# Patient Record
Sex: FEMALE | Race: WHITE | NOT HISPANIC OR LATINO | Employment: UNEMPLOYED | URBAN - METROPOLITAN AREA
[De-identification: names, ages, dates, MRNs, and addresses within clinical notes are randomized per-mention and may not be internally consistent; named-entity substitution may affect disease eponyms.]

---

## 2017-01-23 ENCOUNTER — ALLSCRIPTS OFFICE VISIT (OUTPATIENT)
Dept: OTHER | Facility: OTHER | Age: 57
End: 2017-01-23

## 2017-01-23 LAB — FLUAV AG SPEC QL IA: NEGATIVE

## 2017-02-08 ENCOUNTER — GENERIC CONVERSION - ENCOUNTER (OUTPATIENT)
Dept: OTHER | Facility: OTHER | Age: 57
End: 2017-02-08

## 2017-02-10 ENCOUNTER — ALLSCRIPTS OFFICE VISIT (OUTPATIENT)
Dept: OTHER | Facility: OTHER | Age: 57
End: 2017-02-10

## 2017-02-11 ENCOUNTER — GENERIC CONVERSION - ENCOUNTER (OUTPATIENT)
Dept: OTHER | Facility: OTHER | Age: 57
End: 2017-02-11

## 2017-03-22 ENCOUNTER — GENERIC CONVERSION - ENCOUNTER (OUTPATIENT)
Dept: OTHER | Facility: OTHER | Age: 57
End: 2017-03-22

## 2017-04-11 ENCOUNTER — GENERIC CONVERSION - ENCOUNTER (OUTPATIENT)
Dept: OTHER | Facility: OTHER | Age: 57
End: 2017-04-11

## 2017-04-22 ENCOUNTER — APPOINTMENT (OUTPATIENT)
Dept: LAB | Facility: MEDICAL CENTER | Age: 57
End: 2017-04-22
Payer: COMMERCIAL

## 2017-04-22 DIAGNOSIS — D89.2 HYPERGAMMAGLOBULINEMIA: ICD-10-CM

## 2017-04-22 DIAGNOSIS — R74.8 ABNORMAL LEVELS OF OTHER SERUM ENZYMES: ICD-10-CM

## 2017-04-22 DIAGNOSIS — E78.5 HYPERLIPIDEMIA: ICD-10-CM

## 2017-04-22 LAB
ALBUMIN SERPL BCP-MCNC: 3.3 G/DL (ref 3.5–5)
ALP SERPL-CCNC: 294 U/L (ref 46–116)
ALT SERPL W P-5'-P-CCNC: 46 U/L (ref 12–78)
ANION GAP SERPL CALCULATED.3IONS-SCNC: 5 MMOL/L (ref 4–13)
AST SERPL W P-5'-P-CCNC: 37 U/L (ref 5–45)
BASOPHILS # BLD AUTO: 0.02 THOUSANDS/ΜL (ref 0–0.1)
BASOPHILS NFR BLD AUTO: 0 % (ref 0–1)
BILIRUB SERPL-MCNC: 0.45 MG/DL (ref 0.2–1)
BUN SERPL-MCNC: 19 MG/DL (ref 5–25)
CALCIUM SERPL-MCNC: 8.9 MG/DL (ref 8.3–10.1)
CHLORIDE SERPL-SCNC: 100 MMOL/L (ref 100–108)
CHOLEST SERPL-MCNC: 233 MG/DL (ref 50–200)
CO2 SERPL-SCNC: 31 MMOL/L (ref 21–32)
CREAT SERPL-MCNC: 0.82 MG/DL (ref 0.6–1.3)
EOSINOPHIL # BLD AUTO: 0.16 THOUSAND/ΜL (ref 0–0.61)
EOSINOPHIL NFR BLD AUTO: 3 % (ref 0–6)
ERYTHROCYTE [DISTWIDTH] IN BLOOD BY AUTOMATED COUNT: 13.2 % (ref 11.6–15.1)
GFR SERPL CREATININE-BSD FRML MDRD: >60 ML/MIN/1.73SQ M
GLUCOSE P FAST SERPL-MCNC: 90 MG/DL (ref 65–99)
HCT VFR BLD AUTO: 40 % (ref 34.8–46.1)
HDLC SERPL-MCNC: 72 MG/DL (ref 40–60)
HGB BLD-MCNC: 12.7 G/DL (ref 11.5–15.4)
IGA SERPL-MCNC: 60 MG/DL (ref 70–400)
IGG SERPL-MCNC: 2580 MG/DL (ref 700–1600)
IGM SERPL-MCNC: 259 MG/DL (ref 40–230)
LDLC SERPL DIRECT ASSAY-MCNC: 121 MG/DL (ref 0–100)
LYMPHOCYTES # BLD AUTO: 2.06 THOUSANDS/ΜL (ref 0.6–4.47)
LYMPHOCYTES NFR BLD AUTO: 35 % (ref 14–44)
MCH RBC QN AUTO: 29.3 PG (ref 26.8–34.3)
MCHC RBC AUTO-ENTMCNC: 31.8 G/DL (ref 31.4–37.4)
MCV RBC AUTO: 92 FL (ref 82–98)
MONOCYTES # BLD AUTO: 0.42 THOUSAND/ΜL (ref 0.17–1.22)
MONOCYTES NFR BLD AUTO: 7 % (ref 4–12)
NEUTROPHILS # BLD AUTO: 3.24 THOUSANDS/ΜL (ref 1.85–7.62)
NEUTS SEG NFR BLD AUTO: 55 % (ref 43–75)
NRBC BLD AUTO-RTO: 0 /100 WBCS
PLATELET # BLD AUTO: 239 THOUSANDS/UL (ref 149–390)
PMV BLD AUTO: 11.2 FL (ref 8.9–12.7)
POTASSIUM SERPL-SCNC: 4.4 MMOL/L (ref 3.5–5.3)
PROT SERPL-MCNC: 8.7 G/DL (ref 6.4–8.2)
RBC # BLD AUTO: 4.34 MILLION/UL (ref 3.81–5.12)
SODIUM SERPL-SCNC: 136 MMOL/L (ref 136–145)
TRIGL SERPL-MCNC: 136 MG/DL
WBC # BLD AUTO: 5.93 THOUSAND/UL (ref 4.31–10.16)

## 2017-04-22 PROCEDURE — 82784 ASSAY IGA/IGD/IGG/IGM EACH: CPT

## 2017-04-22 PROCEDURE — 83721 ASSAY OF BLOOD LIPOPROTEIN: CPT

## 2017-04-22 PROCEDURE — 84165 PROTEIN E-PHORESIS SERUM: CPT

## 2017-04-22 PROCEDURE — 85025 COMPLETE CBC W/AUTO DIFF WBC: CPT

## 2017-04-22 PROCEDURE — 80061 LIPID PANEL: CPT

## 2017-04-22 PROCEDURE — 80053 COMPREHEN METABOLIC PANEL: CPT

## 2017-04-22 PROCEDURE — 36415 COLL VENOUS BLD VENIPUNCTURE: CPT

## 2017-04-24 ENCOUNTER — TRANSCRIBE ORDERS (OUTPATIENT)
Dept: ADMINISTRATIVE | Facility: HOSPITAL | Age: 57
End: 2017-04-24

## 2017-04-24 ENCOUNTER — ALLSCRIPTS OFFICE VISIT (OUTPATIENT)
Dept: OTHER | Facility: OTHER | Age: 57
End: 2017-04-24

## 2017-04-24 DIAGNOSIS — D47.2 MONOCLONAL PARAPROTEINEMIA: Primary | ICD-10-CM

## 2017-04-24 LAB
ALBUMIN SERPL ELPH-MCNC: 3.92 G/DL (ref 3.5–5)
ALBUMIN SERPL ELPH-MCNC: 46.1 % (ref 52–65)
ALPHA1 GLOB SERPL ELPH-MCNC: 0.3 G/DL (ref 0.1–0.4)
ALPHA1 GLOB SERPL ELPH-MCNC: 3.5 % (ref 2.5–5)
ALPHA2 GLOB SERPL ELPH-MCNC: 0.95 G/DL (ref 0.4–1.2)
ALPHA2 GLOB SERPL ELPH-MCNC: 11.2 % (ref 7–13)
BETA GLOB ABNORMAL SERPL ELPH-MCNC: 0.51 G/DL (ref 0.4–0.8)
BETA1 GLOB SERPL ELPH-MCNC: 6 % (ref 5–13)
BETA2 GLOB SERPL ELPH-MCNC: 3.9 % (ref 2–8)
BETA2+GAMMA GLOB SERPL ELPH-MCNC: 0.33 G/DL (ref 0.2–0.5)
GAMMA GLOB ABNORMAL SERPL ELPH-MCNC: 2.49 G/DL (ref 0.5–1.6)
GAMMA GLOB SERPL ELPH-MCNC: 29.3 % (ref 12–22)
IGG/ALB SER: 0.86 {RATIO} (ref 1.1–1.8)
M PROTEIN 1 MFR SERPL ELPH: 23 %
M PROTEIN 1 SERPL ELPH-MCNC: 1.96 G/DL
PROT SERPL-MCNC: 8.5 G/DL (ref 6.4–8.2)

## 2017-05-03 ENCOUNTER — ALLSCRIPTS OFFICE VISIT (OUTPATIENT)
Dept: OTHER | Facility: OTHER | Age: 57
End: 2017-05-03

## 2017-05-09 ENCOUNTER — GENERIC CONVERSION - ENCOUNTER (OUTPATIENT)
Dept: OTHER | Facility: OTHER | Age: 57
End: 2017-05-09

## 2017-05-09 ENCOUNTER — HOSPITAL ENCOUNTER (OUTPATIENT)
Dept: NON INVASIVE DIAGNOSTICS | Facility: CLINIC | Age: 57
Discharge: HOME | End: 2017-05-09
Payer: COMMERCIAL

## 2017-05-09 DIAGNOSIS — D47.2 MONOCLONAL PARAPROTEINEMIA: ICD-10-CM

## 2017-05-09 PROCEDURE — 78306 BONE IMAGING WHOLE BODY: CPT

## 2017-05-09 PROCEDURE — A9503 TC99M MEDRONATE: HCPCS

## 2017-05-10 ENCOUNTER — TRANSCRIBE ORDERS (OUTPATIENT)
Dept: LAB | Facility: CLINIC | Age: 57
End: 2017-05-10

## 2017-05-10 ENCOUNTER — APPOINTMENT (OUTPATIENT)
Dept: LAB | Facility: CLINIC | Age: 57
End: 2017-05-10
Payer: COMMERCIAL

## 2017-05-10 ENCOUNTER — ALLSCRIPTS OFFICE VISIT (OUTPATIENT)
Dept: OTHER | Facility: OTHER | Age: 57
End: 2017-05-10

## 2017-05-10 DIAGNOSIS — G89.4 CHRONIC PAIN SYNDROME: ICD-10-CM

## 2017-05-10 LAB
CRP SERPL QL: 4.8 MG/L
ERYTHROCYTE [DISTWIDTH] IN BLOOD BY AUTOMATED COUNT: 13 % (ref 11.6–15.1)
ERYTHROCYTE [SEDIMENTATION RATE] IN BLOOD: 56 MM/HOUR (ref 0–20)
HCT VFR BLD AUTO: 40.8 % (ref 34.8–46.1)
HGB BLD-MCNC: 12.9 G/DL (ref 11.5–15.4)
MCH RBC QN AUTO: 28.7 PG (ref 26.8–34.3)
MCHC RBC AUTO-ENTMCNC: 31.6 G/DL (ref 31.4–37.4)
MCV RBC AUTO: 91 FL (ref 82–98)
PLATELET # BLD AUTO: 244 THOUSANDS/UL (ref 149–390)
PMV BLD AUTO: 10.7 FL (ref 8.9–12.7)
RBC # BLD AUTO: 4.5 MILLION/UL (ref 3.81–5.12)
WBC # BLD AUTO: 5.43 THOUSAND/UL (ref 4.31–10.16)

## 2017-05-10 PROCEDURE — 86618 LYME DISEASE ANTIBODY: CPT

## 2017-05-10 PROCEDURE — 85027 COMPLETE CBC AUTOMATED: CPT

## 2017-05-10 PROCEDURE — 86430 RHEUMATOID FACTOR TEST QUAL: CPT

## 2017-05-10 PROCEDURE — 36415 COLL VENOUS BLD VENIPUNCTURE: CPT

## 2017-05-10 PROCEDURE — 86140 C-REACTIVE PROTEIN: CPT

## 2017-05-10 PROCEDURE — 81374 HLA I TYPING 1 ANTIGEN LR: CPT

## 2017-05-10 PROCEDURE — 86038 ANTINUCLEAR ANTIBODIES: CPT

## 2017-05-10 PROCEDURE — 85652 RBC SED RATE AUTOMATED: CPT

## 2017-05-11 ENCOUNTER — GENERIC CONVERSION - ENCOUNTER (OUTPATIENT)
Dept: OTHER | Facility: OTHER | Age: 57
End: 2017-05-11

## 2017-05-11 LAB
B BURGDOR IGG SER IA-ACNC: 0.22
B BURGDOR IGM SER IA-ACNC: 0.12
RHEUMATOID FACT SER QL LA: NEGATIVE

## 2017-05-12 ENCOUNTER — ALLSCRIPTS OFFICE VISIT (OUTPATIENT)
Dept: OTHER | Facility: OTHER | Age: 57
End: 2017-05-12

## 2017-05-12 ENCOUNTER — GENERIC CONVERSION - ENCOUNTER (OUTPATIENT)
Dept: OTHER | Facility: OTHER | Age: 57
End: 2017-05-12

## 2017-05-16 LAB
HLA-B27 QL NAA+PROBE: NEGATIVE
RYE IGE QN: NEGATIVE

## 2017-05-19 ENCOUNTER — GENERIC CONVERSION - ENCOUNTER (OUTPATIENT)
Dept: OTHER | Facility: OTHER | Age: 57
End: 2017-05-19

## 2017-05-19 ENCOUNTER — HOSPITAL ENCOUNTER (OUTPATIENT)
Dept: RADIOLOGY | Facility: HOSPITAL | Age: 57
Discharge: HOME | End: 2017-05-19
Attending: INTERNAL MEDICINE | Admitting: RADIOLOGY
Payer: COMMERCIAL

## 2017-05-19 VITALS
BODY MASS INDEX: 25.18 KG/M2 | WEIGHT: 170 LBS | DIASTOLIC BLOOD PRESSURE: 67 MMHG | TEMPERATURE: 98.3 F | OXYGEN SATURATION: 98 % | RESPIRATION RATE: 18 BRPM | HEART RATE: 65 BPM | SYSTOLIC BLOOD PRESSURE: 109 MMHG | HEIGHT: 69 IN

## 2017-05-19 DIAGNOSIS — D47.2 MONOCLONAL GAMMOPATHY OF UNKNOWN SIGNIFICANCE: ICD-10-CM

## 2017-05-19 DIAGNOSIS — G62.9 PERIPHERAL POLYNEUROPATHY: ICD-10-CM

## 2017-05-19 PROCEDURE — 88342 IMHCHEM/IMCYTCHM 1ST ANTB: CPT | Performed by: INTERNAL MEDICINE

## 2017-05-19 PROCEDURE — 88365 INSITU HYBRIDIZATION (FISH): CPT | Performed by: INTERNAL MEDICINE

## 2017-05-19 PROCEDURE — 38220 DX BONE MARROW ASPIRATIONS: CPT

## 2017-05-19 PROCEDURE — 88374 M/PHMTRC ALYS ISHQUANT/SEMIQ: CPT

## 2017-05-19 PROCEDURE — 88373 M/PHMTRC ALYS ISHQUANT/SEMIQ: CPT

## 2017-05-19 PROCEDURE — 88313 SPECIAL STAINS GROUP 2: CPT | Performed by: INTERNAL MEDICINE

## 2017-05-19 PROCEDURE — 88185 FLOWCYTOMETRY/TC ADD-ON: CPT

## 2017-05-19 PROCEDURE — 88367 INSITU HYBRIDIZATION AUTO: CPT

## 2017-05-19 PROCEDURE — 77012 CT SCAN FOR NEEDLE BIOPSY: CPT

## 2017-05-19 PROCEDURE — 88184 FLOWCYTOMETRY/ TC 1 MARKER: CPT | Performed by: INTERNAL MEDICINE

## 2017-05-19 PROCEDURE — 88311 DECALCIFY TISSUE: CPT | Performed by: INTERNAL MEDICINE

## 2017-05-19 PROCEDURE — 85097 BONE MARROW INTERPRETATION: CPT | Performed by: INTERNAL MEDICINE

## 2017-05-19 PROCEDURE — 88341 IMHCHEM/IMCYTCHM EA ADD ANTB: CPT | Performed by: INTERNAL MEDICINE

## 2017-05-19 PROCEDURE — 88305 TISSUE EXAM BY PATHOLOGIST: CPT | Performed by: INTERNAL MEDICINE

## 2017-05-19 PROCEDURE — 36415 COLL VENOUS BLD VENIPUNCTURE: CPT

## 2017-05-19 PROCEDURE — 38221 DX BONE MARROW BIOPSIES: CPT

## 2017-05-19 RX ORDER — DIPHENHYDRAMINE HYDROCHLORIDE 50 MG/ML
INJECTION INTRAMUSCULAR; INTRAVENOUS CODE/TRAUMA/SEDATION MEDICATION
Status: COMPLETED | OUTPATIENT
Start: 2017-05-19 | End: 2017-05-19

## 2017-05-19 RX ORDER — SODIUM CHLORIDE 9 MG/ML
75 INJECTION, SOLUTION INTRAVENOUS CONTINUOUS
Status: DISCONTINUED | OUTPATIENT
Start: 2017-05-19 | End: 2017-05-19 | Stop reason: HOSPADM

## 2017-05-19 RX ORDER — MIDAZOLAM HYDROCHLORIDE 1 MG/ML
INJECTION INTRAMUSCULAR; INTRAVENOUS CODE/TRAUMA/SEDATION MEDICATION
Status: COMPLETED | OUTPATIENT
Start: 2017-05-19 | End: 2017-05-19

## 2017-05-19 RX ORDER — FENTANYL CITRATE 50 UG/ML
INJECTION, SOLUTION INTRAMUSCULAR; INTRAVENOUS CODE/TRAUMA/SEDATION MEDICATION
Status: COMPLETED | OUTPATIENT
Start: 2017-05-19 | End: 2017-05-19

## 2017-05-19 RX ORDER — PAROXETINE 30 MG/1
30 TABLET, FILM COATED ORAL EVERY MORNING
COMMUNITY
End: 2018-03-23

## 2017-05-19 RX ADMIN — MIDAZOLAM HYDROCHLORIDE 2 MG: 1 INJECTION, SOLUTION INTRAMUSCULAR; INTRAVENOUS at 10:13

## 2017-05-19 RX ADMIN — FENTANYL CITRATE 50 MCG: 50 INJECTION, SOLUTION INTRAMUSCULAR; INTRAVENOUS at 10:13

## 2017-05-19 RX ADMIN — MIDAZOLAM HYDROCHLORIDE 2 MG: 1 INJECTION, SOLUTION INTRAMUSCULAR; INTRAVENOUS at 10:18

## 2017-05-19 RX ADMIN — DIPHENHYDRAMINE HYDROCHLORIDE 25 MG: 50 INJECTION, SOLUTION INTRAMUSCULAR; INTRAVENOUS at 10:28

## 2017-05-19 RX ADMIN — SODIUM CHLORIDE 75 ML/HR: 0.9 INJECTION, SOLUTION INTRAVENOUS at 08:28

## 2017-05-22 LAB — SCAN RESULT: NORMAL

## 2017-06-02 ENCOUNTER — GENERIC CONVERSION - ENCOUNTER (OUTPATIENT)
Dept: OTHER | Facility: OTHER | Age: 57
End: 2017-06-02

## 2017-06-05 ENCOUNTER — ALLSCRIPTS OFFICE VISIT (OUTPATIENT)
Dept: OTHER | Facility: OTHER | Age: 57
End: 2017-06-05

## 2017-06-05 DIAGNOSIS — K74.3 PRIMARY BILIARY CHOLANGITIS (HCC): ICD-10-CM

## 2017-06-09 ENCOUNTER — ALLSCRIPTS OFFICE VISIT (OUTPATIENT)
Dept: OTHER | Facility: OTHER | Age: 57
End: 2017-06-09

## 2017-06-16 ENCOUNTER — ALLSCRIPTS OFFICE VISIT (OUTPATIENT)
Dept: OTHER | Facility: OTHER | Age: 57
End: 2017-06-16

## 2017-06-23 ENCOUNTER — ALLSCRIPTS OFFICE VISIT (OUTPATIENT)
Dept: OTHER | Facility: OTHER | Age: 57
End: 2017-06-23

## 2017-06-23 ENCOUNTER — HOSPITAL ENCOUNTER (OUTPATIENT)
Dept: INFUSION CENTER | Facility: CLINIC | Age: 57
Discharge: HOME | End: 2017-06-23
Payer: COMMERCIAL

## 2017-06-23 VITALS
SYSTOLIC BLOOD PRESSURE: 112 MMHG | DIASTOLIC BLOOD PRESSURE: 72 MMHG | WEIGHT: 179 LBS | OXYGEN SATURATION: 97 % | TEMPERATURE: 98.7 F | HEART RATE: 80 BPM | BODY MASS INDEX: 25.62 KG/M2 | RESPIRATION RATE: 20 BRPM | HEIGHT: 70 IN

## 2017-06-23 PROCEDURE — 96365 THER/PROPH/DIAG IV INF INIT: CPT

## 2017-06-23 RX ORDER — ZOLEDRONIC ACID 0.04 MG/ML
4 INJECTION, SOLUTION INTRAVENOUS ONCE
Status: COMPLETED | OUTPATIENT
Start: 2017-06-23 | End: 2017-06-23

## 2017-06-23 RX ORDER — SODIUM CHLORIDE 9 MG/ML
20 INJECTION, SOLUTION INTRAVENOUS CONTINUOUS
Status: DISCONTINUED | OUTPATIENT
Start: 2017-06-23 | End: 2017-06-26 | Stop reason: HOSPADM

## 2017-06-23 RX ADMIN — ZOLEDRONIC ACID 4 MG: 0.04 INJECTION, SOLUTION INTRAVENOUS at 12:32

## 2017-06-23 RX ADMIN — SODIUM CHLORIDE 20 ML/HR: 0.9 INJECTION, SOLUTION INTRAVENOUS at 11:40

## 2017-06-23 NOTE — PROGRESS NOTES
Pt at clinic for first treatment of Zometa, talked to Kiana Castellano RN ok to use creat from April 22, 2017

## 2017-06-26 ENCOUNTER — GENERIC CONVERSION - ENCOUNTER (OUTPATIENT)
Dept: OTHER | Facility: OTHER | Age: 57
End: 2017-06-26

## 2017-07-19 ENCOUNTER — GENERIC CONVERSION - ENCOUNTER (OUTPATIENT)
Dept: OTHER | Facility: OTHER | Age: 57
End: 2017-07-19

## 2017-07-27 ENCOUNTER — HOSPITAL ENCOUNTER (OUTPATIENT)
Dept: RADIOLOGY | Age: 57
Discharge: HOME | End: 2017-07-27
Payer: COMMERCIAL

## 2017-07-27 DIAGNOSIS — C90.00 MULTIPLE MYELOMA NOT HAVING ACHIEVED REMISSION (HCC): ICD-10-CM

## 2017-07-27 LAB — GLUCOSE SERPL-MCNC: 107 MG/DL (ref 65–140)

## 2017-07-27 PROCEDURE — 78815 PET IMAGE W/CT SKULL-THIGH: CPT

## 2017-07-27 PROCEDURE — 82948 REAGENT STRIP/BLOOD GLUCOSE: CPT

## 2017-07-27 PROCEDURE — A9552 F18 FDG: HCPCS

## 2017-07-27 RX ADMIN — IOHEXOL 5 ML: 240 INJECTION, SOLUTION INTRATHECAL; INTRAVASCULAR; INTRAVENOUS; ORAL at 07:20

## 2017-07-31 ENCOUNTER — ALLSCRIPTS OFFICE VISIT (OUTPATIENT)
Dept: OTHER | Facility: OTHER | Age: 57
End: 2017-07-31

## 2017-08-25 ENCOUNTER — ALLSCRIPTS OFFICE VISIT (OUTPATIENT)
Dept: OTHER | Facility: OTHER | Age: 57
End: 2017-08-25

## 2017-08-27 ENCOUNTER — GENERIC CONVERSION - ENCOUNTER (OUTPATIENT)
Dept: OTHER | Facility: OTHER | Age: 57
End: 2017-08-27

## 2017-09-11 ENCOUNTER — ALLSCRIPTS OFFICE VISIT (OUTPATIENT)
Dept: OTHER | Facility: OTHER | Age: 57
End: 2017-09-11

## 2017-09-11 DIAGNOSIS — C90.00 MULTIPLE MYELOMA NOT HAVING ACHIEVED REMISSION (HCC): ICD-10-CM

## 2017-09-22 ENCOUNTER — GENERIC CONVERSION - ENCOUNTER (OUTPATIENT)
Dept: OTHER | Facility: OTHER | Age: 57
End: 2017-09-22

## 2017-10-03 ENCOUNTER — GENERIC CONVERSION - ENCOUNTER (OUTPATIENT)
Dept: OTHER | Facility: OTHER | Age: 57
End: 2017-10-03

## 2017-10-06 ENCOUNTER — APPOINTMENT (OUTPATIENT)
Dept: LAB | Facility: CLINIC | Age: 57
End: 2017-10-06
Payer: COMMERCIAL

## 2017-10-06 DIAGNOSIS — C90.00 MULTIPLE MYELOMA NOT HAVING ACHIEVED REMISSION (HCC): ICD-10-CM

## 2017-10-06 DIAGNOSIS — K74.3 PRIMARY BILIARY CHOLANGITIS (HCC): ICD-10-CM

## 2017-10-06 LAB
ALBUMIN SERPL BCP-MCNC: 3.2 G/DL (ref 3.5–5)
ALP SERPL-CCNC: 268 U/L (ref 46–116)
ALT SERPL W P-5'-P-CCNC: 52 U/L (ref 12–78)
ANION GAP SERPL CALCULATED.3IONS-SCNC: 6 MMOL/L (ref 4–13)
AST SERPL W P-5'-P-CCNC: 43 U/L (ref 5–45)
BASOPHILS # BLD AUTO: 0.03 THOUSANDS/ΜL (ref 0–0.1)
BASOPHILS NFR BLD AUTO: 1 % (ref 0–1)
BILIRUB SERPL-MCNC: 0.44 MG/DL (ref 0.2–1)
BUN SERPL-MCNC: 20 MG/DL (ref 5–25)
CALCIUM SERPL-MCNC: 8.7 MG/DL (ref 8.3–10.1)
CHLORIDE SERPL-SCNC: 102 MMOL/L (ref 100–108)
CO2 SERPL-SCNC: 29 MMOL/L (ref 21–32)
CREAT SERPL-MCNC: 0.97 MG/DL (ref 0.6–1.3)
EOSINOPHIL # BLD AUTO: 0.11 THOUSAND/ΜL (ref 0–0.61)
EOSINOPHIL NFR BLD AUTO: 2 % (ref 0–6)
ERYTHROCYTE [DISTWIDTH] IN BLOOD BY AUTOMATED COUNT: 13.6 % (ref 11.6–15.1)
GFR SERPL CREATININE-BSD FRML MDRD: 65 ML/MIN/1.73SQ M
GLUCOSE P FAST SERPL-MCNC: 108 MG/DL (ref 65–99)
HCT VFR BLD AUTO: 38.9 % (ref 34.8–46.1)
HGB BLD-MCNC: 12.5 G/DL (ref 11.5–15.4)
LYMPHOCYTES # BLD AUTO: 1.84 THOUSANDS/ΜL (ref 0.6–4.47)
LYMPHOCYTES NFR BLD AUTO: 31 % (ref 14–44)
MCH RBC QN AUTO: 29.1 PG (ref 26.8–34.3)
MCHC RBC AUTO-ENTMCNC: 32.1 G/DL (ref 31.4–37.4)
MCV RBC AUTO: 91 FL (ref 82–98)
MONOCYTES # BLD AUTO: 0.42 THOUSAND/ΜL (ref 0.17–1.22)
MONOCYTES NFR BLD AUTO: 7 % (ref 4–12)
NEUTROPHILS # BLD AUTO: 3.59 THOUSANDS/ΜL (ref 1.85–7.62)
NEUTS SEG NFR BLD AUTO: 59 % (ref 43–75)
NRBC BLD AUTO-RTO: 0 /100 WBCS
PLATELET # BLD AUTO: 208 THOUSANDS/UL (ref 149–390)
PMV BLD AUTO: 11.3 FL (ref 8.9–12.7)
POTASSIUM SERPL-SCNC: 3.9 MMOL/L (ref 3.5–5.3)
PROT SERPL-MCNC: 8.7 G/DL (ref 6.4–8.2)
RBC # BLD AUTO: 4.3 MILLION/UL (ref 3.81–5.12)
SODIUM SERPL-SCNC: 137 MMOL/L (ref 136–145)
WBC # BLD AUTO: 6.01 THOUSAND/UL (ref 4.31–10.16)

## 2017-10-06 PROCEDURE — 83883 ASSAY NEPHELOMETRY NOT SPEC: CPT

## 2017-10-06 PROCEDURE — 85025 COMPLETE CBC W/AUTO DIFF WBC: CPT

## 2017-10-06 PROCEDURE — 80053 COMPREHEN METABOLIC PANEL: CPT

## 2017-10-06 PROCEDURE — 36415 COLL VENOUS BLD VENIPUNCTURE: CPT

## 2017-10-07 LAB
KAPPA LC FREE SER-MCNC: 37 MG/L (ref 3.3–19.4)
KAPPA LC FREE/LAMBDA FREE SER: 2.55 {RATIO} (ref 0.26–1.65)
LAMBDA LC FREE SERPL-MCNC: 14.5 MG/L (ref 5.7–26.3)

## 2017-10-14 ENCOUNTER — HOSPITAL ENCOUNTER (OUTPATIENT)
Dept: INFUSION CENTER | Facility: CLINIC | Age: 57
Discharge: HOME | End: 2017-10-14
Payer: COMMERCIAL

## 2017-10-14 VITALS
OXYGEN SATURATION: 98 % | WEIGHT: 181.2 LBS | SYSTOLIC BLOOD PRESSURE: 118 MMHG | HEART RATE: 72 BPM | BODY MASS INDEX: 25.94 KG/M2 | HEIGHT: 70 IN | RESPIRATION RATE: 20 BRPM | DIASTOLIC BLOOD PRESSURE: 74 MMHG | TEMPERATURE: 98.1 F

## 2017-10-14 PROCEDURE — 96365 THER/PROPH/DIAG IV INF INIT: CPT

## 2017-10-14 RX ORDER — SODIUM CHLORIDE 9 MG/ML
20 INJECTION, SOLUTION INTRAVENOUS CONTINUOUS
Status: DISCONTINUED | OUTPATIENT
Start: 2017-10-14 | End: 2017-10-17 | Stop reason: HOSPADM

## 2017-10-14 RX ORDER — ZOLEDRONIC ACID 0.04 MG/ML
4 INJECTION, SOLUTION INTRAVENOUS ONCE
Status: DISCONTINUED | OUTPATIENT
Start: 2017-10-14 | End: 2017-10-17 | Stop reason: HOSPADM

## 2017-10-14 RX ADMIN — SODIUM CHLORIDE 20 ML/HR: 0.9 INJECTION, SOLUTION INTRAVENOUS at 12:15

## 2017-10-14 RX ADMIN — ZOLEDRONIC ACID 3.5 MG: 0.8 INJECTION, SOLUTION, CONCENTRATE INTRAVENOUS at 12:31

## 2017-10-14 NOTE — PROGRESS NOTES
Pt resting with no complaints  Vitals stable; labs drawn prior to treatment  Callbell within reach; will continue to monitor

## 2017-10-26 NOTE — PROGRESS NOTES
Assessment  1  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)    Plan  Smoldering multiple myeloma (SMM)    · (1) CBC/PLT/DIFF; Status:Active; Requested for:28Jyb7347;    Perform:[facility]; Due:43Ihr8923; Ordered; For:Smoldering multiple myeloma (SMM); Ordered By:Faroun, Wilmer Patches [facility]);   · (1) COMPREHENSIVE METABOLIC PANEL; Status:Active; Requested for:00Xkg4263;    Perform:[facility]; Due:55Dgt5559; Ordered; For:Smoldering multiple myeloma (SMM); Ordered By:Faroun, Wilmer Patches [facility]);   · (1) FREE LIGHT CHAINS, SERUM; Status:Active; Requested for:83Xxs4393;    Perform:[facility]; Due:35Ltu6929; Ordered; For:Smoldering multiple myeloma (SMM); Ordered By:Faroun, Wilmer Patches [facility]);   · (Q) IMMUNOFIXATION IGA,IGG,IGM QT  IMMUNOFIXATION SERUM IMMUNOGLOBULIN;  Status:Active; Requested for:19Uxf4129;    Perform:[facility]; Due:46Vnz7392; Ordered; For:Smoldering multiple myeloma (SMM); Ordered By:Faroun, Wilmer Patches [facility]); · Follow-up visit in 6 months Evaluation and Treatment  Follow-up  Status: Hold For -  Scheduling  Requested for: 10Dri5304   Ordered; For: Smoldering multiple myeloma (SMM); Ordered By: Ciera Carlos) Performed:  Due: 79LUL8608    Discussion/Summary  Discussion Summary:   #1  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, M protein 1 93 g/dL in April 2017, no evidence of an organ damage such as anemia, hypercalcemia or renal insufficiency, bone marrow biopsy showed 15% plasma cells, with normal cytogenetics as well as fish panel for multiple myelomaproceed with dose #2 out of 4 of Zometa 4 mg IV every 6 month, Ibuprofen every 6 hours when necessary for the next 48 hours after ZometaPrimary cirrhosis of the liver with arthralgia followed by youFollow-up in 6 months with CBC, CMP, SPEP, free light chain        Chief Complaint  Chief Complaint Free Text Note Form: MGUS      History of Present Illness  HPI: 40-year-old  female with past medical history of primary biliary cirrhosis, fracture of the right tibia, hypertension, OCD,Worsening of osteoporosis on DEXA scan Done in 2016, nephrolithiasis, chronic lower back pain and possible sacroiliitis was found to have elevated total protein 3-4 years ago, serum protein electrophoresis showed IgG kappa monoclonal protein of 1 7 g/dL however no evidence of anemia, hypercalcemia, or renal insufficiency, she had persistent elevation of alkaline phosphatase secondary to P BChad a bone scan done last year which showed activity in the ribs areamost recent serum protein electrophoresis in April 2017 showed IgG kappa monoclonal protein of 1 96 g/dL, total protein 8 5, albumin 3 9, IgG 2580, creatinine 0 8, calcium 8 9, alkaline phosphatase 294, AST 37, ALT 46, IgM 259July 2016 monoclonal protein of IgG kappa of 1 73protein has been normal however sedimentation rate was elevated in the range of 60been complaining of diffuse bone pain/aches especially in the shoulders, lower back, bilateral knees and hipsdoes not smoke or drinkworks [facility] and she takes care of her motherhas 2 living brothershistory significant for GI cancer in maternal grandmother   Interval History: She had pain lasting 2 days after Zometa      Review of Systems  Complete-Female:   Constitutional: no fever,-- not feeling poorly,-- no recent weight gain,-- no chills,-- not feeling tired-- and-- no recent weight loss  Eyes: No complaints of eye pain, no red eyes, no eyesight problems, no discharge, no dry eyes, no itching of eyes  ENT: no sore throat  Cardiovascular: No complaints of slow heart rate, no fast heart rate, no chest pain, no palpitations, no leg claudication, no lower extremity edema  Respiratory: no shortness of breath,-- no wheezing-- and-- no shortness of breath during exertion  Gastrointestinal: no abdominal pain,-- no nausea,-- no vomiting,-- no diarrhea-- and-- no blood in stools     Genitourinary: No complaints of dysuria, no incontinence, no pelvic pain, no dysmenorrhea, no vaginal discharge or bleeding  Musculoskeletal: limb pain-- and-- myalgias, but-- no joint swelling,-- no joint stiffness-- and-- no limb swelling  Integumentary: itching  Neurological: No complaints of headache, no confusion, no convulsions, no numbness, no dizziness or fainting, no tingling, no limb weakness, no difficulty walking  Psychiatric: Not suicidal, no sleep disturbance, no anxiety or depression, no change in personality, no emotional problems  Endocrine: No complaints of proptosis, no hot flashes, no muscle weakness, no deepening of the voice, no feelings of weakness  Hematologic/Lymphatic: no swollen glands,-- no tendency for easy bleeding-- and-- no swollen glands in the neck  Active Problems  1  Abnormal mammogram (793 80) (R92 8)   2  Aftercare following surgery of the musculoskeletal system (V58 78) (Z47 89)   3  Arthralgia of knee, right (719 46) (M25 561)   4  Arthritis (716 90) (M19 90)   5  Articular cartilage disorder of knee, right (717 9) (M23 91)   6  Back pain (724 5) (M54 9)   7  Chest pain (786 50) (R07 9)   8  Chronic liver disease (571 9) (K76 9)   9  Chronic pain syndrome (338 4) (G89 4)   10  Chronic right hip pain (719 45,338 29) (M25 551,G89 29)   11  Cough (786 2) (R05)   12  Dense breasts (793 82) (R92 2)   13  Depression with anxiety (300 4) (F41 8)   14  Difficulty concentrating (799 51) (R41 840)   15  Displacement of lumbar intervertebral disc without myelopathy (722 10) (M51 26)   16  Dry skin (701 1) (L85 3)   17  Ear noise/buzzing (388 30) (H93 19)   18  Elevated liver enzymes (790 5) (R74 8)   19  Epicondylitis elbow, medial, left (726 31) (M77 02)   20  Fainting (780 2) (R55)   21  Foot drop, right (736 79) (M21 371)   22  Ganglion cyst of flexor tendon sheath of finger, right (727 42) (M67 441)   23  Headache (784 0) (R51)   24  Hydronephrosis (591) (N13 30)   25   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) 26  Hyperlipidemia (272 4) (E78 5)   27  Hypertension (401 9) (I10)   28  Left elbow pain (719 42) (M25 522)   29  Long term use of drug (V58 69) (Z79 899)   30  Low back pain (724 2) (M54 5)   31  Lumbar degenerative disc disease (722 52) (M51 36)   32  Lumbar radiculopathy (724 4) (M54 16)   33  Medial epicondylitis of left elbow (726 31) (M77 02)   34  Memory loss (780 93) (R41 3)   35  MGUS (monoclonal gammopathy of unknown significance) (273 1) (D47 2)   36  Neck pain (723 1) (M54 2)   37  Need for prophylactic vaccination and inoculation against influenza (V04 81) (Z23)   38  Need for vaccination for DTP (V06 1) (Z23)   39  Nephrolithiasis (592 0) (N20 0)   40  Night sweats (780 8) (R61)   41  Occipital neuralgia (723 8) (M54 81)   42  Orthopedic Aftercare For Healing Traumatic Fx Lower Leg (V54 16)   43  Orthopedic aftercare for healing traumatic leg fracture (V54 14) (S82 90XD)   44  Osteoporosis (733 00) (M81 0)   45  Pain due to internal orthopedic prosthetic device, implant, or graft (996 78,338 18)    (T84 84XA)   46  Pain in joint (719 40) (M25 50)   47  Pain of lower extremity (729 5) (M79 606)   48  Peripheral neuropathy (356 9) (G62 9)   49  Pes anserine bursitis (726 61) (M70 50)   50  Postlaminectomy syndrome, lumbar (722 83) (M96 1)   51  Post-traumatic osteoarthritis of right knee (715 26) (M17 31)   52  Primary biliary cirrhosis (571 6) (K74 3)   53  Pruritus (698 9) (L29 9)   54  Right elbow pain (719 42) (M25 521)   55  Right shoulder pain (719 41) (M25 511)   56  Sacroiliitis (720 2) (M46 1)   57  Screening for colorectal cancer (V76 51) (Z12 11,Z12 12)   58  Shoulder bursitis, right (726 10) (M75 51)   59  Skin rash (782 1) (R21)   60  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)   61  Status post fall (V15 88) (Z91 81)   62  Trigger finger (727 03) (M65 30)   63  Trigger middle finger of right hand (727 03) (M65 331)   64  Trigger ring finger of right hand (727 03) (M65 341)   65  Trochanteric bursitis (726 5) (M70 60)   66  Urinary frequency (788 41) (R35 0)   67  Whiplash injuries (847 0) (S13 4XXA)    Past Medical History  1  History of Abnormal breast exam (611 79) (N64 59)   2  Acute pharyngitis (462) (J02 9)   3  History of Anxiety (300 00) (F41 9)   4  History of Depression with anxiety (300 4) (F41 8)   5  History of Hernia (553 9) (K46 9)   6  History of acute pharyngitis (V12 69) (Z87 09)   7  History of asthma (V12 69) (Z87 09)   8  History of bronchitis (V12 69) (Z87 09)   9  History of cardiac murmur (V12 59) (Z86 79)   10  History of earache (V12 49) (Z86 69)   11  History of endometriosis (V13 29) (Z87 42)   12  History of fracture of tibia (V15 51) (Z87 81)   13  History of hepatic disease (V12 79) (Z87 19)   14  History of low back pain (V13 59) (Z87 39)   15  History of obsessive compulsive disorder (V11 2) (Z86 59)   16  History of pneumonia (V12 61) (Z87 01)   17  History of seasonal allergies (V15 09) (Z88 9)   18  History of whiplash injury to neck (V15 59) (Z87 828)   19  Hypertension (401 9) (I10)   20  Need for vaccination for DTP (V06 1) (Z23)   21  Orthopedic aftercare for healing traumatic leg fracture (V54 14) (S82 90XD)   22  History of Primary biliary cirrhosis (571 6) (K74 3)   23  History of Wears eyeglasses (V49 89) (Z97 3)    Surgical History  1  History of Arthroplasty Of The Tibial Plateau   2  History of Back Surgery   3  History of Hernia Repair   4  History of Injection Of Trigger Point(S)   5  History of Jaw Surgery   6  History of Neuroplasty Decompression Median Nerve At Carpal Tunnel   7  Orthopedic Aftercare For Healing Traumatic Fx Lower Leg (V54 16)    Family History  Mother    1  Family history of Anxiety (Symptom)  Father    2  Family history of Anxiety (Symptom)   3  Family history of    4  Family history of hepatic cirrhosis (V18 59) (Z83 79)  Maternal Grandmother    5  Family history of   Paternal Grandmother    10   Family history of   Maternal Grandfather    7  Family history of   Paternal Grandfather    6  Family history of   Unknown    9  Family history of Back disorder   10  Family history of neuropathy (V17 2) (Z82 0)  Family History    11  Family history of Anxiety (Symptom)   12  Family history of Atherosclerosis (V17 49)   13  Family history of Depression   14  Family history of Hyperlipidemia   13  Family history of Osteoarthritis (V17 7)    Social History   · Alcohol Use (History)   · Completed college   · Functioning activity level   · Living situation   · Never A Smoker   · No alcohol use   · No drug use   · Occupation   · Single    Current Meds   1  Betamethasone Sod Phos & Acet 6 (3-3) MG/ML Injection Suspension; INJECT 6  MG   Subcutaneous; To Be Done: 14DBV4262; Status: HOLD FOR - Administration Ordered   2  Gralise 600 MG Oral Tablet; TAKE 3 TABLETS AT DINNER TIME; Therapy: 02OED7927 to (Claudell Sofia)  Requested for: 20CQN5019; Last   Rx:2017 Ordered   3  Paxil CR 25 MG Oral Tablet Extended Release 24 Hour; Take 1 tablet twice daily; Therapy: (Yash Latham) to Recorded   4  TraMADol HCl - 50 MG Oral Tablet; TAKE 1 TABLET Twice daily PRN  NOT TO EXCEED   100MG A DAY; Therapy: 2017 to Recorded   5  Triamterene-HCTZ TABS; Therapy: (Recorded:2017) to Recorded   6  Ursodiol 300 MG Oral Capsule; Take 1 capsule three times daily  Requested for:   48RTD3363; Last Rx:78Ixo5754 Ordered   7  Zovirax 200 MG Oral Capsule; 1 TABLET DAILY FOR 5 DAYS DURING PERIODS OF   HERPES SIMPLEX; Therapy: 2017 to Recorded    Allergies  1  Codeine Derivatives   2  Doxycycline Hyclate CAPS   3  Penicillins   4  Sulfa Drugs  5  FRUIT   6  Latex   7  Milk   8  TOMATO  Denied    9   Anesthesia Extension Tubing Miscellaneous    Vitals  Vital Signs    Recorded: 2017 08:59AM   Temperature 97 8 F   Heart Rate 76   Respiration 16   Systolic 930   Diastolic 80   Height 5 ft 9 in Weight 176 lb    BMI Calculated 25 99   BSA Calculated 1 96   O2 Saturation 96     Physical Exam    Constitutional   General appearance: No acute distress, well appearing and well nourished  Eyes   Conjunctiva and lids: No swelling, erythema or discharge  Pupils and irises: Equal, round and reactive to light  Ears, Nose, Mouth, and Throat   External inspection of ears and nose: Normal     Oropharynx: Normal with no erythema, edema, exudate or lesions  Pulmonary   Auscultation of lungs: Clear to auscultation  Cardiovascular   Auscultation of heart: Normal rate and rhythm, normal S1 and S2, without murmurs  Examination of extremities for edema and/or varicosities: Normal     Carotid pulses: Normal     Abdomen   Abdomen: Non-tender, no masses  Liver and spleen: No hepatomegaly or splenomegaly  Lymphatic   Palpation of lymph nodes in neck: No lymphadenopathy  Musculoskeletal   Gait and station: Normal     Digits and nails: Normal without clubbing or cyanosis  Inspection/palpation of joints, bones, and muscles: Normal     Skin   Skin and subcutaneous tissue: Normal without rashes or lesions  Neurologic   Cranial nerves: Cranial nerves 2-12 intact  Psychiatric   Orientation to person, place, and time: Normal     Mood and affect: Normal         ECOG 0       Results/Data  * NM PET CT SKULL BASE TO MID THIGH 74AUK9563 06:31AM Senia Or Dani Perez)     Test Name Result Flag Reference   NM PET CT SKULL BASE TO MID THIGH (Report)     PET/CT SCAN     INDICATION: Multiple myeloma,     MODIFIER: PI        COMPARISON: Bone scan 5/9/2017 and priors     CELL TYPE: Plasma cell neoplasm, bone marrow biopsy left iliac crest 5/19/2017     TECHNIQUE:  8 8 mCi F-18-FDG administered IV   Multiplanar attenuation corrected and non attenuation corrected PET images are available for interpretation, and contiguous, low dose, axial CT sections were obtained from the vertex through the femurs    following the administration of oral contrast material (7 5 cc Omnipaque-240 in 300 cc water)  Intravenous contrast material was not utilized  This examination, like all CT scans performed in the [facility], was performed utilizing    techniques to minimize radiation dose exposure, including the use of iterative reconstruction and automated exposure control  Fasting serum glucose: 107 mg/dl     FINDINGS:      VISUALIZED BRAIN:     No acute abnormalities are seen  HEAD/NECK:     There is a small cutaneous focus of activity in the left vertex, SUV 4 1  Etiology is uncertain, possibly just inflammatory  There is otherwise a physiologic distribution of FDG  No FDG avid cervical adenopathy is seen  CT images: Unremarkable  CHEST:     No FDG avid soft tissue lesions are seen  CT images: Small left lingular scarring/atelectasis  ABDOMEN:     No FDG avid soft tissue lesions are seen  CT images: Unremarkable  PELVIS:    No FDG avid soft tissue lesions are seen  CT images: Unremarkable  OSSEOUS STRUCTURES:   No FDG avid lesions are seen  CT images: Minimal spine degenerative change, including right L4-5 posterior facets  IMPRESSION:   1  There is a small cutaneous focus of activity in the scalp overlying the left vertex region, likely inflammatory, however this may be correlated clinically  2  Otherwise there are no hypermetabolic lesions that are concerning for malignancy/metastases  Workstation performed: [id number]     Signed by:   Sade Sanchez MD   7/27/17     (1) BONE MARROW BIOPSY AND EXAM 20LLG8635 10:31AM [address] Layne Fothergill)     Test Name Result Flag Reference   LAB AP SURGICAL ADDITIONAL INFORMATION (Report)     These tests were developed and their performance characteristics   determined by [facility] or [facility]  They may not be cleared or approved by the [place]  Food and   Drug Administration   The FDA has determined that such clearance or   approval is not necessary  These tests are used for clinical purposes  They should not be regarded as investigational or for research  This   laboratory has been approved by [facility] 88, designated as a high-complexity   laboratory and is qualified to perform these tests  - Interpretation performed at [facility] [address]   LAB AP CASE REPORT (Report)     Surgical Pathology Report             Case: [id number]                   Authorizing Provider: Nathalie Dietrich MD     Collected:      05/19/2017 1031        Ordering Location:   [facility]   Received:      05/19/2017 [address]                           Pathologist:      Angel Griffin MD                              Specimens:  A) - Iliac Crest, Left, Core                                      B) - Iliac Crest, Left, Clot                                      C) - Iliac Crest, Left, Slides x 21   LAB AP NOTE (Report)     Immunohistochemical stains and in situ mRNA hybridization (for kappa and   lambda), evaluated with appropriate positive and negative controls, are   employed in the enumeration of marrow plasma cells (), B-lymphocytes   (CD20) and T-lymphocytes (CD3)  Results of these findings are given in   the final diagnosis and identify a 6+:1 kappa-restricted excess plasma   cell component, morphologically mature, CD20(-), suggesting a plasma cell   dyscrasia/neoplasm that comprises    15% of marrow cells  Correlate with   clinical findings  Clinical Data:  - CBC (4/22/17): WBC 5,930; RBC 4,340,000; Hgb 12 7; Hct 40 0; MCV 92;   Plts 239,000  - Diff: Segs %; Lymphs %; Monos %; Eos %   LAB AP GROSS DESCRIPTION (Report)     A  The specimen is received fresh, labeled with the patient's name and   hospital number, and is designated left iliac crest bone marrow core     The specimen consists of one tan osseous tissue core having a diameter of   0 2 cm and a length of 1 0 cm  Touch preps are performed  Entirely   submitted  One cassette, after decalcification in Immunocal      B  The specimen is received in formalin, labeled with the patient's name   and hospital number, and is designated left iliac crest bone marrow clot   the specimen consists of a blood clot measuring 0 9 x 0 7 x 0 1 cm  Entirely submitted  One cassette  Note: The estimated total formalin fixation time based upon information   provided by the submitting clinician and the standard processing schedule   is 10 0 hours  C  The specimen is received without fixative, labeled with the patient's   name and hospital number, and is designated left iliac crest slides   ? ?21  The specimen consists of 21 bone marrow aspirate slides which are   submitted for further staining  No cassettes  MAS   LAB AP INTRAOPERATIVE CONSULTATION      On-Site Evaluation of Aspirate Smears: Spicules with trilineage marrow   elements  Core grossly adequate  Reviewed by ALMAZ Solorio FINAL DIAGNOSIS (Report)     A and B  Bone marrow, core biopsy and aspirate clot section:    - Plasma cell neoplasm, morphologically mature, kappa-restricted,   comprising   15% of marrow cells in a diffuse     and interstitial pattern - see Note  - Normocellular for age (45% cell) marrow with normoblastic and   progressive trilineage hematopoiesis, M:E = 4:1,     blasts < 2%  - Lymphocytes appear mature, scattered, not increased  - No reticulin fibrosis (Grade 0; European Concensus), no collagen   fibrosis, no granulomata, no vasculitis and no     necrosis  C  Bone marrow, left iliac crest, aspirate smear slides:    - Plasma cells appear mature, scattered and without morphologic atypia   to suggest neoplasia      - Normoblastic and progressive trilineage hematopoiesis, without   dysplastic morphologic features, blasts< 3%     - Evaluation of macrophage storage iron precluded due to lack of spicules; no ringed sideroblasts  - Flow cytometric analysis ([facility] report [id number]; Interpreted by Delia Mcnamara DO):     -- Interpretation: An IgG kappa restricted plasma cell neoplasm is   detected  -- Immunophenotypic analysis:      - Percentage of Abnormal Cells: 1% Plasma cells Cell Size: Medium   Viability 7AAD: 93%      - A monoclonal IgG kappa plasma cell (CD38 bright) population is   present       - B-cells (<1%) are polyclonal       - The following antigens were evaluated by flow cytometry and   found to be expressed by the appropriate cells:        CD19, CD20, cKappa, cLambda, cIgG, cIgM, cIgA, CD38, ,   , CD56, sKappa, sLambda, and CD45  - Fluorescence In- Situ Hybridization (FISH) - Myeloma Prognosis     [facility] Report [id number]; Interpreted by Radha Watson DO      -- Interpretation: 1  No evidence of IGH-MAF [translocation t(14;16)] gene rearrangement  2  No evidence of RB1 monosomy (13q14 deletion)  3 No evidence of CCND1-IGH [translocation t(11;14)] gene rearrangement,   and no evidence for trisomy 11 or gain of 11q  4 No evidence of p53 (17p13) deletion or amplification  5 No evidence of FGFR3-IGH [translocation t(4;14)] gene rearrangement  6 Negative for 1q21/CKS1B gain  Electronically signed by Reyna Saavedra MD on 5/31/2017 at 12:14 PM     (1) BHUMI SCREEN W/REFLEX TO TITER/PATTERN 34LXD9888 09:06AM Henrine Scarce   TW Order Number: [id number]     Test Name Result Flag Reference   BHUMI SCREEN   Negative  Negative     (1) CBC/ PLT (NO DIFF) 62QHP2620 09:06AM Henrine Scarce   TW Order Number: [id number]     Test Name Result Flag Reference   HEMATOCRIT 40 8 %  34 8-46 1   HEMOGLOBIN 12 9 g/dL  11 5-15 4   MCHC 31 6 g/dL  31 4-37 4   MCH 28 7 pg  26 8-34 3   MCV 91 fL  82-98   PLATELET COUNT 412 Thousands/uL  149-390   RBC COUNT 4 50 Million/uL  3 81-5 12   RDW 13 0 %  11 6-15 1   WBC COUNT 5 43 Thousand/uL  4 31-10 16   MPV 10 7 fL  8 9-12 7 (1) C-REACTIVE PROTEIN 33MLS6462 09:06AM [employer] Order Number: [id number]     Test Name Result Flag Reference   C-REACT PROTEIN 4 8 mg/L H <3 0     (1) HLA B27 10TYR0058 09:06AM [employer] Order Number: [id number]     Test Name Result Flag Reference   HLA B27 Negative     HLA-B*27 Negative  HLA allele interpretation for all loci based on IMGT/HLA  database version 3 25 0  This test was developed and its performance characteristics  determined by [facility]   It has not been cleared or approved  by the Food and Drug Administration  [facility] Lab CLIA ID Number [id number]  This test was performed using PCR (Polymerase Chain Reaction)/SSOP  (Sequence Specific Oligonucleotide Probes) technique  SBT (Sequence  Based Typing) and/or SSP (Sequence Specific Primers) may be used as  supplemental methods when necessary  Please contact HLA Customer  Service at [phone] if you have any questions  Director of [facility]   Dr José Luis Lewis, PhD    Performed at:  [address]  : Elver Lawton PhD, Phone:  [phone]     (1) RHEUMATOID FACTOR SCREEN 24JQM9990 09:06AM [employer] Order Number: [id number]     Test Name Result Flag Reference   RHEUMATOID FACTOR Negative  Negative     Health Management  History of Screening for cervical cancer   (every) THINPREP PAP; every 1 year; Next Due: 50PZI3077; Overdue  Orthopedic Aftercare For Healing Traumatic Fx Lower Leg   (every) THINPREP PAP; every 1 year; Next Due: 06BTF0796; Overdue  Screening for colorectal cancer   COLONOSCOPY; every 10 years; Next Due: 33URC4144; Overdue    Future Appointments    Date/Time Provider Specialty Site   09/22/2017 08:30 AM ALMAZ Farris  Orthopedic Surgery [facility]   11/24/2017 09:00 AM ALMAZ Farris   Orthopedic Surgery [facility]   12/04/2017 06:30 AM SAM Hunt ALMAZ Salmeron  Internal Medicine Mounika Talavera MD     Signatures   Electronically signed by :  ALMAZ Rodrigues ; Sep 11 2017  9:19AM EST                       (Author)

## 2017-11-24 ENCOUNTER — ALLSCRIPTS OFFICE VISIT (OUTPATIENT)
Dept: OTHER | Facility: OTHER | Age: 57
End: 2017-11-24

## 2017-11-24 ENCOUNTER — TRANSCRIBE ORDERS (OUTPATIENT)
Dept: ADMINISTRATIVE | Facility: HOSPITAL | Age: 57
End: 2017-11-24

## 2017-11-24 DIAGNOSIS — M77.02 MEDIAL EPICONDYLITIS OF LEFT ELBOW: ICD-10-CM

## 2017-11-24 DIAGNOSIS — M25.521 RIGHT ELBOW PAIN: Primary | ICD-10-CM

## 2017-11-24 DIAGNOSIS — Z87.39 PERSONAL HISTORY OF OTHER DISEASES OF THE MUSCULOSKELETAL SYSTEM AND CONNECTIVE TISSUE: ICD-10-CM

## 2017-11-24 DIAGNOSIS — M19.90 OSTEOARTHRITIS: ICD-10-CM

## 2017-11-24 DIAGNOSIS — R53.83 OTHER FATIGUE: ICD-10-CM

## 2017-11-24 DIAGNOSIS — E55.9 VITAMIN D DEFICIENCY: ICD-10-CM

## 2017-11-25 NOTE — PROGRESS NOTES
Assessment    1  Medial epicondylitis of left elbow (726 31) (M77 02)  2  History of lateral epicondylitis of right elbow (V13 59) (Z87 39)  Persistently symptomatic above colitis both elbows right lateral left medial   She has not had the response I would expect injections of corticosteroid  This is a patient that it may at some point time MRI scans of both elbows is warranted  In the interim Voltaren gel was recommended  I would welcome the opportunity see this patient back in the office in 4 weeks' time to review her response small tear in alf, and review MRI scans of both elbows   Plan  History of lateral epicondylitis of right elbow    · * MRI ELBOW LEFT WO CONTRAST; Status:Need Information - Financial Authorization; Requested for:24Nov2017;    · * MRI ELBOW RIGHT WO CONTRAST; Status:Need Information - FinancialAuthorization; Requested for:24Nov2017;    · Follow-up PRN Evaluation and Treatment  Follow-up  Status: Complete  Done: [id number]   History of lateral epicondylitis of right elbow, Medial epicondylitis of left elbow    · Start: Diclofenac Sodium 1 % Transdermal Gel (Voltaren); APPLY TO LOWEREXTREMITIES, 4 GM OF GEL TO AFFECTED AREA 4 TIMES DAILY  DO NOTAPPLY MORE THAN 16 GM DAILY TO ANY ONE AFFECTED JOINT1      1 Amended By: Maritza Hensley; Nov 24 2017 8:41 AM EST    Chief Complaint    1  Knee Pain    History of Present Illness  HPI: 71-year-old female presents for follow-up  3 months ago she received an injection in the lateral right elbow, medial left elbow  She describes no significant interval change her symptoms  She continues described significant pain in the lateralize for the elbow with extension than the dorsolateral aspect of the right forearm  She continues described medial elbow pain that extends over the volar medial aspect left forearm   With both elbow issues, there is no associated numbness or tingling to the hands      Review of Systems   Constitutional: No fever, no chills, feels well, no tiredness, no recent weight gain or loss  Eyes: No complaints of eyesight problems, no red eyes  ENT: no loss of hearing, no nosebleeds, no sore throat  Cardiovascular: No complaints of chest pain, no palpitations, no leg claudication or lower extremity edema  Respiratory: no compliants of shortness of breath, no wheezing, no cough  Gastrointestinal: no complaints of abdominal pain, no constipation, no nausea or diarrhea, no vomiting, no bloody stools  Genitourinary: no complaints of dysuria, no incontinence  Musculoskeletal: as noted in HPI  Integumentary: no complaints of skin rash or lesion, no itching or dry skin, no skin wounds  Neurological: no complaints of headache, no confusion, no numbness or tingling, no dizziness  Endocrine: No complaints of muscle weakness, no feelings of weakness, no frequent urination, no excessive thirst   Psychiatric: No suicidal thoughts, no anxiety, no feelings of depression  Active Problems    1  Abnormal mammogram (793 80) (R92 8)  2  Aftercare following surgery of the musculoskeletal system (V58 78) (Z47 89)  3  Arthralgia of knee, right (719 46) (M25 561)  4  Arthritis (716 90) (M19 90)  5  Articular cartilage disorder of knee, right (717 9) (M23 91)  6  Back pain (724 5) (M54 9)  7  Chest pain (786 50) (R07 9)  8  Chronic liver disease (571 9) (K76 9)  9  Chronic pain syndrome (338 4) (G89 4)  10  Chronic right hip pain (719 45,338 29) (M25 551,G89 29)  11  Cough (786 2) (R05)  12  Dense breasts (793 82) (R92 2)  13  Depression with anxiety (300 4) (F41 8)  14  Difficulty concentrating (799 51) (R41 840)  15  Displacement of lumbar intervertebral disc without myelopathy (722 10) (M51 26)  16  Dry skin (701 1) (L85 3)  17  Ear noise/buzzing (388 30) (H93 19)  18  Elevated liver enzymes (790 5) (R74 8)  19  Epicondylitis elbow, medial, left (726 31) (M77 02)  20  Fainting (780 2) (R55)  21  Foot drop, right (736 79) (T55 269)  22   Ganglion cyst of flexor tendon sheath of finger, right (727 42) (M67 441)  23  Headache (784 0) (R51)  24  Hydronephrosis (591) (N13 30)  25  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)  26  Hyperlipidemia (272 4) (E78 5)  27  Hypertension (401 9) (I10)  28  Left elbow pain (719 42) (M25 522)  29  Long term use of drug (V58 69) (Z79 899)  30  Low back pain (724 2) (M54 5)  31  Lumbar degenerative disc disease (722 52) (M51 36)  32  Lumbar radiculopathy (724 4) (M54 16)  33  Medial epicondylitis of left elbow (726 31) (M77 02)  34  Memory loss (780 93) (R41 3)  35  MGUS (monoclonal gammopathy of unknown significance) (273 1) (D47 2)  36  Neck pain (723 1) (M54 2)  37  Need for prophylactic vaccination and inoculation against influenza (V04 81) (Z23)  38  Need for vaccination for DTP (V06 1) (Z23)  39  Nephrolithiasis (592 0) (N20 0)  40  Night sweats (780 8) (R61)  41  Occipital neuralgia (723 8) (M54 81)  42  Orthopedic Aftercare For Healing Traumatic Fx Lower Leg (V54 16)  43  Orthopedic aftercare for healing traumatic leg fracture (V54 14) (S82 90XD)  44  Osteoporosis (733 00) (M81 0)  45  Pain due to internal orthopedic prosthetic device, implant, or graft (996 78,338 18)  (T84 84XA)  46  Pain in joint (719 40) (M25 50)  47  Pain of lower extremity (729 5) (M79 606)  48  Peripheral neuropathy (356 9) (G62 9)  49  Pes anserine bursitis (726 61) (M70 50)  50  Postlaminectomy syndrome, lumbar (722 83) (M96 1)  51  Post-traumatic osteoarthritis of right knee (715 26) (M17 31)  52  Primary biliary cirrhosis (571 6) (K74 3)  53  Pruritus (698 9) (L29 9)  54  Right elbow pain (719 42) (M25 521)  55  Right shoulder pain (719 41) (M25 511)  56  Sacroiliitis (720 2) (M46 1)  57  Screening for colorectal cancer (V76 51) (Z12 11,Z12 12)  58  Shoulder bursitis, right (726 10) (M75 51)  59  Skin rash (782 1) (R21)  60  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)  61  Status post fall (V15 88) (Z91 81)  62  Trigger finger (727 03) (M65 30)  63  Trigger middle finger of right hand (727 03) (M65 331)  64  Trigger ring finger of right hand (727 03) (M65 341)  65  Trochanteric bursitis (726 5) (M70 60)  66  Urinary frequency (788 41) (R35 0)  67  Whiplash injuries (847 0) (S13 4XXA)    Past Medical History   · History of Abnormal breast exam (611 79) (N64 59)   · Acute pharyngitis (462) (J02 9)   · History of Anxiety (300 00) (F41 9)   · History of Depression with anxiety (300 4) (F41 8)   · History of Hernia (553 9) (K46 9)   · History of acute pharyngitis (V12 69) (Z87 09)   · History of asthma (V12 69) (Z87 09)   · History of bronchitis (V12 69) (Z87 09)   · History of cardiac murmur (V12 59) (Z86 79)   · History of earache (V12 49) (Z86 69)   · History of endometriosis (V13 29) (Z87 42)   · History of fracture of tibia (V15 51) (Z87 81)   · History of hepatic disease (V12 79) (Z87 19)   · History of low back pain (V13 59) (Z87 39)   · History of obsessive compulsive disorder (V11 2) (Z86 59)   · History of pneumonia (V12 61) (Z87 01)   · History of seasonal allergies (V15 09) (Z88 9)   · History of whiplash injury to neck (V15 59) (G26 889)   · Hypertension (401 9) (I10)   · Need for vaccination for DTP (V06 1) (Z23)   · Orthopedic aftercare for healing traumatic leg fracture (V54 14) (S82 90XD)   · History of Primary biliary cirrhosis (571 6) (K74 3)   · History of Wears eyeglasses (V49 89) (Z97 3)    The active problems and past medical history were reviewed and updated today  Surgical History   · History of Arthroplasty Of The Tibial Plateau   · History of Back Surgery   · History of Hernia Repair   · History of Injection Of Trigger Point(S)   · History of Jaw Surgery   · History of Neuroplasty Decompression Median Nerve At Carpal Tunnel   · Orthopedic Aftercare For Healing Traumatic Fx Lower Leg (V54 16)    The surgical history was reviewed and updated today         Family History   · Family history of Anxiety (Symptom)   · Family history of Anxiety (Symptom)   · Family history of    · Family history of hepatic cirrhosis (V18 59) (Z83 79)   · Family history of    · Family history of    · Family history of    · Family history of    · Family history of Back disorder   · Family history of neuropathy (V17 2) (Z82 0)   · Family history of Anxiety (Symptom)   · Family history of Atherosclerosis (V17 49)   · Family history of Depression   · Family history of Hyperlipidemia   · Family history of Osteoarthritis (V17 7)    Social History     · Alcohol Use (History)   · Completed college   · Functioning activity level   · does not participate in activites outside of the home; participates in moderate activities    inside of the home   · Living situation   · lives with mother   · Never A Smoker   · No alcohol use   · No drug use   · Occupation   · /   · Single    Current Meds  1  Betamethasone Sod Phos & Acet 6 (3-3) MG/ML Injection Suspension; INJECT 6  MG Subcutaneous; To Be Done: [id number]; Status: HOLD FOR - Administration Ordered  2  Cefuroxime Axetil 250 MG Oral Tablet; Take 1 tablet twice daily; Therapy: 91TTN1529 to (Dorota Duenas)  Requested for: 2017; Last Rx:2017 Ordered  3  Gralise 600 MG Oral Tablet; TAKE 3 TABLETS AT DINNER TIME; Therapy: 11KZG6463 to (Myrtis Shone)  Requested for: 79LEC0480; Last Rx:2017 Ordered  4  Paxil CR 25 MG Oral Tablet Extended Release 24 Hour (PARoxetine HCl ER); Take 1 tablet twice daily; Therapy: (Emmett Reyes) to Recorded  5  TraMADol HCl - 50 MG Oral Tablet; TAKE 1 TABLET Twice daily PRN  NOT TO EXCEED 100MG A DAY; Therapy: 89Mqa1433 to Recorded  6  Triamterene-HCTZ TABS; Therapy: (Recorded:37Pje4490) to Recorded  7  Ursodiol 300 MG Oral Capsule (Actigall); Take 1 capsule three times daily  Requested for: 92PLN6018; Last Rx:2017 Ordered  8   Zovirax 200 MG Oral Capsule (Acyclovir); 1 TABLET DAILY FOR 5 DAYS DURING PERIODS OF HERPES SIMPLEX; Therapy: 93Jrs1596 to Recorded    Allergies  1  Codeine Derivatives  2  Doxycycline Hyclate CAPS  3  Penicillins  4  Sulfa Drugs  5  FRUIT  6  Latex  7  Milk  8  TOMATO  Denied   9  Anesthesia Extension Tubing Miscellaneous    Vitals   Recorded: [id number] 08:26AM   Heart Rate 73   Systolic 148   Diastolic 68   Height 5 ft 9 in   Weight 176 lb    BMI Calculated 25 99   BSA Calculated 1 96       Physical Exam  Breathing is not labored  Neck is nontender with good motion  Right elbow has full flexion-extension  There is full pronation supination  There is tenderness palpation of the lateral epicondylar mass  Is a negative Tinel sign present  Resistive wrist extension exacerbates pain  Fingers are warm, sensate, and mobile  Examination of the left shoulder find good motion  Left elbow is swollen medially  She is full flexion extension  She has full pronation supination  There is direct tenderness in patient on the medial elbow  Resisted wrist flexion exacerbates his pain  There is negative Tinel sign over the cubital tunnel  Future Appointments    Date/Time Provider Specialty Site   12/22/2017 08:30 AM ALMAZ Farris  Orthopedic Surgery [facility]   03/12/2018 08:00 AM ALMAZ Parekh  Hematology Oncology [facility] [place]   12/04/2017 06:30 AM ALMAZ Live   Internal Medicine Yaz Angulo MD       Signatures   Electronically signed by : ALMAZ Llye ; Nov 24 2017  8:42AM EST                       (Author)

## 2017-12-04 ENCOUNTER — ALLSCRIPTS OFFICE VISIT (OUTPATIENT)
Dept: OTHER | Facility: OTHER | Age: 57
End: 2017-12-04
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
## 2017-12-06 ENCOUNTER — APPOINTMENT (OUTPATIENT)
Dept: LAB | Facility: CLINIC | Age: 57
End: 2017-12-06
Payer: COMMERCIAL

## 2017-12-06 DIAGNOSIS — R53.83 OTHER FATIGUE: ICD-10-CM

## 2017-12-06 DIAGNOSIS — M19.90 OSTEOARTHRITIS: ICD-10-CM

## 2017-12-06 LAB
25(OH)D3 SERPL-MCNC: 12.8 NG/ML (ref 30–100)
ALBUMIN SERPL BCP-MCNC: 3.3 G/DL (ref 3.5–5)
ALP SERPL-CCNC: 260 U/L (ref 46–116)
ALT SERPL W P-5'-P-CCNC: 51 U/L (ref 12–78)
ANION GAP SERPL CALCULATED.3IONS-SCNC: 2 MMOL/L (ref 4–13)
AST SERPL W P-5'-P-CCNC: 42 U/L (ref 5–45)
BASOPHILS # BLD AUTO: 0.02 THOUSANDS/ΜL (ref 0–0.1)
BASOPHILS NFR BLD AUTO: 0 % (ref 0–1)
BILIRUB SERPL-MCNC: 0.35 MG/DL (ref 0.2–1)
BUN SERPL-MCNC: 21 MG/DL (ref 5–25)
CALCIUM SERPL-MCNC: 9.4 MG/DL (ref 8.3–10.1)
CHLORIDE SERPL-SCNC: 102 MMOL/L (ref 100–108)
CK SERPL-CCNC: 63 U/L (ref 26–192)
CO2 SERPL-SCNC: 32 MMOL/L (ref 21–32)
CREAT SERPL-MCNC: 0.87 MG/DL (ref 0.6–1.3)
CRP SERPL QL: <3 MG/L
EOSINOPHIL # BLD AUTO: 0.11 THOUSAND/ΜL (ref 0–0.61)
EOSINOPHIL NFR BLD AUTO: 2 % (ref 0–6)
ERYTHROCYTE [DISTWIDTH] IN BLOOD BY AUTOMATED COUNT: 12.9 % (ref 11.6–15.1)
ERYTHROCYTE [SEDIMENTATION RATE] IN BLOOD: 42 MM/HOUR (ref 0–20)
GFR SERPL CREATININE-BSD FRML MDRD: 74 ML/MIN/1.73SQ M
GLUCOSE P FAST SERPL-MCNC: 93 MG/DL (ref 65–99)
HCT VFR BLD AUTO: 38.6 % (ref 34.8–46.1)
HGB BLD-MCNC: 12.4 G/DL (ref 11.5–15.4)
LYMPHOCYTES # BLD AUTO: 1.76 THOUSANDS/ΜL (ref 0.6–4.47)
LYMPHOCYTES NFR BLD AUTO: 35 % (ref 14–44)
MCH RBC QN AUTO: 29.4 PG (ref 26.8–34.3)
MCHC RBC AUTO-ENTMCNC: 32.1 G/DL (ref 31.4–37.4)
MCV RBC AUTO: 92 FL (ref 82–98)
MONOCYTES # BLD AUTO: 0.44 THOUSAND/ΜL (ref 0.17–1.22)
MONOCYTES NFR BLD AUTO: 9 % (ref 4–12)
NEUTROPHILS # BLD AUTO: 2.71 THOUSANDS/ΜL (ref 1.85–7.62)
NEUTS SEG NFR BLD AUTO: 54 % (ref 43–75)
NRBC BLD AUTO-RTO: 0 /100 WBCS
PLATELET # BLD AUTO: 224 THOUSANDS/UL (ref 149–390)
PMV BLD AUTO: 11 FL (ref 8.9–12.7)
POTASSIUM SERPL-SCNC: 4.3 MMOL/L (ref 3.5–5.3)
PROT SERPL-MCNC: 8.8 G/DL (ref 6.4–8.2)
RBC # BLD AUTO: 4.22 MILLION/UL (ref 3.81–5.12)
SODIUM SERPL-SCNC: 136 MMOL/L (ref 136–145)
TSH SERPL DL<=0.05 MIU/L-ACNC: 1.1 UIU/ML (ref 0.36–3.74)
WBC # BLD AUTO: 5.05 THOUSAND/UL (ref 4.31–10.16)

## 2017-12-06 PROCEDURE — 82550 ASSAY OF CK (CPK): CPT

## 2017-12-06 PROCEDURE — 86140 C-REACTIVE PROTEIN: CPT

## 2017-12-06 PROCEDURE — 80053 COMPREHEN METABOLIC PANEL: CPT

## 2017-12-06 PROCEDURE — 85025 COMPLETE CBC W/AUTO DIFF WBC: CPT

## 2017-12-06 PROCEDURE — 86430 RHEUMATOID FACTOR TEST QUAL: CPT

## 2017-12-06 PROCEDURE — 86618 LYME DISEASE ANTIBODY: CPT

## 2017-12-06 PROCEDURE — 36415 COLL VENOUS BLD VENIPUNCTURE: CPT

## 2017-12-06 PROCEDURE — 84443 ASSAY THYROID STIM HORMONE: CPT

## 2017-12-06 PROCEDURE — 82306 VITAMIN D 25 HYDROXY: CPT

## 2017-12-06 PROCEDURE — 86038 ANTINUCLEAR ANTIBODIES: CPT

## 2017-12-06 PROCEDURE — 85652 RBC SED RATE AUTOMATED: CPT

## 2017-12-07 LAB
B BURGDOR IGG SER IA-ACNC: 0.12
B BURGDOR IGM SER IA-ACNC: 0.14
RHEUMATOID FACT SER QL LA: NEGATIVE

## 2017-12-08 LAB — RYE IGE QN: NEGATIVE

## 2017-12-13 ENCOUNTER — GENERIC CONVERSION - ENCOUNTER (OUTPATIENT)
Dept: OTHER | Facility: OTHER | Age: 57
End: 2017-12-13

## 2017-12-22 ENCOUNTER — GENERIC CONVERSION - ENCOUNTER (OUTPATIENT)
Dept: OTHER | Facility: OTHER | Age: 57
End: 2017-12-22

## 2017-12-24 ENCOUNTER — HOSPITAL ENCOUNTER (OUTPATIENT)
Dept: RADIOLOGY | Facility: HOSPITAL | Age: 57
Discharge: HOME | End: 2017-12-24
Attending: ORTHOPAEDIC SURGERY
Payer: COMMERCIAL

## 2017-12-24 DIAGNOSIS — Z87.39 PERSONAL HISTORY OF OTHER DISEASES OF THE MUSCULOSKELETAL SYSTEM AND CONNECTIVE TISSUE: ICD-10-CM

## 2017-12-24 PROCEDURE — 73221 MRI JOINT UPR EXTREM W/O DYE: CPT

## 2018-01-04 ENCOUNTER — GENERIC CONVERSION - ENCOUNTER (OUTPATIENT)
Dept: OTHER | Facility: OTHER | Age: 58
End: 2018-01-04

## 2018-01-09 NOTE — MISCELLANEOUS
Message   Recorded as Task   Date: 06/26/2017 09:32 AM, Created By: Aileen Toure   Task Name: Call Back   Assigned To: Kira Gage   Regarding Patient: Nash Banegas, Status: Active   Comment:    Aileen Toure - 26 Jun 2017 9:32 AM     TASK CREATED  PT states she had Zometa on Friday and since then she has been experiencing lower back pain and her left leg  She states she was very weak on Saturday  She would like a call back [phone]   Kira Gage - 26 Jun 2017 9:55 AM     TASK EDITED  patient had a dose of zometa on 6/23/17  on 6/24/17 , patient experienced "excruciating leg pain"  pain has subsided now  patient has f/u before her next planned dose of zometa and will d/w Dr Lilly Daigle at that time        Active Problems    1  Abnormal mammogram (793 80) (R92 8)   2  Aftercare following surgery of the musculoskeletal system (V58 78) (Z47 89)   3  Arthralgia of knee, right (719 46) (M25 561)   4  Arthritis (716 90) (M19 90)   5  Articular cartilage disorder of knee, right (717 9) (M23 91)   6  Back pain (724 5) (M54 9)   7  Chest pain (786 50) (R07 9)   8  Chronic liver disease (571 9) (K76 9)   9  Chronic pain syndrome (338 4) (G89 4)   10  Chronic right hip pain (719 45,338 29) (M25 551,G89 29)   11  Cough (786 2) (R05)   12  Dense breast (793 82) (R92 2)   13  Dense breasts (793 82) (R92 2)   14  Depression with anxiety (300 4) (F41 8)   15  Difficulty concentrating (799 51) (R41 840)   16  Displacement of lumbar intervertebral disc without myelopathy (722 10) (M51 26)   17  Dry skin (701 1) (L85 3)   18  Ear noise/buzzing (388 30) (H93 19)   19  Elevated liver enzymes (790 5) (R74 8)   20  Epicondylitis elbow, medial, left (726 31) (M77 02)   21  Fainting (780 2) (R55)   22  Foot drop, right (736 79) (M21 371)   23  Ganglion cyst of flexor tendon sheath of finger, right (727 42) (M67 441)   24  Headache (784 0) (R51)   25  Hydronephrosis (591) (N13 30)   26  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)   27  Hyperlipidemia (272 4) (E78 5)   28  Hypertension (401 9) (I10)   29  Left elbow pain (719 42) (M25 522)   30  Long term use of drug (V58 69) (Z79 899)   31  Low back pain (724 2) (M54 5)   32  Lumbar degenerative disc disease (722 52) (M51 36)   33  Lumbar radiculopathy (724 4) (M54 16)   34  Medial epicondylitis of left elbow (726 31) (M77 02)   35  Memory loss (780 93) (R41 3)   36  MGUS (monoclonal gammopathy of unknown significance) (273 1) (D47 2)   37  Neck pain (723 1) (M54 2)   38  Need for prophylactic vaccination and inoculation against influenza (V04 81) (Z23)   39  Need for vaccination for DTP (V06 1) (Z23)   40  Nephrolithiasis (592 0) (N20 0)   41  Night sweats (780 8) (R61)   42  Occipital neuralgia (723 8) (M54 81)   43  Orthopedic Aftercare For Healing Traumatic Fx Lower Leg (V54 16)   44  Orthopedic aftercare for healing traumatic leg fracture (V54 14) (S82 90XD)   45  Osteopenia (733 90) (M85 80)   46  Osteoporosis (733 00) (M81 0)   47  Pain due to internal orthopedic prosthetic device, implant, or graft (996 78,338 18)    (T84 84XA)   48  Pain in joint (719 40) (M25 50)   49  Pain of lower extremity (729 5) (M79 606)   50  Peripheral neuropathy (356 9) (G62 9)   51  Pes anserine bursitis (726 61) (M70 50)   52  Postlaminectomy syndrome, lumbar (722 83) (M96 1)   53  Post-traumatic osteoarthritis of right knee (715 26) (M17 31)   54  Primary biliary cirrhosis (571 6) (K74 3)   55  Pruritus (698 9) (L29 9)   56  Rib pain on right side (786 50) (R07 81)   57  Right middle ear infection (382 9) (H66 91)   58  Right shoulder pain (719 41) (M25 511)   59  Sacroiliitis (720 2) (M46 1)   60  Screening for colorectal cancer (V76 51) (Z12 11,Z12 12)   61  Shoulder bursitis, right (726 10) (M75 51)   62  Skin rash (782 1) (R21)   63  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)   64  Status post fall (V15 88) (Z91 81)   65  Trigger finger (727 03) (M65 30)   66   Trigger middle finger of right hand (727 03) (M65 331)   67  Trigger ring finger of right hand (727 03) (M65 341)   68  Trochanteric bursitis (726 5) (M70 60)   69  Urinary frequency (788 41) (R35 0)   70  Visit for screening mammogram (V76 12) (Z12 31)   71  Whiplash injuries (847 0) (S13 4XXA)    Current Meds   1  Betamethasone Sod Phos & Acet 6 (3-3) MG/ML Injection Suspension; INJECT 6  MG   Subcutaneous; To Be Done: 02GSF6173; Status: HOLD FOR - Administration Ordered   2  Fosamax 70 MG Oral Tablet (Alendronate Sodium); TAKE 1 TABLET ONCE WEEKLY; Therapy: 24Apr2017 to Recorded   3  Gralise 600 MG Oral Tablet; TAKE 3 TABLETS AT DINNER TIME; Therapy: 56CBI5289 to (Dahiana Barr)  Requested for: 80OBB3463; Last   Rx:01Jun2017 Ordered   4  Lexapro 10 MG Oral Tablet (Escitalopram Oxalate); TAKE 1 TABLET DAILY; Therapy: (Rina Gutierrez) to Recorded   5  Nucynta ER 50 MG Oral Tablet Extended Release 12 Hour; take 1 tablet every twelve   hours; Therapy: 17UWY5096 to (Evaluate:09Jun2017); Last Rx:72Old1428 Ordered   6  Paxil CR 12 5 MG Oral Tablet Extended Release 24 Hour (PARoxetine HCl ER); TAKE 1   TABLET DAILY; Therapy: 24Apr2017 to Recorded   7  TraMADol HCl - 50 MG Oral Tablet; TAKE 1 TABLET Twice daily PRN  NOT TO EXCEED   100MG A DAY; Therapy: 24Apr2017 to Recorded   8  Ursodiol 300 MG Oral Capsule (Actigall); Take 1 capsule three times daily  Requested   for: 63FGU9311; Last Rx:59Jdy8229 Ordered   9  Vitamin D (Ergocalciferol) 94516 UNIT Oral Capsule; TAKE 1 CAPULE ONCE   MONTHLY; Therapy: 24Apr2017 to Recorded   10  Vitamin D3 2000 UNIT Oral Capsule; take 1 capsule by mouth once daily; Therapy: 24Apr2017 to Recorded   11  Zovirax 200 MG Oral Capsule (Acyclovir); 1 TABLET DAILY FOR 5 DAYS DURING    PERIODS OF HERPES SIMPLEX; Therapy: 24Apr2017 to Recorded    Allergies    1  Codeine Derivatives   2  Doxycycline Hyclate CAPS   3  Penicillins   4  Sulfa Drugs    5  FRUIT   6  Latex   7  Milk   8  TOMATO  Denied    9  Anesthesia Extension Tubing Miscellaneous    Signatures   Electronically signed by : Nerissa Quezada, ; Jun 26 2017  9:56AM EST                       (Author)

## 2018-01-10 NOTE — MISCELLANEOUS
Message     Recorded as Task   Date: 05/08/2017 10:58 AM, Created By: Rory Pro   Task Name: Call Back   Assigned To: [facility] kacy clinical,Team   Regarding Patient: [facility], Status: Active   CommentVictorina Domínguez - 08 May 2017 10:58 AM     TASK CREATED  [facility] Call [facility]- Patient called today stating meds prescribed is being denied because it needs an authorization  (Gralise  mg tabs)  any questions please call patient [phone]    [facility]- [phone]   Rory Pro - 08 May 2017 11:00 AM     TASK EDITED  [address] [place] [address]- patient stated she is completely out of meds  RipTayler - 08 May 2017 3:39 PM     TASK IN PROGRESS   [facility] - 08 May 2017 3:39 PM     TASK EDITED  Medication request denied by   She needs an appt to be seen  Last seen May 2016  [facility] - 08 May 2017 3:42 PM     TASK REASSIGNED: Previously Assigned To Willian Van  Per patient, she is scheduled for appt on Wed at 7:45am, she states that she is completely out of her medication and is afraid of going through withdrawl  Please advise   Mikal Balderramaing - 08 May 2017 3:58 PM     TASK REPLIED TO: Previously Assigned To KAM hunter,Team       she can come  a sample pack   Juana Keyes - 08 May 2017 4:30 PM     TASK EDITED  S/W pt and advised that she can come p/u a sample pack tomorrow during office hours  Pt said she is completely out and needs pills to take tonight  Told pt our office policy requires 48 hrs notice for RF and she won't go through withdrawal if she missed 1 night  Pt said once before she went without and starting having withdrawal   Pt said she is still at work and works at the [employer]  I asked pt if her pharmacy just needs a new order or an authorization, she thought an order  Told pt I would need to [facility], if only needs an order I will s/w Dr Talita Weathers about calling in a 1 month RX today    Per   Amadou Trivedi if [facility] only needs order ok to call 1 month supply as written previously  S/W pharmacist at [facility], they need new order  Per Dr Amadou Trivedi Gralise 600mg 3 tabs at Dinnertime, #90 with no refills called to [facility] on [facility]  Pt informed RX called to [facility] and again reminded 48 hrs notice in future needed for med RF  Anitha Nolasco - 08 May 2017 5:06 PM     TASK REPLIED TO: Previously Assigned To Anitha Nolasco md aware        Active Problems    1  Abnormal mammogram (793 80) (R92 8)   2  Aftercare following surgery of the musculoskeletal system (V58 78) (Z47 89)   3  Arthralgia of knee, right (719 46) (M25 561)   4  Arthritis (716 90) (M19 90)   5  Articular cartilage disorder of knee, right (717 9) (M23 91)   6  Back pain (724 5) (M54 9)   7  Chest pain (786 50) (R07 9)   8  Chronic liver disease (571 9) (K76 9)   9  Chronic right hip pain (719 45,338 29) (M25 551,G89 29)   10  Cough (786 2) (R05)   11  Dense breast (793 82) (R92 2)   12  Dense breasts (793 82) (R92 2)   13  Depression with anxiety (300 4) (F41 8)   14  Difficulty concentrating (799 51) (R41 840)   15  Displacement of lumbar intervertebral disc without myelopathy (722 10) (M51 26)   16  Dry skin (701 1) (L85 3)   17  Ear noise/buzzing (388 30) (H93 19)   18  Elevated liver enzymes (790 5) (R74 8)   19  Epicondylitis elbow, medial, left (726 31) (M77 02)   20  Fainting (780 2) (R55)   21  Foot drop, right (736 79) (M21 371)   22  Ganglion cyst of flexor tendon sheath of finger, right (727 42) (M67 441)   23  Headache (784 0) (R51)   24  Hydronephrosis (591) (N13 30)   25  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)   26  Hyperlipidemia (272 4) (E78 5)   27  Hypertension (401 9) (I10)   28  Long term use of drug (V58 69) (Z79 899)   29  Low back pain (724 2) (M54 5)   30  Lumbar degenerative disc disease (722 52) (M51 36)   31  Lumbar radiculopathy (724 4) (M54 16)   32  Memory loss (780 93) (R41 3)   33   MGUS (monoclonal gammopathy of unknown significance) (273 1) (D47 2)   34  Neck pain (723 1) (M54 2)   35  Need for prophylactic vaccination and inoculation against influenza (V04 81) (Z23)   36  Need for vaccination for DTP (V06 1) (Z23)   37  Nephrolithiasis (592 0) (N20 0)   38  Night sweats (780 8) (R61)   39  Occipital neuralgia (723 8) (M54 81)   40  Orthopedic Aftercare For Healing Traumatic Fx Lower Leg (V54 16)   41  Orthopedic aftercare for healing traumatic leg fracture (V54 14) (S82 90XD)   42  Osteopenia (733 90) (M85 80)   43  Osteoporosis (733 00) (M81 0)   44  Pain due to internal orthopedic prosthetic device, implant, or graft (996 78,338 18)    (T84 84XA)   45  Pain in joint (719 40) (M25 50)   46  Pain of lower extremity (729 5) (M79 606)   47  Peripheral neuropathy (356 9) (G62 9)   48  Pes anserine bursitis (726 61) (M70 50)   49  Post-traumatic osteoarthritis of right knee (715 26) (M17 31)   50  Primary biliary cirrhosis (571 6) (K74 3)   51  Pruritus (698 9) (L29 9)   52  Rib pain on right side (786 50) (R07 81)   53  Right middle ear infection (382 9) (H66 91)   54  Right shoulder pain (719 41) (M25 511)   55  Sacroiliitis (720 2) (M46 1)   56  Screening for colorectal cancer (V76 51) (Z12 11,Z12 12)   57  Shoulder bursitis, right (726 10) (M75 51)   58  Skin rash (782 1) (R21)   59  Status post fall (V15 88) (Z91 81)   60  Trigger finger (727 03) (M65 30)   61  Trigger middle finger of right hand (727 03) (M65 331)   62  Trigger ring finger of right hand (727 03) (M65 341)   63  Trochanteric bursitis (726 5) (M70 60)   64  Urinary frequency (788 41) (R35 0)   65  Visit for screening mammogram (V76 12) (Z12 31)   66  Whiplash injuries (847 0) (S13 4XXA)    Current Meds   1  Betamethasone Sod Phos & Acet 6 (3-3) MG/ML Injection Suspension; INJECT 6  MG   Subcutaneous; To Be Done: 12OWJ8932; Status: HOLD FOR - Administration Ordered   2  Fosamax 70 MG Oral Tablet (Alendronate Sodium); TAKE 1 TABLET ONCE WEEKLY;    Therapy: 24Apr2017 to Recorded   3  Gralise 600 MG Oral Tablet; TAKE 3 TABLETS AT DINNER TIME; Therapy: 06LZM9971 to (Evaluate:07Jun2017)  Requested for: 53ZDU4248; Last   LL:25QCV9969 Ordered   4  Lexapro 10 MG Oral Tablet (Escitalopram Oxalate); TAKE 1 TABLET DAILY; Therapy: (Catalina Swift) to Recorded   5  Paxil CR 12 5 MG Oral Tablet Extended Release 24 Hour (PARoxetine HCl ER); TAKE 1   TABLET DAILY; Therapy: 24Apr2017 to Recorded   6  TraMADol HCl - 50 MG Oral Tablet; TAKE 1 TABLET Twice daily PRN  NOT TO EXCEED   100MG A DAY; Therapy: 24Apr2017 to Recorded   7  Ursodiol 300 MG Oral Capsule (Actigall); Take 1 capsule three times daily  Requested   for: 03UQE8118; Last Rx:36Uki2509 Ordered   8  Vitamin D (Ergocalciferol) 58879 UNIT Oral Capsule; TAKE 1 CAPULE ONCE   MONTHLY; Therapy: 24Apr2017 to Recorded   9  Vitamin D3 2000 UNIT Oral Capsule; take 1 capsule by mouth once daily; Therapy: 24Apr2017 to Recorded   10  Zovirax 200 MG Oral Capsule (Acyclovir); 1 TABLET DAILY FOR 5 DAYS DURING    PERIODS OF HERPES SIMPLEX; Therapy: 24Apr2017 to Recorded    Allergies    1  Codeine Derivatives   2  Doxycycline Hyclate CAPS   3  Penicillins   4  Sulfa Drugs    5  FRUIT   6  Latex   7  Milk   8  TOMATO  Denied    9   Anesthesia Extension Tubing Miscellaneous    Signatures   Electronically signed by : Samira Hebert, ; May  9 2017  8:24AM EST                       (Author)

## 2018-01-11 NOTE — MISCELLANEOUS
Message   Recorded as Task   Date: 05/19/2016 02:12 PM, Created By: Debi Boyce   Task Name: Medical Complaint Callback   Assigned To: KAM Simmons   Regarding Patient: Noni Brush, Status: Active   Comment:    Debi Boyce - 19 May 2016 2:12 PM     TASK CREATED  Caller: Self; Medical Complaint; [phone] (Mobile Phone)  Pt left vm today at 12:45 that yest Dr Janice Schafer put her on a different medication, it starts with a "C", said it's the generic of gabapentin  She was to start it at dinner, 1800mg, she didn't come down to earth until today at 12:30pm   She said this won't work, she needs to function and go to work  If this is just going to take a day or two for her to wear into it and then it settles down then ok she'll take it but if not she won't be able to stay on it  Pt was prescribed gralise 600mg, take (3) at dinnertime on 5/18/16 by Dr Janice Schafer  Please advise  Catalina Alicea - 19 May 2016 4:26 PM     TASK REPLIED TO: Previously Assigned To [facility] kacy clinical,Team  patient can try to take 2 tabs at dinner instead for a total of 1200mg, to see if thats more tolerable   Juana Keyes - 19 May 2016 5:02 PM     TASK EDITED  S/W pt and advised of the same  She will dec to 2 tabs tonight at dinner  Pt stated it did help take the edge off  Pt will contact the office if not tolerated        Active Problems    1  Abnormal mammogram (793 80) (R92 8)   2  Acute upper respiratory infection (465 9) (J06 9)   3  Aftercare following surgery of the musculoskeletal system (V58 78) (Z47 89)   4  Anemia (285 9) (D64 9)   5  Arthralgia of knee, right (719 46) (M25 561)   6  Arthritis (716 90) (M19 90)   7  Articular cartilage disorder of knee, right (717 9) (M23 91)   8  Chronic liver disease (571 9) (K76 9)   9  Cough (786 2) (R05)   10  Dense breast (793 82) (R92 2)   11  Depression with anxiety (300 4) (F41 8)   12  Difficulty concentrating (799 51) (R49 591)   13   Displacement of lumbar intervertebral disc without myelopathy (722 10) (M51 26)   14  Dry skin (701 1) (L85 3)   15  Ear noise/buzzing (388 30) (H93 19)   16  Elevated liver enzymes (790 5) (R74 8)   17  Fainting (780 2) (R55)   18  Ganglion cyst of flexor tendon sheath of finger, right (727 42) (M67 441)   19  Headache (784 0) (R51)   20  Hydronephrosis (591) (N13 30)   21  Hyperlipidemia (272 4) (E78 5)   22  Hypertension (401 9) (I10)   23  Iron deficiency anemia (280 9) (D50 9)   24  Leg pain (729 5) (M79 606)   25  Long term use of drug (V58 69) (Z79 899)   26  Loose stools (787 7) (R19 5)   27  Low back pain (724 2) (M54 5)   28  Lumbar degenerative disc disease (722 52) (M51 36)   29  Lumbar radiculopathy (724 4) (M54 16)   30  Memory loss (780 93) (R41 3)   31  Neck pain (723 1) (M54 2)   32  Need for vaccination for DTP (V06 1) (Z23)   33  Nephrolithiasis (592 0) (N20 0)   34  Night sweats (780 8) (R61)   35  Occipital neuralgia (723 8) (M54 81)   36  Orthopedic Aftercare For Healing Traumatic Fx Lower Leg (V54 16)   37  Orthopedic aftercare for healing traumatic leg fracture (V54 14) (S82 90XD)   38  Osteopenia (733 90) (M85 80)   39  Osteoporosis (733 00) (M81 0)   40  Pain due to internal orthopedic prosthetic device, implant, or graft (996 78,338 18)    (T84 84XA)   41  Painful joint (719 40) (M25 50)   42  Peripheral neuropathy (356 9) (G62 9)   43  Pes anserine bursitis (726 61) (M70 50)   44  Post-traumatic osteoarthritis of right knee (715 26) (M17 31)   45  Primary biliary cirrhosis (571 6) (K74 3)   46  Pruritus (698 9) (L29 9)   47  Right middle ear infection (382 9) (H66 91)   48  Sacroiliitis (720 2) (M46 1)   49  Screening for cervical cancer (V76 2) (Z12 4)   50  Screening for colorectal cancer (V76 51) (Z12 11,Z12 12)   51  Status post fall (V15 88) (Z91 89)   52  Trigger finger (727 03) (M65 30)   53  Trigger ring finger of right hand (727 03) (M65 341)   54  Trochanteric bursitis (726 5) (M70 60)   55  Urinary frequency (788 41) (R35 0)   56  Visit for screening mammogram (V76 12) (Z12 31)   57  Whiplash injuries (847 0) (S13 4XXA)    Current Meds   1  Betamethasone Sod Phos & Acet 6 (3-3) MG/ML Injection Suspension; INJECT 6  MG   Subcutaneous; To Be Done: 82YWD7048; Status: HOLD FOR - Administration Ordered   2  Cephalexin 500 MG Oral Capsule; take 1 tid for one week; Therapy: 27LPT9044 to (Last Rx:42Tej6101) Ordered   3  Cholestyramine 4 GM/DOSE Oral Powder; MIX 1 SCOOP IN LIQUID AND DRINK TWICE   DAILY PRN; Therapy: 23Isg4769-(Evaluate:99Hrs8496) Recorded   4  FLUoxetine HCl - 40 MG Oral Capsule Recorded   5  Gralise 600 MG Oral Tablet; TAKE 3 TABLETS AT DINNER TIME; Therapy: 71TKJ8556 to (Evaluate:46Ovb9533)  Requested for: 07IDT3229; Last   Rx:87Yjz9307 Ordered   6  Levofloxacin 500 MG Oral Tablet (Levaquin); 1 po daily for 1 week; Therapy: 72DLM5551 to (Last Rx:11Jan2016) Ordered   7  Medrol (Isaac) 4 MG TABS (MethylPREDNISolone (Isaac)); TAKE AS DIRECTED; Therapy: 04Tpt3783 to (Last Rx:61Yhr6353)  Requested for: 80Hai2368 Ordered   8  Triamterene-HCTZ 37 5-25 MG Oral Capsule (Dyazide); 1 po daily; Therapy: 39ABW4024 to (Evaluate:23Apr2016)  Requested for: 29Apr2015; Last   Rx:29Apr2015 Ordered   9  Ursodiol 300 MG Oral Capsule (Actigall); Take 1 capsule three times daily  Requested   for: 14SOL6068; Last Rx:16Tdi0686 Ordered    Allergies    1  Codeine Derivatives   2  Doxycycline Hyclate CAPS   3  Penicillins   4  Sulfa Drugs    5  FRUIT   6  Milk   7  TOMATO  Denied    8   Anesthesia Extension Tubing Miscellaneous    Signatures   Electronically signed by : Olimpia Dalton, ; May 19 2016  5:02PM EST                       (Author)

## 2018-01-11 NOTE — PROCEDURES
Results/Data    Procedure: Electromyogram and Nerve Conduction Study  Indication: RIght Lower Extremity   Referred by Dr Asim Paulino / Kayode Mills PA-C  The procedure's were discussed with the patient  Written consent was obtained prior to the procedure and is detailed in the patient's record  Prior to the start of the procedure a time out was taken and the identity of the patient was confirmed via name and date of birth with the patient  The correct site and the procedure to be performed were confirmed  The correct side was confirmed if applicable  The positioning of the patient was verified  The availability of the correct equipment was verified  Procedure Start Time: 8:45 am   Procedure Completion Time:   Technique: A sterile concentric needle electrode was used  The patient tolerated the procedure well  There were no complications  Results  : Motor and sensory nerve conduction studies were performed on the peroneal, tibial and sural nerves  The peroneal and tibial compound motor action potentials were within normal limits  The sural sensory action potential was within normal limits  The peroneal and tibial F waves were within normal limits  The right and left H soleus responses were within normal limits  Concentric needle examination was performed on various proximal and distal muscles including gluteus medius, vastus lateralis, tibialis anterior, medial gastrocnemius, EDB, L4-5 and L5-S1 paraspinal myotomes  There was no evidence of active denervation in any of the muscles tested  Mild decreased recruitment of giant motor units was noted in the gluteus medius and extensor digitorum brevis  The compound motor unit action potentials were of normal configuration with interference patterns being full or full for effort  Interpretation: There is electro physiologic evidence of a:    1   Mild chronic L5 radiculopathy as evidenced by the decreased recruitment and chronic denervation changes in the above-mentioned muscles  2  There is no evidence of a peroneal or tibial mono  neuropathy  Clinical correlation is recommended        Signatures   Electronically signed by : Anyi Medina MD; Mar 14 2016  9:42AM EST                       (Author)
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
## 2018-01-12 VITALS — RESPIRATION RATE: 14 BRPM | HEART RATE: 78 BPM | SYSTOLIC BLOOD PRESSURE: 120 MMHG | DIASTOLIC BLOOD PRESSURE: 78 MMHG

## 2018-01-12 VITALS
SYSTOLIC BLOOD PRESSURE: 116 MMHG | HEART RATE: 81 BPM | WEIGHT: 180 LBS | DIASTOLIC BLOOD PRESSURE: 69 MMHG | HEIGHT: 69 IN | BODY MASS INDEX: 26.66 KG/M2

## 2018-01-12 VITALS
HEART RATE: 87 BPM | TEMPERATURE: 98.6 F | RESPIRATION RATE: 16 BRPM | SYSTOLIC BLOOD PRESSURE: 140 MMHG | DIASTOLIC BLOOD PRESSURE: 70 MMHG | HEIGHT: 69 IN | BODY MASS INDEX: 26.66 KG/M2 | WEIGHT: 180 LBS | OXYGEN SATURATION: 96 %

## 2018-01-12 VITALS
DIASTOLIC BLOOD PRESSURE: 76 MMHG | WEIGHT: 170 LBS | HEIGHT: 69 IN | SYSTOLIC BLOOD PRESSURE: 112 MMHG | HEART RATE: 91 BPM | BODY MASS INDEX: 25.18 KG/M2

## 2018-01-12 VITALS
TEMPERATURE: 97.8 F | RESPIRATION RATE: 16 BRPM | HEART RATE: 76 BPM | WEIGHT: 176 LBS | HEIGHT: 69 IN | OXYGEN SATURATION: 96 % | DIASTOLIC BLOOD PRESSURE: 80 MMHG | BODY MASS INDEX: 26.07 KG/M2 | SYSTOLIC BLOOD PRESSURE: 130 MMHG
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
## 2018-01-13 VITALS
DIASTOLIC BLOOD PRESSURE: 68 MMHG | WEIGHT: 176 LBS | SYSTOLIC BLOOD PRESSURE: 111 MMHG | HEART RATE: 73 BPM | BODY MASS INDEX: 26.07 KG/M2 | HEIGHT: 69 IN

## 2018-01-13 VITALS
SYSTOLIC BLOOD PRESSURE: 118 MMHG | DIASTOLIC BLOOD PRESSURE: 78 MMHG | WEIGHT: 180 LBS | HEART RATE: 93 BPM | BODY MASS INDEX: 26.66 KG/M2 | RESPIRATION RATE: 16 BRPM | TEMPERATURE: 97.6 F | HEIGHT: 69 IN | OXYGEN SATURATION: 97 %

## 2018-01-13 VITALS — HEART RATE: 78 BPM | DIASTOLIC BLOOD PRESSURE: 80 MMHG | RESPIRATION RATE: 14 BRPM | SYSTOLIC BLOOD PRESSURE: 130 MMHG

## 2018-01-13 VITALS
HEIGHT: 69 IN | SYSTOLIC BLOOD PRESSURE: 113 MMHG | DIASTOLIC BLOOD PRESSURE: 73 MMHG | HEART RATE: 79 BPM | BODY MASS INDEX: 26.66 KG/M2 | WEIGHT: 180 LBS

## 2018-01-13 VITALS
DIASTOLIC BLOOD PRESSURE: 72 MMHG | BODY MASS INDEX: 24.73 KG/M2 | HEIGHT: 69 IN | WEIGHT: 167 LBS | SYSTOLIC BLOOD PRESSURE: 106 MMHG | HEART RATE: 81 BPM

## 2018-01-13 NOTE — MISCELLANEOUS
Message  patient c/o chronic generalized pain  has not changed since appt with Dr Myrna Reyes in June 2017  patient had seen pain specialist in May, does not want to go back  she has f/u with PCP next week, she will call us after appt if Dr Júnior Carter feels patient would benefit form seeing Dr Myrna Reyes sooner         Active Problems    1  Abnormal mammogram (793 80) (R92 8)   2  Aftercare following surgery of the musculoskeletal system (V58 78) (Z47 89)   3  Arthralgia of knee, right (719 46) (M25 561)   4  Arthritis (716 90) (M19 90)   5  Articular cartilage disorder of knee, right (717 9) (M23 91)   6  Back pain (724 5) (M54 9)   7  Chest pain (786 50) (R07 9)   8  Chronic liver disease (571 9) (K76 9)   9  Chronic pain syndrome (338 4) (G89 4)   10  Chronic right hip pain (719 45,338 29) (M25 551,G89 29)   11  Cough (786 2) (R05)   12  Dense breast (793 82) (R92 2)   13  Dense breasts (793 82) (R92 2)   14  Depression with anxiety (300 4) (F41 8)   15  Difficulty concentrating (799 51) (R41 840)   16  Displacement of lumbar intervertebral disc without myelopathy (722 10) (M51 26)   17  Dry skin (701 1) (L85 3)   18  Ear noise/buzzing (388 30) (H93 19)   19  Elevated liver enzymes (790 5) (R74 8)   20  Epicondylitis elbow, medial, left (726 31) (M77 02)   21  Fainting (780 2) (R55)   22  Foot drop, right (736 79) (M21 371)   23  Ganglion cyst of flexor tendon sheath of finger, right (727 42) (M67 441)   24  Headache (784 0) (R51)   25  Hydronephrosis (591) (N13 30)   26  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)   27  Hyperlipidemia (272 4) (E78 5)   28  Hypertension (401 9) (I10)   29  Left elbow pain (719 42) (M25 522)   30  Long term use of drug (V58 69) (Z79 899)   31  Low back pain (724 2) (M54 5)   32  Lumbar degenerative disc disease (722 52) (M51 36)   33  Lumbar radiculopathy (724 4) (M54 16)   34  Medial epicondylitis of left elbow (726 31) (M77 02)   35  Memory loss (780 93) (R41 3)   36   MGUS (monoclonal gammopathy of unknown significance) (273 1) (D47 2)   37  Neck pain (723 1) (M54 2)   38  Need for prophylactic vaccination and inoculation against influenza (V04 81) (Z23)   39  Need for vaccination for DTP (V06 1) (Z23)   40  Nephrolithiasis (592 0) (N20 0)   41  Night sweats (780 8) (R61)   42  Occipital neuralgia (723 8) (M54 81)   43  Orthopedic Aftercare For Healing Traumatic Fx Lower Leg (V54 16)   44  Orthopedic aftercare for healing traumatic leg fracture (V54 14) (S82 90XD)   45  Osteopenia (733 90) (M85 80)   46  Osteoporosis (733 00) (M81 0)   47  Pain due to internal orthopedic prosthetic device, implant, or graft (996 78,338 18)    (T84 84XA)   48  Pain in joint (719 40) (M25 50)   49  Pain of lower extremity (729 5) (M79 606)   50  Peripheral neuropathy (356 9) (G62 9)   51  Pes anserine bursitis (726 61) (M70 50)   52  Postlaminectomy syndrome, lumbar (722 83) (M96 1)   53  Post-traumatic osteoarthritis of right knee (715 26) (M17 31)   54  Primary biliary cirrhosis (571 6) (K74 3)   55  Pruritus (698 9) (L29 9)   56  Rib pain on right side (786 50) (R07 81)   57  Right middle ear infection (382 9) (H66 91)   58  Right shoulder pain (719 41) (M25 511)   59  Sacroiliitis (720 2) (M46 1)   60  Screening for colorectal cancer (V76 51) (Z12 11,Z12 12)   61  Shoulder bursitis, right (726 10) (M75 51)   62  Skin rash (782 1) (R21)   63  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)   64  Status post fall (V15 88) (Z91 81)   65  Trigger finger (727 03) (M65 30)   66  Trigger middle finger of right hand (727 03) (M65 331)   67  Trigger ring finger of right hand (727 03) (M65 341)   68  Trochanteric bursitis (726 5) (M70 60)   69  Urinary frequency (788 41) (R35 0)   70  Visit for screening mammogram (V76 12) (Z12 31)   71  Whiplash injuries (847 0) (S13 4XXA)    Current Meds   1  Betamethasone Sod Phos & Acet 6 (3-3) MG/ML Injection Suspension; INJECT 6  MG   Subcutaneous;  To Be Done: 92SJW4746; Status: HOLD FOR - Administration Ordered   2  Fosamax 70 MG Oral Tablet (Alendronate Sodium); TAKE 1 TABLET ONCE WEEKLY; Therapy: 24Apr2017 to Recorded   3  Gralise 600 MG Oral Tablet; TAKE 3 TABLETS AT DINNER TIME; Therapy: 63UJW5775 to (Doron Mccoy)  Requested for: 57HZT7992; Last   Rx:01Jun2017 Ordered   4  Lexapro 10 MG Oral Tablet (Escitalopram Oxalate); TAKE 1 TABLET DAILY; Therapy: (Svitlana Langston) to Recorded   5  Nucynta ER 50 MG Oral Tablet Extended Release 12 Hour; take 1 tablet every twelve   hours; Therapy: 70VNN1441 to (Evaluate:09Jun2017); Last Rx:60Jrp2586 Ordered   6  Paxil CR 12 5 MG Oral Tablet Extended Release 24 Hour (PARoxetine HCl ER); TAKE 1   TABLET DAILY; Therapy: 24Apr2017 to Recorded   7  TraMADol HCl - 50 MG Oral Tablet; TAKE 1 TABLET Twice daily PRN  NOT TO EXCEED   100MG A DAY; Therapy: 24Apr2017 to Recorded   8  Ursodiol 300 MG Oral Capsule (Actigall); Take 1 capsule three times daily  Requested   for: 00PFZ6179; Last Rx:14Thu8673 Ordered   9  Vitamin D (Ergocalciferol) 67010 UNIT Oral Capsule; TAKE 1 CAPULE ONCE   MONTHLY; Therapy: 24Apr2017 to Recorded   10  Vitamin D3 2000 UNIT Oral Capsule; take 1 capsule by mouth once daily; Therapy: 24Apr2017 to Recorded   11  Zovirax 200 MG Oral Capsule (Acyclovir); 1 TABLET DAILY FOR 5 DAYS DURING    PERIODS OF HERPES SIMPLEX; Therapy: 24Apr2017 to Recorded    Allergies    1  Codeine Derivatives   2  Doxycycline Hyclate CAPS   3  Penicillins   4  Sulfa Drugs    5  FRUIT   6  Latex   7  Milk   8  TOMATO  Denied    9   Anesthesia Extension Tubing Miscellaneous    Signatures   Electronically signed by : Odilia Martinez, ; Jul 19 2017  3:06PM EST                       (Author)

## 2018-01-13 NOTE — MISCELLANEOUS
Message   Recorded as Task   Date: 04/11/2016 01:45 PM, Created By: Zaheer Morillo   Task Name: Follow Up   Assigned To: Juan Griggs   Regarding Patient: Arias Estevez, Status: In Progress   Comment:    Senia Zurita - 11 Apr 2016 1:45 PM     TASK CREATED  Pt is S/P RIGHT L5 SNRB  on 4/5/16 by Dr Alejandra Damico  Pain diary scanned in  No f/u scheduled   Senia Zurita - 12 Apr 2016 11:03 AM     TASK EDITED  1st attempt to reach pt  LM for return call  Senia Zurita - 12 Apr 2016 11:26 AM     TASK EDITED  Pt LM on procedure line stating nerve block did not work at all  Elham Mcgraw - 09 May 2016 3:22 PM     TASK REPLIED TO: Previously Assigned To Elham Mcgraw  md aware   pt saw Dr Vlad Kurtz last week, please schedule POVS with me to discuss other treatment options   Senia Zurita - 10 May 2016 8:01 AM     TASK REASSIGNED: Previously Assigned To KAM Gonzalez Sa - 11 May 2016 2:44 PM     TASK IN PROGRESS   Kaur Ovalle - 11 May 2016 2:47 PM     TASK REPLIED TO: Previously Assigned To [facility] clerical,Team  LMOM for pt to CB to schedule  Ewa Harry - 12 May 2016 10:07 AM     TASK REPLIED TO: Previously Assigned To [facility] clerical,Team  lmom for pt to cb  Ewa Harry - 12 May 2016 10:13 AM     TASK REPLIED TO: Previously Assigned To Elham Mcgraw  Patient called back  I tried to schedule a follow up appt and she was not very happy about that  She stated there is no need to take off of work to come in discuss things again  She's already done that  She said she talked to Dr Vlad Kurtz as well and was under the impression she would be starting a round of 3 shots  She said Dr Vlad Kurtz told her he was going to discuss that with you  She asked me to relay the message and call her back     Would you still like her to come in for a povs?   Elham Mcgraw - 12 May 2016 11:50 AM     TASK REPLIED TO: Previously Assigned To [facility] k[facility] clinical,Team  i don't think a round of 3 shots would help at all since she received 0% relief with the nerve block   was going to [facility] as a treament option at [facility] - 12 May 2016 1:33 PM     TASK EDITED  S/w the verbally frustrated pt  and she stated that Dr Hi Henry indicated that she would need 6 shots into her right knee  One shot each week  The lower back injection didnot work and the pain is in her knee  I explained that usually there is no specification on how many "shots" you get  That is indicated by the physician administering  She stated she is frustrated by the whole process and she is not going to waste her time coming for an office visit to discuss  She does not want to discuss a stim, she states she does not need one  She just needs shots in her knee  I told her I would clarify with FQ and we would CB to clarify  Navid Clarke - 12 May 2016 2:08 PM     TASK REPLIED TO: Previously Assigned To Navid Trinidad will be doing the orthovisc shots in the knees not me so then i'm confused as to why she is coming back to me then   [facility] - 12 May 2016 2:59 PM     TASK EDITED  S/w the pt  again and she called Dr Zahraa Maddox office and s/w Linda Birmingham np and discussed what he was doing for her  They did verify that Dr Hi Henry will be giving her the knee shots and he wants her to f/u with you because her EMG showed that there might be some back involvement  She was verbally frustrated and condescending during our conversation  I told her it was ultimately  her choice to f/u c/ you  She stated " I guess I have too, Like I have a choice"  My WC claim stops at the end of the year and I have to wait months to  see anyone, she stated " it's not like they have pain everyday" Pt  scheduled for F/u povs c/ FQ  Emotional support was ongoing  Navid Clarke - 12 May 2016 3:28 PM     TASK REPLIED TO: Previously Assigned To Navid Clarke md aware        Active Problems    1  Abnormal mammogram (793 80) (R92 8)   2   Acute upper respiratory infection (465 9) (J06 9)   3  Aftercare following surgery of the musculoskeletal system (V58 78) (Z47 89)   4  Anemia (285 9) (D64 9)   5  Arthralgia of knee, right (719 46) (M25 561)   6  Arthritis (716 90) (M19 90)   7  Articular cartilage disorder of knee, right (717 9) (M23 91)   8  Chronic liver disease (571 9) (K76 9)   9  Cough (786 2) (R05)   10  Dense breast (793 82) (R92 2)   11  Depression with anxiety (300 4) (F41 8)   12  Difficulty concentrating (799 51) (R41 840)   13  Displacement of lumbar intervertebral disc without myelopathy (722 10) (M51 26)   14  Dry skin (701 1) (L85 3)   15  Ear noise/buzzing (388 30) (H93 19)   16  Elevated liver enzymes (790 5) (R74 8)   17  Fainting (780 2) (R55)   18  Ganglion cyst of flexor tendon sheath of finger, right (727 42) (M67 441)   19  Headache (784 0) (R51)   20  Hydronephrosis (591) (N13 30)   21  Hyperlipidemia (272 4) (E78 5)   22  Hypertension (401 9) (I10)   23  Iron deficiency anemia (280 9) (D50 9)   24  Leg pain (729 5) (M79 606)   25  Long term use of drug (V58 69) (Z79 899)   26  Loose stools (787 7) (R19 5)   27  Low back pain (724 2) (M54 5)   28  Lumbar degenerative disc disease (722 52) (M51 36)   29  Lumbar radiculopathy (724 4) (M54 16)   30  Memory loss (780 93) (R41 3)   31  Neck pain (723 1) (M54 2)   32  Need for vaccination for DTP (V06 1) (Z23)   33  Nephrolithiasis (592 0) (N20 0)   34  Night sweats (780 8) (R61)   35  Occipital neuralgia (723 8) (M54 81)   36  Orthopedic Aftercare For Healing Traumatic Fx Lower Leg (V54 16)   37  Orthopedic aftercare for healing traumatic leg fracture (V54 14) (S82 90XD)   38  Osteopenia (733 90) (M85 80)   39  Osteoporosis (733 00) (M81 0)   40  Pain due to internal orthopedic prosthetic device, implant, or graft (996 78,338 18)    (T84 84XA)   41  Painful joint (719 40) (M25 50)   42  Peripheral neuropathy (356 9) (G62 9)   43  Pes anserine bursitis (726 61) (M70 50)   44   Post-traumatic osteoarthritis of right knee (715 26) (M17 31)   45  Primary biliary cirrhosis (571 6) (K74 3)   46  Pruritus (698 9) (L29 9)   47  Right middle ear infection (382 9) (H66 91)   48  Sacroiliitis (720 2) (M46 1)   49  Screening for cervical cancer (V76 2) (Z12 4)   50  Screening for colorectal cancer (V76 51) (Z12 11,Z12 12)   51  Status post fall (V15 88) (Z91 89)   52  Trigger finger (727 03) (M65 30)   53  Trigger ring finger of right hand (727 03) (M65 341)   54  Trochanteric bursitis (726 5) (M70 60)   55  Urinary frequency (788 41) (R35 0)   56  Visit for screening mammogram (V76 12) (Z12 31)   57  Whiplash injuries (847 0) (S13 4XXA)    Current Meds   1  Betamethasone Sod Phos & Acet 6 (3-3) MG/ML Injection Suspension; INJECT 6  MG   Subcutaneous; To Be Done: 53XHQ1157; Status: HOLD FOR - Administration Ordered   2  Cephalexin 500 MG Oral Capsule; take 1 tid for one week; Therapy: 03YEJ4022 to (Last Rx:66Igk4818) Ordered   3  Cholestyramine 4 GM/DOSE Oral Powder; MIX 1 SCOOP IN LIQUID AND DRINK TWICE   DAILY PRN; Therapy: 06Apr2015-(Evaluate:77Guc5512) Recorded   4  FLUoxetine HCl - 40 MG Oral Capsule Recorded   5  Gabapentin 300 MG Oral Capsule; TAKE 2 CAPSULES 3 TIMES DAILY; Therapy: 53JVG4800 to (Juliette Armijo)  Requested for: 04Apr2016; Last   Rx:04Apr2016 Ordered   6  Levofloxacin 500 MG Oral Tablet (Levaquin); 1 po daily for 1 week; Therapy: 99OEE6936 to (Last Rx:11Jan2016) Ordered   7  Medrol (Isaac) 4 MG TABS (MethylPREDNISolone (Isaac)); TAKE AS DIRECTED; Therapy: 12Wna9521 to (Last Rx:25Wmx3506)  Requested for: 09Sep2015 Ordered   8  Triamterene-HCTZ 37 5-25 MG Oral Capsule (Dyazide); 1 po daily; Therapy: 47KIJ3971 to (Evaluate:23Apr2016)  Requested for: 29Apr2015; Last   Rx:29Apr2015 Ordered   9  Ursodiol 300 MG Oral Capsule (Actigall); Take 1 capsule three times daily  Requested   for: 82QAG4035; Last Rx:58Yxc2912 Ordered    Allergies    1  Codeine Derivatives   2   Doxycycline Hyclate CAPS   3  Penicillins   4  Sulfa Drugs    5  FRUIT   6  Milk   7  TOMATO  Denied    8   Anesthesia Extension Tubing Miscellaneous    Signatures   Electronically signed by : Lucero Cartwright, ; May 13 2016  7:42AM EST                       (Author)

## 2018-01-13 NOTE — MISCELLANEOUS
Message   Recorded as Task   Date: 05/24/2016 11:13 AM, Created By: [facility]   Task Name: Call Back   Assigned To: [facility] clinical,Team   Regarding Patient: Aiden Rivas, Status: Active   Comment:    Senia Zurita - 24 May 2016 11:13 AM     TASK CREATED  Pt LM on [facility] procedure line statin she has been taking 1800mg of Gralise and it makes her feel "out of it" and went back to 1200mg but it is nothelping during the day  Still has pain, burning and neuropathy  Pt can be reached at [address] [place] [address] - 24 May 2016 11:46 AM     TASK EDITED  Spoke w/pt states that she was instructed to decrease her gralise to 1200mg due to "feeling out of it" and "couldn't function"when she was taking gralise 1800mg  Pt takes her medication around 5pm, sleeps through the night and has approx 50% pain relief from the time she wakes up until noon,then her pain returns  Perfecto Swift - 24 May 2016 4:24 PM     TASK REPLIED TO: Previously Assigned To Perfecto Swift md aware   maybe she can take it just around 8pm still with a meal and then have pain relief until 3pm?   Juana Keyes - 25 May 2016 8:19 AM     TASK EDITED  Pt advised of the same  Pt will give that a try and then discuss with Dr Felipe Jordan at her inj on Tues 5/31/16  Perfecto Swift - 25 May 2016 8:36 AM     TASK REPLIED TO: Previously Assigned To Perfecto Swift md aware        Active Problems    1  Abnormal mammogram (793 80) (R92 8)   2  Acute upper respiratory infection (465 9) (J06 9)   3  Aftercare following surgery of the musculoskeletal system (V58 78) (Z47 89)   4  Anemia (285 9) (D64 9)   5  Arthralgia of knee, right (719 46) (M25 561)   6  Arthritis (716 90) (M19 90)   7  Articular cartilage disorder of knee, right (717 9) (M23 91)   8  Chronic liver disease (571 9) (K76 9)   9  Cough (786 2) (R05)   10  Dense breast (793 82) (R92 2)   11  Depression with anxiety (300 4) (F41 8)   12   Difficulty concentrating (799 51) (R41 840)   13  Displacement of lumbar intervertebral disc without myelopathy (722 10) (M51 26)   14  Dry skin (701 1) (L85 3)   15  Ear noise/buzzing (388 30) (H93 19)   16  Elevated liver enzymes (790 5) (R74 8)   17  Fainting (780 2) (R55)   18  Ganglion cyst of flexor tendon sheath of finger, right (727 42) (M67 441)   19  Headache (784 0) (R51)   20  Hydronephrosis (591) (N13 30)   21  Hyperlipidemia (272 4) (E78 5)   22  Hypertension (401 9) (I10)   23  Iron deficiency anemia (280 9) (D50 9)   24  Leg pain (729 5) (M79 606)   25  Long term use of drug (V58 69) (Z79 899)   26  Loose stools (787 7) (R19 5)   27  Low back pain (724 2) (M54 5)   28  Lumbar degenerative disc disease (722 52) (M51 36)   29  Lumbar radiculopathy (724 4) (M54 16)   30  Memory loss (780 93) (R41 3)   31  Neck pain (723 1) (M54 2)   32  Need for vaccination for DTP (V06 1) (Z23)   33  Nephrolithiasis (592 0) (N20 0)   34  Night sweats (780 8) (R61)   35  Occipital neuralgia (723 8) (M54 81)   36  Orthopedic Aftercare For Healing Traumatic Fx Lower Leg (V54 16)   37  Orthopedic aftercare for healing traumatic leg fracture (V54 14) (S82 90XD)   38  Osteopenia (733 90) (M85 80)   39  Osteoporosis (733 00) (M81 0)   40  Pain due to internal orthopedic prosthetic device, implant, or graft (996 78,338 18)    (T84 84XA)   41  Painful joint (719 40) (M25 50)   42  Peripheral neuropathy (356 9) (G62 9)   43  Pes anserine bursitis (726 61) (M70 50)   44  Post-traumatic osteoarthritis of right knee (715 26) (M17 31)   45  Primary biliary cirrhosis (571 6) (K74 3)   46  Pruritus (698 9) (L29 9)   47  Right middle ear infection (382 9) (H66 91)   48  Sacroiliitis (720 2) (M46 1)   49  Screening for cervical cancer (V76 2) (Z12 4)   50  Screening for colorectal cancer (V76 51) (Z12 11,Z12 12)   51  Status post fall (V15 88) (Z91 89)   52  Trigger finger (727 03) (M65 30)   53  Trigger ring finger of right hand (727 03) (M65 341)   54   Trochanteric bursitis (726 5) (M70 60)   55  Urinary frequency (788 41) (R35 0)   56  Visit for screening mammogram (V76 12) (Z12 31)   57  Whiplash injuries (847 0) (S13 4XXA)    Current Meds   1  Betamethasone Sod Phos & Acet 6 (3-3) MG/ML Injection Suspension; INJECT 6  MG   Subcutaneous; To Be Done: 92RWE3479; Status: HOLD FOR - Administration Ordered   2  Cephalexin 500 MG Oral Capsule; take 1 tid for one week; Therapy: 30IFK6798 to (Last Rx:59Rob6884) Ordered   3  Cholestyramine 4 GM/DOSE Oral Powder; MIX 1 SCOOP IN LIQUID AND DRINK TWICE   DAILY PRN; Therapy: 25Iyl5677-(Evaluate:93Fdz8067) Recorded   4  FLUoxetine HCl - 40 MG Oral Capsule Recorded   5  Gralise 600 MG Oral Tablet; TAKE 3 TABLETS AT DINNER TIME; Therapy: 88ITO1254 to (Evaluate:84Nvu6238)  Requested for: 05TPR1868; Last   Rx:28Baf4433 Ordered   6  Levofloxacin 500 MG Oral Tablet (Levaquin); 1 po daily for 1 week; Therapy: 93SRX4302 to (Last Rx:11Jan2016) Ordered   7  Medrol (Isaac) 4 MG TABS (MethylPREDNISolone (Isaac)); TAKE AS DIRECTED; Therapy: 22Xez3944 to (Last Rx:79Fpf5013)  Requested for: 38Iuv7641 Ordered   8  Triamterene-HCTZ 37 5-25 MG Oral Capsule (Dyazide); 1 po daily; Therapy: 41LKI1204 to (Evaluate:23Apr2016)  Requested for: 29Apr2015; Last   Rx:29Apr2015 Ordered   9  Ursodiol 300 MG Oral Capsule (Actigall); Take 1 capsule three times daily  Requested   for: 15YRW0312; Last Rx:65Hzc4233 Ordered    Allergies    1  Codeine Derivatives   2  Doxycycline Hyclate CAPS   3  Penicillins   4  Sulfa Drugs    5  FRUIT   6  Milk   7  TOMATO  Denied    8   Anesthesia Extension Tubing Miscellaneous    Signatures   Electronically signed by : Bruna Murphy, ; May 25 2016 10:44AM EST                       (Author)

## 2018-01-14 VITALS
HEIGHT: 69 IN | SYSTOLIC BLOOD PRESSURE: 150 MMHG | DIASTOLIC BLOOD PRESSURE: 69 MMHG | WEIGHT: 182 LBS | HEART RATE: 82 BPM | BODY MASS INDEX: 26.96 KG/M2

## 2018-01-14 VITALS
RESPIRATION RATE: 16 BRPM | SYSTOLIC BLOOD PRESSURE: 114 MMHG | BODY MASS INDEX: 26.96 KG/M2 | DIASTOLIC BLOOD PRESSURE: 72 MMHG | HEART RATE: 88 BPM | WEIGHT: 182 LBS | HEIGHT: 69 IN

## 2018-01-14 VITALS
DIASTOLIC BLOOD PRESSURE: 82 MMHG | WEIGHT: 170 LBS | BODY MASS INDEX: 25.18 KG/M2 | HEART RATE: 73 BPM | SYSTOLIC BLOOD PRESSURE: 122 MMHG | HEIGHT: 69 IN

## 2018-01-14 VITALS
HEIGHT: 69 IN | SYSTOLIC BLOOD PRESSURE: 110 MMHG | DIASTOLIC BLOOD PRESSURE: 70 MMHG | RESPIRATION RATE: 14 BRPM | WEIGHT: 177 LBS | BODY MASS INDEX: 26.22 KG/M2 | HEART RATE: 78 BPM
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
## 2018-01-15 NOTE — MISCELLANEOUS
Message     Recorded as Task   Date: 04/10/2017 03:55 PM, Created By: [email or web address]   Task Name: Medical Complaint Callback   Assigned To: S[facility] kacy clinical,Team   Regarding Patient: Arias Estevez, Status: Active   Comment:    [address] - 10 Apr 2017 3:55 PM     TASK CREATED  Caller: Self; Medical Complaint; [phone] (Home); [phone] (Mobile Phone)  TC from pt that she takes Gralise 1800mg per day for her back and leg  It doesn't seem to be working as well as it did in the past  She can't sleep through the night, she wakes up early she thinks b/c of the pain  She has back pain, pain just below the top of the Rt tibia, burning of the outside of the rt leg and neuropathy of the Rt foot  She is wondering if the dose can be increased, or does she need an appt? She is on her last bottle, not exactly sure how pills she has but and there are no further RF on it  Pt wanted to remind you that she has PBC of the liver and has to watch what meds she takes and the dosages in case you are going to increase the Gralise  Pt [facility], on file  Pt has not been seen since May 2016, pt has no pending ov  Last prescribed Gralise 600mg 3 tabs after dinner with 5 RF on 11/8/16  Told pt I would forward this info to FQ and c/b with how he would like to proceed  Pablo Yoder - 10 Apr 2017 4:26 PM     TASK REPLIED TO: Previously Assigned To KAM hunter,Team  she's already on max dose of Gralise   better make an appt with me for re-eval   i would consider stim for her as well   Juana Keyes - 11 Apr 2017 8:33 AM     TASK EDITED  Pt advised of the same  Pt given ov for 5/10/17 at 8am w/ FQ  Active Problems    1  Abdominal pain (789 00) (R10 9)   2  Abnormal mammogram (793 80) (R92 8)   3  Aftercare following surgery of the musculoskeletal system (V58 78) (Z47 89)   4  Anemia (285 9) (D64 9)   5  Arthralgia of knee, right (719 46) (M25 561)   6   Arthritis (716 90) (M19 90)   7  Articular cartilage disorder of knee, right (717 9) (M23 91)   8  Back pain (724 5) (M54 9)   9  Chest pain (786 50) (R07 9)   10  Chronic liver disease (571 9) (K76 9)   11  Chronic right hip pain (719 45,338 29) (M25 551,G89 29)   12  Cough (786 2) (R05)   13  Dense breast (793 82) (R92 2)   14  Dense breasts (793 82) (R92 2)   15  Depression with anxiety (300 4) (F41 8)   16  Difficulty concentrating (799 51) (R41 840)   17  Displacement of lumbar intervertebral disc without myelopathy (722 10) (M51 26)   18  Dry skin (701 1) (L85 3)   19  Ear noise/buzzing (388 30) (H93 19)   20  Elevated liver enzymes (790 5) (R74 8)   21  Epicondylitis elbow, medial, left (726 31) (M77 02)   22  Fainting (780 2) (R55)   23  Foot drop, right (736 79) (M21 371)   24  Ganglion cyst of flexor tendon sheath of finger, right (727 42) (M67 441)   25  Headache (784 0) (R51)   26  Hydronephrosis (591) (N13 30)   27  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)   28  Hyperlipidemia (272 4) (E78 5)   29  Hypertension (401 9) (I10)   30  Iron deficiency anemia (280 9) (D50 9)   31  Long term use of drug (V58 69) (Z79 899)   32  Low back pain (724 2) (M54 5)   33  Lumbar degenerative disc disease (722 52) (M51 36)   34  Lumbar radiculopathy (724 4) (M54 16)   35  Memory loss (780 93) (R41 3)   36  Neck pain (723 1) (M54 2)   37  Need for prophylactic vaccination and inoculation against influenza (V04 81) (Z23)   38  Need for vaccination for DTP (V06 1) (Z23)   39  Nephrolithiasis (592 0) (N20 0)   40  Night sweats (780 8) (R61)   41  Occipital neuralgia (723 8) (M54 81)   42  Orthopedic Aftercare For Healing Traumatic Fx Lower Leg (V54 16)   43  Orthopedic aftercare for healing traumatic leg fracture (V54 14) (S82 90XD)   44  Osteopenia (733 90) (M85 80)   45  Osteoporosis (733 00) (M81 0)   46  Pain due to internal orthopedic prosthetic device, implant, or graft (996 78,338 18)    (T84 84XA)   47   Pain in joint (719 40) (M25 50)   48  Pain of lower extremity (729 5) (M79 606)   49  Peripheral neuropathy (356 9) (G62 9)   50  Pes anserine bursitis (726 61) (M70 50)   51  Post-traumatic osteoarthritis of right knee (715 26) (M17 31)   52  Primary biliary cirrhosis (571 6) (K74 3)   53  Pruritus (698 9) (L29 9)   54  Rib pain on right side (786 50) (R07 81)   55  Right middle ear infection (382 9) (H66 91)   56  Right shoulder pain (719 41) (M25 511)   57  Sacroiliitis (720 2) (M46 1)   58  Screening for colorectal cancer (V76 51) (Z12 11,Z12 12)   59  Shoulder bursitis, right (726 10) (M75 51)   60  Skin rash (782 1) (R21)   61  Status post fall (V15 88) (Z91 81)   62  Trigger finger (727 03) (M65 30)   63  Trigger middle finger of right hand (727 03) (M65 331)   64  Trigger ring finger of right hand (727 03) (M65 341)   65  Trochanteric bursitis (726 5) (M70 60)   66  Urinary frequency (788 41) (R35 0)   67  Visit for screening mammogram (V76 12) (Z12 31)   68  Whiplash injuries (847 0) (S13 4XXA)    Current Meds   1  Betamethasone Sod Phos & Acet 6 (3-3) MG/ML Injection Suspension; INJECT 6  MG   Subcutaneous; To Be Done: 05TIN1630; Status: HOLD FOR - Administration Ordered   2  Cholestyramine 4 GM/DOSE Oral Powder; MIX 1 SCOOP IN LIQUID AND DRINK TWICE   DAILY PRN; Therapy: 06Apr2015-(Evaluate:53Sqz5821) Recorded   3  Cyclobenzaprine HCl - 10 MG Oral Tablet; TAKE 1/2 TO 1 TABLET 3 TIMES DAILY AS   NEEDED; Therapy: 64NSD0498 to (Evaluate:20Apr2017)  Requested for: 87TDO1009; Last   Rx:01Mar2017 Ordered   4  Gralise 600 MG Oral Tablet; TAKE 3 TABLETS AT DINNER TIME; Therapy: 73YRP1659 to (Gwendolyn Srivastava)  Requested for: 37PFR6389; Last   Rx:08Nov2016 Ordered   5  LevoFLOXacin 500 MG Oral Tablet (Levaquin); 1 po daily for 1 week; Therapy: 38ONL7368 to (Last Rx:11Jan2016) Ordered   6  Lexapro TABS (Escitalopram Oxalate); Therapy: (Recorded:72Ofb5001) to Recorded   7   Medrol (Isaac) 4 MG TABS (MethylPREDNISolone (Isaac)); TAKE AS DIRECTED; Therapy: 73Rkr2083 to (Last Rx:08Rfj5080)  Requested for: 59Rxi7816 Ordered   8  Triamterene-HCTZ 37 5-25 MG Oral Capsule (Dyazide); 1 po daily; Therapy: 62LPD3609 to (Evaluate:23Apr2016)  Requested for: 29Apr2015; Last   Rx:21Vdd1482 Ordered   9  Ursodiol 300 MG Oral Capsule (Actigall); Take 1 capsule three times daily  Requested   for: 39ELE0471; Last Rx:09Ziz5036 Ordered    Allergies    1  Codeine Derivatives   2  Doxycycline Hyclate CAPS   3  Penicillins   4  Sulfa Drugs    5  FRUIT   6  Latex   7  Milk   8  TOMATO  Denied    9   Anesthesia Extension Tubing Miscellaneous    Signatures   Electronically signed by : Codie Bernal, ; Apr 11 2017  8:35AM EST                       (Author)

## 2018-01-15 NOTE — MISCELLANEOUS
Message   Recorded as Task   Date: 10/03/2017 09:52 AM, Created By: Mary Andrade   Task Name: Miscellaneous   Assigned To: Lavelle Colorado   Regarding Patient: Bandar Hinkle, Status: In Progress   Comment:    MarymaribethEmani - 03 Oct 2017 9:52 AM     TASK CREATED  pt said that Sebas Lovell was supposed to up her gralise  please call pt back at [phone] - 03 Oct 2017 11:04 AM     TASK EDITED  LMOM on # listed below for pt to C/B, C/B # provided  Caryn Santos - 03 Oct 2017 11:51 AM     TASK EDITED  [address] [place] [address]- patient returned call   c/b [phone]   Juana Keyes - 03 Oct 2017 1:11 PM     TASK IN PROGRESS   Juana Keyes - 03 Oct 2017 1:18 PM     TASK EDITED  S/W pt who said Dr Teodora Aguilar or his PA was suppose to have notified Dr Derian Alvarez that Dr Teodora Aguilar wants pt to be on more Gralise  She is currently on 600mg capsule and takes (3) capsules at dinner  Pt said she just had a bottle filled last week  Told pt I would forward message to  to inquire if he had gotten a message from Dr Teodora Aguilar about this  Pls advise  No pending sovs with our office  Giuseppe Lyon - 10 Oct 2017 1:20 PM     TASK REPLIED TO: Previously Assigned To Giuseppe Lyon  she is already on the maximum dose   Juana Keyes - 03 Oct 2017 1:59 PM     TASK IN PROGRESS   Juana Keyes - 03 Oct 2017 2:01 PM     TASK EDITED  Left vm on number provided for pt to c/b and s/w nurse  C/B number and office hrs provided  Walt Price - 97 Oct 2017 4:06 PM     TASK EDITED  Patient called the answering service & left a message that she was returning the nurse call  Please call her @ [phone]  Thank you  [address] - 03 Oct 2017 4:09 PM     TASK IN PROGRESS   Juana Keyes - 03 Oct 2017 4:10 PM     TASK EDITED  Pt informed of the same, she will let Dr Mario Alberto López office know that  Active Problems    1  Abnormal mammogram (793 80) (R92 8)   2   Aftercare following surgery of the musculoskeletal system (V58 78) (Z47 89)   3  Arthralgia of knee, right (719 46) (M25 561)   4  Arthritis (716 90) (M19 90)   5  Articular cartilage disorder of knee, right (717 9) (M23 91)   6  Back pain (724 5) (M54 9)   7  Chest pain (786 50) (R07 9)   8  Chronic liver disease (571 9) (K76 9)   9  Chronic pain syndrome (338 4) (G89 4)   10  Chronic right hip pain (719 45,338 29) (M25 551,G89 29)   11  Cough (786 2) (R05)   12  Dense breasts (793 82) (R92 2)   13  Depression with anxiety (300 4) (F41 8)   14  Difficulty concentrating (799 51) (R41 840)   15  Displacement of lumbar intervertebral disc without myelopathy (722 10) (M51 26)   16  Dry skin (701 1) (L85 3)   17  Ear noise/buzzing (388 30) (H93 19)   18  Elevated liver enzymes (790 5) (R74 8)   19  Epicondylitis elbow, medial, left (726 31) (M77 02)   20  Fainting (780 2) (R55)   21  Foot drop, right (736 79) (M21 371)   22  Ganglion cyst of flexor tendon sheath of finger, right (727 42) (M67 441)   23  Headache (784 0) (R51)   24  Hydronephrosis (591) (N13 30)   25  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)   26  Hyperlipidemia (272 4) (E78 5)   27  Hypertension (401 9) (I10)   28  Left elbow pain (719 42) (M25 522)   29  Long term use of drug (V58 69) (Z79 899)   30  Low back pain (724 2) (M54 5)   31  Lumbar degenerative disc disease (722 52) (M51 36)   32  Lumbar radiculopathy (724 4) (M54 16)   33  Medial epicondylitis of left elbow (726 31) (M77 02)   34  Memory loss (780 93) (R41 3)   35  MGUS (monoclonal gammopathy of unknown significance) (273 1) (D47 2)   36  Neck pain (723 1) (M54 2)   37  Need for prophylactic vaccination and inoculation against influenza (V04 81) (Z23)   38  Need for vaccination for DTP (V06 1) (Z23)   39  Nephrolithiasis (592 0) (N20 0)   40  Night sweats (780 8) (R61)   41  Occipital neuralgia (723 8) (M54 81)   42  Orthopedic Aftercare For Healing Traumatic Fx Lower Leg (V54 16)   43   Orthopedic aftercare for healing traumatic leg fracture (V54 14) (S82 90XD)   44  Osteoporosis (733 00) (M81 0)   45  Pain due to internal orthopedic prosthetic device, implant, or graft (996 78,338 18)    (T84 84XA)   46  Pain in joint (719 40) (M25 50)   47  Pain of lower extremity (729 5) (M79 606)   48  Peripheral neuropathy (356 9) (G62 9)   49  Pes anserine bursitis (726 61) (M70 50)   50  Postlaminectomy syndrome, lumbar (722 83) (M96 1)   51  Post-traumatic osteoarthritis of right knee (715 26) (M17 31)   52  Primary biliary cirrhosis (571 6) (K74 3)   53  Pruritus (698 9) (L29 9)   54  Right elbow pain (719 42) (M25 521)   55  Right shoulder pain (719 41) (M25 511)   56  Sacroiliitis (720 2) (M46 1)   57  Screening for colorectal cancer (V76 51) (Z12 11,Z12 12)   58  Shoulder bursitis, right (726 10) (M75 51)   59  Skin rash (782 1) (R21)   60  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)   61  Status post fall (V15 88) (Z91 81)   62  Trigger finger (727 03) (M65 30)   63  Trigger middle finger of right hand (727 03) (M65 331)   64  Trigger ring finger of right hand (727 03) (M65 341)   65  Trochanteric bursitis (726 5) (M70 60)   66  Urinary frequency (788 41) (R35 0)   67  Whiplash injuries (847 0) (S13 4XXA)    Current Meds   1  Betamethasone Sod Phos & Acet 6 (3-3) MG/ML Injection Suspension; INJECT 6  MG   Subcutaneous; To Be Done: 13CWE7375; Status: HOLD FOR - Administration Ordered   2  Gralise 600 MG Oral Tablet; TAKE 3 TABLETS AT DINNER TIME; Therapy: 32BUX1058 to (Eric Perone)  Requested for: 16AVL3702; Last   Rx:01Jun2017 Ordered   3  Paxil CR 25 MG Oral Tablet Extended Release 24 Hour (PARoxetine HCl ER); Take 1   tablet twice daily; Therapy: (Umu Hence) to Recorded   4  TraMADol HCl - 50 MG Oral Tablet; TAKE 1 TABLET Twice daily PRN  NOT TO EXCEED   100MG A DAY; Therapy: 24Apr2017 to Recorded   5  Triamterene-HCTZ TABS; Therapy: (Recorded:11Sep2017) to Recorded   6  Ursodiol 300 MG Oral Capsule (Actigall);  Take 1 capsule three times daily  Requested   for: 96KHN9580; Last Rx:19Ebd0531 Ordered   7  Zovirax 200 MG Oral Capsule (Acyclovir); 1 TABLET DAILY FOR 5 DAYS DURING   PERIODS OF HERPES SIMPLEX; Therapy: 24Apr2017 to Recorded    Allergies    1  Codeine Derivatives   2  Doxycycline Hyclate CAPS   3  Penicillins   4  Sulfa Drugs    5  FRUIT   6  Latex   7  Milk   8  TOMATO  Denied    9   Anesthesia Extension Tubing Miscellaneous    Signatures   Electronically signed by : Hadley Gonzalez, ; Oct  3 2017  4:10PM EST                       (Author)

## 2018-01-15 NOTE — PROCEDURES
Procedures by Harpreet Leiva MD at 5/19/2017  10:42 AM      Author:  Harpreet Leiva MD Service:  Interventional Radiology  Author Type:  Physician     Filed:  5/19/2017 10:42 AM Date of Service:  5/19/2017 10:42 AM Status:  Signed     :  Harpreet Leiva MD (Physician)            Procedures      INTERVENTIONAL RADIOLOGY PROCEDURE NOTE    Date: 05/19/17    Procedures:     1  CT guided bone marrow and core biopsy  Preoperative diagnosis: monoclonal gammopathy    Postoperative diagnosis: Same    Surgeon: Harpreet Leiva MD     Assistant: None  No qualified resident was available  Blood loss: Minimal    Specimens: bone marrow aspirate and core     Findings: Bone marrow elements present  Complications: None    Anesthesia: IV moderate conscious sedation    Plan:  Bed rest 1 hour  DC home if patient remains stable                  Received for:Provider  EPIC   May 19 2017 10:43AM [facility] Standard Time

## 2018-01-15 NOTE — MISCELLANEOUS
Message  [employer] regarding a prior authorization for genetic testing portion of [employer] and they informed me it was approved from 5/5/2017 to 7/07/2017 Auth#: [id number]      Active Problems    1  Abnormal mammogram (793 80) (R92 8)   2  Aftercare following surgery of the musculoskeletal system (V58 78) (Z47 89)   3  Arthralgia of knee, right (719 46) (M25 561)   4  Arthritis (716 90) (M19 90)   5  Articular cartilage disorder of knee, right (717 9) (M23 91)   6  Back pain (724 5) (M54 9)   7  Chest pain (786 50) (R07 9)   8  Chronic liver disease (571 9) (K76 9)   9  Chronic pain syndrome (338 4) (G89 4)   10  Chronic right hip pain (719 45,338 29) (M25 551,G89 29)   11  Cough (786 2) (R05)   12  Dense breast (793 82) (R92 2)   13  Dense breasts (793 82) (R92 2)   14  Depression with anxiety (300 4) (F41 8)   15  Difficulty concentrating (799 51) (R41 840)   16  Displacement of lumbar intervertebral disc without myelopathy (722 10) (M51 26)   17  Dry skin (701 1) (L85 3)   18  Ear noise/buzzing (388 30) (H93 19)   19  Elevated liver enzymes (790 5) (R74 8)   20  Epicondylitis elbow, medial, left (726 31) (M77 02)   21  Fainting (780 2) (R55)   22  Foot drop, right (736 79) (M21 371)   23  Ganglion cyst of flexor tendon sheath of finger, right (727 42) (M67 441)   24  Headache (784 0) (R51)   25  Hydronephrosis (591) (N13 30)   26  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)   27  Hyperlipidemia (272 4) (E78 5)   28  Hypertension (401 9) (I10)   29  Long term use of drug (V58 69) (Z79 899)   30  Low back pain (724 2) (M54 5)   31  Lumbar degenerative disc disease (722 52) (M51 36)   32  Lumbar radiculopathy (724 4) (M54 16)   33  Memory loss (780 93) (R41 3)   34  MGUS (monoclonal gammopathy of unknown significance) (273 1) (D47 2)   35  Neck pain (723 1) (M54 2)   36  Need for prophylactic vaccination and inoculation against influenza (V04 81) (Z23)   37  Need for vaccination for DTP (V06 1) (Z23)   38  Nephrolithiasis (592 0) (N20 0)   39  Night sweats (780 8) (R61)   40  Occipital neuralgia (723 8) (M54 81)   41  Orthopedic Aftercare For Healing Traumatic Fx Lower Leg (V54 16)   42  Orthopedic aftercare for healing traumatic leg fracture (V54 14) (S82 90XD)   43  Osteopenia (733 90) (M85 80)   44  Osteoporosis (733 00) (M81 0)   45  Pain due to internal orthopedic prosthetic device, implant, or graft (996 78,338 18)    (T84 84XA)   46  Pain in joint (719 40) (M25 50)   47  Pain of lower extremity (729 5) (M79 606)   48  Peripheral neuropathy (356 9) (G62 9)   49  Pes anserine bursitis (726 61) (M70 50)   50  Postlaminectomy syndrome, lumbar (722 83) (M96 1)   51  Post-traumatic osteoarthritis of right knee (715 26) (M17 31)   52  Primary biliary cirrhosis (571 6) (K74 3)   53  Pruritus (698 9) (L29 9)   54  Rib pain on right side (786 50) (R07 81)   55  Right middle ear infection (382 9) (H66 91)   56  Right shoulder pain (719 41) (M25 511)   57  Sacroiliitis (720 2) (M46 1)   58  Screening for colorectal cancer (V76 51) (Z12 11,Z12 12)   59  Shoulder bursitis, right (726 10) (M75 51)   60  Skin rash (782 1) (R21)   61  Status post fall (V15 88) (Z91 81)   62  Trigger finger (727 03) (M65 30)   63  Trigger middle finger of right hand (727 03) (M65 331)   64  Trigger ring finger of right hand (727 03) (M65 341)   65  Trochanteric bursitis (726 5) (M70 60)   66  Urinary frequency (788 41) (R35 0)   67  Visit for screening mammogram (V76 12) (Z12 31)   68  Whiplash injuries (847 0) (S13 4XXA)    Current Meds   1  Betamethasone Sod Phos & Acet 6 (3-3) MG/ML Injection Suspension; INJECT 6  MG   Subcutaneous; To Be Done: 69DFD0677; Status: HOLD FOR - Administration Ordered   2  Fosamax 70 MG Oral Tablet (Alendronate Sodium); TAKE 1 TABLET ONCE WEEKLY; Therapy: 24Apr2017 to Recorded   3  Gralise 600 MG Oral Tablet; TAKE 3 TABLETS AT DINNER TIME;    Therapy: 07KWE2732 to (Evaluate:07Jun2017)  Requested for: 40LBG7750; Last   TENISHA:93PHI0689 Ordered   4  Lexapro 10 MG Oral Tablet (Escitalopram Oxalate); TAKE 1 TABLET DAILY; Therapy: (Joe Reyes) to Recorded   5  Nucynta ER 50 MG Oral Tablet Extended Release 12 Hour; take 1 tablet every twelve   hours; Therapy: 47OIQ5834 to (Evaluate:09Jun2017); Last Rx:75Aio1182 Ordered   6  Paxil CR 12 5 MG Oral Tablet Extended Release 24 Hour (PARoxetine HCl ER); TAKE 1   TABLET DAILY; Therapy: 24Apr2017 to Recorded   7  TraMADol HCl - 50 MG Oral Tablet; TAKE 1 TABLET Twice daily PRN  NOT TO EXCEED   100MG A DAY; Therapy: 24Apr2017 to Recorded   8  Ursodiol 300 MG Oral Capsule (Actigall); Take 1 capsule three times daily  Requested   for: 16XXZ4041; Last Rx:81Kbh3853 Ordered   9  Vitamin D (Ergocalciferol) 90592 UNIT Oral Capsule; TAKE 1 CAPULE ONCE   MONTHLY; Therapy: 24Apr2017 to Recorded   10  Vitamin D3 2000 UNIT Oral Capsule; take 1 capsule by mouth once daily; Therapy: 24Apr2017 to Recorded   11  Zovirax 200 MG Oral Capsule (Acyclovir); 1 TABLET DAILY FOR 5 DAYS DURING    PERIODS OF HERPES SIMPLEX; Therapy: 24Apr2017 to Recorded    Allergies    1  Codeine Derivatives   2  Doxycycline Hyclate CAPS   3  Penicillins   4  Sulfa Drugs    5  FRUIT   6  Latex   7  Milk   8  TOMATO  Denied    9   Anesthesia Extension Tubing Miscellaneous    Signatures   Electronically signed by : Sally Theodore, ; May 11 2017  4:20PM EST                       (Author)
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
## 2018-01-16 NOTE — MISCELLANEOUS
Message  This is a late note entry for 9/2/16 @ 9990 0927 as I was on call  The patient had called through the service because she only had 2 days left of her gralise  She reported taking 600 mg, 3 PO HS  I called in the script for this with 1 refill and reminded her of our 48 hour refill policy and that we do not accept refill requests from the pharmacy and only from patients  The patient verbalized and understanding and was thankful for the refill and information        Signatures   Electronically signed by : OLIVIA Luke; Sep  6 2016  6:52AM EST                       (Author)

## 2018-01-16 NOTE — MISCELLANEOUS
Message  Received call from patient requesting we fax over her notes to Dr Joselyn Lopes, fax# [phone]  I faxed over Consult Note & 2 Procedure Notes to Dr Rosi Torres attn this morning  Active Problems    1  Abnormal mammogram (793 80) (R92 8)   2  Acute upper respiratory infection (465 9) (J06 9)   3  Aftercare following surgery of the musculoskeletal system (V58 78) (Z47 89)   4  Anemia (285 9) (D64 9)   5  Arthralgia of knee, right (719 46) (M25 561)   6  Arthritis (716 90) (M19 90)   7  Articular cartilage disorder of knee, right (717 9) (M23 91)   8  Chronic liver disease (571 9) (K76 9)   9  Cough (786 2) (R05)   10  Dense breast (793 82) (R92 2)   11  Depression with anxiety (300 4) (F41 8)   12  Difficulty concentrating (799 51) (R41 840)   13  Displacement of lumbar intervertebral disc without myelopathy (722 10) (M51 26)   14  Dry skin (701 1) (L85 3)   15  Ear noise/buzzing (388 30) (H93 19)   16  Elevated liver enzymes (790 5) (R74 8)   17  Fainting (780 2) (R55)   18  Ganglion cyst of flexor tendon sheath of finger, right (727 42) (M67 441)   19  Headache (784 0) (R51)   20  Hydronephrosis (591) (N13 30)   21  Hyperlipidemia (272 4) (E78 5)   22  Hypertension (401 9) (I10)   23  Iron deficiency anemia (280 9) (D50 9)   24  Leg pain (729 5) (M79 606)   25  Long term use of drug (V58 69) (Z79 899)   26  Low back pain (724 2) (M54 5)   27  Lumbar degenerative disc disease (722 52) (M51 36)   28  Lumbar radiculopathy (724 4) (M54 16)   29  Memory loss (780 93) (R41 3)   30  Neck pain (723 1) (M54 2)   31  Need for vaccination for DTP (V06 1) (Z23)   32  Nephrolithiasis (592 0) (N20 0)   33  Night sweats (780 8) (R61)   34  Occipital neuralgia (723 8) (M54 81)   35  Orthopedic Aftercare For Healing Traumatic Fx Lower Leg (V54 16)   36  Orthopedic aftercare for healing traumatic leg fracture (V54 14) (S82 90XD)   37  Osteopenia (733 90) (M85 80)   38  Osteoporosis (733 00) (M81 0)   39   Pain due to internal orthopedic prosthetic device, implant, or graft (996 78,338 18)    (T84 84XA)   40  Painful joint (719 40) (M25 50)   41  Peripheral neuropathy (356 9) (G62 9)   42  Pes anserine bursitis (726 61) (M70 50)   43  Post-traumatic osteoarthritis of right knee (715 26) (M17 31)   44  Primary biliary cirrhosis (571 6) (K74 3)   45  Pruritus (698 9) (L29 9)   46  Right middle ear infection (382 9) (H66 91)   47  Sacroiliitis (720 2) (M46 1)   48  Screening for cervical cancer (V76 2) (Z12 4)   49  Screening for colorectal cancer (V76 51) (Z12 11,Z12 12)   50  Status post fall (V15 88) (Z91 89)   51  Trigger finger (727 03) (M65 30)   52  Trigger ring finger of right hand (727 03) (M65 341)   53  Trochanteric bursitis (726 5) (M70 60)   54  Urinary frequency (788 41) (R35 0)   55  Visit for screening mammogram (V76 12) (Z12 31)   56  Whiplash injuries (847 0) (S13 4XXA)    Current Meds   1  Betamethasone Sod Phos & Acet 6 (3-3) MG/ML Injection Suspension; INJECT 6  MG   Subcutaneous; To Be Done: 80YZS2607; Status: HOLD FOR - Administration Ordered   2  Cephalexin 500 MG Oral Capsule; take 1 tid for one week; Therapy: 82REI7940 to (Last Rx:17Tll9736) Ordered   3  Cholestyramine 4 GM/DOSE Oral Powder; MIX 1 SCOOP IN LIQUID AND DRINK TWICE   DAILY PRN; Therapy: 06Apr2015-(Evaluate:59Vwy3649) Recorded   4  FLUoxetine HCl - 40 MG Oral Capsule Recorded   5  Gabapentin 300 MG Oral Capsule; TAKE 2 CAPSULES 3 TIMES DAILY; Therapy: 88UBE6906 to (Dot Libman)  Requested for: 04Apr2016; Last   Rx:04Apr2016 Ordered   6  Levofloxacin 500 MG Oral Tablet (Levaquin); 1 po daily for 1 week; Therapy: 51IJB3195 to (Last Rx:11Jan2016) Ordered   7  Medrol (Isaac) 4 MG TABS (MethylPREDNISolone (Isaac)); TAKE AS DIRECTED; Therapy: 48Gco0594 to (Last Rx:09Sep2015)  Requested for: 09Sep2015 Ordered   8  Triamterene-HCTZ 37 5-25 MG Oral Capsule (Dyazide); 1 po daily;    Therapy: 40DOH0869 to (Evaluate:23Apr2016)  Requested for: 29Apr2015; Last   Rx:28Hga2576 Ordered   9  Ursodiol 300 MG Oral Capsule (Actigall); Take 1 capsule three times daily  Requested   for: 03MQS2705; Last Rx:55Lvm1756 Ordered    Allergies    1  Codeine Derivatives   2  Doxycycline Hyclate CAPS   3  Penicillins   4  Sulfa Drugs    5  FRUIT   6  Milk   7  TOMATO  Denied    8   Anesthesia Extension Tubing Miscellaneous    Signatures   Electronically signed by : Allie Leger, ; Apr 11 2016  9:24AM EST                       (Author)

## 2018-01-16 NOTE — RESULT NOTES
Message   Date: 25 Aug 2016 11:06 AM EST, Recorded By: Soha Robles For: Kenya Bauman, Self   Phone: [phone] (Home), [phone] (Work)   Reason: General Medical Question   S/w the pt  after receiving a vmlom from  [id number] today regarding taking Gralise before and after surgery  Referred her to s/w Dr Vlad Kurtz and katarinay with him about preopro and post op instructions          Signatures   Electronically signed by : Enrique Eastman, ; Aug 25 2016 11:07AM EST                       (Author)

## 2018-01-16 NOTE — CONSULTS
Therapy  Rehabilitation Services Referral:   Patient Status: routine  Diagnosis: See previous records  Rehabilitation Services: evaluate and treat patient as needed  Precautions/Comments: Weekly PT continues X 6 weeks , with HEP/ HMP  Frequency: 1 times per week, for 6 weeks  Please send progress report         Signatures   Electronically signed by : Ginette Santos [facility]; Mar 21 2016 11:18AM EST                       (Author)
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
## 2018-01-18 NOTE — MISCELLANEOUS
Message     Recorded as Task   Date: 03/22/2017 08:36 AM, Created By: [address]   Task Name: Follow Up   Assigned To: [facility] clinical,Team   Regarding Patient: Kole Garcia, Status: Active   Comment:    [address] - 22 Mar 2017 8:36 AM     TASK CREATED  Caller: Self; General Medical Question; [phone] (Mobile Phone)  TC from pt that she has been on 1800mg of Gralise Q evening and is being weaned off of it  Sat was the first day that she dropped from 1800mg a day to 1200mg  She is calling b/c since Sat she is finding that she is not sleeping well and has pain all the time  She is asking what she should do? Told pt Dr Soni Tapia to advise  Catherine Jackson - 22 Mar 2017 12:21 PM     TASK REPLIED TO: Previously Assigned To George Urena clinical,Team  better go back up to 3 pills Q evening and see if symptoms get better again   Juana Keyes - 22 Mar 2017 12:42 PM     TASK EDITED  Pt advised of the same  Pt instructed to be sure to call the office 48 Hrs before she runs out for further RF  Pt understood and agreed  Active Problems    1  Abdominal pain (789 00) (R10 9)   2  Abnormal mammogram (793 80) (R92 8)   3  Aftercare following surgery of the musculoskeletal system (V58 78) (Z47 89)   4  Anemia (285 9) (D64 9)   5  Arthralgia of knee, right (719 46) (M25 561)   6  Arthritis (716 90) (M19 90)   7  Articular cartilage disorder of knee, right (717 9) (M23 91)   8  Back pain (724 5) (M54 9)   9  Chest pain (786 50) (R07 9)   10  Chronic liver disease (571 9) (K76 9)   11  Chronic right hip pain (719 45,338 29) (M25 551,G89 29)   12  Cough (786 2) (R05)   13  Dense breast (793 82) (R92 2)   14  Dense breasts (793 82) (R92 2)   15  Depression with anxiety (300 4) (F41 8)   16  Difficulty concentrating (799 51) (R41 840)   17  Displacement of lumbar intervertebral disc without myelopathy (722 10) (M51 26)   18  Dry skin (701 1) (L85 3)   19  Ear noise/buzzing (388 30) (H93 19)   20  Elevated liver enzymes (790 5) (R74 8)   21  Epicondylitis elbow, medial, left (726 31) (M77 02)   22  Fainting (780 2) (R55)   23  Foot drop, right (736 79) (M21 371)   24  Ganglion cyst of flexor tendon sheath of finger, right (727 42) (M67 441)   25  Headache (784 0) (R51)   26  Hydronephrosis (591) (N13 30)   27  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)   28  Hyperlipidemia (272 4) (E78 5)   29  Hypertension (401 9) (I10)   30  Iron deficiency anemia (280 9) (D50 9)   31  Long term use of drug (V58 69) (Z79 899)   32  Low back pain (724 2) (M54 5)   33  Lumbar degenerative disc disease (722 52) (M51 36)   34  Lumbar radiculopathy (724 4) (M54 16)   35  Memory loss (780 93) (R41 3)   36  Neck pain (723 1) (M54 2)   37  Need for prophylactic vaccination and inoculation against influenza (V04 81) (Z23)   38  Need for vaccination for DTP (V06 1) (Z23)   39  Nephrolithiasis (592 0) (N20 0)   40  Night sweats (780 8) (R61)   41  Occipital neuralgia (723 8) (M54 81)   42  Orthopedic Aftercare For Healing Traumatic Fx Lower Leg (V54 16)   43  Orthopedic aftercare for healing traumatic leg fracture (V54 14) (S82 90XD)   44  Osteopenia (733 90) (M85 80)   45  Osteoporosis (733 00) (M81 0)   46  Pain due to internal orthopedic prosthetic device, implant, or graft (996 78,338 18)    (T84 84XA)   47  Pain in joint (719 40) (M25 50)   48  Pain of lower extremity (729 5) (M79 606)   49  Peripheral neuropathy (356 9) (G62 9)   50  Pes anserine bursitis (726 61) (M70 50)   51  Post-traumatic osteoarthritis of right knee (715 26) (M17 31)   52  Primary biliary cirrhosis (571 6) (K74 3)   53  Pruritus (698 9) (L29 9)   54  Rib pain on right side (786 50) (R07 81)   55  Right middle ear infection (382 9) (H66 91)   56  Right shoulder pain (719 41) (M25 511)   57  Sacroiliitis (720 2) (M46 1)   58  Screening for colorectal cancer (V76 51) (Z12 11,Z12 12)   59  Shoulder bursitis, right (726 10) (M75 51)   60   Skin rash (782 1) (R21) 61  Status post fall (V15 88) (Z91 81)   62  Trigger finger (727 03) (M65 30)   63  Trigger middle finger of right hand (727 03) (M65 331)   64  Trigger ring finger of right hand (727 03) (M65 341)   65  Trochanteric bursitis (726 5) (M70 60)   66  Urinary frequency (788 41) (R35 0)   67  Visit for screening mammogram (V76 12) (Z12 31)   68  Whiplash injuries (847 0) (S13 4XXA)    Current Meds   1  Betamethasone Sod Phos & Acet 6 (3-3) MG/ML Injection Suspension; INJECT 6  MG   Subcutaneous; To Be Done: 71SRG0101; Status: HOLD FOR - Administration Ordered   2  Cholestyramine 4 GM/DOSE Oral Powder; MIX 1 SCOOP IN LIQUID AND DRINK TWICE   DAILY PRN; Therapy: 06Apr2015-(Evaluate:60Nfo6083) Recorded   3  Cyclobenzaprine HCl - 10 MG Oral Tablet; TAKE 1/2 TO 1 TABLET 3 TIMES DAILY AS   NEEDED; Therapy: 28KKY0788 to (Evaluate:20Apr2017)  Requested for: 37NIL0220; Last   Rx:01Mar2017 Ordered   4  Gralise 600 MG Oral Tablet; TAKE 3 TABLETS AT DINNER TIME; Therapy: 37UTJ2987 to (Clemencia Coffey)  Requested for: 61KSR4243; Last   Rx:08Nov2016 Ordered   5  LevoFLOXacin 500 MG Oral Tablet (Levaquin); 1 po daily for 1 week; Therapy: 07OHB9081 to (Last Rx:11Jan2016) Ordered   6  Lexapro TABS (Escitalopram Oxalate); Therapy: (Recorded:66Utm8277) to Recorded   7  Medrol (Isaac) 4 MG TABS (MethylPREDNISolone (Isaac)); TAKE AS DIRECTED; Therapy: 87Xxu8823 to (Last Rx:42Qez2483)  Requested for: 90Wtm6002 Ordered   8  Triamterene-HCTZ 37 5-25 MG Oral Capsule (Dyazide); 1 po daily; Therapy: 33JLR4151 to (Evaluate:23Apr2016)  Requested for: 06Rtv8693; Last   Rx:29Apr2015 Ordered   9  Ursodiol 300 MG Oral Capsule (Actigall); Take 1 capsule three times daily  Requested   for: 09TVW9814; Last Rx:88Ezn8677 Ordered    Allergies    1  Codeine Derivatives   2  Doxycycline Hyclate CAPS   3  Penicillins   4  Sulfa Drugs    5  FRUIT   6  Latex   7  Milk   8  TOMATO  Denied    9   Anesthesia Extension Tubing Miscellaneous    Signatures Electronically signed by : Aris Mcdaniel, ; Mar 22 2017 12:43PM EST                       (Author)

## 2018-01-22 VITALS
BODY MASS INDEX: 26.57 KG/M2 | SYSTOLIC BLOOD PRESSURE: 118 MMHG | WEIGHT: 179.38 LBS | DIASTOLIC BLOOD PRESSURE: 70 MMHG | RESPIRATION RATE: 14 BRPM | HEIGHT: 69 IN | HEART RATE: 72 BPM

## 2018-01-22 VITALS
HEIGHT: 69 IN | DIASTOLIC BLOOD PRESSURE: 76 MMHG | BODY MASS INDEX: 26.07 KG/M2 | HEART RATE: 73 BPM | WEIGHT: 176 LBS | SYSTOLIC BLOOD PRESSURE: 112 MMHG

## 2018-01-24 VITALS
HEIGHT: 69 IN | DIASTOLIC BLOOD PRESSURE: 75 MMHG | HEART RATE: 85 BPM | SYSTOLIC BLOOD PRESSURE: 110 MMHG | BODY MASS INDEX: 27.46 KG/M2 | WEIGHT: 185.38 LBS

## 2018-01-24 VITALS — DIASTOLIC BLOOD PRESSURE: 70 MMHG | RESPIRATION RATE: 14 BRPM | HEART RATE: 78 BPM | SYSTOLIC BLOOD PRESSURE: 120 MMHG

## 2018-01-24 VITALS
DIASTOLIC BLOOD PRESSURE: 72 MMHG | HEIGHT: 69 IN | HEART RATE: 90 BPM | SYSTOLIC BLOOD PRESSURE: 109 MMHG | BODY MASS INDEX: 26.51 KG/M2 | WEIGHT: 179 LBS

## 2018-02-06 ENCOUNTER — TELEPHONE (OUTPATIENT)
Dept: INTERNAL MEDICINE CLINIC | Facility: CLINIC | Age: 58
End: 2018-02-06

## 2018-02-06 NOTE — TELEPHONE ENCOUNTER
Pt called stating she has a refill for Vit D 50,000 units , should she get it refilled  And is she using it monthly?        Please advise  She stated she think it is working   [phone]

## 2018-02-07 ENCOUNTER — TELEPHONE (OUTPATIENT)
Dept: HEMATOLOGY ONCOLOGY | Facility: CLINIC | Age: 58
End: 2018-02-07

## 2018-02-07 NOTE — TELEPHONE ENCOUNTER
Patient's phycologist wants to add Tomamex--needs Dr Gaurav Holloway currently taking generic paxol    Please call patient at [phone]  Check with Dr Lucita Addison or Carmella Cheng

## 2018-03-05 DIAGNOSIS — E55.9 VITAMIN D DEFICIENCY: Primary | ICD-10-CM

## 2018-03-05 RX ORDER — ERGOCALCIFEROL 1.25 MG/1
CAPSULE ORAL
Qty: 4 CAPSULE | Refills: 2 | Status: SHIPPED | OUTPATIENT
Start: 2018-03-05 | End: 2021-05-19 | Stop reason: SDUPTHER

## 2018-03-09 ENCOUNTER — APPOINTMENT (OUTPATIENT)
Dept: LAB | Facility: MEDICAL CENTER | Age: 58
End: 2018-03-09
Payer: COMMERCIAL

## 2018-03-09 ENCOUNTER — TRANSCRIBE ORDERS (OUTPATIENT)
Dept: HEMATOLOGY ONCOLOGY | Facility: CLINIC | Age: 58
End: 2018-03-09

## 2018-03-09 ENCOUNTER — OFFICE VISIT (OUTPATIENT)
Dept: OBGYN CLINIC | Facility: MEDICAL CENTER | Age: 58
End: 2018-03-09
Payer: COMMERCIAL

## 2018-03-09 ENCOUNTER — TELEPHONE (OUTPATIENT)
Dept: INTERNAL MEDICINE CLINIC | Facility: CLINIC | Age: 58
End: 2018-03-09

## 2018-03-09 VITALS
BODY MASS INDEX: 25.77 KG/M2 | SYSTOLIC BLOOD PRESSURE: 113 MMHG | WEIGHT: 180 LBS | DIASTOLIC BLOOD PRESSURE: 76 MMHG | HEART RATE: 79 BPM | HEIGHT: 70 IN

## 2018-03-09 DIAGNOSIS — M77.11 LATERAL EPICONDYLITIS OF RIGHT ELBOW: ICD-10-CM

## 2018-03-09 DIAGNOSIS — M77.02 MEDIAL EPICONDYLITIS, LEFT ELBOW: ICD-10-CM

## 2018-03-09 DIAGNOSIS — M25.522 BILATERAL ELBOW JOINT PAIN: Primary | ICD-10-CM

## 2018-03-09 DIAGNOSIS — M25.521 BILATERAL ELBOW JOINT PAIN: Primary | ICD-10-CM

## 2018-03-09 DIAGNOSIS — D47.2 SMOLDERING MULTIPLE MYELOMA: ICD-10-CM

## 2018-03-09 DIAGNOSIS — D47.2 SMOLDERING MULTIPLE MYELOMA: Primary | ICD-10-CM

## 2018-03-09 DIAGNOSIS — D47.2 MONOCLONAL GAMMOPATHY: ICD-10-CM

## 2018-03-09 LAB
ALBUMIN SERPL BCP-MCNC: 3.6 G/DL (ref 3.5–5)
ALP SERPL-CCNC: 229 U/L (ref 46–116)
ALT SERPL W P-5'-P-CCNC: 35 U/L (ref 12–78)
ANION GAP SERPL CALCULATED.3IONS-SCNC: 7 MMOL/L (ref 4–13)
AST SERPL W P-5'-P-CCNC: 35 U/L (ref 5–45)
BASOPHILS # BLD AUTO: 0.04 THOUSANDS/ΜL (ref 0–0.1)
BASOPHILS NFR BLD AUTO: 1 % (ref 0–1)
BILIRUB SERPL-MCNC: 0.34 MG/DL (ref 0.2–1)
BUN SERPL-MCNC: 21 MG/DL (ref 5–25)
CALCIUM SERPL-MCNC: 9.2 MG/DL (ref 8.3–10.1)
CHLORIDE SERPL-SCNC: 103 MMOL/L (ref 100–108)
CO2 SERPL-SCNC: 28 MMOL/L (ref 21–32)
CREAT SERPL-MCNC: 1 MG/DL (ref 0.6–1.3)
EOSINOPHIL # BLD AUTO: 0.16 THOUSAND/ΜL (ref 0–0.61)
EOSINOPHIL NFR BLD AUTO: 3 % (ref 0–6)
ERYTHROCYTE [DISTWIDTH] IN BLOOD BY AUTOMATED COUNT: 13.2 % (ref 11.6–15.1)
GFR SERPL CREATININE-BSD FRML MDRD: 62 ML/MIN/1.73SQ M
GLUCOSE P FAST SERPL-MCNC: 73 MG/DL (ref 65–99)
HCT VFR BLD AUTO: 40.2 % (ref 34.8–46.1)
HGB BLD-MCNC: 13.1 G/DL (ref 11.5–15.4)
LYMPHOCYTES # BLD AUTO: 2.37 THOUSANDS/ΜL (ref 0.6–4.47)
LYMPHOCYTES NFR BLD AUTO: 37 % (ref 14–44)
MCH RBC QN AUTO: 29.2 PG (ref 26.8–34.3)
MCHC RBC AUTO-ENTMCNC: 32.6 G/DL (ref 31.4–37.4)
MCV RBC AUTO: 90 FL (ref 82–98)
MONOCYTES # BLD AUTO: 0.46 THOUSAND/ΜL (ref 0.17–1.22)
MONOCYTES NFR BLD AUTO: 7 % (ref 4–12)
NEUTROPHILS # BLD AUTO: 3.27 THOUSANDS/ΜL (ref 1.85–7.62)
NEUTS SEG NFR BLD AUTO: 52 % (ref 43–75)
NRBC BLD AUTO-RTO: 0 /100 WBCS
PLATELET # BLD AUTO: 239 THOUSANDS/UL (ref 149–390)
PMV BLD AUTO: 11 FL (ref 8.9–12.7)
POTASSIUM SERPL-SCNC: 4.4 MMOL/L (ref 3.5–5.3)
PROT SERPL-MCNC: 9.3 G/DL (ref 6.4–8.2)
RBC # BLD AUTO: 4.48 MILLION/UL (ref 3.81–5.12)
SODIUM SERPL-SCNC: 138 MMOL/L (ref 136–145)
WBC # BLD AUTO: 6.34 THOUSAND/UL (ref 4.31–10.16)

## 2018-03-09 PROCEDURE — 86334 IMMUNOFIX E-PHORESIS SERUM: CPT

## 2018-03-09 PROCEDURE — 85025 COMPLETE CBC W/AUTO DIFF WBC: CPT

## 2018-03-09 PROCEDURE — 84165 PROTEIN E-PHORESIS SERUM: CPT | Performed by: PATHOLOGY

## 2018-03-09 PROCEDURE — 80053 COMPREHEN METABOLIC PANEL: CPT

## 2018-03-09 PROCEDURE — 83883 ASSAY NEPHELOMETRY NOT SPEC: CPT

## 2018-03-09 PROCEDURE — 84165 PROTEIN E-PHORESIS SERUM: CPT

## 2018-03-09 PROCEDURE — 36415 COLL VENOUS BLD VENIPUNCTURE: CPT

## 2018-03-09 PROCEDURE — 20605 DRAIN/INJ JOINT/BURSA W/O US: CPT | Performed by: ORTHOPAEDIC SURGERY

## 2018-03-09 PROCEDURE — 99213 OFFICE O/P EST LOW 20 MIN: CPT | Performed by: ORTHOPAEDIC SURGERY

## 2018-03-09 PROCEDURE — 86334 IMMUNOFIX E-PHORESIS SERUM: CPT | Performed by: PATHOLOGY

## 2018-03-09 RX ORDER — BUPIVACAINE HYDROCHLORIDE 2.5 MG/ML
1 INJECTION, SOLUTION INFILTRATION; PERINEURAL
Status: COMPLETED | OUTPATIENT
Start: 2018-03-09 | End: 2018-03-09

## 2018-03-09 RX ORDER — BETAMETHASONE SODIUM PHOSPHATE AND BETAMETHASONE ACETATE 3; 3 MG/ML; MG/ML
6 INJECTION, SUSPENSION INTRA-ARTICULAR; INTRALESIONAL; INTRAMUSCULAR; SOFT TISSUE
Status: COMPLETED | OUTPATIENT
Start: 2018-03-09 | End: 2018-03-09

## 2018-03-09 RX ORDER — TOPIRAMATE 25 MG/1
TABLET ORAL
Refills: 0 | COMMUNITY
Start: 2018-03-05 | End: 2018-04-13

## 2018-03-09 RX ORDER — LIDOCAINE HYDROCHLORIDE 10 MG/ML
1 INJECTION, SOLUTION INFILTRATION; PERINEURAL
Status: COMPLETED | OUTPATIENT
Start: 2018-03-09 | End: 2018-03-09

## 2018-03-09 RX ADMIN — BUPIVACAINE HYDROCHLORIDE 1 ML: 2.5 INJECTION, SOLUTION INFILTRATION; PERINEURAL at 08:48

## 2018-03-09 RX ADMIN — LIDOCAINE HYDROCHLORIDE 1 ML: 10 INJECTION, SOLUTION INFILTRATION; PERINEURAL at 08:49

## 2018-03-09 RX ADMIN — LIDOCAINE HYDROCHLORIDE 1 ML: 10 INJECTION, SOLUTION INFILTRATION; PERINEURAL at 08:48

## 2018-03-09 RX ADMIN — BUPIVACAINE HYDROCHLORIDE 1 ML: 2.5 INJECTION, SOLUTION INFILTRATION; PERINEURAL at 08:49

## 2018-03-09 RX ADMIN — BETAMETHASONE SODIUM PHOSPHATE AND BETAMETHASONE ACETATE 6 MG: 3; 3 INJECTION, SUSPENSION INTRA-ARTICULAR; INTRALESIONAL; INTRAMUSCULAR; SOFT TISSUE at 08:48

## 2018-03-09 RX ADMIN — BETAMETHASONE SODIUM PHOSPHATE AND BETAMETHASONE ACETATE 6 MG: 3; 3 INJECTION, SUSPENSION INTRA-ARTICULAR; INTRALESIONAL; INTRAMUSCULAR; SOFT TISSUE at 08:49

## 2018-03-09 NOTE — PROGRESS NOTES
61 y o female presents to the office for bilateral elbow pain  She has injections at her last visit which relieved her symptoms for a short period of time  Her pain on VAS is 9/10  She is still currently being treated for cancer  Review of Systems  Review of systems negative unless otherwise specified in HPI    Past Medical History  Past Medical History:   Diagnosis Date    Cancer ([facility] [address] )     IgG kappa smoldering multiple myeloma       Past Surgical History  Past Surgical History:   Procedure Laterality Date    EXPLORATION EXTREMITY Right 8/29/2016    Procedure: KNEE PERONEAL NERVE EXPLORATION AND RELEASE ;  Surgeon: Sebastian Rosales MD;  Location: [facility];  Service:    [facility] FOOT SURGERY      HAND SURGERY      HERNIA REPAIR         Current Medications  Current Outpatient Prescriptions on File Prior to Visit   Medication Sig Dispense Refill    calcium carbonate-vitamin D (OSCAL-D) 500 mg-200 units per tablet Take 1 tablet by mouth daily with breakfast       calcium-vitamin D (OSCAL 500 + D) 500 mg-200 units per tablet Take 1 tablet by mouth daily   Cholecalciferol (VITAMIN D3) 2000 UNITS TABS Take 1 tablet by mouth daily   ergocalciferol (VITAMIN D2) 50,000 units TAKE ONE CAPSULE BY MOUTH EVERY OTHER WEEK FOR 1 MONTH, THEN 1 CAPSULE EVERY MONTH 4 capsule 2    Gabapentin, Once-Daily, (GRALISE) 600 MG TABS Take 1,800 mg by mouth daily at bedtime      Multiple Vitamins-Minerals (CENTRUM SILVER PO) Take 1 tablet by mouth daily   PARoxetine (PAXIL) 30 mg tablet Take 30 mg by mouth every morning      triamterene-hydrochlorothiazide (DYAZIDE) 37 5-25 mg per capsule Take 1 capsule by mouth every morning   ursodiol (ACTIGALL) 300 mg capsule Take 300 mg by mouth 3 (three) times a day  No current facility-administered medications on file prior to visit          Recent Labs [facility])    0  Lab Value Date/Time   HCT 38 6 12/06/2017 0739   HCT 38 2 12/29/2015 0659   HGB 12 4 12/06/2017 0739   HGB 12 4 12/29/2015 0659   WBC 5 05 12/06/2017 0739   WBC 6 60 12/29/2015 0659   ESR 42 (H) 12/06/2017 0739   CRP <3 0 12/06/2017 0739   GLUCOSE 144 (H) 12/19/2016 1006   GLUCOSE 134 12/29/2015 0659         Physical exam  · General: Awake, Alert, Oriented  · Eyes: Pupils equal, round and reactive to light  · Heart: regular rate and rhythm  · Lungs: No audible wheezing  · Abdomen: soft  bilateral Elbow  · Good flexion, extension, pronation and supination bilaterally  · Tender lateral epicondyle, right  · Swelling medial, left  · Tender medial epicondyle, left      Imaging  None    Procedure  Medium joint arthrocentesis  Date/Time: 3/9/2018 8:48 AM  Consent given by: patient  Site marked: site marked  Timeout: Immediately prior to procedure a time out was called to verify the correct patient, procedure, equipment, support staff and site/side marked as required   Supporting Documentation  Indications: pain   Procedure Details  Location: elbow - Elbow joint: Right lateral epicondyle  Preparation: Patient was prepped and draped in the usual sterile fashion  Needle size: 22 G  Ultrasound guidance: no  Approach: Lateral   Medications administered: 1 mL bupivacaine 0 25 %; 1 mL lidocaine 1 %; 6 mg betamethasone acetate-betamethasone sodium phosphate 6 (3-3) mg/mL    Patient tolerance: patient tolerated the procedure well with no immediate complications  Dressing:  Sterile dressing applied  Medium joint arthrocentesis  Date/Time: 3/9/2018 8:49 AM  Consent given by: patient  Site marked: site marked  Timeout: Immediately prior to procedure a time out was called to verify the correct patient, procedure, equipment, support staff and site/side marked as required   Supporting Documentation  Indications: pain and joint swelling   Procedure Details  Location: elbow - Elbow joint: Left medial epicondyle    Needle size: 22 G  Ultrasound guidance: no  Approach: Medial   Medications administered: 1 mL bupivacaine 0 25 %; 1 mL lidocaine 1 %; 6 mg betamethasone acetate-betamethasone sodium phosphate 6 (3-3) mg/mL    Patient tolerance: patient tolerated the procedure well with no immediate complications  Dressing:  Sterile dressing applied          Assessment/Plan:   61 y  o female received bilateral cortisone injections in her elbows  Surgical intervention was discussed in detail  She will hold off and discuss this further with her oncologist  We will see her back in 6 weeks

## 2018-03-09 NOTE — PATIENT INSTRUCTIONS
You have received injections into both elbows today    Should be symptoms later this evening, liberal use of ice packs may be beneficial

## 2018-03-09 NOTE — TELEPHONE ENCOUNTER
PATIENT CALLED STATING SHE SPOKE WITH DR DE GUZMAN AND HE WOULD LIKE TO DO SURGERY ON BOTH OF HER ELBOWS, AT DIFFERENT TIMES  SHE STATED SHE HAS GOTTEN 4 INJECTIONS IN HER ELBOWS  ONE ELBOW IS A TENNIS ELBOW & THE OTHER IS GOLF ELBOW  She wants your opinion on all of this, Dr De Guzman is aware of how you feel

## 2018-03-09 NOTE — TELEPHONE ENCOUNTER
LET PATIENT KNOW THAT I VERY MUCH TRUST DR Darrin Isaac I AM  IN AGREEMENT WITH ANY OF HIS RECOMMENDATIONS

## 2018-03-10 LAB
KAPPA LC FREE SER-MCNC: 36.9 MG/L (ref 3.3–19.4)
KAPPA LC FREE/LAMBDA FREE SER: 2.43 {RATIO} (ref 0.26–1.65)
LAMBDA LC FREE SERPL-MCNC: 15.2 MG/L (ref 5.7–26.3)

## 2018-03-12 ENCOUNTER — TELEPHONE (OUTPATIENT)
Dept: OBGYN CLINIC | Facility: HOSPITAL | Age: 58
End: 2018-03-12

## 2018-03-12 ENCOUNTER — OFFICE VISIT (OUTPATIENT)
Dept: HEMATOLOGY ONCOLOGY | Facility: CLINIC | Age: 58
End: 2018-03-12
Payer: COMMERCIAL

## 2018-03-12 VITALS
BODY MASS INDEX: 27.07 KG/M2 | OXYGEN SATURATION: 93 % | TEMPERATURE: 98.3 F | DIASTOLIC BLOOD PRESSURE: 80 MMHG | SYSTOLIC BLOOD PRESSURE: 120 MMHG | WEIGHT: 186 LBS | HEART RATE: 79 BPM

## 2018-03-12 DIAGNOSIS — K74.5 BILIARY CIRRHOSIS (HCC): ICD-10-CM

## 2018-03-12 DIAGNOSIS — D47.2 SMOLDERING MULTIPLE MYELOMA: Primary | ICD-10-CM

## 2018-03-12 PROCEDURE — 99214 OFFICE O/P EST MOD 30 MIN: CPT | Performed by: INTERNAL MEDICINE

## 2018-03-12 NOTE — TELEPHONE ENCOUNTER
Patient is calling wondering if the bilat elbow pain she is having is related to her work  Patient is stating that she does repetitive motions and is wondering if that is why she is having the pain  Patient does not have an open work comp claim regarding the elbows she does have one regarding her leg       Brogle pt

## 2018-03-12 NOTE — TELEPHONE ENCOUNTER
Patient wonders if her elbow problem is from repetitive use, if it is she says that it should be deemed Wc  Please advise if we should bring her in sooner to discuss

## 2018-03-12 NOTE — TELEPHONE ENCOUNTER
From 1 0 can recall, this patient has never once mentioned her activities a work have any direct impact on her elbows  As such, I do not think her bilateral elbow pathology can be attributed to a work related injury  Please call the patient and advise of above    Thank you

## 2018-03-12 NOTE — PROGRESS NOTES
Hematology Outpatient Follow - Up Note  Kristina Harding 61 y o  female MRN: @ Encounter: [id number]        Date:  3/12/2018        Assessment/ Plan:   1  IgG kappa smoldering multiple myeloma bone marrow biopsy in May 2017 showed 15% plasma cells, normal FISH panel for multiple myeloma as well as cytogenetics, most likely in the background of  Primary biliary cirrhosis of the liver, which cause the patient significant joint and bone pain, osteoporosis  Free light chain had been stable, no evidence of hypercalcemia, anemia or lytic lesions by the last CT/PET scan  2  There was study suggesting IV calcium gluconate 15 milligram/kilogram daily for 2 weeks could give relief of the pain for the next  3-4 month, I told the patient to discuss this with GI physician, I encouraged the patient to take oral calcium hoping to reduce the pain and osteoporosis  3   Follow-up in 6 months with CBC, CMP, SPEP, free light chain           HPI: 18-year-old  female with past medical history of primary biliary cirrhosis, fracture of the right tibia, hypertension, OCD,worsening of osteoporosis on DEXA scan Done in 2016, nephrolithiasis, chronic lower back pain and possible sacroiliitis was found to have elevated total protein 3-4 years ago, serum protein electrophoresis showed IgG kappa monoclonal protein of 1 7 g/dL however no evidence of anemia, hypercalcemia, or renal insufficiency, she had persistent elevation of alkaline phosphatase secondary to PBC  had a bone scan done last year which showed activity in the ribs area  serum protein electrophoresis in April 2017 showed IgG kappa monoclonal protein of 1 96 g/dL, total protein 8 5, albumin 3 9, IgG 2580, creatinine 0 8, calcium 8 9, alkaline phosphatase 294, AST 37, ALT 46, IgM 25 in July 2016 monoclonal protein of IgG kappa of 1 73  C-reactive protein has been normal however sedimentation rate was elevated in the range of 60  she has been complaining of diffuse bone pain/aches especially in the shoulders, lower back, bilateral knees and hips  does not smoke or drink, she works in [employer] and she takes care of her mother  has 2 living brothers  history significant for GI cancer in maternal grandmother  A bone marrow biopsy was done in May 2017 showed 15% plasma cells  In diffuse and interstitial pattern, normal FISH panel for multiple myeloma, normal female cytogenetics     Interval History:  Generalized bone pain       Previous Treatment:   Watchful observation       Test Results:    Imaging: No results found  Labs:   Lab Results   Component Value Date    WBC 6 34 03/09/2018    HGB 13 1 03/09/2018    HCT 40 2 03/09/2018    MCV 90 03/09/2018     03/09/2018     Lab Results   Component Value Date     03/09/2018    K 4 4 03/09/2018     03/09/2018    CO2 28 03/09/2018    ANIONGAP 7 03/09/2018    BUN 21 03/09/2018    CREATININE 1 00 03/09/2018    GLUCOSE 144 (H) 12/19/2016    GLUF 73 03/09/2018    CALCIUM 9 2 03/09/2018    AST 35 03/09/2018    ALT 35 03/09/2018    ALKPHOS 229 (H) 03/09/2018    PROT 9 3 (H) 03/09/2018    BILITOT 0 34 03/09/2018    EGFR 62 03/09/2018       Lab Results   Component Value Date    IRON 80 03/15/2014    FERRITIN 74 1 03/15/2014       No results found for: [id number]      ROS:   Review of Systems   Constitutional: Positive for fatigue  HENT: Negative  Eyes: Negative  Respiratory: Negative  Cardiovascular: Negative  Gastrointestinal: Negative  Endocrine: Negative  Genitourinary: Negative  Musculoskeletal: Positive for arthralgias, back pain and myalgias  Allergic/Immunologic: Negative  Neurological: Negative  Hematological: Negative  Psychiatric/Behavioral: Negative  All other systems reviewed and are negative  Current Medications: Reviewed  Allergies: Reviewed  PMH/FH/SH:  Reviewed      Physical Exam:    Body surface area is 2 01 meters squared      Wt Readings from Last 3 Encounters: 03/12/18 84 4 kg (186 lb)   03/09/18 81 6 kg (180 lb)   01/04/18 84 1 kg (185 lb 6 1 oz)        Temp Readings from Last 3 Encounters:   03/12/18 98 3 °F (36 8 °C) (Tympanic)   10/14/17 98 1 °F (36 7 °C) (Oral)   09/11/17 97 8 °F (36 6 °C)        BP Readings from Last 3 Encounters:   03/12/18 120/80   03/09/18 113/76   01/04/18 110/75         Pulse Readings from Last 3 Encounters:   03/12/18 79   03/09/18 79   01/04/18 85        Physical Exam   Constitutional: She is oriented to person, place, and time  She appears well-developed and well-nourished  No distress  HENT:   Head: Normocephalic and atraumatic  Mouth/Throat: Oropharynx is clear and moist  No oropharyngeal exudate  Eyes: Conjunctivae and EOM are normal  Pupils are equal, round, and reactive to light  Neck: Normal range of motion  Neck supple  No tracheal deviation present  No thyromegaly present  Cardiovascular: Normal rate and regular rhythm  Exam reveals no gallop and no friction rub  No murmur heard  Pulmonary/Chest: Effort normal and breath sounds normal  No respiratory distress  She has no wheezes  She has no rales  She exhibits no tenderness  Abdominal: Soft  Bowel sounds are normal  She exhibits no distension and no mass  There is no tenderness  There is no rebound and no guarding  Musculoskeletal: Normal range of motion  Lymphadenopathy:     She has no cervical adenopathy  Neurological: She is alert and oriented to person, place, and time  Skin: Skin is warm and dry  No rash noted  She is not diaphoretic  No erythema  No pallor  Psychiatric: She has a normal mood and affect  Her behavior is normal  Judgment and thought content normal    Vitals reviewed  Goals and Barriers:  Current Goal: Minimize effects of disease  Barriers: None  Patient's Capacity to Self Care:  Patient is able to self care      Code Status: @[facility]@

## 2018-03-12 NOTE — LETTER
March 12, 2018     Luis Avilez MD  [address]  [address] [facility], [address] [place] [address]    Patient: Rosina Sheikh   YOB: 1960   Date of Visit: 3/12/2018       Dear Dr Bianca Roque Recipients: Thank you for referring Delma Shukla to me for evaluation  Below are my notes for this consultation  If you have questions, please do not hesitate to call me  I look forward to following your patient along with you  Sincerely,        William Zendejas MD        CC: No Recipients  William Zendejas MD  3/12/2018  9:00 AM  Sign at close encounter  Hematology Outpatient Follow - Up Note  Rosina Sheikh 61 y o  female MRN: @ Encounter: [id number]        Date:  3/12/2018        Assessment/ Plan:   1  IgG kappa smoldering multiple myeloma bone marrow biopsy in May 2017 showed 15% plasma cells, normal FISH panel for multiple myeloma as well as cytogenetics, most likely in the background of  Primary biliary cirrhosis of the liver, which cause the patient significant joint and bone pain, osteoporosis  Free light chain had been stable, no evidence of hypercalcemia, anemia or lytic lesions by the last CT/PET scan  2  There was study suggesting IV calcium gluconate 15 milligram/kilogram daily for 2 weeks could give relief of the pain for the next  3-4 month, I told the patient to discuss this with GI physician, I encouraged the patient to take oral calcium hoping to reduce the pain and osteoporosis  3   Follow-up in 6 months with CBC, CMP, SPEP, free light chain           HPI: 79-year-old  female with past medical history of primary biliary cirrhosis, fracture of the right tibia, hypertension, OCD,worsening of osteoporosis on DEXA scan Done in 2016, nephrolithiasis, chronic lower back pain and possible sacroiliitis was found to have elevated total protein 3-4 years ago, serum protein electrophoresis showed IgG kappa monoclonal protein of 1 7 g/dL however no evidence of anemia, hypercalcemia, or renal insufficiency, she had persistent elevation of alkaline phosphatase secondary to PBC  had a bone scan done last year which showed activity in the ribs area  serum protein electrophoresis in April 2017 showed IgG kappa monoclonal protein of 1 96 g/dL, total protein 8 5, albumin 3 9, IgG 2580, creatinine 0 8, calcium 8 9, alkaline phosphatase 294, AST 37, ALT 46, IgM 25 in July 2016 monoclonal protein of IgG kappa of 1 73  C-reactive protein has been normal however sedimentation rate was elevated in the range of 60  she has been complaining of diffuse bone pain/aches especially in the shoulders, lower back, bilateral knees and hips  does not smoke or drink, she works in [employer] and she takes care of her mother  has 2 living brothers  history significant for GI cancer in maternal grandmother  A bone marrow biopsy was done in May 2017 showed 15% plasma cells  In diffuse and interstitial pattern, normal FISH panel for multiple myeloma, normal female cytogenetics     Interval History:  Generalized bone pain       Previous Treatment:   Watchful observation       Test Results:    Imaging: No results found  Labs:   Lab Results   Component Value Date    WBC 6 34 03/09/2018    HGB 13 1 03/09/2018    HCT 40 2 03/09/2018    MCV 90 03/09/2018     03/09/2018     Lab Results   Component Value Date     03/09/2018    K 4 4 03/09/2018     03/09/2018    CO2 28 03/09/2018    ANIONGAP 7 03/09/2018    BUN 21 03/09/2018    CREATININE 1 00 03/09/2018    GLUCOSE 144 (H) 12/19/2016    GLUF 73 03/09/2018    CALCIUM 9 2 03/09/2018    AST 35 03/09/2018    ALT 35 03/09/2018    ALKPHOS 229 (H) 03/09/2018    PROT 9 3 (H) 03/09/2018    BILITOT 0 34 03/09/2018    EGFR 62 03/09/2018       Lab Results   Component Value Date    IRON 80 03/15/2014    FERRITIN 74 1 03/15/2014       No results found for: [id number]      ROS:   Review of Systems   Constitutional: Positive for fatigue  HENT: Negative  Eyes: Negative  Respiratory: Negative  Cardiovascular: Negative  Gastrointestinal: Negative  Endocrine: Negative  Genitourinary: Negative  Musculoskeletal: Positive for arthralgias, back pain and myalgias  Allergic/Immunologic: Negative  Neurological: Negative  Hematological: Negative  Psychiatric/Behavioral: Negative  All other systems reviewed and are negative  Current Medications: Reviewed  Allergies: Reviewed  PMH/FH/SH:  Reviewed      Physical Exam:    Body surface area is 2 01 meters squared  Wt Readings from Last 3 Encounters:   03/12/18 84 4 kg (186 lb)   03/09/18 81 6 kg (180 lb)   01/04/18 84 1 kg (185 lb 6 1 oz)        Temp Readings from Last 3 Encounters:   03/12/18 98 3 °F (36 8 °C) (Tympanic)   10/14/17 98 1 °F (36 7 °C) (Oral)   09/11/17 97 8 °F (36 6 °C)        BP Readings from Last 3 Encounters:   03/12/18 120/80   03/09/18 113/76   01/04/18 110/75         Pulse Readings from Last 3 Encounters:   03/12/18 79   03/09/18 79   01/04/18 85        Physical Exam   Constitutional: She is oriented to person, place, and time  She appears well-developed and well-nourished  No distress  HENT:   Head: Normocephalic and atraumatic  Mouth/Throat: Oropharynx is clear and moist  No oropharyngeal exudate  Eyes: Conjunctivae and EOM are normal  Pupils are equal, round, and reactive to light  Neck: Normal range of motion  Neck supple  No tracheal deviation present  No thyromegaly present  Cardiovascular: Normal rate and regular rhythm  Exam reveals no gallop and no friction rub  No murmur heard  Pulmonary/Chest: Effort normal and breath sounds normal  No respiratory distress  She has no wheezes  She has no rales  She exhibits no tenderness  Abdominal: Soft  Bowel sounds are normal  She exhibits no distension and no mass  There is no tenderness  There is no rebound and no guarding  Musculoskeletal: Normal range of motion  Lymphadenopathy:     She has no cervical adenopathy  Neurological: She is alert and oriented to person, place, and time  Skin: Skin is warm and dry  No rash noted  She is not diaphoretic  No erythema  No pallor  Psychiatric: She has a normal mood and affect  Her behavior is normal  Judgment and thought content normal    Vitals reviewed  Goals and Barriers:  Current Goal: Minimize effects of disease  Barriers: None  Patient's Capacity to Self Care:  Patient is able to self care      Code Status: @[facility]@

## 2018-03-13 LAB
ALBUMIN SERPL ELPH-MCNC: 4.3 G/DL (ref 3.5–5)
ALBUMIN SERPL ELPH-MCNC: 47.2 % (ref 52–65)
ALPHA1 GLOB SERPL ELPH-MCNC: 0.33 G/DL (ref 0.1–0.4)
ALPHA1 GLOB SERPL ELPH-MCNC: 3.6 % (ref 2.5–5)
ALPHA2 GLOB SERPL ELPH-MCNC: 0.96 G/DL (ref 0.4–1.2)
ALPHA2 GLOB SERPL ELPH-MCNC: 10.5 % (ref 7–13)
BETA GLOB ABNORMAL SERPL ELPH-MCNC: 0.5 G/DL (ref 0.4–0.8)
BETA1 GLOB SERPL ELPH-MCNC: 5.5 % (ref 5–13)
BETA2 GLOB SERPL ELPH-MCNC: 4 % (ref 2–8)
BETA2+GAMMA GLOB SERPL ELPH-MCNC: 0.36 G/DL (ref 0.2–0.5)
GAMMA GLOB ABNORMAL SERPL ELPH-MCNC: 2.66 G/DL (ref 0.5–1.6)
GAMMA GLOB SERPL ELPH-MCNC: 29.2 % (ref 12–22)
IGG/ALB SER: 0.89 {RATIO} (ref 1.1–1.8)
INTERPRETATION UR IFE-IMP: NORMAL
M PROTEIN 1 MFR SERPL ELPH: 24 %
M PROTEIN 1 SERPL ELPH-MCNC: 2.18 G/DL
PROT SERPL-MCNC: 9.1 G/DL (ref 6.4–8.2)

## 2018-03-13 NOTE — TELEPHONE ENCOUNTER
No need to bring this patient in the office to make a determination as far as if her elbow pathology is from workmen's Comp  She can be seen at her previously scheduled visit  Please call and inform of above    Thank you

## 2018-03-23 ENCOUNTER — OFFICE VISIT (OUTPATIENT)
Dept: OBGYN CLINIC | Facility: MEDICAL CENTER | Age: 58
End: 2018-03-23
Payer: OTHER MISCELLANEOUS

## 2018-03-23 VITALS
SYSTOLIC BLOOD PRESSURE: 115 MMHG | WEIGHT: 180 LBS | BODY MASS INDEX: 25.77 KG/M2 | HEIGHT: 70 IN | DIASTOLIC BLOOD PRESSURE: 71 MMHG | HEART RATE: 73 BPM

## 2018-03-23 DIAGNOSIS — G89.29 CHRONIC PAIN OF RIGHT KNEE: Primary | ICD-10-CM

## 2018-03-23 DIAGNOSIS — M17.31 POST-TRAUMATIC OSTEOARTHRITIS OF RIGHT KNEE: ICD-10-CM

## 2018-03-23 DIAGNOSIS — M25.561 CHRONIC PAIN OF RIGHT KNEE: Primary | ICD-10-CM

## 2018-03-23 PROCEDURE — 99213 OFFICE O/P EST LOW 20 MIN: CPT | Performed by: ORTHOPAEDIC SURGERY

## 2018-03-23 RX ORDER — FLUOXETINE HYDROCHLORIDE 40 MG/1
40 CAPSULE ORAL
COMMUNITY
End: 2018-04-13

## 2018-03-23 RX ORDER — CHOLESTYRAMINE 4 G/9G
POWDER, FOR SUSPENSION ORAL
COMMUNITY
Start: 2015-04-06 | End: 2018-08-31

## 2018-03-23 NOTE — PROGRESS NOTES
61 y o female presents for follow-up  It has been 9 months she into received a 3 shot series of viscosupplementation to a knee that has posttraumatic arthritis  She describes continued worsening of pain in the right knee  She has morning stiffness that lasts less than 20 minutes  She has pain at the level of the right knee joint, pain is increased with weight-bearing, and increases with increased activities  Review of Systems  Review of systems negative unless otherwise specified in HPI    Past Medical History  Past Medical History:   Diagnosis Date    Cancer ([address] )     IgG kappa smoldering multiple myeloma       Past Surgical History  Past Surgical History:   Procedure Laterality Date    EXPLORATION EXTREMITY Right 8/29/2016    Procedure: KNEE PERONEAL NERVE EXPLORATION AND RELEASE ;  Surgeon: Corazon Hickman MD;  Location: [facility];  Service:    [facility] FOOT SURGERY      HAND SURGERY      HERNIA REPAIR         Current Medications  Current Outpatient Prescriptions on File Prior to Visit   Medication Sig Dispense Refill    calcium carbonate-vitamin D (OSCAL-D) 500 mg-200 units per tablet Take 1 tablet by mouth daily with breakfast       Cholecalciferol (VITAMIN D3) 2000 UNITS TABS Take 1 tablet by mouth daily   ergocalciferol (VITAMIN D2) 50,000 units TAKE ONE CAPSULE BY MOUTH EVERY OTHER WEEK FOR 1 MONTH, THEN 1 CAPSULE EVERY MONTH 4 capsule 2    Gabapentin, Once-Daily, (GRALISE) 600 MG TABS Take 1,800 mg by mouth daily at bedtime      Multiple Vitamins-Minerals (CENTRUM SILVER PO) Take 1 tablet by mouth daily   topiramate (TOPAMAX) 25 mg tablet TAKE 1 TAB NIGHTLY  0    triamterene-hydrochlorothiazide (DYAZIDE) 37 5-25 mg per capsule Take 1 capsule by mouth every morning   ursodiol (ACTIGALL) 300 mg capsule Take 300 mg by mouth 3 (three) times a day   [DISCONTINUED] calcium-vitamin D (OSCAL 500 + D) 500 mg-200 units per tablet Take 1 tablet by mouth daily        [DISCONTINUED] PARoxetine (PAXIL) 30 mg tablet Take 30 mg by mouth every morning       No current facility-administered medications on file prior to visit  Recent Labs [facility])    0  Lab Value Date/Time   HCT 40 2 03/09/2018 0929   HCT 38 2 12/29/2015 0659   HGB 13 1 03/09/2018 0929   HGB 12 4 12/29/2015 0659   WBC 6 34 03/09/2018 0929   WBC 6 60 12/29/2015 0659   ESR 42 (H) 12/06/2017 0739   CRP <3 0 12/06/2017 0739   GLUCOSE 144 (H) 12/19/2016 1006   GLUCOSE 134 12/29/2015 0659         Physical exam  · General: Awake, Alert, Oriented  · Eyes: Pupils equal, round and reactive to light  · Heart: regular rate and rhythm  · Lungs: No audible wheezing  · Abdomen: soft  Right hip moves well  Right thighs devoid of atrophy right knee has a healed anterolateral incision  She has no effusion  There is crepitation flexion extension  There is bony enlargement and tenderness laterally at the knee  Calf compartments are soft and supple  Toes are warm, sensate, and mobile    Imaging  Review of previous x-rays from 2016 show posttraumatic arthritic changes in the lateral and patellofemoral compartments of the right knee    Procedure  None indicated or performed today    Assessment/Plan:   61 y  o female who has posttraumatic arthritis in the right knee  A repeat series of viscosupplementation is indicated  We will range the preserve process and see the patient back in the office in 2 weeks time to initiate a 3 shot series of Orthovisc to the right knee   I did mention that the arthroplasty is indicated to alleviate pain and promote improved function, the patient is to consider her options

## 2018-03-28 ENCOUNTER — TELEPHONE (OUTPATIENT)
Dept: OBGYN CLINIC | Facility: HOSPITAL | Age: 58
End: 2018-03-28

## 2018-03-28 NOTE — TELEPHONE ENCOUNTER
Caller: patient  Call back number: [phone]  Patient's doctor: Dr Sherrill Yeager    Patient is asking if the injection was approved yet  Patient is unsure which one   Please advise

## 2018-04-02 ENCOUNTER — TELEPHONE (OUTPATIENT)
Dept: HEMATOLOGY ONCOLOGY | Facility: CLINIC | Age: 58
End: 2018-04-02

## 2018-04-02 NOTE — TELEPHONE ENCOUNTER
[facility] is seeing her Ortho Doctor on Friday  She stated that the ortho doctor wants her to have a total knee replacement  She was told that she needs to discuss this with Dr Go Cordova and needs to know if it is ok for her to have to surgery because of her type of cancer  She would like a call back by Dr Go Cordova to inform her if she should have the surgery or what he suggests

## 2018-04-04 ENCOUNTER — TELEPHONE (OUTPATIENT)
Dept: INTERNAL MEDICINE CLINIC | Facility: CLINIC | Age: 58
End: 2018-04-04

## 2018-04-04 NOTE — TELEPHONE ENCOUNTER
Per patient Dr De Guzman made the decision her only option is to  Get a knee replacement  Friday going for injection & after final decision will be made

## 2018-04-06 ENCOUNTER — OFFICE VISIT (OUTPATIENT)
Dept: OBGYN CLINIC | Facility: MEDICAL CENTER | Age: 58
End: 2018-04-06
Payer: OTHER MISCELLANEOUS

## 2018-04-06 VITALS
BODY MASS INDEX: 25.77 KG/M2 | HEART RATE: 90 BPM | SYSTOLIC BLOOD PRESSURE: 112 MMHG | HEIGHT: 70 IN | DIASTOLIC BLOOD PRESSURE: 75 MMHG | WEIGHT: 180 LBS

## 2018-04-06 DIAGNOSIS — M25.561 CHRONIC PAIN OF RIGHT KNEE: Primary | ICD-10-CM

## 2018-04-06 DIAGNOSIS — M17.31 POST-TRAUMATIC OSTEOARTHRITIS OF RIGHT KNEE: ICD-10-CM

## 2018-04-06 DIAGNOSIS — G89.29 CHRONIC PAIN OF RIGHT KNEE: Primary | ICD-10-CM

## 2018-04-06 PROCEDURE — 20610 DRAIN/INJ JOINT/BURSA W/O US: CPT | Performed by: ORTHOPAEDIC SURGERY

## 2018-04-06 PROCEDURE — 99213 OFFICE O/P EST LOW 20 MIN: CPT | Performed by: ORTHOPAEDIC SURGERY

## 2018-04-06 NOTE — PROGRESS NOTES
61 y o female who presents for follow-up  She has return of pain in the right knee  The pain is located level joint, the pain is made worse bearing weight, the pain increases with increased activities  She continues to desire not aggressive treatments is under alleviate pain as well as promote improved function      Review of Systems  Review of systems negative unless otherwise specified in HPI    Past Medical History  Past Medical History:   Diagnosis Date    Abnormal breast exam     Anxiety     Asthma     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[facility] [address] [facility]     primary per allscripts    Cancer ([facility] [address] )     IgG kappa smoldering multiple myeloma    Cardiac murmur     Chronic liver disease     resolved 12/04/2017    Depression     Endometriosis     Fracture of tibia     right tibial plateau fracture per allscripts    Hepatic disease     Hypertension     last assessed 12/13/2017    OCD (obsessive compulsive disorder)     Pneumonia     Seasonal allergies     Wears eyeglasses     Whiplash injury to neck        Past Surgical History  Past Surgical History:   Procedure Laterality Date    BACK SURGERY      hemilaminectomy and discectomy, L5-S1, right (Dr Ascencion Desai, [facility]) onset 02/14/2005 per allscripts    EXPLORATION EXTREMITY Right 8/29/2016    Procedure: KNEE PERONEAL NERVE EXPLORATION AND RELEASE ;  Surgeon: Luke Erazo MD;  [facility];  Service:    [facility] FOOT SURGERY      HAND SURGERY      HERNIA REPAIR      MANDIBLE SURGERY      NEUROPLASTY / TRANSPOSITION MEDIAN NERVE AT CARPAL TUNNEL      ORIF TIBIAL PLATEAU Right     arthroplasty per allscripts    OTHER SURGICAL HISTORY      injection of trigger point(s) per allscripts       Current Medications  Current Outpatient Prescriptions on File Prior to Visit   Medication Sig Dispense Refill    calcium carbonate-vitamin D (OSCAL-D) 500 mg-200 units per tablet Take 1 tablet by mouth daily with breakfast       Cholecalciferol (VITAMIN D3) 2000 UNITS TABS Take 1 tablet by mouth daily   cholestyramine (QUESTRAN) 4 g packet       ergocalciferol (VITAMIN D2) 50,000 units TAKE ONE CAPSULE BY MOUTH EVERY OTHER WEEK FOR 1 MONTH, THEN 1 CAPSULE EVERY MONTH 4 capsule 2    FLUoxetine (PROzac) 40 MG capsule Take 40 mg by mouth      Gabapentin, Once-Daily, (GRALISE) 600 MG TABS Take 1,800 mg by mouth daily at bedtime      Multiple Vitamins-Minerals (CENTRUM SILVER PO) Take 1 tablet by mouth daily   topiramate (TOPAMAX) 25 mg tablet TAKE 1 TAB NIGHTLY  0    triamterene-hydrochlorothiazide (DYAZIDE) 37 5-25 mg per capsule Take 1 capsule by mouth every morning   ursodiol (ACTIGALL) 300 mg capsule Take 300 mg by mouth 3 (three) times a day  No current facility-administered medications on file prior to visit  Recent Labs [facility])    0  Lab Value Date/Time   HCT 40 2 03/09/2018 0929   HCT 38 2 12/29/2015 0659   HGB 13 1 03/09/2018 0929   HGB 12 4 12/29/2015 0659   WBC 6 34 03/09/2018 0929   WBC 6 60 12/29/2015 0659   ESR 42 (H) 12/06/2017 0739   CRP <3 0 12/06/2017 0739   GLUCOSE 144 (H) 12/19/2016 1006   GLUCOSE 134 12/29/2015 0659         Physical exam  · General: Awake, Alert, Oriented  · Eyes: Pupils equal, round and reactive to light  · Heart: regular rate and rhythm  · Lungs: No audible wheezing  · Abdomen: soft  Exam finds gait pattern with a single-point cane  Right hip moves well  Right thighs very little atrophy  Right knee is in slight valgus  There is no effusion  There is a healed inferolateral right knee incision  There is bony enlargement and tenderness medially laterally  There is crepitation flexion extension  There is no palpable warmth the synovium  Imaging  None performed today    Procedure  An injection of viscosupplementation is provided to the right knee    It is documented below      Large joint arthrocentesis  Date/Time: 4/6/2018 8:47 AM  Consent given by: patient  Supporting Documentation  Indications: pain   Procedure Details  Location: knee -   Needle size: 20 G  Approach: anterolateral  Medications administered: 30 mg sodium hyaluronate 30 mg/2 mL    Patient tolerance: patient tolerated the procedure well with no immediate complications  Dressing:  Sterile dressing applied          Assessment/Plan:   61 y  o female who has posttraumatic arthritis of the right knee  Viscosupplementation is indicated  It is advised, except, administers outlined above    Would welcome the opportunity see this patient back in the office in 1 week for ongoing viscosupplementation of the right

## 2018-04-13 ENCOUNTER — TELEPHONE (OUTPATIENT)
Dept: PREADMISSION TESTING | Facility: HOSPITAL | Age: 58
End: 2018-04-13

## 2018-04-13 ENCOUNTER — OFFICE VISIT (OUTPATIENT)
Dept: OBGYN CLINIC | Facility: MEDICAL CENTER | Age: 58
End: 2018-04-13
Payer: OTHER MISCELLANEOUS

## 2018-04-13 ENCOUNTER — TELEPHONE (OUTPATIENT)
Dept: INTERNAL MEDICINE CLINIC | Facility: CLINIC | Age: 58
End: 2018-04-13

## 2018-04-13 VITALS
HEIGHT: 70 IN | SYSTOLIC BLOOD PRESSURE: 131 MMHG | DIASTOLIC BLOOD PRESSURE: 85 MMHG | BODY MASS INDEX: 25.75 KG/M2 | HEART RATE: 89 BPM | WEIGHT: 179.9 LBS

## 2018-04-13 DIAGNOSIS — G89.29 CHRONIC PAIN OF RIGHT KNEE: Primary | ICD-10-CM

## 2018-04-13 DIAGNOSIS — M17.31 POST-TRAUMATIC OSTEOARTHRITIS OF RIGHT KNEE: ICD-10-CM

## 2018-04-13 DIAGNOSIS — M25.561 CHRONIC PAIN OF RIGHT KNEE: Primary | ICD-10-CM

## 2018-04-13 PROCEDURE — 20610 DRAIN/INJ JOINT/BURSA W/O US: CPT

## 2018-04-13 RX ORDER — BUPIVACAINE HYDROCHLORIDE 2.5 MG/ML
4 INJECTION, SOLUTION INFILTRATION; PERINEURAL
Status: COMPLETED | OUTPATIENT
Start: 2018-04-13 | End: 2018-04-13

## 2018-04-13 RX ORDER — TOPIRAMATE 50 MG/1
50 TABLET, FILM COATED ORAL
COMMUNITY
Start: 2018-04-12 | End: 2018-08-31

## 2018-04-13 RX ORDER — GABAPENTIN 300 MG/1
300 CAPSULE ORAL ONCE
Status: CANCELLED | OUTPATIENT
Start: 2018-04-13 | End: 2018-04-13

## 2018-04-13 RX ORDER — PAROXETINE HYDROCHLORIDE 25 MG/1
50 TABLET, FILM COATED, EXTENDED RELEASE ORAL
COMMUNITY
Start: 2018-04-12 | End: 2018-08-31

## 2018-04-13 RX ORDER — ACETAMINOPHEN 325 MG/1
975 TABLET ORAL ONCE
Status: CANCELLED | OUTPATIENT
Start: 2018-04-13 | End: 2018-04-13

## 2018-04-13 RX ADMIN — BUPIVACAINE HYDROCHLORIDE 4 ML: 2.5 INJECTION, SOLUTION INFILTRATION; PERINEURAL at 15:15

## 2018-04-13 NOTE — PROGRESS NOTES
61 y o female presents for orthovisc #2 to her right knee, known post-traumatic Oa  She presents using a SPC to ambulate  She recall shaving a fair amount of pain after her injection last week  Review of Systems  Review of systems negative unless otherwise specified in HPI    Past Medical History  Past Medical History:   Diagnosis Date    Abnormal breast exam     Anxiety     Asthma     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[facility] [address] )     primary per allscripts    Cancer ([facility] [address] )     IgG kappa smoldering multiple myeloma    Cardiac murmur     Chronic liver disease     resolved 12/04/2017    Depression     Endometriosis     Fracture of tibia     right tibial plateau fracture per allscripts    Hepatic disease     Hypertension     last assessed 12/13/2017    OCD (obsessive compulsive disorder)     Pneumonia     Seasonal allergies     Wears eyeglasses     Whiplash injury to neck        Past Surgical History  Past Surgical History:   Procedure Laterality Date    BACK SURGERY      hemilaminectomy and discectomy, L5-S1, right (Dr Katelin Paulino, NSA at [place]) onset 02/14/2005 per allscripts    EXPLORATION EXTREMITY Right 8/29/2016    Procedure: KNEE PERONEAL NERVE EXPLORATION AND RELEASE ;  Surgeon: Cole Avalos MD;  [facility];  Service:    [facility] FOOT SURGERY      HAND SURGERY      HERNIA REPAIR      MANDIBLE SURGERY      NEUROPLASTY / TRANSPOSITION MEDIAN NERVE AT CARPAL TUNNEL      ORIF TIBIAL PLATEAU Right     arthroplasty per allscripts    OTHER SURGICAL HISTORY      injection of trigger point(s) per allscripts       Current Medications  Current Outpatient Prescriptions on File Prior to Visit   Medication Sig Dispense Refill    Cholecalciferol (VITAMIN D3) 2000 UNITS TABS Take 1 tablet by mouth daily        cholestyramine (QUESTRAN) 4 g packet       ergocalciferol (VITAMIN D2) 50,000 units TAKE ONE CAPSULE BY MOUTH EVERY OTHER WEEK FOR 1 MONTH, THEN 1 CAPSULE EVERY MONTH 4 capsule 2    Gabapentin, Once-Daily, (GRALISE) 600 MG TABS Take 1,800 mg by mouth daily at bedtime      triamterene-hydrochlorothiazide (DYAZIDE) 37 5-25 mg per capsule Take 1 capsule by mouth every morning   ursodiol (ACTIGALL) 300 mg capsule Take 300 mg by mouth 3 (three) times a day   [DISCONTINUED] calcium carbonate-vitamin D (OSCAL-D) 500 mg-200 units per tablet Take 1 tablet by mouth daily with breakfast       [DISCONTINUED] FLUoxetine (PROzac) 40 MG capsule Take 40 mg by mouth      [DISCONTINUED] Multiple Vitamins-Minerals (CENTRUM SILVER PO) Take 1 tablet by mouth daily   [DISCONTINUED] topiramate (TOPAMAX) 25 mg tablet TAKE 1 TAB NIGHTLY  0     No current facility-administered medications on file prior to visit  Recent Labs [facility]    0  Lab Value Date/Time   HCT 40 2 03/09/2018 0929   HCT 38 2 12/29/2015 0659   HGB 13 1 03/09/2018 0929   HGB 12 4 12/29/2015 0659   WBC 6 34 03/09/2018 0929   WBC 6 60 12/29/2015 0659   ESR 42 (H) 12/06/2017 0739   CRP <3 0 12/06/2017 0739   GLUCOSE 144 (H) 12/19/2016 1006   GLUCOSE 134 12/29/2015 0659         Physical exam  · General: Awake, Alert, Oriented  · Eyes: Pupils equal, round and reactive to light  · Heart: regular rate and rhythm  · Lungs: No audible wheezing  · Abdomen: soft  right Knee exam  Exam finds gait pattern with a single-point cane  Right knee is in slight valgus  There is no effusion  There is a healed inferolateral right knee incision  There is bony enlargement and tenderness medially laterally  There is crepitation flexion extension  There is no palpable warmth the synovium  Procedure  An injection of viscosupplementation is provided to the right knee    It is documented below  Large joint arthrocentesis  Date/Time: 4/13/2018 3:15 PM  Consent given by: patient  Site marked: site marked  Supporting Documentation  Indications: pain   Procedure Details  Location: knee - R knee  Preparation: Patient was prepped and draped in the usual sterile fashion  Ultrasound guidance: no  Approach: lateral  Medications administered: 4 mL bupivacaine 0 25 %; 30 mg sodium hyaluronate 30 mg/2 mL    Patient tolerance: patient tolerated the procedure well with no immediate complications  Dressing:  Sterile dressing applied          Imaging  none    ASSESSMENT 62 yo female who has posttraumatic arthritis of the right knee  orthovisc #2 given to her left knee today        PLAN  Left knee orthovisc #2 performed today  Follow-up in 1 week for #3  Will tentatively slate her for a right total knee for sometime in June at patient's request and availablity

## 2018-04-13 NOTE — TELEPHONE ENCOUNTER
Patient is having surgery with Dr De Guzman on June 4th , she needs an appt for surgical clearance  This appt needs to be in the timeframe of 30days before sugery

## 2018-04-14 ENCOUNTER — HOSPITAL ENCOUNTER (OUTPATIENT)
Dept: INFUSION CENTER | Facility: HOSPITAL | Age: 58
Discharge: HOME | End: 2018-04-14
Payer: COMMERCIAL

## 2018-04-14 VITALS
HEIGHT: 69 IN | SYSTOLIC BLOOD PRESSURE: 106 MMHG | WEIGHT: 178.57 LBS | TEMPERATURE: 97.6 F | RESPIRATION RATE: 20 BRPM | HEART RATE: 76 BPM | BODY MASS INDEX: 26.45 KG/M2 | DIASTOLIC BLOOD PRESSURE: 64 MMHG

## 2018-04-14 LAB
CREAT SERPL-MCNC: 0.95 MG/DL (ref 0.6–1.3)
GFR SERPL CREATININE-BSD FRML MDRD: 66 ML/MIN/1.73SQ M

## 2018-04-14 PROCEDURE — 82565 ASSAY OF CREATININE: CPT | Performed by: INTERNAL MEDICINE

## 2018-04-14 PROCEDURE — 96365 THER/PROPH/DIAG IV INF INIT: CPT

## 2018-04-14 RX ORDER — SODIUM CHLORIDE 9 MG/ML
20 INJECTION, SOLUTION INTRAVENOUS ONCE
Status: COMPLETED | OUTPATIENT
Start: 2018-04-14 | End: 2018-04-14

## 2018-04-14 RX ADMIN — ZOLEDRONIC ACID 3.5 MG: 0.8 INJECTION, SOLUTION, CONCENTRATE INTRAVENOUS at 09:24

## 2018-04-14 RX ADMIN — SODIUM CHLORIDE 20 ML/HR: 0.9 INJECTION, SOLUTION INTRAVENOUS at 09:00

## 2018-04-14 NOTE — PLAN OF CARE
Problem: Potential for Falls  Goal: Patient will remain free of falls  INTERVENTIONS:  - Assess patient frequently for physical needs  -  Identify cognitive and physical deficits and behaviors that affect risk of falls    -  [place] fall precautions as indicated by assessment   - Educate patient/family on patient safety including physical limitations  - Instruct patient to call for assistance with activity based on assessment  - Modify environment to reduce risk of injury  - Consider OT/PT consult to assist with strengthening/mobility   Outcome: Progressing

## 2018-04-16 ENCOUNTER — TELEPHONE (OUTPATIENT)
Dept: INTERNAL MEDICINE CLINIC | Facility: CLINIC | Age: 58
End: 2018-04-16

## 2018-04-16 NOTE — TELEPHONE ENCOUNTER
Ortho called pt is having surgery 06/04     She needs clearance , please advise when I can put her on your schedule        Call Jaylyn Louis [phone]

## 2018-04-20 ENCOUNTER — OFFICE VISIT (OUTPATIENT)
Dept: OBGYN CLINIC | Facility: MEDICAL CENTER | Age: 58
End: 2018-04-20
Payer: OTHER MISCELLANEOUS

## 2018-04-20 ENCOUNTER — OFFICE VISIT (OUTPATIENT)
Dept: OBGYN CLINIC | Facility: MEDICAL CENTER | Age: 58
End: 2018-04-20
Payer: COMMERCIAL

## 2018-04-20 VITALS
DIASTOLIC BLOOD PRESSURE: 81 MMHG | HEART RATE: 80 BPM | WEIGHT: 178.57 LBS | BODY MASS INDEX: 26.45 KG/M2 | HEIGHT: 69 IN | SYSTOLIC BLOOD PRESSURE: 122 MMHG

## 2018-04-20 VITALS
HEIGHT: 69 IN | HEART RATE: 80 BPM | DIASTOLIC BLOOD PRESSURE: 81 MMHG | BODY MASS INDEX: 26.45 KG/M2 | WEIGHT: 178.57 LBS | SYSTOLIC BLOOD PRESSURE: 122 MMHG

## 2018-04-20 DIAGNOSIS — G89.29 CHRONIC PAIN OF RIGHT KNEE: Primary | ICD-10-CM

## 2018-04-20 DIAGNOSIS — M25.561 CHRONIC PAIN OF RIGHT KNEE: Primary | ICD-10-CM

## 2018-04-20 DIAGNOSIS — M77.02 MEDIAL EPICONDYLITIS OF LEFT ELBOW: ICD-10-CM

## 2018-04-20 DIAGNOSIS — M17.31 POST-TRAUMATIC OSTEOARTHRITIS OF RIGHT KNEE: ICD-10-CM

## 2018-04-20 DIAGNOSIS — M77.11 LATERAL EPICONDYLITIS OF RIGHT ELBOW: Primary | ICD-10-CM

## 2018-04-20 PROCEDURE — 20610 DRAIN/INJ JOINT/BURSA W/O US: CPT

## 2018-04-20 PROCEDURE — 99213 OFFICE O/P EST LOW 20 MIN: CPT | Performed by: ORTHOPAEDIC SURGERY

## 2018-04-20 PROCEDURE — 20605 DRAIN/INJ JOINT/BURSA W/O US: CPT

## 2018-04-20 RX ORDER — BETAMETHASONE SODIUM PHOSPHATE AND BETAMETHASONE ACETATE 3; 3 MG/ML; MG/ML
6 INJECTION, SUSPENSION INTRA-ARTICULAR; INTRALESIONAL; INTRAMUSCULAR; SOFT TISSUE
Status: COMPLETED | OUTPATIENT
Start: 2018-04-20 | End: 2018-04-20

## 2018-04-20 RX ORDER — HYALURONATE SODIUM 10 MG/ML
20 SYRINGE (ML) INTRAARTICULAR
Status: COMPLETED | OUTPATIENT
Start: 2018-04-20 | End: 2018-04-20

## 2018-04-20 RX ORDER — BUPIVACAINE HYDROCHLORIDE 2.5 MG/ML
1 INJECTION, SOLUTION INFILTRATION; PERINEURAL
Status: COMPLETED | OUTPATIENT
Start: 2018-04-20 | End: 2018-04-20

## 2018-04-20 RX ORDER — LIDOCAINE HYDROCHLORIDE 10 MG/ML
1 INJECTION, SOLUTION INFILTRATION; PERINEURAL
Status: COMPLETED | OUTPATIENT
Start: 2018-04-20 | End: 2018-04-20

## 2018-04-20 RX ADMIN — BETAMETHASONE SODIUM PHOSPHATE AND BETAMETHASONE ACETATE 6 MG: 3; 3 INJECTION, SUSPENSION INTRA-ARTICULAR; INTRALESIONAL; INTRAMUSCULAR; SOFT TISSUE at 08:34

## 2018-04-20 RX ADMIN — Medication 20 MG: at 08:50

## 2018-04-20 RX ADMIN — LIDOCAINE HYDROCHLORIDE 1 ML: 10 INJECTION, SOLUTION INFILTRATION; PERINEURAL at 08:36

## 2018-04-20 RX ADMIN — BUPIVACAINE HYDROCHLORIDE 1 ML: 2.5 INJECTION, SOLUTION INFILTRATION; PERINEURAL at 08:34

## 2018-04-20 RX ADMIN — BETAMETHASONE SODIUM PHOSPHATE AND BETAMETHASONE ACETATE 6 MG: 3; 3 INJECTION, SUSPENSION INTRA-ARTICULAR; INTRALESIONAL; INTRAMUSCULAR; SOFT TISSUE at 08:36

## 2018-04-20 RX ADMIN — LIDOCAINE HYDROCHLORIDE 1 ML: 10 INJECTION, SOLUTION INFILTRATION; PERINEURAL at 08:34

## 2018-04-20 RX ADMIN — BUPIVACAINE HYDROCHLORIDE 1 ML: 2.5 INJECTION, SOLUTION INFILTRATION; PERINEURAL at 08:36

## 2018-04-20 NOTE — PROGRESS NOTES
61 y o female presents for orthovisc #3 right knee  She is slated for a right TKA for 6/4    Review of Systems  Review of systems negative unless otherwise specified in HPI    Past Medical History  Past Medical History:   Diagnosis Date    Abnormal breast exam     Anxiety     Asthma     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[facility] [address] )     primary per allscripts    Cancer ([facility] [address] )     IgG kappa smoldering multiple myeloma    Cardiac murmur     Chronic liver disease     resolved 12/04/2017    Depression     Endometriosis     Fracture of tibia     right tibial plateau fracture per allscripts    Hepatic disease     Hypertension     last assessed 12/13/2017    OCD (obsessive compulsive disorder)     Pneumonia     Seasonal allergies     Wears eyeglasses     Whiplash injury to neck        Past Surgical History  Past Surgical History:   Procedure Laterality Date    BACK SURGERY      hemilaminectomy and discectomy, L5-S1, right ([facility] at [facility]) onset 02/14/2005 per allscripts    EXPLORATION EXTREMITY Right 8/29/2016    Procedure: KNEE PERONEAL NERVE EXPLORATION AND RELEASE ;  Surgeon: Barry Rand MD;  [facility];  Service:    [facility] FOOT SURGERY      HAND SURGERY      HERNIA REPAIR      MANDIBLE SURGERY      NEUROPLASTY / TRANSPOSITION MEDIAN NERVE AT CARPAL TUNNEL      ORIF TIBIAL PLATEAU Right     arthroplasty per allscripts    OTHER SURGICAL HISTORY      injection of trigger point(s) per allscripts       Current Medications  Current Outpatient Prescriptions on File Prior to Visit   Medication Sig Dispense Refill    Cholecalciferol (VITAMIN D3) 2000 UNITS TABS Take 1 tablet by mouth daily        cholestyramine (QUESTRAN) 4 g packet       ergocalciferol (VITAMIN D2) 50,000 units TAKE ONE CAPSULE BY MOUTH EVERY OTHER WEEK FOR 1 MONTH, THEN 1 CAPSULE EVERY MONTH 4 capsule 2    Gabapentin, Once-Daily, (GRALISE) 600 MG TABS Take 1,800 mg by mouth daily at bedtime      PARoxetine (PAXIL-CR) 25 mg 24 hr tablet  topiramate (TOPAMAX) 50 MG tablet       triamterene-hydrochlorothiazide (DYAZIDE) 37 5-25 mg per capsule Take 1 capsule by mouth every morning   ursodiol (ACTIGALL) 300 mg capsule Take 300 mg by mouth 3 (three) times a day  No current facility-administered medications on file prior to visit          Recent Labs [facility])    0  Lab Value Date/Time   HCT 40 2 03/09/2018 0929   HCT 38 2 12/29/2015 0659   HGB 13 1 03/09/2018 0929   HGB 12 4 12/29/2015 0659   WBC 6 34 03/09/2018 0929   WBC 6 60 12/29/2015 0659   ESR 42 (H) 12/06/2017 0739   CRP <3 0 12/06/2017 0739   GLUCOSE 144 (H) 12/19/2016 1006   GLUCOSE 134 12/29/2015 0659         Physical exam  · General: Awake, Alert, Oriented  · Eyes: Pupils equal, round and reactive to light  · Heart: regular rate and rhythm  · Lungs: No audible wheezing  · Abdomen: soft  right Knee exam  ·     Procedure  Large joint arthrocentesis  Date/Time: 4/20/2018 8:50 AM  Consent given by: patient  Site marked: site marked  Supporting Documentation  Indications: pain   Procedure Details  Location: knee - R knee  Preparation: Patient was prepped and draped in the usual sterile fashion  Needle size: 22 G  Ultrasound guidance: no  Approach: anterolateral  Medications administered: 20 mg Sodium Hyaluronate 20 MG/2ML    Patient tolerance: patient tolerated the procedure well with no immediate complications  Dressing:  Sterile dressing applied          Imaging  none    ASSESSMENT  orthovisc #3 right knee      PLAN  ICE  WBAT  Activities as tolerated  Follow-up 6 weeks

## 2018-04-20 NOTE — PROGRESS NOTES
61 y o female presents for re-evaluation of her B/L elbows, chronic lateral epicondylitis  She continues to complain of lateral elbow pain  Her last set of cortisone injections were 3/9/18  Review of Systems  Review of systems negative unless otherwise specified in HPI    Past Medical History  Past Medical History:   Diagnosis Date    Abnormal breast exam     Anxiety     Asthma     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[facility] [address] )     primary per allscripts    Cancer ([facility] [address] )     IgG kappa smoldering multiple myeloma    Cardiac murmur     Chronic liver disease     resolved 12/04/2017    Depression     Endometriosis     Fracture of tibia     right tibial plateau fracture per allscripts    Hepatic disease     Hypertension     last assessed 12/13/2017    OCD (obsessive compulsive disorder)     Pneumonia     Seasonal allergies     Wears eyeglasses     Whiplash injury to neck        Past Surgical History  Past Surgical History:   Procedure Laterality Date    BACK SURGERY      hemilaminectomy and discectomy, L5-S1, right (Dr Marrion Merlin, [facility]) onset 02/14/2005 per allscripts    EXPLORATION EXTREMITY Right 8/29/2016    Procedure: KNEE PERONEAL NERVE EXPLORATION AND RELEASE ;  Surgeon: Nolvia White MD;  [facility];  Service:    [employer] FOOT SURGERY      HAND SURGERY      HERNIA REPAIR      MANDIBLE SURGERY      NEUROPLASTY / TRANSPOSITION MEDIAN NERVE AT CARPAL TUNNEL      ORIF TIBIAL PLATEAU Right     arthroplasty per allscripts    OTHER SURGICAL HISTORY      injection of trigger point(s) per allscripts       Current Medications  Current Outpatient Prescriptions on File Prior to Visit   Medication Sig Dispense Refill    Cholecalciferol (VITAMIN D3) 2000 UNITS TABS Take 1 tablet by mouth daily        cholestyramine (QUESTRAN) 4 g packet       ergocalciferol (VITAMIN D2) 50,000 units TAKE ONE CAPSULE BY MOUTH EVERY OTHER WEEK FOR 1 MONTH, THEN 1 CAPSULE EVERY MONTH 4 capsule 2    Gabapentin, Once-Daily, (GRALISE) 600 MG TABS Take 1,800 mg by mouth daily at bedtime      PARoxetine (PAXIL-CR) 25 mg 24 hr tablet       topiramate (TOPAMAX) 50 MG tablet       triamterene-hydrochlorothiazide (DYAZIDE) 37 5-25 mg per capsule Take 1 capsule by mouth every morning   ursodiol (ACTIGALL) 300 mg capsule Take 300 mg by mouth 3 (three) times a day  No current facility-administered medications on file prior to visit  Recent Labs [facility])    0  Lab Value Date/Time   HCT 40 2 03/09/2018 0929   HCT 38 2 12/29/2015 0659   HGB 13 1 03/09/2018 0929   HGB 12 4 12/29/2015 0659   WBC 6 34 03/09/2018 0929   WBC 6 60 12/29/2015 0659   ESR 42 (H) 12/06/2017 0739   CRP <3 0 12/06/2017 0739   GLUCOSE 144 (H) 12/19/2016 1006   GLUCOSE 134 12/29/2015 0659         Physical exam  · General: Awake, Alert, Oriented  · Eyes: Pupils equal, round and reactive to light  · Heart: regular rate and rhythm  · Lungs: No audible wheezing  · Abdomen: soft  bilateral Elbow skin intact, no erythema or swelling  · Good flexion, extension, pronation and supination bilaterally, tenderness over right lateral epicondyle and left medial epicondyle    · NVID    Procedure  Medium joint arthrocentesis  Date/Time: 4/20/2018 8:34 AM  Consent given by: patient  Site marked: site marked  Supporting Documentation  Indications: pain   Procedure Details  Location: elbow - R elbow (lateral epicondyle)  Preparation: Patient was prepped and draped in the usual sterile fashion  Needle size: 22 G  Ultrasound guidance: no  Approach: lateral   Medications administered: 1 mL bupivacaine 0 25 %; 1 mL lidocaine 1 %; 6 mg betamethasone acetate-betamethasone sodium phosphate 6 (3-3) mg/mL    Patient tolerance: patient tolerated the procedure well with no immediate complications  Dressing:  Sterile dressing applied  Medium joint arthrocentesis  Date/Time: 4/20/2018 8:36 AM  Consent given by: patient  Site marked: site marked  Supporting Documentation  Indications: pain   Procedure Details  Location: elbow - L elbow (medial epicondyle)  Preparation: Patient was prepped and draped in the usual sterile fashion  Needle size: 22 G  Ultrasound guidance: no  Approach: lateral   Medications administered: 1 mL bupivacaine 0 25 %; 1 mL lidocaine 1 %; 6 mg betamethasone acetate-betamethasone sodium phosphate 6 (3-3) mg/mL    Patient tolerance: patient tolerated the procedure well with no immediate complications  Dressing:  Sterile dressing applied          Imaging      ASSESSMENT  60yo female with chronic B/Lelbow pain, right lateral epicondylitis and left medial epicondylitis  Repeat epicondyle cortisone injections today       PLAN  Activities as tolerated  ICE

## 2018-04-21 ENCOUNTER — APPOINTMENT (OUTPATIENT)
Dept: LAB | Age: 58
End: 2018-04-21
Payer: COMMERCIAL

## 2018-04-21 DIAGNOSIS — E55.9 VITAMIN D DEFICIENCY: ICD-10-CM

## 2018-04-21 LAB — 25(OH)D3 SERPL-MCNC: 28.5 NG/ML (ref 30–100)

## 2018-04-21 PROCEDURE — 82306 VITAMIN D 25 HYDROXY: CPT

## 2018-04-21 PROCEDURE — 36415 COLL VENOUS BLD VENIPUNCTURE: CPT

## 2018-04-22 PROBLEM — D47.2 SMOLDERING MULTIPLE MYELOMA (SMM): Status: ACTIVE | Noted: 2017-06-05

## 2018-04-22 PROBLEM — E55.9 VITAMIN D DEFICIENCY: Status: ACTIVE | Noted: 2017-12-13

## 2018-04-22 PROBLEM — M77.02 MEDIAL EPICONDYLITIS, LEFT ELBOW: Chronic | Status: ACTIVE | Noted: 2018-03-09

## 2018-04-22 PROBLEM — K74.60 CIRRHOSIS (HCC): Status: ACTIVE | Noted: 2017-12-04

## 2018-04-22 PROBLEM — M17.31 POST-TRAUMATIC OSTEOARTHRITIS OF RIGHT KNEE: Chronic | Status: ACTIVE | Noted: 2018-03-23

## 2018-04-22 PROBLEM — K74.60 CIRRHOSIS (HCC): Chronic | Status: ACTIVE | Noted: 2017-12-04

## 2018-04-22 PROBLEM — G89.4 CHRONIC PAIN DISORDER: Status: ACTIVE | Noted: 2017-05-10

## 2018-04-22 PROBLEM — M77.11 LATERAL EPICONDYLITIS OF RIGHT ELBOW: Chronic | Status: ACTIVE | Noted: 2018-03-09

## 2018-04-23 ENCOUNTER — OFFICE VISIT (OUTPATIENT)
Dept: INTERNAL MEDICINE CLINIC | Facility: CLINIC | Age: 58
End: 2018-04-23
Payer: COMMERCIAL

## 2018-04-23 VITALS
BODY MASS INDEX: 27.54 KG/M2 | SYSTOLIC BLOOD PRESSURE: 128 MMHG | HEART RATE: 72 BPM | DIASTOLIC BLOOD PRESSURE: 78 MMHG | RESPIRATION RATE: 14 BRPM | WEIGHT: 186.6 LBS

## 2018-04-23 DIAGNOSIS — E78.2 MIXED HYPERLIPIDEMIA: Chronic | ICD-10-CM

## 2018-04-23 DIAGNOSIS — F41.8 DEPRESSION WITH ANXIETY: Chronic | ICD-10-CM

## 2018-04-23 DIAGNOSIS — M81.0 OSTEOPOROSIS WITHOUT CURRENT PATHOLOGICAL FRACTURE, UNSPECIFIED OSTEOPOROSIS TYPE: Chronic | ICD-10-CM

## 2018-04-23 DIAGNOSIS — K74.3 PRIMARY BILIARY CHOLANGITIS (HCC): Primary | Chronic | ICD-10-CM

## 2018-04-23 DIAGNOSIS — G62.9 PERIPHERAL POLYNEUROPATHY: Chronic | ICD-10-CM

## 2018-04-23 DIAGNOSIS — D47.2 SMOLDERING MULTIPLE MYELOMA (SMM): ICD-10-CM

## 2018-04-23 PROCEDURE — 99215 OFFICE O/P EST HI 40 MIN: CPT | Performed by: INTERNAL MEDICINE
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
## 2018-05-02 ENCOUNTER — TELEPHONE (OUTPATIENT)
Dept: OBGYN CLINIC | Facility: HOSPITAL | Age: 58
End: 2018-05-02

## 2018-05-10 ENCOUNTER — TELEPHONE (OUTPATIENT)
Dept: PREADMISSION TESTING | Facility: HOSPITAL | Age: 58
End: 2018-05-10

## 2018-05-15 ENCOUNTER — TELEPHONE (OUTPATIENT)
Dept: OBGYN CLINIC | Facility: HOSPITAL | Age: 58
End: 2018-05-15

## 2018-05-22 ENCOUNTER — APPOINTMENT (OUTPATIENT)
Dept: LAB | Facility: HOSPITAL | Age: 58
End: 2018-05-22
Attending: ORTHOPAEDIC SURGERY
Payer: COMMERCIAL

## 2018-05-22 ENCOUNTER — HOSPITAL ENCOUNTER (OUTPATIENT)
Dept: RADIOLOGY | Facility: HOSPITAL | Age: 58
Discharge: HOME | End: 2018-05-22
Payer: COMMERCIAL

## 2018-05-22 DIAGNOSIS — G89.29 CHRONIC PAIN OF RIGHT KNEE: ICD-10-CM

## 2018-05-22 DIAGNOSIS — M17.31 POST-TRAUMATIC OSTEOARTHRITIS OF RIGHT KNEE: ICD-10-CM

## 2018-05-22 DIAGNOSIS — M25.561 CHRONIC PAIN OF RIGHT KNEE: ICD-10-CM

## 2018-05-22 LAB
ABO GROUP BLD: NORMAL
ALBUMIN SERPL BCP-MCNC: 3.4 G/DL (ref 3.5–5)
ALP SERPL-CCNC: 237 U/L (ref 46–116)
ALT SERPL W P-5'-P-CCNC: 47 U/L (ref 12–78)
ANION GAP SERPL CALCULATED.3IONS-SCNC: 5 MMOL/L (ref 4–13)
APTT PPP: 27 SECONDS (ref 24–36)
AST SERPL W P-5'-P-CCNC: 38 U/L (ref 5–45)
ATRIAL RATE: 65 BPM
ATRIAL RATE: 67 BPM
BASOPHILS # BLD AUTO: 0.02 THOUSANDS/ΜL (ref 0–0.1)
BASOPHILS NFR BLD AUTO: 0 % (ref 0–1)
BILIRUB SERPL-MCNC: 0.33 MG/DL (ref 0.2–1)
BLD GP AB SCN SERPL QL: NEGATIVE
BUN SERPL-MCNC: 22 MG/DL (ref 5–25)
CALCIUM SERPL-MCNC: 9 MG/DL (ref 8.3–10.1)
CHLORIDE SERPL-SCNC: 103 MMOL/L (ref 100–108)
CO2 SERPL-SCNC: 29 MMOL/L (ref 21–32)
CREAT SERPL-MCNC: 0.91 MG/DL (ref 0.6–1.3)
EOSINOPHIL # BLD AUTO: 0.12 THOUSAND/ΜL (ref 0–0.61)
EOSINOPHIL NFR BLD AUTO: 2 % (ref 0–6)
ERYTHROCYTE [DISTWIDTH] IN BLOOD BY AUTOMATED COUNT: 13.3 % (ref 11.6–15.1)
EST. AVERAGE GLUCOSE BLD GHB EST-MCNC: 140 MG/DL
GFR SERPL CREATININE-BSD FRML MDRD: 70 ML/MIN/1.73SQ M
GLUCOSE SERPL-MCNC: 112 MG/DL (ref 65–140)
HBA1C MFR BLD: 6.5 % (ref 4.2–6.3)
HCT VFR BLD AUTO: 39.4 % (ref 34.8–46.1)
HGB BLD-MCNC: 12.5 G/DL (ref 11.5–15.4)
INR PPP: 0.95 (ref 0.86–1.17)
LYMPHOCYTES # BLD AUTO: 1.93 THOUSANDS/ΜL (ref 0.6–4.47)
LYMPHOCYTES NFR BLD AUTO: 34 % (ref 14–44)
MCH RBC QN AUTO: 29.3 PG (ref 26.8–34.3)
MCHC RBC AUTO-ENTMCNC: 31.7 G/DL (ref 31.4–37.4)
MCV RBC AUTO: 93 FL (ref 82–98)
MONOCYTES # BLD AUTO: 0.32 THOUSAND/ΜL (ref 0.17–1.22)
MONOCYTES NFR BLD AUTO: 6 % (ref 4–12)
NEUTROPHILS # BLD AUTO: 3.24 THOUSANDS/ΜL (ref 1.85–7.62)
NEUTS SEG NFR BLD AUTO: 57 % (ref 43–75)
NRBC BLD AUTO-RTO: 0 /100 WBCS
P AXIS: 43 DEGREES
P AXIS: 45 DEGREES
PLATELET # BLD AUTO: 199 THOUSANDS/UL (ref 149–390)
PMV BLD AUTO: 10.7 FL (ref 8.9–12.7)
POTASSIUM SERPL-SCNC: 4 MMOL/L (ref 3.5–5.3)
PR INTERVAL: 160 MS
PR INTERVAL: 160 MS
PROT SERPL-MCNC: 8.9 G/DL (ref 6.4–8.2)
PROTHROMBIN TIME: 12.8 SECONDS (ref 11.8–14.2)
QRS AXIS: 37 DEGREES
QRS AXIS: 39 DEGREES
QRSD INTERVAL: 84 MS
QRSD INTERVAL: 84 MS
QT INTERVAL: 410 MS
QT INTERVAL: 434 MS
QTC INTERVAL: 433 MS
QTC INTERVAL: 451 MS
RBC # BLD AUTO: 4.26 MILLION/UL (ref 3.81–5.12)
RH BLD: POSITIVE
SODIUM SERPL-SCNC: 137 MMOL/L (ref 136–145)
SPECIMEN EXPIRATION DATE: NORMAL
T WAVE AXIS: 66 DEGREES
T WAVE AXIS: 69 DEGREES
VENTRICULAR RATE: 65 BPM
VENTRICULAR RATE: 67 BPM
WBC # BLD AUTO: 5.65 THOUSAND/UL (ref 4.31–10.16)

## 2018-05-22 PROCEDURE — 86901 BLOOD TYPING SEROLOGIC RH(D): CPT | Performed by: ORTHOPAEDIC SURGERY

## 2018-05-22 PROCEDURE — 85025 COMPLETE CBC W/AUTO DIFF WBC: CPT | Performed by: ORTHOPAEDIC SURGERY

## 2018-05-22 PROCEDURE — 80053 COMPREHEN METABOLIC PANEL: CPT

## 2018-05-22 PROCEDURE — 93010 ELECTROCARDIOGRAM REPORT: CPT | Performed by: INTERNAL MEDICINE

## 2018-05-22 PROCEDURE — 93005 ELECTROCARDIOGRAM TRACING: CPT

## 2018-05-22 PROCEDURE — 85610 PROTHROMBIN TIME: CPT

## 2018-05-22 PROCEDURE — 36415 COLL VENOUS BLD VENIPUNCTURE: CPT

## 2018-05-22 PROCEDURE — 86900 BLOOD TYPING SEROLOGIC ABO: CPT | Performed by: ORTHOPAEDIC SURGERY

## 2018-05-22 PROCEDURE — 71046 X-RAY EXAM CHEST 2 VIEWS: CPT

## 2018-05-22 PROCEDURE — 85730 THROMBOPLASTIN TIME PARTIAL: CPT

## 2018-05-22 PROCEDURE — 83036 HEMOGLOBIN GLYCOSYLATED A1C: CPT

## 2018-05-22 PROCEDURE — 86850 RBC ANTIBODY SCREEN: CPT | Performed by: ORTHOPAEDIC SURGERY

## 2018-05-22 RX ORDER — ASCORBIC ACID 500 MG
500 TABLET ORAL DAILY
COMMUNITY
End: 2018-05-30 | Stop reason: SDUPTHER

## 2018-05-22 RX ORDER — FERROUS SULFATE 325(65) MG
325 TABLET ORAL
COMMUNITY
End: 2018-05-30 | Stop reason: SDUPTHER

## 2018-05-22 RX ORDER — FOLIC ACID 1 MG/1
TABLET ORAL DAILY
COMMUNITY
End: 2018-05-30 | Stop reason: SDUPTHER

## 2018-05-23 ENCOUNTER — TELEPHONE (OUTPATIENT)
Dept: OBGYN CLINIC | Facility: HOSPITAL | Age: 58
End: 2018-05-23

## 2018-05-24 ENCOUNTER — ANESTHESIA EVENT (OUTPATIENT)
Dept: PERIOP | Facility: HOSPITAL | Age: 58
DRG: 209 | End: 2018-05-24
Payer: OTHER MISCELLANEOUS

## 2018-05-24 ENCOUNTER — TELEPHONE (OUTPATIENT)
Dept: HEMATOLOGY ONCOLOGY | Facility: CLINIC | Age: 58
End: 2018-05-24

## 2018-05-24 ENCOUNTER — TELEPHONE (OUTPATIENT)
Dept: INTERNAL MEDICINE CLINIC | Facility: CLINIC | Age: 58
End: 2018-05-24

## 2018-05-24 ENCOUNTER — TELEPHONE (OUTPATIENT)
Dept: OBGYN CLINIC | Facility: HOSPITAL | Age: 58
End: 2018-05-24

## 2018-05-24 ENCOUNTER — TELEPHONE (OUTPATIENT)
Dept: PREADMISSION TESTING | Facility: HOSPITAL | Age: 58
End: 2018-05-24

## 2018-05-24 NOTE — PRE-PROCEDURE INSTRUCTIONS
Pre-Surgery Instructions:   Medication Instructions    ascorbic acid (VITAMIN C) 500 mg tablet Patient was instructed by Physician and understands   Cholecalciferol (VITAMIN D3) 2000 UNITS TABS Instructed patient per Anesthesia Guidelines   cholestyramine (QUESTRAN) 4 g packet Instructed patient per Anesthesia Guidelines   ergocalciferol (VITAMIN D2) 50,000 units Instructed patient per Anesthesia Guidelines   ferrous sulfate 325 (65 Fe) mg tablet Patient was instructed by Physician and understands   folic acid (FOLVITE) 1 mg tablet Patient was instructed by Physician and understands   Gabapentin, Once-Daily, (GRALISE) 600 MG TABS Instructed patient per Anesthesia Guidelines   PARoxetine (PAXIL-CR) 25 mg 24 hr tablet Instructed patient per Anesthesia Guidelines   topiramate (TOPAMAX) 50 MG tablet Instructed patient per Anesthesia Guidelines   triamterene-hydrochlorothiazide (DYAZIDE) 37 5-25 mg per capsule Instructed patient per Anesthesia Guidelines   ursodiol (ACTIGALL) 300 mg capsule Instructed patient per Anesthesia Guidelines  Pt had PAT appt  Medication list reviewed & instructed  As of 6 4 18 pt to stop vit D2/3  Instructed on tylenol  Pt does not have scripts for pre-op vitamins or post-op lovenox, will f/u with ortho  Once received, pt will take folic acid, vit C, iron until 6 10 18  Am DOS pt to take actigall  Showering instructions & cloths given by office, reviewed at time of appt  Soap and incentive spirometer given  All instructed  All instructions verbally understood by patient  Callback number given  Diuretic Med Class     Continue this medication up to the evening before surgery/procedure, but do not take the morning of the day of surgery  Antidepressant Med Class     Continue to take this medication on your normal schedule    If this is an oral medication and you take it in the morning, then you may take this medicine with a sip of water   Antiepileptic Med Class     Continue to take this medication on your normal schedule  If this is an oral medication and you take it in the morning, then you may take this medicine with a sip of water  NonStatin Med Class     Continue to take this medication on your normal schedule  If this is an oral medication and you take it in the morning, then you may take this medicine with a sip of water

## 2018-05-24 NOTE — TELEPHONE ENCOUNTER
Go Pozo ([phone]) from the orthopedic office called her and left her a message that Dr Kermit Bhagat needs a clearance from Dr Iram Ritter before her June 11th knee surgery  Does Dr Iram Ritter need to see her to give this?

## 2018-05-25 ENCOUNTER — TELEPHONE (OUTPATIENT)
Dept: INTERNAL MEDICINE CLINIC | Facility: CLINIC | Age: 58
End: 2018-05-25

## 2018-05-25 NOTE — TELEPHONE ENCOUNTER
Pt states ortho is requesting pre-op clearance for her sx on June  Pre-op appt scheduled on 5/16 was cancelled  Normal pre-op within 30 days of sx  Please advise   Thanks   Pt tele: [phone]

## 2018-05-29 NOTE — TELEPHONE ENCOUNTER
Call placed to do pre-op elective admission assessment  Spoke with pt  Pt reporting she lives in a cape home, her mother lives with her  Pt reporting she is the primary care giver for her mother, whom is 81yo  Per pt, her mother is AAOx4 and is ambulating independently of devices- per pt her mom needs supervision and structure (No cooking when no one is home, no bathing when no one is home etc  )  Pt reporting that her 2 brothers are her only other family and per pt they would be unable to care for pt in post-op period  Pt's mother could possibly assist pt in the post-op period with "small things", however pt is concerned about adding extra stress to her mother  Pt has limited to no post-op caregiver support  Pt has 3 steps to enter into the home, pt planning for a first floor setup if DCd to home  Pt has a step in tub with a shower seat  Pt has a cane, walker and bedside commode  Pt reporting that she is currently ambulating independently of assistive devices and is independent with her ADLs  Pt uses the [facility] in [place]  She self manages her medications and has an RX for post-op lovenox- pt plans to fill PTA  Pt does c/o RLE tingling "because I have nerve damaged in that leg"  Pt reporting she was "hoping to go to rehab when discharged, for a little bit anyway"  Pt's preferred SNF choice is [facility]  I explained to patient that her disposition (rehab Vs  Home PT Vs  outpt PT) is determined while she is inpt and is based off a number of things including post-op functionality, caregiver support, home layout etc   I explained that in order to go to a rehab/SNF, pt would have to be both accepted by a facility and approved by her insurance to go  I explained to patient that for the above reasons, we would have to develop a "plan B" if not accepted to go to SNF    Pt reporting that with her previous surgeries, she was DCd to home with [facility] "for weeks", then she transitioned to outpt PT at [facility] in [place] ([facility])  I explained the many benefits of being DCd to home and directly reporting to outpt PT- pt aware  I spoke with Lynda Feldman, [facility]  on 5/29-per Lynda Feldman, "if it is reasonable and necessary for pt to be DCd to rehab and documented as such, we will approve pt to go to rehab"  Per pt, "[facility] has to take care of post-op transport"  Pt has no post-op transport plan  I spoke with pt's Srinivasa Tipton, on 5/29  Per Lynda Feldman, "[facility] has to pay for transport, or reimburse her family for mileage if they take her"  I explained the pt reported to me she would not have anyone to transport her in the post-op period  Per Lynda Feldman,  She assigned pt a DCMP RN- Mary Arceo  Per Brady Crawford with be working on patient's post-op transport  Per Lynda Feldman, they sometimes use [facility] for transport in the [facility]- I also suggested using [facility]  Per Lynda Feldman, pt told her that pt's neighbor would be "mowing the lawn and getting groceries for her"  I left a VM for LURDES Arceo to discuss post-op DC plan and transport with her- awaiting call back  Abiodun Mac- [phone] ext   [facility] RN- [phone]

## 2018-05-30 ENCOUNTER — TELEPHONE (OUTPATIENT)
Dept: OBGYN CLINIC | Facility: HOSPITAL | Age: 58
End: 2018-05-30

## 2018-05-30 DIAGNOSIS — M25.569 CHRONIC KNEE PAIN, UNSPECIFIED LATERALITY: Primary | ICD-10-CM

## 2018-05-30 DIAGNOSIS — G89.29 CHRONIC KNEE PAIN, UNSPECIFIED LATERALITY: Primary | ICD-10-CM

## 2018-05-30 RX ORDER — ASCORBIC ACID 500 MG
500 TABLET ORAL 2 TIMES DAILY
Qty: 40 TABLET | Refills: 0 | Status: SHIPPED | OUTPATIENT
Start: 2018-05-30 | End: 2018-06-22

## 2018-05-30 RX ORDER — FOLIC ACID 1 MG/1
1 TABLET ORAL DAILY
Qty: 20 TABLET | Refills: 0 | Status: SHIPPED | OUTPATIENT
Start: 2018-05-30 | End: 2018-06-22

## 2018-05-30 RX ORDER — FERROUS SULFATE 325(65) MG
325 TABLET ORAL 2 TIMES DAILY WITH MEALS
Qty: 40 TABLET | Refills: 0 | Status: SHIPPED | OUTPATIENT
Start: 2018-05-30 | End: 2018-06-22

## 2018-05-30 NOTE — TELEPHONE ENCOUNTER
Patient aware    Larkin Severance at [facility] office ( [phone]) aware   she will be faxing a clearance letter   Attention: Catarina Elmore, please look for this form to have Dr Jolly Resendiz sign

## 2018-05-30 NOTE — TELEPHONE ENCOUNTER
Pt returned my call to discuss post-op planning  Pt reporting that her post-op DC plan is dependant upon her post-op pain  Pt reporting that she is primary caregiver for her 81yo mother  Pt reporting that if she is "in so much pain, I cannot go home and be a a bear to my mother- she is 80  I cannot add that stress to her  If my pain is controlled and I am doing OK, I will go home when [facility]"  I explained that pain is not usually a factor that is considered by SNFs for acceptance  I explained that SNFs look at patient's functionality  I explained that in order to be DCd to a SNF, pt has to be both accepted by that SNF and approved by her insurance  Pt reporting that her preferred SNF choice is [facility]  I offered alternatives like [facility], pt reporting to me that she would not like to go to either of those sites  Pt's mother is AAOx4 and ambulates independently of assistive devices  Per pt, her mother just really needs "supervision" with taking meds, bathing, cooking etc  Per pt, her brother Jesi Concepcion is "back up caregiver" for her mother if pt not DCd to home  Per pt, if she is DCd to home, Jesi Concepcion can be "back up" and a phone call away if pt would need him  Pt also reports her neighbor will be assisting her in the post-op period with getting groceries and mowing the lawn  Per pt's [address], [facility] will approve all required post-op transport(PT, appts)  Per Izzy, they would likely use [facility]- [phone]  I explained we prefer if possible for our pt's to be DCd to home with outpt PT  I explained the many benefits of outpt PT  Pt reports she would do outpt PT at [place]    I suggested that she make herself a post-op PT appt for Friday 6/15 with the understanding that it could be cancelled if pt receives post-op home PT or is DCd to SNF- pt declined assistance making initial post-op PT appt and reports she will make the appt  I requested pt call me back with her date/time of first post-op PT appt  Pt denying having questions/concerns at this time

## 2018-05-31 ENCOUNTER — TELEPHONE (OUTPATIENT)
Dept: INTERNAL MEDICINE CLINIC | Facility: CLINIC | Age: 58
End: 2018-05-31

## 2018-05-31 NOTE — TELEPHONE ENCOUNTER
-Poision ivy arms and legs and ankles behind the knees  -started this past saturday   -states little humps, slight redness    -Pt went to pharm and she was instructed to contact  pcp since she is having upcoming sx   Please advise   Thanks   Declines: fever,chills  Pt tele: [phone]  [facility]- [facility]

## 2018-05-31 NOTE — TELEPHONE ENCOUNTER
Apply over-the-counter calamine lotion b i d   Also prescribe Diprosone cream 0 05% apply to affected area b i d -dispense 60 gram tube with 1 refill

## 2018-06-01 ENCOUNTER — TELEPHONE (OUTPATIENT)
Dept: OBGYN CLINIC | Facility: HOSPITAL | Age: 58
End: 2018-06-01

## 2018-06-01 NOTE — TELEPHONE ENCOUNTER
Patient sees Dr Arnulfo Jensen  She has surgery scheduled for 06/11 and she is needing to get the Vitamin C, Folic acid, and Ferrous sulfate  The work [employer] needs something in writing stating she needs these meds for surgery in order to cover them   Please fax to #[phone]

## 2018-06-04 ENCOUNTER — TELEPHONE (OUTPATIENT)
Dept: INTERNAL MEDICINE CLINIC | Facility: CLINIC | Age: 58
End: 2018-06-04

## 2018-06-04 ENCOUNTER — OFFICE VISIT (OUTPATIENT)
Dept: INTERNAL MEDICINE CLINIC | Facility: CLINIC | Age: 58
End: 2018-06-04
Payer: OTHER MISCELLANEOUS

## 2018-06-04 VITALS
SYSTOLIC BLOOD PRESSURE: 128 MMHG | DIASTOLIC BLOOD PRESSURE: 78 MMHG | WEIGHT: 185.2 LBS | BODY MASS INDEX: 27.43 KG/M2 | HEIGHT: 69 IN | HEART RATE: 72 BPM | RESPIRATION RATE: 14 BRPM

## 2018-06-04 DIAGNOSIS — D47.2 SMOLDERING MULTIPLE MYELOMA (SMM): ICD-10-CM

## 2018-06-04 DIAGNOSIS — Z01.818 PREOPERATIVE EXAMINATION: Primary | ICD-10-CM

## 2018-06-04 DIAGNOSIS — R21 SKIN RASH: ICD-10-CM

## 2018-06-04 PROCEDURE — 99213 OFFICE O/P EST LOW 20 MIN: CPT | Performed by: INTERNAL MEDICINE

## 2018-06-04 NOTE — PROGRESS NOTES
Assessment/Plan:  1  Health maintenance-medically clear for surgery-patient instructed on meds  She knows to take usual meds day of surgery with sips of water  2  Skin rash-felt to represent plan poison-she will continue with topical steroids  Also placed on prednisone 10 milligrams b i d  for 4 days  She will use calamine lotion b i d  and is using antihistamine before bed  No problem-specific Assessment & Plan notes found for this encounter  Diagnoses and all orders for this visit:    Preoperative examination    Skin rash    Smoldering multiple myeloma (SMM) ([facility])          Subjective:      Patient ID: Casey Colorado is a 61 y o  female  Seen today for preoperative exam   As noted she will be having additional knee surgery  We had previously reviewed her meds  We went over this again today  She denies chest pain or pressure with activity  She denies calf pain with walking  Denies weakness of either arm and leg compared to the other  Has developed a recent rash  Had been treated with topical steroids for presumptive plan poison  She wants to know about additional treatment as the rash is bothering her significantly  This patient denies any systemic symptoms  Specifically there has been no evidence of fever, night sweats, significant weight loss or significant decrease in appetite  The following portions of the patient's history were reviewed and updated as appropriate: current medications, past family history, past medical history, past social history, past surgical history and problem list     Review of Systems   Constitutional: Positive for fatigue  Respiratory: Negative  Cardiovascular: Negative  Gastrointestinal: Negative  Endocrine: Negative  Genitourinary: Negative  Musculoskeletal: Positive for arthralgias  Skin: Positive for rash  Neurological: Negative  Hematological: Negative  Psychiatric/Behavioral: Negative            Objective:      /78 Pulse 72   Resp 14    5' 9" (1 753 m)   Wt 84 kg (185 lb 3 2 oz)   LMP  (LMP Unknown)   BMI 27 35 kg/m²          Physical Exam   Constitutional: She is oriented to person, place, and time  She appears well-developed and well-nourished  No distress  HENT:   Head: Normocephalic and atraumatic  Right Ear: External ear normal    Left Ear: External ear normal    Nose: Nose normal    Mouth/Throat: Oropharynx is clear and moist  No oropharyngeal exudate  Eyes: Conjunctivae and EOM are normal  Pupils are equal, round, and reactive to light  Right eye exhibits no discharge  Left eye exhibits no discharge  No scleral icterus  Neck: Normal range of motion  Neck supple  No JVD present  No tracheal deviation present  No thyromegaly present  Cardiovascular: Normal rate, regular rhythm, normal heart sounds and intact distal pulses  Exam reveals no gallop and no friction rub  No murmur heard  Pulmonary/Chest: Effort normal and breath sounds normal  No stridor  No respiratory distress  She has no wheezes  She has no rales  She exhibits no tenderness  Abdominal: Soft  Bowel sounds are normal  She exhibits no distension and no mass  There is no tenderness  There is no rebound and no guarding  Musculoskeletal: Normal range of motion  She exhibits no edema or deformity  Lymphadenopathy:     She has no cervical adenopathy  Neurological: She is alert and oriented to person, place, and time  She has normal reflexes  No cranial nerve deficit  She exhibits normal muscle tone  Coordination normal    Skin: Skin is warm and dry  No rash noted  She is not diaphoretic  No erythema  Scattered papules of the arms and legs   Psychiatric: She has a normal mood and affect  Her behavior is normal  Judgment and thought content normal    Vitals reviewed

## 2018-06-05 ENCOUNTER — TELEPHONE (OUTPATIENT)
Dept: OBGYN CLINIC | Facility: HOSPITAL | Age: 58
End: 2018-06-05

## 2018-06-05 NOTE — TELEPHONE ENCOUNTER
Hoang Alcala    [facility]  [phone]    Ami Julian from [facility] called to verify signature on Lovenox RX but nothing on file  If this is to be filled please contact Ami Julian   Thanks

## 2018-06-05 NOTE — TELEPHONE ENCOUNTER
Patient scheduled for total joint next week  She needs this medication filled in the advance in case there are any authorizations that need to occur    Patient is not to start taking until she gets home from the hospital

## 2018-06-08 ENCOUNTER — TELEPHONE (OUTPATIENT)
Dept: OBGYN CLINIC | Facility: HOSPITAL | Age: 58
End: 2018-06-08

## 2018-06-08 NOTE — TELEPHONE ENCOUNTER
Pt called me to notify me that she scheduled herself a post-op PT appt for 6/15 @5108 with Nuha Perez

## 2018-06-11 ENCOUNTER — HOSPITAL ENCOUNTER (INPATIENT)
Facility: HOSPITAL | Age: 58
LOS: 2 days | Discharge: HOME | DRG: 209 | End: 2018-06-13
Attending: ORTHOPAEDIC SURGERY | Admitting: ORTHOPAEDIC SURGERY
Payer: OTHER MISCELLANEOUS

## 2018-06-11 ENCOUNTER — ANESTHESIA (OUTPATIENT)
Dept: PERIOP | Facility: HOSPITAL | Age: 58
DRG: 209 | End: 2018-06-11
Payer: OTHER MISCELLANEOUS

## 2018-06-11 DIAGNOSIS — M25.561 CHRONIC PAIN OF RIGHT KNEE: ICD-10-CM

## 2018-06-11 DIAGNOSIS — G89.29 CHRONIC PAIN OF RIGHT KNEE: ICD-10-CM

## 2018-06-11 DIAGNOSIS — K74.60 HEPATIC CIRRHOSIS, UNSPECIFIED HEPATIC CIRRHOSIS TYPE, UNSPECIFIED WHETHER ASCITES PRESENT (HCC): Primary | Chronic | ICD-10-CM

## 2018-06-11 DIAGNOSIS — E78.2 MIXED HYPERLIPIDEMIA: Chronic | ICD-10-CM

## 2018-06-11 DIAGNOSIS — M17.31 POST-TRAUMATIC OSTEOARTHRITIS OF RIGHT KNEE: Chronic | ICD-10-CM

## 2018-06-11 LAB
ABO GROUP BLD: NORMAL
BLD GP AB SCN SERPL QL: NEGATIVE
RH BLD: POSITIVE
SPECIMEN EXPIRATION DATE: NORMAL

## 2018-06-11 PROCEDURE — G8979 MOBILITY GOAL STATUS: HCPCS

## 2018-06-11 PROCEDURE — C1713 ANCHOR/SCREW BN/BN,TIS/BN: HCPCS | Performed by: ORTHOPAEDIC SURGERY

## 2018-06-11 PROCEDURE — C1776 JOINT DEVICE (IMPLANTABLE): HCPCS | Performed by: ORTHOPAEDIC SURGERY

## 2018-06-11 PROCEDURE — 86850 RBC ANTIBODY SCREEN: CPT | Performed by: ORTHOPAEDIC SURGERY

## 2018-06-11 PROCEDURE — 97163 PT EVAL HIGH COMPLEX 45 MIN: CPT

## 2018-06-11 PROCEDURE — 86900 BLOOD TYPING SEROLOGIC ABO: CPT | Performed by: ORTHOPAEDIC SURGERY

## 2018-06-11 PROCEDURE — 86901 BLOOD TYPING SEROLOGIC RH(D): CPT | Performed by: ORTHOPAEDIC SURGERY

## 2018-06-11 PROCEDURE — 27447 TOTAL KNEE ARTHROPLASTY: CPT | Performed by: ORTHOPAEDIC SURGERY

## 2018-06-11 PROCEDURE — 0SRC0J9 REPLACEMENT OF RIGHT KNEE JOINT WITH SYNTHETIC SUBSTITUTE, CEMENTED, OPEN APPROACH: ICD-10-PCS | Performed by: ORTHOPAEDIC SURGERY

## 2018-06-11 PROCEDURE — G8978 MOBILITY CURRENT STATUS: HCPCS

## 2018-06-11 DEVICE — ATTUNE KNEE SYSTEM FEMORAL POSTERIOR STABILIZED NARROW SIZE 5N RIGHT CEMENTED
Type: IMPLANTABLE DEVICE | Site: PATELLA | Status: FUNCTIONAL
Brand: ATTUNE

## 2018-06-11 DEVICE — ATTUNE KNEE SYSTEM TIBIAL BASE FIXED BEARING SIZE 5 CEMENTED
Type: IMPLANTABLE DEVICE | Site: PATELLA | Status: FUNCTIONAL
Brand: ATTUNE

## 2018-06-11 DEVICE — ATTUNE KNEE SYSTEM TIBIAL INSERT FIXED BEARING POSTERIOR STABILIZED 5 6MM AOX
Type: IMPLANTABLE DEVICE | Site: PATELLA | Status: FUNCTIONAL
Brand: ATTUNE

## 2018-06-11 DEVICE — ATTUNE PATELLA MEDIALIZED DOME 35MM CEMENTED AOX
Type: IMPLANTABLE DEVICE | Site: PATELLA | Status: FUNCTIONAL
Brand: ATTUNE

## 2018-06-11 DEVICE — SMARTSET HV HIGH VISCOSITY BONE CEMENT 40G
Type: IMPLANTABLE DEVICE | Status: FUNCTIONAL
Brand: SMARTSET

## 2018-06-11 RX ORDER — PROPOFOL 10 MG/ML
INJECTION, EMULSION INTRAVENOUS CONTINUOUS PRN
Status: DISCONTINUED | OUTPATIENT
Start: 2018-06-11 | End: 2018-06-11 | Stop reason: SURG

## 2018-06-11 RX ORDER — SODIUM CHLORIDE, SODIUM LACTATE, POTASSIUM CHLORIDE, CALCIUM CHLORIDE 600; 310; 30; 20 MG/100ML; MG/100ML; MG/100ML; MG/100ML
125 INJECTION, SOLUTION INTRAVENOUS CONTINUOUS
Status: DISCONTINUED | OUTPATIENT
Start: 2018-06-11 | End: 2018-06-11

## 2018-06-11 RX ORDER — ROPIVACAINE HYDROCHLORIDE 5 MG/ML
INJECTION, SOLUTION EPIDURAL; INFILTRATION; PERINEURAL AS NEEDED
Status: DISCONTINUED | OUTPATIENT
Start: 2018-06-11 | End: 2018-06-11 | Stop reason: SURG

## 2018-06-11 RX ORDER — PAROXETINE HYDROCHLORIDE 25 MG/1
50 TABLET, FILM COATED, EXTENDED RELEASE ORAL
Status: DISCONTINUED | OUTPATIENT
Start: 2018-06-11 | End: 2018-06-13 | Stop reason: HOSPADM

## 2018-06-11 RX ORDER — TOPIRAMATE 25 MG/1
50 TABLET ORAL
Status: DISCONTINUED | OUTPATIENT
Start: 2018-06-11 | End: 2018-06-13 | Stop reason: HOSPADM

## 2018-06-11 RX ORDER — ONDANSETRON 2 MG/ML
4 INJECTION INTRAMUSCULAR; INTRAVENOUS EVERY 4 HOURS PRN
Status: DISCONTINUED | OUTPATIENT
Start: 2018-06-11 | End: 2018-06-13 | Stop reason: HOSPADM

## 2018-06-11 RX ORDER — TRAMADOL HYDROCHLORIDE 50 MG/1
50 TABLET ORAL EVERY 6 HOURS PRN
Qty: 30 TABLET | Refills: 0 | Status: SHIPPED | OUTPATIENT
Start: 2018-06-11 | End: 2018-06-22

## 2018-06-11 RX ORDER — LIDOCAINE HYDROCHLORIDE 10 MG/ML
INJECTION, SOLUTION INFILTRATION; PERINEURAL AS NEEDED
Status: DISCONTINUED | OUTPATIENT
Start: 2018-06-11 | End: 2018-06-11 | Stop reason: SURG

## 2018-06-11 RX ORDER — MIDAZOLAM HYDROCHLORIDE 1 MG/ML
INJECTION INTRAMUSCULAR; INTRAVENOUS AS NEEDED
Status: DISCONTINUED | OUTPATIENT
Start: 2018-06-11 | End: 2018-06-11 | Stop reason: SURG

## 2018-06-11 RX ORDER — OXYCODONE HYDROCHLORIDE AND ACETAMINOPHEN 5; 325 MG/1; MG/1
TABLET ORAL
Qty: 20 TABLET | Refills: 0 | Status: SHIPPED | OUTPATIENT
Start: 2018-06-11 | End: 2018-10-12

## 2018-06-11 RX ORDER — EPHEDRINE SULFATE 50 MG/ML
INJECTION, SOLUTION INTRAVENOUS AS NEEDED
Status: DISCONTINUED | OUTPATIENT
Start: 2018-06-11 | End: 2018-06-11 | Stop reason: SURG

## 2018-06-11 RX ORDER — MEPERIDINE HYDROCHLORIDE 25 MG/ML
12.5 INJECTION INTRAMUSCULAR; INTRAVENOUS; SUBCUTANEOUS AS NEEDED
Status: DISCONTINUED | OUTPATIENT
Start: 2018-06-11 | End: 2018-06-11 | Stop reason: HOSPADM

## 2018-06-11 RX ORDER — MAGNESIUM HYDROXIDE 1200 MG/15ML
LIQUID ORAL AS NEEDED
Status: DISCONTINUED | OUTPATIENT
Start: 2018-06-11 | End: 2018-06-11 | Stop reason: HOSPADM

## 2018-06-11 RX ORDER — OXYCODONE HYDROCHLORIDE AND ACETAMINOPHEN 5; 325 MG/1; MG/1
1 TABLET ORAL EVERY 4 HOURS PRN
Status: DISCONTINUED | OUTPATIENT
Start: 2018-06-11 | End: 2018-06-13 | Stop reason: HOSPADM

## 2018-06-11 RX ORDER — LABETALOL HYDROCHLORIDE 5 MG/ML
5 INJECTION, SOLUTION INTRAVENOUS
Status: DISCONTINUED | OUTPATIENT
Start: 2018-06-11 | End: 2018-06-11 | Stop reason: HOSPADM

## 2018-06-11 RX ORDER — PROPOFOL 10 MG/ML
INJECTION, EMULSION INTRAVENOUS AS NEEDED
Status: DISCONTINUED | OUTPATIENT
Start: 2018-06-11 | End: 2018-06-11 | Stop reason: SURG

## 2018-06-11 RX ORDER — ALBUTEROL SULFATE 2.5 MG/3ML
2.5 SOLUTION RESPIRATORY (INHALATION) ONCE AS NEEDED
Status: DISCONTINUED | OUTPATIENT
Start: 2018-06-11 | End: 2018-06-11 | Stop reason: HOSPADM

## 2018-06-11 RX ORDER — BUPIVACAINE HYDROCHLORIDE 7.5 MG/ML
INJECTION, SOLUTION INTRASPINAL AS NEEDED
Status: DISCONTINUED | OUTPATIENT
Start: 2018-06-11 | End: 2018-06-11 | Stop reason: SURG

## 2018-06-11 RX ORDER — DOCUSATE SODIUM 100 MG/1
100 CAPSULE, LIQUID FILLED ORAL 2 TIMES DAILY
Status: DISCONTINUED | OUTPATIENT
Start: 2018-06-11 | End: 2018-06-13 | Stop reason: HOSPADM

## 2018-06-11 RX ORDER — MELATONIN
2000 DAILY
Status: DISCONTINUED | OUTPATIENT
Start: 2018-06-11 | End: 2018-06-13 | Stop reason: HOSPADM

## 2018-06-11 RX ORDER — ONDANSETRON 2 MG/ML
4 INJECTION INTRAMUSCULAR; INTRAVENOUS ONCE AS NEEDED
Status: DISCONTINUED | OUTPATIENT
Start: 2018-06-11 | End: 2018-06-11 | Stop reason: HOSPADM

## 2018-06-11 RX ORDER — SENNOSIDES 8.6 MG
1 TABLET ORAL DAILY
Status: DISCONTINUED | OUTPATIENT
Start: 2018-06-11 | End: 2018-06-13 | Stop reason: HOSPADM

## 2018-06-11 RX ORDER — SODIUM CHLORIDE, SODIUM LACTATE, POTASSIUM CHLORIDE, CALCIUM CHLORIDE 600; 310; 30; 20 MG/100ML; MG/100ML; MG/100ML; MG/100ML
1.5 INJECTION, SOLUTION INTRAVENOUS CONTINUOUS
Status: DISCONTINUED | OUTPATIENT
Start: 2018-06-11 | End: 2018-06-13 | Stop reason: HOSPADM

## 2018-06-11 RX ORDER — TRIAMTERENE AND HYDROCHLOROTHIAZIDE 37.5; 25 MG/1; MG/1
1 TABLET ORAL DAILY
Status: DISCONTINUED | OUTPATIENT
Start: 2018-06-12 | End: 2018-06-13 | Stop reason: HOSPADM

## 2018-06-11 RX ORDER — ACETAMINOPHEN 325 MG/1
975 TABLET ORAL ONCE
Status: COMPLETED | OUTPATIENT
Start: 2018-06-11 | End: 2018-06-11

## 2018-06-11 RX ORDER — URSODIOL 300 MG/1
300 CAPSULE ORAL
Status: DISCONTINUED | OUTPATIENT
Start: 2018-06-11 | End: 2018-06-13 | Stop reason: HOSPADM

## 2018-06-11 RX ORDER — FENTANYL CITRATE/PF 50 MCG/ML
25 SYRINGE (ML) INJECTION
Status: DISCONTINUED | OUTPATIENT
Start: 2018-06-11 | End: 2018-06-11 | Stop reason: HOSPADM

## 2018-06-11 RX ORDER — GABAPENTIN 300 MG/1
300 CAPSULE ORAL 3 TIMES DAILY
Status: DISCONTINUED | OUTPATIENT
Start: 2018-06-11 | End: 2018-06-13 | Stop reason: HOSPADM

## 2018-06-11 RX ORDER — ASCORBIC ACID 500 MG
500 TABLET ORAL 2 TIMES DAILY
Status: DISCONTINUED | OUTPATIENT
Start: 2018-06-11 | End: 2018-06-13 | Stop reason: HOSPADM

## 2018-06-11 RX ORDER — SODIUM CHLORIDE, SODIUM LACTATE, POTASSIUM CHLORIDE, CALCIUM CHLORIDE 600; 310; 30; 20 MG/100ML; MG/100ML; MG/100ML; MG/100ML
20 INJECTION, SOLUTION INTRAVENOUS CONTINUOUS
Status: DISCONTINUED | OUTPATIENT
Start: 2018-06-11 | End: 2018-06-11

## 2018-06-11 RX ORDER — OXYCODONE HYDROCHLORIDE AND ACETAMINOPHEN 5; 325 MG/1; MG/1
2 TABLET ORAL EVERY 6 HOURS PRN
Status: DISCONTINUED | OUTPATIENT
Start: 2018-06-11 | End: 2018-06-13 | Stop reason: HOSPADM

## 2018-06-11 RX ORDER — GABAPENTIN 300 MG/1
300 CAPSULE ORAL ONCE
Status: COMPLETED | OUTPATIENT
Start: 2018-06-11 | End: 2018-06-11

## 2018-06-11 RX ORDER — FERROUS SULFATE 325(65) MG
325 TABLET ORAL 2 TIMES DAILY WITH MEALS
Status: DISCONTINUED | OUTPATIENT
Start: 2018-06-11 | End: 2018-06-13 | Stop reason: HOSPADM

## 2018-06-11 RX ORDER — FOLIC ACID 1 MG/1
1 TABLET ORAL DAILY
Status: DISCONTINUED | OUTPATIENT
Start: 2018-06-11 | End: 2018-06-13 | Stop reason: HOSPADM

## 2018-06-11 RX ORDER — ONDANSETRON 2 MG/ML
INJECTION INTRAMUSCULAR; INTRAVENOUS AS NEEDED
Status: DISCONTINUED | OUTPATIENT
Start: 2018-06-11 | End: 2018-06-11 | Stop reason: SURG

## 2018-06-11 RX ORDER — TRAMADOL HYDROCHLORIDE 50 MG/1
50 TABLET ORAL EVERY 6 HOURS SCHEDULED
Status: DISCONTINUED | OUTPATIENT
Start: 2018-06-11 | End: 2018-06-13 | Stop reason: HOSPADM

## 2018-06-11 RX ORDER — DIPHENHYDRAMINE HCL 25 MG
25 TABLET ORAL EVERY 6 HOURS PRN
Status: DISCONTINUED | OUTPATIENT
Start: 2018-06-11 | End: 2018-06-13 | Stop reason: HOSPADM

## 2018-06-11 RX ADMIN — DEXAMETHASONE SODIUM PHOSPHATE 10 MG: 10 INJECTION INTRAMUSCULAR; INTRAVENOUS at 11:06

## 2018-06-11 RX ADMIN — HYDROMORPHONE HYDROCHLORIDE 1 MG: 1 INJECTION, SOLUTION INTRAMUSCULAR; INTRAVENOUS; SUBCUTANEOUS at 22:35

## 2018-06-11 RX ADMIN — DIPHENHYDRAMINE HCL 25 MG: 25 TABLET ORAL at 16:35

## 2018-06-11 RX ADMIN — TOPIRAMATE 50 MG: 25 TABLET, FILM COATED ORAL at 22:33

## 2018-06-11 RX ADMIN — Medication 325 MG: at 15:58

## 2018-06-11 RX ADMIN — Medication 2000 MG: at 11:02

## 2018-06-11 RX ADMIN — OXYCODONE HYDROCHLORIDE AND ACETAMINOPHEN 500 MG: 500 TABLET ORAL at 18:28

## 2018-06-11 RX ADMIN — MIDAZOLAM 2 MG: 1 INJECTION INTRAMUSCULAR; INTRAVENOUS at 10:46

## 2018-06-11 RX ADMIN — GABAPENTIN 300 MG: 300 CAPSULE ORAL at 08:41

## 2018-06-11 RX ADMIN — SODIUM CHLORIDE, SODIUM LACTATE, POTASSIUM CHLORIDE, AND CALCIUM CHLORIDE 20 ML/HR: .6; .31; .03; .02 INJECTION, SOLUTION INTRAVENOUS at 08:55

## 2018-06-11 RX ADMIN — OXYCODONE HYDROCHLORIDE AND ACETAMINOPHEN 2 TABLET: 5; 325 TABLET ORAL at 19:43

## 2018-06-11 RX ADMIN — PROPOFOL 100 MCG/KG/MIN: 10 INJECTION, EMULSION INTRAVENOUS at 11:02

## 2018-06-11 RX ADMIN — OXYCODONE HYDROCHLORIDE AND ACETAMINOPHEN 2 TABLET: 5; 325 TABLET ORAL at 13:39

## 2018-06-11 RX ADMIN — DOCUSATE SODIUM 100 MG: 100 CAPSULE, LIQUID FILLED ORAL at 18:28

## 2018-06-11 RX ADMIN — GABAPENTIN 300 MG: 300 CAPSULE ORAL at 22:33

## 2018-06-11 RX ADMIN — ACETAMINOPHEN 975 MG: 325 TABLET ORAL at 08:41

## 2018-06-11 RX ADMIN — ONDANSETRON 4 MG: 2 INJECTION INTRAMUSCULAR; INTRAVENOUS at 11:41

## 2018-06-11 RX ADMIN — URSODIOL 300 MG: 300 CAPSULE ORAL at 18:29

## 2018-06-11 RX ADMIN — SODIUM CHLORIDE, SODIUM LACTATE, POTASSIUM CHLORIDE, AND CALCIUM CHLORIDE 1.5 ML/KG/HR: .6; .31; .03; .02 INJECTION, SOLUTION INTRAVENOUS at 19:34

## 2018-06-11 RX ADMIN — LIDOCAINE HYDROCHLORIDE 3 ML: 10 INJECTION, SOLUTION INFILTRATION; PERINEURAL at 10:55

## 2018-06-11 RX ADMIN — BUPIVACAINE HYDROCHLORIDE IN DEXTROSE 1.2 ML: 7.5 INJECTION, SOLUTION SUBARACHNOID at 10:58

## 2018-06-11 RX ADMIN — TRAMADOL HYDROCHLORIDE 50 MG: 50 TABLET, FILM COATED ORAL at 18:28

## 2018-06-11 RX ADMIN — SODIUM CHLORIDE, SODIUM LACTATE, POTASSIUM CHLORIDE, AND CALCIUM CHLORIDE: .6; .31; .03; .02 INJECTION, SOLUTION INTRAVENOUS at 11:51

## 2018-06-11 RX ADMIN — SODIUM CHLORIDE, SODIUM LACTATE, POTASSIUM CHLORIDE, AND CALCIUM CHLORIDE 1.5 ML/KG/HR: .6; .31; .03; .02 INJECTION, SOLUTION INTRAVENOUS at 13:42

## 2018-06-11 RX ADMIN — EPHEDRINE SULFATE 5 MG: 50 INJECTION, SOLUTION INTRAMUSCULAR; INTRAVENOUS; SUBCUTANEOUS at 11:37

## 2018-06-11 RX ADMIN — ROPIVACAINE HYDROCHLORIDE 20 ML: 5 INJECTION, SOLUTION EPIDURAL; INFILTRATION; PERINEURAL at 09:40

## 2018-06-11 RX ADMIN — EPHEDRINE SULFATE 5 MG: 50 INJECTION, SOLUTION INTRAMUSCULAR; INTRAVENOUS; SUBCUTANEOUS at 11:55

## 2018-06-11 RX ADMIN — PAROXETINE HYDROCHLORIDE 50 MG: 25 TABLET, FILM COATED, EXTENDED RELEASE ORAL at 22:33

## 2018-06-11 RX ADMIN — EPHEDRINE SULFATE 5 MG: 50 INJECTION, SOLUTION INTRAMUSCULAR; INTRAVENOUS; SUBCUTANEOUS at 11:35

## 2018-06-11 RX ADMIN — GABAPENTIN 300 MG: 300 CAPSULE ORAL at 15:58

## 2018-06-11 RX ADMIN — PROPOFOL 50 MG: 10 INJECTION, EMULSION INTRAVENOUS at 11:02

## 2018-06-11 RX ADMIN — CEFAZOLIN SODIUM 1000 MG: 1 SOLUTION INTRAVENOUS at 18:30

## 2018-06-11 RX ADMIN — HYDROMORPHONE HYDROCHLORIDE 1 MG: 1 INJECTION, SOLUTION INTRAMUSCULAR; INTRAVENOUS; SUBCUTANEOUS at 15:59

## 2018-06-11 NOTE — ANESTHESIA PROCEDURE NOTES
Peripheral Block    Patient location during procedure: holding area  Start time: 6/11/2018 9:30 AM  Reason for block: at surgeon's request and post-op pain management  Staffing  Anesthesiologist: Liberty Goodpasture  Performed: anesthesiologist   Preanesthetic Checklist  Completed: patient identified, site marked, surgical consent, pre-op evaluation, timeout performed, IV checked, risks and benefits discussed and monitors and equipment checked  Peripheral Block  Patient position: supine  Prep: ChloraPrep  Patient monitoring: continuous pulse ox, frequent blood pressure checks, cardiac monitor and heart rate  Block type: adductor canal block  Laterality: right  Injection technique: single-shot  Procedures: ultrasound guided  Ultrasound permanent image saved  Needle  Needle type: Stimuplex   Needle gauge: 22 G  Needle length: 10 cm  Needle localization: ultrasound guidance  Needle insertion depth: 5 cm  Assessment  Injection assessment: incremental injection, local visualized surrounding nerve on ultrasound, negative aspiration for heme and no paresthesia on injection  Paresthesia pain: none  Heart rate change: no  Slow fractionated injection: yes  Post-procedure:  site cleaned  patient tolerated the procedure well with no immediate complications

## 2018-06-11 NOTE — ANESTHESIA POSTPROCEDURE EVALUATION
Post-Op Assessment Note      CV Status:  Stable    Mental Status:  Alert and awake    Hydration Status:  Stable    PONV Controlled:  None    Airway Patency:  Patent    Post Op Vitals Reviewed: Yes          Staff: CRNA           BP   84/52   Temp 98 4    Pulse 71 (06/11/18 1221)   Resp   11   SpO2 96%   Postop VS in PACU noted above, SV non-obstructed on 2LNC, denies pain

## 2018-06-11 NOTE — OP NOTE
OPERATIVE REPORT  PATIENT NAME: Meena Salcedo    :  1960  MRN: [id number]  Pt Location: [facility] OR ROOM 04    SURGERY DATE: 2018    Surgeon(s) and Role:     * Elza Wilkes MD - Primary     * Ken Byrd - Assisting     * Shagufta Henson PA-C - Assisting    Preop Diagnosis:  Post-traumatic osteoarthritis of right knee [M17 31]  Chronic pain of right knee [M25 561, G89 29]    Post-Op Diagnosis Codes:     * Post-traumatic osteoarthritis of right knee [M17 31]     * Chronic pain of right knee [M25 561, G89 29]    Procedure(s) (LRB):  ARTHROPLASTY KNEE TOTAL (Right)    Specimen(s):  * No specimens in log *    Estimated Blood Loss:   Minimal    Drains:  Closed/Suction Drain Right Knee Accordion 10 Fr  (Active)   Number of days: 0       Urethral Catheter Non-latex 16 Fr  (Active)   Number of days: 0       Anesthesia Type:   Spinal w/ Femoral Nerve Block    Operative Indications:  Post-traumatic osteoarthritis of right knee [M17 31]  Chronic pain of right knee [M25 561, G89 29]      Operative Findings:  depuy attune   Femur-5N   Poly-6   Tibia-5   [id number]    Complications:   None    Procedure and Technique: Following induction of adequate level of spinal anesthesia, Tierney catheter sterilely introduced this patient's bladder  The right thigh was fitted with a thigh-high tourniquet  The right lower extremity was then prepped and draped sterilely  Right lower extremity was exsanguinated gravity, the tourniquet inflated to 3 0 mm of mercury  Antibiotics were administered prior to this  A midline knee incision was created the knee in flexion  Full-thickness flaps raised get the extensor mechanism  A medial arthrotomy was created open the joint  Bony and soft tissue releases were performed where necessary  The distal femoral cut was made 6° valgus  The cemented long intramedullary alignment bravo  The proximal tibia cut was made next  As this was a varus knee, 2 mm reference medially    Care was taken in order to protect the integrity medial collateral, lateral collateral, and posterior structures during these maneuvers  The distal femoral sizing guide was used, the appropriate form 1 cutting blocks impacted  The anterior, posterior, chamfer cuts were then made  The box cut was made for the posterior stabilized unit  With the trial components in, the knee was taken through range of motion, found to be capable full extension, good flexion, no mid flexion valgus instability  The patella was then resurfaced will utilize the manufacture's equipment, was found to be a size 35 mm button  The trial components removed, and the knee was prepared for insertion of cemented components  The cemented tibia, cemented femur, trial poly, cemented patella placed  Excess cement was removed, the knee was brought into extension  The cement was allowed to cure  The trial poly was taken out, the knee was packed off  The tourniquet was deflated, hemostasis was secured  The insert polyethylene was then snapped into position  The knee was taken through a final range of motion, and found to be capable full extension, good flexion, no mid flexion valgus instability, and excellent patellar tracking  Satisfied with the extent of surgery, the wounds then flushed with saline and closed  A Betadine soak was initiated  A drain was placed deep brought out via separate lateral stab incision  The arthrotomy was closed number Vicryl suture  The subcu tissue closed 2 Vicryl suture  The skin was closed staples  Sterile dressings applied  The drains constituted    She was then transferred to recovery in stable condition with plans to include physical therapy weight-bearing to tolerance, she will require DVT prophylaxis with Lovenox   I was present for the entire procedure    Patient Disposition:  PACU     SIGNATURE: Ganga Vinson MD  DATE: June 11, 2018  TIME: 12:15 PM
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
## 2018-06-11 NOTE — PLAN OF CARE
Problem: PHYSICAL THERAPY ADULT  Goal: Performs mobility at highest level of function for planned discharge setting  See evaluation for individualized goals  Treatment/Interventions: Functional transfer training, LE strengthening/ROM, Elevations, Therapeutic exercise, Endurance training, Cognitive reorientation, Patient/family training, Equipment eval/education, Bed mobility, Gait training, Spoke to nursing  Equipment Recommended: Other (Comment) (pt owns RW and crutches )       See flowsheet documentation for full assessment, interventions and recommendations  Prognosis: Good  Problem List: Decreased strength, Decreased range of motion, Decreased endurance, Impaired balance, Decreased mobility, Impaired sensation, Impaired tone, Decreased skin integrity, Orthopedic restrictions, Pain  Assessment: Pt is a 61year old female presenting s/p ARTHROPLASTY KNEE TOTAL performed 6/11/18  PT consulted to assess functional mobility and assist with safe d/c planning  PT eval performed POD #0 with WBAT RLE and OOB activity orders  Pt with a problem list which includes post traumatic OA of R knee, peripheral neuropathy, B elbow joint pain, cirrhosis, lumbar radiculopathy and HTN  Please see above for a more comprehensive list  PTA, pt living at home with her 80year old mother in a [address] [facility] home with 2+1 FELISHA and a first floor set up  Pt reports owning RW and crutches  Denies any falls  On eval, pt presenting with the following deficits; impaired balance, decreased strength and ROM, pain, poor tolerance to activity and decreased overall functional mobility  Pt in supine upon arrival ad agreeable to participate in PT session  Pt required Aida for bed mobility, transfers and gait with a RW  PT to continue to follow pt during stay to progress functional mobility (I) and safety  Anticipate safe return home with HHPT vs OPPT pending progress           Recommendation: Home with family support (pending progress; HHPT vs OPPT) See flowsheet documentation for full assessment

## 2018-06-11 NOTE — CASE MANAGEMENT
Initial Clinical Review    Age/Sex: 61 y o  female admitted on 6/11 for elective surgery - OR    Surgery Date: 6/11    Procedure: S/P ARTHROPLASTY KNEE TOTAL (Right Knee)    Anesthesia: Regional, Spinal    Admission Orders: Date/Time/Statement: Inpatient 6/11/18 @ 1226 Med Surg     Orders Placed This Encounter   Procedures    Inpatient Admission     Standing Status:   Standing     Number of Occurrences:   1     Order Specific Question:   Admitting Physician     Answer:   Coretta Cook [[id number]]     Order Specific Question:   Level of Care     Answer:   Med Surg [16]     Order Specific Question:   Estimated length of stay     Answer:   Inpatient Only Surgery       Vital Signs: BP 96/52 (BP Location: Left arm)   Pulse 77   Temp 97 5 °F (36 4 °C) (Oral)   Resp 18   Ht 5' 9" (1 753 m)   Wt 81 6 kg (180 lb)   LMP  (LMP Unknown)   SpO2 98%   BMI 26 58 kg/m²     Diet:        Diet Orders            Start     Ordered    06/11/18 1237  Diet Regular; Regular House  Diet effective now     Question Answer Comment   Diet Type Regular    Regular Regular House    RD to adjust diet per protocol? Yes        06/11/18 1236          Mobility: WBAT RLE   PT/OT eval and treat    DVT Prophylaxis: Foot Pump    Scheduled Meds:  Current Facility-Administered Medications:  ascorbic acid 500 mg Oral BID   cefazolin 1,000 mg Intravenous Q8H   cholecalciferol 2,000 Units Oral Daily   docusate sodium 100 mg Oral BID   [START ON 6/12/2018] enoxaparin 40 mg Subcutaneous Daily   ferrous sulfate 325 mg Oral BID With Meals   folic acid 1 mg Oral Daily   gabapentin 300 mg Oral TID   PARoxetine 50 mg Oral HS   senna 1 tablet Oral Daily   topiramate 50 mg Oral HS   traMADol 50 mg Oral Q6H [facility]   [START ON 6/12/2018] triamterene-hydrochlorothiazide 1 tablet Oral Daily   ursodiol 300 mg Oral TID (diuretic)     Continuous Infusions:  lactated ringers 1 5 mL/kg/hr Last Rate: 1 5 mL/kg/hr (06/11/18 1342)     PRN Meds:  HYDROmorphone    ondansetron   oxyCODONE-acetaminophen po x1

## 2018-06-11 NOTE — ANESTHESIA PREPROCEDURE EVALUATION
Review of Systems/Medical History  Patient summary reviewed  Chart reviewed  History of anesthetic complications     Cardiovascular  Negative cardio ROS Exercise tolerance (METS): >4,  Hyperlipidemia, Hypertension controlled,    Pulmonary  Negative pulmonary ROS        GI/Hepatic  Negative GI/hepatic ROS   Liver disease (primary biliary cirrhosis) ,        Negative  ROS Kidney stones,        Endo/Other  Negative endo/other ROS      GYN  Negative gynecology ROS          Hematology      Comment: IgG kappa smoldering multiple myeloma Musculoskeletal  Negative musculoskeletal ROS   Arthritis     Neurology  Negative neurology ROS   Headaches,    Psychology   Anxiety, Depression ,   Chronic pain,   Comment: OCD         Physical Exam    Airway  Comment: Cervical disease, limited neck extension and flexion  Mallampati score: II  TM Distance: >3 FB  Neck ROM: limited     Dental   Comment: caps, No notable dental hx     Cardiovascular  Comment: Negative ROS, Cardiovascular exam normal    Pulmonary  Pulmonary exam normal     Other Findings        Anesthesia Plan  ASA Score- 2     Anesthesia Type- regional and spinal with ASA Monitors  Additional Monitors:   Airway Plan:     Comment: Patient seen and examined  History reviewed  Adductor canal or femoral nerve block to be done pre-op for post-op pain  Spinal to be done in OR with sedation during the case  Plan discussed with the patient  Risks explained, consent obtained        Plan Factors-    Induction- intravenous  Postoperative Plan-     Informed Consent- Anesthetic plan and risks discussed with patient  I personally reviewed this patient with the CRNA  Discussed and agreed on the Anesthesia Plan with the CRNA  Merrill Krishna

## 2018-06-11 NOTE — CONSULTS
Consultation - Nela Broussard 61 y o  female MRN: [id number]    Unit/Bed#: CW3 343-01 Encounter: [id number]        History of Present Illness     HPI: Nela Broussard is a 61y o  year old female, with PMH of HTN, mood disorder, primary biliary cirrhosis, IgG kappa smoldering multiple myeloma, who presents for an elective Rt TKA by Dr Ngoc Conner  She had failed conservative treatment as an outpt  She tolerated the surgery well with minimal blood loss  Pt did have hypotension post-op and was asymptomatic  ROS:  Constitutional: Negative  HENT: Negative  Respiratory: Negative  Cardiovascular: Negative  Gastrointestinal: Negative  Musculoskeletal: Rt knee pain    Neurological: Negative  Psychiatric/Behavioral: Negative          Historical Information   Past Medical History:   Diagnosis Date    Abnormal breast exam     Anxiety     Asthma     childhood    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[facility] [address] )     primary per allscripts    Cancer ([facility] [address] )     IgG kappa smoldering multiple myeloma    Cardiac murmur     Chronic liver disease     resolved 12/04/2017    Depression     Endometriosis     Fracture of tibia     right tibial plateau fracture per allscripts    Hepatic disease     Hypertension     last assessed 12/13/2017    Neuropathy     R foot     OCD (obsessive compulsive disorder)     Pneumonia     Seasonal allergies     Wears eyeglasses     Whiplash injury to neck      Past Surgical History:   Procedure Laterality Date    BACK SURGERY      hemilaminectomy and discectomy, L5-S1, right (Dr Katelin Paulino, [facility] [place]) onset 02/14/2005 per allscripts    EXPLORATION EXTREMITY Right 8/29/2016    Procedure: KNEE PERONEAL NERVE EXPLORATION AND RELEASE ;  Surgeon: Cole Avalos MD;  [facility];  Service:    Mushtaq Buckley FOOT SURGERY      HAND SURGERY      HERNIA REPAIR      MANDIBLE SURGERY      NEUROPLASTY / TRANSPOSITION MEDIAN NERVE AT CARPAL TUNNEL      ORIF TIBIAL PLATEAU Right     arthroplasty per allscripts    OTHER SURGICAL HISTORY      injection of trigger point(s) per allscripts     Social History   History   Alcohol Use No     History   Drug Use No     History   Smoking Status    Never Smoker   Smokeless Tobacco    Never Used     Family History   Problem Relation Age of Onset    Heart failure Mother     Anxiety disorder Mother      symptom per allscripts    Anxiety disorder Father      symptom per allscripts    Cirrhosis Father      hepatic per allscripts    Anxiety disorder Other      symptom per allscripts    Coronary artery disease Other     Depression Other     Hyperlipidemia Other     Osteoarthritis Other     Other Other      back disorder per allscripts    Neuropathy Other        Meds/Allergies   current meds:  Current Facility-Administered Medications   Medication Dose Route Frequency    ascorbic acid (VITAMIN C) tablet 500 mg  500 mg Oral BID    ceFAZolin (ANCEF) IVPB (premix) 1,000 mg  1,000 mg Intravenous Q8H    cholecalciferol (VITAMIN D3) tablet 2,000 Units  2,000 Units Oral Daily    docusate sodium (COLACE) capsule 100 mg  100 mg Oral BID    [START ON 6/12/2018] enoxaparin (LOVENOX) subcutaneous injection 40 mg  40 mg Subcutaneous Daily    ferrous sulfate tablet 325 mg  325 mg Oral BID With Meals    folic acid (FOLVITE) tablet 1 mg  1 mg Oral Daily    gabapentin (NEURONTIN) capsule 300 mg  300 mg Oral TID    HYDROmorphone (DILAUDID) injection 1 mg  1 mg Intravenous Q4H PRN    lactated ringers infusion  1 5 mL/kg/hr Intravenous Continuous    ondansetron (ZOFRAN) injection 4 mg  4 mg Intravenous Q4H PRN    oxyCODONE-acetaminophen (PERCOCET) 5-325 mg per tablet 1 tablet  1 tablet Oral Q4H PRN    oxyCODONE-acetaminophen (PERCOCET) 5-325 mg per tablet 2 tablet  2 tablet Oral Q6H PRN    PARoxetine (PAXIL-CR) 24 hr tablet 50 mg  50 mg Oral HS    senna (SENOKOT) tablet 8 6 mg  1 tablet Oral Daily    topiramate (TOPAMAX) tablet 50 mg  50 mg Oral HS    traMADol (ULTRAM) tablet 50 mg 50 mg Oral Q6H [facility]    [START ON 6/12/2018] triamterene-hydrochlorothiazide (MAXZIDE-25) 37 5-25 mg per tablet 1 tablet  1 tablet Oral Daily    ursodiol (ACTIGALL) capsule 300 mg  300 mg Oral TID (diuretic)       PTA meds:   Prescriptions Prior to Admission   Medication    ascorbic acid (VITAMIN C) 500 mg tablet    Cholecalciferol (VITAMIN D3) 2000 UNITS TABS    ergocalciferol (VITAMIN D2) 50,000 units    ferrous sulfate 325 (65 Fe) mg tablet    folic acid (FOLVITE) 1 mg tablet    Gabapentin, Once-Daily, (GRALISE) 600 MG TABS    PARoxetine (PAXIL-CR) 25 mg 24 hr tablet    topiramate (TOPAMAX) 50 MG tablet    triamterene-hydrochlorothiazide (DYAZIDE) 37 5-25 mg per capsule    ursodiol (ACTIGALL) 300 mg capsule    cholestyramine (QUESTRAN) 4 g packet     Allergies   Allergen Reactions    Codeine GI Intolerance     Other reaction(s): Other (See Comments)  Violently ill    Doxycycline GI Intolerance    Milk-Related Compounds GI Intolerance    Morphine And Related     Orange Fruit [Citrus] Other (See Comments)     Tested  positive    Sulfa Antibiotics GI Intolerance    Tomato Other (See Comments)     Tested positive;  Eats in sauces, not pure    Latex Rash     itching    Penicillins Other (See Comments) and Rash     Childhood reaction       Objective   Vitals: Blood pressure 98/54, pulse 67, temperature (!) 97 3 °F (36 3 °C), temperature source Oral, resp  rate 18, height 5' 9" (1 753 m), weight 81 6 kg (180 lb), SpO2 98 %  Physical Exam   Constitutional: Pt is oriented to person, place, and time  HENT:  Normocephalic  EOM are normal  PERRLA  Neck supple  Cardiovascular: Normal rate and regular rhythm  Pulmonary: Breath sounds normal  No respiratory distress  Pt has no wheezes nor rales  Abdominal: Soft  Bowel sounds are normal  Pt exhibits no distension  There is no tenderness  There is no rebound and no guarding  Neurological: Pt is alert and oriented to person, place, and time  Psychiatric: Pt has a normal mood and affect  Lab Results:                   Glucose (mg/dL)   Date Value   05/22/2018 112   12/19/2016 144 (H)   10/19/2016 118   07/15/2016 139   12/29/2015 134   08/31/2015 99   03/28/2015 89   12/06/2014 99       Labs reviewed    Imaging: reviewed  EKG, Pathology, and Other Studies: I have personally reviewed pertinent reports  VTE Prophylaxis: Enoxaparin (Lovenox)    Code Status: Level 1 - Full Code   Advance Directive and Living Will:      Power of :    POLST:      Assessment/Plan     # Rt knee OA s/p Rt TKA: Continue post op pain control measures as prescribed  Follow bowel regimen to help decrease narcotic induced constipation  Follow post operative hemoglobin with serial CBC and treat accordingly  Monitor WBC and fever curve post op while encouraging use of incentive spirometer  DVT prophylaxis in place and reviewed  # HTN: Monitor BP every shift  Will hold Maxide in the post-op period to avoid electrolyte abnormalities and renal injury  Add Hydralazine 25mg every 8 hours as needed for SBP > 160  # Mood disorder: Continue Psychiatric medications  Counseling / Coordination of Care  Total floor / unit time spent today 30 minutes  Greater than 50% of total time was spent with the patient and / or family counseling and / or coordination of care        Heriberto Simon PA-C

## 2018-06-12 LAB
ANION GAP SERPL CALCULATED.3IONS-SCNC: 7 MMOL/L (ref 4–13)
BASOPHILS # BLD AUTO: 0 THOUSANDS/ΜL (ref 0–0.1)
BASOPHILS NFR BLD AUTO: 0 % (ref 0–1)
BUN SERPL-MCNC: 17 MG/DL (ref 5–25)
CALCIUM SERPL-MCNC: 7.8 MG/DL (ref 8.3–10.1)
CHLORIDE SERPL-SCNC: 102 MMOL/L (ref 100–108)
CO2 SERPL-SCNC: 29 MMOL/L (ref 21–32)
CREAT SERPL-MCNC: 0.92 MG/DL (ref 0.6–1.3)
EOSINOPHIL # BLD AUTO: 0 THOUSAND/ΜL (ref 0–0.61)
EOSINOPHIL NFR BLD AUTO: 0 % (ref 0–6)
ERYTHROCYTE [DISTWIDTH] IN BLOOD BY AUTOMATED COUNT: 13.1 % (ref 11.6–15.1)
GFR SERPL CREATININE-BSD FRML MDRD: 69 ML/MIN/1.73SQ M
GLUCOSE SERPL-MCNC: 184 MG/DL (ref 65–140)
HCT VFR BLD AUTO: 27.3 % (ref 34.8–46.1)
HCT VFR BLD AUTO: 29.6 % (ref 34.8–46.1)
HGB BLD-MCNC: 8.3 G/DL (ref 11.5–15.4)
HGB BLD-MCNC: 9.2 G/DL (ref 11.5–15.4)
IMM GRANULOCYTES # BLD AUTO: 0.03 THOUSAND/UL (ref 0–0.2)
IMM GRANULOCYTES NFR BLD AUTO: 0 % (ref 0–2)
LYMPHOCYTES # BLD AUTO: 0.88 THOUSANDS/ΜL (ref 0.6–4.47)
LYMPHOCYTES NFR BLD AUTO: 9 % (ref 14–44)
MCH RBC QN AUTO: 29.3 PG (ref 26.8–34.3)
MCHC RBC AUTO-ENTMCNC: 31.1 G/DL (ref 31.4–37.4)
MCV RBC AUTO: 94 FL (ref 82–98)
MONOCYTES # BLD AUTO: 0.76 THOUSAND/ΜL (ref 0.17–1.22)
MONOCYTES NFR BLD AUTO: 8 % (ref 4–12)
NEUTROPHILS # BLD AUTO: 7.88 THOUSANDS/ΜL (ref 1.85–7.62)
NEUTS SEG NFR BLD AUTO: 83 % (ref 43–75)
NRBC BLD AUTO-RTO: 0 /100 WBCS
PLATELET # BLD AUTO: 151 THOUSANDS/UL (ref 149–390)
PMV BLD AUTO: 10.5 FL (ref 8.9–12.7)
POTASSIUM SERPL-SCNC: 3.5 MMOL/L (ref 3.5–5.3)
RBC # BLD AUTO: 3.14 MILLION/UL (ref 3.81–5.12)
SODIUM SERPL-SCNC: 138 MMOL/L (ref 136–145)
WBC # BLD AUTO: 9.55 THOUSAND/UL (ref 4.31–10.16)

## 2018-06-12 PROCEDURE — G8988 SELF CARE GOAL STATUS: HCPCS

## 2018-06-12 PROCEDURE — 85018 HEMOGLOBIN: CPT | Performed by: PHYSICIAN ASSISTANT

## 2018-06-12 PROCEDURE — G8987 SELF CARE CURRENT STATUS: HCPCS

## 2018-06-12 PROCEDURE — 97530 THERAPEUTIC ACTIVITIES: CPT

## 2018-06-12 PROCEDURE — 85025 COMPLETE CBC W/AUTO DIFF WBC: CPT | Performed by: ORTHOPAEDIC SURGERY

## 2018-06-12 PROCEDURE — 97166 OT EVAL MOD COMPLEX 45 MIN: CPT

## 2018-06-12 PROCEDURE — 85014 HEMATOCRIT: CPT | Performed by: PHYSICIAN ASSISTANT

## 2018-06-12 PROCEDURE — 99024 POSTOP FOLLOW-UP VISIT: CPT | Performed by: ORTHOPAEDIC SURGERY

## 2018-06-12 PROCEDURE — 80048 BASIC METABOLIC PNL TOTAL CA: CPT | Performed by: ORTHOPAEDIC SURGERY

## 2018-06-12 PROCEDURE — 97110 THERAPEUTIC EXERCISES: CPT

## 2018-06-12 PROCEDURE — 97116 GAIT TRAINING THERAPY: CPT

## 2018-06-12 RX ADMIN — URSODIOL 300 MG: 300 CAPSULE ORAL at 17:26

## 2018-06-12 RX ADMIN — TRAMADOL HYDROCHLORIDE 50 MG: 50 TABLET, FILM COATED ORAL at 11:07

## 2018-06-12 RX ADMIN — PAROXETINE HYDROCHLORIDE 50 MG: 25 TABLET, FILM COATED, EXTENDED RELEASE ORAL at 22:24

## 2018-06-12 RX ADMIN — TRAMADOL HYDROCHLORIDE 50 MG: 50 TABLET, FILM COATED ORAL at 23:05

## 2018-06-12 RX ADMIN — TRAMADOL HYDROCHLORIDE 50 MG: 50 TABLET, FILM COATED ORAL at 00:30

## 2018-06-12 RX ADMIN — DOCUSATE SODIUM 100 MG: 100 CAPSULE, LIQUID FILLED ORAL at 08:01

## 2018-06-12 RX ADMIN — FOLIC ACID 1 MG: 1 TABLET ORAL at 08:01

## 2018-06-12 RX ADMIN — OXYCODONE HYDROCHLORIDE AND ACETAMINOPHEN 2 TABLET: 5; 325 TABLET ORAL at 07:57

## 2018-06-12 RX ADMIN — OXYCODONE HYDROCHLORIDE AND ACETAMINOPHEN 2 TABLET: 5; 325 TABLET ORAL at 01:50

## 2018-06-12 RX ADMIN — HYDROMORPHONE HYDROCHLORIDE 1 MG: 1 INJECTION, SOLUTION INTRAMUSCULAR; INTRAVENOUS; SUBCUTANEOUS at 22:25

## 2018-06-12 RX ADMIN — URSODIOL 300 MG: 300 CAPSULE ORAL at 07:33

## 2018-06-12 RX ADMIN — GABAPENTIN 300 MG: 300 CAPSULE ORAL at 20:19

## 2018-06-12 RX ADMIN — CHOLECALCIFEROL TAB 25 MCG (1000 UNIT) 2000 UNITS: 25 TAB at 08:00

## 2018-06-12 RX ADMIN — SODIUM CHLORIDE, SODIUM LACTATE, POTASSIUM CHLORIDE, AND CALCIUM CHLORIDE 1.5 ML/KG/HR: .6; .31; .03; .02 INJECTION, SOLUTION INTRAVENOUS at 04:06

## 2018-06-12 RX ADMIN — SODIUM CHLORIDE, SODIUM LACTATE, POTASSIUM CHLORIDE, AND CALCIUM CHLORIDE 1000 ML: .6; .31; .03; .02 INJECTION, SOLUTION INTRAVENOUS at 20:21

## 2018-06-12 RX ADMIN — GABAPENTIN 300 MG: 300 CAPSULE ORAL at 15:42

## 2018-06-12 RX ADMIN — URSODIOL 300 MG: 300 CAPSULE ORAL at 11:08

## 2018-06-12 RX ADMIN — DOCUSATE SODIUM 100 MG: 100 CAPSULE, LIQUID FILLED ORAL at 17:26

## 2018-06-12 RX ADMIN — OXYCODONE HYDROCHLORIDE AND ACETAMINOPHEN 500 MG: 500 TABLET ORAL at 17:26

## 2018-06-12 RX ADMIN — OXYCODONE HYDROCHLORIDE AND ACETAMINOPHEN 500 MG: 500 TABLET ORAL at 08:01

## 2018-06-12 RX ADMIN — SODIUM CHLORIDE, SODIUM LACTATE, POTASSIUM CHLORIDE, AND CALCIUM CHLORIDE 1000 ML: .6; .31; .03; .02 INJECTION, SOLUTION INTRAVENOUS at 12:22

## 2018-06-12 RX ADMIN — ENOXAPARIN SODIUM 40 MG: 100 INJECTION SUBCUTANEOUS at 08:00

## 2018-06-12 RX ADMIN — CEFAZOLIN SODIUM 1000 MG: 1 SOLUTION INTRAVENOUS at 04:05

## 2018-06-12 RX ADMIN — HYDROMORPHONE HYDROCHLORIDE 1 MG: 1 INJECTION, SOLUTION INTRAMUSCULAR; INTRAVENOUS; SUBCUTANEOUS at 15:42

## 2018-06-12 RX ADMIN — TRAMADOL HYDROCHLORIDE 50 MG: 50 TABLET, FILM COATED ORAL at 05:55

## 2018-06-12 RX ADMIN — OXYCODONE HYDROCHLORIDE AND ACETAMINOPHEN 2 TABLET: 5; 325 TABLET ORAL at 20:19

## 2018-06-12 RX ADMIN — TRAMADOL HYDROCHLORIDE 50 MG: 50 TABLET, FILM COATED ORAL at 17:26

## 2018-06-12 RX ADMIN — Medication 325 MG: at 15:42

## 2018-06-12 RX ADMIN — Medication 325 MG: at 08:01

## 2018-06-12 RX ADMIN — OXYCODONE HYDROCHLORIDE AND ACETAMINOPHEN 2 TABLET: 5; 325 TABLET ORAL at 14:11

## 2018-06-12 RX ADMIN — SENNOSIDES 8.6 MG: 8.6 TABLET, FILM COATED ORAL at 08:00

## 2018-06-12 RX ADMIN — TOPIRAMATE 50 MG: 25 TABLET, FILM COATED ORAL at 22:24

## 2018-06-12 RX ADMIN — GABAPENTIN 300 MG: 300 CAPSULE ORAL at 08:00

## 2018-06-12 NOTE — PROGRESS NOTES
Pt care rounds completed with Dr Milo Jara anticipate discharge home tomorrow if medically stable  Aware of current bp and currently receiving bolus as per protocol, will d/c hemovac this afternoon

## 2018-06-12 NOTE — PLAN OF CARE
Problem: PHYSICAL THERAPY ADULT  Goal: Performs mobility at highest level of function for planned discharge setting  See evaluation for individualized goals  Treatment/Interventions: Functional transfer training, LE strengthening/ROM, Elevations, Therapeutic exercise, Endurance training, Cognitive reorientation, Patient/family training, Equipment eval/education, Bed mobility, Gait training, Spoke to nursing  Equipment Recommended: Other (Comment) (pt owns RW and crutches )       See flowsheet documentation for full assessment, interventions and recommendations  Outcome: Progressing  Prognosis: Good  Problem List: Decreased strength, Decreased range of motion, Decreased endurance, Impaired balance, Decreased mobility, Decreased skin integrity, Orthopedic restrictions, Pain  Assessment: Pt engaged in PT treatment session focussing on transfers, gait and LE therex  LE therex performed with AAROM 2* pain and stiffness to increase strength and ROM  Pt able to progress to S with all activites  Pt expressing goal again of using crutches at home as it is more practical will conitnue to utilize with PT  Pt notified to utilize RW with staff during stay  PT to continue to follow pt during stay to progress functional mobility (I) and safety  Anticipate safe return home with HHPT vs OPPT pending progress  Recommendation: Home with family support (velasquez ROMEROPT 2* limited support )          See flowsheet documentation for full assessment

## 2018-06-12 NOTE — PLAN OF CARE
Problem: OCCUPATIONAL THERAPY ADULT  Goal: Performs self-care activities at highest level of function for planned discharge setting  See evaluation for individualized goals  Treatment Interventions: ADL retraining, Functional transfer training, Endurance training, Patient/family training, Equipment evaluation/education, Compensatory technique education, Continued evaluation, Energy conservation          See flowsheet documentation for full assessment, interventions and recommendations  Limitation: Decreased ADL status, Decreased Safe judgement during ADL, Decreased endurance, Decreased self-care trans, Decreased high-level ADLs  Prognosis: Good  Assessment: Pt  is a 61 y o  female who was admitted to \[facility]\"" on 6/11/2018  Pt  Now s/p R ARTHROPLASTY KNEE TOTAL, WBAT  Pt  w/a significant PMH/co-morbidities of post traumatic OA of R knee, peripheral neuropathy, B/L elbow joint pain, cirrhosis, lumbar radiculopathy and HTN  Please see above for a more comprehensive list  Pt  currently lives in a [address] [place] [address] with FF s/u with 80 y o  mother  PTA, pt  (I) in all ADLs/IADLs + driving and +work  Pt did not use AD for functional mobility  Currently, pt  requires close (S) for functional mobility and transfers, min A-s/u for UB ADLs and min A-(S) for LB ADLs  A is needed d/t the following impairments: decreased functional activity tolerance, decreased balance, increased pain, decreased LE flexibility/ROM 2/2 pain, slight impulsivity,decreased safety/insight into deficits/judgment  Additionally, pt  requires min v c  during functional activity  Therefore, pt  will benefit from skilled OT services to maximize functional gains, monitor status and prevent decline  Will continue to follow pt  3-5x/week to meet the goals described below in 7-10 days  Recommend home with family support once medically stable  OT Discharge Recommendation: Home with family support  OT - OK to Discharge:  Yes

## 2018-06-12 NOTE — PLAN OF CARE
Maintains or returns to baseline urinary function Progressing      Absence of urinary retention Progressing      Maintains hematologic stability Progressing      Maintain or return mobility to safest level of function Progressing      Maintain proper alignment of affected body part Progressing      Patient will remain free of falls Progressing      Skin integrity is maintained or improved Progressing      Skin integrity remains intact Progressing      Incision(s), wounds(s) or drain site(s) healing without S/S of infection Progressing      Oral mucous membranes remain intact Progressing

## 2018-06-12 NOTE — PROGRESS NOTES
Internal Medicine Progress Note  Patient: Rocky Davis  Age/sex: 61 y o  female  Medical Record #: [id number]      ASSESSMENT/PLAN:  Rocky aDvis is seen and examined and mangement for following issues:    # Rt knee OA s/p Rt TKA: Pain is adequately controlled per patient will continue same therapy without change  Remains afebrile; no nausea     # HTN:Blood pressure adequately controlled on current treatment per review of BP logs  Continue same treatment plan  Maintain current outpatient medical therapy which we reviewed with Rocky Davis   Respect holding parameters in post op period  Adjust accordingly based on blood pressure trends    # Mood disorder: Continue Psychiatric medications  # Acute blood loss anemia: Mild at 9 2  No need for transfusion  Follow post operative hemoglobin with serial CBC and treat accordingly  Subjective: Patient seen and examined    Pt without any overnight events or reported nursing issues      ROS:   GI: denies abdominal pain, change bowel habits or reflux symptoms  Neuro: No new neurologic changes  Respiratory: No Cough, SOB  Cardiovascular: No CP, palpitations     Scheduled Meds:    Current Facility-Administered Medications:  ascorbic acid 500 mg Oral BID Dacia Massing    cholecalciferol 2,000 Units Oral Daily Jim Camara    diphenhydrAMINE 25 mg Oral Q6H PRN Brina Kelley PA-C    docusate sodium 100 mg Oral BID Jim Camara    enoxaparin 40 mg Subcutaneous Daily Dacia Massing    ferrous sulfate 325 mg Oral BID With Meals Dacia Massing    folic acid 1 mg Oral Daily Jim Camara    gabapentin 300 mg Oral TID Dacia Massing    HYDROmorphone 1 mg Intravenous Q4H PRN Jim Camara    lactated ringers 1 5 mL/kg/hr Intravenous Continuous Dacia Massing Last Rate: 1 5 mL/kg/hr (06/12/18 0406)   ondansetron 4 mg Intravenous Q4H PRN Dacia Massing    oxyCODONE-acetaminophen 1 tablet Oral Q4H PRN Dacia Massing    oxyCODONE-acetaminophen 2 tablet Oral Q6H PRN Dacia Massing PARoxetine 50 mg Oral HS Jim Camara    senna 1 tablet Oral Daily Shanta Yadav    topiramate 50 mg Oral HS Shanta Yadav    traMADol 50 mg Oral Q6H [address] Jim Camara    triamterene-hydrochlorothiazide 1 tablet Oral Daily Shanta Yadav    ursodiol 300 mg Oral TID (diuretic) Shanta Yadav        Labs:       Results from last 7 days  Lab Units 06/12/18  0440   WBC Thousand/uL 9 55   HEMOGLOBIN g/dL 9 2*   HEMATOCRIT % 29 6*   PLATELETS Thousands/uL 151       Results from last 7 days  Lab Units 06/12/18  0440   SODIUM mmol/L 138   POTASSIUM mmol/L 3 5   CHLORIDE mmol/L 102   CO2 mmol/L 29   BUN mg/dL 17   CREATININE mg/dL 0 92   GLUCOSE RANDOM mg/dL 184*   CALCIUM mg/dL 7 8*                Glucose (mg/dL)   Date Value   06/12/2018 184 (H)   05/22/2018 112   12/19/2016 144 (H)   10/19/2016 118   12/29/2015 134   08/31/2015 99   03/28/2015 89   12/06/2014 99       Labs reviewed    Physical Examination:  Vitals:   Vitals:    06/11/18 1620 06/11/18 1900 06/11/18 2300 06/12/18 0300   BP: 94/51 106/59 117/62 96/50   BP Location: Right arm Right arm Left arm Right arm   Pulse: 74 76 73 68   Resp: 18 18 18 18   Temp: 97 6 °F (36 4 °C) 97 5 °F (36 4 °C) 97 6 °F (36 4 °C) 97 5 °F (36 4 °C)   TempSrc: Oral Oral Oral Oral   SpO2: 95% 95% 95% 95%   Weight:       Height:           GEN: NAD  HEENT: NC/AT, EOMI  RESP: CTAB, no R/R/W  CV: +S1 S2, regular rate, no rubs  ABD: soft, NT, ND, normal BS   : catheter removed  EXT: no edema; DP intact  Skin:no rash  Neuro: AAOx3      [ X ] Total time spent: 30 Mins and greater than 50% of this time was spent counseling/coordinating care        Collette Littles, PA-C  Internal Medicine

## 2018-06-12 NOTE — SOCIAL WORK
CM met with patient and explained cm role  Pt alert and oriented  Pt reports she lives in a 2 story home with her 80year old mother/caregiver Naresh  [phone]  Pt reports DME: cane , walker, commode, denies VNA, and inpt rehab  Pt reports being independent PTA, reports minimal support at home, she drives and has transport home with workers comp company who pick her up on Wednesday when pt is ready for d/c home, pt did not have telephone number  Pts pharmacy is [facility] in [place]  No POA  Pt denies hx/admission for drug/etoh and psych/mental health  Pt reports she filled Lovenox  Pt reports she may need rehab due to poor caregiver support, gave choices: [address], [address], and [facility]  PT recommending: Home PT  CM will continue to follow for d/c needs  CM reviewed d/c planning process including the following: identifying help at home, patient preference for d/c planning needs, [facility] ung[facility], availability of treatment team to discuss questions or concerns patient and/or family may have regarding understanding medications and recognizing signs and symptoms once discharged  CM also encouraged patient to follow up with all recommended appointments after discharge  Patient advised of importance for patient and family to participate in managing patients medical well being

## 2018-06-12 NOTE — PHYSICAL THERAPY NOTE
PT TREATMENT      06/12/18 0900   Pain Assessment   Pain Assessment 0-10   Pain Score Worst Possible Pain   Pain Type Acute pain;Surgical pain   Pain Location Knee   Pain Orientation Right   Effect of Pain on Daily Activities impaired quality of mobility    Patient's Stated Pain Goal No pain   Hospital Pain Intervention(s) Cold applied; Ambulation/increased activity; Elevated   Response to Interventions tolerated well    Restrictions/Precautions   Weight Bearing Precautions Per Order Yes   RLE Weight Bearing Per Order WBAT   Other Precautions WBS; Fall Risk;Pain   General   Chart Reviewed Yes   Additional Pertinent History POD #1 TKA    Response to Previous Treatment Patient with no complaints from previous session  Family/Caregiver Present No   Cognition   Overall Cognitive Status WFL   Arousal/Participation Cooperative   Attention Within functional limits   Orientation Level Oriented X4   Memory Within functional limits   Following Commands Follows all commands and directions without difficulty   Comments Pt is pleasant and cooperative  Able to converse beyond basic needs    Subjective   Subjective Pt eager and agreeable to particiapte in PT session    Bed Mobility   Supine to Sit 5  Supervision   Additional items HOB elevated; Increased time required;Verbal cues   Additional Comments Reproted low BP this AM- asymptomatic    Transfers   Sit to Stand 5  Supervision   Additional items Increased time required;Verbal cues   Stand to Sit 5  Supervision   Additional items Increased time required;Verbal cues   Toilet transfer 5  Supervision   Additional items Increased time required;Verbal cues;Standard toilet  (with use of grab bars )   Ambulation/Elevation   Gait pattern Improper Weight shift; Antalgic;Decreased foot clearance;Decreased R stance; Short stride; Step to   Gait Assistance 4  Minimal assist   Additional items Assist x 1   Assistive Device Rolling walker;Crutches   Distance 20' with RW + 20' with crutches Balance   Static Sitting Fair +   Dynamic Sitting Fair   Static Standing Fair   Dynamic Standing Fair -   Ambulatory Fair -   Endurance Deficit   Endurance Deficit No   Activity Tolerance   Activity Tolerance Patient tolerated treatment well   Medical Staff [facility], [facility] Silvio Hanley RN    Exercises   Quad Sets Sitting;20 reps;AROM  (longsitting )   Glute Sets Sitting;20 reps;AROM; Bilateral  (longsitting )   Hip Flexion Sitting;20 reps;AAROM; Right   Hip Abduction Sitting;20 reps;AROM; Bilateral   Hip Adduction Sitting;20 reps;AROM; Bilateral   Knee AROM Long Arc Quad Sitting;20 reps;AAROM; Right   Ankle Pumps Sitting;20 reps;AROM; Bilateral;Supine  (longsitting )   Balance training  Education provided on positioning of LE for optimal ROM and comfort    Assessment   Prognosis Good   Problem List Decreased strength;Decreased range of motion;Decreased endurance; Impaired balance;Decreased mobility; Decreased skin integrity;Orthopedic restrictions;Pain   Assessment Pt engaged in PT treatment session focussing on transfers, gait and LE therex  LE therex performed with AAROM 2* pain and stiffness to increase strength and ROM  Pt able to progress to S with all activites  Pt expressing goal again of using crutches at home as it is more practical will conitnue to utilize with PT  Pt notified to utilize RW with staff during stay  PT to continue to follow pt during stay to progress functional mobility (I) and safety  Anticipate safe return home with HHPT vs OPPT pending progress  Goals   Patient Goals patient would like to regain her QOL   STG Expiration Date 06/16/18   Treatment Day 1   Plan   Treatment/Interventions Functional transfer training;LE strengthening/ROM; Elevations; Therapeutic exercise; Endurance training;Patient/family training;Equipment eval/education; Bed mobility;Gait training;Spoke to nursing;Spoke to case management;Spoke to advanced practitioner;OT   Progress Progressing toward goals PT Frequency Twice a day   Recommendation   Recommendation Home with family support  (velasquez ROMEROPT 2* limited support )   Equipment Recommended Other (Comment)  (pt owns RW and crutches )   Additional Comments OOB in chair with all needs within reach    Orlena Comfort, PT

## 2018-06-12 NOTE — PROGRESS NOTES
Orthopedics   Di  61 y o  female MRN: [id number]  Unit/Bed#: CW3 343-01      Subjective:  61 y  o female post operative day 1 right total knee arthroplasty  Pt doing well  Pain controlled      Labs:    0  Lab Value Date/Time   HCT 29 6 (L) 06/12/2018 0440   HCT 39 4 05/22/2018 1547   HCT 40 2 03/09/2018 0929   HCT 38 2 12/29/2015 0659   HCT 40 1 03/28/2015 1012   HCT 39 3 12/06/2014 0840   HGB 9 2 (L) 06/12/2018 0440   HGB 12 5 05/22/2018 1547   HGB 13 1 03/09/2018 0929   HGB 12 4 12/29/2015 0659   HGB 13 0 03/28/2015 1012   HGB 12 9 12/06/2014 0840   INR 0 95 05/22/2018 1439   WBC 9 55 06/12/2018 0440   WBC 5 65 05/22/2018 1547   WBC 6 34 03/09/2018 0929   WBC 6 60 12/29/2015 0659   WBC 5 12 03/28/2015 1012   WBC 5 23 12/06/2014 0840   ESR 42 (H) 12/06/2017 0739   CRP <3 0 12/06/2017 0739       Meds:    Current Facility-Administered Medications:     ascorbic acid (VITAMIN C) tablet 500 mg, 500 mg, Oral, BID, Zaria Bingham, 500 mg at 06/11/18 1828    cholecalciferol (VITAMIN D3) tablet 2,000 Units, 2,000 Units, Oral, Daily, Zaria Bingham    diphenhydrAMINE (BENADRYL) tablet 25 mg, 25 mg, Oral, Q6H PRN, Brina Kelley PA-C, 25 mg at 06/11/18 1635    docusate sodium (COLACE) capsule 100 mg, 100 mg, Oral, BID, Zaria Bingham, 100 mg at 06/11/18 1828    enoxaparin (LOVENOX) subcutaneous injection 40 mg, 40 mg, Subcutaneous, Daily, Zaria Bingham    ferrous sulfate tablet 325 mg, 325 mg, Oral, BID With Meals, Zaria Bingham, 325 mg at 44/57/37 8716    folic acid (FOLVITE) tablet 1 mg, 1 mg, Oral, Daily, Zaria Bingham    gabapentin (NEURONTIN) capsule 300 mg, 300 mg, Oral, TID, Zaria Bingham, 300 mg at 06/11/18 2233    HYDROmorphone (DILAUDID) injection 1 mg, 1 mg, Intravenous, Q4H PRN, Zaria Bingham, 1 mg at 06/11/18 2235    lactated ringers infusion, 1 5 mL/kg/hr, Intravenous, Continuous, Zaria Bingham, Last Rate: 122 4 mL/hr at 06/12/18 0406, 1 5 mL/kg/hr at 06/12/18 0406    ondansetron (ZOFRAN) injection 4 mg, 4 mg, Intravenous, Q4H PRN, Zaria Bingham    oxyCODONE-acetaminophen (PERCOCET) 5-325 mg per tablet 1 tablet, 1 tablet, Oral, Q4H PRN, Zaria Bingham    oxyCODONE-acetaminophen (PERCOCET) 5-325 mg per tablet 2 tablet, 2 tablet, Oral, Q6H PRN, Zaria Bingham, 2 tablet at 06/12/18 0150    PARoxetine (PAXIL-CR) 24 hr tablet 50 mg, 50 mg, Oral, HS, Zaria Bingham, 50 mg at 06/11/18 2233    senna (SENOKOT) tablet 8 6 mg, 1 tablet, Oral, Daily, Zaria Bingham    topiramate (TOPAMAX) tablet 50 mg, 50 mg, Oral, HS, Zaria Bingham, 50 mg at 06/11/18 2233    traMADol (ULTRAM) tablet 50 mg, 50 mg, Oral, Q6H [address], Zaria Bingham, 50 mg at 06/12/18 0555    triamterene-hydrochlorothiazide (MAXZIDE-25) 37 5-25 mg per tablet 1 tablet, 1 tablet, Oral, Daily, Zaria Bingham    ursodiol (ACTIGALL) capsule 300 mg, 300 mg, Oral, TID (diuretic), Zaria Bingham, 300 mg at 06/11/18 1829    Blood Culture:   No results found for: BLOODCX    Wound Culture:   No results found for: WOUNDCULT    Ins and Outs:  I/O last 24 hours: In: 2048 1 [P O :180; I V :1868 1]  Out: 2025 [Urine:1300; Drains:725]          Physical:  Vitals:    06/12/18 0300   BP: 96/50   Pulse: 68   Resp: 18   Temp: 97 5 °F (36 4 °C)   SpO2: 95%     right lower extremity:  · Dressings C/D/I  · Toes warm and well perfused  · HVx1    _*_*_*_*_*_*_*_*_*_*_*_*_*_*_*_*_*_*_*_*_*_*_*_*_*_*_*_*_*_*_*_*_*_*_*_*_*_*_*_*_*    Assessment: 61 y  o female post operative day 1 right total knee arthroplasty   Doing well    Plan:  · Weight Bearing as tolerated  · Up and out of bed  · DVT prophylaxis  · Analgesics  · PT/OT  · Patient noted to have acute blood loss anemia due to a drop in Hbg of > 2 0g from preop levels, will monitor vital signs and resuscitate with IV fluids as needed    Carlyn Rom

## 2018-06-12 NOTE — PLAN OF CARE
Problem: PHYSICAL THERAPY ADULT  Goal: Performs mobility at highest level of function for planned discharge setting  See evaluation for individualized goals  Treatment/Interventions: Functional transfer training, LE strengthening/ROM, Elevations, Therapeutic exercise, Endurance training, Cognitive reorientation, Patient/family training, Equipment eval/education, Bed mobility, Gait training, Spoke to nursing  Equipment Recommended: Other (Comment) (pt owns RW and crutches )       See flowsheet documentation for full assessment, interventions and recommendations  Outcome: Progressing  Prognosis: Good  Problem List: Decreased strength, Decreased range of motion, Decreased endurance, Impaired balance, Decreased mobility, Decreased skin integrity, Orthopedic restrictions, Pain  Assessment: The pt  is significantly limited due to severe pain, but she was agreeable to therapeutic exercise  She was instructed in her home exercise program which she was able to complete actively except for heel slides  Encouraged the pt  to continue to mobilize with nursing as well as with therapy tomorrow morning  Barriers to Discharge: Inaccessible home environment     Recommendation: Home with family support, Home PT     PT - OK to Discharge: No    See flowsheet documentation for full assessment

## 2018-06-12 NOTE — PHYSICAL THERAPY NOTE
Physical Therapy Progress Note     06/12/18 1421   Pain Assessment   Pain Assessment 0-10   Pain Score Worst Possible Pain   Pain Type Acute pain;Surgical pain   Pain Location Knee   Pain Orientation Right   Pain Descriptors [facility] Pain Intervention(s) Cold applied;Repositioned; Emotional support   Response to Interventions Tolerated  Restrictions/Precautions   RLE Weight Bearing Per Order WBAT   Other Precautions WBS; Fall Risk;Pain   Subjective   Subjective The pt  reports that she has severe pain, and that she has been walking to the bathroom  The pt's nurse noted that she has been walking to the bathroom with assistance  The pt  deferred ambulation at this time due to pain, but she was agreeable to therapeutic exercise  Activity Tolerance   Activity Tolerance Patient limited by pain   Nurse Made [address], RN  Exercises   TKR Supine;10 reps;AAROM;AROM; Bilateral  (RLE for heelslides only )   Assessment   Prognosis Good   Problem List Decreased strength;Decreased range of motion;Decreased endurance; Impaired balance;Decreased mobility; Decreased skin integrity;Orthopedic restrictions;Pain   Assessment The pt  is significantly limited due to severe pain, but she was agreeable to therapeutic exercise  She was instructed in her home exercise program which she was able to complete actively except for heel slides  Encouraged the pt  to continue to mobilize with nursing as well as with therapy tomorrow morning  Barriers to Discharge Inaccessible home environment   Goals   Patient Goals To have less pain  STG Expiration Date 06/16/18   Treatment Day 2   Plan   Treatment/Interventions Functional transfer training;LE strengthening/ROM; Elevations; Therapeutic exercise; Endurance training;Patient/family training;Bed mobility;Gait training   Progress Progressing toward goals   PT Frequency Twice a day   Recommendation   Recommendation Home with family support;Home PT   PT - OK to Discharge No     Bhakti VALERIO Nevils, PTA

## 2018-06-12 NOTE — OCCUPATIONAL THERAPY NOTE
[address]  Evaluation     Patient Name: Julio César Covarrubias  OPGTI'C Date: 6/12/2018  Problem List  Patient Active Problem List   Diagnosis    Peripheral neuropathy    Bilateral elbow joint pain    Lateral epicondylitis of right elbow    Medial epicondylitis, left elbow    Chronic pain of right knee    Post-traumatic osteoarthritis of right knee    Medial epicondylitis of left elbow    Abnormal mammogram    Arthritis    Chronic pain disorder    Chronic right hip pain    Cirrhosis ([address] )    Dense breasts    Depression with anxiety    Ear noise/buzzing    Headache    Hydronephrosis    Hypergammaglobulinemia    Hyperlipidemia    Lumbar degenerative disc disease    Lumbar radiculopathy    Nephrolithiasis    Occipital neuralgia    Osteoporosis    Primary biliary cholangitis (HCC)    Pruritus    Sacroiliitis (HCC)    Smoldering multiple myeloma (SMM) (HCC)    Vitamin D deficiency    Preoperative examination     Past Medical History  Past Medical History:   Diagnosis Date    Abnormal breast exam     Anxiety     Asthma     childhood    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[address] )     primary per allscripts    Cancer ([address] )     IgG kappa smoldering multiple myeloma    Cardiac murmur     Chronic liver disease     resolved 12/04/2017    Depression     Endometriosis     Fracture of tibia     right tibial plateau fracture per allscripts    Hepatic disease     Hypertension     last assessed 12/13/2017    Neuropathy     R foot     OCD (obsessive compulsive disorder)     Pneumonia     Seasonal allergies     Wears eyeglasses     Whiplash injury to neck      Past Surgical History  Past Surgical History:   Procedure Laterality Date    BACK SURGERY      hemilaminectomy and discectomy, L5-S1, right (Dr DORMAN [facility] [place]) onset 02/14/2005 per allscripts    EXPLORATION EXTREMITY Right 8/29/2016    Procedure: KNEE PERONEAL NERVE EXPLORATION AND RELEASE ;  Surgeon: Nolvia White MD;  Location:  MAIN OR;  Service:     FOOT SURGERY      HAND SURGERY      HERNIA REPAIR      MANDIBLE SURGERY      NEUROPLASTY / TRANSPOSITION MEDIAN NERVE AT CARPAL TUNNEL      ORIF TIBIAL PLATEAU Right     arthroplasty per allscripts    OTHER SURGICAL HISTORY      injection of trigger point(s) per allscripts    MA TOTAL KNEE ARTHROPLASTY Right 6/11/2018    Procedure: ARTHROPLASTY KNEE TOTAL;  Surgeon: Nolvia White MD;  [facility];  Service: Orthopedics         06/12/18 0844   Note Type   Note type Eval/Treat   Restrictions/Precautions   Weight Bearing Precautions Per Order Yes   RLE Weight Bearing Per Order WBAT   Other Precautions WBS; Impulsive; Fall Risk;Pain;Multiple lines;Telemetry   Pain Assessment   Pain Assessment 0-10   Pain Score Worst Possible Pain   Pain Type Acute pain   Pain Location Knee   Pain Orientation Right   Effect of Pain on Daily Activities Impaired mobiltiy/ADLs   Hospital Pain Intervention(s) Repositioned; Ambulation/increased activity; Distraction; Emotional support   Diversional Activities (conversation)   Response to Interventions tolerated well   Home Living   Type of [address] [place] [address] Two level;Stairs to enter with rails; Able to live on main level with bedroom/bathroom  (2+1 FELISHA)   Bathroom Shower/Tub Tub/shower unit   [employer] Raised  ("comfort height")   Bathroom Equipment Shower chair   Bathroom Accessibility Accessible   Home Equipment Walker;Cane;Crutches   Additional Comments Pt would prefer to utilize crutches at d/c as her home is small    Prior Function   Level of [place] Independent with ADLs and functional mobility   Lives With Family  (79 y/o mother)   Receives Help From Family  (brother will be checking in, neighbor can A PRN)   ADL Assistance Independent   IADLs Independent   Falls in the last 6 months 0   Vocational (works as )   Lifestyle   Autonomy I in ADL/IADL + driving and work   Reciprocal Relationships lives with mother who can complete her ADLs at a (S) to mod I level    Service to Others works as a    Intrinsic Gratification biking   Psychosocial   Psychosocial (WDL) [address] "I hope that after this surgery I can be more active again"   ADL   Eating Assistance 5  Supervision/Setup   Eating Deficit Setup   Grooming Assistance 5  Supervision/Setup   Grooming Deficit Setup  (seated)   [address]; Fasteners   LB Dressing Assistance 4  Minimal Assistance   LB Dressing Deficit Setup;Don/doff R sock; Don/doff L sock; Don/doff R shoe;Don/doff L shoe  (donned undergarment, completed long sitting in bed)   Toileting Assistance  5  Supervision/Setup   Toileting Deficit Supervison/safety;Verbal cueing   Bed Mobility   Supine to Sit 5  Supervision   Additional items Verbal cues;HOB elevated   Transfers   Sit to Stand 5  Supervision   Additional items Verbal cues   Stand to Sit 5  Supervision   Additional items Verbal cues   Functional Mobility   Functional Mobility 5  Supervision   Additional Comments t/o room and bathroom   Additional items Rolling walker   Balance   Static Sitting Fair +   Dynamic Sitting Fair   Static Standing Fair   Dynamic Standing Fair -   Ambulatory Fair -   Activity Tolerance   Activity Tolerance Patient tolerated treatment well;Patient limited by pain   Medical Staff Made Aware PT Belkis present   Nurse Made [facility] cleared for session   RUE Assessment   RUE Assessment WFL   LUE Assessment   LUE Assessment WFL   Hand Function   Gross Motor Coordination Functional   Fine Motor Coordination Functional   Sensation   Light Touch No apparent deficits   Cognition   Overall Cognitive Status WFL   Arousal/Participation Alert; Cooperative   Attention Within functional limits   Orientation Level Oriented X4   Memory Within functional limits   Following Commands Follows all commands and directions without difficulty   Comments pt  slightly impulsive with decreased safety however takes v c  well and open to edu  provided   Assessment   Limitation Decreased ADL status; Decreased Safe judgement during ADL;Decreased endurance;Decreased self-care trans;Decreased high-level ADLs   Prognosis Good   Assessment Pt  is a 61 y o  female who was [facility] to [facility] on 6/11/2018  Pt  Now s/p R ARTHROPLASTY KNEE TOTAL, WBAT  Pt  w/a significant PMH/co-morbidities of post traumatic OA of R knee, peripheral neuropathy, B/L elbow joint pain, cirrhosis, lumbar radiculopathy and HTN  Please see above for a more comprehensive list  Pt  currently lives in a [address] [place] [address]) with FF s/u with 80 y o  mother  PTA, pt  (I) in all ADLs/IADLs + driving and +work  Pt did not use AD for functional mobility  Currently, pt  requires close (S) for functional mobility and transfers, min A-s/u for UB ADLs and min A-(S) for LB ADLs  A is needed d/t the following impairments: decreased functional activity tolerance, decreased balance, increased pain, decreased LE flexibility/ROM 2/2 pain, slight impulsivity,decreased safety/insight into deficits/judgment  Additionally, pt  requires min v c  during functional activity  Therefore, pt  will benefit from skilled OT services to maximize functional gains, monitor status and prevent decline  Will continue to follow pt  3-5x/week to meet the goals described below in 7-10 days  Recommend home with family support once medically stable  Goals   Patient Goals to try using crutches   LTG Time Frame 7-10   Plan   Treatment Interventions ADL retraining;Functional transfer training; Endurance training;Patient/family training;Equipment evaluation/education; Compensatory technique education;Continued evaluation; Energy conservation   Goal Expiration Date 06/22/18   OT Frequency 3-5x/wk   Recommendation   OT Discharge Recommendation Home with family/friend support   OT - OK to Discharge Yes; edu   Pt  On BSC to raise toilet and provide rails however declined    Barthel Index   Feeding 10   Bathing 0   Grooming Score 5   Dressing Score 5   Bladder Score 10   Bowels Score 10   Toilet Use Score 10   Transfers (Bed/Chair) Score 10   Mobility (Level Surface) Score 0   Stairs Score 0   Barthel Index Score 60   Modified Storey Scale   Modified Storey Scale 3     GOALS     Patient will perform all functional mobility and transfers at a mod I level with use of the least restrictive device      Pt  will perform bed mobility at a mod I level with G balance and activity tolerance      Pt  will complete LB/UB dressing at a mod I level with AE as needed       Pt  will complete all grooming activities at a mod I level      Pt   will complete all toileting tasks at a mod I level      Pt will complete LB/UB bathing at a mod I level with the use of AE as needed      Chalo Solomon, ISAAC, OTR/L

## 2018-06-12 NOTE — H&P
Orthopedics   Di  61 y o  female MRN: [id number]  Unit/Bed#: CW3 343-01    61 y  o female presents for follow up clinic visit and orthovisc #3 right knee   She is slated for a right TKA in June      Review of Systems  Review of systems negative unless otherwise specified in HPI     Past Medical History  Medical History        Past Medical History:   Diagnosis Date    Abnormal breast exam      Anxiety      Asthma      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HCC)       primary per allscripts    Cancer ([address] )       IgG kappa smoldering multiple myeloma    Cardiac murmur      Chronic liver disease       resolved 12/04/2017    Depression      Endometriosis      Fracture of tibia       right tibial plateau fracture per allscripts    Hepatic disease      Hypertension       last assessed 12/13/2017    OCD (obsessive compulsive disorder)      Pneumonia      Seasonal allergies      Wears eyeglasses      Whiplash injury to neck              Past Surgical History  Surgical History         Past Surgical History:   Procedure Laterality Date    BACK SURGERY         hemilaminectomy and discectomy, L5-S1, right (Dr Hemalatha Goncalves, [facility]) onset 02/14/2005 per allscripts    EXPLORATION EXTREMITY Right 8/29/2016     Procedure: KNEE PERONEAL NERVE EXPLORATION AND RELEASE ;  Surgeon: Priscilla Babinski, MD;  Location[facility];  Service:    [facility] FOOT SURGERY        HAND SURGERY        HERNIA REPAIR        MANDIBLE SURGERY        NEUROPLASTY / TRANSPOSITION MEDIAN NERVE AT CARPAL TUNNEL        ORIF TIBIAL PLATEAU Right       arthroplasty per allscripts    OTHER SURGICAL HISTORY         injection of trigger point(s) per allscripts            Current Medications         Current Outpatient Prescriptions on File Prior to Visit   Medication Sig Dispense Refill    Cholecalciferol (VITAMIN D3) 2000 UNITS TABS Take 1 tablet by mouth daily         cholestyramine (QUESTRAN) 4 g packet          ergocalciferol (VITAMIN D2) 50,000 units TAKE ONE CAPSULE BY MOUTH EVERY OTHER WEEK FOR 1 MONTH, THEN 1 CAPSULE EVERY MONTH 4 capsule 2    Gabapentin, Once-Daily, (GRALISE) 600 MG TABS Take 1,800 mg by mouth daily at bedtime        PARoxetine (PAXIL-CR) 25 mg 24 hr tablet          topiramate (TOPAMAX) 50 MG tablet          triamterene-hydrochlorothiazide (DYAZIDE) 37 5-25 mg per capsule Take 1 capsule by mouth every morning         ursodiol (ACTIGALL) 300 mg capsule Take 300 mg by mouth 3 (three) times a day           No current facility-administered medications on file prior to visit           Recent Labs [facility])     0  Lab Value Date/Time   HCT 40 2 03/09/2018 0929   HCT 38 2 12/29/2015 0659   HGB 13 1 03/09/2018 0929   HGB 12 4 12/29/2015 0659   WBC 6 34 03/09/2018 0929   WBC 6 60 12/29/2015 0659   ESR 42 (H) 12/06/2017 0739   CRP <3 0 12/06/2017 0739   GLUCOSE 144 (H) 12/19/2016 1006   GLUCOSE 134 12/29/2015 0659            Physical exam  · General: Awake, Alert, Oriented  · Eyes: Pupils equal, round and reactive to light  · Heart: regular rate and rhythm  · Lungs: No audible wheezing  · Abdomen: soft  right Knee exam  ·       Procedure  Large joint arthrocentesis  Date/Time: 4/20/2018 8:50 AM  Consent given by: patient  Site marked: site marked  Supporting Documentation  Indications: pain   Procedure Details  Location: knee - R knee  Preparation: Patient was prepped and draped in the usual sterile fashion  Needle size: 22 G  Ultrasound guidance: no  Approach: anterolateral  Medications administered: 20 mg Sodium Hyaluronate 20 MG/2ML     Patient tolerance: patient tolerated the procedure well with no immediate complications  Dressing:  Sterile dressing applied            Imaging  none     ASSESSMENT  orthovisc #3 right knee and TKA        PLAN  ICE  WBAT  Activities as tolerated  Will present for surgery to [facility] in June   Follow up as post-op

## 2018-06-13 VITALS
BODY MASS INDEX: 26.66 KG/M2 | TEMPERATURE: 98 F | HEART RATE: 73 BPM | HEIGHT: 69 IN | DIASTOLIC BLOOD PRESSURE: 62 MMHG | OXYGEN SATURATION: 97 % | SYSTOLIC BLOOD PRESSURE: 101 MMHG | WEIGHT: 180 LBS | RESPIRATION RATE: 18 BRPM

## 2018-06-13 LAB
ANION GAP SERPL CALCULATED.3IONS-SCNC: 4 MMOL/L (ref 4–13)
BASOPHILS # BLD AUTO: 0.04 THOUSANDS/ΜL (ref 0–0.1)
BASOPHILS NFR BLD AUTO: 1 % (ref 0–1)
BUN SERPL-MCNC: 12 MG/DL (ref 5–25)
CALCIUM SERPL-MCNC: 7.5 MG/DL (ref 8.3–10.1)
CHLORIDE SERPL-SCNC: 107 MMOL/L (ref 100–108)
CO2 SERPL-SCNC: 30 MMOL/L (ref 21–32)
CREAT SERPL-MCNC: 0.81 MG/DL (ref 0.6–1.3)
EOSINOPHIL # BLD AUTO: 0.19 THOUSAND/ΜL (ref 0–0.61)
EOSINOPHIL NFR BLD AUTO: 3 % (ref 0–6)
ERYTHROCYTE [DISTWIDTH] IN BLOOD BY AUTOMATED COUNT: 13.5 % (ref 11.6–15.1)
GFR SERPL CREATININE-BSD FRML MDRD: 80 ML/MIN/1.73SQ M
GLUCOSE SERPL-MCNC: 112 MG/DL (ref 65–140)
HCT VFR BLD AUTO: 26.2 % (ref 34.8–46.1)
HGB BLD-MCNC: 8 G/DL (ref 11.5–15.4)
IMM GRANULOCYTES # BLD AUTO: 0.05 THOUSAND/UL (ref 0–0.2)
IMM GRANULOCYTES NFR BLD AUTO: 1 % (ref 0–2)
LYMPHOCYTES # BLD AUTO: 2.15 THOUSANDS/ΜL (ref 0.6–4.47)
LYMPHOCYTES NFR BLD AUTO: 32 % (ref 14–44)
MCH RBC QN AUTO: 29.2 PG (ref 26.8–34.3)
MCHC RBC AUTO-ENTMCNC: 30.5 G/DL (ref 31.4–37.4)
MCV RBC AUTO: 96 FL (ref 82–98)
MONOCYTES # BLD AUTO: 0.47 THOUSAND/ΜL (ref 0.17–1.22)
MONOCYTES NFR BLD AUTO: 7 % (ref 4–12)
NEUTROPHILS # BLD AUTO: 3.79 THOUSANDS/ΜL (ref 1.85–7.62)
NEUTS SEG NFR BLD AUTO: 56 % (ref 43–75)
NRBC BLD AUTO-RTO: 0 /100 WBCS
PLATELET # BLD AUTO: 144 THOUSANDS/UL (ref 149–390)
PMV BLD AUTO: 10.8 FL (ref 8.9–12.7)
POTASSIUM SERPL-SCNC: 3.5 MMOL/L (ref 3.5–5.3)
RBC # BLD AUTO: 2.74 MILLION/UL (ref 3.81–5.12)
SODIUM SERPL-SCNC: 141 MMOL/L (ref 136–145)
WBC # BLD AUTO: 6.69 THOUSAND/UL (ref 4.31–10.16)

## 2018-06-13 PROCEDURE — 97535 SELF CARE MNGMENT TRAINING: CPT | Performed by: STUDENT IN AN ORGANIZED HEALTH CARE EDUCATION/TRAINING PROGRAM

## 2018-06-13 PROCEDURE — 97116 GAIT TRAINING THERAPY: CPT

## 2018-06-13 PROCEDURE — 97530 THERAPEUTIC ACTIVITIES: CPT

## 2018-06-13 PROCEDURE — 80048 BASIC METABOLIC PNL TOTAL CA: CPT | Performed by: ORTHOPAEDIC SURGERY

## 2018-06-13 PROCEDURE — 85025 COMPLETE CBC W/AUTO DIFF WBC: CPT | Performed by: ORTHOPAEDIC SURGERY

## 2018-06-13 RX ORDER — DOCUSATE SODIUM 100 MG/1
100 CAPSULE, LIQUID FILLED ORAL 2 TIMES DAILY
Qty: 10 CAPSULE | Refills: 0 | Status: SHIPPED | OUTPATIENT
Start: 2018-06-13 | End: 2018-08-31

## 2018-06-13 RX ADMIN — FOLIC ACID 1 MG: 1 TABLET ORAL at 08:06

## 2018-06-13 RX ADMIN — Medication 325 MG: at 08:05

## 2018-06-13 RX ADMIN — URSODIOL 300 MG: 300 CAPSULE ORAL at 05:17

## 2018-06-13 RX ADMIN — DOCUSATE SODIUM 100 MG: 100 CAPSULE, LIQUID FILLED ORAL at 08:06

## 2018-06-13 RX ADMIN — GABAPENTIN 300 MG: 300 CAPSULE ORAL at 08:06

## 2018-06-13 RX ADMIN — OXYCODONE HYDROCHLORIDE AND ACETAMINOPHEN 2 TABLET: 5; 325 TABLET ORAL at 02:16

## 2018-06-13 RX ADMIN — OXYCODONE HYDROCHLORIDE AND ACETAMINOPHEN 500 MG: 500 TABLET ORAL at 08:06

## 2018-06-13 RX ADMIN — ENOXAPARIN SODIUM 40 MG: 100 INJECTION SUBCUTANEOUS at 08:05

## 2018-06-13 RX ADMIN — URSODIOL 300 MG: 300 CAPSULE ORAL at 11:10

## 2018-06-13 RX ADMIN — SENNOSIDES 8.6 MG: 8.6 TABLET, FILM COATED ORAL at 08:06

## 2018-06-13 RX ADMIN — OXYCODONE HYDROCHLORIDE AND ACETAMINOPHEN 2 TABLET: 5; 325 TABLET ORAL at 08:05

## 2018-06-13 RX ADMIN — CHOLECALCIFEROL TAB 25 MCG (1000 UNIT) 2000 UNITS: 25 TAB at 08:06

## 2018-06-13 RX ADMIN — TRAMADOL HYDROCHLORIDE 50 MG: 50 TABLET, FILM COATED ORAL at 05:17

## 2018-06-13 RX ADMIN — TRAMADOL HYDROCHLORIDE 50 MG: 50 TABLET, FILM COATED ORAL at 11:10

## 2018-06-13 RX ADMIN — OXYCODONE HYDROCHLORIDE AND ACETAMINOPHEN 2 TABLET: 5; 325 TABLET ORAL at 14:19

## 2018-06-13 NOTE — DISCHARGE INSTRUCTIONS
Discharge Instructions - Jennifer May 61 y o  female MRN: [id number]  Unit/Bed#: PACU 04    Weight Bearing Status:                                           Weight bearing as tolerated right lower extremity    DVT prophylaxis:  Complete course of Lovenox as directed    Pain:  Continue analgesics as directed    Dressing Instructions:   May change dressing as needed for drainage with ABD pad and ace wrap  Do not shower until first follow-up clinic appointment    PT/OT:  Continue PT/OT on outpatient basis as directed    Appt Instructions: If you do not have your appointment, please call the clinic at [phone] to f/u with Dr Teodora Aguilar in orthopaedic clinic, otherwise follow-up as scheduled prior to surgery    Contact the office sooner if you experience any increased numbness/tingling in the extremities

## 2018-06-13 NOTE — DISCHARGE SUMMARY
ORTHOPEDICS DISCHARGE SUMMARY  Leona Root 61 y o  female MRN: [id number]  Unit/Bed#: DZ5 343-01    Attending Physician: Delia Pedro    Admitting diagnosis: Post-traumatic osteoarthritis of right knee [M17 31]  Chronic pain of right knee [M25 561, G89 29]    Discharge diagnosis: Post-traumatic osteoarthritis of right knee [M17 31]  Chronic pain of right knee [M25 561, G89 29]    Date of admission: 6/11/2018    Date of discharge: 06/13/18         Procedure: Right TKA    HPI:  This is a 61y o  year old female that presented to the office with signs and symptoms of right knee osteoarthritis  They tried and failed conservative treatment measures and wished to proceed with surgical intervention  The risks, benefits, and complications of the procedure were discussed with the patient and informed consent was obtained  Hospital Course: The patient was admitted to the hospital on 6/11/2018 and underwent an uncomplicated right total knee arthroplasty  They were transferred to the floor after a brief stay in the post-anesthesia care unit  Their pain was well managed with IV and oral pain medications  They began therapy on post operative day #1  Lovenox was also started for DVT prophylaxis post operative day #1  Hemovac drain was removed on POD1  On discharge date pt was cleared by PT and the medicine team and determined to be safe for discharge  Daily discussion was had with the patient, nursing staff, orthopaedic team, and family members if present  All questions were answered to the patients satisifaction          0  Lab Value Date/Time   HGB 8 0 (L) 06/13/2018 0509   HGB 8 3 (L) 06/12/2018 2045   HGB 9 2 (L) 06/12/2018 0440   HGB 12 5 05/22/2018 1547   HGB 13 1 03/09/2018 0929   HGB 12 4 12/06/2017 0739   HGB 12 5 10/06/2017 0735   HGB 12 9 05/10/2017 0906   HGB 12 7 04/22/2017 0934   HGB 13 3 12/19/2016 1006   HGB 12 8 06/04/2016 0928   HGB 13 2 01/30/2016 1025   HGB 12 4 12/29/2015 0659   HGB 13 0 03/28/2015 1012 HGB 12 9 12/06/2014 0840   HGB 12 8 07/26/2014 1016   HGB 12 4 06/04/2014 1400   HGB 12 1 03/15/2014 1117   HGB 12 4 11/11/2013 1106     Greater than 2 gram drop which qualifies for diagnosis of acute blood loss anemia  Vital signs remained stable and pt was resuscitated with IVF as needed     Discharge Instructions: The patient was discharged weight bearing as tolerated to the right lower extremity  Lovenox will be continued for 28 days  Continue PT/OT  Take pain medications as instructed  Discharge Medications: For the complete list of discharge medications, please refer to the patient's medication reconciliation

## 2018-06-13 NOTE — PROGRESS NOTES
Orthopedics   Stanley Garcia 61 y o  female MRN: [id number]  Unit/Bed#: CW3 343-01      Subjective:  61 y  o female post operative day 2 right total knee arthroplasty  Pt doing well  Pain controlled  Did physical therapy yesterday      Labs:    0  Lab Value Date/Time   HCT 26 2 (L) 06/13/2018 0509   HCT 27 3 (L) 06/12/2018 2045   HCT 29 6 (L) 06/12/2018 0440   HCT 38 2 12/29/2015 0659   HCT 40 1 03/28/2015 1012   HCT 39 3 12/06/2014 0840   HGB 8 0 (L) 06/13/2018 0509   HGB 8 3 (L) 06/12/2018 2045   HGB 9 2 (L) 06/12/2018 0440   HGB 12 4 12/29/2015 0659   HGB 13 0 03/28/2015 1012   HGB 12 9 12/06/2014 0840   INR 0 95 05/22/2018 1439   WBC 6 69 06/13/2018 0509   WBC 9 55 06/12/2018 0440   WBC 5 65 05/22/2018 1547   WBC 6 60 12/29/2015 0659   WBC 5 12 03/28/2015 1012   WBC 5 23 12/06/2014 0840   ESR 42 (H) 12/06/2017 0739   CRP <3 0 12/06/2017 0739       Meds:    Current Facility-Administered Medications:     ascorbic acid (VITAMIN C) tablet 500 mg, 500 mg, Oral, BID, Santosh Humberto, 500 mg at 06/12/18 1726    cholecalciferol (VITAMIN D3) tablet 2,000 Units, 2,000 Units, Oral, Daily, Santosh Humberto, 2,000 Units at 06/12/18 0800    diphenhydrAMINE (BENADRYL) tablet 25 mg, 25 mg, Oral, Q6H PRN, Brina Kelley PA-C, 25 mg at 06/11/18 1635    docusate sodium (COLACE) capsule 100 mg, 100 mg, Oral, BID, Santosh Humberto, 100 mg at 06/12/18 1726    enoxaparin (LOVENOX) subcutaneous injection 40 mg, 40 mg, Subcutaneous, Daily, Santosh Humberto, 40 mg at 06/12/18 0800    ferrous sulfate tablet 325 mg, 325 mg, Oral, BID With Meals, Santosh Humberto, 325 mg at 11/28/05 4678    folic acid (FOLVITE) tablet 1 mg, 1 mg, Oral, Daily, Santosh Humberto, 1 mg at 06/12/18 0801    gabapentin (NEURONTIN) capsule 300 mg, 300 mg, Oral, TID, Santosh Humberto, 300 mg at 06/12/18 2019    HYDROmorphone (DILAUDID) injection 1 mg, 1 mg, Intravenous, Q4H PRN, Santosh Humberto, 1 mg at 06/12/18 2225    lactated ringers infusion, 1 5 mL/kg/hr, Intravenous, Continuous, Deana Magyar, Last Rate: 122 4 mL/hr at 06/12/18 0406, 1 5 mL/kg/hr at 06/12/18 0406    ondansetron (ZOFRAN) injection 4 mg, 4 mg, Intravenous, Q4H PRN, Deana Magyar    oxyCODONE-acetaminophen (PERCOCET) 5-325 mg per tablet 1 tablet, 1 tablet, Oral, Q4H PRN, Deana Magyar    oxyCODONE-acetaminophen (PERCOCET) 5-325 mg per tablet 2 tablet, 2 tablet, Oral, Q6H PRN, Deana Magyar, 2 tablet at 06/13/18 0216    PARoxetine (PAXIL-CR) 24 hr tablet 50 mg, 50 mg, Oral, HS, Deana Magyar, 50 mg at 06/12/18 2224    senna (SENOKOT) tablet 8 6 mg, 1 tablet, Oral, Daily, Deana Magyar, 8 6 mg at 06/12/18 0800    topiramate (TOPAMAX) tablet 50 mg, 50 mg, Oral, HS, Deana Magyar, 50 mg at 06/12/18 2224    traMADol (ULTRAM) tablet 50 mg, 50 mg, Oral, Q6H [facility], Deana Magyar, 50 mg at 06/13/18 0517    triamterene-hydrochlorothiazide (MAXZIDE-25) 37 5-25 mg per tablet 1 tablet, 1 tablet, Oral, Daily, Deana Magyar    ursodiol (ACTIGALL) capsule 300 mg, 300 mg, Oral, TID (diuretic), Deana Magyar, 300 mg at 06/13/18 2254    Blood Culture:   No results found for: BLOODCX    Wound Culture:   No results found for: WOUNDCULT    Ins and Outs:  I/O last 24 hours: In: 3117 5 [I V :2117 5; IV Piggyback:1000]  Out: 1425 [Urine:1050; Drains:375]          Physical:  Vitals:    06/13/18 0340   BP: 104/63   Pulse: 76   Resp: 20   Temp: 98 6 °F (37 °C)   SpO2: 96%     right lower extremity:  · Dressings C/D/I  · Toes warm and well perfused  · SILT s/sp/dp/s  · Motor intact plantarflexion and dorsiflexion    _*_*_*_*_*_*_*_*_*_*_*_*_*_*_*_*_*_*_*_*_*_*_*_*_*_*_*_*_*_*_*_*_*_*_*_*_*_*_*_*_*    Assessment: 61 y  o female post operative day 2 right total knee arthroplasty   Doing well    Plan:  · Weight Bearing as tolerated  · Up and out of bed  · DVT prophylaxis  · Analgesics  · PT/OT as able  · Patient noted to have acute blood loss anemia due to a drop in Hbg of > 2 0g from preop levels, will monitor vital signs and resuscitate with IV fluids as needed    Cyrus Mar

## 2018-06-13 NOTE — PROGRESS NOTES
Internal Medicine Progress Note  Patient: Ravindra Teran  Age/sex: 61 y o  female  Medical Record #: [id number]      ASSESSMENT/PLAN:  Ravindra Teran is seen and examined and mangement for following issues:    # Rt knee OA s/p Rt TKA: Pain is adequately controlled per patient will continue same therapy without change  Remains afebrile; no nausea     # HTN:  Blood pressure adequately controlled on current treatment per review of BP logs  Continue same treatment plan  Maintain current outpatient medical therapy which we reviewed with Ravindra Teran   Respect holding parameters in post op period  Adjust accordingly based on blood pressure trends    # Mood disorder: Continue Psychiatric medications  # Acute blood loss anemia: Hgb remains stable; no need for transfusion    Medically clear for discharge    Subjective: Patient seen and examined    Pt without any overnight events or reported nursing issues, has not had BM but has hyperactive BS and is passing gas      ROS:   GI: denies abdominal pain, change bowel habits or reflux symptoms; +gas  Neuro: No new neurologic changes  Respiratory: No Cough, SOB  Cardiovascular: No CP, palpitations   Musculoskeletal: +pain R knee    Scheduled Meds:    Current Facility-Administered Medications:  ascorbic acid 500 mg Oral BID Shanta Yadav    cholecalciferol 2,000 Units Oral Daily Jim Camara    diphenhydrAMINE 25 mg Oral Q6H PRN Brina Kelley PA-C    docusate sodium 100 mg Oral BID Jim Camara    enoxaparin 40 mg Subcutaneous Daily Shanta Yadav    ferrous sulfate 325 mg Oral BID With Meals Shanta Yadav    folic acid 1 mg Oral Daily Jim Camara    gabapentin 300 mg Oral TID Shanta Yadav    HYDROmorphone 1 mg Intravenous Q4H PRN Jim Camara    lactated ringers 1 5 mL/kg/hr Intravenous Continuous Shanta Yadav Last Rate: 1 5 mL/kg/hr (06/12/18 0406)   ondansetron 4 mg Intravenous Q4H PRN Shanta Yadav    oxyCODONE-acetaminophen 1 tablet Oral Q4H PRN Shanta Yadav oxyCODONE-acetaminophen 2 tablet Oral Q6H PRN Melodye Repress    PARoxetine 50 mg Oral HS Jim Camara    senna 1 tablet Oral Daily Jim Camara    topiramate 50 mg Oral HS Jim Camara    traMADol 50 mg Oral Q6H [facility] Jim Camara    triamterene-hydrochlorothiazide 1 tablet Oral Daily Melodye Repress    ursodiol 300 mg Oral TID (diuretic) Melodye Repress        Labs:       Results from last 7 days  Lab Units 06/13/18  0509 06/12/18  2045 06/12/18  0440   WBC Thousand/uL 6 69  --  9 55   HEMOGLOBIN g/dL 8 0* 8 3* 9 2*   HEMATOCRIT % 26 2* 27 3* 29 6*   PLATELETS Thousands/uL 144*  --  151       Results from last 7 days  Lab Units 06/13/18  0509 06/12/18  0440   SODIUM mmol/L 141 138   POTASSIUM mmol/L 3 5 3 5   CHLORIDE mmol/L 107 102   CO2 mmol/L 30 29   BUN mg/dL 12 17   CREATININE mg/dL 0 81 0 92   GLUCOSE RANDOM mg/dL 112 184*   CALCIUM mg/dL 7 5* 7 8*                  Glucose (mg/dL)   Date Value   06/13/2018 112   06/12/2018 184 (H)   05/22/2018 112   12/19/2016 144 (H)   12/29/2015 134   08/31/2015 99   03/28/2015 89   12/06/2014 99       Labs reviewed    Physical Examination:  Vitals:   Vitals:    06/12/18 2215 06/12/18 2300 06/13/18 0340 06/13/18 0833   BP: 112/62 102/60 104/63 101/62   BP Location: Left arm Left arm Right arm Right arm   Pulse: 76 81 76 73   Resp: 18 18 20 18   Temp:  98 2 °F (36 8 °C) 98 6 °F (37 °C) 98 °F (36 7 °C)   TempSrc:  Oral Oral Oral   SpO2:  91% 96% 97%   Weight:       Height:           GEN: NAD  HEENT: NC/AT, EOMI  RESP: CTAB, no R/R/W  CV: +S1 S2, regular rate, no rubs  ABD: soft, NT, ND, hyperactive BS   : catheter removed  EXT: no edema; DP intact  Skin:no rash; dressing in place to R knee  Neuro: AAOx3      [ X ] Total time spent: 30 Mins and greater than 50% of this time was spent counseling/coordinating care        Gerri Dominguez, [facility]  Internal Medicine

## 2018-06-13 NOTE — PLAN OF CARE
Problem: PHYSICAL THERAPY ADULT  Goal: Performs mobility at highest level of function for planned discharge setting  See evaluation for individualized goals  Treatment/Interventions: Functional transfer training, LE strengthening/ROM, Elevations, Therapeutic exercise, Endurance training, Cognitive reorientation, Patient/family training, Equipment eval/education, Bed mobility, Gait training, Spoke to nursing  Equipment Recommended: Other (Comment) (pt owns RW and crutches )       See flowsheet documentation for full assessment, interventions and recommendations  Outcome: Adequate for Discharge  Prognosis: Good  Problem List: Decreased strength, Decreased range of motion, Decreased endurance, Impaired balance, Decreased mobility, Decreased skin integrity, Orthopedic restrictions, Pain  Assessment: The pt  is ambulating community distances without any loss of balance  She initially had absent heel strike, but after 30 feet of gait this improved  She had improving balance throughout the treatment, and she is independent with the rolling walker  She also completed the stairs without difficulty  She has demonstrated the necessary mobility in order to safely return home at discharge  Barriers to Discharge: None     Recommendation: Home with family support, Outpatient PT     PT - OK to Discharge: Yes    See flowsheet documentation for full assessment

## 2018-06-13 NOTE — PLAN OF CARE
DISCHARGE PLANNING - CARE MANAGEMENT     Discharge to post-acute care or home with appropriate resources Progressing        GENITOURINARY - ADULT     Maintains or returns to baseline urinary function Progressing     Absence of urinary retention Progressing        HEMATOLOGIC - ADULT     Maintains hematologic stability Progressing        MUSCULOSKELETAL - ADULT     Maintain or return mobility to safest level of function Progressing     Maintain proper alignment of affected body part Progressing        Potential for Falls     Patient will remain free of falls Progressing        Prexisting or High Potential for Compromised Skin Integrity     Skin integrity is maintained or improved Progressing        SKIN/TISSUE INTEGRITY - ADULT     Skin integrity remains intact Progressing     Incision(s), wounds(s) or drain site(s) healing without S/S of infection Progressing     Oral mucous membranes remain intact Progressing

## 2018-06-13 NOTE — PLAN OF CARE
Problem: OCCUPATIONAL THERAPY ADULT  Goal: Performs self-care activities at highest level of function for planned discharge setting  See evaluation for individualized goals  Treatment Interventions: ADL retraining, Functional transfer training, Endurance training, Patient/family training, Equipment evaluation/education, Compensatory technique education, Continued evaluation, Energy conservation          See flowsheet documentation for full assessment, interventions and recommendations  Outcome: Progressing  Limitation: Decreased ADL status, Decreased Safe judgement during ADL, Decreased endurance, Decreased self-care trans, Decreased high-level ADLs  Prognosis: Good  Assessment: Pt participates in OT session with focus on dressing, activity tolerance, grooming, and transfers to increase I for d/c  Pt setup grooming to wash and comb hair using shampoo cap  Pt mod I UB dressing to don/doff shirt  Pt min A LB dressing to don socks, underwear, and shorts while supine in bed with HOB elevated  Pt min A supine to sit EOB with A needed to lift RLE  Pt supervision functional mobility from EOB to bedside chair with RW support  Pt will continue to benefit from activity tolerance, transfers, and adls  OT Discharge Recommendation: Home with family support  OT - OK to Discharge:  Yes

## 2018-06-13 NOTE — OCCUPATIONAL THERAPY NOTE
06/13/18 1300   Restrictions/Precautions   Weight Bearing Precautions Per Order Yes   RLE Weight Bearing Per Order WBAT   Other Precautions WBS; Fall Risk;Pain   Pain Assessment   Pain Assessment 0-10   Pain Score 8   ADL   Where Assessed Supine, bed   Grooming Assistance 5  Supervision/Setup   Grooming Deficit Setup   Grooming Comments shampoo cap and comb hair   UB Dressing Assistance 6  Modified independent   UB Dressing Comments don shirt   LB Dressing Assistance 4  Minimal Assistance   LB Dressing Deficit Setup;Don/doff R sock   LB Dressing Comments don socks, underwear, and shorts   Bed Mobility   Supine to Sit 4  Minimal assistance   Additional items Assist x 1   Transfers   Sit to Stand 5  Supervision   Stand to Sit 5  Supervision   Stand pivot 5  Supervision   Cognition   Overall Cognitive Status WFL   Arousal/Participation Cooperative   Attention Within functional limits   Orientation Level Oriented X4   Memory Within functional limits   Following Commands Follows all commands and directions without difficulty   Activity Tolerance   Activity Tolerance Patient tolerated treatment well   Assessment   Assessment Pt participates in OT session with focus on dressing, activity tolerance, grooming, and transfers to increase I for d/c  Pt setup grooming to wash and comb hair using shampoo cap  Pt mod I UB dressing to don/doff shirt  Pt min A LB dressing to don socks, underwear, and shorts while supine in bed with HOB elevated  Pt min A supine to sit EOB with A needed to lift RLE  Pt supervision functional mobility from EOB to bedside chair with RW support  Pt will continue to benefit from activity tolerance, transfers, and adls  Plan   Treatment Interventions ADL retraining;Functional transfer training; Activityengagement   Goal Expiration Date 06/22/18   Treatment Day 1   OT Frequency 3-5x/wk   Recommendation   OT Discharge Recommendation Home with family support

## 2018-06-14 ENCOUNTER — TRANSITIONAL CARE MANAGEMENT (OUTPATIENT)
Dept: INTERNAL MEDICINE CLINIC | Facility: CLINIC | Age: 58
End: 2018-06-14

## 2018-06-15 ENCOUNTER — TELEPHONE (OUTPATIENT)
Dept: OBGYN CLINIC | Facility: HOSPITAL | Age: 58
End: 2018-06-15

## 2018-06-15 DIAGNOSIS — Z47.1 AFTERCARE FOLLOWING RIGHT KNEE JOINT REPLACEMENT SURGERY: Primary | ICD-10-CM

## 2018-06-15 DIAGNOSIS — Z96.651 AFTERCARE FOLLOWING RIGHT KNEE JOINT REPLACEMENT SURGERY: Primary | ICD-10-CM

## 2018-06-15 NOTE — TELEPHONE ENCOUNTER
Patient sees Dr Ryan holt is calling for a new PT script  The last script written was from 04/13/18  Can an updated script be faxed to them?       Please fax to #[phone]

## 2018-06-15 NOTE — TELEPHONE ENCOUNTER
Pt returned my call to do post-op follow up  Pt reporting "so far it's alright"  Pt reporting "I wake up with most of my pain, lying flat is also hard because it hurts my back"  Pt reporting she is taking one tablet of tramadol every 6 hours and 1-2 tablets of percocet every 6 hours  Pt confirms she is ambulating using the walker, she denies falls  Pt reporting a slight serosanguineous drainage in her dressing, per pt no active draining or bleeding at this time  Pt confirms she is doing her daily lovenox without issue, she denies bleeding from any source  Pt confirms she is taking docusate BID  LBM 6/11  Pt confirms she is passing gas  She denies nausea/vomiting/abd pain  I suggested ambulating frequently, increasing oral fluids (6/8 glasses of water/day) and adding in miralax (as directed on back of bottle or consult [facility]) and/or prune juice  I educated pt to be aware of S&S like nausea/vomiting/absence of gas/abd pain  Pt agreeable to plan and verbalizes understanding of S&S  Pt denying CP/SOB/dizziness/fevers/calf pain  Pt reporting she has outpt PT today  AVS, AVS med list and F/Us reviewed with pt  Pt denying having any questions/concerns at this time  Pt reports "I just want to say that I had really really good care while I was in the hospital, they were really good to me  I am so pleased"

## 2018-06-15 NOTE — PROGRESS NOTES
Patient called the office    Patient desires having her physical therapy ordered at [facility]    This was done electronically on her behalf

## 2018-06-18 ENCOUNTER — TELEPHONE (OUTPATIENT)
Dept: OBGYN CLINIC | Facility: HOSPITAL | Age: 58
End: 2018-06-18

## 2018-06-18 NOTE — TELEPHONE ENCOUNTER
Pt is going to wait and pick this up on Friday when you are in [address] [place] [address]

## 2018-06-18 NOTE — TELEPHONE ENCOUNTER
Patient is calling   [phone]  Patient sees Dr Jessica Reyna    Patient is asking for a refill of percocet 5-325mg  Patient has started physical therapy & states that she needs this for pain  Please call into the [facility] pharmacy on file  Please let the patient know when this is done

## 2018-06-18 NOTE — TELEPHONE ENCOUNTER
Dr  and I are in the operating room in [facility]  This medication cannot be called in,   And our E prescribe privileges are not working    This can be refilled and picked up in [facility] by us,    Or better 1 of your practitioners at [facility] [place]    Please give thought to this,   And discuss this with the patient

## 2018-06-22 ENCOUNTER — APPOINTMENT (OUTPATIENT)
Dept: RADIOLOGY | Facility: MEDICAL CENTER | Age: 58
End: 2018-06-22
Payer: OTHER MISCELLANEOUS

## 2018-06-22 ENCOUNTER — OFFICE VISIT (OUTPATIENT)
Dept: OBGYN CLINIC | Facility: MEDICAL CENTER | Age: 58
End: 2018-06-22

## 2018-06-22 VITALS
HEART RATE: 90 BPM | HEIGHT: 70 IN | BODY MASS INDEX: 25.77 KG/M2 | SYSTOLIC BLOOD PRESSURE: 110 MMHG | DIASTOLIC BLOOD PRESSURE: 72 MMHG | WEIGHT: 180 LBS

## 2018-06-22 DIAGNOSIS — Z96.651 STATUS POST TOTAL RIGHT KNEE REPLACEMENT: ICD-10-CM

## 2018-06-22 DIAGNOSIS — Z96.651 STATUS POST TOTAL RIGHT KNEE REPLACEMENT: Primary | ICD-10-CM

## 2018-06-22 PROCEDURE — 99024 POSTOP FOLLOW-UP VISIT: CPT | Performed by: ORTHOPAEDIC SURGERY

## 2018-06-22 PROCEDURE — 73560 X-RAY EXAM OF KNEE 1 OR 2: CPT

## 2018-06-22 RX ORDER — OXYCODONE HYDROCHLORIDE 5 MG/1
5 TABLET ORAL EVERY 4 HOURS PRN
Qty: 30 TABLET | Refills: 0 | Status: SHIPPED | OUTPATIENT
Start: 2018-06-22 | End: 2018-07-20 | Stop reason: SDUPTHER

## 2018-07-17 ENCOUNTER — TELEPHONE (OUTPATIENT)
Dept: OBGYN CLINIC | Facility: HOSPITAL | Age: 58
End: 2018-07-17

## 2018-07-17 NOTE — TELEPHONE ENCOUNTER
Patient sees Dr Aleksander Espitia  She is calling to see if it is okay if she drives today  She has been doing her PT and she is feeling that she is able to drive now

## 2018-07-17 NOTE — TELEPHONE ENCOUNTER
If this is a regularly scheduled dental visit, should be rescheduled for 6 weeks in the future  If this is for acute dental care, she should taken antibiotic  Please call in a prescription for this patient  It should read:  Cephalexin 500 mg  Four tablets p  o  1 hour before dental visit  Number 40, no refills  Please call patient and inform of above    Thank you

## 2018-07-17 NOTE — TELEPHONE ENCOUNTER
Provided her right knee bending well, and she is not taking narcotic medication, return to driving is probably okay  Please call patient and inform of above    Thank you

## 2018-07-17 NOTE — TELEPHONE ENCOUNTER
Patient sees Dr Jacky Evans  She had her knee replaced on 06/11  She has a dental appointment on Thursday 07/19  She is asking if antibiotics can be sent to her pharmacy on file? She is allergic to Penicillins

## 2018-07-17 NOTE — TELEPHONE ENCOUNTER
Patient made aware of above information and will reschedule for 6 weeks for routine cleaning  Kizzy called to pharmacy on file

## 2018-07-20 ENCOUNTER — OFFICE VISIT (OUTPATIENT)
Dept: OBGYN CLINIC | Facility: MEDICAL CENTER | Age: 58
End: 2018-07-20

## 2018-07-20 VITALS
BODY MASS INDEX: 31.4 KG/M2 | HEIGHT: 69 IN | SYSTOLIC BLOOD PRESSURE: 109 MMHG | HEART RATE: 75 BPM | DIASTOLIC BLOOD PRESSURE: 77 MMHG | WEIGHT: 212 LBS

## 2018-07-20 DIAGNOSIS — Z96.651 STATUS POST TOTAL RIGHT KNEE REPLACEMENT: Primary | ICD-10-CM

## 2018-07-20 PROCEDURE — 99024 POSTOP FOLLOW-UP VISIT: CPT | Performed by: STUDENT IN AN ORGANIZED HEALTH CARE EDUCATION/TRAINING PROGRAM

## 2018-07-20 RX ORDER — OXYCODONE HYDROCHLORIDE 5 MG/1
5 TABLET ORAL EVERY 4 HOURS PRN
Qty: 30 TABLET | Refills: 0 | Status: SHIPPED | OUTPATIENT
Start: 2018-07-20 | End: 2018-08-31

## 2018-07-20 NOTE — LETTER
To whom it may concern,         Given the patient's recent surgery, she is not medically cleared to  an automobile long distances  If there are questions regarding this communication, please feel free to contact the [address] [place] [address] office                             Reyna Bennett MD

## 2018-07-20 NOTE — PROGRESS NOTES
61 y o female presents for 6 weeks postoperative visit status post right TKA (op date: 6/11/2018)  Patient continues to progress well though she still complains of medially based tenderness at the pes anserine bursa  The pain is a nuisance, but does not significantly limit her progress with PT  She came into the office today without the use of an assistive walking device  She has no other complaints at this time       Review of Systems  Review of systems negative unless otherwise specified in HPI    Past Medical History  Past Medical History:   Diagnosis Date    Abnormal breast exam     Anxiety     Asthma     childhood    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[address] )     primary per allscripts    Cancer ([facility] [address] )     IgG kappa smoldering multiple myeloma    Cardiac murmur     Chronic liver disease     resolved 12/04/2017    Depression     Endometriosis     Fracture of tibia     right tibial plateau fracture per allscripts    Hepatic disease     Hypertension     last assessed 12/13/2017    Neuropathy     R foot     OCD (obsessive compulsive disorder)     Pneumonia     Seasonal allergies     Wears eyeglasses     Whiplash injury to neck        Past Surgical History  Past Surgical History:   Procedure Laterality Date    BACK SURGERY      hemilaminectomy and discectomy, L5-S1, right ([facility]) onset 02/14/2005 per allscripts    EXPLORATION EXTREMITY Right 8/29/2016    Procedure: KNEE PERONEAL NERVE EXPLORATION AND RELEASE ;  Surgeon: Mari Wong MD;  Location: [facility];  Service:    [facility] FOOT SURGERY      HAND SURGERY      HERNIA REPAIR      MANDIBLE SURGERY      NEUROPLASTY / TRANSPOSITION MEDIAN NERVE AT CARPAL TUNNEL      ORIF TIBIAL PLATEAU Right     arthroplasty per allscripts    OTHER SURGICAL HISTORY      injection of trigger point(s) per allscripts    SD TOTAL KNEE ARTHROPLASTY Right 6/11/2018    Procedure: ARTHROPLASTY KNEE TOTAL;  Surgeon: Mari Wong MD;  Location: [facility];  Service: Orthopedics       Current Medications  Current Outpatient Prescriptions on File Prior to Visit   Medication Sig Dispense Refill    Cholecalciferol (VITAMIN D3) 2000 UNITS TABS Take 2,000 Units by mouth daily        cholestyramine (QUESTRAN) 4 g packet       docusate sodium (COLACE) 100 mg capsule Take 1 capsule (100 mg total) by mouth 2 (two) times a day 10 capsule 0    enoxaparin (LOVENOX) 40 mg/0 4 mL Inject 0 4 mL (40 mg total) under the skin daily for 28 days 11 2 mL 0    ergocalciferol (VITAMIN D2) 50,000 units TAKE ONE CAPSULE BY MOUTH EVERY OTHER WEEK FOR 1 MONTH, THEN 1 CAPSULE EVERY MONTH (Patient taking differently: TAKE ONE CAPSULE BY MOUTH EVERY MONTH) 4 capsule 2    Gabapentin, Once-Daily, (GRALISE) 600 MG TABS Take 1,800 mg by mouth daily at bedtime      oxyCODONE (ROXICODONE) 5 mg immediate release tablet Take 1 tablet (5 mg total) by mouth every 4 (four) hours as needed for moderate pain for up to 60 doses Max Daily Amount: 30 mg 30 tablet 0    oxyCODONE-acetaminophen (PERCOCET) 5-325 mg per tablet 1-2 tabs po q6 hr prn pain  Do not exceed 4g of acetaminophen in any 24 hr period 20 tablet 0    PARoxetine (PAXIL-CR) 25 mg 24 hr tablet Take 50 mg by mouth daily at bedtime        topiramate (TOPAMAX) 50 MG tablet Take 50 mg by mouth daily at bedtime        triamterene-hydrochlorothiazide (DYAZIDE) 37 5-25 mg per capsule Take 1 capsule by mouth every morning   ursodiol (ACTIGALL) 300 mg capsule Take 300 mg by mouth 3 (three) times a day  No current facility-administered medications on file prior to visit          Recent Labs [facility])    0  Lab Value Date/Time   HCT 26 2 (L) 06/13/2018 0509   HCT 38 2 12/29/2015 0659   HGB 8 0 (L) 06/13/2018 0509   HGB 12 4 12/29/2015 0659   WBC 6 69 06/13/2018 0509   WBC 6 60 12/29/2015 0659   INR 0 95 05/22/2018 1439   ESR 42 (H) 12/06/2017 0739   CRP <3 0 12/06/2017 0739   GLUCOSE 112 06/13/2018 0509   GLUCOSE 134 12/29/2015 0659   HGBA1C 6 5 (H) 05/22/2018 1439         Physical exam  · General: Awake, Alert, Oriented  · Eyes: Pupils equal, round and reactive to light  · Heart: regular rate and rhythm  · Lungs: No audible wheezing  · Abdomen: soft  right Knee exam  · Skin intact with a well healed surgical incision  · No effusion   · Mild swelling  · No JLT though there is point tenderness over Washita's tubercle   · Stable to varus/valgus stress  · ROM 5-100 degrees   · Motor and sensation intact   · 2+ DP pulse     Imaging  Radiographs of right knee show a well placed prosthesis in excellent alignment     62 yo F 6 weeks s/p right TKA for post-traumatic arthritis   · WBAT RLE   · Continue PT  · PO anti-inflammatories  · Please follow-up in 6 weeks for continued post-operative evaluation

## 2018-07-24 ENCOUNTER — TELEPHONE (OUTPATIENT)
Dept: OBGYN CLINIC | Facility: HOSPITAL | Age: 58
End: 2018-07-24

## 2018-07-24 NOTE — TELEPHONE ENCOUNTER
Call from patient   Call back # [phone]  Dr Kasia Walker 06/11/18 RT TKA    Patient would like to know if she can put Biofreeze on her knee to help with pain at night? Patient states she has trouble sleeping due to the pain

## 2018-07-30 ENCOUNTER — TELEPHONE (OUTPATIENT)
Dept: OBGYN CLINIC | Facility: HOSPITAL | Age: 58
End: 2018-07-30

## 2018-07-30 NOTE — TELEPHONE ENCOUNTER
I apologize, what I meant to request is the patients work status  I only see a letter in the chart that she cannot drive

## 2018-08-02 ENCOUNTER — TELEPHONE (OUTPATIENT)
Dept: OBGYN CLINIC | Facility: HOSPITAL | Age: 58
End: 2018-08-02

## 2018-08-02 NOTE — TELEPHONE ENCOUNTER
Seema- dr Elkin Echevarria pt  Ph- [phone]  Patient is asking if she can come in to get the injection for her bursitis before her p/o follow up on 8/31? She reports she is in a lot of pain

## 2018-08-02 NOTE — TELEPHONE ENCOUNTER
An injection for bursitis is not advised until a least 3 months postsurgical   Please call patient and inform of above    Thank you

## 2018-08-13 ENCOUNTER — TELEPHONE (OUTPATIENT)
Dept: INTERNAL MEDICINE CLINIC | Facility: CLINIC | Age: 58
End: 2018-08-13

## 2018-08-13 ENCOUNTER — OFFICE VISIT (OUTPATIENT)
Dept: INTERNAL MEDICINE CLINIC | Facility: CLINIC | Age: 58
End: 2018-08-13
Payer: COMMERCIAL

## 2018-08-13 VITALS — HEART RATE: 78 BPM | RESPIRATION RATE: 14 BRPM

## 2018-08-13 DIAGNOSIS — M79.671 RIGHT FOOT PAIN: Primary | ICD-10-CM

## 2018-08-13 PROCEDURE — 99213 OFFICE O/P EST LOW 20 MIN: CPT | Performed by: INTERNAL MEDICINE

## 2018-08-13 PROCEDURE — 10120 INC&RMVL FB SUBQ TISS SMPL: CPT | Performed by: INTERNAL MEDICINE

## 2018-08-13 NOTE — TELEPHONE ENCOUNTER
PT STATED THAT SHE GOT A SPLINTER IN HER FOOT THIS PAST Friday  SHE STATED THAT HER NEIGHBOR TRIED TO GET IT ALL OUT BUT SHE DOESN'T THINK THAT IT IS BECAUSE IT'S PAINFUL WHEN SHE WALKS  SHE STATED THAT SHE CAN'T LOOK TO SEE IF IT'S ALL OUT BECAUSE IT'S THE FOOT OF THE LEG SHE HAD SURGERY ON SO SHE CAN'T BEND   PLEASE ADVISE     PT'S #[phone]

## 2018-08-13 NOTE — PROGRESS NOTES
Procedures  Under sterile conditions patient's right plantar surface of her foot was cleaned with Betadine and then alcohol  With careful manipulation in use a 22 gauge needle foreign body removed without complications    Patient tolerated the procedure well

## 2018-08-13 NOTE — PROGRESS NOTES
Assessment/Plan:  1  Foreign body of the right foot-removed under sterile conditions-she will apply Neosporin and Band-Aid daily  She will call if she notes surrounding erythema fever streaking up the leg etc with concerns about potential secondary infection  No problem-specific Assessment & Plan notes found for this encounter  Diagnoses and all orders for this visit:    Right foot pain          Subjective:      Patient ID: Sal Vargas is a 61 y o  female  Seen today with chief complaint of right foot pain  She felt is 0 she had a splinter in her right foot  She has hardwood floor  She felt a large splinter in the foot  It is difficult for her to removed the splinter with her chronic right leg problems  She tried a manipulated herself  She had her neighbor look added  She presents for exam today  She denies pain starting in the foot and radiating up the leg  She has not had any systemic symptoms fever etc        The following portions of the patient's history were reviewed and updated as appropriate: current medications, past family history, past medical history, past social history, past surgical history and problem list     Review of Systems   Constitutional: Negative  Respiratory: Negative  Cardiovascular: Negative  Gastrointestinal: Negative  Endocrine: Negative  Genitourinary: Negative  Musculoskeletal: Positive for gait problem  Chronic right leg pain in new right foot pain   Hematological: Negative  Psychiatric/Behavioral: Negative  Objective:      Pulse 78   Resp 14   LMP  (LMP Unknown)          Physical Exam   Constitutional: She is oriented to person, place, and time  She appears well-developed and well-nourished  No distress  HENT:   Head: Normocephalic and atraumatic  Right Ear: External ear normal    Left Ear: External ear normal    Nose: Nose normal    Mouth/Throat: Oropharynx is clear and moist  No oropharyngeal exudate     Eyes: Conjunctivae and EOM are normal  Pupils are equal, round, and reactive to light  Right eye exhibits no discharge  Left eye exhibits no discharge  No scleral icterus  Neck: Normal range of motion  Neck supple  No JVD present  No tracheal deviation present  No thyromegaly present  Cardiovascular: Normal rate, regular rhythm, normal heart sounds and intact distal pulses  Exam reveals no gallop and no friction rub  No murmur heard  Pulmonary/Chest: Effort normal and breath sounds normal  No respiratory distress  She has no wheezes  She has no rales  She exhibits no tenderness  Abdominal: Soft  Bowel sounds are normal  She exhibits no distension and no mass  There is no tenderness  There is no rebound and no guarding  Musculoskeletal: Normal range of motion  She exhibits no edema or deformity  Pain on range of motion of right leg  Lymphadenopathy:     She has no cervical adenopathy  Neurological: She is alert and oriented to person, place, and time  She has normal reflexes  No cranial nerve deficit  She exhibits normal muscle tone  Coordination normal    Skin: Skin is warm and dry  No rash noted  No erythema  Wound of right plantar side of her foot associated with recent splinter  Under sterile conditions small pieces of splinter removed   Psychiatric: She has a normal mood and affect   Her behavior is normal  Judgment and thought content normal

## 2018-08-30 ENCOUNTER — OFFICE VISIT (OUTPATIENT)
Dept: INTERNAL MEDICINE CLINIC | Facility: CLINIC | Age: 58
End: 2018-08-30
Payer: COMMERCIAL

## 2018-08-30 VITALS
WEIGHT: 167.4 LBS | HEART RATE: 68 BPM | DIASTOLIC BLOOD PRESSURE: 78 MMHG | BODY MASS INDEX: 24.79 KG/M2 | SYSTOLIC BLOOD PRESSURE: 128 MMHG | RESPIRATION RATE: 14 BRPM | HEIGHT: 69 IN

## 2018-08-30 DIAGNOSIS — D47.2 SMOLDERING MULTIPLE MYELOMA (SMM): ICD-10-CM

## 2018-08-30 DIAGNOSIS — M81.0 OSTEOPOROSIS WITHOUT CURRENT PATHOLOGICAL FRACTURE, UNSPECIFIED OSTEOPOROSIS TYPE: Chronic | ICD-10-CM

## 2018-08-30 DIAGNOSIS — E78.5 HYPERLIPIDEMIA, UNSPECIFIED HYPERLIPIDEMIA TYPE: ICD-10-CM

## 2018-08-30 DIAGNOSIS — K74.60 HEPATIC CIRRHOSIS, UNSPECIFIED HEPATIC CIRRHOSIS TYPE, UNSPECIFIED WHETHER ASCITES PRESENT (HCC): Chronic | ICD-10-CM

## 2018-08-30 DIAGNOSIS — R92.8 ABNORMAL MAMMOGRAM: ICD-10-CM

## 2018-08-30 DIAGNOSIS — R53.83 OTHER FATIGUE: Primary | ICD-10-CM

## 2018-08-30 DIAGNOSIS — E55.9 VITAMIN D DEFICIENCY: ICD-10-CM

## 2018-08-30 PROBLEM — I10 ESSENTIAL HYPERTENSION: Chronic | Status: ACTIVE | Noted: 2018-08-30

## 2018-08-30 PROBLEM — I10 ESSENTIAL HYPERTENSION: Status: ACTIVE | Noted: 2018-08-30

## 2018-08-30 PROCEDURE — 99215 OFFICE O/P EST HI 40 MIN: CPT | Performed by: INTERNAL MEDICINE
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
## 2018-08-31 ENCOUNTER — APPOINTMENT (OUTPATIENT)
Dept: RADIOLOGY | Facility: MEDICAL CENTER | Age: 58
End: 2018-08-31
Payer: OTHER MISCELLANEOUS

## 2018-08-31 ENCOUNTER — OFFICE VISIT (OUTPATIENT)
Dept: OBGYN CLINIC | Facility: MEDICAL CENTER | Age: 58
End: 2018-08-31
Payer: OTHER MISCELLANEOUS

## 2018-08-31 VITALS
HEART RATE: 76 BPM | HEIGHT: 69 IN | WEIGHT: 164.8 LBS | DIASTOLIC BLOOD PRESSURE: 77 MMHG | SYSTOLIC BLOOD PRESSURE: 120 MMHG | BODY MASS INDEX: 24.41 KG/M2

## 2018-08-31 DIAGNOSIS — Z96.651 STATUS POST TOTAL RIGHT KNEE REPLACEMENT: ICD-10-CM

## 2018-08-31 DIAGNOSIS — Z96.651 STATUS POST TOTAL RIGHT KNEE REPLACEMENT: Primary | ICD-10-CM

## 2018-08-31 PROCEDURE — 99024 POSTOP FOLLOW-UP VISIT: CPT | Performed by: ORTHOPAEDIC SURGERY

## 2018-08-31 PROCEDURE — 73560 X-RAY EXAM OF KNEE 1 OR 2: CPT

## 2018-08-31 PROCEDURE — 20610 DRAIN/INJ JOINT/BURSA W/O US: CPT | Performed by: ORTHOPAEDIC SURGERY

## 2018-08-31 RX ORDER — LIDOCAINE HYDROCHLORIDE 10 MG/ML
2 INJECTION, SOLUTION INFILTRATION; PERINEURAL
Status: COMPLETED | OUTPATIENT
Start: 2018-08-31 | End: 2018-08-31

## 2018-08-31 RX ORDER — TRAMADOL HYDROCHLORIDE 50 MG/1
50 TABLET ORAL EVERY 6 HOURS PRN
COMMUNITY
End: 2018-10-12

## 2018-08-31 RX ORDER — BUPIVACAINE HYDROCHLORIDE 2.5 MG/ML
2 INJECTION, SOLUTION INFILTRATION; PERINEURAL
Status: COMPLETED | OUTPATIENT
Start: 2018-08-31 | End: 2018-08-31

## 2018-08-31 RX ORDER — BETAMETHASONE SODIUM PHOSPHATE AND BETAMETHASONE ACETATE 3; 3 MG/ML; MG/ML
12 INJECTION, SUSPENSION INTRA-ARTICULAR; INTRALESIONAL; INTRAMUSCULAR; SOFT TISSUE
Status: COMPLETED | OUTPATIENT
Start: 2018-08-31 | End: 2018-08-31

## 2018-08-31 RX ADMIN — BUPIVACAINE HYDROCHLORIDE 2 ML: 2.5 INJECTION, SOLUTION INFILTRATION; PERINEURAL at 09:17

## 2018-08-31 RX ADMIN — LIDOCAINE HYDROCHLORIDE 2 ML: 10 INJECTION, SOLUTION INFILTRATION; PERINEURAL at 09:17

## 2018-08-31 RX ADMIN — BUPIVACAINE HYDROCHLORIDE 2 ML: 2.5 INJECTION, SOLUTION INFILTRATION; PERINEURAL at 09:18

## 2018-08-31 RX ADMIN — BETAMETHASONE SODIUM PHOSPHATE AND BETAMETHASONE ACETATE 12 MG: 3; 3 INJECTION, SUSPENSION INTRA-ARTICULAR; INTRALESIONAL; INTRAMUSCULAR; SOFT TISSUE at 09:18

## 2018-08-31 RX ADMIN — LIDOCAINE HYDROCHLORIDE 2 ML: 10 INJECTION, SOLUTION INFILTRATION; PERINEURAL at 09:18

## 2018-08-31 RX ADMIN — BETAMETHASONE SODIUM PHOSPHATE AND BETAMETHASONE ACETATE 12 MG: 3; 3 INJECTION, SUSPENSION INTRA-ARTICULAR; INTRALESIONAL; INTRAMUSCULAR; SOFT TISSUE at 09:17
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
## 2018-08-31 NOTE — LETTER
August 31, 2018     Patient: Meena Salcedo   YOB: 1960   Date of Visit: 8/31/2018       To Whom it May Concern:    Jesse Adams is under my professional care  She was seen in my office on 8/31/2018  She will remain out of work though her next appointment in 6 wks on 10/12/18 at which time we will re-evaluate her return to work status  If you have any questions or concerns, please don't hesitate to call           Sincerely,          Elza Wilkes MD        CC: No Recipients

## 2018-09-04 ENCOUNTER — TELEPHONE (OUTPATIENT)
Dept: INTERNAL MEDICINE CLINIC | Facility: CLINIC | Age: 58
End: 2018-09-04

## 2018-09-04 NOTE — TELEPHONE ENCOUNTER
PT CALLED STATING SHE IS HAVING A HARD TIME GETTING INTO DR MCCLAIN [facility] OFFICE   SHE STATED YOU TOLD HER TO CALL THE OFFICE IF SHE WAS HAVING TROUBLE  SHE ALSO MENTIONED THERE IS A LADY THAT WORKS AT HIS OFFICE BY THE NAME OF JIMENEZ , SHE DOES NOT WANT HER IN HER CHART AT ALL , FOR PERSONAL REASONS      SHE IS WILLING TO GO TO THE OFFICE BUT WANTS YOU TO SPEAK WITH  [facility] AND LET HIM KNOW AHEAD OF  TIME OR THE OFFICE [address]  [address]   [phone]

## 2018-09-04 NOTE — TELEPHONE ENCOUNTER
Please make a note to speak with Poncho Valentine on Thursday about this-give me  a copy of this requested and remind me on Thursday to speak with him

## 2018-09-07 ENCOUNTER — TELEPHONE (OUTPATIENT)
Dept: HEMATOLOGY ONCOLOGY | Facility: CLINIC | Age: 58
End: 2018-09-07

## 2018-09-07 ENCOUNTER — TELEPHONE (OUTPATIENT)
Dept: PSYCHIATRY | Facility: CLINIC | Age: 58
End: 2018-09-07

## 2018-09-07 NOTE — TELEPHONE ENCOUNTER
Please ask Sejal Saldana to help you-we either need to push to move up the appointment or seek out another psychiatrist in the system-asked Sejal Saldana for his assistance

## 2018-09-07 NOTE — TELEPHONE ENCOUNTER
Called pt to let her know Cyrus's response that he could get her in to see Dr Enedina Argueta but that it might not be till next summer  Had to leave a message

## 2018-09-07 NOTE — TELEPHONE ENCOUNTER
Called patient and LVM informing her that her labs need to be completed prior to have appt in Monday with Dr Isaiah Dang   Informed her if her labs are not completed her appt may need to be R/S

## 2018-09-07 NOTE — TELEPHONE ENCOUNTER
----- Message from Flakito Higgins sent at 9/7/2018 10:50 AM EDT -----  He is aware it is going to be a ways out  [place] Peri Quesada  ----- Message -----  From: Karishma Parekh MA  Sent: 9/7/2018  10:43 AM  To: Elif Clark is Dr Kalia Sheldon aware Dr Darren Huang is scheduling in August 2019?  ----- Message -----  From: Roxana Gutierrez MA  Sent: 9/7/2018   8:48 AM  To: Karishma Parekh MA        ----- Message -----  From: Rachel Orlando MA  Sent: 9/6/2018   2:21 PM  To: Mary Parker MA        ----- Message -----  From: Flakito Higgins  Sent: 9/6/2018   1:21 PM  To: Yolanda Way    PCP- Dr Kalia Sheldon- requesting patient see Dr Darren Huang specifically   Dacia Quesada

## 2018-09-07 NOTE — TELEPHONE ENCOUNTER
Pt called asking for an update and also to reiterate that she does not want Courtney Fierro to be able to access her records  She is her ex-boyfriend's ex-wife so she feels there is a definite conflict of interest and she wants everyone to be aware  Please advise

## 2018-09-07 NOTE — TELEPHONE ENCOUNTER
Pt called back to say that waiting that long is unacceptable and that she is already irritated with that office because they called her home phone and left a voicemail so now anyone in the house could hear and she feels like that was a breach of [employer]  Pt asked if you have an alternative suggestion or if you yourself could get her in sooner  Please advise

## 2018-09-08 ENCOUNTER — APPOINTMENT (OUTPATIENT)
Dept: LAB | Age: 58
End: 2018-09-08
Payer: COMMERCIAL

## 2018-09-08 DIAGNOSIS — E78.5 HYPERLIPIDEMIA, UNSPECIFIED HYPERLIPIDEMIA TYPE: ICD-10-CM

## 2018-09-08 DIAGNOSIS — E55.9 VITAMIN D DEFICIENCY: ICD-10-CM

## 2018-09-08 DIAGNOSIS — R53.83 OTHER FATIGUE: ICD-10-CM

## 2018-09-08 LAB
25(OH)D3 SERPL-MCNC: 22.8 NG/ML (ref 30–100)
ALBUMIN SERPL BCP-MCNC: 3.1 G/DL (ref 3.5–5)
ALP SERPL-CCNC: 295 U/L (ref 46–116)
ALT SERPL W P-5'-P-CCNC: 71 U/L (ref 12–78)
ANION GAP SERPL CALCULATED.3IONS-SCNC: 6 MMOL/L (ref 4–13)
AST SERPL W P-5'-P-CCNC: 42 U/L (ref 5–45)
BILIRUB SERPL-MCNC: 0.34 MG/DL (ref 0.2–1)
BUN SERPL-MCNC: 14 MG/DL (ref 5–25)
CALCIUM SERPL-MCNC: 8.7 MG/DL (ref 8.3–10.1)
CHLORIDE SERPL-SCNC: 101 MMOL/L (ref 100–108)
CHOLEST SERPL-MCNC: 188 MG/DL (ref 50–200)
CO2 SERPL-SCNC: 30 MMOL/L (ref 21–32)
CREAT SERPL-MCNC: 0.86 MG/DL (ref 0.6–1.3)
GFR SERPL CREATININE-BSD FRML MDRD: 75 ML/MIN/1.73SQ M
GLUCOSE P FAST SERPL-MCNC: 90 MG/DL (ref 65–99)
HDLC SERPL-MCNC: 62 MG/DL (ref 40–60)
LDLC SERPL DIRECT ASSAY-MCNC: 96 MG/DL (ref 0–100)
POTASSIUM SERPL-SCNC: 3.3 MMOL/L (ref 3.5–5.3)
PROT SERPL-MCNC: 8.4 G/DL (ref 6.4–8.2)
SODIUM SERPL-SCNC: 137 MMOL/L (ref 136–145)
TRIGL SERPL-MCNC: 165 MG/DL

## 2018-09-08 PROCEDURE — 80053 COMPREHEN METABOLIC PANEL: CPT

## 2018-09-08 PROCEDURE — 36415 COLL VENOUS BLD VENIPUNCTURE: CPT

## 2018-09-08 PROCEDURE — 82306 VITAMIN D 25 HYDROXY: CPT

## 2018-09-08 PROCEDURE — 80061 LIPID PANEL: CPT

## 2018-09-08 PROCEDURE — 83721 ASSAY OF BLOOD LIPOPROTEIN: CPT

## 2018-09-10 ENCOUNTER — TELEPHONE (OUTPATIENT)
Dept: INTERNAL MEDICINE CLINIC | Facility: CLINIC | Age: 58
End: 2018-09-10

## 2018-09-10 ENCOUNTER — APPOINTMENT (OUTPATIENT)
Dept: LAB | Facility: CLINIC | Age: 58
End: 2018-09-10
Payer: COMMERCIAL

## 2018-09-10 ENCOUNTER — TELEPHONE (OUTPATIENT)
Dept: BEHAVIORAL/MENTAL HEALTH CLINIC | Facility: CLINIC | Age: 58
End: 2018-09-10

## 2018-09-10 ENCOUNTER — OFFICE VISIT (OUTPATIENT)
Dept: HEMATOLOGY ONCOLOGY | Facility: CLINIC | Age: 58
End: 2018-09-10
Payer: COMMERCIAL

## 2018-09-10 ENCOUNTER — TELEPHONE (OUTPATIENT)
Dept: HEMATOLOGY ONCOLOGY | Facility: CLINIC | Age: 58
End: 2018-09-10

## 2018-09-10 ENCOUNTER — TRANSCRIBE ORDERS (OUTPATIENT)
Dept: LAB | Facility: CLINIC | Age: 58
End: 2018-09-10

## 2018-09-10 VITALS
TEMPERATURE: 98 F | WEIGHT: 168 LBS | SYSTOLIC BLOOD PRESSURE: 140 MMHG | HEART RATE: 76 BPM | BODY MASS INDEX: 24.05 KG/M2 | OXYGEN SATURATION: 97 % | DIASTOLIC BLOOD PRESSURE: 82 MMHG | HEIGHT: 70 IN | RESPIRATION RATE: 18 BRPM

## 2018-09-10 DIAGNOSIS — C90.00 MULTIPLE MYELOMA, REMISSION STATUS UNSPECIFIED (HCC): Primary | ICD-10-CM

## 2018-09-10 DIAGNOSIS — D47.2 SMOLDERING MULTIPLE MYELOMA (SMM): Primary | ICD-10-CM

## 2018-09-10 DIAGNOSIS — M81.0 OSTEOPOROSIS WITHOUT CURRENT PATHOLOGICAL FRACTURE, UNSPECIFIED OSTEOPOROSIS TYPE: Chronic | ICD-10-CM

## 2018-09-10 DIAGNOSIS — D47.2 SMOLDERING MULTIPLE MYELOMA (SMM): ICD-10-CM

## 2018-09-10 LAB
BASOPHILS # BLD AUTO: 0.04 THOUSANDS/ΜL (ref 0–0.1)
BASOPHILS NFR BLD AUTO: 1 % (ref 0–1)
EOSINOPHIL # BLD AUTO: 0.12 THOUSAND/ΜL (ref 0–0.61)
EOSINOPHIL NFR BLD AUTO: 2 % (ref 0–6)
ERYTHROCYTE [DISTWIDTH] IN BLOOD BY AUTOMATED COUNT: 13.4 % (ref 11.6–15.1)
HCT VFR BLD AUTO: 40.5 % (ref 34.8–46.1)
HGB BLD-MCNC: 12.8 G/DL (ref 11.5–15.4)
IGA SERPL-MCNC: 57 MG/DL (ref 70–400)
IGG SERPL-MCNC: 2650 MG/DL (ref 700–1600)
IGM SERPL-MCNC: 215 MG/DL (ref 40–230)
IMM GRANULOCYTES # BLD AUTO: 0.05 THOUSAND/UL (ref 0–0.2)
IMM GRANULOCYTES NFR BLD AUTO: 1 % (ref 0–2)
LYMPHOCYTES # BLD AUTO: 2.39 THOUSANDS/ΜL (ref 0.6–4.47)
LYMPHOCYTES NFR BLD AUTO: 30 % (ref 14–44)
MCH RBC QN AUTO: 28.2 PG (ref 26.8–34.3)
MCHC RBC AUTO-ENTMCNC: 31.6 G/DL (ref 31.4–37.4)
MCV RBC AUTO: 89 FL (ref 82–98)
MONOCYTES # BLD AUTO: 0.48 THOUSAND/ΜL (ref 0.17–1.22)
MONOCYTES NFR BLD AUTO: 6 % (ref 4–12)
NEUTROPHILS # BLD AUTO: 4.83 THOUSANDS/ΜL (ref 1.85–7.62)
NEUTS SEG NFR BLD AUTO: 60 % (ref 43–75)
NRBC BLD AUTO-RTO: 0 /100 WBCS
PLATELET # BLD AUTO: 225 THOUSANDS/UL (ref 149–390)
PMV BLD AUTO: 10 FL (ref 8.9–12.7)
RBC # BLD AUTO: 4.54 MILLION/UL (ref 3.81–5.12)
WBC # BLD AUTO: 7.91 THOUSAND/UL (ref 4.31–10.16)

## 2018-09-10 PROCEDURE — 99214 OFFICE O/P EST MOD 30 MIN: CPT | Performed by: INTERNAL MEDICINE

## 2018-09-10 PROCEDURE — 84165 PROTEIN E-PHORESIS SERUM: CPT | Performed by: PATHOLOGY

## 2018-09-10 PROCEDURE — 84165 PROTEIN E-PHORESIS SERUM: CPT

## 2018-09-10 PROCEDURE — 83883 ASSAY NEPHELOMETRY NOT SPEC: CPT

## 2018-09-10 PROCEDURE — 82784 ASSAY IGA/IGD/IGG/IGM EACH: CPT

## 2018-09-10 PROCEDURE — 85025 COMPLETE CBC W/AUTO DIFF WBC: CPT

## 2018-09-10 PROCEDURE — 36415 COLL VENOUS BLD VENIPUNCTURE: CPT

## 2018-09-10 NOTE — TELEPHONE ENCOUNTER
----- Message from Reece Zuniga MD sent at 9/9/2018  6:51 AM EDT -----  Call patient and let her know recent labs show vitamin-D level still low  She needs to increase her daily dose to 2000 units daily and also begin vitamin-D 33830 units 1 p o  monthly dispense 3 with 3 refills    Her potassium is borderline low needs to be repeated in a month-schedule chemistry profile no fast in 1 month with diagnosis of multiple myeloma

## 2018-09-10 NOTE — TELEPHONE ENCOUNTER
SPOKE WITH PT, PER THIS CONVERSATION, I INFORMED HER THAT WE ARE UNABLE TO PUSH UP HER APPT WITH DR Aguila Swanson  I GAVE HER DR MEGHAN'S NAME AND SHE REFUSED TO SEE HIM  I TOLD HER SHE COULD CALL BACK TO DR Aguila Swanson' OFFICE TO SEE IF THERE IS ANOTHER DR IN HIS PRACTICE THAT SHE COULD SEE UNTIL DR BATES HAS OPENINGS OR SHE COULD ASK TO BE PUT ON A CANCELTION LIST OR SEE SOMEONE ELSE   PT STATED "OK" AND THAT WAS THE END OF OUR CONVERSATION

## 2018-09-10 NOTE — LETTER
September 10, 2018     Harlee Moritz, MD  [address]  [address] [facility], [address] [place] [address]    Patient: Garett Service   YOB: 1960   Date of Visit: 9/10/2018       Dear Dr Jhonathan Baez: Thank you for referring Micheline Osorio to me for evaluation  Below are my notes for this consultation  If you have questions, please do not hesitate to call me  I look forward to following your patient along with you  Sincerely,        Wander Quintana MD        CC: No Recipients  Wander Quintana MD  9/10/2018  9:01 AM  Sign at close encounter  Hematology Outpatient Follow - Up Note  Roomer Travel Service 61 y o  female MRN: @ Encounter: [id number]        Date:  9/10/2018        Assessment/ Plan:    1  IgG kappa smoldering multiple myeloma, she had a bone marrow biopsy in May 2017 showed 15% plasma cells when she presented with elevated total protein, normal cytogenetics and fish panel for multiple myeloma, she is on watchful observation  2  Primary biliary cirrhosis of the liver with persistent elevation of alkaline phosphatase   3  Osteoporosis, she is on Zometa every 6 months for total of 2 years proceed with dose 4/4   4  Very frustrated with anxiety/ OCD/depression that she could not get an appointment with a psychiatrist for 1 year, she does not have suicidal ideation,  I will try to talk to our  even talk to the psychiatrist to expedite the appointment  5  SPEP, free light chain are pending at the time of the dictation, also CBC  5   Follow-up in 6 months with CBC, CMP, SPEP, free light chain          HPI: 79-year-old  female with past medical history of primary biliary cirrhosis, fracture of the right tibia, hypertension, OCD,worsening of osteoporosis on DEXA scan Done in 2016, nephrolithiasis, chronic lower back pain and possible sacroiliitis was found to have elevated total protein 3-4 years ago, serum protein electrophoresis showed IgG kappa monoclonal protein of 1 7 g/dL however no evidence of anemia, hypercalcemia, or renal insufficiency, she had persistent elevation of alkaline phosphatase secondary to PBC  had a bone scan done last year which showed activity in the ribs area  serum protein electrophoresis in April 2017 showed IgG kappa monoclonal protein of 1 96 g/dL, total protein 8 5, albumin 3 9, IgG 2580, creatinine 0 8, calcium 8 9, alkaline phosphatase 294, AST 37, ALT 46, IgM 25 in July 2016 monoclonal protein of IgG kappa of 1 73  C-reactive protein has been normal however sedimentation rate was elevated in the range of 60  Bone marrow biopsy in May 2017 showed 15% plasma cells in diffuse and interstitial pattern, normal fish panel for multiple myeloma and cytogenetics     Interval History:  Status post right knee replacement in June 2018, I was noticed to have anemia after the surgery with hemoglobin of 8 down from 12  She had exacerbation of anxiety/depression, she is so frustrated that she could not get an appointment with a psychiatrist until 1 year? ECOG score       Test Results:    Imaging: Xr Knee 1 Or 2 Vw Right    Result Date: 8/31/2018  Narrative: RIGHT KNEE INDICATION:   Z96 651: Presence of right artificial knee joint  COMPARISON:  6/22/2018 VIEWS:  XR KNEE 1 OR 2 VW RIGHT FINDINGS: There is no acute fracture or dislocation  There appears to be a joint effusion  The knee prosthesis is stable in alignment  No lucency around the hardware components  Sclerotic area noted in the proximal lateral aspect of the tibia, stable from prior  Soft tissues are unremarkable  Impression: Small joint effusion  Knee prosthesis appears stable and satisfactory   Workstation performed: [id number]       Labs:   Lab Results   Component Value Date    WBC 6 69 06/13/2018    HGB 8 0 (L) 06/13/2018    HCT 26 2 (L) 06/13/2018    MCV 96 06/13/2018     (L) 06/13/2018     Lab Results   Component Value Date     09/08/2018    K 3 3 (L) 09/08/2018     09/08/2018 CO2 30 09/08/2018    ANIONGAP 7 12/29/2015    BUN 14 09/08/2018    CREATININE 0 86 09/08/2018    GLUCOSE 134 12/29/2015    GLUF 90 09/08/2018    CALCIUM 8 7 09/08/2018    AST 42 09/08/2018    ALT 71 09/08/2018    ALKPHOS 295 (H) 09/08/2018    PROT 8 4 (H) 12/29/2015    BILITOT 0 46 12/29/2015    EGFR 75 09/08/2018           No results found for: [id number]      ROS:   Review of Systems   Constitutional: Positive for diaphoresis and fatigue  Negative for activity change, appetite change, fever and unexpected weight change  HENT: Negative for facial swelling, hearing loss, rhinorrhea, sinus pain, sinus pressure, sneezing, sore throat and tinnitus  Eyes: Negative for photophobia, pain, discharge, redness, itching and visual disturbance  Respiratory: Positive for shortness of breath  Negative for apnea and chest tightness  Cardiovascular: Negative for chest pain, palpitations and leg swelling  Gastrointestinal: Negative for abdominal distention, abdominal pain, blood in stool, constipation, diarrhea, nausea, rectal pain and vomiting  Endocrine: Negative for cold intolerance, heat intolerance, polydipsia and polyphagia  Genitourinary: Negative for difficulty urinating, dyspareunia, frequency, hematuria, pelvic pain and urgency  Musculoskeletal: Positive for joint swelling ( right knee joint swelling)  Negative for arthralgias, back pain, gait problem and myalgias  Skin: Negative for color change, pallor and rash  Allergic/Immunologic: Negative for environmental allergies and food allergies  Neurological: Negative for dizziness, tremors, seizures, syncope, speech difficulty, numbness and headaches  Hematological: Negative for adenopathy  Does not bruise/bleed easily  Psychiatric/Behavioral: Positive for sleep disturbance  Negative for agitation, confusion, dysphoric mood, hallucinations and suicidal ideas  The patient is nervous/anxious            Current Medications: Reviewed  Allergies: Reviewed  PMH/FH/SH:  Reviewed      Physical Exam:    Body surface area is 1 93 meters squared  Wt Readings from Last 3 Encounters:   09/10/18 76 2 kg (168 lb)   08/31/18 74 8 kg (164 lb 12 8 oz)   08/30/18 75 9 kg (167 lb 6 4 oz)        Temp Readings from Last 3 Encounters:   09/10/18 98 °F (36 7 °C) (Tympanic)   06/13/18 98 °F (36 7 °C) (Oral)   04/14/18 97 6 °F (36 4 °C) (Tympanic)        BP Readings from Last 3 Encounters:   09/10/18 140/82   08/31/18 120/77   08/30/18 128/78         Pulse Readings from Last 3 Encounters:   09/10/18 76   08/31/18 76   08/30/18 68        Physical Exam   Constitutional: She is oriented to person, place, and time  She appears well-developed and well-nourished  No distress  HENT:   Head: Normocephalic and atraumatic  Mouth/Throat: Oropharynx is clear and moist  No oropharyngeal exudate  Eyes: Conjunctivae and EOM are normal  Pupils are equal, round, and reactive to light  Neck: Normal range of motion  Neck supple  No JVD present  No tracheal deviation present  No thyromegaly present  Cardiovascular: Normal rate and regular rhythm  Exam reveals no gallop and no friction rub  No murmur heard  Pulmonary/Chest: Effort normal and breath sounds normal  No respiratory distress  She has no wheezes  She has no rales  She exhibits no tenderness  Abdominal: Soft  Bowel sounds are normal  She exhibits no distension and no mass  There is no tenderness  There is no rebound and no guarding  Musculoskeletal: Normal range of motion  She exhibits edema ( +1 edema of the right knee), tenderness and deformity ( scoliosis)  Lymphadenopathy:     She has no cervical adenopathy  Neurological: She is alert and oriented to person, place, and time  Skin: Skin is warm and dry  No rash noted  She is not diaphoretic  No erythema  No pallor  Psychiatric: She has a normal mood and affect  Her behavior is normal  Judgment and thought content normal    Vitals reviewed            Goals and Barriers:  Current Goal: Minimize effects of disease  Barriers: None  Patient's Capacity to Self Care:  Patient is able to self care      Code Status: [unfilled]

## 2018-09-10 NOTE — PROGRESS NOTES
Hematology Outpatient Follow - Up Note  Claudene Jacobs 61 y o  female MRN: @ Encounter: [id number]        Date:  9/10/2018        Assessment/ Plan:    1  IgG kappa smoldering multiple myeloma, she had a bone marrow biopsy in May 2017 showed 15% plasma cells when she presented with elevated total protein, normal cytogenetics and fish panel for multiple myeloma, she is on watchful observation  2  Primary biliary cirrhosis of the liver with persistent elevation of alkaline phosphatase   3  Osteoporosis, she is on Zometa every 6 months for total of 2 years proceed with dose 4/4   4  Very frustrated with anxiety/ OCD/depression that she could not get an appointment with a psychiatrist for 1 year, she does not have suicidal ideation,  I will try to talk to our  even talk to the psychiatrist to expedite the appointment  5  SPEP, free light chain are pending at the time of the dictation, also CBC  5   Follow-up in 6 months with CBC, CMP, SPEP, free light chain          HPI: 50-year-old  female with past medical history of primary biliary cirrhosis, fracture of the right tibia, hypertension, OCD,worsening of osteoporosis on DEXA scan Done in 2016, nephrolithiasis, chronic lower back pain and possible sacroiliitis was found to have elevated total protein 3-4 years ago, serum protein electrophoresis showed IgG kappa monoclonal protein of 1 7 g/dL however no evidence of anemia, hypercalcemia, or renal insufficiency, she had persistent elevation of alkaline phosphatase secondary to PBC  had a bone scan done last year which showed activity in the ribs area  serum protein electrophoresis in April 2017 showed IgG kappa monoclonal protein of 1 96 g/dL, total protein 8 5, albumin 3 9, IgG 2580, creatinine 0 8, calcium 8 9, alkaline phosphatase 294, AST 37, ALT 46, IgM 25 in July 2016 monoclonal protein of IgG kappa of 1 73  C-reactive protein has been normal however sedimentation rate was elevated in the range of 60  Bone marrow biopsy in May 2017 showed 15% plasma cells in diffuse and interstitial pattern, normal fish panel for multiple myeloma and cytogenetics     Interval History:  Status post right knee replacement in June 2018, I was noticed to have anemia after the surgery with hemoglobin of 8 down from 12  She had exacerbation of anxiety/depression, she is so frustrated that she could not get an appointment with a psychiatrist until 1 year? ECOG score       Test Results:    Imaging: Xr Knee 1 Or 2 Vw Right    Result Date: 8/31/2018  Narrative: RIGHT KNEE INDICATION:   Z96 651: Presence of right artificial knee joint  COMPARISON:  6/22/2018 VIEWS:  XR KNEE 1 OR 2 VW RIGHT FINDINGS: There is no acute fracture or dislocation  There appears to be a joint effusion  The knee prosthesis is stable in alignment  No lucency around the hardware components  Sclerotic area noted in the proximal lateral aspect of the tibia, stable from prior  Soft tissues are unremarkable  Impression: Small joint effusion  Knee prosthesis appears stable and satisfactory  Workstation performed: [id number]       Labs:   Lab Results   Component Value Date    WBC 6 69 06/13/2018    HGB 8 0 (L) 06/13/2018    HCT 26 2 (L) 06/13/2018    MCV 96 06/13/2018     (L) 06/13/2018     Lab Results   Component Value Date     09/08/2018    K 3 3 (L) 09/08/2018     09/08/2018    CO2 30 09/08/2018    ANIONGAP 7 12/29/2015    BUN 14 09/08/2018    CREATININE 0 86 09/08/2018    GLUCOSE 134 12/29/2015    GLUF 90 09/08/2018    CALCIUM 8 7 09/08/2018    AST 42 09/08/2018    ALT 71 09/08/2018    ALKPHOS 295 (H) 09/08/2018    PROT 8 4 (H) 12/29/2015    BILITOT 0 46 12/29/2015    EGFR 75 09/08/2018           No results found for: [id number]      ROS:   Review of Systems   Constitutional: Positive for diaphoresis and fatigue  Negative for activity change, appetite change, fever and unexpected weight change     HENT: Negative for facial swelling, hearing loss, rhinorrhea, sinus pain, sinus pressure, sneezing, sore throat and tinnitus  Eyes: Negative for photophobia, pain, discharge, redness, itching and visual disturbance  Respiratory: Positive for shortness of breath  Negative for apnea and chest tightness  Cardiovascular: Negative for chest pain, palpitations and leg swelling  Gastrointestinal: Negative for abdominal distention, abdominal pain, blood in stool, constipation, diarrhea, nausea, rectal pain and vomiting  Endocrine: Negative for cold intolerance, heat intolerance, polydipsia and polyphagia  Genitourinary: Negative for difficulty urinating, dyspareunia, frequency, hematuria, pelvic pain and urgency  Musculoskeletal: Positive for joint swelling ( right knee joint swelling)  Negative for arthralgias, back pain, gait problem and myalgias  Skin: Negative for color change, pallor and rash  Allergic/Immunologic: Negative for environmental allergies and food allergies  Neurological: Negative for dizziness, tremors, seizures, syncope, speech difficulty, numbness and headaches  Hematological: Negative for adenopathy  Does not bruise/bleed easily  Psychiatric/Behavioral: Positive for sleep disturbance  Negative for agitation, confusion, dysphoric mood, hallucinations and suicidal ideas  The patient is nervous/anxious  Current Medications: Reviewed  Allergies: Reviewed  PMH/FH/SH:  Reviewed      Physical Exam:    Body surface area is 1 93 meters squared      Wt Readings from Last 3 Encounters:   09/10/18 76 2 kg (168 lb)   08/31/18 74 8 kg (164 lb 12 8 oz)   08/30/18 75 9 kg (167 lb 6 4 oz)        Temp Readings from Last 3 Encounters:   09/10/18 98 °F (36 7 °C) (Tympanic)   06/13/18 98 °F (36 7 °C) (Oral)   04/14/18 97 6 °F (36 4 °C) (Tympanic)        BP Readings from Last 3 Encounters:   09/10/18 140/82   08/31/18 120/77   08/30/18 128/78         Pulse Readings from Last 3 Encounters:   09/10/18 76   08/31/18 76 08/30/18 68        Physical Exam   Constitutional: She is oriented to person, place, and time  She appears well-developed and well-nourished  No distress  HENT:   Head: Normocephalic and atraumatic  Mouth/Throat: Oropharynx is clear and moist  No oropharyngeal exudate  Eyes: Conjunctivae and EOM are normal  Pupils are equal, round, and reactive to light  Neck: Normal range of motion  Neck supple  No JVD present  No tracheal deviation present  No thyromegaly present  Cardiovascular: Normal rate and regular rhythm  Exam reveals no gallop and no friction rub  No murmur heard  Pulmonary/Chest: Effort normal and breath sounds normal  No respiratory distress  She has no wheezes  She has no rales  She exhibits no tenderness  Abdominal: Soft  Bowel sounds are normal  She exhibits no distension and no mass  There is no tenderness  There is no rebound and no guarding  Musculoskeletal: Normal range of motion  She exhibits edema ( +1 edema of the right knee), tenderness and deformity ( scoliosis)  Lymphadenopathy:     She has no cervical adenopathy  Neurological: She is alert and oriented to person, place, and time  Skin: Skin is warm and dry  No rash noted  She is not diaphoretic  No erythema  No pallor  Psychiatric: She has a normal mood and affect  Her behavior is normal  Judgment and thought content normal    Vitals reviewed  Goals and Barriers:  Current Goal: Minimize effects of disease  Barriers: None  Patient's Capacity to Self Care:  Patient is able to self care      Code Status: [unfilled]

## 2018-09-10 NOTE — TELEPHONE ENCOUNTER
SPOKE WITH PT, GAVE RESULTS  THEN CALLED IN THE VITAMIN D (TO [facility] PER HER REQUEST) I LEFT A MESSAGE FOR THE PHARMACIST, AND MAILED HOME A LAB SLIP THAT PT WILL HAVE DONE IN ONE MONTH, NON FASTING

## 2018-09-10 NOTE — TELEPHONE ENCOUNTER
Behavorial Health Outpatient Intake Questions    Referred by: DR Cavazos with provider before scheduling    Are there any developmental disabilities? No    Does the patient have hearing impairment? No    Does the patient have ICM or CTT? No    Taking injectable psychiatric medications? NoIf yes, patient can not be seen here  Has the patient ever seen or currently see a psychiatrist? Yes If yes who/when? 2 weeks ago DR ALLEN X 2 YRS OCD,ANXIETY,DEPRESSION    Has the patient ever seen or currently see a therapist? Yes If yes who/when? CURRENTLY SEEN BY  Ladonna Pate X 20 YRS    How many visits did the pt have for previous psychiatric treatment?  History    Has the patient served in the [place] [address]? No    If yes, have you had combat services? No    Was the patient activated into federal active duty as a member of the national guard or reserve? No    Minor Child    Who has custody of the child? N/A    Is there a custody agreement? N/A    If there is a custody agreement remind parent that they must bring a copy to the first appt or they will not be seen  Behavorial Health Outpatient Intake History     Presenting Problem (in patient's words) ANXIETY,OCD,DEPRESSION    Substance Abuse:No concerns of substance abuse are reported  Has the patient been seen here previously, either inpatient or outpatient? No outpatient    If seen as outpatient, what provider(s) did the patient see? N/A    A member of the patient's family has been in therapy here with NO    ACCEPTED as a patient Yes Appointment Date: 8/2/2019 @ 10:00AM  DR BATES    Referred Elsewhere?  No    Primary Care Physician: Dayana Torres MD    PCP telephone number: [phone]    SUB: [facility]  INS: [facility]  ID: [id number]    GRP: [id number]

## 2018-09-10 NOTE — TELEPHONE ENCOUNTER
Spoke with Adriano Moreno regarding concerns he expressed in office note from today  Dacia Quesada will reach out to patient today

## 2018-09-11 ENCOUNTER — TELEPHONE (OUTPATIENT)
Dept: INTERNAL MEDICINE CLINIC | Facility: CLINIC | Age: 58
End: 2018-09-11

## 2018-09-11 LAB
KAPPA LC FREE SER-MCNC: 32.6 MG/L (ref 3.3–19.4)
KAPPA LC FREE/LAMBDA FREE SER: 2.43 {RATIO} (ref 0.26–1.65)
LAMBDA LC FREE SERPL-MCNC: 13.4 MG/L (ref 5.7–26.3)

## 2018-09-11 NOTE — TELEPHONE ENCOUNTER
Pt called stating she was referred to Dr Jack Tate psych    She wants to know if you have heard anything about this doctor     Good or bad    Please advise

## 2018-09-12 ENCOUNTER — DOCUMENTATION (OUTPATIENT)
Dept: RADIATION ONCOLOGY | Facility: HOSPITAL | Age: 58
End: 2018-09-12

## 2018-09-12 LAB
ALBUMIN SERPL ELPH-MCNC: 4.08 G/DL (ref 3.5–5)
ALBUMIN SERPL ELPH-MCNC: 45.8 % (ref 52–65)
ALPHA1 GLOB SERPL ELPH-MCNC: 0.35 G/DL (ref 0.1–0.4)
ALPHA1 GLOB SERPL ELPH-MCNC: 3.9 % (ref 2.5–5)
ALPHA2 GLOB SERPL ELPH-MCNC: 1.01 G/DL (ref 0.4–1.2)
ALPHA2 GLOB SERPL ELPH-MCNC: 11.4 % (ref 7–13)
BETA GLOB ABNORMAL SERPL ELPH-MCNC: 0.49 G/DL (ref 0.4–0.8)
BETA1 GLOB SERPL ELPH-MCNC: 5.5 % (ref 5–13)
BETA2 GLOB SERPL ELPH-MCNC: 3.9 % (ref 2–8)
BETA2+GAMMA GLOB SERPL ELPH-MCNC: 0.35 G/DL (ref 0.2–0.5)
GAMMA GLOB ABNORMAL SERPL ELPH-MCNC: 2.63 G/DL (ref 0.5–1.6)
GAMMA GLOB SERPL ELPH-MCNC: 29.5 % (ref 12–22)
IGG/ALB SER: 0.85 {RATIO} (ref 1.1–1.8)
M PROTEIN 1 MFR SERPL ELPH: 24 %
M PROTEIN 1 SERPL ELPH-MCNC: 2.14 G/DL
PROT SERPL-MCNC: 8.9 G/DL (ref 6.4–8.2)

## 2018-09-12 NOTE — PROGRESS NOTES
Summary Note  Patient was very upset that she would not be able to see her PCP"S choice of Behavioral Health Providers, Dr Hiral Egan, until next August  I attempted to assuage her concerns and I indicated that I would do my best to obtain an earlier appt for her with this provider  Discussed the matter with Dr Ron Pabon and Saeed Camarena RN on 9/11/18  Dr Ron Pabon very kindly agreed to reach out to Dr Nighat Leong to request an earlier appt  Received a pc from the patient today (9/12/18) informing me that she heard from Dr Nighat Leong' office-she now has an appointment on 10/22/18  To see Dr Nighat Leong  She requested that I contact Dr Ron Pabon re same and offer him her appreciation and gratitude for his assistance  Notified Dr Ron Pabon of same

## 2018-09-17 ENCOUNTER — TELEPHONE (OUTPATIENT)
Dept: HEMATOLOGY ONCOLOGY | Facility: CLINIC | Age: 58
End: 2018-09-17

## 2018-09-17 ENCOUNTER — TELEPHONE (OUTPATIENT)
Dept: INTERNAL MEDICINE CLINIC | Facility: CLINIC | Age: 58
End: 2018-09-17

## 2018-09-17 RX ORDER — SODIUM CHLORIDE 9 MG/ML
20 INJECTION, SOLUTION INTRAVENOUS CONTINUOUS
Status: DISCONTINUED | OUTPATIENT
Start: 2018-09-18 | End: 2018-09-21 | Stop reason: HOSPADM

## 2018-09-17 NOTE — TELEPHONE ENCOUNTER
Pt called requesting a refill on her zovirax cream         Also she stated she has not been feeling well   She goes from warm to cold  Possible flu like symptoms  She has been sleeping more than usual  Denies any fevers     She recently stopped a med and think its from that       Please advise  Cb [phone]

## 2018-09-17 NOTE — TELEPHONE ENCOUNTER
Patient called in to state she has not been felling well for the past 2 weeks or so with her symptoms not improving and Dr Lilly Daigle told her at her last visit, to advise him of any "flu like" symptoms she might be experiencing  Patient stated she has been sleeping a lot more than usual (slept most of this past Saturday and Sunday) Has had chills/then gets very hot  Denies any fevers but stated she does not own a thermometer  She is due for Zometa tomorrow but would like to speak with someone about her symptoms        She can be reached at [phone]

## 2018-09-18 ENCOUNTER — OFFICE VISIT (OUTPATIENT)
Dept: INTERNAL MEDICINE CLINIC | Facility: CLINIC | Age: 58
End: 2018-09-18
Payer: COMMERCIAL

## 2018-09-18 ENCOUNTER — TELEPHONE (OUTPATIENT)
Dept: HEMATOLOGY ONCOLOGY | Facility: CLINIC | Age: 58
End: 2018-09-18

## 2018-09-18 ENCOUNTER — HOSPITAL ENCOUNTER (OUTPATIENT)
Dept: INFUSION CENTER | Facility: CLINIC | Age: 58
Discharge: HOME | End: 2018-09-18

## 2018-09-18 VITALS
WEIGHT: 163.6 LBS | RESPIRATION RATE: 14 BRPM | SYSTOLIC BLOOD PRESSURE: 110 MMHG | DIASTOLIC BLOOD PRESSURE: 70 MMHG | HEART RATE: 78 BPM | BODY MASS INDEX: 23.81 KG/M2 | TEMPERATURE: 98.1 F

## 2018-09-18 DIAGNOSIS — B00.9 HERPES SIMPLEX: ICD-10-CM

## 2018-09-18 DIAGNOSIS — R53.83 OTHER FATIGUE: Primary | ICD-10-CM

## 2018-09-18 PROCEDURE — 99214 OFFICE O/P EST MOD 30 MIN: CPT | Performed by: INTERNAL MEDICINE

## 2018-09-18 NOTE — PROGRESS NOTES
Assessment/Plan:  1  Herpes simplex-suspect this is the cause of her systemic symptoms  We reviewed this is more of an issue now that she has multiple myeloma  She will be treated with Acyclovir 400 milligrams t i d  for 5 days  If she has recurrent symptoms she may require suppressive Acyclovir with her myeloma-  2  Systemic symptoms-likely related to 1   3   Vitamin-D deficiency-recent level low at 23-patient is now on 2000 units a day +50 1000 units monthly  4  Elevated alk-phos-felt related to her chronic liver disease-most recent value 295  She is scheduled for repeat chemistry profile over the next month  5  Recent noted mild hypokalemia-potassium 3 3-repeat labs over the next month-she did have some diarrhea  6  Hyperlipidemia-recent lipids show cholesterol 188 triglycerides 165 HDL 62 LDL 96  7  Depression with OCD  Was on Paxil CR with maximal dose recommended with her chronic liver disease and Topamax  Psychiatry spur sugar Cymbalta but she did tolerated and is now off all meds  As noted she will be seen Dr Ward Migue    All other problems as per note of August 2018 and September 2012      MEDICAL REGIMEN:Acyclovir- 200 mg- 2 p o  t i d  for 5 days now and during periods of herpes simplex, Actigall 300 milligrams t i d  of though she is often noncompliant, aspirin 81 milligrams a day, Oscal D 500 milligrams b i d , vitamin D3/2000 units a day, vitamin-D/89520 units monthly, multivitamin, IV bisphosphonates every several months be Oncology      This patient will be seen at previously scheduled appointment with previously scheduled labs  Addendum-received a phone call for the patient on October 24th  She had been placed on gabapentin 600 milligrams half tablet t i d  by orthopedic physician she felt this was helping  She was also placed on oxcarbazepine 150 milligrams-start was 75 milligrams b i d    Every week decreased by 75 milligrams until reaching 300 milligrams b i d -this was started by her new psychiatrist-Dr Jasmine Sung  She wanted to know if she should take both  We told her to hold the gabapentin since she was started oxcarbazepine which is also used sometimes for neuropathic pain-she will keep us posted  She has ongoing symptoms of wants to restart gabapentin we will need to make sure this is okay with Psychiatry    Addendum-patient called the office wanting to go back on gabapentin-will make sure this is okay with Psychiatry    Addendum-Dr Jasmine Sung feels it is okay to restart gabapentin at prior dose              No problem-specific Assessment & Plan notes found for this encounter  Diagnoses and all orders for this visit:    Other fatigue    Herpes simplex          Subjective:      Patient ID: Rich Fu is a 61 y o  female  This patient is seen today as an emergency appointment  She felt poorly for the last several days  She notes some nausea, chills, questionable fever-she felt as though she had flu-like syndromes  She wonders if she is in drug  She wonders if this is residual former previously noted abrupt withdrawal of Cymbalta  She had been seen in the office on August 30th at that point talked about abrupt withdrawal of Cymbalta  She has been seeking out a new psychiatric care and will be seen Dr Jasmine Sung  This patient has a history of herpes simplex  She noted new lesions of the vaginal area  These are recurred over the past couple days  We reviewed that many of her symptoms could be related to this  We reviewed that symptomatology with this is likely to be more of an issue with her known smoldering multiple myeloma  She said she has not had an episode of herpes simplex for several years  She wanted to know about oral verses topical therapy  Up-to-date was reviewed and there is no dose adjustment needed with a 6 clear for patients with chronic liver disease  Dose adjustment is needed for patients with chronic renal disease   At this point I recommended that she go on a sickly clear 40 milligrams t i d  for 5 days  She has chronic leg pain as before  She recently had a follow-up visit with Oncology in reference to her smoldering multiple myeloma  We reviewed that if her simplex his recurrence she may require suppressive antiviral therapy  The following portions of the patient's history were reviewed and updated as appropriate: current medications, past family history, past medical history, past social history, past surgical history and problem list     Review of Systems   Constitutional: Positive for chills, diaphoresis and fatigue  Respiratory: Negative  Cardiovascular: Negative  Gastrointestinal: Positive for nausea  Endocrine: Negative  Genitourinary: Negative  Musculoskeletal: Negative  Right leg pain unchanged   Neurological: Negative  Hematological: Negative  Psychiatric/Behavioral: Negative  Objective:      /70   Pulse 78   Temp 98 1 °F (36 7 °C) (Oral)   Resp 14   Wt 74 2 kg (163 lb 9 6 oz)   LMP  (LMP Unknown)   BMI 23 81 kg/m²          Physical Exam   Constitutional: She is oriented to person, place, and time  She appears well-developed and well-nourished  No distress  HENT:   Head: Normocephalic and atraumatic  Right Ear: External ear normal    Left Ear: External ear normal    Nose: Nose normal    Mouth/Throat: Oropharynx is clear and moist  No oropharyngeal exudate  Eyes: Conjunctivae and EOM are normal  Pupils are equal, round, and reactive to light  Right eye exhibits no discharge  Left eye exhibits no discharge  No scleral icterus  Neck: Normal range of motion  Neck supple  No JVD present  No tracheal deviation present  No thyromegaly present  Cardiovascular: Normal rate, regular rhythm and intact distal pulses  Exam reveals no gallop and no friction rub  No murmur heard  Pulmonary/Chest: Effort normal and breath sounds normal  No respiratory distress  She has no wheezes   She has no rales  She exhibits no tenderness  Abdominal: Soft  Bowel sounds are normal  She exhibits no distension and no mass  There is no tenderness  There is no rebound and no guarding  Genitourinary:   Genitourinary Comments: Abnormal areas of the vulva   Musculoskeletal: Normal range of motion  She exhibits no edema or deformity  Scar from prior knee surgeries   Lymphadenopathy:     She has no cervical adenopathy  Neurological: She is alert and oriented to person, place, and time  She has normal reflexes  No cranial nerve deficit  She exhibits normal muscle tone  Coordination normal    Skin: Skin is warm and dry  No rash noted  No erythema  Psychiatric: She has a normal mood and affect   Her behavior is normal  Judgment and thought content normal

## 2018-10-01 ENCOUNTER — TELEPHONE (OUTPATIENT)
Dept: INTERNAL MEDICINE CLINIC | Facility: CLINIC | Age: 58
End: 2018-10-01

## 2018-10-01 ENCOUNTER — OFFICE VISIT (OUTPATIENT)
Dept: INTERNAL MEDICINE CLINIC | Facility: CLINIC | Age: 58
End: 2018-10-01
Payer: COMMERCIAL

## 2018-10-01 VITALS
SYSTOLIC BLOOD PRESSURE: 128 MMHG | OXYGEN SATURATION: 98 % | TEMPERATURE: 97.7 F | HEART RATE: 84 BPM | DIASTOLIC BLOOD PRESSURE: 74 MMHG

## 2018-10-01 DIAGNOSIS — R10.9 ABDOMINAL PAIN, UNSPECIFIED ABDOMINAL LOCATION: Primary | ICD-10-CM

## 2018-10-01 DIAGNOSIS — K21.9 GASTROESOPHAGEAL REFLUX DISEASE WITHOUT ESOPHAGITIS: ICD-10-CM

## 2018-10-01 PROBLEM — R10.30 LOWER ABDOMINAL PAIN: Status: ACTIVE | Noted: 2018-10-01

## 2018-10-01 PROCEDURE — 99214 OFFICE O/P EST MOD 30 MIN: CPT | Performed by: INTERNAL MEDICINE

## 2018-10-01 RX ORDER — PANTOPRAZOLE SODIUM 40 MG/1
40 TABLET, DELAYED RELEASE ORAL DAILY
Qty: 90 TABLET | Refills: 1 | Status: SHIPPED | OUTPATIENT
Start: 2018-10-01 | End: 2018-10-02

## 2018-10-01 NOTE — TELEPHONE ENCOUNTER
PT STATED THAT SHE'S STILL NOT FEELING WELL, STATED SHE HAS A "SOUR STOMACH, NO APPETITE, HASN'T HAD A BM, AND "NEEDS TO SHAKE HER HEAD BECAUSE SHE FEELS DRUGGED"   PLEASE ADVISE       PT'S #[phone]

## 2018-10-01 NOTE — PROGRESS NOTES
Assessment/Plan:    #Acid Reflux  - reports acid sensation in her abdomen  - we will start on protonix at this time to see if she experiences relief  - reports that appetite has diminished as a result    #Abdominal Pain  - b/l lower quadrant abdominal pain  - non tender to palpation  - reports that she feels nausea and bloating but is constipated  - we will obtain US abdomen imaging    Addendum 10/2/18: patient tried to get protonix filled however it was too expensive, we will try 150mg raniditine BID instead to see if she gets relief  Refer to August 2018 note for completeness    No problem-specific Assessment & Plan notes found for this encounter  Diagnoses and all orders for this visit:    Abdominal pain, unspecified abdominal location  -     US abdomen complete; Future    Gastroesophageal reflux disease without esophagitis  -     pantoprazole (PROTONIX) 40 mg tablet; Take 1 tablet (40 mg total) by mouth daily            Current Outpatient Prescriptions:     Cholecalciferol (VITAMIN D3) 2000 UNITS TABS, Take 2,000 Units by mouth daily  , Disp: , Rfl:     ergocalciferol (VITAMIN D2) 50,000 units, TAKE ONE CAPSULE BY MOUTH EVERY OTHER WEEK FOR 1 MONTH, THEN 1 CAPSULE EVERY MONTH (Patient taking differently: TAKE ONE CAPSULE BY MOUTH EVERY MONTH), Disp: 4 capsule, Rfl: 2    oxyCODONE-acetaminophen (PERCOCET) 5-325 mg per tablet, 1-2 tabs po q6 hr prn pain   Do not exceed 4g of acetaminophen in any 24 hr period, Disp: 20 tablet, Rfl: 0    pantoprazole (PROTONIX) 40 mg tablet, Take 1 tablet (40 mg total) by mouth daily, Disp: 90 tablet, Rfl: 1    traMADol (ULTRAM) 50 mg tablet, Take 50 mg by mouth every 6 (six) hours as needed for moderate pain, Disp: , Rfl:     triamterene-hydrochlorothiazide (DYAZIDE) 37 5-25 mg per capsule, Take 1 capsule by mouth every morning , Disp: , Rfl:     ursodiol (ACTIGALL) 300 mg capsule, Take 300 mg by mouth 3 (three) times a day , Disp: , Rfl:     Subjective:      Patient ID: Corinne Jackson is a 61 y o  female  HPI     Patient presents as an emergency visit  States that since last Thursday she has started to feel weak without appetite  She reports that she feels a sour stomach along with lower abdominal pain  Has had very little bowel movement and reports that she has had a runny nose that is chronic which she does nasal irrigration at home for relief  She denies cough, sore throat  She denies any recent travel, no vomiting, and no fevers  She reports that she has chronic muscle aches and joint pain because she is on treatment for the myeloma  She is currently on treatment with zymetia however missed the last dose because of her current symptoms  She has not taken any medications at home for the relief  On examination patient did not have any fevers, abdomen was nontender to palpation, bowel sounds auscultated, no hepatosplenomegaly noted  She reports that she had of random bug bite on the medial aspect of her right patella in May  She underwent Lyme disease titers testing that was negative  The following portions of the patient's history were reviewed and updated as appropriate: allergies, current medications, past family history, past medical history, past social history, past surgical history and problem list     Review of Systems   Constitutional: Positive for appetite change and fatigue  Negative for activity change, chills and fever  HENT: Negative for congestion, ear pain, facial swelling, hearing loss, sore throat, tinnitus and trouble swallowing  Eyes: Negative for photophobia and visual disturbance  Respiratory: Negative for cough, shortness of breath and wheezing  Cardiovascular: Negative for chest pain and leg swelling  Gastrointestinal: Positive for abdominal distention, abdominal pain (lower abdomen), constipation and nausea  Negative for blood in stool and vomiting  Genitourinary: Negative for difficulty urinating, dysuria and pelvic pain  Musculoskeletal: Negative for arthralgias and joint swelling  Skin: Negative for rash and wound  Neurological: Negative for dizziness, tremors, light-headedness and headaches  Objective:      /74   Pulse 84   Temp 97 7 °F (36 5 °C) (Oral)   LMP  (LMP Unknown)   SpO2 98%          Physical Exam   Constitutional: She is oriented to person, place, and time  She appears well-developed and well-nourished  HENT:   Head: Normocephalic and atraumatic  Right Ear: External ear normal    Left Ear: External ear normal    Nose: Nose normal    Mouth/Throat: Oropharynx is clear and moist    Eyes: Pupils are equal, round, and reactive to light  Conjunctivae and EOM are normal    Neck: Normal range of motion  Neck supple  No JVD present  No thyromegaly present  Cardiovascular: Normal rate, regular rhythm, normal heart sounds and intact distal pulses  No murmur heard  Pulmonary/Chest: Effort normal and breath sounds normal  No stridor  No respiratory distress  She has no wheezes  Abdominal: Soft  Bowel sounds are normal  She exhibits no distension  There is no tenderness  There is no rebound and no guarding  Musculoskeletal: Normal range of motion  She exhibits tenderness (right knee surgical incision for knee replacement)  She exhibits no edema  Lymphadenopathy:     She has no cervical adenopathy  Neurological: She is alert and oriented to person, place, and time  She has normal reflexes  Skin: Skin is warm  No rash noted  No erythema  Vitals reviewed

## 2018-10-02 ENCOUNTER — TELEPHONE (OUTPATIENT)
Dept: INTERNAL MEDICINE CLINIC | Facility: CLINIC | Age: 58
End: 2018-10-02

## 2018-10-02 RX ORDER — RANITIDINE 150 MG/1
150 CAPSULE ORAL 2 TIMES DAILY
Qty: 60 CAPSULE | Refills: 1 | Status: SHIPPED | OUTPATIENT
Start: 2018-10-02 | End: 2018-10-12

## 2018-10-02 NOTE — TELEPHONE ENCOUNTER
Pt called stating Dr Froilan Harding prescribed her protonix , when she went to the pharm it was $190  It is too expensive and the generic was called into the pharm   Clare Agosto     Please advise

## 2018-10-10 ENCOUNTER — APPOINTMENT (OUTPATIENT)
Dept: LAB | Facility: MEDICAL CENTER | Age: 58
End: 2018-10-10
Payer: COMMERCIAL

## 2018-10-10 DIAGNOSIS — D47.2 SMOLDERING MULTIPLE MYELOMA (SMM): ICD-10-CM

## 2018-10-10 LAB
ALBUMIN SERPL BCP-MCNC: 3.2 G/DL (ref 3.5–5)
ALP SERPL-CCNC: 366 U/L (ref 46–116)
ALT SERPL W P-5'-P-CCNC: 64 U/L (ref 12–78)
ANION GAP SERPL CALCULATED.3IONS-SCNC: 4 MMOL/L (ref 4–13)
AST SERPL W P-5'-P-CCNC: 52 U/L (ref 5–45)
BILIRUB SERPL-MCNC: 0.39 MG/DL (ref 0.2–1)
BUN SERPL-MCNC: 17 MG/DL (ref 5–25)
CALCIUM SERPL-MCNC: 9.2 MG/DL (ref 8.3–10.1)
CHLORIDE SERPL-SCNC: 105 MMOL/L (ref 100–108)
CO2 SERPL-SCNC: 29 MMOL/L (ref 21–32)
CREAT SERPL-MCNC: 0.93 MG/DL (ref 0.6–1.3)
GFR SERPL CREATININE-BSD FRML MDRD: 68 ML/MIN/1.73SQ M
GLUCOSE P FAST SERPL-MCNC: 85 MG/DL (ref 65–99)
POTASSIUM SERPL-SCNC: 4.5 MMOL/L (ref 3.5–5.3)
PROT SERPL-MCNC: 8.8 G/DL (ref 6.4–8.2)
SODIUM SERPL-SCNC: 138 MMOL/L (ref 136–145)

## 2018-10-10 PROCEDURE — 80053 COMPREHEN METABOLIC PANEL: CPT

## 2018-10-10 PROCEDURE — 36415 COLL VENOUS BLD VENIPUNCTURE: CPT

## 2018-10-12 ENCOUNTER — OFFICE VISIT (OUTPATIENT)
Dept: OBGYN CLINIC | Facility: MEDICAL CENTER | Age: 58
End: 2018-10-12
Payer: OTHER MISCELLANEOUS

## 2018-10-12 VITALS
DIASTOLIC BLOOD PRESSURE: 81 MMHG | BODY MASS INDEX: 23.34 KG/M2 | WEIGHT: 163 LBS | SYSTOLIC BLOOD PRESSURE: 128 MMHG | HEART RATE: 77 BPM | HEIGHT: 70 IN

## 2018-10-12 DIAGNOSIS — Z96.651 STATUS POST TOTAL RIGHT KNEE REPLACEMENT: Primary | ICD-10-CM

## 2018-10-12 DIAGNOSIS — G62.9 PERIPHERAL POLYNEUROPATHY: Chronic | ICD-10-CM

## 2018-10-12 PROCEDURE — 99213 OFFICE O/P EST LOW 20 MIN: CPT | Performed by: ORTHOPAEDIC SURGERY

## 2018-10-12 RX ORDER — GABAPENTIN 600 MG/1
300 TABLET ORAL 3 TIMES DAILY
Qty: 60 TABLET | Refills: 2 | Status: SHIPPED | OUTPATIENT
Start: 2018-10-12 | End: 2018-12-18 | Stop reason: SDUPTHER

## 2018-10-12 NOTE — LETTER
October 12, 2018     Patient: Alexis Dickson   YOB: 1960   Date of Visit: 10/12/2018       To Whom it May Concern:    Mau Posada is under my professional care  She was seen in my office on 10/12/2018  She  Is to remain out of work for the next month  Her next follow-up appointment is in 4 additional weeks    If you have any questions or concerns, please don't hesitate to call           Sincerely,          Eva Levin MD        CC: Alexis Samueljunior

## 2018-10-12 NOTE — PROGRESS NOTES
Subjective;     74-year-old adult female well known to the practice  she underwent total knee replacement right knee for posttraumatic arthritis of the right knee from a previous trauma this occurred on 11 June 2018  Her visit today is just over 3 months postop,  In individual that has done rehab and has an established neuropathy of the right lower extremity  in follow-up today she continues to have significant pain throughout the right lower extremity the lateral right knee the lateral greater than medial anterior calf and shin down to the foot  She describes neuritic type symptoms involving the shin and the foot  she finds that range of motion has slightly decreased a former flexion of 105 is now 100,    And she has an extension lag of straightening the knee of 5 -8 degrees  She is concerned regarding her course and progress thus far,  And feels that she has been able to accomplish little   Also coinciding at this time is the cessation of a nerve pain relieving medication    Past Medical History:   Diagnosis Date    Abnormal breast exam     Anxiety     Asthma     childhood    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[facility] [address] )     primary per allscripts    Cancer ([facility] [address] [facility]     IgG kappa smoldering multiple myeloma    Cardiac murmur     Chronic liver disease     resolved 12/04/2017    Depression     Endometriosis     Fracture of tibia     right tibial plateau fracture per allscripts    Hepatic disease     Hypertension     last assessed 12/13/2017    Neuropathy     R foot     OCD (obsessive compulsive disorder)     Pneumonia     Seasonal allergies     Wears eyeglasses     Whiplash injury to neck        Past Surgical History:   Procedure Laterality Date    BACK SURGERY      hemilaminectomy and discectomy, L5-S1, right ([facility]) onset 02/14/2005 per allscripts    EXPLORATION EXTREMITY Right 8/29/2016    Procedure: KNEE PERONEAL NERVE EXPLORATION AND RELEASE ;  Surgeon: Lolita Kwong MD;  Location: [facility];  Service:     FOOT SURGERY      HAND SURGERY      HERNIA REPAIR      MANDIBLE SURGERY      NEUROPLASTY / TRANSPOSITION MEDIAN NERVE AT CARPAL TUNNEL      ORIF TIBIAL PLATEAU Right     arthroplasty per allscripts    OTHER SURGICAL HISTORY      injection of trigger point(s) per allscripts    [place] TOTAL KNEE ARTHROPLASTY Right 6/11/2018    Procedure: ARTHROPLASTY KNEE TOTAL;  Surgeon: Etta Rush MD;  Location: [facility];  Service: Orthopedics       Family History   Problem Relation Age of Onset    Heart failure Mother     Anxiety disorder Mother         symptom per allscripts    Anxiety disorder Father         symptom per allscripts    Cirrhosis Father         hepatic per allscripts    Anxiety disorder Other         symptom per allscripts    Coronary artery disease Other     Depression Other     Hyperlipidemia Other     Osteoarthritis Other     Other Other         back disorder per allscripts    Neuropathy Other        Social History   Substance Use Topics    Smoking status: Never Smoker    Smokeless tobacco: Never Used    Alcohol use No      exam;     adult female who is seated in the exam room  Her attire allows for full inspection and examination of the right lower extremity   she exhibits a healed midline incision for the knee for knee replacement and a lazy J incision laterally from her previous trauma to the knee and proximal tibia  She has a history of nerve release as well secondary to pain after trauma and the knee surgery  extension finds a subtle extension lag of 5-8 degrees   flexion is done to just past 90 on our cold exam today( an exam not done after therapy exercises)  She exhibits no mid flexion arc instability to the attending surgeon   she exhibits no intra-articular pain of the right knee to range of motion by the attending surgeon   there are no changes in the skin color texture or temperature of the right lower extremity       impression;     history of traumatic injury to the right leg the right knee and proximal tibia  history of right total knee replacement for posttraumatic arthritis of the right knee  history of surgical nerve release to the right lower extremity secondary to pain from trauma and from surgeries and nerve entrapment  current neuritic type symptoms of the right lower extremity from the level of the knee to the foot       plan;     the attending surgeon recommended gabapentin, to be reintroduced to the patient at 300 mg t i d      she is to remain off work     she is to continue therapeutic exercise for range of motion function and pain alleviation     we will see her in follow-up  In 4 weeks

## 2018-10-17 ENCOUNTER — TELEPHONE (OUTPATIENT)
Dept: HEMATOLOGY ONCOLOGY | Facility: CLINIC | Age: 58
End: 2018-10-17

## 2018-10-17 NOTE — TELEPHONE ENCOUNTER
Pt called  Has smoldering multiple myeloma  Has been doing some reading on it which said it is a "precursor" to myeloma  Informed that is true  That currently is under observation  It is a condition that may or may not progress to myeloma  She wants to get cancer insurance  Informed not sure how her DX will affect getting a policy

## 2018-10-22 ENCOUNTER — TELEPHONE (OUTPATIENT)
Dept: OBGYN CLINIC | Facility: CLINIC | Age: 58
End: 2018-10-22

## 2018-10-22 ENCOUNTER — OFFICE VISIT (OUTPATIENT)
Dept: PSYCHIATRY | Facility: CLINIC | Age: 58
End: 2018-10-22
Payer: COMMERCIAL

## 2018-10-22 DIAGNOSIS — Z79.899 HIGH RISK MEDICATION USE: ICD-10-CM

## 2018-10-22 DIAGNOSIS — F41.1 GAD (GENERALIZED ANXIETY DISORDER): Primary | ICD-10-CM

## 2018-10-22 DIAGNOSIS — F33.2 MDD (MAJOR DEPRESSIVE DISORDER), RECURRENT SEVERE, WITHOUT PSYCHOSIS (HCC): ICD-10-CM

## 2018-10-22 DIAGNOSIS — F42.9 OBSESSIVE-COMPULSIVE DISORDER, UNSPECIFIED TYPE: ICD-10-CM

## 2018-10-22 PROCEDURE — 90792 PSYCH DIAG EVAL W/MED SRVCS: CPT | Performed by: PSYCHIATRY & NEUROLOGY

## 2018-10-22 RX ORDER — OXCARBAZEPINE 150 MG/1
TABLET, FILM COATED ORAL
Qty: 120 TABLET | Refills: 1 | Status: SHIPPED | OUTPATIENT
Start: 2018-10-22 | End: 2018-11-30
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
## 2018-10-22 NOTE — TELEPHONE ENCOUNTER
Dr Renetta Hahn was prescribed Gabapentin 0 5 tablets 300 mg total by mouth 3xday  She misread the directions and took one full tablet on Friday and got a terrible headache  She took a full tablet Saturday and when realized she was supposed to take 1/2 tablet, skipped the next dose and continued taking as prescribed  She went to physical therapy on Monday morning and everything went really well but now the 1/2 tablet 3 x day is not helping much  She feels she needs the medication to be adjusted and she would like to speak to you about it    Best contact #[phone]  Thank you

## 2018-10-23 NOTE — TELEPHONE ENCOUNTER
She should resume full tablets of the 300 mg preparation, and if she runs out of this medication I can prescribe it accordingly  Please call patient and inform of above    Thank you

## 2018-10-24 ENCOUNTER — CLINICAL SUPPORT (OUTPATIENT)
Dept: INTERNAL MEDICINE CLINIC | Facility: CLINIC | Age: 58
End: 2018-10-24
Payer: COMMERCIAL

## 2018-10-24 ENCOUNTER — TELEPHONE (OUTPATIENT)
Dept: INTERNAL MEDICINE CLINIC | Facility: CLINIC | Age: 58
End: 2018-10-24

## 2018-10-24 DIAGNOSIS — Z23 NEEDS FLU SHOT: Primary | ICD-10-CM

## 2018-10-24 PROCEDURE — 90471 IMMUNIZATION ADMIN: CPT

## 2018-10-24 PROCEDURE — 90682 RIV4 VACC RECOMBINANT DNA IM: CPT

## 2018-10-24 NOTE — TELEPHONE ENCOUNTER
Called pt back telling her, per you to stop the gabapentin      She stated its making her feel better     So she wants to know now what to do

## 2018-10-24 NOTE — TELEPHONE ENCOUNTER
While pt was getting her flu shot    She stated she was put on 2 new medications       Gabapentin 600 mg- 0 5 by mouth TID from Dr Dm Wright     Oxcarbazepine 150 mg - Take 75mg BID   Every week increase by 75mg total per day until reaching 300mg BID from Dr Valeri Pollock    Pt wants to know if this is okay     Please advise  CB - pt cell

## 2018-10-24 NOTE — TELEPHONE ENCOUNTER
The oxcarbazepine should help with the pain in her leg as well as her psychiatric disease-if this is not the case may need to consider adding get but she would 1st need clearance from the psychiatrist-tell her  for now to start with the oxcarbazepine and observe both issues

## 2018-10-25 ENCOUNTER — DOCUMENTATION (OUTPATIENT)
Dept: PSYCHIATRY | Facility: CLINIC | Age: 58
End: 2018-10-25

## 2018-10-25 NOTE — PROGRESS NOTES
Treatment Plan done but not signed at time of office visit due to:  Leaving before signing Tx Plan at office visit 10/22/2018 , will review and sign at next OV

## 2018-10-31 ENCOUNTER — TELEPHONE (OUTPATIENT)
Dept: INTERNAL MEDICINE CLINIC | Facility: CLINIC | Age: 58
End: 2018-10-31

## 2018-10-31 NOTE — TELEPHONE ENCOUNTER
Pt wants to go back on gabapentin  She said she doesn't understand why you took her off it because now she's getting no relief in the pain in her leg  Please advise

## 2018-10-31 NOTE — TELEPHONE ENCOUNTER
The tell her I will need to get in touch with her psychiatrist to make sure he is okay with her going back on this and no interaction with the medication he prescribed-we will be in touch

## 2018-11-05 ENCOUNTER — TELEPHONE (OUTPATIENT)
Dept: INTERNAL MEDICINE CLINIC | Facility: CLINIC | Age: 58
End: 2018-11-05

## 2018-11-05 NOTE — TELEPHONE ENCOUNTER
----- Message from Jens Russo MD sent at 11/4/2018  9:12 AM EST -----  Please let patient know I was in touch with Dr Yeung and it is okay to restart gabapentin

## 2018-11-07 ENCOUNTER — TELEPHONE (OUTPATIENT)
Dept: OBGYN CLINIC | Facility: HOSPITAL | Age: 58
End: 2018-11-07

## 2018-11-07 DIAGNOSIS — M54.16 LUMBAR RADICULOPATHY: Primary | Chronic | ICD-10-CM

## 2018-11-07 DIAGNOSIS — Z96.651 STATUS POST TOTAL RIGHT KNEE REPLACEMENT: ICD-10-CM

## 2018-11-07 NOTE — TELEPHONE ENCOUNTER
[facility]  [phone]    Dr Nellie Phillips    Patient has changed mind about EMG on rt leg and would like an RX  Please notify patient when ready  Thank you

## 2018-11-16 ENCOUNTER — OFFICE VISIT (OUTPATIENT)
Dept: OBGYN CLINIC | Facility: MEDICAL CENTER | Age: 58
End: 2018-11-16
Payer: OTHER MISCELLANEOUS

## 2018-11-16 VITALS
RESPIRATION RATE: 18 BRPM | DIASTOLIC BLOOD PRESSURE: 74 MMHG | HEART RATE: 98 BPM | SYSTOLIC BLOOD PRESSURE: 109 MMHG | WEIGHT: 158.2 LBS | HEIGHT: 70 IN | BODY MASS INDEX: 22.65 KG/M2

## 2018-11-16 DIAGNOSIS — G89.29 CHRONIC RIGHT HIP PAIN: Primary | ICD-10-CM

## 2018-11-16 DIAGNOSIS — G89.29 CHRONIC PAIN OF RIGHT KNEE: ICD-10-CM

## 2018-11-16 DIAGNOSIS — M25.561 CHRONIC PAIN OF RIGHT KNEE: ICD-10-CM

## 2018-11-16 DIAGNOSIS — M25.551 CHRONIC RIGHT HIP PAIN: Primary | ICD-10-CM

## 2018-11-16 DIAGNOSIS — T78.40XA HYPERSENSITIVITY, INITIAL ENCOUNTER: ICD-10-CM

## 2018-11-16 PROCEDURE — 99213 OFFICE O/P EST LOW 20 MIN: CPT | Performed by: ORTHOPAEDIC SURGERY

## 2018-11-16 NOTE — LETTER
November 16, 2018     Patient: Mc Rodgers   YOB: 1960   Date of Visit: 11/16/2018       To Whom it May Concern:    Сергей Raza is under my professional care  She was seen in my office on 11/16/2018  She is not cleared for work at current time  She will be re-evaluated in 6 weeks for improvement in symptoms  If you have any questions or concerns, please don't hesitate to call           Sincerely,          Zian Mcallister MD        CC: Mc Rodgers
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
## 2018-11-19 ENCOUNTER — TELEPHONE (OUTPATIENT)
Dept: INTERNAL MEDICINE CLINIC | Facility: CLINIC | Age: 58
End: 2018-11-19

## 2018-11-19 NOTE — TELEPHONE ENCOUNTER
Pt called back wanting to know should she keep going to see Dr Kb Fulton  Knowing her back history of them , having office issues  Or is there some one else you like     She mentioned 2 doctors from [facility] [place] : Dr Aron Sanchez and Jose Cruz Tejada   Any thought     She's also getting a EMG with Dr Deana Conner ? But heard Dr Kemi Jorge knows more ?     Please advise with your opinion and thoughts  Cb Cell #

## 2018-11-22 DIAGNOSIS — K74.5 BILIARY CIRRHOSIS (HCC): Primary | ICD-10-CM

## 2018-11-22 RX ORDER — URSODIOL 300 MG/1
CAPSULE ORAL
Qty: 270 CAPSULE | Refills: 2 | Status: SHIPPED | OUTPATIENT
Start: 2018-11-22 | End: 2019-05-16 | Stop reason: SDUPTHER

## 2018-11-23 ENCOUNTER — TELEPHONE (OUTPATIENT)
Dept: INTERNAL MEDICINE CLINIC | Facility: CLINIC | Age: 58
End: 2018-11-23

## 2018-11-23 NOTE — TELEPHONE ENCOUNTER
PT STATED THAT WHILE AT [facility] LAST NIGHT "SHE THINKS SHE HAD ANOTHER EPISODE"  STATED THAT SHE HAD SHARP PAINS IN HER STOMACH, BECAME SWEATY, AND THEN PASSED OUT ON HER WAY TO THE BATHROOM  SHE STATED THAT WHEN THE SHARP PAINS STARTED SHE "LAID DOWN ON ONE OF THE BEDS AND HAD HER MOTHER GO GET A WHEELCHAIR" SO SHE COULD GET TO THE BATHROOM, SHE PASSED OUT IN THE WHEELCHAIR BUT ISN'T SURE FOR HOW LONG  SHE ALSO STATED THAT TODAY SHE'S WEAK AND OUT OF IT   PLEASE ADVISE     PT'S CB# [phone]

## 2018-11-24 ENCOUNTER — APPOINTMENT (OUTPATIENT)
Dept: LAB | Age: 58
End: 2018-11-24
Payer: COMMERCIAL

## 2018-11-24 DIAGNOSIS — Z79.899 HIGH RISK MEDICATION USE: ICD-10-CM

## 2018-11-24 LAB
ALBUMIN SERPL BCP-MCNC: 3.6 G/DL (ref 3.5–5)
ALP SERPL-CCNC: 352 U/L (ref 46–116)
ALT SERPL W P-5'-P-CCNC: 52 U/L (ref 12–78)
ANION GAP SERPL CALCULATED.3IONS-SCNC: 3 MMOL/L (ref 4–13)
AST SERPL W P-5'-P-CCNC: 44 U/L (ref 5–45)
BASOPHILS # BLD AUTO: 0.07 THOUSANDS/ΜL (ref 0–0.1)
BASOPHILS NFR BLD AUTO: 1 % (ref 0–1)
BILIRUB SERPL-MCNC: 0.33 MG/DL (ref 0.2–1)
BUN SERPL-MCNC: 26 MG/DL (ref 5–25)
CALCIUM SERPL-MCNC: 10 MG/DL (ref 8.3–10.1)
CHLORIDE SERPL-SCNC: 98 MMOL/L (ref 100–108)
CO2 SERPL-SCNC: 31 MMOL/L (ref 21–32)
CREAT SERPL-MCNC: 1.05 MG/DL (ref 0.6–1.3)
EOSINOPHIL # BLD AUTO: 0.13 THOUSAND/ΜL (ref 0–0.61)
EOSINOPHIL NFR BLD AUTO: 2 % (ref 0–6)
ERYTHROCYTE [DISTWIDTH] IN BLOOD BY AUTOMATED COUNT: 14 % (ref 11.6–15.1)
GFR SERPL CREATININE-BSD FRML MDRD: 59 ML/MIN/1.73SQ M
GLUCOSE SERPL-MCNC: 76 MG/DL (ref 65–140)
HCT VFR BLD AUTO: 39.3 % (ref 34.8–46.1)
HGB BLD-MCNC: 12.2 G/DL (ref 11.5–15.4)
IMM GRANULOCYTES # BLD AUTO: 0.02 THOUSAND/UL (ref 0–0.2)
IMM GRANULOCYTES NFR BLD AUTO: 0 % (ref 0–2)
LYMPHOCYTES # BLD AUTO: 2.35 THOUSANDS/ΜL (ref 0.6–4.47)
LYMPHOCYTES NFR BLD AUTO: 32 % (ref 14–44)
MCH RBC QN AUTO: 28.5 PG (ref 26.8–34.3)
MCHC RBC AUTO-ENTMCNC: 31 G/DL (ref 31.4–37.4)
MCV RBC AUTO: 92 FL (ref 82–98)
MONOCYTES # BLD AUTO: 0.37 THOUSAND/ΜL (ref 0.17–1.22)
MONOCYTES NFR BLD AUTO: 5 % (ref 4–12)
NEUTROPHILS # BLD AUTO: 4.34 THOUSANDS/ΜL (ref 1.85–7.62)
NEUTS SEG NFR BLD AUTO: 60 % (ref 43–75)
NRBC BLD AUTO-RTO: 0 /100 WBCS
PLATELET # BLD AUTO: 238 THOUSANDS/UL (ref 149–390)
PMV BLD AUTO: 11.1 FL (ref 8.9–12.7)
POTASSIUM SERPL-SCNC: 3.8 MMOL/L (ref 3.5–5.3)
PROT SERPL-MCNC: 10.1 G/DL (ref 6.4–8.2)
RBC # BLD AUTO: 4.28 MILLION/UL (ref 3.81–5.12)
SODIUM SERPL-SCNC: 132 MMOL/L (ref 136–145)
WBC # BLD AUTO: 7.28 THOUSAND/UL (ref 4.31–10.16)

## 2018-11-24 PROCEDURE — 36415 COLL VENOUS BLD VENIPUNCTURE: CPT

## 2018-11-24 PROCEDURE — 80053 COMPREHEN METABOLIC PANEL: CPT

## 2018-11-24 PROCEDURE — 85025 COMPLETE CBC W/AUTO DIFF WBC: CPT

## 2018-11-26 ENCOUNTER — OFFICE VISIT (OUTPATIENT)
Dept: INTERNAL MEDICINE CLINIC | Facility: CLINIC | Age: 58
End: 2018-11-26
Payer: COMMERCIAL

## 2018-11-26 VITALS — SYSTOLIC BLOOD PRESSURE: 110 MMHG | DIASTOLIC BLOOD PRESSURE: 80 MMHG | RESPIRATION RATE: 14 BRPM | HEART RATE: 72 BPM

## 2018-11-26 DIAGNOSIS — F42.9 OBSESSIVE-COMPULSIVE DISORDER, UNSPECIFIED TYPE: ICD-10-CM

## 2018-11-26 DIAGNOSIS — I10 ESSENTIAL HYPERTENSION: Chronic | ICD-10-CM

## 2018-11-26 DIAGNOSIS — R55 SYNCOPE, UNSPECIFIED SYNCOPE TYPE: Primary | ICD-10-CM

## 2018-11-26 PROCEDURE — 99215 OFFICE O/P EST HI 40 MIN: CPT | Performed by: INTERNAL MEDICINE

## 2018-11-26 PROCEDURE — 93000 ELECTROCARDIOGRAM COMPLETE: CPT | Performed by: INTERNAL MEDICINE

## 2018-11-26 PROCEDURE — 3079F DIAST BP 80-89 MM HG: CPT | Performed by: INTERNAL MEDICINE

## 2018-11-26 PROCEDURE — 3074F SYST BP LT 130 MM HG: CPT | Performed by: INTERNAL MEDICINE

## 2018-11-26 NOTE — PROGRESS NOTES
Assessment/Plan:  1  Syncope-vasovagal-has had this in the past and similar  Exam stable  EKG stable  I do not feel additional investigation is warranted at this point  She was counseled on preventive measures including avoiding hot environments, adequate fluid intake, lying down if she has symptoms etc   2  Episode of abdominal pain-resolved-questionable gastroenteritis-no longer an issue  3  Evaluation by the covering physician for dyspepsia-she was given an H2 blocker-this has resolved  4  Elevated alk-phos-felt related to her chronic liver disease-most recent value 05/03/2052-she has been counseled on multiple occasions about taking correct dosing of her meds with her cirrhosis  5  Recent episode of herpes simplex-she understands this is more of an issue since she has multiple myeloma  6  Vitamin-D deficiency-continue current regimen  7  Hyperlipidemia-improved with conservative therapy  8  Depression with OCD-was on Paxil CR with maximal dose recommended with her chronic liver disease and Topamax  She did do well on Cymbalta  Now seeing Dr Laly Segura and is on oxcarbazepine  All other problems as per noted August 2018 in September 2012      MEDICAL REGIMEN:      A sickly clear to 100 milligrams-2 p o  t i d  during periods of herpes simplex, Actigall 300 milligrams t i d  although she is often noncompliant, aspirin 81 milligrams a day, Oscal D 500 milligrams b i d , vitamin D3/2000 units a day, vitamin-D 36128 units monthly, multivitamin, IV bisphosphonates every several months be Oncology, gabapentin 300 milligrams b i d  to t i d , oxcarbazepine 300 milligrams b i d  This patient will be seen at previously scheduled appointment with previously scheduled labs      Addendum-patient called on December the 10 stating then Dr Laly Segura put her on Vibryd- Dr Neymar Robles told her to continue gabapentin 300 milligrams t i d  and started her on Nucynta which is in opioid-she wants to know my opinion about that and she was told that she would have discussed this extensively with pain management in the they both feel this is appropriate then she should proceed a        No problem-specific Assessment & Plan notes found for this encounter  Diagnoses and all orders for this visit:    Syncope, unspecified syncope type  -     POCT ECG    Essential hypertension    Obsessive-compulsive disorder, unspecified type          Subjective:      Patient ID: Alexis Dickson is a 61 y o  female  This patient is seen today as an emergency appointment  She had a recent episode of syncope  She said the night before the episode she had less sleep or was on altered sleep schedule  The following evening she was at a store and felt as though she is going to have diarrhea  She felt weak in his though she was going to pass out  At the store she laid down on a mattress  She tried to get up and felt weak again and again lay down on the mattress  Her mother was with her and she asked her mother to obtain a wheelchair from the store  She get into the wheelchair and while being escorted to the bathroom she had a syncopal episode  She said she went to the restroom  She had minimal diarrhea  She came out of the bathroom was again in a wheelchair and had nausea without vomiting  She then felt better and was able to get into her truck and drive home  She thinks he abdominal pain lasted about 15 minutes  She did not vomit  She did not have any chest pain or pressure  She did not have any palpitations  She did not have any weakness of 1 arm and leg compared to the other  She did not have any numbness of 1 arm and leg compared to the other  She did not have any blurred or double vision or difficulty with her speech  She did not have any full-blown diarrhea  She is on a new psychiatric regimen per her psychiatrist-she is currently on oxcarbazepine 300 milligrams b i d  as well as gabapentin 300 milligrams b i d -t i d  She is scheduled for follow-up appointment with Psychiatry  This patient denies any systemic symptoms  Specifically there has been no evidence of fever, night sweats, significant weight loss or significant decrease in appetite  As noted she did not have any chest pain or pressure  EKG done today shows no evidence of ischemia  She is remaining under treatment for her smoldering myeloma and has no med a every 3-4 months  She has chronic right leg pain as before and has now been told she may have reflex sympathetic dystrophy per her orthopedic surgeon    She remains under large amount of stress caring for her mother with her multiple medical problems  She did not have any tonic-clonic activity  She did not have any incontinence at the time of the event  She states that she has had similar episodes in the past associated with pain and/or feeling poorly  She clearly states she felt the episode coming on        She has not had any episodes since that time    She had laboratory testing done in November 24 showing CBC normal   Chemistry profile shows a BUN of 26 creatinine 1 05 potassium 3 8 AST had been elevated a month prior and is now normal   ALT is normal   Alk-phos is drops in 366 down 352    This was a 40 minutes visit  More than 50% of the time was spent counseling the patient formulating a treatment plan  Multiple questions were answered        The following portions of the patient's history were reviewed and updated as appropriate: allergies, current medications, past family history, past medical history, past social history, past surgical history and problem list     Review of Systems   Constitutional: Negative  Respiratory: Negative  Cardiovascular: Negative  Gastrointestinal: Negative  Endocrine: Negative  Genitourinary: Negative  Musculoskeletal: Negative  Right leg pain   Neurological: Positive for syncope  Hematological: Negative      Psychiatric/Behavioral: Negative  Objective:      /80   Pulse 72   Resp 14   LMP  (LMP Unknown)          Physical Exam   Constitutional: She is oriented to person, place, and time  She appears well-developed and well-nourished  No distress  HENT:   Head: Normocephalic and atraumatic  Right Ear: External ear normal    Left Ear: External ear normal    Nose: Nose normal    Mouth/Throat: Oropharynx is clear and moist  No oropharyngeal exudate  Eyes: Pupils are equal, round, and reactive to light  Conjunctivae and EOM are normal  Right eye exhibits no discharge  Left eye exhibits no discharge  No scleral icterus  Neck: Normal range of motion  Neck supple  No JVD present  No tracheal deviation present  No thyromegaly present  Cardiovascular: Normal rate, regular rhythm, normal heart sounds and intact distal pulses  Exam reveals no gallop and no friction rub  No murmur heard  Pulmonary/Chest: Effort normal and breath sounds normal  No respiratory distress  She has no wheezes  She has no rales  She exhibits no tenderness  Abdominal: Soft  Bowel sounds are normal  She exhibits no distension and no mass  There is no tenderness  There is no rebound and no guarding  Musculoskeletal: Normal range of motion  She exhibits no edema or deformity  Changes related to prior surgery   Lymphadenopathy:     She has no cervical adenopathy  Neurological: She is alert and oriented to person, place, and time  She has normal reflexes  No cranial nerve deficit  She exhibits normal muscle tone  Coordination normal    Sensitivity to touch of the right leg   Skin: Skin is warm and dry  No rash noted  No erythema  Psychiatric: She has a normal mood and affect   Her behavior is normal  Judgment and thought content normal

## 2018-11-29 ENCOUNTER — TELEPHONE (OUTPATIENT)
Dept: PSYCHIATRY | Facility: CLINIC | Age: 58
End: 2018-11-29

## 2018-11-29 NOTE — TELEPHONE ENCOUNTER
She has had vasovagal episodes in the past  Seems better with medication prescribe  However, trileptal has not helped her mental health  Currently taking 300mg BID  Agitation lately  No thirst, new fatigue, nausea, headaches  P: continue trileptal, discuss further tomorrow

## 2018-11-30 ENCOUNTER — OFFICE VISIT (OUTPATIENT)
Dept: PSYCHIATRY | Facility: CLINIC | Age: 58
End: 2018-11-30
Payer: COMMERCIAL

## 2018-11-30 DIAGNOSIS — F42.9 OBSESSIVE-COMPULSIVE DISORDER, UNSPECIFIED TYPE: ICD-10-CM

## 2018-11-30 DIAGNOSIS — E55.9 VITAMIN D DEFICIENCY: ICD-10-CM

## 2018-11-30 DIAGNOSIS — F33.2 MDD (MAJOR DEPRESSIVE DISORDER), RECURRENT SEVERE, WITHOUT PSYCHOSIS (HCC): Primary | ICD-10-CM

## 2018-11-30 DIAGNOSIS — F43.10 PTSD (POST-TRAUMATIC STRESS DISORDER): ICD-10-CM

## 2018-11-30 DIAGNOSIS — F41.1 GAD (GENERALIZED ANXIETY DISORDER): ICD-10-CM

## 2018-11-30 PROCEDURE — 99214 OFFICE O/P EST MOD 30 MIN: CPT | Performed by: PSYCHIATRY & NEUROLOGY

## 2018-11-30 RX ORDER — ACYCLOVIR 200 MG/1
CAPSULE ORAL 3 TIMES DAILY PRN
Refills: 4 | COMMUNITY
Start: 2018-09-18 | End: 2020-03-12 | Stop reason: SDUPTHER

## 2018-11-30 RX ORDER — VILAZODONE HYDROCHLORIDE 20 MG/1
TABLET ORAL
Qty: 30 TABLET | Refills: 1 | Status: SHIPPED | OUTPATIENT
Start: 2018-11-30 | End: 2019-01-29 | Stop reason: SDUPTHER

## 2018-11-30 RX ORDER — LORAZEPAM 0.5 MG/1
.5-1 TABLET ORAL 2 TIMES DAILY PRN
Qty: 45 TABLET | Refills: 1 | Status: SHIPPED | OUTPATIENT
Start: 2018-11-30 | End: 2020-03-03
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
## 2018-12-03 ENCOUNTER — TELEPHONE (OUTPATIENT)
Dept: PSYCHIATRY | Facility: CLINIC | Age: 58
End: 2018-12-03

## 2018-12-03 NOTE — TELEPHONE ENCOUNTER
Received a faxed request to submit a prior auth for Viibryd  Request generated via [facility]  Will submit

## 2018-12-04 DIAGNOSIS — R60.9 EDEMA, UNSPECIFIED TYPE: Primary | ICD-10-CM

## 2018-12-04 NOTE — TELEPHONE ENCOUNTER
Received approval for Viibryd started kit  (12/03/2018 - 12/03/2019) Starter kit on order and should be there later today  When I notified the pharmacist, she said Viibryd 20 mg daily needs a P  A too  I attempted to complete a P  A via CoverMyMeds, but the request was "resolved " I spoke with a representative at  [facility]/[employer] regarding the request  Ubaldo Blevins is covered with a paid claim beyond the started pack  No further P  A needed  I reviewed the same information with the pharmacist and that the office can be contacted with further difficulty filling/processing  I also spoke with Arianna Waggoner and reviewed same  She, too, agreed to call with any problems filling

## 2018-12-05 RX ORDER — TRIAMTERENE AND HYDROCHLOROTHIAZIDE 37.5; 25 MG/1; MG/1
TABLET ORAL
Qty: 90 TABLET | Refills: 2 | Status: SHIPPED | OUTPATIENT
Start: 2018-12-05 | End: 2020-03-16 | Stop reason: SDUPTHER

## 2018-12-10 ENCOUNTER — CONSULT (OUTPATIENT)
Dept: PAIN MEDICINE | Facility: CLINIC | Age: 58
End: 2018-12-10
Payer: OTHER MISCELLANEOUS

## 2018-12-10 ENCOUNTER — TELEPHONE (OUTPATIENT)
Dept: INTERNAL MEDICINE CLINIC | Facility: CLINIC | Age: 58
End: 2018-12-10

## 2018-12-10 VITALS
TEMPERATURE: 98.4 F | SYSTOLIC BLOOD PRESSURE: 120 MMHG | DIASTOLIC BLOOD PRESSURE: 74 MMHG | WEIGHT: 158 LBS | BODY MASS INDEX: 23 KG/M2 | HEART RATE: 68 BPM

## 2018-12-10 DIAGNOSIS — G89.4 CHRONIC PAIN SYNDROME: Primary | ICD-10-CM

## 2018-12-10 DIAGNOSIS — G90.521 COMPLEX REGIONAL PAIN SYNDROME TYPE 1 OF RIGHT LOWER EXTREMITY: ICD-10-CM

## 2018-12-10 PROCEDURE — 99214 OFFICE O/P EST MOD 30 MIN: CPT | Performed by: ANESTHESIOLOGY
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
## 2018-12-10 NOTE — TELEPHONE ENCOUNTER
Pam Rey is an opiod-she should have discussed this extensively with the pain management physician and if they both feel it is appropriate that she should proceed

## 2018-12-10 NOTE — TELEPHONE ENCOUNTER
Pt called to make you aware of med changes  Dr Urbina Fairly put pt on Viibryd and Dr Rebecca Warren said to continue gabapentin 300 mg 3 times a day and he also put her on Nucynta and she's a little concerned about that one  Please advise

## 2018-12-11 ENCOUNTER — TELEPHONE (OUTPATIENT)
Dept: PSYCHIATRY | Facility: CLINIC | Age: 58
End: 2018-12-11

## 2018-12-11 ENCOUNTER — TELEPHONE (OUTPATIENT)
Dept: PAIN MEDICINE | Facility: CLINIC | Age: 58
End: 2018-12-11

## 2018-12-11 DIAGNOSIS — G62.9 PERIPHERAL POLYNEUROPATHY: Chronic | ICD-10-CM

## 2018-12-11 NOTE — TELEPHONE ENCOUNTER
Patient is calling because she was in the office yesterday and given a script for Jessica Fairchild  She has some questions and concerns about being on this mediation  She can be reached at #[phone]

## 2018-12-11 NOTE — TELEPHONE ENCOUNTER
AYAHI:  Pt informed of the same as stated in FQ's previous task  Pt asked what the dosage was again and I told her it was written as nucynta ER 50 mg 1 tab Q 12hrs  I explained it is a extended release medication that is taken Q 12 hours around the clock  Pt thought it was only a once a day medication  Pt said she is waiting to hear if W/C approves it and then she will prob try it, she said FQ told her to call in 2 wks with update on how it was working

## 2018-12-11 NOTE — TELEPHONE ENCOUNTER
This medication has an extremely low abuse potential and is not a typical opioid  The dose is extremely low and will not cause serotonin syndrome at the dose prescribed  I am aware of her PBC diagnosis and this medication will not worsen that condition  It is used long term if it works  This condition she has is chronic  There is no medication that will cure what she has  All we can do is try to alleviate some of the pain to bring her a better quality of life  In addition, she does not want to do SCS which is the gold standard treatment for CRPS so options are extremly limited

## 2018-12-11 NOTE — TELEPHONE ENCOUNTER
Patient called patient & I verified that she should speak to pain management to have a clear understating about this medication & if they both feel this is right she should start

## 2018-12-17 NOTE — TELEPHONE ENCOUNTER
Patient is calling back about the Nucynta  She is asking if the Gabapentin can be increased rather than taking the Nucynta?

## 2018-12-17 NOTE — TELEPHONE ENCOUNTER
Sure   She can try increasing the gapapentin to 600mg QID and see if that helps      Unfortunately, no injection helps with CRPS

## 2018-12-17 NOTE — TELEPHONE ENCOUNTER
SW patient, states that she called her pharmacy today in regards to her Vene [address] ER and pharmacists states she needs a prior authorization  Patient states she was suppose to call our office today and give an update on how the Vene [address] ER is working for her and she is still waiting to get it filled  Apologized to patient and advised will forward this to  triage for them to start working on the authorization  Patient states that she discussed taking the Nucynta ER  with her PCP Tatianna Noonan MD) and he really doesn't want her to start the Nucynta ER due to her PBC  Patient is asking if her gabapentin dosage can be increased instead of going on the Nucynta ER? Patient is also asking if there are any procedures/injections that she can get to relieve her pain besides a SCS? Advised patient will c/b with FQ recommendations

## 2018-12-18 RX ORDER — GABAPENTIN 600 MG/1
600 TABLET ORAL 4 TIMES DAILY
Qty: 120 TABLET | Refills: 2 | Status: SHIPPED | OUTPATIENT
Start: 2018-12-18 | End: 2019-03-18 | Stop reason: SDUPTHER

## 2018-12-18 NOTE — TELEPHONE ENCOUNTER
I called pt and asked her if her medications are under W/C she said yes  She was upset that this wasn't already being worked on  I did find the adjusters information and lm for her to take care of the authorization for the pt

## 2018-12-18 NOTE — TELEPHONE ENCOUNTER
I left detailed vm on pt's cell that the new script for gabapentin 600 mg QID with 2 refills was sent to her [facility]

## 2018-12-18 NOTE — TELEPHONE ENCOUNTER
Pt informed that per FQ she can inc the gabapentin 600 mg to QID and see if that helps  Pt said she will need a Rx for the 600 mg QID [facility] on [address] b/c she won't have enough from the last Rx  Pt asked about the PA for the Nucynta ER; can someone pls call pt with the status of the PA  Pt plans to try the Nucynta ER once approved and will only stay on it if she see a significant improvement while taking it

## 2018-12-19 NOTE — TELEPHONE ENCOUNTER
Pt said last night was the first night she took gabapentin 600 mg at bedtime and she doesn't know if she was having hallucinations or just weird nightmares  Pt asking for rec or if she could try the gralise 1800 mg she took in the past about a year ago? Pt no longer has any gralise  Told pt will forward update to FQ and c/b with his rec  Pt then asked about the status of the nucynta; I told pt that our staff that does the PA had confirmed with her that her medications are under w/c and she had told the pt W/C would have to approve it  I advised pt to f/u with W/C on the status of the nucynta

## 2018-12-19 NOTE — TELEPHONE ENCOUNTER
Patient  [phone]    Pt states she had a hard time falling asleep and when she did fall asleep she had nightmares  Please call back to advise  Also would like to try the Nucynta

## 2018-12-20 NOTE — TELEPHONE ENCOUNTER
DANNY patient and made aware of FQ recommendations  Patient states she will give us a call back on Monday with an update

## 2018-12-20 NOTE — TELEPHONE ENCOUNTER
It will take some time to get used to the higher dose of the gabapentin  Just stick with it for now and usually the side effects go away

## 2018-12-21 ENCOUNTER — OFFICE VISIT (OUTPATIENT)
Dept: OBGYN CLINIC | Facility: MEDICAL CENTER | Age: 58
End: 2018-12-21
Payer: OTHER MISCELLANEOUS

## 2018-12-21 VITALS — BODY MASS INDEX: 22.9 KG/M2 | RESPIRATION RATE: 18 BRPM | HEIGHT: 70 IN | WEIGHT: 160 LBS

## 2018-12-21 DIAGNOSIS — M25.561 CHRONIC PAIN OF RIGHT KNEE: Primary | ICD-10-CM

## 2018-12-21 DIAGNOSIS — G89.29 CHRONIC PAIN OF RIGHT KNEE: Primary | ICD-10-CM

## 2018-12-21 DIAGNOSIS — G90.521 COMPLEX REGIONAL PAIN SYNDROME I OF RIGHT LOWER LIMB: ICD-10-CM

## 2018-12-21 DIAGNOSIS — Z96.651 STATUS POST TOTAL RIGHT KNEE REPLACEMENT: ICD-10-CM

## 2018-12-21 PROCEDURE — 99213 OFFICE O/P EST LOW 20 MIN: CPT | Performed by: ORTHOPAEDIC SURGERY

## 2018-12-21 NOTE — TELEPHONE ENCOUNTER
Gissel Marsh   [phone] ext [phone]/ fax: [phone]  Dr Spring Hendersonoter    Please fax over office notes so she can see why the prescription was ordered  Gissel Marsh is requesting Shellie Pallas to fax the notes that they were speaking about over to her asap thank you
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
## 2018-12-21 NOTE — LETTER
December 21, 2018     Patient: Venus Hahn   YOB: 1960   Date of Visit: 12/21/2018       To Whom it May Concern:      Celeste Palomares is under my professional care  She was seen in my office on 12/21/2018  She can return to work 1/4/19  She is out of work until that date  She will work 3 half days a week until re evaluated next appointment  She is able to work with limitations of 5lbs max lifting  No squatting, kneeling and no deep knee bending  No prolonged standing and allow rest as tolerated  No continuous  walking(as tolerated), no climbing  Please designate parking spot in covered parking deck closest to the doors  Please allow patient to wear shoes that accommodate  Allot time off as needed to accommodate physical therapy sessions and doctor appts related to injury  Patient requires use of elevator as she is not able to perform stairs or steps  If you have any questions or concerns, please don't hesitate to call           Sincerely,          Candance Knife, MD        CC: No Recipients

## 2018-12-21 NOTE — LETTER
December 21, 2018     Patient: Eulalia Blas   YOB: 1960   Date of Visit: 12/21/2018       To Whom it May Concern:    Nolan Bryson is under my professional care  She was seen in my office on 12/21/2018  She can return to work 1/4/19  She is out of work until that date  She is able to work with limitations of 5lbs max lifting  No squatting, kneeling and no deep knee bending  No prolonged standing and allow rest as tolerated  No continuous  walking(as tolerated), no climbing  Please designate parking spot in covered parking deck closest to the doors  Please allow patient to wear shoes that accommodate  Allot time off as needed to accommodate physical therapy sessions and doctor appts related to injury  Patient requires use of elevator as she is not able to perform stairs or steps  If you have any questions or concerns, please don't hesitate to call           Sincerely,          Jimmy Blankenship MD        CC: No Recipients

## 2018-12-24 NOTE — TELEPHONE ENCOUNTER
Pt's consult note from 12/10/18 was faxed to [facility] at fax # [phone]

## 2019-01-07 ENCOUNTER — APPOINTMENT (OUTPATIENT)
Dept: LAB | Facility: CLINIC | Age: 59
End: 2019-01-07
Payer: COMMERCIAL

## 2019-01-07 ENCOUNTER — TRANSCRIBE ORDERS (OUTPATIENT)
Dept: LAB | Facility: CLINIC | Age: 59
End: 2019-01-07

## 2019-01-07 DIAGNOSIS — E55.9 VITAMIN D DEFICIENCY: ICD-10-CM

## 2019-01-07 DIAGNOSIS — R53.83 FATIGUE, UNSPECIFIED TYPE: ICD-10-CM

## 2019-01-07 DIAGNOSIS — E78.5 HYPERLIPIDEMIA, UNSPECIFIED HYPERLIPIDEMIA TYPE: ICD-10-CM

## 2019-01-07 DIAGNOSIS — E78.5 HYPERLIPIDEMIA, UNSPECIFIED HYPERLIPIDEMIA TYPE: Primary | ICD-10-CM

## 2019-01-07 LAB
25(OH)D3 SERPL-MCNC: 54.4 NG/ML (ref 30–100)
ALBUMIN SERPL BCP-MCNC: 3.3 G/DL (ref 3.5–5)
ALP SERPL-CCNC: 254 U/L (ref 46–116)
ALT SERPL W P-5'-P-CCNC: 48 U/L (ref 12–78)
ANION GAP SERPL CALCULATED.3IONS-SCNC: 6 MMOL/L (ref 4–13)
AST SERPL W P-5'-P-CCNC: 38 U/L (ref 5–45)
BILIRUB SERPL-MCNC: 0.4 MG/DL (ref 0.2–1)
BUN SERPL-MCNC: 21 MG/DL (ref 5–25)
CALCIUM SERPL-MCNC: 8.9 MG/DL (ref 8.3–10.1)
CHLORIDE SERPL-SCNC: 104 MMOL/L (ref 100–108)
CHOLEST SERPL-MCNC: 203 MG/DL (ref 50–200)
CO2 SERPL-SCNC: 28 MMOL/L (ref 21–32)
CREAT SERPL-MCNC: 1.15 MG/DL (ref 0.6–1.3)
GFR SERPL CREATININE-BSD FRML MDRD: 52 ML/MIN/1.73SQ M
GLUCOSE P FAST SERPL-MCNC: 96 MG/DL (ref 65–99)
HDLC SERPL-MCNC: 64 MG/DL (ref 40–60)
LDLC SERPL DIRECT ASSAY-MCNC: 118 MG/DL (ref 0–100)
POTASSIUM SERPL-SCNC: 3.8 MMOL/L (ref 3.5–5.3)
PROT SERPL-MCNC: 8.9 G/DL (ref 6.4–8.2)
SODIUM SERPL-SCNC: 138 MMOL/L (ref 136–145)
TRIGL SERPL-MCNC: 101 MG/DL

## 2019-01-07 PROCEDURE — 83721 ASSAY OF BLOOD LIPOPROTEIN: CPT

## 2019-01-07 PROCEDURE — 36415 COLL VENOUS BLD VENIPUNCTURE: CPT

## 2019-01-07 PROCEDURE — 82306 VITAMIN D 25 HYDROXY: CPT

## 2019-01-07 PROCEDURE — 80053 COMPREHEN METABOLIC PANEL: CPT

## 2019-01-07 PROCEDURE — 80061 LIPID PANEL: CPT

## 2019-01-10 ENCOUNTER — OFFICE VISIT (OUTPATIENT)
Dept: INTERNAL MEDICINE CLINIC | Facility: CLINIC | Age: 59
End: 2019-01-10
Payer: COMMERCIAL

## 2019-01-10 VITALS
HEIGHT: 69 IN | WEIGHT: 158.8 LBS | HEART RATE: 72 BPM | SYSTOLIC BLOOD PRESSURE: 128 MMHG | DIASTOLIC BLOOD PRESSURE: 78 MMHG | RESPIRATION RATE: 14 BRPM | BODY MASS INDEX: 23.52 KG/M2

## 2019-01-10 DIAGNOSIS — I10 ESSENTIAL HYPERTENSION: Chronic | ICD-10-CM

## 2019-01-10 DIAGNOSIS — R74.8 ELEVATED LIVER ENZYMES: Primary | ICD-10-CM

## 2019-01-10 DIAGNOSIS — F42.9 OBSESSIVE-COMPULSIVE DISORDER, UNSPECIFIED TYPE: ICD-10-CM

## 2019-01-10 DIAGNOSIS — Z12.31 ENCOUNTER FOR SCREENING MAMMOGRAM FOR BREAST CANCER: ICD-10-CM

## 2019-01-10 DIAGNOSIS — K74.3 PRIMARY BILIARY CHOLANGITIS (HCC): Chronic | ICD-10-CM

## 2019-01-10 DIAGNOSIS — D47.2 SMOLDERING MULTIPLE MYELOMA (SMM): ICD-10-CM

## 2019-01-10 DIAGNOSIS — F33.2 MDD (MAJOR DEPRESSIVE DISORDER), RECURRENT SEVERE, WITHOUT PSYCHOSIS (HCC): ICD-10-CM

## 2019-01-10 PROCEDURE — 99214 OFFICE O/P EST MOD 30 MIN: CPT | Performed by: INTERNAL MEDICINE

## 2019-01-10 PROCEDURE — 3078F DIAST BP <80 MM HG: CPT | Performed by: INTERNAL MEDICINE

## 2019-01-10 PROCEDURE — 3074F SYST BP LT 130 MM HG: CPT | Performed by: INTERNAL MEDICINE
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
## 2019-01-11 ENCOUNTER — TELEPHONE (OUTPATIENT)
Dept: INTERNAL MEDICINE CLINIC | Facility: CLINIC | Age: 59
End: 2019-01-11

## 2019-01-11 ENCOUNTER — TELEPHONE (OUTPATIENT)
Dept: HEMATOLOGY ONCOLOGY | Facility: CLINIC | Age: 59
End: 2019-01-11

## 2019-01-11 ENCOUNTER — APPOINTMENT (OUTPATIENT)
Dept: LAB | Facility: MEDICAL CENTER | Age: 59
End: 2019-01-11
Payer: COMMERCIAL

## 2019-01-11 DIAGNOSIS — K74.5 BILIARY CIRRHOSIS (HCC): ICD-10-CM

## 2019-01-11 DIAGNOSIS — D47.2 SMOLDERING MULTIPLE MYELOMA: ICD-10-CM

## 2019-01-11 DIAGNOSIS — R74.8 ELEVATED LIVER ENZYMES: ICD-10-CM

## 2019-01-11 LAB
ALBUMIN SERPL BCP-MCNC: 3.5 G/DL (ref 3.5–5)
ALP SERPL-CCNC: 269 U/L (ref 46–116)
ALT SERPL W P-5'-P-CCNC: 49 U/L (ref 12–78)
ANION GAP SERPL CALCULATED.3IONS-SCNC: 7 MMOL/L (ref 4–13)
AST SERPL W P-5'-P-CCNC: 40 U/L (ref 5–45)
BASOPHILS # BLD AUTO: 0.05 THOUSANDS/ΜL (ref 0–0.1)
BASOPHILS NFR BLD AUTO: 1 % (ref 0–1)
BILIRUB SERPL-MCNC: 0.33 MG/DL (ref 0.2–1)
BUN SERPL-MCNC: 19 MG/DL (ref 5–25)
CALCIUM SERPL-MCNC: 9 MG/DL (ref 8.3–10.1)
CHLORIDE SERPL-SCNC: 101 MMOL/L (ref 100–108)
CO2 SERPL-SCNC: 27 MMOL/L (ref 21–32)
CREAT SERPL-MCNC: 0.96 MG/DL (ref 0.6–1.3)
EOSINOPHIL # BLD AUTO: 0.11 THOUSAND/ΜL (ref 0–0.61)
EOSINOPHIL NFR BLD AUTO: 2 % (ref 0–6)
ERYTHROCYTE [DISTWIDTH] IN BLOOD BY AUTOMATED COUNT: 13.2 % (ref 11.6–15.1)
GFR SERPL CREATININE-BSD FRML MDRD: 65 ML/MIN/1.73SQ M
GLUCOSE SERPL-MCNC: 123 MG/DL (ref 65–140)
HCT VFR BLD AUTO: 38.3 % (ref 34.8–46.1)
HGB BLD-MCNC: 12.1 G/DL (ref 11.5–15.4)
IGA SERPL-MCNC: 53 MG/DL (ref 70–400)
IGG SERPL-MCNC: 2770 MG/DL (ref 700–1600)
IGM SERPL-MCNC: 206 MG/DL (ref 40–230)
IMM GRANULOCYTES # BLD AUTO: 0.02 THOUSAND/UL (ref 0–0.2)
IMM GRANULOCYTES NFR BLD AUTO: 0 % (ref 0–2)
LYMPHOCYTES # BLD AUTO: 2.44 THOUSANDS/ΜL (ref 0.6–4.47)
LYMPHOCYTES NFR BLD AUTO: 33 % (ref 14–44)
MCH RBC QN AUTO: 29.2 PG (ref 26.8–34.3)
MCHC RBC AUTO-ENTMCNC: 31.6 G/DL (ref 31.4–37.4)
MCV RBC AUTO: 93 FL (ref 82–98)
MONOCYTES # BLD AUTO: 0.39 THOUSAND/ΜL (ref 0.17–1.22)
MONOCYTES NFR BLD AUTO: 5 % (ref 4–12)
NEUTROPHILS # BLD AUTO: 4.41 THOUSANDS/ΜL (ref 1.85–7.62)
NEUTS SEG NFR BLD AUTO: 59 % (ref 43–75)
NRBC BLD AUTO-RTO: 0 /100 WBCS
PLATELET # BLD AUTO: 217 THOUSANDS/UL (ref 149–390)
PMV BLD AUTO: 11.2 FL (ref 8.9–12.7)
POTASSIUM SERPL-SCNC: 3.7 MMOL/L (ref 3.5–5.3)
PROT SERPL-MCNC: 9 G/DL (ref 6.4–8.2)
RBC # BLD AUTO: 4.14 MILLION/UL (ref 3.81–5.12)
SODIUM SERPL-SCNC: 135 MMOL/L (ref 136–145)
WBC # BLD AUTO: 7.42 THOUSAND/UL (ref 4.31–10.16)

## 2019-01-11 PROCEDURE — 80053 COMPREHEN METABOLIC PANEL: CPT

## 2019-01-11 PROCEDURE — 82784 ASSAY IGA/IGD/IGG/IGM EACH: CPT

## 2019-01-11 PROCEDURE — 83883 ASSAY NEPHELOMETRY NOT SPEC: CPT

## 2019-01-11 PROCEDURE — 36415 COLL VENOUS BLD VENIPUNCTURE: CPT

## 2019-01-11 PROCEDURE — 84165 PROTEIN E-PHORESIS SERUM: CPT

## 2019-01-11 PROCEDURE — 84165 PROTEIN E-PHORESIS SERUM: CPT | Performed by: PATHOLOGY

## 2019-01-11 PROCEDURE — 85025 COMPLETE CBC W/AUTO DIFF WBC: CPT

## 2019-01-11 NOTE — TELEPHONE ENCOUNTER
Her appt can be moved up, and she does not need zometa  Per the last note the last dose was discontinued

## 2019-01-11 NOTE — TELEPHONE ENCOUNTER
Pt called stating she wants to know what test you wanted her to have done?     She stated its for complex regional pain syndrome      Please advise

## 2019-01-11 NOTE — TELEPHONE ENCOUNTER
At her last visit I told her she needs a mammogram which is supposedly already scheduled  She needs lab work prior to next visit    I did not order any other testing

## 2019-01-11 NOTE — TELEPHONE ENCOUNTER
Pt is calling regarding loss of employment and insurance going to  the end of January  Pt is asking that her f/u be moved up to this month and arrange for Zometa as well  Please address/ advise

## 2019-01-14 LAB
ALBUMIN SERPL ELPH-MCNC: 4.09 G/DL (ref 3.5–5)
ALBUMIN SERPL ELPH-MCNC: 46.5 % (ref 52–65)
ALPHA1 GLOB SERPL ELPH-MCNC: 0.31 G/DL (ref 0.1–0.4)
ALPHA1 GLOB SERPL ELPH-MCNC: 3.5 % (ref 2.5–5)
ALPHA2 GLOB SERPL ELPH-MCNC: 0.92 G/DL (ref 0.4–1.2)
ALPHA2 GLOB SERPL ELPH-MCNC: 10.4 % (ref 7–13)
BETA GLOB ABNORMAL SERPL ELPH-MCNC: 0.48 G/DL (ref 0.4–0.8)
BETA1 GLOB SERPL ELPH-MCNC: 5.4 % (ref 5–13)
BETA2 GLOB SERPL ELPH-MCNC: 3.6 % (ref 2–8)
BETA2+GAMMA GLOB SERPL ELPH-MCNC: 0.32 G/DL (ref 0.2–0.5)
GAMMA GLOB ABNORMAL SERPL ELPH-MCNC: 2.69 G/DL (ref 0.5–1.6)
GAMMA GLOB SERPL ELPH-MCNC: 30.6 % (ref 12–22)
IGG/ALB SER: 0.87 {RATIO} (ref 1.1–1.8)
M PROTEIN 1 MFR SERPL ELPH: 24.3 %
M PROTEIN 1 SERPL ELPH-MCNC: 2.14 G/DL
PROT SERPL-MCNC: 8.8 G/DL (ref 6.4–8.2)

## 2019-01-15 LAB
KAPPA LC FREE SER-MCNC: 30.6 MG/L (ref 3.3–19.4)
KAPPA LC FREE/LAMBDA FREE SER: 3.03 {RATIO} (ref 0.26–1.65)
LAMBDA LC FREE SERPL-MCNC: 10.1 MG/L (ref 5.7–26.3)

## 2019-01-18 ENCOUNTER — TELEPHONE (OUTPATIENT)
Dept: PSYCHIATRY | Facility: CLINIC | Age: 59
End: 2019-01-18

## 2019-01-18 ENCOUNTER — OFFICE VISIT (OUTPATIENT)
Dept: HEMATOLOGY ONCOLOGY | Facility: CLINIC | Age: 59
End: 2019-01-18
Payer: COMMERCIAL

## 2019-01-18 VITALS
HEIGHT: 69 IN | OXYGEN SATURATION: 98 % | HEART RATE: 76 BPM | RESPIRATION RATE: 18 BRPM | BODY MASS INDEX: 23.4 KG/M2 | WEIGHT: 158 LBS | SYSTOLIC BLOOD PRESSURE: 116 MMHG | TEMPERATURE: 96.9 F | DIASTOLIC BLOOD PRESSURE: 78 MMHG

## 2019-01-18 DIAGNOSIS — D47.2 SMOLDERING MULTIPLE MYELOMA (SMM): Chronic | ICD-10-CM

## 2019-01-18 DIAGNOSIS — M81.0 OSTEOPOROSIS WITHOUT CURRENT PATHOLOGICAL FRACTURE, UNSPECIFIED OSTEOPOROSIS TYPE: Chronic | ICD-10-CM

## 2019-01-18 DIAGNOSIS — K74.3 PRIMARY BILIARY CHOLANGITIS (HCC): Primary | Chronic | ICD-10-CM

## 2019-01-18 PROCEDURE — 99214 OFFICE O/P EST MOD 30 MIN: CPT | Performed by: INTERNAL MEDICINE

## 2019-01-18 NOTE — LETTER
January 18, 2019     Sherren Dames, MD  [address]  [address] [facility], [address] [place] [address]    Patient: Venus Hahn   YOB: 1960   Date of Visit: 1/18/2019       Dear Dr Gordo Wilburn: Thank you for referring Celeste Palomares to me for evaluation  Below are my notes for this consultation  If you have questions, please do not hesitate to call me  I look forward to following your patient along with you  Sincerely,        Mary Quintana MD        CC: No Recipients  Mary Quintana MD  1/18/2019  4:52 PM  Sign at close encounter  Hematology Outpatient Follow - Up Note  Venus Hahn 60 y o  female MRN: @ Encounter: [id number]        Date:  1/18/2019        Assessment/ Plan: IgG kappa indolent multiple myeloma, bone marrow biopsy in May 2017 showed 15% plasma cells, IgG kappa subtype, normal cytogenetics and fish panel for multiple myeloma, no evidence of end-organ damage, continue watchful observation    Elevated alkaline phosphatase and primary biliary cirrhosis of the liver    Osteoporosis, Zometa proceed with dose 4  Out of 4, the patient said that she is tolerating Zometa if she takes NSAIDs for flu-like symptoms after Zometa injection, she insisted on getting Zometa dose 4      Follow-up in 6 months with CBC, CMP, SPEP, free light chain, quantitative immunoglobulins or earlier if symptoms arise such as bone pain, lower back pain, fatigue, low urine output, fever, chills               HPI:  65-year-old  female with past medical history of primary biliary cirrhosis, fracture of the right tibia, hypertension, OCD,worsening of osteoporosis on DEXA scan Done in 2016, nephrolithiasis, chronic lower back pain and possible sacroiliitis was found to have elevated total protein 3-4 years ago, serum protein electrophoresis showed IgG kappa monoclonal protein of 1 7 g/dL however no evidence of anemia, hypercalcemia, or renal insufficiency, she had persistent elevation of alkaline phosphatase secondary to PBC  had a bone scan done last year which showed activity in the ribs area  serum protein electrophoresis in April 2017 showed IgG kappa monoclonal protein of 1 96 g/dL, total protein 8 5, albumin 3 9, IgG 2580, creatinine 0 8, calcium 8 9, alkaline phosphatase 294, AST 37, ALT 46, IgM 25 in July 2016 monoclonal protein of IgG kappa of 1 73  C-reactive protein has been normal however sedimentation rate was elevated in the range of 60  Bone marrow biopsy in May 2017 showed 15% plasma cells in diffuse and interstitial pattern, normal fish panel for multiple myeloma and cytogenetics  Status post right knee replacement in June 2018  Exacerbation of anxiety/depression followed by psychiatric medicine      Interval History:        Previous Treatment:         Test Results:    Imaging: No results found  Labs:   Lab Results   Component Value Date    WBC 7 42 01/11/2019    HGB 12 1 01/11/2019    HCT 38 3 01/11/2019    MCV 93 01/11/2019     01/11/2019     Lab Results   Component Value Date     12/29/2015    K 3 7 01/11/2019     01/11/2019    CO2 27 01/11/2019    ANIONGAP 7 12/29/2015    BUN 19 01/11/2019    CREATININE 0 96 01/11/2019    GLUCOSE 134 12/29/2015    GLUF 96 01/07/2019    CALCIUM 9 0 01/11/2019    AST 40 01/11/2019    ALT 49 01/11/2019    ALKPHOS 269 (H) 01/11/2019    PROT 8 4 (H) 12/29/2015    BILITOT 0 46 12/29/2015    EGFR 65 01/11/2019       [place] light chain 30 6, lambda light chain 10 1, IgG 2770, IgA 53, IgM 206, M protein 2 14 g IgG kappa    No results found for: [id number]      ROS:   Review of Systems   Constitutional: Negative for activity change, appetite change, diaphoresis, fatigue, fever and unexpected weight change  HENT: Negative for facial swelling, hearing loss, rhinorrhea, sinus pain, sinus pressure, sneezing, sore throat and tinnitus  Eyes: Negative for photophobia, pain, discharge, redness, itching and visual disturbance     Respiratory: Negative for apnea and chest tightness  Cardiovascular: Negative for chest pain, palpitations and leg swelling  Gastrointestinal: Negative for abdominal distention, abdominal pain, blood in stool, constipation, diarrhea, nausea, rectal pain and vomiting  Endocrine: Negative for cold intolerance, heat intolerance, polydipsia and polyphagia  Genitourinary: Negative for difficulty urinating, dyspareunia, frequency, hematuria, pelvic pain and urgency  Musculoskeletal: Positive for arthralgias (Of the right knee), back pain, gait problem and joint swelling  Negative for myalgias  Skin: Negative for color change, pallor and rash  Allergic/Immunologic: Negative for environmental allergies and food allergies  Neurological: Negative for dizziness, tremors, seizures, syncope, speech difficulty, numbness and headaches  Hematological: Negative for adenopathy  Does not bruise/bleed easily  Psychiatric/Behavioral: Negative for agitation, confusion, dysphoric mood, hallucinations and suicidal ideas  Current Medications: Reviewed  Allergies: Reviewed  PMH/FH/SH:  Reviewed      Physical Exam:    Body surface area is 1 87 meters squared  Wt Readings from Last 3 Encounters:   01/18/19 71 7 kg (158 lb)   01/10/19 72 kg (158 lb 12 8 oz)   12/21/18 72 6 kg (160 lb)        Temp Readings from Last 3 Encounters:   01/18/19 (!) 96 9 °F (36 1 °C) (Tympanic)   12/10/18 98 4 °F (36 9 °C) (Oral)   10/01/18 97 7 °F (36 5 °C) (Oral)        BP Readings from Last 3 Encounters:   01/18/19 116/78   01/10/19 128/78   12/10/18 120/74         Pulse Readings from Last 3 Encounters:   01/18/19 76   01/10/19 72   12/10/18 68        Physical Exam   Constitutional: She is oriented to person, place, and time  She appears well-developed and well-nourished  No distress  HENT:   Head: Normocephalic and atraumatic  Mouth/Throat: Oropharynx is clear and moist  No oropharyngeal exudate     Eyes: Pupils are equal, round, and reactive to light  Conjunctivae and EOM are normal    Neck: Normal range of motion  Neck supple  No tracheal deviation present  No thyromegaly present  Cardiovascular: Normal rate and regular rhythm  Exam reveals no gallop and no friction rub  No murmur heard  Pulmonary/Chest: Effort normal and breath sounds normal  No respiratory distress  She has no wheezes  She has no rales  She exhibits no tenderness  Abdominal: Soft  Bowel sounds are normal  She exhibits no distension and no mass  There is no tenderness  There is no rebound and no guarding  Musculoskeletal: Normal range of motion  She exhibits edema (Plus one edema of the right lower extremity) and tenderness (Right knee joint)  Lymphadenopathy:     She has no cervical adenopathy  Neurological: She is alert and oriented to person, place, and time  Skin: Skin is warm and dry  No rash noted  She is not diaphoretic  No erythema  No pallor  Psychiatric: She has a normal mood and affect  Her behavior is normal  Judgment and thought content normal    Vitals reviewed  Goals and Barriers:  Current Goal: Minimize effects of disease  Barriers: None  Patient's Capacity to Self Care:  Patient is able to self care      Code Status: @[facility] CenterDARVINUniversity [facility] Hospitals@

## 2019-01-18 NOTE — PROGRESS NOTES
Hematology Outpatient Follow - Up Note  Mc Rodgers 60 y o  female MRN: @ Encounter: [id number]        Date:  1/18/2019        Assessment/ Plan: IgG kappa indolent multiple myeloma, bone marrow biopsy in May 2017 showed 15% plasma cells, IgG kappa subtype, normal cytogenetics and fish panel for multiple myeloma, no evidence of end-organ damage, continue watchful observation    Elevated alkaline phosphatase and primary biliary cirrhosis of the liver    Osteoporosis, Zometa proceed with dose 4  Out of 4, the patient said that she is tolerating Zometa if she takes NSAIDs for flu-like symptoms after Zometa injection, she insisted on getting Zometa dose 4      Follow-up in 6 months with CBC, CMP, SPEP, free light chain, quantitative immunoglobulins or earlier if symptoms arise such as bone pain, lower back pain, fatigue, low urine output, fever, chills               HPI:  42-year-old  female with past medical history of primary biliary cirrhosis, fracture of the right tibia, hypertension, OCD,worsening of osteoporosis on DEXA scan Done in 2016, nephrolithiasis, chronic lower back pain and possible sacroiliitis was found to have elevated total protein 3-4 years ago, serum protein electrophoresis showed IgG kappa monoclonal protein of 1 7 g/dL however no evidence of anemia, hypercalcemia, or renal insufficiency, she had persistent elevation of alkaline phosphatase secondary to PBC  had a bone scan done last year which showed activity in the ribs area  serum protein electrophoresis in April 2017 showed IgG kappa monoclonal protein of 1 96 g/dL, total protein 8 5, albumin 3 9, IgG 2580, creatinine 0 8, calcium 8 9, alkaline phosphatase 294, AST 37, ALT 46, IgM 25 in July 2016 monoclonal protein of IgG kappa of 1 73  C-reactive protein has been normal however sedimentation rate was elevated in the range of 60  Bone marrow biopsy in May 2017 showed 15% plasma cells in diffuse and interstitial pattern, normal fish panel for multiple myeloma and cytogenetics  Status post right knee replacement in June 2018  Exacerbation of anxiety/depression followed by psychiatric medicine      Interval History:        Previous Treatment:         Test Results:    Imaging: No results found  Labs:   Lab Results   Component Value Date    WBC 7 42 01/11/2019    HGB 12 1 01/11/2019    HCT 38 3 01/11/2019    MCV 93 01/11/2019     01/11/2019     Lab Results   Component Value Date     12/29/2015    K 3 7 01/11/2019     01/11/2019    CO2 27 01/11/2019    ANIONGAP 7 12/29/2015    BUN 19 01/11/2019    CREATININE 0 96 01/11/2019    GLUCOSE 134 12/29/2015    GLUF 96 01/07/2019    CALCIUM 9 0 01/11/2019    AST 40 01/11/2019    ALT 49 01/11/2019    ALKPHOS 269 (H) 01/11/2019    PROT 8 4 (H) 12/29/2015    BILITOT 0 46 12/29/2015    EGFR 65 01/11/2019       [place] light chain 30 6, lambda light chain 10 1, IgG 2770, IgA 53, IgM 206, M protein 2 14 g IgG kappa    No results found for: [id number]      ROS:   Review of Systems   Constitutional: Negative for activity change, appetite change, diaphoresis, fatigue, fever and unexpected weight change  HENT: Negative for facial swelling, hearing loss, rhinorrhea, sinus pain, sinus pressure, sneezing, sore throat and tinnitus  Eyes: Negative for photophobia, pain, discharge, redness, itching and visual disturbance  Respiratory: Negative for apnea and chest tightness  Cardiovascular: Negative for chest pain, palpitations and leg swelling  Gastrointestinal: Negative for abdominal distention, abdominal pain, blood in stool, constipation, diarrhea, nausea, rectal pain and vomiting  Endocrine: Negative for cold intolerance, heat intolerance, polydipsia and polyphagia  Genitourinary: Negative for difficulty urinating, dyspareunia, frequency, hematuria, pelvic pain and urgency     Musculoskeletal: Positive for arthralgias (Of the right knee), back pain, gait problem and joint swelling  Negative for myalgias  Skin: Negative for color change, pallor and rash  Allergic/Immunologic: Negative for environmental allergies and food allergies  Neurological: Negative for dizziness, tremors, seizures, syncope, speech difficulty, numbness and headaches  Hematological: Negative for adenopathy  Does not bruise/bleed easily  Psychiatric/Behavioral: Negative for agitation, confusion, dysphoric mood, hallucinations and suicidal ideas  Current Medications: Reviewed  Allergies: Reviewed  PMH/FH/SH:  Reviewed      Physical Exam:    Body surface area is 1 87 meters squared  Wt Readings from Last 3 Encounters:   01/18/19 71 7 kg (158 lb)   01/10/19 72 kg (158 lb 12 8 oz)   12/21/18 72 6 kg (160 lb)        Temp Readings from Last 3 Encounters:   01/18/19 (!) 96 9 °F (36 1 °C) (Tympanic)   12/10/18 98 4 °F (36 9 °C) (Oral)   10/01/18 97 7 °F (36 5 °C) (Oral)        BP Readings from Last 3 Encounters:   01/18/19 116/78   01/10/19 128/78   12/10/18 120/74         Pulse Readings from Last 3 Encounters:   01/18/19 76   01/10/19 72   12/10/18 68        Physical Exam   Constitutional: She is oriented to person, place, and time  She appears well-developed and well-nourished  No distress  HENT:   Head: Normocephalic and atraumatic  Mouth/Throat: Oropharynx is clear and moist  No oropharyngeal exudate  Eyes: Pupils are equal, round, and reactive to light  Conjunctivae and EOM are normal    Neck: Normal range of motion  Neck supple  No tracheal deviation present  No thyromegaly present  Cardiovascular: Normal rate and regular rhythm  Exam reveals no gallop and no friction rub  No murmur heard  Pulmonary/Chest: Effort normal and breath sounds normal  No respiratory distress  She has no wheezes  She has no rales  She exhibits no tenderness  Abdominal: Soft  Bowel sounds are normal  She exhibits no distension and no mass  There is no tenderness  There is no rebound and no guarding  Musculoskeletal: Normal range of motion  She exhibits edema (Plus one edema of the right lower extremity) and tenderness (Right knee joint)  Lymphadenopathy:     She has no cervical adenopathy  Neurological: She is alert and oriented to person, place, and time  Skin: Skin is warm and dry  No rash noted  She is not diaphoretic  No erythema  No pallor  Psychiatric: She has a normal mood and affect  Her behavior is normal  Judgment and thought content normal    Vitals reviewed  Goals and Barriers:  Current Goal: Minimize effects of disease  Barriers: None  Patient's Capacity to Self Care:  Patient is able to self care      Code Status: [unfilled]

## 2019-01-21 ENCOUNTER — DOCUMENTATION (OUTPATIENT)
Dept: PSYCHIATRY | Facility: CLINIC | Age: 59
End: 2019-01-21

## 2019-01-21 NOTE — PROGRESS NOTES
# 1Treatment Plan not completed within required time limits due to : Provider will be out of the office 1/25/19  and will reschedule at a later time

## 2019-01-24 ENCOUNTER — TELEPHONE (OUTPATIENT)
Dept: INTERNAL MEDICINE CLINIC | Facility: CLINIC | Age: 59
End: 2019-01-24

## 2019-01-24 RX ORDER — SODIUM CHLORIDE 9 MG/ML
20 INJECTION, SOLUTION INTRAVENOUS CONTINUOUS
Status: DISCONTINUED | OUTPATIENT
Start: 2019-01-25 | End: 2019-01-28 | Stop reason: HOSPADM

## 2019-01-24 NOTE — TELEPHONE ENCOUNTER
Arpita the [employer] called saying that the pt had told her that you only wanted a mammo but the tech said that she also needs a breast ultrasound and asked that you look and confirm if you want her to have both an ultrasound and a mammogram  Please advise    CB#: [phone]

## 2019-01-25 ENCOUNTER — HOSPITAL ENCOUNTER (OUTPATIENT)
Dept: INFUSION CENTER | Facility: CLINIC | Age: 59
Discharge: HOME | End: 2019-01-25
Payer: COMMERCIAL

## 2019-01-25 VITALS
WEIGHT: 161 LBS | TEMPERATURE: 97.6 F | BODY MASS INDEX: 23.05 KG/M2 | OXYGEN SATURATION: 98 % | SYSTOLIC BLOOD PRESSURE: 104 MMHG | HEIGHT: 70 IN | HEART RATE: 67 BPM | RESPIRATION RATE: 16 BRPM | DIASTOLIC BLOOD PRESSURE: 66 MMHG

## 2019-01-25 PROCEDURE — 96365 THER/PROPH/DIAG IV INF INIT: CPT

## 2019-01-25 RX ADMIN — ZOLEDRONIC ACID 3.5 MG: 4 INJECTION, SOLUTION, CONCENTRATE INTRAVENOUS at 15:19

## 2019-01-25 RX ADMIN — SODIUM CHLORIDE 20 ML/HR: 0.9 INJECTION, SOLUTION INTRAVENOUS at 15:19

## 2019-01-25 NOTE — PROGRESS NOTES
Pt  Tolerated treatment w/out adverse reaction  Pt  Has no further appts  Scheduled &states this is her last appt   Pt  Declined AVS

## 2019-01-29 ENCOUNTER — OFFICE VISIT (OUTPATIENT)
Dept: PSYCHIATRY | Facility: CLINIC | Age: 59
End: 2019-01-29
Payer: COMMERCIAL

## 2019-01-29 DIAGNOSIS — F43.10 PTSD (POST-TRAUMATIC STRESS DISORDER): ICD-10-CM

## 2019-01-29 DIAGNOSIS — F41.1 GAD (GENERALIZED ANXIETY DISORDER): ICD-10-CM

## 2019-01-29 DIAGNOSIS — F33.2 MDD (MAJOR DEPRESSIVE DISORDER), RECURRENT SEVERE, WITHOUT PSYCHOSIS (HCC): ICD-10-CM

## 2019-01-29 DIAGNOSIS — F42.9 OBSESSIVE-COMPULSIVE DISORDER, UNSPECIFIED TYPE: Primary | ICD-10-CM

## 2019-01-29 PROCEDURE — 99214 OFFICE O/P EST MOD 30 MIN: CPT | Performed by: PSYCHIATRY & NEUROLOGY

## 2019-01-29 PROCEDURE — 90833 PSYTX W PT W E/M 30 MIN: CPT | Performed by: PSYCHIATRY & NEUROLOGY

## 2019-01-29 RX ORDER — VILAZODONE HYDROCHLORIDE 40 MG/1
40 TABLET ORAL
Qty: 30 TABLET | Refills: 1 | Status: SHIPPED | OUTPATIENT
Start: 2019-01-29 | End: 2019-02-26 | Stop reason: SDUPTHER
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
## 2019-01-30 ENCOUNTER — HOSPITAL ENCOUNTER (OUTPATIENT)
Dept: ULTRASOUND IMAGING | Facility: CLINIC | Age: 59
Discharge: HOME | End: 2019-01-30
Payer: COMMERCIAL

## 2019-01-30 ENCOUNTER — HOSPITAL ENCOUNTER (OUTPATIENT)
Dept: MAMMOGRAPHY | Facility: CLINIC | Age: 59
Discharge: HOME | End: 2019-01-30
Payer: COMMERCIAL

## 2019-01-30 VITALS — WEIGHT: 161 LBS | BODY MASS INDEX: 23.85 KG/M2 | HEIGHT: 69 IN

## 2019-01-30 DIAGNOSIS — Z12.31 ENCOUNTER FOR SCREENING MAMMOGRAM FOR BREAST CANCER: ICD-10-CM

## 2019-01-30 DIAGNOSIS — R92.8 ABNORMAL MAMMOGRAM: ICD-10-CM

## 2019-01-30 PROCEDURE — 76642 ULTRASOUND BREAST LIMITED: CPT

## 2019-01-30 PROCEDURE — 77067 SCR MAMMO BI INCL CAD: CPT

## 2019-01-30 PROCEDURE — 77063 BREAST TOMOSYNTHESIS BI: CPT

## 2019-01-31 ENCOUNTER — TELEPHONE (OUTPATIENT)
Dept: PSYCHIATRY | Facility: CLINIC | Age: 59
End: 2019-01-31

## 2019-01-31 NOTE — TELEPHONE ENCOUNTER
I had told her of that in our first conversation but [facility]  Thanks Jorge Hernandez

## 2019-01-31 NOTE — TELEPHONE ENCOUNTER
Patient called to report that Vibryd 40 mg needs prior authorization She wanted to mention that today is the last day of her [employer] [facility]

## 2019-01-31 NOTE — TELEPHONE ENCOUNTER
Claudia - please let Yuki Skinner know the prior auth won't get finished today and if it's the last day of coverage it won't cover medication in the future  If she's getting new insurance we can submit to them

## 2019-01-31 NOTE — TELEPHONE ENCOUNTER
Called patient with your advice   She asked if this is ture even though Dr Allyson Mancera gave her a card that she would pay $10 for last 2 Vibryd prescriptions-Vibryd program?

## 2019-01-31 NOTE — TELEPHONE ENCOUNTER
I followed up with Chris Cody to review insurance/prior auth process  Chris Shows said she was getting her medication for $10 with some program  When I asked for more specific information, she could only say it was a program  She also said she didn't know an increase had been sent to the pharmacy and why hadn't the doctor put it through already  I explained providers don't know specifics of insurance plans and that the pharmacy or patient would need to let the office know that a prior Ceci Ferraro is needed  She said she will not be able to increase the medication  After viewing additional information by Radha Gomez it seems she would be using a coupon from the [employer], which is only valid with [employer]

## 2019-02-04 NOTE — TELEPHONE ENCOUNTER
I believe we discussed on Friday  She should go through pharmaceutical  to see if they can help her  If insurance not an option now, will she get soon? If she does not want to pursue this medication, let me know  We may have to go back to a medication she has taken in the past that at least helped some (like prozac, lexapro, or similar medications)       Thanks,  [id number]

## 2019-02-05 NOTE — TELEPHONE ENCOUNTER
Spoke with Laquita Parekh and she stated that she is not sure if the $10 00 coupon she received from Dr Ron Pickering for [address] just works with her former insurance or whether she can continue getting Viibryd with the coupon only b/c she no longer has any insurance  She has requested paperwork from her former company to apply for COBRA but has not recvd as yet  Laquita Parekh will contact her pharmacy and clarify with them the terms of this coupon  She stated that she wants to remain on Viibryd and this program would be most helpful  She wanted to let Dr Ron Pickering know that at this time she does not have insurance but she has a job interview renetta Parekh was asked to stay in contact and communicate if she needs any further assistance with her medications  For Dr Ron Pickering review

## 2019-02-14 DIAGNOSIS — F33.2 MDD (MAJOR DEPRESSIVE DISORDER), RECURRENT SEVERE, WITHOUT PSYCHOSIS (HCC): ICD-10-CM

## 2019-02-14 DIAGNOSIS — F41.1 GAD (GENERALIZED ANXIETY DISORDER): ICD-10-CM

## 2019-02-15 NOTE — TELEPHONE ENCOUNTER
Called Kandace Dennis and left  requesting return call to discuss Viibryd refill request sent to Dr Daphne Muniz by pharmacy

## 2019-02-18 ENCOUNTER — TELEPHONE (OUTPATIENT)
Dept: PSYCHIATRY | Facility: CLINIC | Age: 59
End: 2019-02-18

## 2019-02-18 NOTE — TELEPHONE ENCOUNTER
Letty Manuel again and left VM on her mobile phone as there was no answer  Nursing phone number left to return call  For Dr Brenda townsend

## 2019-02-20 ENCOUNTER — TELEPHONE (OUTPATIENT)
Dept: PSYCHIATRY | Facility: CLINIC | Age: 59
End: 2019-02-20

## 2019-02-20 NOTE — TELEPHONE ENCOUNTER
Katelin Nichols returned call and apologized that she did not call back sooner  She stated that she can no longer get her Viibryd as she is not eligible to participate in special program with [employer] as she does not have insurance and program requires insurance to participate  Katelin Nichols stated she has not been able to reach [employer] customer service either on-line or by phone  Discussed calling phone number listed under "[employer]" customer service on-line  She will call back and inform of her progress as she wants to remain on Viibryd  For Dr Nely Michaels review

## 2019-02-21 ENCOUNTER — DOCUMENTATION (OUTPATIENT)
Dept: PSYCHIATRY | Facility: CLINIC | Age: 59
End: 2019-02-21

## 2019-02-21 NOTE — PROGRESS NOTES
Patient came to office today and brought in forms from [employer] assistance program for her medication,  Viibryd  Patient is requesting that Dr Mary An complete, sign and provide script so she can apply for program  She is requesting once packet of forms is completed that we fax to the designated number on forms for her directly to [employer]  She also requested that we mail or have a copy of the fax confirmation  for her records  Patient also is concerned about running out of the medication because she only has a 2 week supply left  She is questioning if perhaps she should take every other day instead of daily  It was also discussed that she could download a good Rx coupon and inquire about out of pocket cost  Patient asked nursing to relay this information to Dr Mary An  For Dr Mary An review

## 2019-02-21 NOTE — PROGRESS NOTES
I would prefer she not be on every other day or 1/2 tab daily, but if it will take a while, we may need to discuss      Did they give us a timeframe for approval?

## 2019-02-22 ENCOUNTER — TELEPHONE (OUTPATIENT)
Dept: PSYCHIATRY | Facility: CLINIC | Age: 59
End: 2019-02-22

## 2019-02-22 NOTE — TELEPHONE ENCOUNTER
Ask her to split the viibryd in 1/2 rather than taking every other day  Hopefully we will hear from insurance sooner, but if we hear nothing in next week or two we can talk about other medication options

## 2019-02-22 NOTE — TELEPHONE ENCOUNTER
Janet First and relayed Dr Ozzie Calhoun instructions on taking lower dose of medication if this is necessary  She stated she will follow these instructions if necessary  For Dr Ozzie Calhoun review

## 2019-02-22 NOTE — TELEPHONE ENCOUNTER
Lindsey Kebede was contacted to follow up on her requests to have Dr Bette Andersen complete  [employer] paperwork for [employer] to obtain Viibryd at no cost      Paper work completed and copy of fax verification and entire application was put in mail today, per [employer] request     As per [employer] requirements, a three month supply of Viibryd needs to be ordered by Dr Bette Andersen (noted on paperwork) Lindsey Kebede asked if she is approved for Viibryd 40 mg tablet for 90 days, would Dr Bette Andersen approve her splitting the tablet into 20 mgs to take a lower dose if 40 mg is too high a dose for her  Discussed that time frame to hear of approval is normally one month  Lindsey Kebede was supposed to discuss with her pharmacy how much out of pocket she would have to pay, with Good RX coupon, for two weeks of Viibryd  She has not done this to date  She is asking if she could take 20 mg Viibryd tablets, she has on hand, every other day, (she only has two weeks left), until she hears of approval/denial, from Mariann Abernathy  On Good RX site, [facility] lists Viibryd 40 mg 30 days costing $297 52 and Lindsey Kebede stated that she cannot afford to pay even 1/2 of this charge for 15 days of Viibryd so she may need to switch medications  For Dr Bette Andersen review and follow up  Thank you

## 2019-02-26 ENCOUNTER — TELEPHONE (OUTPATIENT)
Dept: PSYCHIATRY | Facility: CLINIC | Age: 59
End: 2019-02-26

## 2019-02-26 ENCOUNTER — TELEPHONE (OUTPATIENT)
Dept: INTERNAL MEDICINE CLINIC | Facility: CLINIC | Age: 59
End: 2019-02-26

## 2019-02-26 DIAGNOSIS — F33.2 MDD (MAJOR DEPRESSIVE DISORDER), RECURRENT SEVERE, WITHOUT PSYCHOSIS (HCC): ICD-10-CM

## 2019-02-26 DIAGNOSIS — F41.1 GAD (GENERALIZED ANXIETY DISORDER): ICD-10-CM

## 2019-02-26 RX ORDER — VILAZODONE HYDROCHLORIDE 40 MG/1
40 TABLET ORAL
Qty: 90 TABLET | Refills: 1 | Status: SHIPPED | OUTPATIENT
Start: 2019-02-26 | End: 2019-02-28

## 2019-02-26 RX ORDER — VILAZODONE HYDROCHLORIDE 20 MG/1
TABLET ORAL
Qty: 30 TABLET | Refills: 1 | OUTPATIENT
Start: 2019-02-26

## 2019-02-26 NOTE — TELEPHONE ENCOUNTER
Pt just called to let you know that she saw Dr David Alejandra at [facility] and that he's going to order an MRI of her back  Wanted to update you

## 2019-02-26 NOTE — TELEPHONE ENCOUNTER
Viibryd 40 mg tablets need to have 90 days ordered per [facility] requirements for Patient Assistance Program RX  For Dr Jerry townsend

## 2019-02-26 NOTE — TELEPHONE ENCOUNTER
Viibryd 40 mg tablets need to have 90 days ordered per [employer] requirements for Patient Assistance Program Jose Daniel townsend

## 2019-02-28 ENCOUNTER — DOCUMENTATION (OUTPATIENT)
Dept: PSYCHIATRY | Facility: CLINIC | Age: 59
End: 2019-02-28

## 2019-02-28 DIAGNOSIS — F42.9 OBSESSIVE-COMPULSIVE DISORDER, UNSPECIFIED TYPE: ICD-10-CM

## 2019-02-28 DIAGNOSIS — F33.2 MDD (MAJOR DEPRESSIVE DISORDER), RECURRENT SEVERE, WITHOUT PSYCHOSIS (HCC): Primary | ICD-10-CM

## 2019-02-28 DIAGNOSIS — F43.10 PTSD (POST-TRAUMATIC STRESS DISORDER): ICD-10-CM

## 2019-02-28 DIAGNOSIS — F41.1 GAD (GENERALIZED ANXIETY DISORDER): ICD-10-CM

## 2019-02-28 RX ORDER — FLUOXETINE 20 MG/1
TABLET, FILM COATED ORAL
Qty: 60 TABLET | Refills: 1 | Status: SHIPPED | OUTPATIENT
Start: 2019-02-28 | End: 2019-05-17

## 2019-02-28 NOTE — PROGRESS NOTES
Patient called back and stated for 10 pills of both 20 mg and 40 mg Viibryd would cost her over $100 00 dollars and this is not doable due to her not working  Patient asking for direction since she only has one week supply of this medication  She mentioned the side effects again during the conversation and is requesting a return call with plan moving forward  For Dr Bette Andersen review

## 2019-02-28 NOTE — PROGRESS NOTES
Nursing left message for patient regarding status on patient assistance and what she has left for medication  Requested a return call

## 2019-02-28 NOTE — PROGRESS NOTES
Taking 20mg still  Has stomach burning and loose stool ongoing  Always since starting medications  Does not feel it was there before viibryd  Waiting to hear back on interview  Reduce Viibryd to 10mg daily and start prozac 20mg daily for 1week,  Then stop Viibryd and increase prozac 40mg  PARQ completed including serotonin syndrome (especially with taking viibryd), SIADH, worsening depression, suicidality, activation, GI upset (discussed preference to stop one and let AE resolve, but she is worried about psychological functioning/symptoms), sedation, potential drug interactions, and others

## 2019-02-28 NOTE — PROGRESS NOTES
Patient called back reporting that she has about one week of her medication, Viibryd  She is going to pharmacy today and will check on what a 2 week supply of 20 mg vs 40 of medication mg is  She inquired about the lower dosing and staying on 20 mg possibly  She stated that she will call back once she speaks to the pharmacist  The one month of 297 00 is "too much for her to pay out of pocket"  She is complaining of side effects consisting of "burning stomach and loose stools"  She is inquiring if this could be from the [address]  Patient informed that nursing would send message to Dr Jaclyn Fountain

## 2019-03-18 DIAGNOSIS — G62.9 PERIPHERAL POLYNEUROPATHY: Chronic | ICD-10-CM

## 2019-03-18 RX ORDER — GABAPENTIN 600 MG/1
TABLET ORAL
Qty: 120 TABLET | Refills: 5 | Status: SHIPPED | OUTPATIENT
Start: 2019-03-18 | End: 2021-02-09

## 2019-03-22 ENCOUNTER — OFFICE VISIT (OUTPATIENT)
Dept: OBGYN CLINIC | Facility: MEDICAL CENTER | Age: 59
End: 2019-03-22
Payer: OTHER MISCELLANEOUS

## 2019-03-22 VITALS
HEART RATE: 78 BPM | WEIGHT: 161 LBS | DIASTOLIC BLOOD PRESSURE: 83 MMHG | SYSTOLIC BLOOD PRESSURE: 125 MMHG | BODY MASS INDEX: 23.85 KG/M2 | HEIGHT: 69 IN

## 2019-03-22 DIAGNOSIS — Z96.651 STATUS POST TOTAL RIGHT KNEE REPLACEMENT: Primary | ICD-10-CM

## 2019-03-22 DIAGNOSIS — M25.561 CHRONIC PAIN OF RIGHT KNEE: ICD-10-CM

## 2019-03-22 DIAGNOSIS — G89.29 CHRONIC PAIN OF RIGHT KNEE: ICD-10-CM

## 2019-03-22 DIAGNOSIS — G90.521 COMPLEX REGIONAL PAIN SYNDROME I OF RIGHT LOWER LIMB: ICD-10-CM

## 2019-03-22 PROCEDURE — 99213 OFFICE O/P EST LOW 20 MIN: CPT | Performed by: ORTHOPAEDIC SURGERY

## 2019-03-22 RX ORDER — TRAMADOL HYDROCHLORIDE 50 MG/1
TABLET ORAL
COMMUNITY
End: 2020-05-04 | Stop reason: SDUPTHER

## 2019-03-22 RX ORDER — LIDOCAINE 50 MG/G
PATCH TOPICAL
COMMUNITY

## 2019-03-22 NOTE — PROGRESS NOTES
60 y o female presents for 9 months postoperative visit status post right TKA (op date: 6/11/2019)  Patient continues to see a pain management specialist [facility] for lumbar spine injections to address her complex regional pain syndrome for the right lower extremity  In addition to this, the patient uses a diclofenac patch over the area of sensitivity  Regarding the knee, the patient has very few complaints       Review of Systems  All systems reviewed and are otherwise negative    Past Medical History  Past Medical History:   Diagnosis Date    Abnormal breast exam     Anxiety     Asthma     childhood    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[address] )     primary per allscripts    Cancer ([facility] )     IgG kappa smoldering multiple myeloma    Cardiac murmur     Chronic liver disease     resolved 12/04/2017    Depression     Endometriosis     Fracture of tibia     right tibial plateau fracture per allscripts    Hepatic disease     Hypertension     last assessed 12/13/2017    Neuropathy     R foot     OCD (obsessive compulsive disorder)     Pneumonia     RSD (reflex sympathetic dystrophy) 12/11/2018    Seasonal allergies     Wears eyeglasses     Whiplash injury to neck        Past Surgical History  Past Surgical History:   Procedure Laterality Date    BACK SURGERY      hemilaminectomy and discectomy, L5-S1, right (Dr Kia Adams, [facility]) onset 02/14/2005 per allscripts    EXPLORATION EXTREMITY Right 8/29/2016    Procedure: KNEE PERONEAL NERVE EXPLORATION AND RELEASE ;  Surgeon: Etta Rush MD;  [facility];  Service:    Sweetie Silva FOOT SURGERY      HAND SURGERY      HERNIA REPAIR      MANDIBLE SURGERY      NEUROPLASTY / TRANSPOSITION MEDIAN NERVE AT CARPAL TUNNEL      ORIF TIBIAL PLATEAU Right     arthroplasty per allscripts    OTHER SURGICAL HISTORY      injection of trigger point(s) per allscripts    CT TOTAL KNEE ARTHROPLASTY Right 6/11/2018    Procedure: ARTHROPLASTY KNEE TOTAL;  Surgeon: Etta Rush MD; Location: [facility];  Service: Orthopedics       Current Medications  Current Outpatient Medications on File Prior to Visit   Medication Sig Dispense Refill    acyclovir (ZOVIRAX) 200 mg capsule TAKE 2 CAPSULES BY MOUTH 3 TIMES A DAY FOR 5 DAYS  4    Cholecalciferol (VITAMIN D3) 2000 UNITS TABS Take 2,000 Units by mouth daily        ergocalciferol (VITAMIN D2) 50,000 units TAKE ONE CAPSULE BY MOUTH EVERY OTHER WEEK FOR 1 MONTH, THEN 1 CAPSULE EVERY MONTH (Patient taking differently: TAKE ONE CAPSULE BY MOUTH EVERY MONTH) 4 capsule 2    FLUoxetine (PROzac) 20 MG tablet Take 20mg daily for 1 week, then increase to 40mg daily  May go slower if tolerability concerns  60 tablet 1    gabapentin (NEURONTIN) 600 MG tablet TAKE 1 TABLET (600 MG TOTAL) BY MOUTH 4 (FOUR) TIMES A DAY FOR 90  tablet 5    lidocaine (LIDODERM) 5 % lidocaine 5 % topical patch   APPLY 1 PATCH TO AFFECTED AREA FOR 12 HOURS A DAY & REMOVE FOR 12 HOURS BEFORE APPLYING NEXT PATCH  (12 HOURS ON, 12 HOURS OFF)      LORazepam (ATIVAN) 0 5 mg tablet Take 1-2 tablets (0 5-1 mg total) by mouth 2 (two) times a day as needed for anxiety 45 tablet 1    traMADol (ULTRAM) 50 mg tablet Ultram      triamterene-hydrochlorothiazide (MAXZIDE-25) 37 5-25 mg per tablet TAKE 1 TABLET BY MOUTH EVERY DAY 90 tablet 2    ursodiol (ACTIGALL) 300 mg capsule TAKE 1 CAPSULE THREE TIMES DAILY  270 capsule 2     No current facility-administered medications on file prior to visit          Recent Labs [facility])  0   Lab Value Date/Time    HCT 38 3 01/11/2019 1450    HCT 38 2 12/29/2015 0659    HGB 12 1 01/11/2019 1450    HGB 12 4 12/29/2015 0659    WBC 7 42 01/11/2019 1450    WBC 6 60 12/29/2015 0659    INR 0 95 05/22/2018 1439    ESR 42 (H) 12/06/2017 0739    CRP <3 0 12/06/2017 0739    GLUCOSE 134 12/29/2015 0659    HGBA1C 6 5 (H) 05/22/2018 1439         Physical exam  · General: Awake, Alert, Oriented  · Eyes: Pupils equal, round and reactive to light  · Heart: regular rate and rhythm  · Lungs: No audible wheezing  · Abdomen: soft  right Knee exam  · Well-healed anterior and anterolateral incisions  Proximal aspect of the anterior incision with some hyperpigmentation  · Skin over the lower leg appear show any  · Knees able to go from 0-120 degrees flexion  · Stable to varus valgus stress  · Hyperesthesia over the anterior aspect of the proximal tibia  · Patient has robust plantar dorsiflexion of the ankle  · Limb otherwise warm well-perfused    A 70-year-old female 9 months status post right total knee arthroplasty for posttraumatic arthritis  · Weightbearing as tolerated to the right lower extremity  · Patient may return to work in a limited capacity  · Patient should continue her pain management modalities as directed by her pain management specialist  · She is instructed to follow up in 3 months for her 1 year postoperative visit    She should obtain x-rays at this time  · Patient understands and agrees with plan above

## 2019-03-22 NOTE — LETTER
March 22, 2019     Patient: Gracie Verdin   YOB: 1960   Date of Visit: 3/22/2019       To Whom it May Concern:    Kobe Gibson is under my professional care  She was seen in my office on 3/22/2019  She may return to work in a limited capacity 3 half days per week  She has a weight lifting restriction of 5 lb  She is restricted from performing any squatting, kneeling or deep knee bending  She is also restricted from continuous walking (as tolerated) or climbing  Patient should receive a designated parking spot, and Cover parking, close doors  Fetal out that she wear shoes that accommodate her limited activity  Patient should also be allowed time off for physical therapy as well as doctor's appointments  Lastly, patient should primarily use the elevator she is unable to traverse steps  If you have any questions or concerns, please don't hesitate to call           Sincerely,          Dany Gann MD        CC: Gracie Verdin

## 2019-03-25 ENCOUNTER — TELEPHONE (OUTPATIENT)
Dept: OBGYN CLINIC | Facility: HOSPITAL | Age: 59
End: 2019-03-25

## 2019-03-25 NOTE — TELEPHONE ENCOUNTER
Caller: patient  Callback# [phone]  Fax# [facility] [phone]  Dr Hitesh Pickering        Patient called stated note dated 03/22/19 need to be revised sentence 4  Patient should be able to work with limitations  5  She state 5lbs max and no climbing  6 patient need to have a destinate parking spot undercover and closet to the door  7 please allow patient shoe that accommodate  related to work injury "Fetal out that she " should be removed  Lastly patient should require use of elevator  Please advise thanks

## 2019-03-25 NOTE — TELEPHONE ENCOUNTER
cbnDr Verónica Leander    Patient forgot to ask if she is to continue with PT   She has it scheduled for this afternoon and if it needs to be cancelled she will she just needs to know    Thank you  Cb# [phone]

## 2019-03-25 NOTE — LETTER
March 26, 2019     Anant Spencer    Patient: Anant Spencer   YOB: 1960   Date of Visit: 3/25/2019       Dear Dr Johnice Gowers:    Socorro Gunter is under my professional care  She was seen in my office on 3/22/2019  She may return to work with limitations 3 half days per week  She has a weight lifting restriction of 5 lb maximum  She is restricted from performing any squatting, kneeling, climbing or deep knee bending  She is also restricted from continuous walking (as tolerated) or climbing  Patient should receive a designated covered parking spot closest to the door  Please allow her to wear shoes that accommodate her limited work activity  Patient should also be allowed time off for physical therapy as well as doctor's appointments  Lastly, patient is required to use the elevator as she is unable to traverse steps  Sincerely,        Radha Chino MD        CC: No Recipients  Juan C Blevins  3/26/2019 11:23 AM  Signed  *    Shawn Helton PA-C  3/26/2019  7:15 AM  Signed  This will be relayed to Dr Carlo Marrufo  3/25/2019 12:30 PM  Signed  Caller: patient  Callback# [phone]  Fax# [facility] [phone]  Dr Ivelisse Blankenship        Patient called stated note dated 03/22/19 need to be revised sentence 4  Patient should be able to work with limitations  5  She state 5lbs max and no climbing  6 patient need to have a destinate parking spot undercover and closet to the door  7 please allow patient shoe that accommodate  related to work injury "Fetal out that she " should be removed  Lastly patient should require use of elevator  Please advise thanks

## 2019-03-27 ENCOUNTER — TELEPHONE (OUTPATIENT)
Dept: PSYCHIATRY | Facility: CLINIC | Age: 59
End: 2019-03-27

## 2019-03-27 NOTE — TELEPHONE ENCOUNTER
GAIL left on [employer]'s cell phone that Paulino Gaffney has been delivered and requested return call for Sharif Bird to arrange p/u with nursing  For Dr Carter Johnson information

## 2019-03-27 NOTE — TELEPHONE ENCOUNTER
Viibryd 40 mg three containers of 30 tabs received today in Dr Brenda Steven name for [facility]  For Dr Brenda Steven review:   Nursing can call to advise [facility] that Portia Montero has been received and she can come in to office to p/u  Can all three containers be picked up at one time or month to month?

## 2019-03-28 ENCOUNTER — TELEPHONE (OUTPATIENT)
Dept: PSYCHIATRY | Facility: CLINIC | Age: 59
End: 2019-03-28

## 2019-03-28 NOTE — TELEPHONE ENCOUNTER
[facility] Customer Service Phone # [phone] (Customer Service Contract Program)   Reference Purchase Order when reordering  #[id number] [facility]    Original order: Viibryd 40 mg tabs    (3 mos were sent) and arrived 3/27/19 in Dr Elidia Rogers' name at: [facility] and patient picked up today  Instructions are to begin by taking Viibryd 1/4 tab (10 mg) in morning, beginning tomm  3/29/19  for one week then titrate up to 1/2 tab (20 mg) for one week to 3/4 tab (30 mg) for one week and finally to one whole tab of 40 mg depending on Courtney's progress titrating up successfully  Thor Organ will contact our office if experiencing problems  No Prozac dose tonight

## 2019-03-28 NOTE — TELEPHONE ENCOUNTER
Aly Wang stopped by to  Viibryd today  She had the following questions:    1  She took Prozac last evening  Should she begin to take Viibryd tonight? 2  RX is for 40 mg Viibryd  Do you want her to take 1/2 tab (20 mg) to start? She stated that she had loose stool as SE when she last took [employer] (20 mg)  Never took 40 mg dose  3  Nursing agreed to call her with instructions per Dr Yon Powers  4  Aly Kathleen was instructed to contact Nursing at least two weeks before she is out of medication to give nursing time to process  For Dr Yon Powers' review

## 2019-04-22 ENCOUNTER — TELEPHONE (OUTPATIENT)
Dept: PSYCHIATRY | Facility: CLINIC | Age: 59
End: 2019-04-22

## 2019-04-30 ENCOUNTER — DOCUMENTATION (OUTPATIENT)
Dept: PSYCHIATRY | Facility: CLINIC | Age: 59
End: 2019-04-30

## 2019-05-07 ENCOUNTER — TELEPHONE (OUTPATIENT)
Dept: PSYCHIATRY | Facility: CLINIC | Age: 59
End: 2019-05-07

## 2019-05-16 DIAGNOSIS — K74.5 BILIARY CIRRHOSIS (HCC): ICD-10-CM

## 2019-05-16 RX ORDER — URSODIOL 300 MG/1
300 CAPSULE ORAL 3 TIMES DAILY
Qty: 270 CAPSULE | Refills: 2 | Status: SHIPPED | OUTPATIENT
Start: 2019-05-16 | End: 2020-07-11

## 2019-05-17 ENCOUNTER — OFFICE VISIT (OUTPATIENT)
Dept: PSYCHIATRY | Facility: CLINIC | Age: 59
End: 2019-05-17
Payer: COMMERCIAL

## 2019-05-17 DIAGNOSIS — F42.9 OBSESSIVE-COMPULSIVE DISORDER, UNSPECIFIED TYPE: ICD-10-CM

## 2019-05-17 DIAGNOSIS — F43.10 PTSD (POST-TRAUMATIC STRESS DISORDER): ICD-10-CM

## 2019-05-17 DIAGNOSIS — F33.2 MDD (MAJOR DEPRESSIVE DISORDER), RECURRENT SEVERE, WITHOUT PSYCHOSIS (HCC): Primary | ICD-10-CM

## 2019-05-17 DIAGNOSIS — F41.1 GAD (GENERALIZED ANXIETY DISORDER): ICD-10-CM

## 2019-05-17 PROCEDURE — 90833 PSYTX W PT W E/M 30 MIN: CPT | Performed by: PSYCHIATRY & NEUROLOGY

## 2019-05-17 PROCEDURE — 99214 OFFICE O/P EST MOD 30 MIN: CPT | Performed by: PSYCHIATRY & NEUROLOGY

## 2019-05-23 ENCOUNTER — DOCUMENTATION (OUTPATIENT)
Dept: PSYCHIATRY | Facility: CLINIC | Age: 59
End: 2019-05-23

## 2019-05-23 ENCOUNTER — APPOINTMENT (OUTPATIENT)
Dept: LAB | Facility: CLINIC | Age: 59
End: 2019-05-23
Payer: COMMERCIAL

## 2019-05-23 ENCOUNTER — TELEPHONE (OUTPATIENT)
Dept: PSYCHIATRY | Facility: CLINIC | Age: 59
End: 2019-05-23

## 2019-05-23 DIAGNOSIS — R74.8 ELEVATED LIVER ENZYMES: ICD-10-CM

## 2019-05-23 LAB
ALBUMIN SERPL BCP-MCNC: 3.3 G/DL (ref 3.5–5)
ALP SERPL-CCNC: 799 U/L (ref 46–116)
ALT SERPL W P-5'-P-CCNC: 95 U/L (ref 12–78)
ANION GAP SERPL CALCULATED.3IONS-SCNC: 5 MMOL/L (ref 4–13)
AST SERPL W P-5'-P-CCNC: 79 U/L (ref 5–45)
BASOPHILS # BLD AUTO: 0.04 THOUSANDS/ΜL (ref 0–0.1)
BASOPHILS NFR BLD AUTO: 1 % (ref 0–1)
BILIRUB SERPL-MCNC: 0.65 MG/DL (ref 0.2–1)
BUN SERPL-MCNC: 16 MG/DL (ref 5–25)
CALCIUM SERPL-MCNC: 8.8 MG/DL (ref 8.3–10.1)
CHLORIDE SERPL-SCNC: 102 MMOL/L (ref 100–108)
CO2 SERPL-SCNC: 28 MMOL/L (ref 21–32)
CREAT SERPL-MCNC: 0.85 MG/DL (ref 0.6–1.3)
EOSINOPHIL # BLD AUTO: 0.19 THOUSAND/ΜL (ref 0–0.61)
EOSINOPHIL NFR BLD AUTO: 3 % (ref 0–6)
ERYTHROCYTE [DISTWIDTH] IN BLOOD BY AUTOMATED COUNT: 13.8 % (ref 11.6–15.1)
GFR SERPL CREATININE-BSD FRML MDRD: 75 ML/MIN/1.73SQ M
GLUCOSE SERPL-MCNC: 88 MG/DL (ref 65–140)
HCT VFR BLD AUTO: 39 % (ref 34.8–46.1)
HGB BLD-MCNC: 12.3 G/DL (ref 11.5–15.4)
IMM GRANULOCYTES # BLD AUTO: 0.01 THOUSAND/UL (ref 0–0.2)
IMM GRANULOCYTES NFR BLD AUTO: 0 % (ref 0–2)
LYMPHOCYTES # BLD AUTO: 2.13 THOUSANDS/ΜL (ref 0.6–4.47)
LYMPHOCYTES NFR BLD AUTO: 37 % (ref 14–44)
MCH RBC QN AUTO: 29.4 PG (ref 26.8–34.3)
MCHC RBC AUTO-ENTMCNC: 31.5 G/DL (ref 31.4–37.4)
MCV RBC AUTO: 93 FL (ref 82–98)
MONOCYTES # BLD AUTO: 0.43 THOUSAND/ΜL (ref 0.17–1.22)
MONOCYTES NFR BLD AUTO: 8 % (ref 4–12)
NEUTROPHILS # BLD AUTO: 2.97 THOUSANDS/ΜL (ref 1.85–7.62)
NEUTS SEG NFR BLD AUTO: 51 % (ref 43–75)
NRBC BLD AUTO-RTO: 0 /100 WBCS
PLATELET # BLD AUTO: 214 THOUSANDS/UL (ref 149–390)
PMV BLD AUTO: 11.6 FL (ref 8.9–12.7)
POTASSIUM SERPL-SCNC: 4 MMOL/L (ref 3.5–5.3)
PROT SERPL-MCNC: 9.3 G/DL (ref 6.4–8.2)
RBC # BLD AUTO: 4.19 MILLION/UL (ref 3.81–5.12)
SODIUM SERPL-SCNC: 135 MMOL/L (ref 136–145)
WBC # BLD AUTO: 5.77 THOUSAND/UL (ref 4.31–10.16)

## 2019-05-23 PROCEDURE — 85025 COMPLETE CBC W/AUTO DIFF WBC: CPT

## 2019-05-23 PROCEDURE — 36415 COLL VENOUS BLD VENIPUNCTURE: CPT

## 2019-05-23 PROCEDURE — 80053 COMPREHEN METABOLIC PANEL: CPT

## 2019-05-24 ENCOUNTER — TELEPHONE (OUTPATIENT)
Dept: PSYCHIATRY | Facility: CLINIC | Age: 59
End: 2019-05-24

## 2019-05-28 ENCOUNTER — TELEPHONE (OUTPATIENT)
Dept: INTERNAL MEDICINE CLINIC | Facility: CLINIC | Age: 59
End: 2019-05-28

## 2019-05-28 ENCOUNTER — OFFICE VISIT (OUTPATIENT)
Dept: INTERNAL MEDICINE CLINIC | Facility: CLINIC | Age: 59
End: 2019-05-28
Payer: COMMERCIAL

## 2019-05-28 ENCOUNTER — TELEPHONE (OUTPATIENT)
Dept: PSYCHIATRY | Facility: CLINIC | Age: 59
End: 2019-05-28

## 2019-05-28 VITALS
DIASTOLIC BLOOD PRESSURE: 78 MMHG | SYSTOLIC BLOOD PRESSURE: 120 MMHG | HEART RATE: 72 BPM | BODY MASS INDEX: 22.38 KG/M2 | WEIGHT: 151.1 LBS | RESPIRATION RATE: 14 BRPM | HEIGHT: 69 IN

## 2019-05-28 DIAGNOSIS — M81.0 OSTEOPOROSIS WITHOUT CURRENT PATHOLOGICAL FRACTURE, UNSPECIFIED OSTEOPOROSIS TYPE: Chronic | ICD-10-CM

## 2019-05-28 DIAGNOSIS — K74.3 PRIMARY BILIARY CHOLANGITIS (HCC): Primary | Chronic | ICD-10-CM

## 2019-05-28 DIAGNOSIS — F33.2 MDD (MAJOR DEPRESSIVE DISORDER), RECURRENT SEVERE, WITHOUT PSYCHOSIS (HCC): ICD-10-CM

## 2019-05-28 DIAGNOSIS — D47.2 SMOLDERING MULTIPLE MYELOMA (SMM): Chronic | ICD-10-CM

## 2019-05-28 DIAGNOSIS — I10 ESSENTIAL HYPERTENSION: Chronic | ICD-10-CM

## 2019-05-28 PROBLEM — Z01.818 PREOPERATIVE EXAMINATION: Status: RESOLVED | Noted: 2018-06-04 | Resolved: 2019-05-28

## 2019-05-28 PROCEDURE — 3078F DIAST BP <80 MM HG: CPT | Performed by: INTERNAL MEDICINE

## 2019-05-28 PROCEDURE — 99214 OFFICE O/P EST MOD 30 MIN: CPT | Performed by: INTERNAL MEDICINE

## 2019-05-28 PROCEDURE — 3074F SYST BP LT 130 MM HG: CPT | Performed by: INTERNAL MEDICINE

## 2019-05-29 ENCOUNTER — TELEPHONE (OUTPATIENT)
Dept: PSYCHIATRY | Facility: CLINIC | Age: 59
End: 2019-05-29

## 2019-05-31 ENCOUNTER — TELEPHONE (OUTPATIENT)
Dept: PSYCHIATRY | Facility: CLINIC | Age: 59
End: 2019-05-31

## 2019-06-05 ENCOUNTER — TELEPHONE (OUTPATIENT)
Dept: PSYCHIATRY | Facility: CLINIC | Age: 59
End: 2019-06-05

## 2019-06-08 DIAGNOSIS — M81.0 AGE-RELATED OSTEOPOROSIS WITHOUT CURRENT PATHOLOGICAL FRACTURE: ICD-10-CM

## 2019-06-10 ENCOUNTER — TELEPHONE (OUTPATIENT)
Dept: INTERNAL MEDICINE CLINIC | Facility: CLINIC | Age: 59
End: 2019-06-10

## 2019-06-17 ENCOUNTER — TELEPHONE (OUTPATIENT)
Dept: PSYCHIATRY | Facility: CLINIC | Age: 59
End: 2019-06-17

## 2019-07-05 ENCOUNTER — APPOINTMENT (OUTPATIENT)
Dept: RADIOLOGY | Facility: MEDICAL CENTER | Age: 59
End: 2019-07-05
Payer: COMMERCIAL

## 2019-07-05 ENCOUNTER — OFFICE VISIT (OUTPATIENT)
Dept: OBGYN CLINIC | Facility: MEDICAL CENTER | Age: 59
End: 2019-07-05
Payer: OTHER MISCELLANEOUS

## 2019-07-05 VITALS
DIASTOLIC BLOOD PRESSURE: 79 MMHG | SYSTOLIC BLOOD PRESSURE: 127 MMHG | HEIGHT: 69 IN | WEIGHT: 151 LBS | BODY MASS INDEX: 22.36 KG/M2 | HEART RATE: 68 BPM

## 2019-07-05 DIAGNOSIS — G90.521 COMPLEX REGIONAL PAIN SYNDROME I OF RIGHT LOWER LIMB: ICD-10-CM

## 2019-07-05 DIAGNOSIS — M62.81 QUADRICEPS WEAKNESS: ICD-10-CM

## 2019-07-05 DIAGNOSIS — Z96.651 STATUS POST TOTAL RIGHT KNEE REPLACEMENT: Primary | ICD-10-CM

## 2019-07-05 DIAGNOSIS — M79.604 LEG PAIN, ANTERIOR, RIGHT: ICD-10-CM

## 2019-07-05 DIAGNOSIS — Z96.651 STATUS POST TOTAL RIGHT KNEE REPLACEMENT: ICD-10-CM

## 2019-07-05 PROCEDURE — 99213 OFFICE O/P EST LOW 20 MIN: CPT | Performed by: ORTHOPAEDIC SURGERY

## 2019-07-05 PROCEDURE — 73560 X-RAY EXAM OF KNEE 1 OR 2: CPT

## 2019-07-05 NOTE — PROGRESS NOTES
Assessment  Diagnoses and all orders for this visit:    Status post total right knee replacement  -     XR knee 1 or 2 vw right; Future  -     Ambulatory referral to Physical Therapy; Future      Discussion and Plan:    1  Continue with therapy for the right knee  Quad strengthening  2  Lifetime antibiotic prophylaxis prior to dental appointments  3  Work note provided  It will remain unchanged from last visit  Restriction will remain in effect until her next appointment  4  Follow up in 3 months for reevaluation  5  Continue with pain management for treatment of right leg pain     Subjective:   Patient ID: Jennifer Giron is a 60 y o  female      Ghulam Goldstein returns to the office in follow in follow up of the right knee  She denies interval change in her pain complaints since last office visit  She has pain that limits activities of daily living as well as activities of enjoyment  Pain is located globally over the knee  At times the pain is sharp  She has burning sensation along medial and lateral aspect of the right knee  She denies new injury or trauma since last visit  She has been going to pain management at [facility] for injections for her CRPS  She has gained short term relief with 3/4 injections  The longest relief was approximately 1 month  /79   Pulse 68   Ht 5' 9" (1 753 m)   Wt 68 5 kg (151 lb)   LMP  (LMP Unknown)   BMI 22 30 kg/m²       The following portions of the patient's history were reviewed and updated as appropriate: allergies, current medications, past family history, past medical history, past social history, past surgical history and problem list     Review of Systems   Constitutional: Negative for chills and fever  HENT: Negative for hearing loss  Eyes: Negative for visual disturbance  Respiratory: Negative for shortness of breath  Cardiovascular: Negative for chest pain  Gastrointestinal: Negative for abdominal pain     Musculoskeletal:        As reviewed in the HPI   Skin: Negative for rash  Neurological:        As reviewed in the HPI   Psychiatric/Behavioral: Negative for agitation  Objective:  Right Knee Exam     Tenderness   Right knee tenderness location: hypersensitivity globally over knee  Range of Motion   Extension: 0   Flexion: 120     Other   Erythema: absent  Scars: present (healed scars)  Right knee sensation: hyperesthesia  Swelling: mild  Effusion: no effusion present    Comments:  No midflexion instability with varus and valgus stress  Mild quad atrophy compared to contralateral side            Physical Exam   Constitutional: She is oriented to person, place, and time  She appears well-developed and well-nourished  HENT:   Head: Normocephalic  Eyes: EOM are normal    Neck: Normal range of motion  Pulmonary/Chest: Breath sounds normal  She has no wheezes  Musculoskeletal:        Right knee: She exhibits no effusion  Neurological: She is alert and oriented to person, place, and time  Skin: Skin is warm and dry  Psychiatric: She has a normal mood and affect  Her behavior is normal  Judgment and thought content normal          I have personally reviewed pertinent films in PACS and my interpretation is as follows      2 views right knee: stable prosthesis, no loosening

## 2019-07-05 NOTE — LETTER
July 5, 2019     Patient: Brii Pratt   YOB: 1960   Date of Visit: 7/5/2019       To Whom it May Concern:    Lenny Wilson is under my professional care  She was seen in my office on 7/5/2019  She may return to work with limitations 3 half days per week  Izabella Washburn has a weight lifting restriction of 5 lb maximum   She is restricted from performing any squatting, kneeling, climbing or deep knee bending   She is also restricted from continuous walking (as tolerated) or climbing   Patient should receive a designated covered parking spot closest to the door   Please allow her to wear shoes that accommodate her limited work activity   Patient should also be allowed time off for physical therapy as well as doctor's appointments  Severa Plenty, patient is required to use the elevator as she is unable to traverse steps  If you have any questions or concerns, please don't hesitate to call           Sincerely,          Kirk Salmon MD        CC: No Recipients

## 2019-07-12 ENCOUNTER — TRANSCRIBE ORDERS (OUTPATIENT)
Dept: LAB | Facility: CLINIC | Age: 59
End: 2019-07-12

## 2019-07-12 ENCOUNTER — APPOINTMENT (OUTPATIENT)
Dept: LAB | Facility: CLINIC | Age: 59
End: 2019-07-12
Payer: COMMERCIAL

## 2019-07-12 DIAGNOSIS — K74.3 PRIMARY BILIARY CHOLANGITIS (HCC): Chronic | ICD-10-CM

## 2019-07-12 DIAGNOSIS — D47.2 SMOLDERING MULTIPLE MYELOMA (SMM): Primary | ICD-10-CM

## 2019-07-12 DIAGNOSIS — E85.9 MYELOMA ASSOCIATED AMYLOIDOSIS (HCC): ICD-10-CM

## 2019-07-12 DIAGNOSIS — C90.00 MYELOMA ASSOCIATED AMYLOIDOSIS (HCC): ICD-10-CM

## 2019-07-12 DIAGNOSIS — M81.0 OSTEOPOROSIS WITHOUT CURRENT PATHOLOGICAL FRACTURE, UNSPECIFIED OSTEOPOROSIS TYPE: Chronic | ICD-10-CM

## 2019-07-12 DIAGNOSIS — D47.2 SMOLDERING MULTIPLE MYELOMA (SMM): Chronic | ICD-10-CM

## 2019-07-12 LAB
ALBUMIN SERPL BCP-MCNC: 3.4 G/DL (ref 3.5–5)
ALP SERPL-CCNC: 419 U/L (ref 46–116)
ALT SERPL W P-5'-P-CCNC: 88 U/L (ref 12–78)
ANION GAP SERPL CALCULATED.3IONS-SCNC: 6 MMOL/L (ref 4–13)
AST SERPL W P-5'-P-CCNC: 67 U/L (ref 5–45)
BASOPHILS # BLD AUTO: 0.03 THOUSANDS/ΜL (ref 0–0.1)
BASOPHILS NFR BLD AUTO: 1 % (ref 0–1)
BILIRUB SERPL-MCNC: 0.5 MG/DL (ref 0.2–1)
BUN SERPL-MCNC: 15 MG/DL (ref 5–25)
CALCIUM SERPL-MCNC: 8.9 MG/DL (ref 8.3–10.1)
CHLORIDE SERPL-SCNC: 103 MMOL/L (ref 100–108)
CO2 SERPL-SCNC: 30 MMOL/L (ref 21–32)
CREAT SERPL-MCNC: 0.91 MG/DL (ref 0.6–1.3)
EOSINOPHIL # BLD AUTO: 0.1 THOUSAND/ΜL (ref 0–0.61)
EOSINOPHIL NFR BLD AUTO: 2 % (ref 0–6)
ERYTHROCYTE [DISTWIDTH] IN BLOOD BY AUTOMATED COUNT: 14.2 % (ref 11.6–15.1)
GFR SERPL CREATININE-BSD FRML MDRD: 69 ML/MIN/1.73SQ M
GLUCOSE P FAST SERPL-MCNC: 90 MG/DL (ref 65–99)
HCT VFR BLD AUTO: 41.9 % (ref 34.8–46.1)
HGB BLD-MCNC: 13.1 G/DL (ref 11.5–15.4)
IGA SERPL-MCNC: 57 MG/DL (ref 70–400)
IGG SERPL-MCNC: 2690 MG/DL (ref 700–1600)
IGM SERPL-MCNC: 308 MG/DL (ref 40–230)
IMM GRANULOCYTES # BLD AUTO: 0.02 THOUSAND/UL (ref 0–0.2)
IMM GRANULOCYTES NFR BLD AUTO: 0 % (ref 0–2)
LYMPHOCYTES # BLD AUTO: 1.53 THOUSANDS/ΜL (ref 0.6–4.47)
LYMPHOCYTES NFR BLD AUTO: 33 % (ref 14–44)
MCH RBC QN AUTO: 29.7 PG (ref 26.8–34.3)
MCHC RBC AUTO-ENTMCNC: 31.3 G/DL (ref 31.4–37.4)
MCV RBC AUTO: 95 FL (ref 82–98)
MONOCYTES # BLD AUTO: 0.35 THOUSAND/ΜL (ref 0.17–1.22)
MONOCYTES NFR BLD AUTO: 7 % (ref 4–12)
NEUTROPHILS # BLD AUTO: 2.68 THOUSANDS/ΜL (ref 1.85–7.62)
NEUTS SEG NFR BLD AUTO: 57 % (ref 43–75)
NRBC BLD AUTO-RTO: 0 /100 WBCS
PLATELET # BLD AUTO: 192 THOUSANDS/UL (ref 149–390)
PMV BLD AUTO: 10.8 FL (ref 8.9–12.7)
POTASSIUM SERPL-SCNC: 4.1 MMOL/L (ref 3.5–5.3)
PROT SERPL-MCNC: 8.9 G/DL (ref 6.4–8.2)
RBC # BLD AUTO: 4.41 MILLION/UL (ref 3.81–5.12)
SODIUM SERPL-SCNC: 139 MMOL/L (ref 136–145)
WBC # BLD AUTO: 4.71 THOUSAND/UL (ref 4.31–10.16)

## 2019-07-12 PROCEDURE — 83883 ASSAY NEPHELOMETRY NOT SPEC: CPT

## 2019-07-12 PROCEDURE — 84165 PROTEIN E-PHORESIS SERUM: CPT

## 2019-07-12 PROCEDURE — 85025 COMPLETE CBC W/AUTO DIFF WBC: CPT

## 2019-07-12 PROCEDURE — 80053 COMPREHEN METABOLIC PANEL: CPT

## 2019-07-12 PROCEDURE — 82784 ASSAY IGA/IGD/IGG/IGM EACH: CPT

## 2019-07-12 PROCEDURE — 86334 IMMUNOFIX E-PHORESIS SERUM: CPT

## 2019-07-12 PROCEDURE — 36415 COLL VENOUS BLD VENIPUNCTURE: CPT

## 2019-07-12 PROCEDURE — 84165 PROTEIN E-PHORESIS SERUM: CPT | Performed by: PATHOLOGY

## 2019-07-12 NOTE — PROGRESS NOTES
Hematology/Oncology Outpatient Follow- up Note  Garett Service 60 y o  female MRN: @ Encounter: [id number]        Date:  7/15/2019      Assessment / Plan:    1  IgG kappa indolent multiple myeloma, bone marrow biopsy in May 2017 showed 15% plasma cells, IgG kappa subtype, normal cytogenetics and fish panel for multiple myeloma, no evidence of end-organ damage, continue watchful observation  7/12/2019:  Hemoglobin 13 1, white blood cell count 4 7 with normal differential, platelet count 031  Creatinine stable 0 9, calcium normal at 8 9  Serum kappa free light chain 37 6, lambda 14 1, ratio 2 67(stable since at least 10/2017)  IgG stable at 2690 (2580 4/2017)        2   Elevated alkaline phosphatase, transaminases and primary biliary cirrhosis of the liver  She saw Dr Janneth Vasques previously  Managed now per Dr Jhonathan Baez  3   Superficial dog bit/nip left lateral shin  No sign of infection    If she were to have any site of infection, she is to contact PCP or present urgent care        Follow-up in 6 months with CBC, CMP, SPEP, free light chain, quantitative immunoglobulins or earlier if symptoms arise such as bone pain, lower back pain, fatigue, low urine output, fever, chills               HPI:  34-year-old  female with past medical history of primary biliary cirrhosis, fracture of the right tibia, hypertension, OCD,worsening of osteoporosis on DEXA scan Done in 2016, nephrolithiasis, chronic lower back pain and possible sacroiliitis was found to have elevated total protein 3-4 years ago, serum protein electrophoresis showed IgG kappa monoclonal protein of 1 7 g/dL however no evidence of anemia, hypercalcemia, or renal insufficiency, she had persistent elevation of alkaline phosphatase secondary to PBC  had a bone scan done last year which showed activity in the ribs area  serum protein electrophoresis in April 2017 showed IgG kappa monoclonal protein of 1 96 g/dL, total protein 8 5, albumin 3 9, IgG 2580, creatinine 0 8, calcium 8 9, alkaline phosphatase 294, AST 37, ALT 46, IgM 25 in July 2016 monoclonal protein of IgG kappa of 1 73  C-reactive protein has been normal however sedimentation rate was elevated in the range of 60  Bone marrow biopsy in May 2017 showed 15% plasma cells in diffuse and interstitial pattern, normal fish panel for multiple myeloma and cytogenetics  Status post right knee replacement in June 2018  Exacerbation of anxiety/depression followed by psychiatric medicine      Osteoporosis, Zometa dose 4 out of 4 1/2019  She took NSAIDs for flu-like symptoms after Zometa injection  Interval History:  7/12/2019:  Hemoglobin 13 1, white blood cell count 4 7 with normal differential, platelet count 509  Creatinine stable 0 9, calcium normal at 8 9  Serum kappa free light chain 37 6, lambda 14 1, ratio 2 67(stable since at least 10/2017)  IgG stable at 2690 (2580 4/2017)  IgM 308  Was bit by a friend's dog yesterday, cleaned the wound out with peroxide  She has not had any fevers or chills  Test Results:        Labs:   Lab Results   Component Value Date    HGB 12 3 05/23/2019    HCT 39 0 05/23/2019    MCV 93 05/23/2019     05/23/2019    WBC 5 77 05/23/2019    NRBC 0 05/23/2019     Lab Results   Component Value Date     12/29/2015    K 4 0 05/23/2019     05/23/2019    CO2 28 05/23/2019    ANIONGAP 7 12/29/2015    BUN 16 05/23/2019    CREATININE 0 85 05/23/2019    GLUCOSE 134 12/29/2015    GLUF 96 01/07/2019    CALCIUM 8 8 05/23/2019    AST 79 (H) 05/23/2019    ALT 95 (H) 05/23/2019    ALKPHOS 799 (H) 05/23/2019    PROT 8 4 (H) 12/29/2015    BILITOT 0 46 12/29/2015    EGFR 75 05/23/2019       Imaging: Xr Knee 1 Or 2 Vw Right    Result Date: 7/9/2019  Narrative: RIGHT KNEE INDICATION:   Z96 651: Presence of right artificial knee joint  COMPARISON:  Right knee plain films from 8/31/2018   VIEWS:  XR KNEE 1 OR 2 VW RIGHT FINDINGS: There is no acute fracture or dislocation  There is a small joint effusion  Unremarkable appearance of total knee arthroplasty  No evidence of hardware complication  No lytic or blastic lesions are seen  Soft tissues are unremarkable  Impression: Unremarkable appearance of total knee arthroplasty  Workstation performed: [id number]           ROS:  As mentioned in HPI & Interval History otherwise 14 point ROS negative  Allergies: Allergies   Allergen Reactions    Codeine GI Intolerance and Nausea Only     Other reaction(s): Other (See Comments)  Violently ill  Violently ill    Latex Rash     itching    Doxycycline GI Intolerance and Nausea Only    Cymbalta [Duloxetine Hcl]     Milk-Related Compounds GI Intolerance    Morphine And Related     Orange Fruit [Citrus] Other (See Comments)     Tested  positive    Sulfa Antibiotics GI Intolerance    Tomato Other (See Comments)     Tested positive;  Eats in sauces, not pure    Penicillins Other (See Comments) and Rash     Childhood reaction     Current Medications: Reviewed  PMH/FH/SH:  Reviewed      Physical Exam:    There is no height or weight on file to calculate BSA  Ht Readings from Last 3 Encounters:   07/05/19 5' 9" (1 753 m)   05/28/19 5' 9" (1 753 m)   03/22/19 5' 9" (1 753 m)        Wt Readings from Last 3 Encounters:   07/05/19 68 5 kg (151 lb)   05/28/19 68 5 kg (151 lb 1 6 oz)   03/22/19 73 kg (161 lb)        Temp Readings from Last 3 Encounters:   01/25/19 97 6 °F (36 4 °C) (Oral)   01/18/19 (!) 96 9 °F (36 1 °C) (Tympanic)   12/10/18 98 4 °F (36 9 °C) (Oral)        BP Readings from Last 3 Encounters:   07/05/19 127/79   05/28/19 120/78   03/22/19 125/83           Physical Exam   Constitutional: She is oriented to person, place, and time  She appears well-developed and well-nourished  No distress  HENT:   Head: Normocephalic and atraumatic  Mouth/Throat: No oropharyngeal exudate  Eyes: Pupils are equal, round, and reactive to light   Conjunctivae are normal  Neck: Normal range of motion  Neck supple  No tracheal deviation present  Cardiovascular: Normal rate and regular rhythm  Exam reveals no gallop and no friction rub  No murmur heard  Pulmonary/Chest: Effort normal and breath sounds normal  No respiratory distress  She has no wheezes  She has no rales  She exhibits no tenderness  Abdominal: Soft  She exhibits no distension  There is no tenderness  Musculoskeletal: Normal range of motion  Lymphadenopathy:     She has no cervical adenopathy  Neurological: She is alert and oriented to person, place, and time  Skin: Skin is warm and dry  No rash noted  She is not diaphoretic  No erythema  No pallor  Puncture wound  Superficial left lateral shin   Psychiatric: She has a normal mood and affect  Her behavior is normal  Judgment and thought content normal    Vitals reviewed          Emergency Contacts:    Steven Mcgovern [address], [phone],

## 2019-07-13 LAB
KAPPA LC FREE SER-MCNC: 37.6 MG/L (ref 3.3–19.4)
KAPPA LC FREE/LAMBDA FREE SER: 2.67 {RATIO} (ref 0.26–1.65)
LAMBDA LC FREE SERPL-MCNC: 14.1 MG/L (ref 5.7–26.3)

## 2019-07-15 ENCOUNTER — OFFICE VISIT (OUTPATIENT)
Dept: HEMATOLOGY ONCOLOGY | Facility: CLINIC | Age: 59
End: 2019-07-15
Payer: COMMERCIAL

## 2019-07-15 VITALS
TEMPERATURE: 98.7 F | RESPIRATION RATE: 16 BRPM | HEIGHT: 69 IN | BODY MASS INDEX: 22.96 KG/M2 | HEART RATE: 84 BPM | WEIGHT: 155 LBS | DIASTOLIC BLOOD PRESSURE: 64 MMHG | SYSTOLIC BLOOD PRESSURE: 130 MMHG | OXYGEN SATURATION: 98 %

## 2019-07-15 DIAGNOSIS — D47.2 SMOLDERING MULTIPLE MYELOMA (SMM): Primary | Chronic | ICD-10-CM

## 2019-07-15 PROCEDURE — 99213 OFFICE O/P EST LOW 20 MIN: CPT | Performed by: PHYSICIAN ASSISTANT

## 2019-07-16 LAB
ALBUMIN SERPL ELPH-MCNC: 3.95 G/DL (ref 3.5–5)
ALBUMIN SERPL ELPH-MCNC: 45.9 % (ref 52–65)
ALPHA1 GLOB SERPL ELPH-MCNC: 0.29 G/DL (ref 0.1–0.4)
ALPHA1 GLOB SERPL ELPH-MCNC: 3.4 % (ref 2.5–5)
ALPHA2 GLOB SERPL ELPH-MCNC: 0.89 G/DL (ref 0.4–1.2)
ALPHA2 GLOB SERPL ELPH-MCNC: 10.3 % (ref 7–13)
BETA GLOB ABNORMAL SERPL ELPH-MCNC: 0.5 G/DL (ref 0.4–0.8)
BETA1 GLOB SERPL ELPH-MCNC: 5.8 % (ref 5–13)
BETA2 GLOB SERPL ELPH-MCNC: 3.9 % (ref 2–8)
BETA2+GAMMA GLOB SERPL ELPH-MCNC: 0.34 G/DL (ref 0.2–0.5)
GAMMA GLOB ABNORMAL SERPL ELPH-MCNC: 2.64 G/DL (ref 0.5–1.6)
GAMMA GLOB SERPL ELPH-MCNC: 30.7 % (ref 12–22)
IGG/ALB SER: 0.85 {RATIO} (ref 1.1–1.8)
INTERPRETATION UR IFE-IMP: NORMAL
M PROTEIN 1 MFR SERPL ELPH: 24.6 %
M PROTEIN 1 SERPL ELPH-MCNC: 2.12 G/DL
PROT PATTERN SERPL ELPH-IMP: ABNORMAL
PROT SERPL-MCNC: 8.6 G/DL (ref 6.4–8.2)

## 2019-07-17 ENCOUNTER — OFFICE VISIT (OUTPATIENT)
Dept: INTERNAL MEDICINE CLINIC | Facility: CLINIC | Age: 59
End: 2019-07-17
Payer: COMMERCIAL

## 2019-07-17 ENCOUNTER — OFFICE VISIT (OUTPATIENT)
Dept: PSYCHIATRY | Facility: CLINIC | Age: 59
End: 2019-07-17
Payer: COMMERCIAL

## 2019-07-17 ENCOUNTER — TELEPHONE (OUTPATIENT)
Dept: INTERNAL MEDICINE CLINIC | Facility: CLINIC | Age: 59
End: 2019-07-17

## 2019-07-17 VITALS
RESPIRATION RATE: 14 BRPM | BODY MASS INDEX: 21.66 KG/M2 | DIASTOLIC BLOOD PRESSURE: 70 MMHG | SYSTOLIC BLOOD PRESSURE: 120 MMHG | WEIGHT: 146.2 LBS | HEART RATE: 78 BPM | HEIGHT: 69 IN

## 2019-07-17 DIAGNOSIS — F41.8 DEPRESSION WITH ANXIETY: Chronic | ICD-10-CM

## 2019-07-17 DIAGNOSIS — F41.1 GAD (GENERALIZED ANXIETY DISORDER): ICD-10-CM

## 2019-07-17 DIAGNOSIS — F43.10 PTSD (POST-TRAUMATIC STRESS DISORDER): ICD-10-CM

## 2019-07-17 DIAGNOSIS — F33.2 MDD (MAJOR DEPRESSIVE DISORDER), RECURRENT SEVERE, WITHOUT PSYCHOSIS (HCC): Primary | ICD-10-CM

## 2019-07-17 DIAGNOSIS — D47.2 SMOLDERING MULTIPLE MYELOMA (SMM): Chronic | ICD-10-CM
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
## 2019-07-17 DIAGNOSIS — F42.9 OBSESSIVE-COMPULSIVE DISORDER, UNSPECIFIED TYPE: ICD-10-CM

## 2019-07-17 DIAGNOSIS — E55.9 VITAMIN D DEFICIENCY: ICD-10-CM

## 2019-07-17 PROCEDURE — 90471 IMMUNIZATION ADMIN: CPT

## 2019-07-17 PROCEDURE — 1036F TOBACCO NON-USER: CPT | Performed by: INTERNAL MEDICINE

## 2019-07-17 PROCEDURE — 99214 OFFICE O/P EST MOD 30 MIN: CPT | Performed by: PSYCHIATRY & NEUROLOGY

## 2019-07-17 PROCEDURE — 3008F BODY MASS INDEX DOCD: CPT | Performed by: INTERNAL MEDICINE

## 2019-07-17 PROCEDURE — 90715 TDAP VACCINE 7 YRS/> IM: CPT

## 2019-07-17 PROCEDURE — 99213 OFFICE O/P EST LOW 20 MIN: CPT | Performed by: INTERNAL MEDICINE

## 2019-07-17 RX ORDER — MIRTAZAPINE 15 MG/1
TABLET, FILM COATED ORAL
Qty: 30 TABLET | Refills: 1 | Status: SHIPPED | OUTPATIENT
Start: 2019-07-17 | End: 2019-08-14
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
## 2019-07-17 NOTE — PROGRESS NOTES
Assessment/Plan:   1  Dog bite of the left leg -sustained over the last 3-4 days  Adacel vaccine given today  She will clean the wound twice daily with soap and water  She was placed on cephalexin 250 milligrams t i d  For 5 days  She will make sure the animal as observed for 10 days for any potential normal behavior to imply underlying rabies  2  Ongoing depression -she wanted to discuss neuro modulation treatments and we went over this     All other problems as per prior records      This patient will be seen at previously scheduled appointment with previously scheduled labs  No problem-specific Assessment & Plan notes found for this encounter  Diagnoses and all orders for this visit:    Dog bite, initial encounter    Smoldering multiple myeloma (SMM) ([facility] [address] )    Depression with anxiety          Subjective:      Patient ID: Hawa Joseph is a 60 y o  female  This patient is seen today as an emergency appointment  she sustained a dog bite over the last several days of presents for evaluation  She wants know about tennis prophylaxis and was elected to give her Adacel vaccine today  He has been greater than several years since her last dose  Reviewed that dog bites her more tetanus prone  We looked at the areas of her leg  She has some erythema  As noted she has smoldering myeloma and is immunosuppressed  We elected to treat with a brief course of cephalexin  She has a history of pedis and allergy young age of though she does not know the type of reaction  She is having ongoing issues with depression  She wants know above neuro modulation procedures which her psychiatrist is recommending we talked about this in detail  We talked about prevention of rabies  The dog is confined to home and she knows to have an monitored for greater than 10 days        The following portions of the patient's history were reviewed and updated as appropriate: allergies, current medications, past family history, past medical history, past social history, past surgical history and problem list     Review of Systems   Constitutional: Negative  Respiratory: Negative  Cardiovascular: Negative  Gastrointestinal: Negative  Endocrine: Negative  Genitourinary: Negative  Musculoskeletal: Negative  Skin:          Recent animal bite   Neurological: Negative  Hematological: Negative  Psychiatric/Behavioral: Negative  Objective:      Ht 5' 9" (1 753 m)   Wt 66 3 kg (146 lb 3 2 oz)   LMP  (LMP Unknown)   BMI 21 59 kg/m²          Physical Exam   Constitutional: She is oriented to person, place, and time  She appears well-developed and well-nourished  No distress  HENT:   Head: Normocephalic and atraumatic  Right Ear: External ear normal    Left Ear: External ear normal    Nose: Nose normal    Mouth/Throat: Oropharynx is clear and moist  No oropharyngeal exudate  Eyes: Pupils are equal, round, and reactive to light  Conjunctivae and EOM are normal  Right eye exhibits no discharge  Left eye exhibits no discharge  No scleral icterus  Neck: Normal range of motion  Neck supple  No JVD present  No tracheal deviation present  No thyromegaly present  Cardiovascular: Normal rate, regular rhythm and intact distal pulses  Exam reveals no gallop and no friction rub  No murmur heard  Pulmonary/Chest: Effort normal and breath sounds normal  No respiratory distress  She has no wheezes  She has no rales  She exhibits no tenderness  Abdominal: Soft  Bowel sounds are normal  She exhibits no distension and no mass  There is no tenderness  There is no rebound and no guarding  Musculoskeletal: Normal range of motion  She exhibits no edema or deformity  Lymphadenopathy:     She has no cervical adenopathy  Neurological: She is alert and oriented to person, place, and time  She has normal reflexes  She displays normal reflexes  No cranial nerve deficit  She exhibits normal muscle tone  Coordination normal    Skin: Skin is warm and dry  No rash noted  No erythema  Puncture bites of the left leg   Psychiatric: She has a normal mood and affect   Her behavior is normal  Judgment and thought content normal

## 2019-07-17 NOTE — TELEPHONE ENCOUNTER
Pt got in contact with the owner of the dog who bit her and its rabies vaccine ran out in November of last year  Please advise

## 2019-07-17 NOTE — TELEPHONE ENCOUNTER
As I told the patient the office visit the dog needs to be cord T her a total of 10 days after the bite and if it does not display  Any abnormal behavior she is then safe and does not need rabies prophylaxis

## 2019-07-29 ENCOUNTER — TELEPHONE (OUTPATIENT)
Dept: PSYCHIATRY | Facility: CLINIC | Age: 59
End: 2019-07-29

## 2019-07-30 ENCOUNTER — TELEPHONE (OUTPATIENT)
Dept: PSYCHIATRY | Facility: CLINIC | Age: 59
End: 2019-07-30

## 2019-07-30 NOTE — TELEPHONE ENCOUNTER
Nursing recvd VM from Luana Jarquin expressing interest in [address] [place] [address]  Waiting for reply from Dr Jimenez Huertas before returning call  (See Telephone Notes )    For Maria Elena's information

## 2019-07-30 NOTE — TELEPHONE ENCOUNTER
1) we will mail a form out (I know she has done enough medication trials, but I need this documentation to help with insurance filing)  Once received back from her, we should be able to proceed with a prior auth with her insurance  2) treatment depends upon our scheduling and if we can get her in, but once we start it will take 6 weeks  Earliest we would potentially start would be ~3weeks from now  If she does the form, We can make our discussion next appointment fully focused on [address] [place] (if she desires)  Or we can focus on medication management at that time instead of [address] [place]  I see her in 2 weeks

## 2019-07-30 NOTE — TELEPHONE ENCOUNTER
Justen Dias left a VM on this RN's phone yesterday expressing interest in receiving [address] [place] treatments  She asked several questions includin  How fast can she begin treatment as she may be returning to work soon and knows it is five days weekly  (She repeated this several times )  2  Does she need to make an appointment with Dr Hardeep Hinojosa before starting treatment? 3  Does her insurance need to be checked? She stated that she had discussed with Dr Hardeep Hinojosa change of medication VS [address] [place] at last appointment  She is thinking about trying TMS rather than going with a med change  She requested return call from Nursing to advise  For Dr Hardeep Hinojosa review

## 2019-07-31 NOTE — TELEPHONE ENCOUNTER
Nursing called back Justen Dias and left detailed voice mail, per her request, to get more information on time table to complete [address] [facility] treatments before she starts back to work  (Currently not working ) Relayed information from Dr Hardeep Hinojosa' note of 7/30/19  Justen Dias also informed that Dr Hardeep Hinojosa is out of office for the remainder of this week  Nsg phone #s left for questions/concerns  For Dr Hardeep Hinojosa' information on RTO
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
## 2019-08-14 ENCOUNTER — OFFICE VISIT (OUTPATIENT)
Dept: PSYCHIATRY | Facility: CLINIC | Age: 59
End: 2019-08-14
Payer: COMMERCIAL

## 2019-08-14 DIAGNOSIS — F33.2 MDD (MAJOR DEPRESSIVE DISORDER), RECURRENT SEVERE, WITHOUT PSYCHOSIS (HCC): Primary | ICD-10-CM

## 2019-08-14 DIAGNOSIS — F43.10 PTSD (POST-TRAUMATIC STRESS DISORDER): ICD-10-CM

## 2019-08-14 DIAGNOSIS — F41.1 GAD (GENERALIZED ANXIETY DISORDER): ICD-10-CM

## 2019-08-14 DIAGNOSIS — F42.9 OBSESSIVE-COMPULSIVE DISORDER, UNSPECIFIED TYPE: ICD-10-CM

## 2019-08-14 PROCEDURE — 99213 OFFICE O/P EST LOW 20 MIN: CPT | Performed by: PSYCHIATRY & NEUROLOGY

## 2019-08-20 ENCOUNTER — TELEPHONE (OUTPATIENT)
Dept: PSYCHIATRY | Facility: CLINIC | Age: 59
End: 2019-08-20

## 2019-08-20 NOTE — TELEPHONE ENCOUNTER
Scott Cagle called and left  for Nursing to return call  When Nursing spoke with her she stated that she is checking that medical records were received by Dr Kai Winkler as one of the three medical offices below reported that fax would not go through to us at [address] Fax # Chetna Ortega had number incorrect )    Nursing spoke with Dr Zachary Peralta office and they stated they had faxed records to Nursing Fax # this AM     Nursing called and spoke with Dr Ludivina Estrada' office and requested records be re-faxed to Nursing Fax #[phone]  (They were faxed to another fax # and not received )     Nursing called Dr Wander Dupree office and they do not come in until Noon  Will call back to confirm that records were sent to correct number  For Dr Kai Winkler' information

## 2019-09-03 ENCOUNTER — OFFICE VISIT (OUTPATIENT)
Dept: PSYCHIATRY | Facility: CLINIC | Age: 59
End: 2019-09-03
Payer: COMMERCIAL

## 2019-09-03 DIAGNOSIS — F41.1 GAD (GENERALIZED ANXIETY DISORDER): Primary | ICD-10-CM

## 2019-09-03 DIAGNOSIS — F33.2 MDD (MAJOR DEPRESSIVE DISORDER), RECURRENT SEVERE, WITHOUT PSYCHOSIS (HCC): ICD-10-CM

## 2019-09-03 DIAGNOSIS — F42.9 OBSESSIVE-COMPULSIVE DISORDER, UNSPECIFIED TYPE: ICD-10-CM

## 2019-09-03 DIAGNOSIS — F43.10 PTSD (POST-TRAUMATIC STRESS DISORDER): ICD-10-CM

## 2019-09-03 PROCEDURE — 99214 OFFICE O/P EST MOD 30 MIN: CPT | Performed by: PSYCHIATRY & NEUROLOGY

## 2019-09-03 NOTE — TELEPHONE ENCOUNTER
Left VM via  (David) for patient to call back for pre procedure instructions. His procedure is Friday, September 6th at 0830. He needs to hold his lisinopril and metformin the day of procedure and only take 5 units of his Lantus insulin the night before. He should be NPO after midnight on Thursday 9/5/2019, with the exceptions of medications. Will await call back. Will try to call again if he does not call back.    Yes she can proceed with knee replacement surgery from Hematology point of view  No need for office visit
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
## 2019-09-04 ENCOUNTER — TELEPHONE (OUTPATIENT)
Dept: PSYCHIATRY | Facility: CLINIC | Age: 59
End: 2019-09-04

## 2019-09-04 NOTE — TELEPHONE ENCOUNTER
I called Dr Mikhail Kaminski [phone]) and Dr Merna Linares ([phone]) offices  Both will be looking into my inquiry regarding her metal jaw implant ~3402m

## 2019-09-09 ENCOUNTER — TELEPHONE (OUTPATIENT)
Dept: PSYCHIATRY | Facility: CLINIC | Age: 59
End: 2019-09-09

## 2019-09-09 NOTE — TELEPHONE ENCOUNTER
----- Message from Jose Akhtar RN sent at 9/4/2019  6:12 PM EDT -----  Dr Harmeet Null office called ([phone])  Her procedure was in 1190 and they only keep records for 10 years  Nothing on file    ----- Message -----  From: Kathya Faust DO  Sent: 9/4/2019   9:55 AM EDT  To: Jose Akhtar RN, Marcus Nunes RN, #    Dr Dimple Frias office will be calling you  I asked if they could find out the material of the jaw plate she has (eg is it ferromagnetic), and they are looking into this     (FYI  She has had MRIs since placement, so I suspect no issue but just trying to be thorough )    Thanks!   Zeynep Maharaj

## 2019-09-19 ENCOUNTER — TELEPHONE (OUTPATIENT)
Dept: PSYCHIATRY | Facility: CLINIC | Age: 59
End: 2019-09-19

## 2019-09-19 NOTE — TELEPHONE ENCOUNTER
Patient called and spoke to nursing and reported that her insurance has denied the request for [address] and Dr Carlos Alberto León should be in receipt of denial letter  She is asking about next steps and if Dr Carlos Alberto León is planning on appealing with insurance company  It was reviewed that nursing will send a message to Dr Carlos Alberto León about her inquiry  She was not aware that she had a scheduled appointment on 10/28/2019 in office with Dr Carlos Alberto León, date and time reviewed  For Dr Carlos Alberto León to review

## 2019-09-19 NOTE — TELEPHONE ENCOUNTER
Nursing contacted patient and informed her that Dr Sylvia Camarena is working on appeal with insurance company  She thanked nursing for the call

## 2019-09-30 ENCOUNTER — TELEPHONE (OUTPATIENT)
Dept: INTERNAL MEDICINE CLINIC | Facility: CLINIC | Age: 59
End: 2019-09-30
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
## 2019-10-05 ENCOUNTER — APPOINTMENT (OUTPATIENT)
Dept: LAB | Age: 59
End: 2019-10-05
Payer: COMMERCIAL

## 2019-10-05 DIAGNOSIS — K74.3 PRIMARY BILIARY CHOLANGITIS (HCC): Chronic | ICD-10-CM

## 2019-10-05 LAB
ALBUMIN SERPL BCP-MCNC: 3.6 G/DL (ref 3.5–5)
ALP SERPL-CCNC: 338 U/L (ref 46–116)
ALT SERPL W P-5'-P-CCNC: 42 U/L (ref 12–78)
ANION GAP SERPL CALCULATED.3IONS-SCNC: 3 MMOL/L (ref 4–13)
AST SERPL W P-5'-P-CCNC: 36 U/L (ref 5–45)
BASOPHILS # BLD AUTO: 0.04 THOUSANDS/ΜL (ref 0–0.1)
BASOPHILS NFR BLD AUTO: 1 % (ref 0–1)
BILIRUB SERPL-MCNC: 0.42 MG/DL (ref 0.2–1)
BUN SERPL-MCNC: 20 MG/DL (ref 5–25)
CALCIUM SERPL-MCNC: 9 MG/DL (ref 8.3–10.1)
CHLORIDE SERPL-SCNC: 104 MMOL/L (ref 100–108)
CO2 SERPL-SCNC: 32 MMOL/L (ref 21–32)
CREAT SERPL-MCNC: 0.94 MG/DL (ref 0.6–1.3)
EOSINOPHIL # BLD AUTO: 0.12 THOUSAND/ΜL (ref 0–0.61)
EOSINOPHIL NFR BLD AUTO: 2 % (ref 0–6)
ERYTHROCYTE [DISTWIDTH] IN BLOOD BY AUTOMATED COUNT: 12.4 % (ref 11.6–15.1)
GFR SERPL CREATININE-BSD FRML MDRD: 67 ML/MIN/1.73SQ M
GLUCOSE P FAST SERPL-MCNC: 87 MG/DL (ref 65–99)
HCT VFR BLD AUTO: 41 % (ref 34.8–46.1)
HGB BLD-MCNC: 12.9 G/DL (ref 11.5–15.4)
IMM GRANULOCYTES # BLD AUTO: 0.03 THOUSAND/UL (ref 0–0.2)
IMM GRANULOCYTES NFR BLD AUTO: 1 % (ref 0–2)
LYMPHOCYTES # BLD AUTO: 1.91 THOUSANDS/ΜL (ref 0.6–4.47)
LYMPHOCYTES NFR BLD AUTO: 32 % (ref 14–44)
MCH RBC QN AUTO: 30.1 PG (ref 26.8–34.3)
MCHC RBC AUTO-ENTMCNC: 31.5 G/DL (ref 31.4–37.4)
MCV RBC AUTO: 96 FL (ref 82–98)
MONOCYTES # BLD AUTO: 0.46 THOUSAND/ΜL (ref 0.17–1.22)
MONOCYTES NFR BLD AUTO: 8 % (ref 4–12)
NEUTROPHILS # BLD AUTO: 3.5 THOUSANDS/ΜL (ref 1.85–7.62)
NEUTS SEG NFR BLD AUTO: 56 % (ref 43–75)
NRBC BLD AUTO-RTO: 0 /100 WBCS
PLATELET # BLD AUTO: 219 THOUSANDS/UL (ref 149–390)
PMV BLD AUTO: 11.2 FL (ref 8.9–12.7)
POTASSIUM SERPL-SCNC: 4.5 MMOL/L (ref 3.5–5.3)
PROT SERPL-MCNC: 9.1 G/DL (ref 6.4–8.2)
RBC # BLD AUTO: 4.28 MILLION/UL (ref 3.81–5.12)
SODIUM SERPL-SCNC: 139 MMOL/L (ref 136–145)
WBC # BLD AUTO: 6.06 THOUSAND/UL (ref 4.31–10.16)

## 2019-10-05 PROCEDURE — 80053 COMPREHEN METABOLIC PANEL: CPT

## 2019-10-05 PROCEDURE — 85025 COMPLETE CBC W/AUTO DIFF WBC: CPT

## 2019-10-05 PROCEDURE — 36415 COLL VENOUS BLD VENIPUNCTURE: CPT

## 2019-10-08 ENCOUNTER — OFFICE VISIT (OUTPATIENT)
Dept: INTERNAL MEDICINE CLINIC | Facility: CLINIC | Age: 59
End: 2019-10-08
Payer: COMMERCIAL

## 2019-10-08 VITALS
BODY MASS INDEX: 22.04 KG/M2 | HEART RATE: 72 BPM | RESPIRATION RATE: 14 BRPM | DIASTOLIC BLOOD PRESSURE: 70 MMHG | SYSTOLIC BLOOD PRESSURE: 120 MMHG | HEIGHT: 69 IN | WEIGHT: 148.8 LBS

## 2019-10-08 DIAGNOSIS — K74.3 PRIMARY BILIARY CIRRHOSIS (HCC): ICD-10-CM

## 2019-10-08 DIAGNOSIS — Z23 ENCOUNTER FOR IMMUNIZATION: ICD-10-CM

## 2019-10-08 DIAGNOSIS — K74.3 PRIMARY BILIARY CHOLANGITIS (HCC): Chronic | ICD-10-CM

## 2019-10-08 DIAGNOSIS — D47.2 SMOLDERING MULTIPLE MYELOMA: Primary | ICD-10-CM

## 2019-10-08 PROCEDURE — 90471 IMMUNIZATION ADMIN: CPT

## 2019-10-08 PROCEDURE — 90732 PPSV23 VACC 2 YRS+ SUBQ/IM: CPT

## 2019-10-08 PROCEDURE — 90682 RIV4 VACC RECOMBINANT DNA IM: CPT

## 2019-10-08 PROCEDURE — 99214 OFFICE O/P EST MOD 30 MIN: CPT | Performed by: INTERNAL MEDICINE

## 2019-10-08 PROCEDURE — 90472 IMMUNIZATION ADMIN EACH ADD: CPT
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
## 2019-10-11 ENCOUNTER — OFFICE VISIT (OUTPATIENT)
Dept: OBGYN CLINIC | Facility: MEDICAL CENTER | Age: 59
End: 2019-10-11
Payer: OTHER MISCELLANEOUS

## 2019-10-11 VITALS
RESPIRATION RATE: 18 BRPM | DIASTOLIC BLOOD PRESSURE: 72 MMHG | WEIGHT: 147.9 LBS | HEART RATE: 78 BPM | HEIGHT: 69 IN | BODY MASS INDEX: 21.91 KG/M2 | SYSTOLIC BLOOD PRESSURE: 113 MMHG

## 2019-10-11 DIAGNOSIS — G89.29 CHRONIC PAIN OF RIGHT KNEE: ICD-10-CM

## 2019-10-11 DIAGNOSIS — M54.50 CHRONIC BILATERAL LOW BACK PAIN WITHOUT SCIATICA: ICD-10-CM

## 2019-10-11 DIAGNOSIS — Z96.651 STATUS POST TOTAL RIGHT KNEE REPLACEMENT: Primary | ICD-10-CM

## 2019-10-11 DIAGNOSIS — G90.521 COMPLEX REGIONAL PAIN SYNDROME I OF RIGHT LOWER LIMB: ICD-10-CM

## 2019-10-11 DIAGNOSIS — M25.561 CHRONIC PAIN OF RIGHT KNEE: ICD-10-CM

## 2019-10-11 DIAGNOSIS — M62.81 QUADRICEPS WEAKNESS: ICD-10-CM

## 2019-10-11 DIAGNOSIS — G89.29 CHRONIC BILATERAL LOW BACK PAIN WITHOUT SCIATICA: ICD-10-CM

## 2019-10-11 PROCEDURE — 99213 OFFICE O/P EST LOW 20 MIN: CPT | Performed by: ORTHOPAEDIC SURGERY
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
## 2019-10-28 ENCOUNTER — OFFICE VISIT (OUTPATIENT)
Dept: PSYCHIATRY | Facility: CLINIC | Age: 59
End: 2019-10-28
Payer: COMMERCIAL

## 2019-10-28 DIAGNOSIS — F41.1 GAD (GENERALIZED ANXIETY DISORDER): Primary | ICD-10-CM

## 2019-10-28 DIAGNOSIS — F42.9 OBSESSIVE-COMPULSIVE DISORDER, UNSPECIFIED TYPE: ICD-10-CM

## 2019-10-28 DIAGNOSIS — F43.10 PTSD (POST-TRAUMATIC STRESS DISORDER): ICD-10-CM

## 2019-10-28 DIAGNOSIS — F33.2 MDD (MAJOR DEPRESSIVE DISORDER), RECURRENT SEVERE, WITHOUT PSYCHOSIS (HCC): ICD-10-CM

## 2019-10-28 PROCEDURE — 90833 PSYTX W PT W E/M 30 MIN: CPT | Performed by: PSYCHIATRY & NEUROLOGY

## 2019-10-28 PROCEDURE — 99213 OFFICE O/P EST LOW 20 MIN: CPT | Performed by: PSYCHIATRY & NEUROLOGY
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
## 2019-11-01 ENCOUNTER — TELEPHONE (OUTPATIENT)
Dept: OBGYN CLINIC | Facility: HOSPITAL | Age: 59
End: 2019-11-01

## 2019-11-01 NOTE — TELEPHONE ENCOUNTER
Spoke to Jarad Fernandez at [facility] and she indicated the patient was only billed 4 00 for the 10/11/19 office notes request and that invoice was zero'd out  There is nothing further needed for the 10/11/19 [facility] request as pt recvd the office notes in the office  The form went to [facility] in error

## 2019-11-01 NOTE — TELEPHONE ENCOUNTER
Spoke w/ pt  When she leaves she always signs a records release for the office note that day  This was sent to [facility] in error  She was billed $27 for the records, and also notes that there is incorrect information in her chart  Spoke w/ Breann Green and advised the pt that she will need to contact [facility] about the bill and also the chart correction  When she had spoken to [facility] she had asked specifically to speak to Sly Stapleton and this information was not relayed to me  Radha Chou and I are looking into this matter  Pt advised we will look into this further and call her back

## 2019-11-01 NOTE — TELEPHONE ENCOUNTER
Spoke w/ pt and relayed message  Apologized for the error  I gave her the phone number for [facility] to contact them for the chart correction form  Encouraged her to call us with any further problems  She said the bill was only for $4 83, and I let her know it was zero'd out  All was understood and all questions were answered

## 2019-11-01 NOTE — TELEPHONE ENCOUNTER
Caller: patient  Call back number: [phone]  Patient's doctor: Dr Jennifer Angulo    Patient is asking to speak to someone in the office about papers she received  She states she was not supposed to get this and they are not correct  That is all the information she will give

## 2019-11-05 ENCOUNTER — TELEPHONE (OUTPATIENT)
Dept: PSYCHIATRY | Facility: CLINIC | Age: 59
End: 2019-11-05

## 2019-11-07 ENCOUNTER — TELEPHONE (OUTPATIENT)
Dept: PSYCHIATRY | Facility: CLINIC | Age: 59
End: 2019-11-07

## 2019-11-11 ENCOUNTER — DOCUMENTATION (OUTPATIENT)
Dept: PSYCHIATRY | Facility: CLINIC | Age: 59
End: 2019-11-11

## 2019-11-11 NOTE — PROGRESS NOTES
Treatment  Plan not complete within required time limits due to:  Counseling was provided during the session  and that took most of the office visit , will do at next OV

## 2019-11-11 NOTE — PROGRESS NOTES
Treatment Plan not  completed within required time limits due to: there was an Epic technology error and Treatment Plan was lost

## 2019-11-12 ENCOUNTER — TELEPHONE (OUTPATIENT)
Dept: PSYCHIATRY | Facility: CLINIC | Age: 59
End: 2019-11-12

## 2019-11-12 ENCOUNTER — DOCUMENTATION (OUTPATIENT)
Dept: PSYCHIATRY | Facility: CLINIC | Age: 59
End: 2019-11-12

## 2019-11-12 ENCOUNTER — OFFICE VISIT (OUTPATIENT)
Dept: PSYCHIATRY | Facility: CLINIC | Age: 59
End: 2019-11-12

## 2019-11-12 DIAGNOSIS — F33.2 MDD (MAJOR DEPRESSIVE DISORDER), RECURRENT SEVERE, WITHOUT PSYCHOSIS (HCC): Primary | ICD-10-CM

## 2019-11-12 NOTE — TELEPHONE ENCOUNTER
Nursing received VM from Ngoc Zarate that she is trying to reach Maynor re: apptmnt for [facility] for tomm  She wants Maynor to call her as she wants to get in for [facility] in the morning  For Maynor's review

## 2019-11-12 NOTE — PROGRESS NOTES
Full initial treatment completed and tolerated well with no adverse events  See [address] [facility]  Session report (In Media)

## 2019-11-13 ENCOUNTER — DOCUMENTATION (OUTPATIENT)
Dept: PSYCHIATRY | Facility: CLINIC | Age: 59
End: 2019-11-13

## 2019-11-13 NOTE — PROGRESS NOTES
Full treatment completed and tolerated well with no adverse events  See [facility]  Session report (In Media)

## 2019-11-14 ENCOUNTER — DOCUMENTATION (OUTPATIENT)
Dept: PSYCHIATRY | Facility: CLINIC | Age: 59
End: 2019-11-14

## 2019-11-14 NOTE — PSYCH
Initiated [employer] with mapping, MT, and treatment  Well tolerated  See other documentation for further detail

## 2019-11-14 NOTE — PROGRESS NOTES
Full treatment completed and tolerated well with no adverse events  See [facility]  Session report (In Media)

## 2019-11-15 ENCOUNTER — DOCUMENTATION (OUTPATIENT)
Dept: PSYCHIATRY | Facility: CLINIC | Age: 59
End: 2019-11-15

## 2019-11-15 NOTE — PROGRESS NOTES
Full treatment completed and tolerated well with no adverse events  See [address] [facility]  Session report (In Media)

## 2019-11-18 ENCOUNTER — DOCUMENTATION (OUTPATIENT)
Dept: PSYCHIATRY | Facility: CLINIC | Age: 59
End: 2019-11-18

## 2019-11-18 NOTE — PROGRESS NOTES
Full treatment completed and tolerated well with no adverse events  See [address] [facility]  Session report (In Media)

## 2019-11-19 ENCOUNTER — DOCUMENTATION (OUTPATIENT)
Dept: PSYCHIATRY | Facility: CLINIC | Age: 59
End: 2019-11-19

## 2019-11-19 NOTE — PROGRESS NOTES
Full treatment completed and tolerated well with no adverse events  See [address] [facility]  Session report (In Media)

## 2019-11-20 ENCOUNTER — DOCUMENTATION (OUTPATIENT)
Dept: PSYCHIATRY | Facility: CLINIC | Age: 59
End: 2019-11-20

## 2019-11-20 NOTE — PROGRESS NOTES
Full treatment completed and tolerated well with no adverse events  See [address] [facility]  Session report (In Media)

## 2019-11-21 ENCOUNTER — DOCUMENTATION (OUTPATIENT)
Dept: PSYCHIATRY | Facility: CLINIC | Age: 59
End: 2019-11-21

## 2019-11-21 NOTE — PROGRESS NOTES
Full treatment completed and tolerated well with no adverse events  See [address] [facility]  Session report (In Media)

## 2019-11-22 ENCOUNTER — DOCUMENTATION (OUTPATIENT)
Dept: PSYCHIATRY | Facility: CLINIC | Age: 59
End: 2019-11-22

## 2019-11-22 NOTE — PROGRESS NOTES
Full treatment completed and tolerated well with no adverse events  See [address] [facility]  Session report (In Media)

## 2019-11-26 ENCOUNTER — DOCUMENTATION (OUTPATIENT)
Dept: PSYCHIATRY | Facility: CLINIC | Age: 59
End: 2019-11-26

## 2019-11-26 NOTE — PROGRESS NOTES
Full treatment completed and tolerated well with no adverse events  See [address] [facility]  Session report (In Media)

## 2019-11-27 ENCOUNTER — DOCUMENTATION (OUTPATIENT)
Dept: PSYCHIATRY | Facility: CLINIC | Age: 59
End: 2019-11-27

## 2019-11-27 NOTE — PROGRESS NOTES
Full treatment completed and tolerated well with no adverse events  See [address] [facility] report (in Media)

## 2019-11-29 ENCOUNTER — DOCUMENTATION (OUTPATIENT)
Dept: PSYCHIATRY | Facility: CLINIC | Age: 59
End: 2019-11-29

## 2019-11-29 ENCOUNTER — OFFICE VISIT (OUTPATIENT)
Dept: INTERNAL MEDICINE CLINIC | Facility: CLINIC | Age: 59
End: 2019-11-29
Payer: COMMERCIAL

## 2019-11-29 VITALS — OXYGEN SATURATION: 98 % | TEMPERATURE: 97.8 F | RESPIRATION RATE: 14 BRPM | HEART RATE: 88 BPM

## 2019-11-29 DIAGNOSIS — J06.9 UPPER RESPIRATORY TRACT INFECTION, UNSPECIFIED TYPE: Primary | ICD-10-CM

## 2019-11-29 LAB — S PYO AG THROAT QL: NEGATIVE

## 2019-11-29 PROCEDURE — 87880 STREP A ASSAY W/OPTIC: CPT | Performed by: INTERNAL MEDICINE

## 2019-11-29 PROCEDURE — 1036F TOBACCO NON-USER: CPT | Performed by: INTERNAL MEDICINE

## 2019-11-29 PROCEDURE — 99213 OFFICE O/P EST LOW 20 MIN: CPT | Performed by: INTERNAL MEDICINE

## 2019-11-29 RX ORDER — BROMPHENIRAMINE MALEATE, PSEUDOEPHEDRINE HYDROCHLORIDE, AND DEXTROMETHORPHAN HYDROBROMIDE 2; 30; 10 MG/5ML; MG/5ML; MG/5ML
5 SYRUP ORAL 4 TIMES DAILY PRN
Qty: 120 ML | Refills: 0 | Status: SHIPPED | OUTPATIENT
Start: 2019-11-29 | End: 2020-03-03

## 2019-11-29 RX ORDER — AZITHROMYCIN 250 MG/1
500 TABLET, FILM COATED ORAL EVERY 24 HOURS
Qty: 10 TABLET | Refills: 0 | Status: SHIPPED | OUTPATIENT
Start: 2019-11-29 | End: 2019-12-04

## 2019-11-29 NOTE — PROGRESS NOTES
Full treatment completed and tolerated well with no adverse events  See [address] [facility] report (in Media)

## 2019-11-29 NOTE — PROGRESS NOTES
Assessment/Plan:    #URI  -nasal congestion, runny nose, bloody nose, dry cough, sore throat, mild muscle aches since Tuesday night  -denies fever, abdominal pain, chest pain, SOB, headache, dizziness, sick contacts at home  -has been taking dayquil without relief  -rapid strep today negative  -will start on bromphed  -symptoms likely viral but will start on azithromycin in case symptoms worsen or do not improve    For comprehensive progress note refer to 10/08/2019    No problem-specific Assessment & Plan notes found for this encounter  Diagnoses and all orders for this visit:    Upper respiratory tract infection, unspecified type  -     azithromycin (ZITHROMAX) 250 mg tablet; Take 2 tablets (500 mg total) by mouth every 24 hours for 5 days  -     brompheniramine-pseudoephedrine-DM 30-2-10 MG/5ML syrup;  Take 5 mL by mouth 4 (four) times a day as needed for cough            Current Outpatient Medications:     acyclovir (ZOVIRAX) 200 mg capsule, 3 (three) times a day as needed , Disp: , Rfl: 4    azithromycin (ZITHROMAX) 250 mg tablet, Take 2 tablets (500 mg total) by mouth every 24 hours for 5 days, Disp: 10 tablet, Rfl: 0    brompheniramine-pseudoephedrine-DM 30-2-10 MG/5ML syrup, Take 5 mL by mouth 4 (four) times a day as needed for cough, Disp: 120 mL, Rfl: 0    Cholecalciferol (VITAMIN D3) 2000 UNITS TABS, Take 2,000 Units by mouth daily  , Disp: , Rfl:     diclofenac sodium (VOLTAREN) 1 %, APPLY 2 GRAMS TO THE AFFECTED AREA(S) BY TOPICAL ROUTE 4 TIMES PER DAY, Disp: , Rfl: 3    ergocalciferol (VITAMIN D2) 50,000 units, TAKE ONE CAPSULE BY MOUTH EVERY OTHER WEEK FOR 1 MONTH, THEN 1 CAPSULE EVERY MONTH (Patient taking differently: TAKE ONE CAPSULE BY MOUTH EVERY MONTH), Disp: 4 capsule, Rfl: 2    gabapentin (NEURONTIN) 600 MG tablet, TAKE 1 TABLET (600 MG TOTAL) BY MOUTH 4 (FOUR) TIMES A DAY FOR 90 DAY, Disp: 120 tablet, Rfl: 5    lidocaine (LIDODERM) 5 %, lidocaine 5 % topical patch  APPLY 1 PATCH TO AFFECTED AREA FOR 12 HOURS A DAY & REMOVE FOR 12 HOURS BEFORE APPLYING NEXT PATCH  (12 HOURS ON, 12 HOURS OFF), Disp: , Rfl:     LORazepam (ATIVAN) 0 5 mg tablet, Take 1-2 tablets (0 5-1 mg total) by mouth 2 (two) times a day as needed for anxiety (Patient not taking: Reported on 10/11/2019), Disp: 45 tablet, Rfl: 1    traMADol (ULTRAM) 50 mg tablet, Ultram, Disp: , Rfl:     triamterene-hydrochlorothiazide (MAXZIDE-25) 37 5-25 mg per tablet, TAKE 1 TABLET BY MOUTH EVERY DAY, Disp: 90 tablet, Rfl: 2    ursodiol (ACTIGALL) 300 mg capsule, Take 1 capsule (300 mg total) by mouth 3 (three) times a day, Disp: 270 capsule, Rfl: 2    Subjective:      Patient ID: Rigo Allison is a 60 y o  female  HPI     Patient presents for an acute visit  Reports that on Tuesday night she developed a sore throat as well as nasal congestion with sneezing and nosebleeds  She denied any fevers at the time or any changes in her appetite however she states that she feels a little bit tired along with some occasional muscle aches  She states that she tried taking DayQuil however did not provide her with any relief  She denies any sick contacts at home  She denies present treatment for her multiple myeloma  Rapid strep swab today was negative  Informed patient that this is likely viral however we did give her azithromycin in case symptoms worsen or do not improve  Will also start her on Bromfed for cough relief  She will call back within 2 weeks if symptoms persist or do not improve  The following portions of the patient's history were reviewed and updated as appropriate: allergies, current medications, past family history, past medical history, past social history, past surgical history and problem list     Review of Systems   Constitutional: Negative for activity change, appetite change, chills, fatigue and fever  HENT: Positive for congestion, nosebleeds, rhinorrhea and sore throat   Negative for ear pain, facial swelling, hearing loss, sinus pressure, sinus pain, tinnitus and trouble swallowing  Eyes: Negative for photophobia and visual disturbance  Respiratory: Positive for cough  Negative for shortness of breath, wheezing and stridor  Cardiovascular: Negative for chest pain and leg swelling  Gastrointestinal: Negative for abdominal distention, abdominal pain, blood in stool, constipation, diarrhea, nausea and vomiting  Genitourinary: Negative for difficulty urinating, dysuria, frequency and pelvic pain  Musculoskeletal: Positive for myalgias  Negative for arthralgias, back pain, gait problem, joint swelling, neck pain and neck stiffness  Skin: Negative for rash and wound  Neurological: Negative for dizziness, tremors, light-headedness and headaches  Hematological: Negative for adenopathy  Does not bruise/bleed easily  Objective:      Pulse 88   Temp 97 8 °F (36 6 °C) (Oral)   Resp 14   LMP  (LMP Unknown)   SpO2 98%          Physical Exam   Constitutional: She is oriented to person, place, and time  She appears well-developed and well-nourished  No distress  HENT:   Head: Normocephalic and atraumatic  Right Ear: External ear normal    Left Ear: External ear normal    Nose: Nose normal    Erythematous oropharynx   Eyes: Pupils are equal, round, and reactive to light  Conjunctivae and EOM are normal  Right eye exhibits no discharge  Left eye exhibits no discharge  No scleral icterus  Neck: Normal range of motion  Neck supple  No JVD present  No thyromegaly present  Cardiovascular: Normal rate, regular rhythm, normal heart sounds and intact distal pulses  No murmur heard  Pulmonary/Chest: Effort normal and breath sounds normal  No stridor  No respiratory distress  She has no wheezes  She has no rales  She exhibits no tenderness  Abdominal: Soft  Bowel sounds are normal  She exhibits no distension  There is no tenderness  There is no rebound and no guarding     Musculoskeletal: Normal range of motion  She exhibits no edema, tenderness or deformity  Lymphadenopathy:     She has no cervical adenopathy  Neurological: She is alert and oriented to person, place, and time  She has normal reflexes  Skin: Skin is warm and dry  No rash noted  No erythema  Vitals reviewed

## 2019-12-02 ENCOUNTER — DOCUMENTATION (OUTPATIENT)
Dept: PSYCHIATRY | Facility: CLINIC | Age: 59
End: 2019-12-02

## 2019-12-02 NOTE — PROGRESS NOTES
Full treatment completed and tolerated well with no adverse events  See 8525 [facility]  Session report (In Media)

## 2019-12-03 ENCOUNTER — DOCUMENTATION (OUTPATIENT)
Dept: PSYCHIATRY | Facility: CLINIC | Age: 59
End: 2019-12-03

## 2019-12-03 NOTE — PROGRESS NOTES
Full treatment completed and tolerated well with no adverse events  See 0362 [facility]  Session report (In Media)

## 2019-12-04 ENCOUNTER — DOCUMENTATION (OUTPATIENT)
Dept: PSYCHIATRY | Facility: CLINIC | Age: 59
End: 2019-12-04

## 2019-12-04 NOTE — PROGRESS NOTES
Full treatment completed and tolerated well with no adverse events  See 0215 [facility]  Session report (In Media)

## 2019-12-05 ENCOUNTER — DOCUMENTATION (OUTPATIENT)
Dept: PSYCHIATRY | Facility: CLINIC | Age: 59
End: 2019-12-05

## 2019-12-05 NOTE — PROGRESS NOTES
Full treatment completed and tolerated well with no adverse events  See 9916 [facility]  Session report (In Media)

## 2019-12-06 ENCOUNTER — DOCUMENTATION (OUTPATIENT)
Dept: PSYCHIATRY | Facility: CLINIC | Age: 59
End: 2019-12-06

## 2019-12-06 NOTE — PROGRESS NOTES
Full treatment completed and tolerated well with no adverse events  See 5539 [facility]  Session report (In Media)

## 2019-12-09 ENCOUNTER — DOCUMENTATION (OUTPATIENT)
Dept: PSYCHIATRY | Facility: CLINIC | Age: 59
End: 2019-12-09

## 2019-12-10 ENCOUNTER — DOCUMENTATION (OUTPATIENT)
Dept: PSYCHIATRY | Facility: CLINIC | Age: 59
End: 2019-12-10

## 2019-12-10 NOTE — PROGRESS NOTES
Full treatment completed and tolerated well with no adverse events  See 3510 [facility]  Session report (In Media)

## 2019-12-11 ENCOUNTER — DOCUMENTATION (OUTPATIENT)
Dept: PSYCHIATRY | Facility: CLINIC | Age: 59
End: 2019-12-11

## 2019-12-11 NOTE — PROGRESS NOTES
Full treatment completed and tolerated well with no adverse events  See 7493 [facility]  Session report (In Media)

## 2019-12-12 ENCOUNTER — DOCUMENTATION (OUTPATIENT)
Dept: PSYCHIATRY | Facility: CLINIC | Age: 59
End: 2019-12-12

## 2019-12-12 NOTE — PROGRESS NOTES
Full treatment completed and tolerated well with no adverse events  See 9720 [facility]  Session report (In Media)

## 2019-12-13 ENCOUNTER — DOCUMENTATION (OUTPATIENT)
Dept: PSYCHIATRY | Facility: CLINIC | Age: 59
End: 2019-12-13

## 2019-12-13 NOTE — PROGRESS NOTES
Full treatment completed and tolerated well with no adverse events  See 8362 [facility]  Session report (In Media)

## 2019-12-16 ENCOUNTER — DOCUMENTATION (OUTPATIENT)
Dept: PSYCHIATRY | Facility: CLINIC | Age: 59
End: 2019-12-16

## 2019-12-16 NOTE — PROGRESS NOTES
Full treatment completed and tolerated well with no adverse events  See 8596 [facility]  Session report (In Media)

## 2019-12-17 ENCOUNTER — DOCUMENTATION (OUTPATIENT)
Dept: PSYCHIATRY | Facility: CLINIC | Age: 59
End: 2019-12-17

## 2019-12-17 NOTE — PROGRESS NOTES
Full treatment completed and tolerated well with no adverse events  See 7562 [facility]  Session report (In Media)

## 2019-12-18 ENCOUNTER — DOCUMENTATION (OUTPATIENT)
Dept: PSYCHIATRY | Facility: CLINIC | Age: 59
End: 2019-12-18

## 2019-12-18 NOTE — PROGRESS NOTES
Full treatment completed and tolerated well with no adverse events  See 3378 [facility]  Session report (In Media)

## 2019-12-19 ENCOUNTER — DOCUMENTATION (OUTPATIENT)
Dept: PSYCHIATRY | Facility: CLINIC | Age: 59
End: 2019-12-19

## 2019-12-19 NOTE — PROGRESS NOTES
Full treatment completed and tolerated well with no adverse events  See 2234 [facility]  Session report (In Media)

## 2019-12-20 ENCOUNTER — DOCUMENTATION (OUTPATIENT)
Dept: PSYCHIATRY | Facility: CLINIC | Age: 59
End: 2019-12-20

## 2019-12-20 ENCOUNTER — APPOINTMENT (OUTPATIENT)
Dept: LAB | Facility: CLINIC | Age: 59
End: 2019-12-20
Payer: COMMERCIAL

## 2019-12-20 DIAGNOSIS — D47.2 SMOLDERING MULTIPLE MYELOMA (SMM): Chronic | ICD-10-CM

## 2019-12-20 LAB
ALBUMIN SERPL BCP-MCNC: 3.5 G/DL (ref 3.5–5)
ALP SERPL-CCNC: 300 U/L (ref 46–116)
ALT SERPL W P-5'-P-CCNC: 44 U/L (ref 12–78)
ANION GAP SERPL CALCULATED.3IONS-SCNC: 3 MMOL/L (ref 4–13)
AST SERPL W P-5'-P-CCNC: 37 U/L (ref 5–45)
BASOPHILS # BLD AUTO: 0.03 THOUSANDS/ΜL (ref 0–0.1)
BASOPHILS NFR BLD AUTO: 1 % (ref 0–1)
BILIRUB SERPL-MCNC: 0.39 MG/DL (ref 0.2–1)
BUN SERPL-MCNC: 22 MG/DL (ref 5–25)
CALCIUM SERPL-MCNC: 9.5 MG/DL (ref 8.3–10.1)
CHLORIDE SERPL-SCNC: 104 MMOL/L (ref 100–108)
CO2 SERPL-SCNC: 31 MMOL/L (ref 21–32)
CREAT SERPL-MCNC: 0.91 MG/DL (ref 0.6–1.3)
EOSINOPHIL # BLD AUTO: 0.1 THOUSAND/ΜL (ref 0–0.61)
EOSINOPHIL NFR BLD AUTO: 2 % (ref 0–6)
ERYTHROCYTE [DISTWIDTH] IN BLOOD BY AUTOMATED COUNT: 12.8 % (ref 11.6–15.1)
GFR SERPL CREATININE-BSD FRML MDRD: 69 ML/MIN/1.73SQ M
GLUCOSE SERPL-MCNC: 69 MG/DL (ref 65–140)
HCT VFR BLD AUTO: 38.9 % (ref 34.8–46.1)
HGB BLD-MCNC: 12.2 G/DL (ref 11.5–15.4)
IGA SERPL-MCNC: 62 MG/DL (ref 70–400)
IGG SERPL-MCNC: 2820 MG/DL (ref 700–1600)
IGM SERPL-MCNC: 272 MG/DL (ref 40–230)
IMM GRANULOCYTES # BLD AUTO: 0.01 THOUSAND/UL (ref 0–0.2)
IMM GRANULOCYTES NFR BLD AUTO: 0 % (ref 0–2)
LYMPHOCYTES # BLD AUTO: 1.67 THOUSANDS/ΜL (ref 0.6–4.47)
LYMPHOCYTES NFR BLD AUTO: 32 % (ref 14–44)
MCH RBC QN AUTO: 29.4 PG (ref 26.8–34.3)
MCHC RBC AUTO-ENTMCNC: 31.4 G/DL (ref 31.4–37.4)
MCV RBC AUTO: 94 FL (ref 82–98)
MONOCYTES # BLD AUTO: 0.35 THOUSAND/ΜL (ref 0.17–1.22)
MONOCYTES NFR BLD AUTO: 7 % (ref 4–12)
NEUTROPHILS # BLD AUTO: 2.99 THOUSANDS/ΜL (ref 1.85–7.62)
NEUTS SEG NFR BLD AUTO: 58 % (ref 43–75)
NRBC BLD AUTO-RTO: 0 /100 WBCS
PLATELET # BLD AUTO: 224 THOUSANDS/UL (ref 149–390)
PMV BLD AUTO: 11 FL (ref 8.9–12.7)
POTASSIUM SERPL-SCNC: 3.9 MMOL/L (ref 3.5–5.3)
PROT SERPL-MCNC: 9.1 G/DL (ref 6.4–8.2)
RBC # BLD AUTO: 4.15 MILLION/UL (ref 3.81–5.12)
SODIUM SERPL-SCNC: 138 MMOL/L (ref 136–145)
WBC # BLD AUTO: 5.15 THOUSAND/UL (ref 4.31–10.16)

## 2019-12-20 PROCEDURE — 36415 COLL VENOUS BLD VENIPUNCTURE: CPT

## 2019-12-20 PROCEDURE — 85025 COMPLETE CBC W/AUTO DIFF WBC: CPT

## 2019-12-20 PROCEDURE — 80053 COMPREHEN METABOLIC PANEL: CPT

## 2019-12-20 PROCEDURE — 82784 ASSAY IGA/IGD/IGG/IGM EACH: CPT

## 2019-12-20 PROCEDURE — 83883 ASSAY NEPHELOMETRY NOT SPEC: CPT

## 2019-12-20 PROCEDURE — 84165 PROTEIN E-PHORESIS SERUM: CPT

## 2019-12-20 PROCEDURE — 84165 PROTEIN E-PHORESIS SERUM: CPT | Performed by: PATHOLOGY

## 2019-12-20 NOTE — PROGRESS NOTES
Full treatment completed and tolerated well with no adverse events  See 7426 [facility]  Session report (In Media)

## 2019-12-21 LAB
KAPPA LC FREE SER-MCNC: 38.4 MG/L (ref 3.3–19.4)
KAPPA LC FREE/LAMBDA FREE SER: 3.02 {RATIO} (ref 0.26–1.65)
LAMBDA LC FREE SERPL-MCNC: 12.7 MG/L (ref 5.7–26.3)

## 2019-12-23 ENCOUNTER — DOCUMENTATION (OUTPATIENT)
Dept: PSYCHIATRY | Facility: CLINIC | Age: 59
End: 2019-12-23

## 2019-12-23 LAB
ALBUMIN SERPL ELPH-MCNC: 3.86 G/DL (ref 3.5–5)
ALBUMIN SERPL ELPH-MCNC: 44.9 % (ref 52–65)
ALPHA1 GLOB SERPL ELPH-MCNC: 0.35 G/DL (ref 0.1–0.4)
ALPHA1 GLOB SERPL ELPH-MCNC: 4.1 % (ref 2.5–5)
ALPHA2 GLOB SERPL ELPH-MCNC: 0.99 G/DL (ref 0.4–1.2)
ALPHA2 GLOB SERPL ELPH-MCNC: 11.5 % (ref 7–13)
BETA GLOB ABNORMAL SERPL ELPH-MCNC: 0.56 G/DL (ref 0.4–0.8)
BETA1 GLOB SERPL ELPH-MCNC: 6.5 % (ref 5–13)
BETA2 GLOB SERPL ELPH-MCNC: 5.6 % (ref 2–8)
BETA2+GAMMA GLOB SERPL ELPH-MCNC: 0.48 G/DL (ref 0.2–0.5)
GAMMA GLOB ABNORMAL SERPL ELPH-MCNC: 2.36 G/DL (ref 0.5–1.6)
GAMMA GLOB SERPL ELPH-MCNC: 27.4 % (ref 12–22)
IGG/ALB SER: 0.81 {RATIO} (ref 1.1–1.8)
M PROTEIN 1 MFR SERPL ELPH: 19.7 %
M PROTEIN 1 SERPL ELPH-MCNC: 1.69 G/DL
PROT PATTERN SERPL ELPH-IMP: ABNORMAL
PROT SERPL-MCNC: 8.6 G/DL (ref 6.4–8.2)

## 2019-12-23 NOTE — PROGRESS NOTES
Full treatment completed and tolerated well with no adverse events  See 1780 [facility]  Session report (In Media)

## 2019-12-24 ENCOUNTER — DOCUMENTATION (OUTPATIENT)
Dept: PSYCHIATRY | Facility: CLINIC | Age: 59
End: 2019-12-24

## 2019-12-24 NOTE — PROGRESS NOTES
Full treatment completed and tolerated well with no adverse events  See 0917 [facility]  Session report (In Media)

## 2019-12-26 ENCOUNTER — DOCUMENTATION (OUTPATIENT)
Dept: PSYCHIATRY | Facility: CLINIC | Age: 59
End: 2019-12-26

## 2019-12-26 NOTE — PROGRESS NOTES
Jani Hayes here for her first taper session  Full treatment completed and tolerated well with no adverse events  See 4237 [facility]  Session report (In Media)

## 2019-12-27 ENCOUNTER — DOCUMENTATION (OUTPATIENT)
Dept: PSYCHIATRY | Facility: CLINIC | Age: 59
End: 2019-12-27

## 2019-12-27 ENCOUNTER — TELEPHONE (OUTPATIENT)
Dept: PSYCHIATRY | Facility: CLINIC | Age: 59
End: 2019-12-27

## 2019-12-27 NOTE — PROGRESS NOTES
Brenda Wilson here for second taper treatment, Full treatment completed and tolerated well with no adverse events  See 7549 [facility]  Session report (In Media)

## 2019-12-31 ENCOUNTER — DOCUMENTATION (OUTPATIENT)
Dept: PSYCHIATRY | Facility: CLINIC | Age: 59
End: 2019-12-31

## 2019-12-31 NOTE — PROGRESS NOTES
Ngoc Zarate returns for her third 1465 [facility] tapering treatment  Full treatment completed and tolerated well with no adverse events  See 1465 [facility]  Session report (In Media)

## 2020-01-02 ENCOUNTER — DOCUMENTATION (OUTPATIENT)
Dept: PSYCHIATRY | Facility: CLINIC | Age: 60
End: 2020-01-02

## 2020-01-02 NOTE — PROGRESS NOTES
Full treatment completed and tolerated well with no adverse events  See 4948 [facility]  Session report (In Media)

## 2020-01-07 ENCOUNTER — DOCUMENTATION (OUTPATIENT)
Dept: PSYCHIATRY | Facility: CLINIC | Age: 60
End: 2020-01-07

## 2020-01-07 NOTE — PROGRESS NOTES
No Tomlinson here for her fifth tapering treatment  Full treatment completed and tolerated well with no adverse events  See 4167 [facility]  Session report (In Media)

## 2020-01-09 ENCOUNTER — DOCUMENTATION (OUTPATIENT)
Dept: PSYCHIATRY | Facility: CLINIC | Age: 60
End: 2020-01-09

## 2020-01-09 DIAGNOSIS — Z79.899 HIGH RISK MEDICATION USE: Primary | ICD-10-CM

## 2020-01-09 NOTE — PROGRESS NOTES
Tiarra Jones here for her final 1465 [facility] tapering treatment  Tiarra Jones was very appreciative of the Treatment and our customer service  It was a pleasure to see Tiarra Jones leave with a big smile on her face  Full treatment completed and tolerated well with no adverse events  See 1465 [facility]  Session report (In Media)

## 2020-01-09 NOTE — PROGRESS NOTES
Did well with 1465 [place], but noticing anxiety more, particularly at night  Has ativan (not been taking), will take pRN  Also ordered EKG in consideration of TCA in future  She will figure out insurance, make appt, call if issues/concerns

## 2020-01-17 ENCOUNTER — TELEPHONE (OUTPATIENT)
Dept: HEMATOLOGY ONCOLOGY | Facility: CLINIC | Age: 60
End: 2020-01-17

## 2020-01-17 NOTE — TELEPHONE ENCOUNTER
PT CALLED ASKING IF IT'S OK TO RESCHED HER [place]AVON APT 1/20/20   DUE TO HER HAVING ISSUES WITH HER [employer] COMP     SHE SAID IF YOU SEE SOMETHING BAD ON HER BLOOD WORK FROM December   SHE COULD POSSIBLE STILL DO THE APT   IF NOT SHE WANTS TO KNOW IF DR Lita Agudelo WOULD MIND IF SHE RESCHED    PLEASE CALL HER BACK WITH HIS DECISION

## 2020-01-21 ENCOUNTER — TELEPHONE (OUTPATIENT)
Dept: HEMATOLOGY ONCOLOGY | Facility: CLINIC | Age: 60
End: 2020-01-21

## 2020-01-21 NOTE — TELEPHONE ENCOUNTER
Patient indicates that her pain management Dr would like for her to get a temporary stimulator implanted and the patient would like to know if this is safe or ok for her to get due to her chemo  Pacheco Billingsley  Please call and advise, pt's call back number is [phone]

## 2020-01-27 ENCOUNTER — TELEPHONE (OUTPATIENT)
Dept: OBGYN CLINIC | Facility: HOSPITAL | Age: 60
End: 2020-01-27

## 2020-01-27 NOTE — TELEPHONE ENCOUNTER
Patient is calling because she is given the opportunity to get an inversion table & would like to know if Dr Kendra Wheeler feels this would do anything to hurt her rt leg/knee     cb [phone]

## 2020-01-27 NOTE — TELEPHONE ENCOUNTER
Dr Cosby Dies and I have no experience with inversion tables    You may relay this comment to the patient

## 2020-02-10 ENCOUNTER — OFFICE VISIT (OUTPATIENT)
Dept: INTERNAL MEDICINE CLINIC | Facility: CLINIC | Age: 60
End: 2020-02-10
Payer: COMMERCIAL

## 2020-02-10 VITALS
HEART RATE: 72 BPM | HEIGHT: 69 IN | RESPIRATION RATE: 14 BRPM | BODY MASS INDEX: 22.66 KG/M2 | WEIGHT: 153 LBS | SYSTOLIC BLOOD PRESSURE: 130 MMHG | DIASTOLIC BLOOD PRESSURE: 88 MMHG

## 2020-02-10 DIAGNOSIS — I10 ESSENTIAL HYPERTENSION: Chronic | ICD-10-CM

## 2020-02-10 DIAGNOSIS — M81.0 OSTEOPOROSIS WITHOUT CURRENT PATHOLOGICAL FRACTURE, UNSPECIFIED OSTEOPOROSIS TYPE: Chronic | ICD-10-CM

## 2020-02-10 DIAGNOSIS — K74.3 PRIMARY BILIARY CHOLANGITIS (HCC): Chronic | ICD-10-CM

## 2020-02-10 DIAGNOSIS — M54.50 CHRONIC LEFT-SIDED LOW BACK PAIN WITHOUT SCIATICA: Primary | ICD-10-CM

## 2020-02-10 DIAGNOSIS — G89.29 CHRONIC LEFT-SIDED LOW BACK PAIN WITHOUT SCIATICA: Primary | ICD-10-CM

## 2020-02-10 DIAGNOSIS — M79.605 LEFT LEG PAIN: ICD-10-CM

## 2020-02-10 PROCEDURE — 3079F DIAST BP 80-89 MM HG: CPT | Performed by: INTERNAL MEDICINE

## 2020-02-10 PROCEDURE — 1036F TOBACCO NON-USER: CPT | Performed by: INTERNAL MEDICINE

## 2020-02-10 PROCEDURE — 3008F BODY MASS INDEX DOCD: CPT | Performed by: INTERNAL MEDICINE

## 2020-02-10 PROCEDURE — 99214 OFFICE O/P EST MOD 30 MIN: CPT | Performed by: INTERNAL MEDICINE

## 2020-02-10 PROCEDURE — 3075F SYST BP GE 130 - 139MM HG: CPT | Performed by: INTERNAL MEDICINE
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
## 2020-02-11 ENCOUNTER — TELEPHONE (OUTPATIENT)
Dept: INTERNAL MEDICINE CLINIC | Facility: CLINIC | Age: 60
End: 2020-02-11

## 2020-02-11 NOTE — TELEPHONE ENCOUNTER
PT CALLED, STATED THAT YESTERDAY AFTER SHE LEFT OUR OFFICE SHE STARTED WITH  A SORE THROAT, RUNNY NOSE, DOUBLE EAR PAIN BUT NO FEVER OR ANY OTHER SYMPTOMS      WOULD LIKE TO KNOW IF THE KEFLEX (YOU GAVE AT YESTERDAY'S APPT) WOULD HELP THIS AND IF NOT, SHE'S ASKING FOR AN ANTIBIOTIC TO BE CALLED IN  STATED THAT SHE CAN'T COME IN TODAY TO SEE DR FUCHS   PLEASE ADVISE

## 2020-02-11 NOTE — TELEPHONE ENCOUNTER
it is likely viral so antibiotics are not warranted for that  But if it is bacterial cephalexin should be of some benefit -she should take cephalexin

## 2020-03-02 ENCOUNTER — TELEPHONE (OUTPATIENT)
Dept: HEMATOLOGY ONCOLOGY | Facility: CLINIC | Age: 60
End: 2020-03-02

## 2020-03-02 NOTE — TELEPHONE ENCOUNTER
Patient called stating that she is scheduled for a follow up with Dr Parul Beach tomorrow but she completed the ordered blood work for this appt in December  She stated that she would like to know if that is ok or if she will need new orders and to reschedule   Please advise at [phone]  3  * she stated a VM can be left if no answer

## 2020-03-03 ENCOUNTER — OFFICE VISIT (OUTPATIENT)
Dept: HEMATOLOGY ONCOLOGY | Facility: CLINIC | Age: 60
End: 2020-03-03
Payer: COMMERCIAL

## 2020-03-03 VITALS
HEIGHT: 69 IN | WEIGHT: 155 LBS | DIASTOLIC BLOOD PRESSURE: 80 MMHG | HEART RATE: 67 BPM | OXYGEN SATURATION: 99 % | RESPIRATION RATE: 16 BRPM | SYSTOLIC BLOOD PRESSURE: 122 MMHG | BODY MASS INDEX: 22.96 KG/M2

## 2020-03-03 DIAGNOSIS — D47.2 SMOLDERING MULTIPLE MYELOMA (SMM): Primary | Chronic | ICD-10-CM

## 2020-03-03 PROCEDURE — 3008F BODY MASS INDEX DOCD: CPT | Performed by: INTERNAL MEDICINE

## 2020-03-03 PROCEDURE — 3079F DIAST BP 80-89 MM HG: CPT | Performed by: INTERNAL MEDICINE

## 2020-03-03 PROCEDURE — 99213 OFFICE O/P EST LOW 20 MIN: CPT | Performed by: INTERNAL MEDICINE

## 2020-03-03 PROCEDURE — 3074F SYST BP LT 130 MM HG: CPT | Performed by: INTERNAL MEDICINE

## 2020-03-03 PROCEDURE — 1036F TOBACCO NON-USER: CPT | Performed by: INTERNAL MEDICINE

## 2020-03-03 RX ORDER — CEPHALEXIN 250 MG/1
CAPSULE ORAL
COMMUNITY
Start: 2020-02-10 | End: 2020-03-27

## 2020-03-03 NOTE — PROGRESS NOTES
Hematology Outpatient Follow - Up Note  Aga Farnsworth 60 y o  female MRN: @ Encounter: [id number]        Date:  3/3/2020        Assessment/ Plan:    Low risk IgG kappa smoldering multiple myeloma with 15% plasma cell in the bone marrow biopsy done on May 2017, normal fish panel for multiple myeloma and cytogenetics, no evidence of end-organ damage, had been on watchful observation, M protein stable as well as kappa light chain, IgG went up to 2800 from 20/6 100, will continue watchful observation and follow-up in 6 months with CBC, CMP, SPEP, free light chain and quantitative immunoglobulins            HPI:  24-year-old  female with past medical history of primary biliary cirrhosis, fracture of the right tibia, hypertension, OCD,worsening of osteoporosis on DEXA scan Done in 2016, nephrolithiasis, chronic lower back pain and possible sacroiliitis was found to have elevated total protein 3-4 years ago, serum protein electrophoresis showed IgG kappa monoclonal protein of 1 7 g/dL however no evidence of anemia, hypercalcemia, or renal insufficiency, she had persistent elevation of alkaline phosphatase secondary to PBC  had a bone scan done last year which showed activity in the ribs area  serum protein electrophoresis in April 2017 showed IgG kappa monoclonal protein of 1 96 g/dL, total protein 8 5, albumin 3 9, IgG 2580, creatinine 0 8, calcium 8 9, alkaline phosphatase 294, AST 37, ALT 46, IgM 25 in July 2016 monoclonal protein of IgG kappa of 1 73  C-reactive protein has been normal however sedimentation rate was elevated in the range of 60  Bone marrow biopsy in May 2017 showed 15% plasma cells in diffuse and interstitial pattern, normal fish panel for multiple myeloma and cytogenetics  Status post right knee replacement in June 2018  Exacerbation of anxiety/depression followed by psychiatric medicine       Osteoporosis, Zometa dose 4 out of 4 1/2019    She took NSAIDs for flu-like symptoms after Zometa injection  Interval History:        Previous Treatment:         Test Results:    Imaging: No results found  Labs:   Lab Results   Component Value Date    WBC 5 15 12/20/2019    HGB 12 2 12/20/2019    HCT 38 9 12/20/2019    MCV 94 12/20/2019     12/20/2019     Lab Results   Component Value Date     12/29/2015    K 3 9 12/20/2019     12/20/2019    CO2 31 12/20/2019    ANIONGAP 7 12/29/2015    BUN 22 12/20/2019    CREATININE 0 91 12/20/2019    GLUCOSE 134 12/29/2015    GLUF 87 10/05/2019    CALCIUM 9 5 12/20/2019    AST 37 12/20/2019    ALT 44 12/20/2019    ALKPHOS 300 (H) 12/20/2019    PROT 8 4 (H) 12/29/2015    BILITOT 0 46 12/29/2015    EGFR 69 12/20/2019     IgG kappa M protein 1 69 stable from before, IgG 2800, kappa light chain 38, lambda light chain 12 with a ratio of 3 0      No results found for: [id number]      ROS: Review of Systems   Constitutional: Negative for appetite change, chills, diaphoresis, fatigue and unexpected weight change  HENT:   Negative for mouth sores, nosebleeds, sore throat, trouble swallowing and voice change  Eyes: Negative for eye problems and icterus  Respiratory: Negative for chest tightness, cough, hemoptysis and wheezing  Cardiovascular: Negative for chest pain, leg swelling and palpitations  Gastrointestinal: Negative for abdominal distention, abdominal pain, blood in stool, constipation, diarrhea, nausea and vomiting  Endocrine: Negative for hot flashes  Genitourinary: Negative for bladder incontinence, difficulty urinating, dyspareunia, dysuria and frequency  Musculoskeletal: Negative for arthralgias, back pain, gait problem, neck pain and neck stiffness  Skin: Negative for itching and rash  Neurological: Negative for dizziness, gait problem, headaches, numbness, seizures and speech difficulty  Hematological: Negative for adenopathy  Does not bruise/bleed easily     Psychiatric/Behavioral: Negative for decreased concentration, depression, sleep disturbance and suicidal ideas  The patient is not nervous/anxious  Current Medications: Reviewed  Allergies: Reviewed  PMH/FH/SH:  Reviewed      Physical Exam:    Body surface area is 1 85 meters squared  Wt Readings from Last 3 Encounters:   03/03/20 70 3 kg (155 lb)   02/10/20 69 4 kg (153 lb)   10/11/19 67 1 kg (147 lb 14 4 oz)        Temp Readings from Last 3 Encounters:   11/29/19 97 8 °F (36 6 °C) (Oral)   07/15/19 98 7 °F (37 1 °C) (Tympanic)   01/25/19 97 6 °F (36 4 °C) (Oral)        BP Readings from Last 3 Encounters:   03/03/20 122/80   02/10/20 130/88   10/11/19 113/72         Pulse Readings from Last 3 Encounters:   03/03/20 67   02/10/20 72   11/29/19 88        Physical Exam   Constitutional: She is oriented to person, place, and time  She appears well-developed and well-nourished  No distress  HENT:   Head: Normocephalic and atraumatic  Eyes: Conjunctivae are normal    Neck: Normal range of motion  Neck supple  No tracheal deviation present  Cardiovascular: Normal rate and regular rhythm  Exam reveals no gallop and no friction rub  No murmur heard  Pulmonary/Chest: Effort normal and breath sounds normal  No respiratory distress  She has no wheezes  She has no rales  She exhibits no tenderness  Abdominal: Soft  She exhibits no distension  There is no tenderness  Musculoskeletal: She exhibits no edema  Lymphadenopathy:     She has no cervical adenopathy  Neurological: She is alert and oriented to person, place, and time  Skin: Skin is warm and dry  She is not diaphoretic  No erythema  No pallor  Psychiatric: She has a normal mood and affect  Her behavior is normal  Judgment and thought content normal    Vitals reviewed  ECOG:    Goals and Barriers:  Current Goal: Minimize effects of disease  Barriers: None  Patient's Capacity to Self Care:  Patient is able to self care      Code Status: @[facility]@

## 2020-03-11 ENCOUNTER — TELEPHONE (OUTPATIENT)
Dept: INTERNAL MEDICINE CLINIC | Facility: CLINIC | Age: 60
End: 2020-03-11

## 2020-03-11 NOTE — TELEPHONE ENCOUNTER
Tell her no fried or greasy foods, avoid caffeine, minimize dairy products  Schedule appointment for tomorrow at 7:30 a m   If that slot is still open

## 2020-03-11 NOTE — TELEPHONE ENCOUNTER
Loose stool but not excessive, abdominal pain, chills, body aches, fever  [place]  Symptoms started yesterday morning  Pt asked if she should be concerned  Please advise

## 2020-03-12 ENCOUNTER — OFFICE VISIT (OUTPATIENT)
Dept: INTERNAL MEDICINE CLINIC | Facility: CLINIC | Age: 60
End: 2020-03-12
Payer: COMMERCIAL

## 2020-03-12 VITALS
WEIGHT: 146.2 LBS | BODY MASS INDEX: 21.66 KG/M2 | DIASTOLIC BLOOD PRESSURE: 70 MMHG | RESPIRATION RATE: 14 BRPM | HEIGHT: 69 IN | HEART RATE: 72 BPM | SYSTOLIC BLOOD PRESSURE: 120 MMHG

## 2020-03-12 DIAGNOSIS — R10.9 ABDOMINAL PAIN, UNSPECIFIED ABDOMINAL LOCATION: Primary | ICD-10-CM

## 2020-03-12 DIAGNOSIS — R19.7 DIARRHEA, UNSPECIFIED TYPE: ICD-10-CM

## 2020-03-12 DIAGNOSIS — D47.2 SMOLDERING MULTIPLE MYELOMA (SMM): Chronic | ICD-10-CM

## 2020-03-12 DIAGNOSIS — B00.9 HERPES SIMPLEX: Primary | ICD-10-CM

## 2020-03-12 PROCEDURE — 3008F BODY MASS INDEX DOCD: CPT | Performed by: INTERNAL MEDICINE

## 2020-03-12 PROCEDURE — 3074F SYST BP LT 130 MM HG: CPT | Performed by: INTERNAL MEDICINE

## 2020-03-12 PROCEDURE — 3078F DIAST BP <80 MM HG: CPT | Performed by: INTERNAL MEDICINE

## 2020-03-12 PROCEDURE — 99214 OFFICE O/P EST MOD 30 MIN: CPT | Performed by: INTERNAL MEDICINE

## 2020-03-12 PROCEDURE — 1036F TOBACCO NON-USER: CPT | Performed by: INTERNAL MEDICINE

## 2020-03-12 RX ORDER — ACYCLOVIR 200 MG/1
200 CAPSULE ORAL 3 TIMES DAILY PRN
Qty: 15 CAPSULE | Refills: 4 | Status: SHIPPED | OUTPATIENT
Start: 2020-03-12 | End: 2021-04-02 | Stop reason: SDUPTHER

## 2020-03-12 NOTE — PROGRESS NOTES
Assessment/Plan:    1  Lower abdominal cramping with fever -began about 10 days after traveling to the [facility] in [place] - rule out variant of traveler's diarrhea, rule out Giardia, rule out parasites, rule out Clostridium difficile with recent use of antibiotics  We reviewed that she is more susceptible because of a history of chronic liver disease as well as her history of smoldering multiple myeloma   -I recommended she minimize caffeine, minimize dairy products, avoid fried or greasy food, take her temperature t i d , remain hydrated incidence stools for Clostridium difficile, a enteric panel by PCR, Giardia and O&P x2   She knows separate the O&P times several days  Further therapy based on overall clinical course  2  Smoldering multiple myeloma -as above -stable a being seen by he on every several months without progression  All other problems as prior records  Medical regimen as per baseline  Await results of additional testing    Addendum -spoke with the patient on March 15th-stool culture positive for seminal a-B she is pending- she is still having abdominal cramping without fever -no bloody stool  As noted she is immunosuppressed with her smoldering multiple myeloma  Unfortunately the only have 1 bathroom at home and we reviewed how to keep the bathroom clean  She will monitor her temp  Placed on Cipro 500 milligrams b i d  For 2 weeks - she will be notified when final culture available  At this point she is not working and staying home  No problem-specific Assessment & Plan notes found for this encounter  Diagnoses and all orders for this visit:    Abdominal pain, unspecified abdominal location    Smoldering multiple myeloma (SMM) (HCC)          Subjective:      Patient ID: Karen Wilkerson is a 60 y o  female  This patient is seen today as an emergency appointment  this patient recently traveled to the [facility] in [place]  She was there from February 23rd to February 28  She began over the last 2 days with lower abdominal pain  She yesterday she had chills and fever  Temp was a 100 degrees 0 1-100 5 degrees  She has some myalgias  She denies cough sore throat  She has ongoing lower abdominal cramping  She is having 1-2 loose stools - borderline watery stools per day  She denies any bloody stool  She denies any vomiting  She had recently been treated with cephalexin for associated folliculitis of the scalp and she wondered if she did need additional antibiotic therapy    Per in history shows that she has a known history of smoldering multiple myeloma  Most recently she has been stable with M protein you may -3  She has not had any evidence of anemia, hypercalcemia, renal insufficiency or new bony lytic disease  She sees Oncology every several months and is felt to be stable  As noted she had been home for 10 days before symptoms started  She drinks city water  She had minimize caffeine  She is minimize dairy products she says she has a history of chronic lactose intolerance      The following portions of the patient's history were reviewed and updated as appropriate: allergies, current medications, past family history, past medical history, past social history, past surgical history and problem list     Review of Systems   Constitutional: Positive for fatigue  Respiratory: Negative  Cardiovascular: Negative  Gastrointestinal: Positive for abdominal pain  Endocrine: Negative  Genitourinary: Negative  Musculoskeletal: Negative  Neurological: Negative  Hematological: Negative  Psychiatric/Behavioral: Negative  Objective:      Ht 5' 9" (1 753 m)   Wt 66 3 kg (146 lb 3 2 oz)   LMP  (LMP Unknown)   BMI 21 59 kg/m²          Physical Exam   Constitutional: She is oriented to person, place, and time  She appears well-developed and well-nourished  No distress  HENT:   Head: Normocephalic and atraumatic     Right Ear: External ear normal  Left Ear: External ear normal    Nose: Nose normal    Mouth/Throat: Oropharynx is clear and moist  No oropharyngeal exudate  Eyes: Pupils are equal, round, and reactive to light  Conjunctivae and EOM are normal  Right eye exhibits no discharge  Left eye exhibits no discharge  No scleral icterus  Neck: Normal range of motion  Neck supple  No JVD present  No tracheal deviation present  No thyromegaly present  Cardiovascular: Normal rate, regular rhythm, normal heart sounds and intact distal pulses  Exam reveals no gallop and no friction rub  No murmur heard  Pulmonary/Chest: Effort normal and breath sounds normal  No respiratory distress  She has no wheezes  She has no rales  She exhibits no tenderness  Abdominal: Soft  Bowel sounds are normal  She exhibits no distension and no mass  There is no tenderness  There is no rebound and no guarding  Musculoskeletal: Normal range of motion  She exhibits no edema or deformity  Scars from prior orthopedic surgeries  Lymphadenopathy:     She has no cervical adenopathy  Neurological: She is alert and oriented to person, place, and time  She has normal reflexes  She displays normal reflexes  No cranial nerve deficit  She exhibits normal muscle tone  Coordination normal    Skin: Skin is warm and dry  No rash noted  No erythema  Psychiatric: She has a normal mood and affect   Her behavior is normal  Judgment and thought content normal

## 2020-03-13 ENCOUNTER — APPOINTMENT (OUTPATIENT)
Dept: LAB | Facility: CLINIC | Age: 60
End: 2020-03-13
Payer: COMMERCIAL

## 2020-03-13 ENCOUNTER — TRANSCRIBE ORDERS (OUTPATIENT)
Dept: LAB | Facility: CLINIC | Age: 60
End: 2020-03-13

## 2020-03-13 DIAGNOSIS — R19.7 DIARRHEA, UNSPECIFIED TYPE: Primary | ICD-10-CM

## 2020-03-13 DIAGNOSIS — K83.09 CHOLANGIOLITIS: Primary | ICD-10-CM

## 2020-03-13 DIAGNOSIS — K83.09 CHOLANGIOLITIS: ICD-10-CM

## 2020-03-13 LAB
ALBUMIN SERPL BCP-MCNC: 3.6 G/DL (ref 3.5–5)
ALP SERPL-CCNC: 287 U/L (ref 46–116)
ALT SERPL W P-5'-P-CCNC: 35 U/L (ref 12–78)
ANION GAP SERPL CALCULATED.3IONS-SCNC: 4 MMOL/L (ref 4–13)
AST SERPL W P-5'-P-CCNC: 33 U/L (ref 5–45)
BASOPHILS # BLD AUTO: 0.03 THOUSANDS/ΜL (ref 0–0.1)
BASOPHILS NFR BLD AUTO: 1 % (ref 0–1)
BILIRUB SERPL-MCNC: 0.37 MG/DL (ref 0.2–1)
BUN SERPL-MCNC: 25 MG/DL (ref 5–25)
CALCIUM SERPL-MCNC: 9.1 MG/DL (ref 8.3–10.1)
CHLORIDE SERPL-SCNC: 101 MMOL/L (ref 100–108)
CO2 SERPL-SCNC: 30 MMOL/L (ref 21–32)
CREAT SERPL-MCNC: 0.98 MG/DL (ref 0.6–1.3)
EOSINOPHIL # BLD AUTO: 0.09 THOUSAND/ΜL (ref 0–0.61)
EOSINOPHIL NFR BLD AUTO: 2 % (ref 0–6)
ERYTHROCYTE [DISTWIDTH] IN BLOOD BY AUTOMATED COUNT: 13.1 % (ref 11.6–15.1)
GFR SERPL CREATININE-BSD FRML MDRD: 63 ML/MIN/1.73SQ M
GLUCOSE SERPL-MCNC: 99 MG/DL (ref 65–140)
HCT VFR BLD AUTO: 41.7 % (ref 34.8–46.1)
HGB BLD-MCNC: 13.3 G/DL (ref 11.5–15.4)
IMM GRANULOCYTES # BLD AUTO: 0.02 THOUSAND/UL (ref 0–0.2)
IMM GRANULOCYTES NFR BLD AUTO: 0 % (ref 0–2)
LYMPHOCYTES # BLD AUTO: 2.07 THOUSANDS/ΜL (ref 0.6–4.47)
LYMPHOCYTES NFR BLD AUTO: 37 % (ref 14–44)
MCH RBC QN AUTO: 28.4 PG (ref 26.8–34.3)
MCHC RBC AUTO-ENTMCNC: 31.9 G/DL (ref 31.4–37.4)
MCV RBC AUTO: 89 FL (ref 82–98)
MONOCYTES # BLD AUTO: 0.65 THOUSAND/ΜL (ref 0.17–1.22)
MONOCYTES NFR BLD AUTO: 12 % (ref 4–12)
NEUTROPHILS # BLD AUTO: 2.77 THOUSANDS/ΜL (ref 1.85–7.62)
NEUTS SEG NFR BLD AUTO: 48 % (ref 43–75)
NRBC BLD AUTO-RTO: 0 /100 WBCS
PLATELET # BLD AUTO: 217 THOUSANDS/UL (ref 149–390)
PMV BLD AUTO: 10.7 FL (ref 8.9–12.7)
POTASSIUM SERPL-SCNC: 3.9 MMOL/L (ref 3.5–5.3)
PROT SERPL-MCNC: 9.2 G/DL (ref 6.4–8.2)
RBC # BLD AUTO: 4.68 MILLION/UL (ref 3.81–5.12)
SODIUM SERPL-SCNC: 135 MMOL/L (ref 136–145)
WBC # BLD AUTO: 5.63 THOUSAND/UL (ref 4.31–10.16)

## 2020-03-13 PROCEDURE — 84165 PROTEIN E-PHORESIS SERUM: CPT | Performed by: PATHOLOGY

## 2020-03-13 PROCEDURE — 80053 COMPREHEN METABOLIC PANEL: CPT

## 2020-03-13 PROCEDURE — 85025 COMPLETE CBC W/AUTO DIFF WBC: CPT

## 2020-03-13 PROCEDURE — 36415 COLL VENOUS BLD VENIPUNCTURE: CPT

## 2020-03-13 PROCEDURE — 84165 PROTEIN E-PHORESIS SERUM: CPT

## 2020-03-13 PROCEDURE — 87177 OVA AND PARASITES SMEARS: CPT

## 2020-03-13 PROCEDURE — 87077 CULTURE AEROBIC IDENTIFY: CPT

## 2020-03-13 PROCEDURE — 87505 NFCT AGENT DETECTION GI: CPT

## 2020-03-13 PROCEDURE — 87209 SMEAR COMPLEX STAIN: CPT

## 2020-03-13 PROCEDURE — 87186 SC STD MICRODIL/AGAR DIL: CPT

## 2020-03-14 LAB
C DIFF TOX GENS STL QL NAA+PROBE: NEGATIVE
CAMPYLOBACTER DNA SPEC NAA+PROBE: ABNORMAL
O+P STL CONC: NORMAL
SALMONELLA DNA SPEC QL NAA+PROBE: DETECTED
SHIGA TOXIN STX GENE SPEC NAA+PROBE: ABNORMAL
SHIGELLA DNA SPEC QL NAA+PROBE: ABNORMAL

## 2020-03-16 ENCOUNTER — TELEPHONE (OUTPATIENT)
Dept: INTERNAL MEDICINE CLINIC | Facility: CLINIC | Age: 60
End: 2020-03-16

## 2020-03-16 DIAGNOSIS — R60.9 EDEMA, UNSPECIFIED TYPE: ICD-10-CM

## 2020-03-16 LAB
ALBUMIN SERPL ELPH-MCNC: 3.99 G/DL (ref 3.5–5)
ALBUMIN SERPL ELPH-MCNC: 44.3 % (ref 52–65)
ALPHA1 GLOB SERPL ELPH-MCNC: 0.38 G/DL (ref 0.1–0.4)
ALPHA1 GLOB SERPL ELPH-MCNC: 4.2 % (ref 2.5–5)
ALPHA2 GLOB SERPL ELPH-MCNC: 1.04 G/DL (ref 0.4–1.2)
ALPHA2 GLOB SERPL ELPH-MCNC: 11.5 % (ref 7–13)
BETA GLOB ABNORMAL SERPL ELPH-MCNC: 0.5 G/DL (ref 0.4–0.8)
BETA1 GLOB SERPL ELPH-MCNC: 5.5 % (ref 5–13)
BETA2 GLOB SERPL ELPH-MCNC: 4.1 % (ref 2–8)
BETA2+GAMMA GLOB SERPL ELPH-MCNC: 0.37 G/DL (ref 0.2–0.5)
G LAMBLIA AG STL QL IA: NEGATIVE
GAMMA GLOB ABNORMAL SERPL ELPH-MCNC: 2.74 G/DL (ref 0.5–1.6)
GAMMA GLOB SERPL ELPH-MCNC: 30.4 % (ref 12–22)
IGG/ALB SER: 0.8 {RATIO} (ref 1.1–1.8)
M PROTEIN 1 MFR SERPL ELPH: 20.3 %
M PROTEIN 1 SERPL ELPH-MCNC: 1.83 G/DL
PROT PATTERN SERPL ELPH-IMP: ABNORMAL
PROT SERPL-MCNC: 9 G/DL (ref 6.4–8.2)

## 2020-03-16 RX ORDER — TRIAMTERENE AND HYDROCHLOROTHIAZIDE 37.5; 25 MG/1; MG/1
1 TABLET ORAL DAILY
Qty: 90 TABLET | Refills: 3 | Status: SHIPPED | OUTPATIENT
Start: 2020-03-16 | End: 2021-06-25

## 2020-03-16 NOTE — TELEPHONE ENCOUNTER
She should avoid the gym for 2 weeks-find out please the time sequence that she will not be there and I will fill out appropriate no

## 2020-03-16 NOTE — TELEPHONE ENCOUNTER
SPOKE WITH PT, GAVE MESSAGE       SHE'LL BE OUT THE WEEK OF THE 16TH AND THE WEEK OF THE 23RD   WILL RETURN THE WEEK OF THE 30TH

## 2020-03-16 NOTE — TELEPHONE ENCOUNTER
PT STATED THAT SHE SPOKE WITH YOU OVER THE WEEKEND BUT FORGOT TO ASK YOU IF IT'S OK FOR HER TO GO TO PHYSICAL THERAPY TWICE A WEEK SINCE SHE HAS SALMONELLA POISONING AND BECAUSE OF HER OTHER HEALTH ISSUES    SHE SAID THAT IF YOU DON'T THINK SHE SHOULD GO, THEN SHE'LL NEED A NOTE BECAUSE HER [employer] PAYS FOR IT

## 2020-03-21 LAB — CULTURE ID FROM ENTERIC PCR: ABNORMAL

## 2020-03-23 ENCOUNTER — OFFICE VISIT (OUTPATIENT)
Dept: INTERNAL MEDICINE CLINIC | Facility: CLINIC | Age: 60
End: 2020-03-23
Payer: COMMERCIAL

## 2020-03-23 ENCOUNTER — TELEPHONE (OUTPATIENT)
Dept: INTERNAL MEDICINE CLINIC | Facility: CLINIC | Age: 60
End: 2020-03-23

## 2020-03-23 VITALS
BODY MASS INDEX: 21.98 KG/M2 | SYSTOLIC BLOOD PRESSURE: 120 MMHG | HEART RATE: 72 BPM | HEIGHT: 69 IN | DIASTOLIC BLOOD PRESSURE: 78 MMHG | WEIGHT: 148.4 LBS | RESPIRATION RATE: 14 BRPM

## 2020-03-23 DIAGNOSIS — A02.0 SALMONELLA GASTROENTERITIS: ICD-10-CM

## 2020-03-23 DIAGNOSIS — D47.2 SMOLDERING MULTIPLE MYELOMA (SMM): Chronic | ICD-10-CM

## 2020-03-23 DIAGNOSIS — A09 INFECTIOUS DIARRHEA: Primary | ICD-10-CM

## 2020-03-23 PROCEDURE — 3008F BODY MASS INDEX DOCD: CPT | Performed by: INTERNAL MEDICINE

## 2020-03-23 PROCEDURE — 3074F SYST BP LT 130 MM HG: CPT | Performed by: INTERNAL MEDICINE

## 2020-03-23 PROCEDURE — 99214 OFFICE O/P EST MOD 30 MIN: CPT | Performed by: INTERNAL MEDICINE

## 2020-03-23 PROCEDURE — 3078F DIAST BP <80 MM HG: CPT | Performed by: INTERNAL MEDICINE

## 2020-03-23 PROCEDURE — 1036F TOBACCO NON-USER: CPT | Performed by: INTERNAL MEDICINE

## 2020-03-23 NOTE — PROGRESS NOTES
Assessment/Plan:    No problem-specific Assessment & Plan notes found for this encounter  {Assess/PlanSmartLinks:76489}      Subjective:      Patient ID: Tommy Evans is a 60 y o  female      HPI    {Common ambulatory SmartLinks:71426}    Review of Systems      Objective:      Ht 5' 9" (1 753 m)   Wt 67 3 kg (148 lb 6 4 oz)   LMP  (LMP Unknown)   BMI 21 91 kg/m²          Physical Exam

## 2020-03-23 NOTE — PROGRESS NOTES
Assessment/Plan:   1  Salmonella gastroenteritis -organisms Salmonella infantis- appears clinically to be resolving  As noted the decision was made to treat the patient with Cipro because of her immunosuppressed state with chronic liver disease and smoldering multiple myeloma  She will be completing Cipro 500 milligrams b i d  For total of 2 weeks  She is following appropriate bathroom precautions at home  6 weeks after completion of her antibiotic therapy weekly x2 time she will resubmit stool specimen  I told her she is again positive she will need referral to infectious disease to determine if she needs additional treatment  As noted other stool testing including Giardia, Clostridium difficile, O and P were all negative  2  Smoldering multiple myeloma- being monitored periodically gynecology  Most recent protein electrophoresis shows an M spike of 1 69  Chemistry profile normal other than chronic elevation of her alk-phos associated with chronic liver disease although this is improved compared to before  3  General care -I answered multiple questions regarding the recent corona virus outbreak    All other problems as per prior records  Medical regimen as per baseline  With addition of the Cipro as noted above    Addendum- patient called with increasing symptoms as well as insomnia associated with stress of caring for her mother Micah Mak increased pain and requested refill of her tramadol     Addendum- stool for enteric panel by PCR done in mid May 2020 normal without evidence of salmonella noted previously    Addendum- patient called the office on May 29th with significant plan poison- unresponsive to topical betamethasone  She will apply calamine q i d  [place] She will use Benadryl 25 milligrams hs  Placed on a Medrol Dosepak  No problem-specific Assessment & Plan notes found for this encounter         Diagnoses and all orders for this visit:    Infectious diarrhea  -     Stool Enteric Bacterial Panel by PCR; Future  -     Stool Enteric Bacterial Panel by PCR; Future    Salmonella gastroenteritis    Smoldering multiple myeloma (SMM) ([facility])          Subjective:      Patient ID: Willy Araiza is a 60 y o  female  She was seen in March 12 with lower abdominal cramping with fever which began about 10 days after traveling to the [facility]  She has stool testing performed  Stool for Clostridium difficile was negative  Stool for O and P was negative  Stool for Giardia was negative  Stool culture over was positive for ya Apple which eventually proved on final culture to be salmonella infantis - she is immune suppressed with her history of smoldering multiple myeloma  She started on March to 15 Cipro 500 milligrams b i d  Which she is going to take for total of 2 weeks  We reviewed -they only have 1 bathroom in her house but she is practicing proper precautions  Most recent testing shows that on her serum protein electrophoresis her M peak is 1 83-previously was 1 69  Chemistry profile normal other than alkaline phosphatase which is 287 previously 300  She says at this point she is overall improved  Her diarrhea is markedly improved only occasionally with loose stool  She feels fullness in the lower abdominal area but no major pain  She has not had any bloody stool  Previously noted fever -chills has resolved  She notes some increase in prior itis and she is on the Cipro and she wanted about drug drug interaction with the med used treat her chronic liver disease  We reviewed the patient's with salmon Sonjaaraceli can have intermittent shedding for weeks after their illness  We reviewed that this may be more prolonged with her immunosuppressed state  I recommended that 6 weeks after she completes her Cipro she submit a stool specimen and repeated again week later  If she is still positive for salmon Vanessa Apple I will recommended that point she be seen by infectious disease    She has her comorbidities of her chronic liver disease and smoldering multiple myeloma but does not have end-stage disease with either  Long discussion held today regarding the above  This was a 25 minutes visit with more than 50% time spent counseling the patient formulating a treatment plan  Multiple questions were answered      The following portions of the patient's history were reviewed and updated as appropriate: allergies, current medications, past family history, past medical history, past social history, past surgical history and problem list     Review of Systems   Constitutional: Negative  Respiratory: Negative  Cardiovascular: Negative  Gastrointestinal: Positive for diarrhea  Endocrine: Negative  Genitourinary: Negative  Musculoskeletal: Negative  Neurological: Negative  Hematological: Negative  Psychiatric/Behavioral: Negative  Objective:      Ht 5' 9" (1 753 m)   Wt 67 3 kg (148 lb 6 4 oz)   LMP  (LMP Unknown)   BMI 21 91 kg/m²          Physical Exam   Constitutional: She is oriented to person, place, and time  She appears well-developed and well-nourished  No distress  HENT:   Head: Normocephalic and atraumatic  Right Ear: External ear normal    Left Ear: External ear normal    Nose: Nose normal    Mouth/Throat: Oropharynx is clear and moist  No oropharyngeal exudate  Eyes: Pupils are equal, round, and reactive to light  Conjunctivae and EOM are normal  Right eye exhibits no discharge  Left eye exhibits no discharge  No scleral icterus  Neck: Normal range of motion  Neck supple  No JVD present  No tracheal deviation present  No thyromegaly present  Cardiovascular: Normal rate, regular rhythm, normal heart sounds and intact distal pulses  Exam reveals no gallop and no friction rub  No murmur heard  Pulmonary/Chest: Effort normal and breath sounds normal  No respiratory distress  She has no wheezes  She has no rales  She exhibits no tenderness     Abdominal: Soft  Bowel sounds are normal  She exhibits no distension and no mass  There is no tenderness  There is no rebound and no guarding  Musculoskeletal: Normal range of motion  She exhibits no edema or deformity  Known changes of her right leg as before   Lymphadenopathy:     She has no cervical adenopathy  Neurological: She is alert and oriented to person, place, and time  She has normal reflexes  She displays normal reflexes  No cranial nerve deficit  She exhibits normal muscle tone  Coordination normal    Skin: Skin is warm and dry  No rash noted  No erythema  Psychiatric: She has a normal mood and affect   Her behavior is normal  Judgment and thought content normal

## 2020-03-27 ENCOUNTER — TELEMEDICINE (OUTPATIENT)
Dept: PSYCHIATRY | Facility: CLINIC | Age: 60
End: 2020-03-27
Payer: COMMERCIAL

## 2020-03-27 DIAGNOSIS — F41.1 GAD (GENERALIZED ANXIETY DISORDER): Primary | ICD-10-CM

## 2020-03-27 PROCEDURE — 99213 OFFICE O/P EST LOW 20 MIN: CPT | Performed by: PSYCHIATRY & NEUROLOGY

## 2020-03-27 RX ORDER — TRAZODONE HYDROCHLORIDE 50 MG/1
25-50 TABLET ORAL
Qty: 30 TABLET | Refills: 2 | Status: SHIPPED | OUTPATIENT
Start: 2020-03-27 | End: 2020-06-29
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
## 2020-03-30 ENCOUNTER — TELEPHONE (OUTPATIENT)
Dept: PSYCHIATRY | Facility: CLINIC | Age: 60
End: 2020-03-30

## 2020-03-30 DIAGNOSIS — Z79.899 HIGH RISK MEDICATION USE: Primary | ICD-10-CM

## 2020-03-30 NOTE — TELEPHONE ENCOUNTER
Trazodone still a good option  Another idea I have would require that she get an EKG done  I have ordered it, so that if she would like to get the EKG done we can discuss another option to address her OCD concerns  If she chooses to get EKG done, ask her to let us know when complete (so I can keep an eye  Out for results)      Thanks,  Luis Minor

## 2020-03-30 NOTE — TELEPHONE ENCOUNTER
Returned codebender  She states that she had a phone visit with Dr Johnson Marsh that was not as successful as she hoped and forgot to tell him a few things  She feels that her anxiety is being triggered by her OCD  Dr Johnson Marsh gave her Trazodone 25-50 MG at bedtime prn  She states she told him that she needed something at night because she couldn't fall asleep  However she wants him to "have the whole picture" before she picks the medicine up from the pharmacy  She states that if "my bladder wakes me up, I have a hard time falling back to sleep"  She wants  to made aware of this  She states that he asked her what her goal was and she wants to get her OCD under control  Informed her that either myself or Dr Johnson Marsh would call her back by tomorrow  Will refer to Dr Johnson Marsh for his review and instruction

## 2020-03-31 NOTE — TELEPHONE ENCOUNTER
Spoke to Paris Ba regarding EKG  She is not sure about this at the moment  She will think about it and call and let us know if she does go  Informed her, per Dr Chloe Chatman, that the Trazodone is still a good option  She is satisfied with this information and will keep us posted

## 2020-05-04 ENCOUNTER — TELEPHONE (OUTPATIENT)
Dept: OBGYN CLINIC | Facility: HOSPITAL | Age: 60
End: 2020-05-04

## 2020-05-04 DIAGNOSIS — M79.605 LEFT LEG PAIN: Primary | ICD-10-CM

## 2020-05-04 RX ORDER — TRAMADOL HYDROCHLORIDE 50 MG/1
50 TABLET ORAL EVERY 8 HOURS PRN
Qty: 30 TABLET | Refills: 0 | Status: SHIPPED | OUTPATIENT
Start: 2020-05-04

## 2020-05-06 ENCOUNTER — TELEPHONE (OUTPATIENT)
Dept: OBGYN CLINIC | Facility: HOSPITAL | Age: 60
End: 2020-05-06

## 2020-05-13 ENCOUNTER — TELEPHONE (OUTPATIENT)
Dept: OBGYN CLINIC | Facility: HOSPITAL | Age: 60
End: 2020-05-13

## 2020-05-14 ENCOUNTER — TELEPHONE (OUTPATIENT)
Dept: OBGYN CLINIC | Facility: HOSPITAL | Age: 60
End: 2020-05-14

## 2020-05-14 ENCOUNTER — LAB (OUTPATIENT)
Dept: LAB | Age: 60
End: 2020-05-14
Payer: COMMERCIAL

## 2020-05-14 DIAGNOSIS — A09 INFECTIOUS DIARRHEA: ICD-10-CM

## 2020-05-14 PROCEDURE — 87505 NFCT AGENT DETECTION GI: CPT

## 2020-05-15 ENCOUNTER — TELEPHONE (OUTPATIENT)
Dept: INTERNAL MEDICINE CLINIC | Facility: CLINIC | Age: 60
End: 2020-05-15

## 2020-05-15 LAB
CAMPYLOBACTER DNA SPEC NAA+PROBE: NORMAL
SALMONELLA DNA SPEC QL NAA+PROBE: NORMAL
SHIGA TOXIN STX GENE SPEC NAA+PROBE: NORMAL
SHIGELLA DNA SPEC QL NAA+PROBE: NORMAL

## 2020-05-21 ENCOUNTER — OFFICE VISIT (OUTPATIENT)
Dept: OBGYN CLINIC | Facility: HOSPITAL | Age: 60
End: 2020-05-21
Payer: OTHER MISCELLANEOUS

## 2020-05-21 VITALS
HEIGHT: 69 IN | SYSTOLIC BLOOD PRESSURE: 138 MMHG | BODY MASS INDEX: 21.48 KG/M2 | DIASTOLIC BLOOD PRESSURE: 80 MMHG | HEART RATE: 76 BPM | WEIGHT: 145 LBS

## 2020-05-21 DIAGNOSIS — G57.71 COMPLEX REGIONAL PAIN SYNDROME TYPE 2 OF RIGHT LOWER EXTREMITY: Primary | ICD-10-CM

## 2020-05-21 PROCEDURE — 1036F TOBACCO NON-USER: CPT | Performed by: ORTHOPAEDIC SURGERY

## 2020-05-21 PROCEDURE — 3008F BODY MASS INDEX DOCD: CPT | Performed by: ORTHOPAEDIC SURGERY

## 2020-05-21 PROCEDURE — 99213 OFFICE O/P EST LOW 20 MIN: CPT | Performed by: ORTHOPAEDIC SURGERY

## 2020-05-22 ENCOUNTER — TELEPHONE (OUTPATIENT)
Dept: OBGYN CLINIC | Facility: HOSPITAL | Age: 60
End: 2020-05-22

## 2020-05-29 ENCOUNTER — TELEPHONE (OUTPATIENT)
Dept: INTERNAL MEDICINE CLINIC | Facility: CLINIC | Age: 60
End: 2020-05-29

## 2020-06-15 ENCOUNTER — TELEPHONE (OUTPATIENT)
Dept: INTERNAL MEDICINE CLINIC | Facility: CLINIC | Age: 60
End: 2020-06-15

## 2020-06-15 ENCOUNTER — APPOINTMENT (OUTPATIENT)
Dept: LAB | Facility: MEDICAL CENTER | Age: 60
End: 2020-06-15
Payer: COMMERCIAL

## 2020-06-15 DIAGNOSIS — E78.5 HYPERLIPIDEMIA, UNSPECIFIED HYPERLIPIDEMIA TYPE: Chronic | ICD-10-CM

## 2020-06-15 DIAGNOSIS — I10 ESSENTIAL HYPERTENSION: Chronic | ICD-10-CM

## 2020-06-15 DIAGNOSIS — C90.00 MULTIPLE MYELOMA, REMISSION STATUS UNSPECIFIED (HCC): ICD-10-CM

## 2020-06-15 DIAGNOSIS — E55.9 VITAMIN D DEFICIENCY: ICD-10-CM

## 2020-06-15 DIAGNOSIS — M81.0 OSTEOPOROSIS WITHOUT CURRENT PATHOLOGICAL FRACTURE, UNSPECIFIED OSTEOPOROSIS TYPE: Chronic | ICD-10-CM

## 2020-06-15 DIAGNOSIS — M81.0 OSTEOPOROSIS WITHOUT CURRENT PATHOLOGICAL FRACTURE, UNSPECIFIED OSTEOPOROSIS TYPE: Primary | Chronic | ICD-10-CM

## 2020-06-15 LAB
25(OH)D3 SERPL-MCNC: 38.6 NG/ML (ref 30–100)
ALBUMIN SERPL BCP-MCNC: 3.3 G/DL (ref 3.5–5)
ALP SERPL-CCNC: 441 U/L (ref 46–116)
ALT SERPL W P-5'-P-CCNC: 71 U/L (ref 12–78)
ANION GAP SERPL CALCULATED.3IONS-SCNC: 4 MMOL/L (ref 4–13)
AST SERPL W P-5'-P-CCNC: 59 U/L (ref 5–45)
BASOPHILS # BLD AUTO: 0.03 THOUSANDS/ΜL (ref 0–0.1)
BASOPHILS NFR BLD AUTO: 1 % (ref 0–1)
BILIRUB SERPL-MCNC: 0.64 MG/DL (ref 0.2–1)
BUN SERPL-MCNC: 15 MG/DL (ref 5–25)
CALCIUM SERPL-MCNC: 8.7 MG/DL (ref 8.3–10.1)
CHLORIDE SERPL-SCNC: 104 MMOL/L (ref 100–108)
CHOLEST SERPL-MCNC: 203 MG/DL (ref 50–200)
CO2 SERPL-SCNC: 27 MMOL/L (ref 21–32)
CREAT SERPL-MCNC: 0.91 MG/DL (ref 0.6–1.3)
CRP SERPL QL: <3 MG/L
EOSINOPHIL # BLD AUTO: 0.1 THOUSAND/ΜL (ref 0–0.61)
EOSINOPHIL NFR BLD AUTO: 2 % (ref 0–6)
ERYTHROCYTE [DISTWIDTH] IN BLOOD BY AUTOMATED COUNT: 13.5 % (ref 11.6–15.1)
ERYTHROCYTE [SEDIMENTATION RATE] IN BLOOD: 57 MM/HOUR (ref 0–20)
GFR SERPL CREATININE-BSD FRML MDRD: 69 ML/MIN/1.73SQ M
GLUCOSE P FAST SERPL-MCNC: 76 MG/DL (ref 65–99)
HCT VFR BLD AUTO: 38.6 % (ref 34.8–46.1)
HDLC SERPL-MCNC: 89 MG/DL
HGB BLD-MCNC: 12.2 G/DL (ref 11.5–15.4)
IMM GRANULOCYTES # BLD AUTO: 0.02 THOUSAND/UL (ref 0–0.2)
IMM GRANULOCYTES NFR BLD AUTO: 0 % (ref 0–2)
LDLC SERPL DIRECT ASSAY-MCNC: 92 MG/DL (ref 0–100)
LYMPHOCYTES # BLD AUTO: 1.7 THOUSANDS/ΜL (ref 0.6–4.47)
LYMPHOCYTES NFR BLD AUTO: 34 % (ref 14–44)
MCH RBC QN AUTO: 29.3 PG (ref 26.8–34.3)
MCHC RBC AUTO-ENTMCNC: 31.6 G/DL (ref 31.4–37.4)
MCV RBC AUTO: 93 FL (ref 82–98)
MONOCYTES # BLD AUTO: 0.32 THOUSAND/ΜL (ref 0.17–1.22)
MONOCYTES NFR BLD AUTO: 6 % (ref 4–12)
NEUTROPHILS # BLD AUTO: 2.85 THOUSANDS/ΜL (ref 1.85–7.62)
NEUTS SEG NFR BLD AUTO: 57 % (ref 43–75)
NRBC BLD AUTO-RTO: 0 /100 WBCS
PLATELET # BLD AUTO: 185 THOUSANDS/UL (ref 149–390)
PMV BLD AUTO: 11.3 FL (ref 8.9–12.7)
POTASSIUM SERPL-SCNC: 3.9 MMOL/L (ref 3.5–5.3)
PROT SERPL-MCNC: 8.5 G/DL (ref 6.4–8.2)
RBC # BLD AUTO: 4.17 MILLION/UL (ref 3.81–5.12)
SODIUM SERPL-SCNC: 135 MMOL/L (ref 136–145)
TRIGL SERPL-MCNC: 44 MG/DL
WBC # BLD AUTO: 5.02 THOUSAND/UL (ref 4.31–10.16)

## 2020-06-15 PROCEDURE — 80053 COMPREHEN METABOLIC PANEL: CPT

## 2020-06-15 PROCEDURE — 85025 COMPLETE CBC W/AUTO DIFF WBC: CPT

## 2020-06-15 PROCEDURE — 86140 C-REACTIVE PROTEIN: CPT

## 2020-06-15 PROCEDURE — 36415 COLL VENOUS BLD VENIPUNCTURE: CPT

## 2020-06-15 PROCEDURE — 85652 RBC SED RATE AUTOMATED: CPT

## 2020-06-15 PROCEDURE — 83721 ASSAY OF BLOOD LIPOPROTEIN: CPT

## 2020-06-15 PROCEDURE — 82306 VITAMIN D 25 HYDROXY: CPT

## 2020-06-15 PROCEDURE — 80061 LIPID PANEL: CPT

## 2020-06-17 ENCOUNTER — OFFICE VISIT (OUTPATIENT)
Dept: INTERNAL MEDICINE CLINIC | Facility: CLINIC | Age: 60
End: 2020-06-17
Payer: COMMERCIAL

## 2020-06-17 ENCOUNTER — APPOINTMENT (OUTPATIENT)
Dept: LAB | Facility: HOSPITAL | Age: 60
End: 2020-06-17
Payer: COMMERCIAL

## 2020-06-17 ENCOUNTER — TELEPHONE (OUTPATIENT)
Dept: INTERNAL MEDICINE CLINIC | Facility: CLINIC | Age: 60
End: 2020-06-17

## 2020-06-17 VITALS
WEIGHT: 140.2 LBS | DIASTOLIC BLOOD PRESSURE: 78 MMHG | HEIGHT: 69 IN | HEART RATE: 72 BPM | BODY MASS INDEX: 20.76 KG/M2 | RESPIRATION RATE: 14 BRPM | SYSTOLIC BLOOD PRESSURE: 110 MMHG

## 2020-06-17 DIAGNOSIS — Z12.39 ENCOUNTER FOR BREAST CANCER SCREENING OTHER THAN MAMMOGRAM: ICD-10-CM

## 2020-06-17 DIAGNOSIS — Z23 ENCOUNTER FOR IMMUNIZATION: Primary | ICD-10-CM

## 2020-06-17 DIAGNOSIS — C90.00 MULTIPLE MYELOMA, REMISSION STATUS UNSPECIFIED (HCC): ICD-10-CM

## 2020-06-17 DIAGNOSIS — L73.9 CHRONIC FOLLICULITIS: ICD-10-CM

## 2020-06-17 LAB
IGA SERPL-MCNC: 51 MG/DL (ref 70–400)
IGG SERPL-MCNC: 2460 MG/DL (ref 700–1600)
IGM SERPL-MCNC: 243 MG/DL (ref 40–230)

## 2020-06-17 PROCEDURE — 84165 PROTEIN E-PHORESIS SERUM: CPT

## 2020-06-17 PROCEDURE — 3008F BODY MASS INDEX DOCD: CPT | Performed by: PSYCHIATRY & NEUROLOGY

## 2020-06-17 PROCEDURE — 82784 ASSAY IGA/IGD/IGG/IGM EACH: CPT

## 2020-06-17 PROCEDURE — 36415 COLL VENOUS BLD VENIPUNCTURE: CPT

## 2020-06-17 PROCEDURE — 99215 OFFICE O/P EST HI 40 MIN: CPT | Performed by: INTERNAL MEDICINE

## 2020-06-17 PROCEDURE — 3074F SYST BP LT 130 MM HG: CPT | Performed by: INTERNAL MEDICINE

## 2020-06-17 PROCEDURE — 86334 IMMUNOFIX E-PHORESIS SERUM: CPT

## 2020-06-17 PROCEDURE — 83883 ASSAY NEPHELOMETRY NOT SPEC: CPT

## 2020-06-17 PROCEDURE — 1036F TOBACCO NON-USER: CPT | Performed by: INTERNAL MEDICINE

## 2020-06-17 PROCEDURE — 86334 IMMUNOFIX E-PHORESIS SERUM: CPT | Performed by: PATHOLOGY

## 2020-06-17 PROCEDURE — 84165 PROTEIN E-PHORESIS SERUM: CPT | Performed by: PATHOLOGY

## 2020-06-17 PROCEDURE — 3078F DIAST BP <80 MM HG: CPT | Performed by: INTERNAL MEDICINE

## 2020-06-17 PROCEDURE — 3008F BODY MASS INDEX DOCD: CPT | Performed by: INTERNAL MEDICINE

## 2020-06-18 LAB
ALBUMIN SERPL ELPH-MCNC: 3.68 G/DL (ref 3.5–5)
ALBUMIN SERPL ELPH-MCNC: 47.2 % (ref 52–65)
ALPHA1 GLOB SERPL ELPH-MCNC: 0.29 G/DL (ref 0.1–0.4)
ALPHA1 GLOB SERPL ELPH-MCNC: 3.7 % (ref 2.5–5)
ALPHA2 GLOB SERPL ELPH-MCNC: 0.79 G/DL (ref 0.4–1.2)
ALPHA2 GLOB SERPL ELPH-MCNC: 10.1 % (ref 7–13)
BETA GLOB ABNORMAL SERPL ELPH-MCNC: 0.42 G/DL (ref 0.4–0.8)
BETA1 GLOB SERPL ELPH-MCNC: 5.4 % (ref 5–13)
BETA2 GLOB SERPL ELPH-MCNC: 3.6 % (ref 2–8)
BETA2+GAMMA GLOB SERPL ELPH-MCNC: 0.28 G/DL (ref 0.2–0.5)
GAMMA GLOB ABNORMAL SERPL ELPH-MCNC: 2.34 G/DL (ref 0.5–1.6)
GAMMA GLOB SERPL ELPH-MCNC: 30 % (ref 12–22)
IGG/ALB SER: 0.89 {RATIO} (ref 1.1–1.8)
INTERPRETATION UR IFE-IMP: NORMAL
KAPPA LC FREE SER-MCNC: 43.5 MG/L (ref 3.3–19.4)
KAPPA LC FREE/LAMBDA FREE SER: 3.18 {RATIO} (ref 0.26–1.65)
LAMBDA LC FREE SERPL-MCNC: 13.7 MG/L (ref 5.7–26.3)
M PROTEIN 1 MFR SERPL ELPH: 22.2 %
M PROTEIN 1 SERPL ELPH-MCNC: 1.73 G/DL
PROT PATTERN SERPL ELPH-IMP: ABNORMAL
PROT SERPL-MCNC: 7.8 G/DL (ref 6.4–8.2)

## 2020-06-19 ENCOUNTER — TELEPHONE (OUTPATIENT)
Dept: INTERNAL MEDICINE CLINIC | Facility: CLINIC | Age: 60
End: 2020-06-19

## 2020-06-26 ENCOUNTER — HOSPITAL ENCOUNTER (OUTPATIENT)
Dept: MAMMOGRAPHY | Facility: CLINIC | Age: 60
Discharge: HOME | End: 2020-06-26
Payer: COMMERCIAL

## 2020-06-26 VITALS — BODY MASS INDEX: 20.73 KG/M2 | HEIGHT: 69 IN | WEIGHT: 140 LBS

## 2020-06-26 DIAGNOSIS — Z12.39 ENCOUNTER FOR BREAST CANCER SCREENING OTHER THAN MAMMOGRAM: ICD-10-CM

## 2020-06-26 PROCEDURE — 77063 BREAST TOMOSYNTHESIS BI: CPT

## 2020-06-26 PROCEDURE — 77067 SCR MAMMO BI INCL CAD: CPT

## 2020-06-27 DIAGNOSIS — F41.1 GAD (GENERALIZED ANXIETY DISORDER): ICD-10-CM

## 2020-06-29 RX ORDER — TRAZODONE HYDROCHLORIDE 50 MG/1
TABLET ORAL
Qty: 90 TABLET | Refills: 0 | Status: SHIPPED | OUTPATIENT
Start: 2020-06-29 | End: 2021-03-11

## 2020-07-07 ENCOUNTER — TELEPHONE (OUTPATIENT)
Dept: INTERNAL MEDICINE CLINIC | Facility: CLINIC | Age: 60
End: 2020-07-07

## 2020-07-10 DIAGNOSIS — K74.5 BILIARY CIRRHOSIS (HCC): ICD-10-CM

## 2020-07-11 RX ORDER — URSODIOL 300 MG/1
CAPSULE ORAL
Qty: 270 CAPSULE | Refills: 2 | Status: SHIPPED | OUTPATIENT
Start: 2020-07-11

## 2020-07-13 ENCOUNTER — OFFICE VISIT (OUTPATIENT)
Dept: PSYCHIATRY | Facility: CLINIC | Age: 60
End: 2020-07-13
Payer: COMMERCIAL

## 2020-07-13 DIAGNOSIS — F42.9 OBSESSIVE-COMPULSIVE DISORDER, UNSPECIFIED TYPE: Primary | ICD-10-CM

## 2020-07-13 DIAGNOSIS — F33.0 MDD (MAJOR DEPRESSIVE DISORDER), RECURRENT EPISODE, MILD (HCC): ICD-10-CM

## 2020-07-13 DIAGNOSIS — F43.10 PTSD (POST-TRAUMATIC STRESS DISORDER): ICD-10-CM

## 2020-07-13 DIAGNOSIS — F41.1 GAD (GENERALIZED ANXIETY DISORDER): ICD-10-CM

## 2020-07-13 PROCEDURE — 3078F DIAST BP <80 MM HG: CPT | Performed by: PSYCHIATRY & NEUROLOGY

## 2020-07-13 PROCEDURE — 99214 OFFICE O/P EST MOD 30 MIN: CPT | Performed by: PSYCHIATRY & NEUROLOGY

## 2020-07-13 PROCEDURE — 3074F SYST BP LT 130 MM HG: CPT | Performed by: PSYCHIATRY & NEUROLOGY

## 2020-07-13 PROCEDURE — 1036F TOBACCO NON-USER: CPT | Performed by: PSYCHIATRY & NEUROLOGY

## 2020-07-13 RX ORDER — FLUVOXAMINE MALEATE 25 MG
TABLET ORAL
Qty: 60 TABLET | Refills: 2 | Status: SHIPPED | OUTPATIENT
Start: 2020-07-13 | End: 2020-07-23 | Stop reason: SDUPTHER
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
## 2020-07-13 NOTE — BH TREATMENT PLAN
TREATMENT PLAN (Medication Management Only)        [facility]    Name/Date of Birth/MRN:  Brad Boyle 60 y o  1960 MRN: [id number]  Date of Treatment Plan: July 13, 2020  Diagnosis/Diagnoses:   1  Obsessive-compulsive disorder, unspecified type    2  JOSEFINA (generalized anxiety disorder)    3  MDD (major depressive disorder), recurrent severe, without psychosis ([facility] [address] )    4  PTSD (post-traumatic stress disorder)      Strengths/Personal Resources for Self-Care: determined, intelligent  Area/Areas of need (in own words): to get all this legal stuff behind me  1  Long Term Goal: "to get to a place where I feel comfortable with what I am dealing with"   Target Date: 180 days from treatment plan  Person/Persons responsible for completion of goal: Dr Demi Bess and Self  2  Short Term Objective (s) - How will we reach this goal?:   A  Provider new recommended medication/dosage changes and/or continue medication(s): as noted  B   meds and f/u as prescribed, therapy as possible  C  Make a plan with insurance coverage  Target Date: 6 months from treatment plan unless noted otherwise  Person/Persons Responsible for Completion of Goal: Dr Demi Bess and Self   Progress Towards Goals: continuing treatment   Treatment Modality: Medication management and therapy PRN  Review due 180 days from date of this plan: Approximately 6 months from today ( 1/13/2021 )    Expected length of service: ongoing treatment unless revised  My Physician/PA/NP and I have developed this plan together and I agree to work on the goals and objectives  I understand the treatment goals that were developed for my treatment    Signature:       Date and time:  Signature of parent/guardian if under age of 15 years: Date and time:  Signature of provider:      Date and time:  Signature of Supervising Physician:    Date and time: 7/13/2020      Gorge León III, DO

## 2020-07-14 ENCOUNTER — TELEPHONE (OUTPATIENT)
Dept: PSYCHIATRY | Facility: CLINIC | Age: 60
End: 2020-07-14

## 2020-07-14 NOTE — TELEPHONE ENCOUNTER
Called Sharif Bird to discuss Genesight testing per Dr Carter Johnson  Explained process and possible out of pocket costs if any  Sharif Bird also wanted to confirm that Dr Carter Johnson knew her depression was a 1-2 and not an 8-10  Informed her that he was aware and documented accordingly  Sharif Bird will be coming to the [facility] office tomorrow morning for the [place] testing as location is more convenient for her  Will refer to Dr Carter Johnson for his information

## 2020-07-15 NOTE — TELEPHONE ENCOUNTER
Chris Shows presented to [email or web address] office for her [place] testing  Sample collected  Will process and send out via [employer]

## 2020-07-23 ENCOUNTER — TELEPHONE (OUTPATIENT)
Dept: PSYCHIATRY | Facility: CLINIC | Age: 60
End: 2020-07-23

## 2020-07-23 DIAGNOSIS — F41.1 GAD (GENERALIZED ANXIETY DISORDER): ICD-10-CM

## 2020-07-23 DIAGNOSIS — F43.10 PTSD (POST-TRAUMATIC STRESS DISORDER): ICD-10-CM

## 2020-07-23 DIAGNOSIS — F33.0 MDD (MAJOR DEPRESSIVE DISORDER), RECURRENT EPISODE, MILD (HCC): ICD-10-CM

## 2020-07-23 DIAGNOSIS — F42.9 OBSESSIVE-COMPULSIVE DISORDER, UNSPECIFIED TYPE: ICD-10-CM

## 2020-07-23 RX ORDER — FLUVOXAMINE MALEATE 50 MG/1
TABLET, COATED ORAL
Qty: 30 TABLET | Refills: 2 | Status: SHIPPED | OUTPATIENT
Start: 2020-07-23 | End: 2020-10-08 | Stop reason: SDUPTHER

## 2020-07-23 NOTE — TELEPHONE ENCOUNTER
That is fine  No further action needed  We are keeping track of those quantity limit denials but not pushing them any further  I asked Jose Alberto Gaffney to maybe look at how much those quantity limit denials delay treatment as an interesting data point

## 2020-07-23 NOTE — TELEPHONE ENCOUNTER
[facility] Department    [address] [address]    Phone:  [phone]    Fax:  [phone]                                       Lulu Carlton   MRN: [id number]    Department:  [facility] Department   Date of Visit:  5/1/2017           After Visit Summary Signature Page     I have received my discharge instructions, and my questions have been answered. I have discussed any challenges I see with this plan with the nurse or doctor.    ..........................................................................................................................................  Patient/Patient Representative Signature      ..........................................................................................................................................  Patient Representative Print Name and Relationship to Patient    ..................................................               ................................................  Date                                            Time    ..........................................................................................................................................  Reviewed by Signature/Title    ...................................................              ..............................................  Date                                                            Time           Called [facility] to see if a P A is needed for new script, 50 MG Luvox, 25mg BID after taking 25 MG at night for 4-5 days  Pharmacist ran scripts and no P  A needed  Called Campos Tijerina and informed her of change in tablet strength and that no P  A is needed for this script  Will refer to Dr Krissy Edwards for his information

## 2020-07-23 NOTE — TELEPHONE ENCOUNTER
Courtney De Guzman called and left a message that her medication was denied  The pharmacy said that she would nee a Prior-Auth on the mg dosage in order for them to pay for it   She has [employer] and the number for the prior Santos Molina is [phone]    It should be the Luvox 25mg tablets

## 2020-07-23 NOTE — TELEPHONE ENCOUNTER
Great thank you  Not sure with the QI project, if we need to continue with the prior auth or not    [place]-  Luvox 25mg reqd prior auth (so I changed to Luvox 50mg, approved without prior auth)  However, what shall I do per QI project?      Thanks,  Sammie Quintana

## 2020-07-27 ENCOUNTER — TELEPHONE (OUTPATIENT)
Dept: PSYCHIATRY | Facility: CLINIC | Age: 60
End: 2020-07-27

## 2020-07-27 NOTE — TELEPHONE ENCOUNTER
Received e-mail from [facility] requesting another sample be submitted  They said there was not enough DNA to complete the testing  Called Robb Rand and informed her that we need another sample  She will go to [facility] office and have collection done and submitted by [place], RN

## 2020-07-28 NOTE — TELEPHONE ENCOUNTER
GeneSight specimen obtained as requested  [employer] scheduled to pick sample up between 3-5 pm today  Katelin Nichols is aware

## 2020-08-17 ENCOUNTER — TELEPHONE (OUTPATIENT)
Dept: HEMATOLOGY ONCOLOGY | Facility: CLINIC | Age: 60
End: 2020-08-17

## 2020-08-17 NOTE — TELEPHONE ENCOUNTER
Pt called to state that someone called earlier to confirm her appt for tomorrow and screen for COVID , but she checked her calendar and had that her appt was 9/18 not 8/18  We rescheduled appt to 9/18  She then decided to keep appt for tomorrow  She is trying to get an appt for her mother with Dr Melly Agarwal, though, and may need to cancel if it conflicts with this one,

## 2020-09-08 ENCOUNTER — OFFICE VISIT (OUTPATIENT)
Dept: HEMATOLOGY ONCOLOGY | Facility: CLINIC | Age: 60
End: 2020-09-08
Payer: COMMERCIAL

## 2020-09-08 VITALS
BODY MASS INDEX: 21.39 KG/M2 | RESPIRATION RATE: 16 BRPM | DIASTOLIC BLOOD PRESSURE: 70 MMHG | WEIGHT: 144.4 LBS | OXYGEN SATURATION: 99 % | SYSTOLIC BLOOD PRESSURE: 96 MMHG | TEMPERATURE: 97.6 F | HEART RATE: 70 BPM | HEIGHT: 69 IN

## 2020-09-08 DIAGNOSIS — K74.3 PRIMARY BILIARY CHOLANGITIS (HCC): Chronic | ICD-10-CM

## 2020-09-08 DIAGNOSIS — D47.2 SMOLDERING MULTIPLE MYELOMA (SMM): Primary | Chronic | ICD-10-CM

## 2020-09-08 PROCEDURE — 3074F SYST BP LT 130 MM HG: CPT | Performed by: INTERNAL MEDICINE

## 2020-09-08 PROCEDURE — 99214 OFFICE O/P EST MOD 30 MIN: CPT | Performed by: INTERNAL MEDICINE

## 2020-09-08 PROCEDURE — 3078F DIAST BP <80 MM HG: CPT | Performed by: INTERNAL MEDICINE

## 2020-09-08 PROCEDURE — 1036F TOBACCO NON-USER: CPT | Performed by: INTERNAL MEDICINE

## 2020-09-08 NOTE — PROGRESS NOTES
Hematology Outpatient Follow - Up Note  Alexandrea Choudhury 60 y o  female MRN: @ Encounter: [id number]        Date:  9/8/2020        Assessment/ Plan:    She is 79-year-old  female with IgG kappa smoldering multiple myeloma bone marrow biopsy showed 15% plasma cells in May 2017, normal fish panel as well as cytogenetics for multiple myeloma    No evidence of end-organ damage, continue watchful observation, M protein 1 73 g IgG kappa, stable, stable kappa light chain, IgG 2400    Depression, anxiety, OCD, primary biliary cirrhosis, chronic pain syndrome, osteoporosis    Follow-up in 6 months with CBC, CMP, SPEP, free light chain, quantitative immunoglobulins            HPI:  79-year-old  female with past medical history of primary biliary cirrhosis, fracture of the right tibia, hypertension, OCD,worsening of osteoporosis on DEXA scan Done in 2016, nephrolithiasis, chronic lower back pain and possible sacroiliitis was found to have elevated total protein 3-4 years ago, serum protein electrophoresis showed IgG kappa monoclonal protein of 1 7 g/dL however no evidence of anemia, hypercalcemia, or renal insufficiency, she had persistent elevation of alkaline phosphatase secondary to PBC  had a bone scan done last year which showed activity in the ribs area  serum protein electrophoresis in April 2017 showed IgG kappa monoclonal protein of 1 96 g/dL, total protein 8 5, albumin 3 9, IgG 2580, creatinine 0 8, calcium 8 9, alkaline phosphatase 294, AST 37, ALT 46, IgM 25 in July 2016 monoclonal protein of IgG kappa of 1 73  C-reactive protein has been normal however sedimentation rate was elevated in the range of 60  Bone marrow biopsy in May 2017 showed 15% plasma cells in diffuse and interstitial pattern, normal fish panel for multiple myeloma and cytogenetics  Status post right knee replacement in June 2018  Exacerbation of anxiety/depression followed by psychiatric medicine    Osteoporosis she received Zometa 4 doses the lost dose on April 2019    Primary biliary cirrhosis followed by GI      Interval History:  Arthralgia, myalgia, her mother was admitted to the hospital yesterday because of the stroke      Previous Treatment:         Test Results:    Imaging: No results found  Labs:   Lab Results   Component Value Date    WBC 5 02 06/15/2020    HGB 12 2 06/15/2020    HCT 38 6 06/15/2020    MCV 93 06/15/2020     06/15/2020     Lab Results   Component Value Date     12/29/2015    K 3 9 06/15/2020     06/15/2020    CO2 27 06/15/2020    ANIONGAP 7 12/29/2015    BUN 15 06/15/2020    CREATININE 0 91 06/15/2020    GLUCOSE 134 12/29/2015    GLUF 76 06/15/2020    CALCIUM 8 7 06/15/2020    AST 59 (H) 06/15/2020    ALT 71 06/15/2020    ALKPHOS 441 (H) 06/15/2020    PROT 8 4 (H) 12/29/2015    BILITOT 0 46 12/29/2015    EGFR 69 06/15/2020       Lab Results   Component Value Date    IRON 80 03/15/2014    FERRITIN 74 1 03/15/2014       No results found for: [id number]      ROS: Review of Systems   Constitutional: Negative for appetite change, chills, diaphoresis, fatigue and unexpected weight change  HENT:   Negative for mouth sores, nosebleeds, sore throat, trouble swallowing and voice change  Eyes: Negative for eye problems and icterus  Respiratory: Negative for chest tightness, cough, hemoptysis and wheezing  Cardiovascular: Negative for chest pain, leg swelling and palpitations  Gastrointestinal: Negative for abdominal distention, abdominal pain, blood in stool, constipation, diarrhea, nausea and vomiting  Endocrine: Negative for hot flashes  Genitourinary: Negative for bladder incontinence, difficulty urinating, dyspareunia, dysuria and frequency  Musculoskeletal: Positive for arthralgias, back pain, myalgias, neck pain and neck stiffness  Negative for gait problem  Skin: Negative for itching and rash     Neurological: Negative for dizziness, gait problem, headaches, numbness, seizures and speech difficulty  Hematological: Negative for adenopathy  Does not bruise/bleed easily  Psychiatric/Behavioral: Negative for decreased concentration, depression, sleep disturbance and suicidal ideas  The patient is not nervous/anxious  Current Medications: Reviewed  Allergies: Reviewed  PMH/FH/SH:  Reviewed      Physical Exam:    Body surface area is 1 8 meters squared  Wt Readings from Last 3 Encounters:   09/08/20 65 5 kg (144 lb 6 4 oz)   06/26/20 63 5 kg (140 lb)   06/17/20 63 6 kg (140 lb 3 2 oz)        Temp Readings from Last 3 Encounters:   09/08/20 97 6 °F (36 4 °C) (Tympanic)   11/29/19 97 8 °F (36 6 °C) (Oral)   07/15/19 98 7 °F (37 1 °C) (Tympanic)        BP Readings from Last 3 Encounters:   09/08/20 96/70   06/17/20 110/78   05/21/20 138/80         Pulse Readings from Last 3 Encounters:   09/08/20 70   06/17/20 72   05/21/20 76        Physical Exam  Vitals signs reviewed  Constitutional:       General: She is not in acute distress  Appearance: She is well-developed  She is not diaphoretic  HENT:      Head: Normocephalic and atraumatic  Eyes:      Conjunctiva/sclera: Conjunctivae normal    Neck:      Musculoskeletal: Normal range of motion and neck supple  Trachea: No tracheal deviation  Cardiovascular:      Rate and Rhythm: Normal rate and regular rhythm  Heart sounds: No murmur  No friction rub  No gallop  Pulmonary:      Effort: Pulmonary effort is normal  No respiratory distress  Breath sounds: Normal breath sounds  No wheezing or rales  Chest:      Chest wall: No tenderness  Abdominal:      General: There is no distension  Palpations: Abdomen is soft  Tenderness: There is no abdominal tenderness  Musculoskeletal:         General: Tenderness (Of the knees) present  No swelling  Right lower leg: No edema  Left lower leg: No edema  Lymphadenopathy:      Cervical: No cervical adenopathy     Skin:     General: Skin is warm and dry  Coloration: Skin is not pale  Findings: No erythema  Neurological:      Mental Status: She is alert and oriented to person, place, and time  Psychiatric:         Behavior: Behavior normal          Thought Content: Thought content normal          Judgment: Judgment normal          ECO    Goals and Barriers:  Current Goal: Minimize effects of disease  Barriers: None  Patient's Capacity to Self Care:  Patient is able to self care      [facility] Status: @[facility]@

## 2020-10-05 ENCOUNTER — TELEPHONE (OUTPATIENT)
Dept: PSYCHIATRY | Facility: CLINIC | Age: 60
End: 2020-10-05

## 2020-10-08 ENCOUNTER — OFFICE VISIT (OUTPATIENT)
Dept: PSYCHIATRY | Facility: CLINIC | Age: 60
End: 2020-10-08
Payer: COMMERCIAL

## 2020-10-08 DIAGNOSIS — F33.0 MDD (MAJOR DEPRESSIVE DISORDER), RECURRENT EPISODE, MILD (HCC): ICD-10-CM

## 2020-10-08 DIAGNOSIS — F43.10 PTSD (POST-TRAUMATIC STRESS DISORDER): ICD-10-CM

## 2020-10-08 DIAGNOSIS — F42.9 OBSESSIVE-COMPULSIVE DISORDER, UNSPECIFIED TYPE: ICD-10-CM

## 2020-10-08 DIAGNOSIS — F41.1 GAD (GENERALIZED ANXIETY DISORDER): ICD-10-CM

## 2020-10-08 PROCEDURE — 99213 OFFICE O/P EST LOW 20 MIN: CPT | Performed by: PSYCHIATRY & NEUROLOGY

## 2020-10-08 PROCEDURE — 90833 PSYTX W PT W E/M 30 MIN: CPT | Performed by: PSYCHIATRY & NEUROLOGY

## 2020-10-08 RX ORDER — FLUVOXAMINE MALEATE 100 MG
TABLET ORAL
Qty: 90 TABLET | Refills: 1 | Status: SHIPPED | OUTPATIENT
Start: 2020-10-08 | End: 2020-12-28

## 2020-10-12 ENCOUNTER — TELEPHONE (OUTPATIENT)
Dept: PSYCHIATRY | Facility: CLINIC | Age: 60
End: 2020-10-12

## 2020-10-19 ENCOUNTER — TELEPHONE (OUTPATIENT)
Dept: INTERNAL MEDICINE CLINIC | Facility: CLINIC | Age: 60
End: 2020-10-19

## 2020-11-06 ENCOUNTER — TELEPHONE (OUTPATIENT)
Dept: PSYCHIATRY | Facility: CLINIC | Age: 60
End: 2020-11-06

## 2020-11-13 ENCOUNTER — OFFICE VISIT (OUTPATIENT)
Dept: OBGYN CLINIC | Facility: HOSPITAL | Age: 60
End: 2020-11-13
Payer: COMMERCIAL

## 2020-11-13 ENCOUNTER — TELEPHONE (OUTPATIENT)
Dept: INTERNAL MEDICINE CLINIC | Facility: CLINIC | Age: 60
End: 2020-11-13

## 2020-11-13 VITALS
SYSTOLIC BLOOD PRESSURE: 117 MMHG | HEIGHT: 69 IN | WEIGHT: 148 LBS | BODY MASS INDEX: 21.92 KG/M2 | DIASTOLIC BLOOD PRESSURE: 77 MMHG | HEART RATE: 76 BPM

## 2020-11-13 DIAGNOSIS — M65.331 TRIGGER FINGER, RIGHT MIDDLE FINGER: Primary | ICD-10-CM

## 2020-11-13 PROCEDURE — 1036F TOBACCO NON-USER: CPT | Performed by: ORTHOPAEDIC SURGERY

## 2020-11-13 PROCEDURE — 3074F SYST BP LT 130 MM HG: CPT | Performed by: ORTHOPAEDIC SURGERY

## 2020-11-13 PROCEDURE — 3008F BODY MASS INDEX DOCD: CPT | Performed by: ORTHOPAEDIC SURGERY

## 2020-11-13 PROCEDURE — 99214 OFFICE O/P EST MOD 30 MIN: CPT | Performed by: ORTHOPAEDIC SURGERY

## 2020-11-13 PROCEDURE — 3078F DIAST BP <80 MM HG: CPT | Performed by: ORTHOPAEDIC SURGERY

## 2020-11-13 RX ORDER — LIDOCAINE HYDROCHLORIDE AND EPINEPHRINE 10; 10 MG/ML; UG/ML
20 INJECTION, SOLUTION INFILTRATION; PERINEURAL ONCE
Status: CANCELLED | OUTPATIENT
Start: 2020-11-13 | End: 2020-11-13

## 2020-11-16 ENCOUNTER — OFFICE VISIT (OUTPATIENT)
Dept: URGENT CARE | Facility: MEDICAL CENTER | Age: 60
End: 2020-11-16
Payer: COMMERCIAL

## 2020-11-16 VITALS
OXYGEN SATURATION: 97 % | TEMPERATURE: 98.3 F | HEIGHT: 69 IN | WEIGHT: 140 LBS | HEART RATE: 72 BPM | BODY MASS INDEX: 20.73 KG/M2

## 2020-11-16 DIAGNOSIS — H92.03 OTALGIA OF BOTH EARS: ICD-10-CM

## 2020-11-16 DIAGNOSIS — R07.89 CHEST TIGHTNESS: ICD-10-CM

## 2020-11-16 DIAGNOSIS — J02.0 STREP PHARYNGITIS: Primary | ICD-10-CM

## 2020-11-16 LAB — S PYO AG THROAT QL: POSITIVE

## 2020-11-16 PROCEDURE — 99203 OFFICE O/P NEW LOW 30 MIN: CPT | Performed by: PHYSICIAN ASSISTANT

## 2020-11-16 PROCEDURE — 99283 EMERGENCY DEPT VISIT LOW MDM: CPT | Performed by: PHYSICIAN ASSISTANT

## 2020-11-16 PROCEDURE — G0382 LEV 3 HOSP TYPE B ED VISIT: HCPCS | Performed by: PHYSICIAN ASSISTANT

## 2020-11-16 PROCEDURE — 87880 STREP A ASSAY W/OPTIC: CPT | Performed by: PHYSICIAN ASSISTANT

## 2020-11-16 RX ORDER — AZITHROMYCIN 250 MG/1
TABLET, FILM COATED ORAL
Qty: 6 TABLET | Refills: 0 | Status: SHIPPED | OUTPATIENT
Start: 2020-11-16 | End: 2020-11-20

## 2020-11-16 RX ORDER — ALBUTEROL SULFATE 90 UG/1
2 AEROSOL, METERED RESPIRATORY (INHALATION) EVERY 6 HOURS PRN
Qty: 8.5 G | Refills: 0 | Status: SHIPPED | OUTPATIENT
Start: 2020-11-16 | End: 2021-10-19

## 2020-11-23 ENCOUNTER — TELEMEDICINE (OUTPATIENT)
Dept: INTERNAL MEDICINE CLINIC | Facility: CLINIC | Age: 60
End: 2020-11-23
Payer: COMMERCIAL

## 2020-11-23 ENCOUNTER — TELEPHONE (OUTPATIENT)
Dept: INTERNAL MEDICINE CLINIC | Facility: CLINIC | Age: 60
End: 2020-11-23

## 2020-11-23 DIAGNOSIS — R05.9 COUGH: ICD-10-CM

## 2020-11-23 DIAGNOSIS — R05.9 COUGH: Primary | ICD-10-CM

## 2020-11-23 PROCEDURE — 87637 SARSCOV2&INF A&B&RSV AMP PRB: CPT | Performed by: INTERNAL MEDICINE

## 2020-11-23 PROCEDURE — G2012 BRIEF CHECK IN BY MD/QHP: HCPCS | Performed by: INTERNAL MEDICINE

## 2020-11-25 LAB
FLUAV RNA NPH QL NAA+PROBE: NOT DETECTED
FLUBV RNA NPH QL NAA+PROBE: NOT DETECTED
RSV RNA NPH QL NAA+PROBE: NOT DETECTED
SARS-COV-2 RNA NPH QL NAA+PROBE: NOT DETECTED

## 2020-11-30 ENCOUNTER — TELEPHONE (OUTPATIENT)
Dept: OBGYN CLINIC | Facility: CLINIC | Age: 60
End: 2020-11-30

## 2020-11-30 ENCOUNTER — TELEPHONE (OUTPATIENT)
Dept: OBGYN CLINIC | Facility: HOSPITAL | Age: 60
End: 2020-11-30

## 2020-12-02 ENCOUNTER — HOSPITAL ENCOUNTER (OUTPATIENT)
Facility: HOSPITAL | Age: 60
Setting detail: OUTPATIENT SURGERY
Discharge: HOME | End: 2020-12-02
Attending: ORTHOPAEDIC SURGERY | Admitting: ORTHOPAEDIC SURGERY
Payer: COMMERCIAL

## 2020-12-02 VITALS
TEMPERATURE: 96.9 F | WEIGHT: 148 LBS | HEIGHT: 69 IN | HEART RATE: 65 BPM | BODY MASS INDEX: 21.92 KG/M2 | RESPIRATION RATE: 18 BRPM | SYSTOLIC BLOOD PRESSURE: 132 MMHG | DIASTOLIC BLOOD PRESSURE: 81 MMHG | OXYGEN SATURATION: 98 %

## 2020-12-02 DIAGNOSIS — M65.331 TRIGGER FINGER, RIGHT MIDDLE FINGER: Primary | ICD-10-CM

## 2020-12-02 PROCEDURE — 26055 INCISE FINGER TENDON SHEATH: CPT | Performed by: ORTHOPAEDIC SURGERY

## 2020-12-02 RX ORDER — NAPROXEN SODIUM 220 MG
220 TABLET ORAL 2 TIMES DAILY WITH MEALS
Qty: 10 TABLET | Refills: 0 | Status: SHIPPED | OUTPATIENT
Start: 2020-12-02 | End: 2020-12-28

## 2020-12-02 RX ORDER — SENNOSIDES 8.6 MG
650 CAPSULE ORAL EVERY 8 HOURS
Qty: 15 TABLET | Refills: 0 | Status: SHIPPED | OUTPATIENT
Start: 2020-12-02 | End: 2021-10-19

## 2020-12-02 RX ADMIN — SODIUM BICARBONATE: 84 INJECTION, SOLUTION INTRAVENOUS at 09:13

## 2020-12-03 ENCOUNTER — TELEPHONE (OUTPATIENT)
Dept: OBGYN CLINIC | Facility: HOSPITAL | Age: 60
End: 2020-12-03

## 2020-12-09 ENCOUNTER — TELEPHONE (OUTPATIENT)
Dept: OBGYN CLINIC | Facility: HOSPITAL | Age: 60
End: 2020-12-09

## 2020-12-09 ENCOUNTER — OFFICE VISIT (OUTPATIENT)
Dept: OBGYN CLINIC | Facility: HOSPITAL | Age: 60
End: 2020-12-09

## 2020-12-09 VITALS
WEIGHT: 145 LBS | BODY MASS INDEX: 21.48 KG/M2 | SYSTOLIC BLOOD PRESSURE: 125 MMHG | DIASTOLIC BLOOD PRESSURE: 79 MMHG | HEART RATE: 71 BPM | HEIGHT: 69 IN

## 2020-12-09 DIAGNOSIS — M65.331 TRIGGER FINGER, RIGHT MIDDLE FINGER: Primary | ICD-10-CM

## 2020-12-09 PROCEDURE — 99024 POSTOP FOLLOW-UP VISIT: CPT | Performed by: PHYSICIAN ASSISTANT

## 2020-12-11 ENCOUNTER — EVALUATION (OUTPATIENT)
Dept: OCCUPATIONAL THERAPY | Facility: CLINIC | Age: 60
End: 2020-12-11
Payer: COMMERCIAL

## 2020-12-11 DIAGNOSIS — M65.331 TRIGGER FINGER, RIGHT MIDDLE FINGER: ICD-10-CM

## 2020-12-11 PROCEDURE — 97165 OT EVAL LOW COMPLEX 30 MIN: CPT | Performed by: OCCUPATIONAL THERAPIST

## 2020-12-11 PROCEDURE — 97110 THERAPEUTIC EXERCISES: CPT | Performed by: OCCUPATIONAL THERAPIST

## 2020-12-16 ENCOUNTER — OFFICE VISIT (OUTPATIENT)
Dept: OCCUPATIONAL THERAPY | Facility: CLINIC | Age: 60
End: 2020-12-16
Payer: COMMERCIAL

## 2020-12-16 DIAGNOSIS — M65.331 TRIGGER FINGER, RIGHT MIDDLE FINGER: Primary | ICD-10-CM

## 2020-12-16 PROCEDURE — 97140 MANUAL THERAPY 1/> REGIONS: CPT | Performed by: OCCUPATIONAL THERAPIST

## 2020-12-16 PROCEDURE — 97530 THERAPEUTIC ACTIVITIES: CPT | Performed by: OCCUPATIONAL THERAPIST

## 2020-12-16 PROCEDURE — 97110 THERAPEUTIC EXERCISES: CPT | Performed by: OCCUPATIONAL THERAPIST

## 2020-12-17 DIAGNOSIS — R07.89 CHEST TIGHTNESS: ICD-10-CM

## 2020-12-18 ENCOUNTER — OFFICE VISIT (OUTPATIENT)
Dept: OCCUPATIONAL THERAPY | Facility: CLINIC | Age: 60
End: 2020-12-18
Payer: COMMERCIAL

## 2020-12-18 ENCOUNTER — OFFICE VISIT (OUTPATIENT)
Dept: INTERNAL MEDICINE CLINIC | Facility: CLINIC | Age: 60
End: 2020-12-18
Payer: COMMERCIAL

## 2020-12-18 VITALS
SYSTOLIC BLOOD PRESSURE: 114 MMHG | HEART RATE: 72 BPM | HEIGHT: 69 IN | WEIGHT: 146.8 LBS | DIASTOLIC BLOOD PRESSURE: 70 MMHG | TEMPERATURE: 99.3 F | RESPIRATION RATE: 14 BRPM | BODY MASS INDEX: 21.74 KG/M2

## 2020-12-18 DIAGNOSIS — M65.331 TRIGGER FINGER, RIGHT MIDDLE FINGER: Primary | ICD-10-CM

## 2020-12-18 DIAGNOSIS — Z23 FLU VACCINE NEED: ICD-10-CM

## 2020-12-18 DIAGNOSIS — K74.3 PRIMARY BILIARY CHOLANGITIS (HCC): Primary | Chronic | ICD-10-CM

## 2020-12-18 PROCEDURE — 99214 OFFICE O/P EST MOD 30 MIN: CPT | Performed by: INTERNAL MEDICINE

## 2020-12-18 PROCEDURE — 3008F BODY MASS INDEX DOCD: CPT | Performed by: INTERNAL MEDICINE

## 2020-12-18 PROCEDURE — 1036F TOBACCO NON-USER: CPT | Performed by: INTERNAL MEDICINE

## 2020-12-18 PROCEDURE — 97110 THERAPEUTIC EXERCISES: CPT | Performed by: OCCUPATIONAL THERAPIST

## 2020-12-18 PROCEDURE — 97530 THERAPEUTIC ACTIVITIES: CPT | Performed by: OCCUPATIONAL THERAPIST

## 2020-12-18 PROCEDURE — 3725F SCREEN DEPRESSION PERFORMED: CPT | Performed by: INTERNAL MEDICINE

## 2020-12-18 RX ORDER — ALBUTEROL SULFATE 90 UG/1
AEROSOL, METERED RESPIRATORY (INHALATION)
Qty: 8.5 INHALER | OUTPATIENT
Start: 2020-12-18

## 2020-12-21 ENCOUNTER — OFFICE VISIT (OUTPATIENT)
Dept: OCCUPATIONAL THERAPY | Facility: CLINIC | Age: 60
End: 2020-12-21
Payer: COMMERCIAL

## 2020-12-21 DIAGNOSIS — M65.331 TRIGGER FINGER, RIGHT MIDDLE FINGER: Primary | ICD-10-CM

## 2020-12-21 PROCEDURE — 97140 MANUAL THERAPY 1/> REGIONS: CPT | Performed by: OCCUPATIONAL THERAPIST

## 2020-12-21 PROCEDURE — 97530 THERAPEUTIC ACTIVITIES: CPT | Performed by: OCCUPATIONAL THERAPIST

## 2020-12-21 PROCEDURE — 97110 THERAPEUTIC EXERCISES: CPT | Performed by: OCCUPATIONAL THERAPIST

## 2020-12-22 ENCOUNTER — LAB (OUTPATIENT)
Dept: LAB | Facility: CLINIC | Age: 60
End: 2020-12-22
Payer: COMMERCIAL

## 2020-12-22 DIAGNOSIS — K74.3 PRIMARY BILIARY CHOLANGITIS (HCC): Chronic | ICD-10-CM

## 2020-12-22 LAB
ALBUMIN SERPL BCP-MCNC: 3.6 G/DL (ref 3.5–5)
ALP SERPL-CCNC: 491 U/L (ref 46–116)
ALT SERPL W P-5'-P-CCNC: 55 U/L (ref 12–78)
ANION GAP SERPL CALCULATED.3IONS-SCNC: 5 MMOL/L (ref 4–13)
AST SERPL W P-5'-P-CCNC: 44 U/L (ref 5–45)
BILIRUB SERPL-MCNC: 0.6 MG/DL (ref 0.2–1)
BUN SERPL-MCNC: 15 MG/DL (ref 5–25)
CALCIUM SERPL-MCNC: 9.3 MG/DL (ref 8.3–10.1)
CHLORIDE SERPL-SCNC: 106 MMOL/L (ref 100–108)
CO2 SERPL-SCNC: 29 MMOL/L (ref 21–32)
CREAT SERPL-MCNC: 0.89 MG/DL (ref 0.6–1.3)
GFR SERPL CREATININE-BSD FRML MDRD: 70 ML/MIN/1.73SQ M
GLUCOSE P FAST SERPL-MCNC: 83 MG/DL (ref 65–99)
POTASSIUM SERPL-SCNC: 4.4 MMOL/L (ref 3.5–5.3)
PROT SERPL-MCNC: 9.4 G/DL (ref 6.4–8.2)
SODIUM SERPL-SCNC: 140 MMOL/L (ref 136–145)

## 2020-12-22 PROCEDURE — 90662 IIV NO PRSV INCREASED AG IM: CPT

## 2020-12-22 PROCEDURE — 36415 COLL VENOUS BLD VENIPUNCTURE: CPT

## 2020-12-22 PROCEDURE — 80053 COMPREHEN METABOLIC PANEL: CPT

## 2020-12-22 PROCEDURE — 90471 IMMUNIZATION ADMIN: CPT

## 2020-12-23 ENCOUNTER — OFFICE VISIT (OUTPATIENT)
Dept: OCCUPATIONAL THERAPY | Facility: CLINIC | Age: 60
End: 2020-12-23
Payer: COMMERCIAL

## 2020-12-23 DIAGNOSIS — M65.331 TRIGGER FINGER, RIGHT MIDDLE FINGER: Primary | ICD-10-CM

## 2020-12-23 PROCEDURE — 97110 THERAPEUTIC EXERCISES: CPT | Performed by: OCCUPATIONAL THERAPIST

## 2020-12-23 PROCEDURE — 97140 MANUAL THERAPY 1/> REGIONS: CPT | Performed by: OCCUPATIONAL THERAPIST

## 2020-12-28 ENCOUNTER — OFFICE VISIT (OUTPATIENT)
Dept: PSYCHIATRY | Facility: CLINIC | Age: 60
End: 2020-12-28
Payer: COMMERCIAL

## 2020-12-28 ENCOUNTER — TELEPHONE (OUTPATIENT)
Dept: INTERNAL MEDICINE CLINIC | Facility: CLINIC | Age: 60
End: 2020-12-28

## 2020-12-28 DIAGNOSIS — Z79.899 HIGH RISK MEDICATION USE: ICD-10-CM

## 2020-12-28 DIAGNOSIS — F33.0 MDD (MAJOR DEPRESSIVE DISORDER), RECURRENT EPISODE, MILD (HCC): ICD-10-CM

## 2020-12-28 DIAGNOSIS — F43.10 PTSD (POST-TRAUMATIC STRESS DISORDER): ICD-10-CM

## 2020-12-28 DIAGNOSIS — F41.1 GAD (GENERALIZED ANXIETY DISORDER): ICD-10-CM

## 2020-12-28 DIAGNOSIS — F42.9 OBSESSIVE-COMPULSIVE DISORDER, UNSPECIFIED TYPE: Primary | ICD-10-CM

## 2020-12-28 PROCEDURE — 99214 OFFICE O/P EST MOD 30 MIN: CPT | Performed by: PSYCHIATRY & NEUROLOGY

## 2020-12-31 ENCOUNTER — OFFICE VISIT (OUTPATIENT)
Dept: OCCUPATIONAL THERAPY | Facility: CLINIC | Age: 60
End: 2020-12-31
Payer: COMMERCIAL

## 2020-12-31 ENCOUNTER — TRANSCRIBE ORDERS (OUTPATIENT)
Dept: LAB | Facility: CLINIC | Age: 60
End: 2020-12-31

## 2020-12-31 DIAGNOSIS — M65.331 TRIGGER FINGER, RIGHT MIDDLE FINGER: Primary | ICD-10-CM

## 2020-12-31 PROCEDURE — 97140 MANUAL THERAPY 1/> REGIONS: CPT

## 2020-12-31 PROCEDURE — 97530 THERAPEUTIC ACTIVITIES: CPT

## 2020-12-31 PROCEDURE — 97110 THERAPEUTIC EXERCISES: CPT

## 2021-01-05 ENCOUNTER — OFFICE VISIT (OUTPATIENT)
Dept: OCCUPATIONAL THERAPY | Facility: CLINIC | Age: 61
End: 2021-01-05
Payer: COMMERCIAL

## 2021-01-05 DIAGNOSIS — M65.331 TRIGGER FINGER, RIGHT MIDDLE FINGER: Primary | ICD-10-CM

## 2021-01-05 PROCEDURE — 97530 THERAPEUTIC ACTIVITIES: CPT

## 2021-01-05 PROCEDURE — 97110 THERAPEUTIC EXERCISES: CPT

## 2021-01-05 NOTE — PROGRESS NOTES
Daily Note     Today's date: 2021  Patient name: Coral Evans  : 1960  MRN: [id number]  Referring provider: Sophia Oconnor MD  Dx:   Encounter Diagnosis     ICD-10-CM    1  Trigger finger, right middle finger  M65 331                   Subjective: This is loose (the orficast)  Objective: See treatment diary below  PIP:  10-86  (passive 0-92)  DIP:  60  (passive 70)    Assessment: Applied a new orficast with PIP at 0  Plan: Continue per plan of care        Precautions: Universal  HEP: TGE, extrinsic + intrinsic stretch, blocking      Manuals 21      IASTM            Grade 2-3 PIP for ext 8 PIP for ext 8 Pip 8 8 5' 5'                              Neuro Re-Ed                                                                                                Ther Ex            EDC lrg rbx 30 lrg rbx 30 y gum 3x10 y gum 3x10 Lg new RB 3x10 RB on TB 3x10                  PROM 15 10 10 10 10' 10'      Wall walking TB x 10 TB x 10 1# x 10 1# x 10 1# 10x D/C due to neck pain with it      Circles in palm with ball      1# 5x 4 directions                                          Ther Activity            grasping Pow, cyl RPW 3x 10, digicizer R ind 3x 10 Pow, cyl GPW 3x 10, digicizer G ind 3x 10 Pow, cyl BPW 3x 10, digicizer G ind 3x 10 Pow, cyl BPW 3x 10, digicizer G ind 3x 10 BPW Pwr/cyl 3x10, G digigrip ind 3x10 BPW pwr/cyl 3x10, G digigrip ind 3x10      pinching TT pinch circe x 2 RG (held, time) TT pinch circe x 2 RG TT pinch circe x 2 GB TT pinch circe x 2 GB TT pinch [place] 2x GB TT 2x                                          Modalities            MHP 5 5 5 5 5' 5'

## 2021-01-07 ENCOUNTER — OFFICE VISIT (OUTPATIENT)
Dept: OCCUPATIONAL THERAPY | Facility: CLINIC | Age: 61
End: 2021-01-07
Payer: COMMERCIAL

## 2021-01-07 DIAGNOSIS — M65.331 TRIGGER FINGER, RIGHT MIDDLE FINGER: Primary | ICD-10-CM

## 2021-01-07 PROCEDURE — 97140 MANUAL THERAPY 1/> REGIONS: CPT | Performed by: OCCUPATIONAL THERAPIST

## 2021-01-07 PROCEDURE — 97110 THERAPEUTIC EXERCISES: CPT | Performed by: OCCUPATIONAL THERAPIST

## 2021-01-07 NOTE — PROGRESS NOTES
Daily Note     Today's date: 2021  Patient name: Tushar Trinidad  : 1960  MRN: [id number]  Referring provider: Harmeet Boateng MD  Dx:   Encounter Diagnosis     ICD-10-CM    1  Trigger finger, right middle finger  M65 331                   Subjective: " at the base"      Objective: See treatment diary below      Assessment: PREs have progressed well, PIP extension continues to be a point of contention, managing with orthotics and stretching, mobs      Plan: Continue per plan of care        Precautions: Universal  HEP: TGE, extrinsic + intrinsic stretch, blocking      Manuals 21     IASTM       3     Grade 2-3 PIP for ext 8 PIP for ext 8 Pip 8 8 5' 5' 5                             Neuro Re-Ed                                                                                                Ther Ex            EDC lrg rbx 30 lrg rbx 30 y gum 3x10 y gum 3x10 Lg new RB 3x10 RB on TB 3x10 RB on TB x 30                 PROM 15 10 10 10 10' 10'      Wall walking TB x 10 TB x 10 1# x 10 1# x 10 1# 10x D/C due to neck pain with it      Finger pushups       10x10s     Reverse blocking       2x10                             Ther Activity            grasping Pow, cyl RPW 3x 10, digicizer R ind 3x 10 Pow, cyl GPW 3x 10, digicizer G ind 3x 10 Pow, cyl BPW 3x 10, digicizer G ind 3x 10 Pow, cyl BPW 3x 10, digicizer G ind 3x 10 BPW Pwr/cyl 3x10, G digigrip ind 3x10 BPW pwr/cyl 3x10, G digigrip ind 3x10 BlaPW pwr/cyl 3x10, Blue digigrip ind 3x10     pinching TT pinch circe x 2 RG (held, time) TT pinch circe x 2 RG TT pinch circe x 2 GB TT pinch circe x 2 GB TT pinch [place] 2x GB TT 2x BB x 1 TT                                         Modalities            MHP 5 5 5 5 5' 5' 5

## 2021-01-13 ENCOUNTER — EVALUATION (OUTPATIENT)
Dept: OCCUPATIONAL THERAPY | Facility: CLINIC | Age: 61
End: 2021-01-13
Payer: COMMERCIAL

## 2021-01-13 DIAGNOSIS — M65.331 TRIGGER FINGER, RIGHT MIDDLE FINGER: Primary | ICD-10-CM

## 2021-01-13 PROCEDURE — 97530 THERAPEUTIC ACTIVITIES: CPT | Performed by: OCCUPATIONAL THERAPIST

## 2021-01-13 PROCEDURE — 97140 MANUAL THERAPY 1/> REGIONS: CPT | Performed by: OCCUPATIONAL THERAPIST

## 2021-01-13 PROCEDURE — 97110 THERAPEUTIC EXERCISES: CPT | Performed by: OCCUPATIONAL THERAPIST

## 2021-01-13 NOTE — PROGRESS NOTES
OT Re-Evaluation     Today's date: 2021  Patient name: Kermit Stubbs  : 1960  MRN: [id number]  Referring provider: Clotilde Garcia MD  Dx:   Encounter Diagnosis     ICD-10-CM    1  Trigger finger, right middle finger  M65 331                   Assessment  Assessment details:  Svitlana Bacon has continued extrinsic and intrinsic tightness but has shown significant AROM and strength gains  She would benefit from continued tx to reach goals    Goals  STG) Motion increased by 25% in 4-6 weeks MET  STG) Strength/Motor skills improved by 25% in 4-6 weeks MET  STG) SwellingEdema improved by 25% in 4-6 weeks MET  STG) Pain decreased by 25% in 4-6 weeks MET    STG) Neutral extension in 3-4 weeks PIP DIP  STG) Grasp strength improved 10% in 3-4 weeks    LTG) ADL and IADL skills improved   LTG) Work skills improved  LTG) Leisure skills improved    Patient Goals: to get back to normal      Goals, plan of care and treatment condition discussed with patient  Patient expresses their understanding and questions regarding these issues were addressed        Plan  Planned modality interventions: thermotherapy: hydrocollator packs and ultrasound  Planned therapy interventions: joint mobilization, manual therapy, Mcclelland taping, motor coordination training, orthotic fitting/training, fine motor coordination training, therapeutic activities, therapeutic exercise, stretching, strengthening and home exercise program  Frequency: 2x week  Duration in visits: 10        Subjective Evaluation    History of Present Illness  Mechanism of injury: Svitlana Bacon is S/P R LF trigger finger release on 2020; this had been symptomatic for several years, but this summer got much worse with heavy too use  Pain  Current pain ratin  At worst pain ratin    Hand dominance: right          Objective     Active Range of Motion     Right Wrist   Wrist flexion: 80 degrees   Wrist extension: 70 degrees     Right Digits   Flexion   Index     MCP: 64    PIP: 86    DIP: 54  Middle     MCP: 62    PIP: 80    DIP: 50  Ring     MCP: 64    PIP: 86    DIP: 40  Extension   Middle     MCP: 8    PIP: -40    Passive Range of Motion     Right Digits   Extension   Middle     PIP: -6    Strength/Myotome Testing     Left Wrist/Hand      (2nd hand position)     Trial 1: 47 7    Right Wrist/Hand      (2nd hand position)     Trial 1: 22 9    Tests     Right Wrist/Hand   Positive Bunnel-Littler and intrinsic muscle tightness       Additional Tests Details  Denies N/T    Swelling     Left Wrist/Hand   Middle     Proximal: 6 1 cm    Right Wrist/Hand   Middle     Proximal: 6 3 cm             Precautions: Universal  HEP: TGE, extrinsic + intrinsic stretch, blocking      Manuals             IASTM                                                    Neuro Re-Ed                                                                                                        Ther Ex             EDC             Isometric grasp             PROM             Wall walking                                                                 Ther Activity             grasping Pow, cyl PW, digicizer            pinching TT pinch circe                                                   Modalities

## 2021-01-13 NOTE — PROGRESS NOTES
Daily Note     Today's date: 2021  Patient name: Karen Wilkerson  : 1960  MRN: [id number]  Referring provider: Galilea Castaneda MD  Dx:   Encounter Diagnosis     ICD-10-CM    1  Trigger finger, right middle finger  M65 331                   Subjective: "It's better?"      Objective: See treatment diary below      Assessment: See RE      Plan: Continue per plan of care        Precautions: Universal  HEP: TGE, extrinsic + intrinsic stretch, blocking      Manuals 21    IASTM       3 3    Grade 2-3 PIP for ext 8 PIP for ext 8 Pip 8 8 5' 5' 5 5                            Neuro Re-Ed                                                                                                Ther Ex            EDC lrg rbx 30 lrg rbx 30 y gum 3x10 y gum 3x10 Lg new RB 3x10 RB on TB 3x10 RB on TB x 30 RB on TB x 30                PROM 15 10 10 10 10' 10'  5    Wall walking TB x 10 TB x 10 1# x 10 1# x 10 1# 10x D/C due to neck pain with it      Finger pushups       10x10s 10x10s    Reverse blocking       2x10 2x10                            Ther Activity            grasping Pow, cyl RPW 3x 10, digicizer R ind 3x 10 Pow, cyl GPW 3x 10, digicizer G ind 3x 10 Pow, cyl BPW 3x 10, digicizer G ind 3x 10 Pow, cyl BPW 3x 10, digicizer G ind 3x 10 BPW Pwr/cyl 3x10, G digigrip ind 3x10 BPW pwr/cyl 3x10, G digigrip ind 3x10 BlaPW pwr/cyl 3x10, Blue digigrip ind 3x10 BlaPW pwr/cyl 3x10, Blue digigrip ind 3x10    pinching TT pinch circe x 2 RG (held, time) TT pinch circe x 2 RG TT pinch circe x 2 GB TT pinch circe x 2 GB TT pinch [place] 2x GB TT 2x BB x 1 TT BB x 1 TT                                        Modalities            MHP 5 5 5 5 5' 5' 5 5

## 2021-01-15 ENCOUNTER — OFFICE VISIT (OUTPATIENT)
Dept: OCCUPATIONAL THERAPY | Facility: CLINIC | Age: 61
End: 2021-01-15
Payer: COMMERCIAL

## 2021-01-15 DIAGNOSIS — M65.331 TRIGGER FINGER, RIGHT MIDDLE FINGER: Primary | ICD-10-CM

## 2021-01-15 PROCEDURE — 97140 MANUAL THERAPY 1/> REGIONS: CPT | Performed by: OCCUPATIONAL THERAPIST

## 2021-01-15 PROCEDURE — 97530 THERAPEUTIC ACTIVITIES: CPT | Performed by: OCCUPATIONAL THERAPIST

## 2021-01-15 PROCEDURE — 97110 THERAPEUTIC EXERCISES: CPT | Performed by: OCCUPATIONAL THERAPIST

## 2021-01-15 NOTE — PROGRESS NOTES
Daily Note     Today's date: 1/15/2021  Patient name: Kermit Stubbs  : 1960  MRN: [id number]  Referring provider: Clotilde Garcia MD  Dx:   Encounter Diagnosis     ICD-10-CM    1  Trigger finger, right middle finger  M65 331                   Subjective: "It starts drooping after 20 minutes"      Objective: See treatment diary below      Assessment:  A1 and tender with grasping, will hold IASTM  Urged to continue with her reverse blocking as she shows quick improvement when performed  Plan: Continue per plan of care        Precautions: Universal  HEP: TGE, extrinsic + intrinsic stretch, blocking      Manuals 12/16 12/18 12/21 12/23 12/31 1/5/21 1/7/21 1/13 1/15   IASTM       3 3 5   Grade 2-3 PIP for ext 8 PIP for ext 8 Pip 8 8 5' 5' 5 5 3                           Neuro Re-Ed                                                                                                Ther Ex            EDC lrg rbx 30 lrg rbx 30 y gum 3x10 y gum 3x10 Lg new RB 3x10 RB on TB 3x10 RB on TB x 30 RB on TB x 30 Red gummy 3x 10               PROM 15 10 10 10 10' 10'  5 5   Wall walking TB x 10 TB x 10 1# x 10 1# x 10 1# 10x D/C due to neck pain with it      Finger pushups       10x10s 10x10s 10x10s s3 coins   Reverse blocking       2x10 2x10                            Ther Activity            grasping Pow, cyl RPW 3x 10, digicizer R ind 3x 10 Pow, cyl GPW 3x 10, digicizer G ind 3x 10 Pow, cyl BPW 3x 10, digicizer G ind 3x 10 Pow, cyl BPW 3x 10, digicizer G ind 3x 10 BPW Pwr/cyl 3x10, G digigrip ind 3x10 BPW pwr/cyl 3x10, G digigrip ind 3x10 BlaPW pwr/cyl 3x10, Blue digigrip ind 3x10 BlaPW pwr/cyl 3x10, Blue digigrip ind 3x10 BlaPW pwr/cyl 3x10, Blue digigrip ind 3x10   pinching TT pinch circe x 2 RG (held, time) TT pinch circe x 2 RG TT pinch circe x 2 GB TT pinch circe x 2 GB TT pinch [place] 2x GB TT 2x BB x 1 TT BB x 1 TT BB x 1 TT                                       Modalities            [facility] 5 5 5 5 5' 5' 5 5 5

## 2021-01-20 ENCOUNTER — TELEPHONE (OUTPATIENT)
Dept: INTERNAL MEDICINE CLINIC | Facility: CLINIC | Age: 61
End: 2021-01-20

## 2021-01-20 ENCOUNTER — OFFICE VISIT (OUTPATIENT)
Dept: OCCUPATIONAL THERAPY | Facility: CLINIC | Age: 61
End: 2021-01-20
Payer: COMMERCIAL

## 2021-01-20 DIAGNOSIS — M65.331 TRIGGER FINGER, RIGHT MIDDLE FINGER: Primary | ICD-10-CM

## 2021-01-20 PROCEDURE — 97530 THERAPEUTIC ACTIVITIES: CPT | Performed by: OCCUPATIONAL THERAPIST

## 2021-01-20 PROCEDURE — 97110 THERAPEUTIC EXERCISES: CPT | Performed by: OCCUPATIONAL THERAPIST

## 2021-01-20 NOTE — TELEPHONE ENCOUNTER
----- Message from Rosaura Jeffrey sent at 1/19/2021  6:26 PM EST -----  Regarding: Prescription Question  Contact: [phone]  Dr Viriglio Landaverde,    1  Should my mother Josh Patel and I Rosaura Jeffrey) be getting the COVID injections because of our health issues? 2   Does my cancer eliminate me from getting it due to possible complications? 3  Not sure how (register; phone number; location) my mother and I would get the COVID injections, if we should? 4  If we should, should we get a certain one like Elease Maiers, etc  because of effectiveness, complications, etc     Didn't think I should wait to ask these questions until our next visit      Thank you,    Rosaura Jeffrey  [phone]

## 2021-01-20 NOTE — PROGRESS NOTES
Daily Note     Today's date: 2021  Patient name: Kermit Stubbs  : 1960  MRN: [id number]  Referring provider: Clotilde Garcia MD  Dx:   Encounter Diagnosis     ICD-10-CM    1  Trigger finger, right middle finger  M65 331                   Subjective: "It just keeps drooping"      Objective: See treatment diary below      Assessment: -14 PIP at start, after 1x EDC and +1x lumbricals improves to neutral  DC L+B      Plan: Continue per plan of care        Precautions: Universal  HEP: TGE, extrinsic + intrinsic stretch, blocking      Manuals 1/20 12/18 12/21 12/23 12/31 1/5/21 1/7/21 1/13 1/15   IASTM       3 3 5   Grade 2-3 5 PIP for ext 8 Pip 8 8 5' 5' 5 5 3   Intrinsic stretch 15                       Neuro Re-Ed                                                                                                Ther Ex            EDC RB on TB x 35 lrg rbx 30 y gum 3x10 y gum 3x10 Lg new RB 3x10 RB on TB 3x10 RB on TB x 30 RB on TB x 30 Red gummy 3x 10               PROM  10 10 10 10' 10'  5 5   Wall walking  TB x 10 1# x 10 1# x 10 1# 10x D/C due to neck pain with it      Finger pushups 10x 10s coins      10x10s 10x10s 10x10s s3 coins   Reverse blocking 3x 10      2x10 2x10    EDC LF lrg rb 3x10, off table 3x10                       Ther Activity            grasping Pow, cyl BlaPW 3x 10, digicizer B ind 3x 10 Pow, cyl GPW 3x 10, digicizer G ind 3x 10 Pow, cyl BPW 3x 10, digicizer G ind 3x 10 Pow, cyl BPW 3x 10, digicizer G ind 3x 10 BPW Pwr/cyl 3x10, G digigrip ind 3x10 BPW pwr/cyl 3x10, G digigrip ind 3x10 BlaPW pwr/cyl 3x10, Blue digigrip ind 3x10 BlaPW pwr/cyl 3x10, Blue digigrip ind 3x10 BlaPW pwr/cyl 3x10, Blue digigrip ind 3x10   pinching TT pinch circe x 2 BB (held, time) TT pinch circe x 2 RG TT pinch circe x 2 GB TT pinch circe x 2 GB TT pinch [place] 2x GB TT 2x BB x 1 TT BB x 1 TT BB x 1 TT                                       Modalities            MHP 5 5 5 5 5' 5' 5 5 5

## 2021-01-20 NOTE — TELEPHONE ENCOUNTER
They should both get the vaccine -does  Does not matter which 1 they received -they are both equally effective- please try and guide them how to sign up on my chart

## 2021-01-22 ENCOUNTER — OFFICE VISIT (OUTPATIENT)
Dept: OCCUPATIONAL THERAPY | Facility: CLINIC | Age: 61
End: 2021-01-22
Payer: COMMERCIAL

## 2021-01-22 DIAGNOSIS — M65.331 TRIGGER FINGER, RIGHT MIDDLE FINGER: Primary | ICD-10-CM

## 2021-01-22 PROCEDURE — 97110 THERAPEUTIC EXERCISES: CPT | Performed by: OCCUPATIONAL THERAPIST

## 2021-01-22 NOTE — PROGRESS NOTES
Daily Note     Today's date: 2021  Patient name: Kenisha Kahn  : 1960  MRN: [id number]  Referring provider: Cesar Ortiz MD  Dx:   Encounter Diagnosis     ICD-10-CM    1  Trigger finger, right middle finger  M65 331                   Subjective: "The finger was swollen, I couldn't get the brace now"      Objective: See treatment diary below      Assessment: Decreased PIP extension, quickly improves with extension exercises and mobs  Started daytime coban due to recurring swelling and starting daytime static wear 30 min 2x/day  Continuing with al stretches  Plan: Continue per plan of care        Precautions: Universal  HEP: TGE, extrinsic + intrinsic stretch, blocking      Manuals 1/20 1/22 12/21 12/23 12/31 1/5/21 1/7/21 1/13 1/15   IASTM       3 3 5   Grade 2-3 5 3 Pip 8 8 5' 5' 5 5 3   Intrinsic stretch 15 2                      Neuro Re-Ed                                                                                                Ther Ex            EDC RB on TB x 35 R gummy 3x10 y gum 3x10 y gum 3x10 Lg new RB 3x10 RB on TB 3x10 RB on TB x 30 RB on TB x 30 Red gummy 3x 10               PROM   10 10 10' 10'  5 5   Wall walking   1# x 10 1# x 10 1# 10x D/C due to neck pain with it      Finger pushups 10x 10s coins      10x10s 10x10s 10x10s s3 coins   Reverse blocking 3x 10 2x10     2x10 2x10    EDC LF lrg rb 3x10, off table 3x10 2x10 off table                      Ther Activity            grasping Pow, cyl BlaPW 3x 10, digicizer B ind 3x 10 Pow, cyl BlaPW 3x 10, digicizer B ind 3x 10 Pow, cyl BPW 3x 10, digicizer G ind 3x 10 Pow, cyl BPW 3x 10, digicizer G ind 3x 10 BPW Pwr/cyl 3x10, G digigrip ind 3x10 BPW pwr/cyl 3x10, G digigrip ind 3x10 BlaPW pwr/cyl 3x10, Blue digigrip ind 3x10 BlaPW pwr/cyl 3x10, Blue digigrip ind 3x10 BlaPW pwr/cyl 3x10, Blue digigrip ind 3x10   pinching TT pinch circe x 2 BB (held, time) TT pinch circe x 2 BB TT pinch circe x 2 GB TT pinch circe x 2 GB TT pinch [place] 2x GB TT 2x BB x 1 TT BB x 1 TT BB x 1 TT   Grasp stabilizing  WF WE S P GFB 3x10                                  Modalities            MHP 5 5 5 5 5' 5' 5 5 5

## 2021-01-27 ENCOUNTER — OFFICE VISIT (OUTPATIENT)
Dept: OCCUPATIONAL THERAPY | Facility: CLINIC | Age: 61
End: 2021-01-27
Payer: COMMERCIAL

## 2021-01-27 DIAGNOSIS — M65.331 TRIGGER FINGER, RIGHT MIDDLE FINGER: Primary | ICD-10-CM

## 2021-01-27 PROCEDURE — 97110 THERAPEUTIC EXERCISES: CPT | Performed by: OCCUPATIONAL THERAPIST

## 2021-01-27 PROCEDURE — 97530 THERAPEUTIC ACTIVITIES: CPT | Performed by: OCCUPATIONAL THERAPIST

## 2021-01-27 NOTE — PROGRESS NOTES
Daily Note     Today's date: 2021  Patient name: Jeanne Jean  : 1960  MRN: [id number]  Referring provider: Jaycee Castro MD  Dx:   Encounter Diagnosis     ICD-10-CM    1  Trigger finger, right middle finger  M65 331                   Subjective: "Not that straight"      Objective: See treatment diary below      Assessment: PT frustrated by lack of PIP ext, however with extension exercises and some mobs she is able to achieve near neutral extension  It has been discussed before that any flexion based activity will affect her progress, and that she has shown good improvement  She also demonstrates less A1 site irritation  Plan: Continue per plan of care        Precautions: Universal  HEP: TGE, extrinsic + intrinsic stretch, blocking      Manuals 1/20 1/22 1/27 12/23 12/31 1/5/21 1/7/21 1/13 1/15   IASTM       3 3 5   Grade 2-3 5 3 3 8 5' 5' 5 5 3   Intrinsic stretch 15 2 2                     Neuro Re-Ed                                                                                                Ther Ex            EDC RB on TB x 35 R gummy 3x10 R gum 3x10 y gum 3x10 Lg new RB 3x10 RB on TB 3x10 RB on TB x 30 RB on TB x 30 Red gummy 3x 10               PROM    10 10' 10'  5 5   Wall walking    1# x 10 1# 10x D/C due to neck pain with it      Finger pushups 10x 10s coins      10x10s 10x10s 10x10s s3 coins   Reverse blocking 3x 10 2x10 W/ manual resist 2x10    2x10 2x10    EDC LF lrg rb 3x10, off table 3x10 2x10 off table 2x 10 off table         Wall walking   1# x 10         Ther Activity            grasping Pow, cyl BlaPW 3x 10, digicizer B ind 3x 10 Pow, cyl BlaPW 3x 10, digicizer B ind 3x 10 Pow, cyl BlaPW 3x 10, digicizer B ind 3x 10 Pow, cyl BPW 3x 10, digicizer G ind 3x 10 BPW Pwr/cyl 3x10, G digigrip ind 3x10 BPW pwr/cyl 3x10, G digigrip ind 3x10 BlaPW pwr/cyl 3x10, Blue digigrip ind 3x10 BlaPW pwr/cyl 3x10, Blue digigrip ind 3x10 BlaPW pwr/cyl 3x10, Blue digigrip ind 3x10   pinching TT pinch circe x 2 BB (held, time) TT pinch circe x 2 BB TT pinch circe x 2 BB TT pinch circe x 2 GB TT pinch [place] 2x GB TT 2x BB x 1 TT BB x 1 TT BB x 1 TT   Grasp stabilizing  WF WE S P GFB 3x10 WF WE S P GFB 3x10                                 Modalities            MHP 5 5 5 5 5' 5' 5 5 5
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
## 2021-01-29 ENCOUNTER — OFFICE VISIT (OUTPATIENT)
Dept: PSYCHIATRY | Facility: CLINIC | Age: 61
End: 2021-01-29
Payer: COMMERCIAL

## 2021-01-29 ENCOUNTER — OFFICE VISIT (OUTPATIENT)
Dept: OCCUPATIONAL THERAPY | Facility: CLINIC | Age: 61
End: 2021-01-29
Payer: COMMERCIAL

## 2021-01-29 ENCOUNTER — APPOINTMENT (OUTPATIENT)
Dept: OCCUPATIONAL THERAPY | Facility: CLINIC | Age: 61
End: 2021-01-29
Payer: COMMERCIAL

## 2021-01-29 VITALS — HEART RATE: 81 BPM | SYSTOLIC BLOOD PRESSURE: 119 MMHG | DIASTOLIC BLOOD PRESSURE: 78 MMHG

## 2021-01-29 DIAGNOSIS — M65.331 TRIGGER FINGER, RIGHT MIDDLE FINGER: Primary | ICD-10-CM

## 2021-01-29 DIAGNOSIS — F43.10 PTSD (POST-TRAUMATIC STRESS DISORDER): ICD-10-CM

## 2021-01-29 DIAGNOSIS — F41.1 GAD (GENERALIZED ANXIETY DISORDER): ICD-10-CM

## 2021-01-29 DIAGNOSIS — F42.9 OBSESSIVE-COMPULSIVE DISORDER, UNSPECIFIED TYPE: Primary | ICD-10-CM

## 2021-01-29 DIAGNOSIS — F33.0 MDD (MAJOR DEPRESSIVE DISORDER), RECURRENT EPISODE, MILD (HCC): ICD-10-CM

## 2021-01-29 DIAGNOSIS — Z79.899 HIGH RISK MEDICATION USE: ICD-10-CM

## 2021-01-29 PROCEDURE — 97110 THERAPEUTIC EXERCISES: CPT | Performed by: OCCUPATIONAL THERAPIST

## 2021-01-29 PROCEDURE — 99213 OFFICE O/P EST LOW 20 MIN: CPT | Performed by: PSYCHIATRY & NEUROLOGY

## 2021-01-29 RX ORDER — NORTRIPTYLINE HYDROCHLORIDE 25 MG/1
CAPSULE ORAL
Qty: 60 CAPSULE | Refills: 1 | Status: SHIPPED | OUTPATIENT
Start: 2021-01-29 | End: 2021-02-20

## 2021-01-29 RX ORDER — CLOMIPRAMINE HYDROCHLORIDE 25 MG/1
25 CAPSULE ORAL
Qty: 60 CAPSULE | Refills: 1 | Status: SHIPPED | OUTPATIENT
Start: 2021-01-29 | End: 2021-01-29 | Stop reason: SDUPTHER

## 2021-01-29 RX ORDER — CLOMIPRAMINE HYDROCHLORIDE 25 MG/1
CAPSULE ORAL
Qty: 60 CAPSULE | Refills: 1 | Status: SHIPPED | OUTPATIENT
Start: 2021-01-29 | End: 2021-01-29

## 2021-01-29 NOTE — PROGRESS NOTES
Daily Note     Today's date: 2021  Patient name: Tushar Trinidad  : 1960  MRN: [id number]  Referring provider: Harmeet Boateng MD  Dx:   Encounter Diagnosis     ICD-10-CM    1  Trigger finger, right middle finger  M65 331                   Subjective: "It's not straight"      Objective: See treatment diary below      Assessment: Emphasized better extrinsic stretch routine - she is able to get near full extension with flexor stretch then active lumbrical and EDC exercises  Upgraded HEP for more grasp conditioning at A1  Plan: Continue per plan of care        Precautions: Universal  HEP: TGE, extrinsic + intrinsic stretch, blocking      Manuals 1/20 1/22 1/27 1/29 12/31 1/5/21 1/7/21 1/13 1/15   IASTM       3 3 5   Grade 2-3 5 3 3  5' 5' 5 5 3   Intrinsic stretch 15 2 2         Extrinsic stretch    5        Neuro Re-Ed                                                                                                Ther Ex            EDC RB on TB x 35 R gummy 3x10 R gum 3x10 R gum 3x10 Lg new RB 3x10 RB on TB 3x10 RB on TB x 30 RB on TB x 30 Red gummy 3x 10               PROM     10' 10'  5 5   Wall walking     1# 10x D/C due to neck pain with it      Finger pushups 10x 10s coins      10x10s 10x10s 10x10s s3 coins   Reverse blocking 3x 10 2x10 W/ manual resist 2x10    2x10 2x10    EDC LF lrg rb 3x10, off table 3x10 2x10 off table 2x 10 off table 2x 10 off table        Wall walking   1# x 10         Ther Activity            grasping Pow, cyl BlaPW 3x 10, digicizer B ind 3x 10 Pow, cyl BlaPW 3x 10, digicizer B ind 3x 10 Pow, cyl BlaPW 3x 10, digicizer B ind 3x 10 Pow, cyl BlaPW 3x 10, digicizer B ind 3x 10 BPW Pwr/cyl 3x10, G digigrip ind 3x10 BPW pwr/cyl 3x10, G digigrip ind 3x10 BlaPW pwr/cyl 3x10, Blue digigrip ind 3x10 BlaPW pwr/cyl 3x10, Blue digigrip ind 3x10 BlaPW pwr/cyl 3x10, Blue digigrip ind 3x10   pinching TT pinch circe x 2 BB (held, time) TT pinch circe x 2 BB TT pinch circe x 2 BB TT pinch circe x 2 BB TT pinch [place] 2x GB TT 2x BB x 1 TT BB x 1 TT BB x 1 TT   Grasp stabilizing  WF WE S P GFB 3x10 WF WE S P GFB 3x10 WF WE S P BFB 2x10                                Modalities            MHP 5 5 5 5 5' 5' 5 5 5

## 2021-02-01 ENCOUNTER — TELEPHONE (OUTPATIENT)
Dept: PSYCHIATRY | Facility: CLINIC | Age: 61
End: 2021-02-01

## 2021-02-01 ENCOUNTER — APPOINTMENT (OUTPATIENT)
Dept: OCCUPATIONAL THERAPY | Facility: CLINIC | Age: 61
End: 2021-02-01
Payer: COMMERCIAL

## 2021-02-01 DIAGNOSIS — F41.1 GAD (GENERALIZED ANXIETY DISORDER): ICD-10-CM

## 2021-02-01 DIAGNOSIS — F33.0 MDD (MAJOR DEPRESSIVE DISORDER), RECURRENT EPISODE, MILD (HCC): ICD-10-CM

## 2021-02-01 DIAGNOSIS — F43.10 PTSD (POST-TRAUMATIC STRESS DISORDER): ICD-10-CM

## 2021-02-01 DIAGNOSIS — F42.9 OBSESSIVE-COMPULSIVE DISORDER, UNSPECIFIED TYPE: ICD-10-CM

## 2021-02-01 RX ORDER — NORTRIPTYLINE HYDROCHLORIDE 10 MG/1
10 CAPSULE ORAL
Qty: 3 CAPSULE | Refills: 0 | Status: SHIPPED | OUTPATIENT
Start: 2021-02-01 | End: 2021-02-09

## 2021-02-01 NOTE — TELEPHONE ENCOUNTER
LVM:    P: Stop nortriptyline 25mg pills (for now)    Start nortriptyline 10mg HS,     Asked that she will call and update me in a few days time   Or call sooner if any further questions or considerations

## 2021-02-03 ENCOUNTER — TELEPHONE (OUTPATIENT)
Dept: PSYCHIATRY | Facility: CLINIC | Age: 61
End: 2021-02-03

## 2021-02-03 NOTE — TELEPHONE ENCOUNTER
Ngoc Zarate called and is experiencing more side effects  Magali Nair can you please give her a call back?

## 2021-02-03 NOTE — TELEPHONE ENCOUNTER
Returned [facility]  Josephine Garcia states that she was out snow blowing yesterday and had several periods where she felt light headed  Also states she felt nauseous all day  She never received VM from Dr Jean-Paul Heck instructing her to stop 25 MG capsule for now and take 10 MG capsule at bedtime  Therefore, she will try at this new dose and call office if she still has problems  Will refer to Dr Jean-Paul Heck for his information

## 2021-02-04 ENCOUNTER — APPOINTMENT (OUTPATIENT)
Dept: OCCUPATIONAL THERAPY | Facility: CLINIC | Age: 61
End: 2021-02-04
Payer: COMMERCIAL

## 2021-02-05 ENCOUNTER — TELEPHONE (OUTPATIENT)
Dept: HEMATOLOGY ONCOLOGY | Facility: CLINIC | Age: 61
End: 2021-02-05

## 2021-02-05 ENCOUNTER — LAB (OUTPATIENT)
Dept: LAB | Facility: CLINIC | Age: 61
End: 2021-02-05
Payer: COMMERCIAL

## 2021-02-05 DIAGNOSIS — D47.2 SMOLDERING MULTIPLE MYELOMA (SMM): Chronic | ICD-10-CM

## 2021-02-05 LAB
ALBUMIN SERPL BCP-MCNC: 3.6 G/DL (ref 3.5–5)
ALP SERPL-CCNC: 321 U/L (ref 46–116)
ALT SERPL W P-5'-P-CCNC: 47 U/L (ref 12–78)
ANION GAP SERPL CALCULATED.3IONS-SCNC: 1 MMOL/L (ref 4–13)
AST SERPL W P-5'-P-CCNC: 49 U/L (ref 5–45)
BASOPHILS # BLD AUTO: 0.04 THOUSANDS/ΜL (ref 0–0.1)
BASOPHILS NFR BLD AUTO: 1 % (ref 0–1)
BILIRUB SERPL-MCNC: 0.65 MG/DL (ref 0.2–1)
BUN SERPL-MCNC: 25 MG/DL (ref 5–25)
CALCIUM SERPL-MCNC: 9.6 MG/DL (ref 8.3–10.1)
CHLORIDE SERPL-SCNC: 104 MMOL/L (ref 100–108)
CO2 SERPL-SCNC: 31 MMOL/L (ref 21–32)
CREAT SERPL-MCNC: 1.04 MG/DL (ref 0.6–1.3)
EOSINOPHIL # BLD AUTO: 0.13 THOUSAND/ΜL (ref 0–0.61)
EOSINOPHIL NFR BLD AUTO: 3 % (ref 0–6)
ERYTHROCYTE [DISTWIDTH] IN BLOOD BY AUTOMATED COUNT: 13.3 % (ref 11.6–15.1)
GFR SERPL CREATININE-BSD FRML MDRD: 58 ML/MIN/1.73SQ M
GLUCOSE P FAST SERPL-MCNC: 94 MG/DL (ref 65–99)
HCT VFR BLD AUTO: 39 % (ref 34.8–46.1)
HGB BLD-MCNC: 12.6 G/DL (ref 11.5–15.4)
IGA SERPL-MCNC: 56 MG/DL (ref 70–400)
IGG SERPL-MCNC: 2830 MG/DL (ref 700–1600)
IGM SERPL-MCNC: 270 MG/DL (ref 40–230)
IMM GRANULOCYTES # BLD AUTO: 0.02 THOUSAND/UL (ref 0–0.2)
IMM GRANULOCYTES NFR BLD AUTO: 0 % (ref 0–2)
LYMPHOCYTES # BLD AUTO: 2.09 THOUSANDS/ΜL (ref 0.6–4.47)
LYMPHOCYTES NFR BLD AUTO: 40 % (ref 14–44)
MCH RBC QN AUTO: 28.9 PG (ref 26.8–34.3)
MCHC RBC AUTO-ENTMCNC: 32.3 G/DL (ref 31.4–37.4)
MCV RBC AUTO: 89 FL (ref 82–98)
MONOCYTES # BLD AUTO: 0.37 THOUSAND/ΜL (ref 0.17–1.22)
MONOCYTES NFR BLD AUTO: 7 % (ref 4–12)
NEUTROPHILS # BLD AUTO: 2.56 THOUSANDS/ΜL (ref 1.85–7.62)
NEUTS SEG NFR BLD AUTO: 49 % (ref 43–75)
NRBC BLD AUTO-RTO: 0 /100 WBCS
PLATELET # BLD AUTO: 245 THOUSANDS/UL (ref 149–390)
PMV BLD AUTO: 10.7 FL (ref 8.9–12.7)
POTASSIUM SERPL-SCNC: 4.5 MMOL/L (ref 3.5–5.3)
PROT SERPL-MCNC: 9.4 G/DL (ref 6.4–8.2)
RBC # BLD AUTO: 4.36 MILLION/UL (ref 3.81–5.12)
SODIUM SERPL-SCNC: 136 MMOL/L (ref 136–145)
WBC # BLD AUTO: 5.21 THOUSAND/UL (ref 4.31–10.16)

## 2021-02-05 PROCEDURE — 84165 PROTEIN E-PHORESIS SERUM: CPT

## 2021-02-05 PROCEDURE — 85025 COMPLETE CBC W/AUTO DIFF WBC: CPT

## 2021-02-05 PROCEDURE — 84165 PROTEIN E-PHORESIS SERUM: CPT | Performed by: PATHOLOGY

## 2021-02-05 PROCEDURE — 83883 ASSAY NEPHELOMETRY NOT SPEC: CPT

## 2021-02-05 PROCEDURE — 82784 ASSAY IGA/IGD/IGG/IGM EACH: CPT

## 2021-02-05 PROCEDURE — 36415 COLL VENOUS BLD VENIPUNCTURE: CPT

## 2021-02-05 PROCEDURE — 80053 COMPREHEN METABOLIC PANEL: CPT

## 2021-02-05 NOTE — TELEPHONE ENCOUNTER
Jimmy Walden called and stated that she only received 3 pills of the 10mg  She is questioning on whether she  should've received more than pills and if she is continuing the 25mg after she has finished with them  She would like a call back to clarify her instructions

## 2021-02-06 LAB
KAPPA LC FREE SER-MCNC: 48.3 MG/L (ref 3.3–19.4)
KAPPA LC FREE/LAMBDA FREE SER: 3.53 {RATIO} (ref 0.26–1.65)
LAMBDA LC FREE SERPL-MCNC: 13.7 MG/L (ref 5.7–26.3)

## 2021-02-08 ENCOUNTER — OFFICE VISIT (OUTPATIENT)
Dept: OCCUPATIONAL THERAPY | Facility: CLINIC | Age: 61
End: 2021-02-08
Payer: COMMERCIAL

## 2021-02-08 DIAGNOSIS — M65.331 TRIGGER FINGER, RIGHT MIDDLE FINGER: Primary | ICD-10-CM

## 2021-02-08 LAB
ALBUMIN SERPL ELPH-MCNC: 4.13 G/DL (ref 3.5–5)
ALBUMIN SERPL ELPH-MCNC: 45.4 % (ref 52–65)
ALPHA1 GLOB SERPL ELPH-MCNC: 0.34 G/DL (ref 0.1–0.4)
ALPHA1 GLOB SERPL ELPH-MCNC: 3.7 % (ref 2.5–5)
ALPHA2 GLOB SERPL ELPH-MCNC: 0.96 G/DL (ref 0.4–1.2)
ALPHA2 GLOB SERPL ELPH-MCNC: 10.5 % (ref 7–13)
BETA GLOB ABNORMAL SERPL ELPH-MCNC: 0.47 G/DL (ref 0.4–0.8)
BETA1 GLOB SERPL ELPH-MCNC: 5.2 % (ref 5–13)
BETA2 GLOB SERPL ELPH-MCNC: 3.6 % (ref 2–8)
BETA2+GAMMA GLOB SERPL ELPH-MCNC: 0.33 G/DL (ref 0.2–0.5)
GAMMA GLOB ABNORMAL SERPL ELPH-MCNC: 2.88 G/DL (ref 0.5–1.6)
GAMMA GLOB SERPL ELPH-MCNC: 31.6 % (ref 12–22)
IGG/ALB SER: 0.83 {RATIO} (ref 1.1–1.8)
M PROTEIN 1 MFR SERPL ELPH: 23 %
M PROTEIN 1 SERPL ELPH-MCNC: 2.09 G/DL
PROT PATTERN SERPL ELPH-IMP: ABNORMAL
PROT SERPL-MCNC: 9.1 G/DL (ref 6.4–8.2)

## 2021-02-08 PROCEDURE — 97110 THERAPEUTIC EXERCISES: CPT | Performed by: OCCUPATIONAL THERAPIST

## 2021-02-08 NOTE — TELEPHONE ENCOUNTER
[facility] Dr Bala Connell only gave her 3 of the 10 MG Nortriptyline capsules  States that she felt ok on them  Since she didn't have anymore she took 25 MG last night and didn't sleep well at all  [facility] would like to try the 25 MG capsules for the next 2 nights and see how she feels  She will call the office and let us know how she is feeling  Will follow up on Wednesday  Will refer to Dr Bala Connell for review

## 2021-02-08 NOTE — PROGRESS NOTES
Daily Note     Today's date: 2021  Patient name: Braydon Aguirre  : 1960  MRN: [id number]  Referring provider: Shayne Chávez MD  Dx:   Encounter Diagnosis     ICD-10-CM    1  Trigger finger, right middle finger  M65 331                   Subjective: "Still bent"      Objective: See treatment diary below      Assessment: Digit extension 0/-2/0 at start of session, great progress, may start weaning PM brace      Plan: Continue per plan of care        Precautions: Universal  HEP: TGE, extrinsic + intrinsic stretch, blocking      Manuals 1/20 1/22 1/27 1/29 12/31 2/8 1/7/21 1/13 1/15   IASTM       3 3 5   Grade 2-3 5 3 3  5' 5 5 5 3   Intrinsic stretch 15 2 2   10      Extrinsic stretch    5        Neuro Re-Ed                                                                                                Ther Ex            EDC RB on TB x 35 R gummy 3x10 R gum 3x10 R gum 3x10 Lg new RB 3x10 R Gummy 3c 10 RB on TB x 30 RB on TB x 30 Red gummy 3x 10               PROM     10'   5 5   Wall walking     1# 10x       Finger pushups 10x 10s coins      10x10s 10x10s 10x10s s3 coins   Reverse blocking 3x 10 2x10 W/ manual resist 2x10    2x10 2x10    EDC LF lrg rb 3x10, off table 3x10 2x10 off table 2x 10 off table 2x 10 off table        Wall walking   1# x 10         Ther Activity            grasping Pow, cyl BlaPW 3x 10, digicizer B ind 3x 10 Pow, cyl BlaPW 3x 10, digicizer B ind 3x 10 Pow, cyl BlaPW 3x 10, digicizer B ind 3x 10 Pow, cyl BlaPW 3x 10, digicizer B ind 3x 10 BPW Pwr/cyl 3x10, G digigrip ind 3x10 BlaPW pwr/cyl 3x10, sustained  2d red 30 s x 5 BlaPW pwr/cyl 3x10, Blue digigrip ind 3x10 BlaPW pwr/cyl 3x10, Blue digigrip ind 3x10 BlaPW pwr/cyl 3x10, Blue digigrip ind 3x10   pinching TT pinch circe x 2 BB (held, time) TT pinch circe x 2 BB TT pinch circe x 2 BB TT pinch circe x 2 BB TT pinch [place] 2x  BB x 1 TT BB x 1 TT BB x 1 TT   Grasp stabilizing  WF WE S P GFB 3x10 WF WE S P GFB 3x10 WF WE S P BFB 2x10                                Modalities            MHP 5 5 5 5 5' 5' 5 5 5

## 2021-02-09 ENCOUNTER — OFFICE VISIT (OUTPATIENT)
Dept: HEMATOLOGY ONCOLOGY | Facility: CLINIC | Age: 61
End: 2021-02-09
Payer: COMMERCIAL

## 2021-02-09 VITALS
TEMPERATURE: 97.5 F | SYSTOLIC BLOOD PRESSURE: 104 MMHG | DIASTOLIC BLOOD PRESSURE: 76 MMHG | HEART RATE: 78 BPM | HEIGHT: 69 IN | OXYGEN SATURATION: 98 % | BODY MASS INDEX: 21.89 KG/M2 | WEIGHT: 147.8 LBS

## 2021-02-09 DIAGNOSIS — D47.2 SMOLDERING MULTIPLE MYELOMA (SMM): Primary | Chronic | ICD-10-CM

## 2021-02-09 DIAGNOSIS — Z78.9 NEED FOR FOLLOW-UP BY SOCIAL WORKER: ICD-10-CM

## 2021-02-09 PROBLEM — A02.0 SALMONELLA GASTROENTERITIS: Status: RESOLVED | Noted: 2020-03-23 | Resolved: 2021-02-09

## 2021-02-09 PROCEDURE — 1036F TOBACCO NON-USER: CPT | Performed by: INTERNAL MEDICINE

## 2021-02-09 PROCEDURE — 3008F BODY MASS INDEX DOCD: CPT | Performed by: INTERNAL MEDICINE

## 2021-02-09 PROCEDURE — 99214 OFFICE O/P EST MOD 30 MIN: CPT | Performed by: INTERNAL MEDICINE

## 2021-02-09 NOTE — PROGRESS NOTES
Hematology Outpatient Follow - Up Note  Karmen Dec 63 y o  female MRN: @ Encounter: [id number]        Date:  2/9/2021        Assessment/ Plan:     smoldering multiple myeloma IgG kappa, bone marrow biopsy showed 15% plasma cell normal cytogenetics and fish panel for multiple myeloma in May 2017, she had primary biliary cirrhosis    No evidence of anemia, hypercalcemia, renal insufficiency, normal albumin    Will continue watchful observation and follow-up every 6 months with CBC, CMP, SPEP, free light chain    History of osteoporosis she receives Zometa 4 doses the last dose in April 2019, DEXA scan in June 2019 showed osteopenia and significant bruise in medial density she will require DEXA scan every 2 years        Labs and imaging studies are reviewed by ordering provider once results are available  If there are findings that need immediate attention, you will be contacted when results available  Discussing results and the implication on your healthcare is best discussed in person at your follow-up visit         HPI:  42-year-old  female with past medical history of primary biliary cirrhosis, fracture of the right tibia, hypertension, OCD,worsening of osteoporosis on DEXA scan Done in 2016, nephrolithiasis, chronic lower back pain and possible sacroiliitis was found to have elevated total protein 3-4 years ago, serum protein electrophoresis showed IgG kappa monoclonal protein of 1 7 g/dL however no evidence of anemia, hypercalcemia, or renal insufficiency, she had persistent elevation of alkaline phosphatase secondary to PBC  had a bone scan done last year which showed activity in the ribs area  serum protein electrophoresis in April 2017 showed IgG kappa monoclonal protein of 1 96 g/dL, total protein 8 5, albumin 3 9, IgG 2580, creatinine 0 8, calcium 8 9, alkaline phosphatase 294, AST 37, ALT 46, IgM 25 in July 2016 monoclonal protein of IgG kappa of 1 73  C-reactive protein has been normal however sedimentation rate was elevated in the range of 60  Bone marrow biopsy in May 2017 showed 15% plasma cells in diffuse and interstitial pattern, normal fish panel for multiple myeloma and cytogenetics  Status post right knee replacement in June 2018  Exacerbation of anxiety/depression followed by psychiatric medicine     Osteoporosis she received Zometa 4 doses the lost dose on April 2019     Primary biliary cirrhosis followed by GI      Interval History:  No changes from the last visit             Test Results:    Imaging: No results found  Labs:   Lab Results   Component Value Date    WBC 5 21 02/05/2021    HGB 12 6 02/05/2021    HCT 39 0 02/05/2021    MCV 89 02/05/2021     02/05/2021     Lab Results   Component Value Date     12/29/2015    K 4 5 02/05/2021     02/05/2021    CO2 31 02/05/2021    ANIONGAP 7 12/29/2015    BUN 25 02/05/2021    CREATININE 1 04 02/05/2021    GLUCOSE 134 12/29/2015    GLUF 94 02/05/2021    CALCIUM 9 6 02/05/2021    AST 49 (H) 02/05/2021    ALT 47 02/05/2021    ALKPHOS 321 (H) 02/05/2021    PROT 8 4 (H) 12/29/2015    BILITOT 0 46 12/29/2015    EGFR 58 02/05/2021      SPEP showed IgG kappa M protein 2 09, kappa light chain 48, lambda light chain 13 with a ratio of 3 5, IgA 56, IgG 2830, IgM 270      No results found for: [id number]      ROS: Review of Systems   Constitutional: Positive for fatigue  Negative for appetite change, chills, diaphoresis and unexpected weight change  HENT:   Negative for mouth sores, nosebleeds, sore throat, trouble swallowing and voice change  Eyes: Negative for eye problems and icterus  Respiratory: Negative for chest tightness, cough, hemoptysis and wheezing  Cardiovascular: Negative for chest pain, leg swelling and palpitations  Gastrointestinal: Negative for abdominal distention, abdominal pain, blood in stool, constipation, diarrhea, nausea and vomiting  Endocrine: Negative for hot flashes     Genitourinary: Negative for bladder incontinence, difficulty urinating, dyspareunia, dysuria and frequency  Musculoskeletal: Positive for arthralgias, back pain and myalgias  Negative for gait problem, neck pain and neck stiffness  Skin: Negative for itching and rash  Neurological: Positive for numbness  Negative for dizziness, gait problem, headaches, seizures and speech difficulty  Hematological: Negative for adenopathy  Does not bruise/bleed easily  Psychiatric/Behavioral: Negative for decreased concentration, depression, sleep disturbance and suicidal ideas  The patient is nervous/anxious  Current Medications: Reviewed  Allergies: Reviewed  PMH/FH/SH:  Reviewed      Physical Exam:    Body surface area is 1 82 meters squared  Wt Readings from Last 3 Encounters:   02/09/21 67 kg (147 lb 12 8 oz)   12/18/20 66 6 kg (146 lb 12 8 oz)   12/09/20 65 8 kg (145 lb)        Temp Readings from Last 3 Encounters:   02/09/21 97 5 °F (36 4 °C) (Tympanic)   12/18/20 99 3 °F (37 4 °C) (Oral)   12/02/20 (!) 96 9 °F (36 1 °C) (Tympanic)        BP Readings from Last 3 Encounters:   02/09/21 104/76   12/18/20 114/70   12/09/20 125/79         Pulse Readings from Last 3 Encounters:   02/09/21 78   12/18/20 72   12/09/20 71        Physical Exam  Vitals signs reviewed  Constitutional:       General: She is not in acute distress  Appearance: She is well-developed  She is not diaphoretic  HENT:      Head: Normocephalic and atraumatic  Eyes:      Conjunctiva/sclera: Conjunctivae normal    Neck:      Musculoskeletal: Normal range of motion and neck supple  Trachea: No tracheal deviation  Cardiovascular:      Rate and Rhythm: Normal rate and regular rhythm  Heart sounds: No murmur  No friction rub  No gallop  Pulmonary:      Effort: Pulmonary effort is normal  No respiratory distress  Breath sounds: Normal breath sounds  No wheezing or rales  Chest:      Chest wall: No tenderness     Abdominal: General: There is no distension  Palpations: Abdomen is soft  Tenderness: There is no abdominal tenderness  Lymphadenopathy:      Cervical: No cervical adenopathy  Skin:     General: Skin is warm and dry  Coloration: Skin is not pale  Findings: No erythema  Neurological:      General: No focal deficit present  Mental Status: She is alert and oriented to person, place, and time  Psychiatric:         Behavior: Behavior normal          Thought Content: Thought content normal          Judgment: Judgment normal          ECO    Goals and Barriers:  Current Goal: Minimize effects of disease  Barriers: None  Patient's Capacity to Self Care:  Patient is able to self care      Code Status: @[facility]@

## 2021-02-10 NOTE — TELEPHONE ENCOUNTER
LM on Courtney's VM that I was following up on previous conversation regarding Nortriptyline dosing  Left direct nursing number to see if she is doing OK on the 25 MG dose

## 2021-02-10 NOTE — TELEPHONE ENCOUNTER
Spoke to Ngoc [place] Tessa states that Monday she slept from 12:00 pm-5:00 am the next morning  Tuesday she slept from 1 pm- 5 pm then couldn't fall asleep until 4:30 in the morning and woke up around 8:30 am   States she had a really bad headache  So far today she is tired but does not have a headache  She decided to stick with the 25 MG for a few more days and will call the office with an update  Will refer to Dr Phoenix for his information

## 2021-02-11 ENCOUNTER — APPOINTMENT (OUTPATIENT)
Dept: OCCUPATIONAL THERAPY | Facility: CLINIC | Age: 61
End: 2021-02-11
Payer: COMMERCIAL

## 2021-02-12 ENCOUNTER — LAB (OUTPATIENT)
Dept: LAB | Facility: CLINIC | Age: 61
End: 2021-02-12
Payer: COMMERCIAL

## 2021-02-12 ENCOUNTER — TRANSCRIBE ORDERS (OUTPATIENT)
Dept: LAB | Facility: CLINIC | Age: 61
End: 2021-02-12

## 2021-02-12 DIAGNOSIS — R69 DIAGNOSIS UNKNOWN: ICD-10-CM

## 2021-02-12 DIAGNOSIS — R69 DIAGNOSIS UNKNOWN: Primary | ICD-10-CM

## 2021-02-12 LAB
ALBUMIN SERPL BCP-MCNC: 3.4 G/DL (ref 3.5–5)
ALP SERPL-CCNC: 334 U/L (ref 46–116)
ALT SERPL W P-5'-P-CCNC: 45 U/L (ref 12–78)
ANION GAP SERPL CALCULATED.3IONS-SCNC: 4 MMOL/L (ref 4–13)
AST SERPL W P-5'-P-CCNC: 39 U/L (ref 5–45)
BASOPHILS # BLD AUTO: 0.04 THOUSANDS/ΜL (ref 0–0.1)
BASOPHILS NFR BLD AUTO: 1 % (ref 0–1)
BILIRUB SERPL-MCNC: 0.53 MG/DL (ref 0.2–1)
BUN SERPL-MCNC: 17 MG/DL (ref 5–25)
CALCIUM ALBUM COR SERPL-MCNC: 10 MG/DL (ref 8.3–10.1)
CALCIUM SERPL-MCNC: 9.5 MG/DL (ref 8.3–10.1)
CHLORIDE SERPL-SCNC: 101 MMOL/L (ref 100–108)
CHOLEST SERPL-MCNC: 208 MG/DL (ref 50–200)
CO2 SERPL-SCNC: 31 MMOL/L (ref 21–32)
CREAT SERPL-MCNC: 0.96 MG/DL (ref 0.6–1.3)
EOSINOPHIL # BLD AUTO: 0.14 THOUSAND/ΜL (ref 0–0.61)
EOSINOPHIL NFR BLD AUTO: 2 % (ref 0–6)
ERYTHROCYTE [DISTWIDTH] IN BLOOD BY AUTOMATED COUNT: 13.3 % (ref 11.6–15.1)
GFR SERPL CREATININE-BSD FRML MDRD: 64 ML/MIN/1.73SQ M
GLUCOSE P FAST SERPL-MCNC: 99 MG/DL (ref 65–99)
HCT VFR BLD AUTO: 39.9 % (ref 34.8–46.1)
HDLC SERPL-MCNC: 73 MG/DL
HGB BLD-MCNC: 12.7 G/DL (ref 11.5–15.4)
IMM GRANULOCYTES # BLD AUTO: 0.02 THOUSAND/UL (ref 0–0.2)
IMM GRANULOCYTES NFR BLD AUTO: 0 % (ref 0–2)
INR PPP: 0.92 (ref 0.84–1.19)
LDLC SERPL CALC-MCNC: 112 MG/DL (ref 0–100)
LYMPHOCYTES # BLD AUTO: 2.03 THOUSANDS/ΜL (ref 0.6–4.47)
LYMPHOCYTES NFR BLD AUTO: 35 % (ref 14–44)
MCH RBC QN AUTO: 28.7 PG (ref 26.8–34.3)
MCHC RBC AUTO-ENTMCNC: 31.8 G/DL (ref 31.4–37.4)
MCV RBC AUTO: 90 FL (ref 82–98)
MONOCYTES # BLD AUTO: 0.4 THOUSAND/ΜL (ref 0.17–1.22)
MONOCYTES NFR BLD AUTO: 7 % (ref 4–12)
NEUTROPHILS # BLD AUTO: 3.23 THOUSANDS/ΜL (ref 1.85–7.62)
NEUTS SEG NFR BLD AUTO: 55 % (ref 43–75)
NONHDLC SERPL-MCNC: 135 MG/DL
NRBC BLD AUTO-RTO: 0 /100 WBCS
PLATELET # BLD AUTO: 196 THOUSANDS/UL (ref 149–390)
PMV BLD AUTO: 11.3 FL (ref 8.9–12.7)
POTASSIUM SERPL-SCNC: 5.1 MMOL/L (ref 3.5–5.3)
PROT SERPL-MCNC: 9.2 G/DL (ref 6.4–8.2)
PROTHROMBIN TIME: 12.3 SECONDS (ref 11.6–14.5)
RBC # BLD AUTO: 4.43 MILLION/UL (ref 3.81–5.12)
SODIUM SERPL-SCNC: 136 MMOL/L (ref 136–145)
TRIGL SERPL-MCNC: 117 MG/DL
WBC # BLD AUTO: 5.86 THOUSAND/UL (ref 4.31–10.16)

## 2021-02-12 PROCEDURE — 85610 PROTHROMBIN TIME: CPT

## 2021-02-12 PROCEDURE — 36415 COLL VENOUS BLD VENIPUNCTURE: CPT

## 2021-02-12 PROCEDURE — 80061 LIPID PANEL: CPT

## 2021-02-12 PROCEDURE — 85025 COMPLETE CBC W/AUTO DIFF WBC: CPT

## 2021-02-12 PROCEDURE — 80053 COMPREHEN METABOLIC PANEL: CPT

## 2021-02-15 ENCOUNTER — OFFICE VISIT (OUTPATIENT)
Dept: OCCUPATIONAL THERAPY | Facility: CLINIC | Age: 61
End: 2021-02-15
Payer: COMMERCIAL

## 2021-02-15 DIAGNOSIS — M65.331 TRIGGER FINGER, RIGHT MIDDLE FINGER: Primary | ICD-10-CM

## 2021-02-15 PROCEDURE — 97110 THERAPEUTIC EXERCISES: CPT | Performed by: OCCUPATIONAL THERAPIST

## 2021-02-15 NOTE — PROGRESS NOTES
Daily Note     Today's date: 2/15/2021  Patient name: Anh Franz  : 1960  MRN: [id number]  Referring provider: Jodi Nagy MD  Dx:   Encounter Diagnosis     ICD-10-CM    1  Trigger finger, right middle finger  M65 331                   Subjective: "Well it's straight now"      Objective: See treatment diary below      Assessment: Still residual volar scar tissue with IASTM; finger full extension at end of session  Restarting L+B PRN  Plan: Progress note during next visit        Precautions: Universal  HEP: TGE, extrinsic + intrinsic stretch, blocking      Manuals 1/20 1/22 1/27 1/29 12/31 2/8 2/15 1/13 1/15   IASTM       15 3 5   Grade 2-3 5 3 3  5' 5 5 5 3   Intrinsic stretch 15 2 2   10 5     Extrinsic stretch    5        Neuro Re-Ed            MWM       PIP distract w/ext                                                                             Ther Ex            EDC RB on TB x 35 R gummy 3x10 R gum 3x10 R gum 3x10 Lg new RB 3x10 R Gummy 3c 10  RB on TB x 30 Red gummy 3x 10               PROM     10'   5 5   Wall walking     1# 10x       Finger pushups 10x 10s coins       10x10s 10x10s s3 coins   Reverse blocking 3x 10 2x10 W/ manual resist 2x10     2x10    EDC LF lrg rb 3x10, off table 3x10 2x10 off table 2x 10 off table 2x 10 off table        Wall walking   1# x 10         Ther Activity            grasping Pow, cyl BlaPW 3x 10, digicizer B ind 3x 10 Pow, cyl BlaPW 3x 10, digicizer B ind 3x 10 Pow, cyl BlaPW 3x 10, digicizer B ind 3x 10 Pow, cyl BlaPW 3x 10, digicizer B ind 3x 10 BPW Pwr/cyl 3x10, G digigrip ind 3x10 BlaPW pwr/cyl 3x10, sustained  2d red 30 s x 5  BlaPW pwr/cyl 3x10, Blue digigrip ind 3x10 BlaPW pwr/cyl 3x10, Blue digigrip ind 3x10   pinching TT pinch circe x 2 BB (held, time) TT pinch circe x 2 BB TT pinch circe x 2 BB TT pinch circe x 2 BB TT pinch [place] 2x   BB x 1 TT BB x 1 TT   Grasp stabilizing  WF WE S P GFB 3x10 WF WE S P GFB 3x10 WF WE S P BFB 2x10 Modalities            [facility], [place] 5 5 5 5 5' 5' 5 5 5

## 2021-02-18 ENCOUNTER — APPOINTMENT (OUTPATIENT)
Dept: OCCUPATIONAL THERAPY | Facility: CLINIC | Age: 61
End: 2021-02-18
Payer: COMMERCIAL

## 2021-02-19 ENCOUNTER — PATIENT OUTREACH (OUTPATIENT)
Dept: CASE MANAGEMENT | Facility: HOSPITAL | Age: 61
End: 2021-02-19

## 2021-02-19 NOTE — PROGRESS NOTES
MSW received a Distress Thermometer from [facility], where the pt self scored a 9 to indicate her level of stress  The pot marked off treatment decisions, family health issues, fears and worry as her psychosocial stressors  She indicated 3 out of 21 physical stressors  MSW reached out to the pt to offer support  MSW received pt's voicemail  A detailed message was left, including a contact number and the pt was encouraged to return the call

## 2021-02-20 DIAGNOSIS — F42.9 OBSESSIVE-COMPULSIVE DISORDER, UNSPECIFIED TYPE: ICD-10-CM

## 2021-02-20 DIAGNOSIS — F33.0 MDD (MAJOR DEPRESSIVE DISORDER), RECURRENT EPISODE, MILD (HCC): ICD-10-CM

## 2021-02-20 DIAGNOSIS — F43.10 PTSD (POST-TRAUMATIC STRESS DISORDER): ICD-10-CM

## 2021-02-20 DIAGNOSIS — F41.1 GAD (GENERALIZED ANXIETY DISORDER): ICD-10-CM

## 2021-02-20 RX ORDER — NORTRIPTYLINE HYDROCHLORIDE 25 MG/1
CAPSULE ORAL
Qty: 60 CAPSULE | Refills: 1 | Status: SHIPPED | OUTPATIENT
Start: 2021-02-20 | End: 2021-03-11 | Stop reason: SDUPTHER

## 2021-02-22 ENCOUNTER — OFFICE VISIT (OUTPATIENT)
Dept: OCCUPATIONAL THERAPY | Facility: CLINIC | Age: 61
End: 2021-02-22
Payer: COMMERCIAL

## 2021-02-22 ENCOUNTER — TELEPHONE (OUTPATIENT)
Dept: PSYCHIATRY | Facility: CLINIC | Age: 61
End: 2021-02-22

## 2021-02-22 ENCOUNTER — PATIENT OUTREACH (OUTPATIENT)
Dept: CASE MANAGEMENT | Facility: HOSPITAL | Age: 61
End: 2021-02-22

## 2021-02-22 DIAGNOSIS — M65.331 TRIGGER FINGER, RIGHT MIDDLE FINGER: Primary | ICD-10-CM

## 2021-02-22 DIAGNOSIS — F43.10 PTSD (POST-TRAUMATIC STRESS DISORDER): ICD-10-CM

## 2021-02-22 DIAGNOSIS — F41.1 GAD (GENERALIZED ANXIETY DISORDER): Primary | ICD-10-CM

## 2021-02-22 DIAGNOSIS — F42.9 OBSESSIVE-COMPULSIVE DISORDER, UNSPECIFIED TYPE: ICD-10-CM

## 2021-02-22 PROCEDURE — 97140 MANUAL THERAPY 1/> REGIONS: CPT | Performed by: OCCUPATIONAL THERAPIST

## 2021-02-22 RX ORDER — NORTRIPTYLINE HYDROCHLORIDE 10 MG/1
CAPSULE ORAL
Qty: 60 CAPSULE | Refills: 1 | Status: SHIPPED | OUTPATIENT
Start: 2021-02-22 | End: 2021-03-11 | Stop reason: SDUPTHER

## 2021-02-22 NOTE — TELEPHONE ENCOUNTER
Returned [facility]  Essie Poole is having trouble sleeping  States that when she was on the 25 MG of Nortriptyline she was able to sleep but it didn't help at all with her anxiety  She increased to 50 MG as of Friday and has not been able to sleep  States for the last 2 days she just laid there  She said the 50 did help some with her anxiety  States that right before it would peak, it would go away  It is not helping her OCD at all  She is very concerned about not being able to sleep and also states it is causing her to have dry eye  The concern with the dry eye is that she was told she is getting a cataract and she wants to make sure that will not make things worse  She is requesting your recommendation  Will refer to Dr Cabral Son for review

## 2021-02-22 NOTE — PROGRESS NOTES
OT Re-Evaluation     Today's date: 2021  Patient name: Aga Farnsworth  : 1960  MRN: [id number]  Referring provider: Nunu Lebron MD  Dx:   Encounter Diagnosis     ICD-10-CM    1  Trigger finger, right middle finger  M65 331                   Assessment  Assessment details:  Ronda Fountain still shows P1 enlargement with volar crepitus, pain and weakness with grasping and PIP LF tightness  It does improve quickly with manual techniques but her contracture still quickly returns  Goals  STG) Motion increased by 25% in 4-6 weeks MET  STG) Strength/Motor skills improved by 25% in 4-6 weeks MET  STG) SwellingEdema improved by 25% in 4-6 weeks MET  STG) Pain decreased by 25% in 4-6 weeks MET    STG) Neutral extension in 3-4 weeks PIP DIP PARTIALLY MET, CONTINUE  STG) Grasp strength improved 10% in 3-4 weeks PARTIALLY MET, CONTINUE    LTG) ADL and IADL skills improved   LTG) Work skills improved  LTG) Leisure skills improved    Patient Goals: to get back to normal      Goals, plan of care and treatment condition discussed with patient  Patient expresses their understanding and questions regarding these issues were addressed        Plan  Plan details: Heavier emphasis on tissue mobilization  Planned modality interventions: thermotherapy: hydrocollator packs and ultrasound  Planned therapy interventions: joint mobilization, manual therapy, Mcclelland taping, motor coordination training, orthotic fitting/training, fine motor coordination training, therapeutic activities, therapeutic exercise, stretching, strengthening and home exercise program  Frequency: 2x week  Duration in visits: 6        Subjective Evaluation    History of Present Illness  Mechanism of injury: Ronda Fountain is S/P R LF trigger finger release on 2020; this had been symptomatic for several years, but this summer got much worse with heavy too use  Pain  Current pain ratin  At worst pain ratin    Hand dominance: right          Objective Active Range of Motion     Right Digits   Flexion   Index     MCP: 70    PIP: 92    DIP: 62  Middle     MCP: 70    PIP: 90    DIP: 68  Ring     MCP: 74    PIP: 94    DIP: 52  Extension   Middle     MCP: 10    PIP: -16    DIP: 4    Passive Range of Motion     Right Digits   Extension   Middle     PIP: -6    Strength/Myotome Testing     Left Wrist/Hand      (2nd hand position)     Trial 1: 62    Right Wrist/Hand      (2nd hand position)     Trial 1: 39 2    Tests     Right Wrist/Hand   Positive Bunnel-Littler and intrinsic muscle tightness       Additional Tests Details  Denies N/T    Swelling     Left Wrist/Hand   Middle     Proximal: 6 cm    Right Wrist/Hand   Middle     Proximal: 6 4 cm             Precautions: Universal  HEP: TGE, extrinsic + intrinsic stretch, blocking      Manuals             IASTM                                                    Neuro Re-Ed                                                                                                        Ther Ex             EDC             Isometric grasp             PROM             Wall walking                                                                 Ther Activity             grasping Pow, cyl PW, digicizer            pinching TT pinch circe                                                   Modalities

## 2021-02-22 NOTE — TELEPHONE ENCOUNTER
Lenka Fernandez would like provider to send in script for 10 MG tablets  She will take 35 MG for a while and see how that works for her  Will refer to Dr Libertad Hernandez for review

## 2021-02-22 NOTE — PROGRESS NOTES
Daily Note     Today's date: 2021  Patient name: Karen Wilkerson  : 1960  MRN: [id number]  Referring provider: Galilea Castaneda MD  Dx:   Encounter Diagnosis     ICD-10-CM    1  Trigger finger, right middle finger  M65 331                   Subjective: "A lot of crunching"    Objective: See treatment diary below      Assessment: See RE      Plan: Continue per plan of care        Precautions: Universal  HEP: TGE, extrinsic + intrinsic stretch, blocking      Manuals 1/20 1/22 1/27 1/29 12/31 2/8 2/15 2/22 1/15   IASTM       15 30 5   Grade 2-3 5 3 3  5' 5 5  3   Intrinsic stretch 15 2 2   10 5     Extrinsic stretch    5        Neuro Re-Ed            MWM       PIP distract w/ext                                                                             Ther Ex            EDC RB on TB x 35 R gummy 3x10 R gum 3x10 R gum 3x10 Lg new RB 3x10 R Gummy 3c 10   Red gummy 3x 10               PROM     10'    5   Wall walking     1# 10x       Finger pushups 10x 10s coins        10x10s s3 coins   Reverse blocking 3x 10 2x10 W/ manual resist 2x10         EDC LF lrg rb 3x10, off table 3x10 2x10 off table 2x 10 off table 2x 10 off table        Wall walking   1# x 10         Ther Activity            grasping Pow, cyl BlaPW 3x 10, digicizer B ind 3x 10 Pow, cyl BlaPW 3x 10, digicizer B ind 3x 10 Pow, cyl BlaPW 3x 10, digicizer B ind 3x 10 Pow, cyl BlaPW 3x 10, digicizer B ind 3x 10 BPW Pwr/cyl 3x10, G digigrip ind 3x10 BlaPW pwr/cyl 3x10, sustained  2d red 30 s x 5   BlaPW pwr/cyl 3x10, Blue digigrip ind 3x10   pinching TT pinch circe x 2 BB (held, time) TT pinch circe x 2 BB TT pinch circe x 2 BB TT pinch circe x 2 BB TT pinch [place] 2x    BB x 1 TT   Grasp stabilizing  WF WE S P GFB 3x10 WF WE S P GFB 3x10 WF WE S P BFB 2x10                                Modalities            [facility] 5 5 5 5 5' 5' 5  5

## 2021-02-22 NOTE — PROGRESS NOTES
Pt returned call this day and MSW explained the role of the oncology social worker  The pt shared some of her recent health issues and a situation that had occurred with difficulty scheduling an appointment at [place] [id number]  MSW offered to intervene in anyway in the future if needed  Pt reports that she had been seeing a counselor, who has since retired  She is looking to get some resources on referrals and would prefer that to come from her counselor as she knows her so well and would refer accordingly  MSW acknowledged this request and still offered to assist if needed  Pt reports no other needs at this time  MSW encouraged the pt to call if anything should change

## 2021-02-25 ENCOUNTER — APPOINTMENT (OUTPATIENT)
Dept: OCCUPATIONAL THERAPY | Facility: CLINIC | Age: 61
End: 2021-02-25
Payer: COMMERCIAL

## 2021-03-01 ENCOUNTER — APPOINTMENT (OUTPATIENT)
Dept: OCCUPATIONAL THERAPY | Facility: CLINIC | Age: 61
End: 2021-03-01
Payer: COMMERCIAL

## 2021-03-02 ENCOUNTER — APPOINTMENT (OUTPATIENT)
Dept: OCCUPATIONAL THERAPY | Facility: CLINIC | Age: 61
End: 2021-03-02
Payer: COMMERCIAL

## 2021-03-08 ENCOUNTER — OFFICE VISIT (OUTPATIENT)
Dept: OCCUPATIONAL THERAPY | Facility: CLINIC | Age: 61
End: 2021-03-08
Payer: COMMERCIAL

## 2021-03-08 DIAGNOSIS — M65.331 TRIGGER FINGER, RIGHT MIDDLE FINGER: Primary | ICD-10-CM

## 2021-03-08 PROCEDURE — 97140 MANUAL THERAPY 1/> REGIONS: CPT | Performed by: OCCUPATIONAL THERAPIST

## 2021-03-08 NOTE — PROGRESS NOTES
Daily Note     Today's date: 3/8/2021  Patient name: Yodit Blunt  : 1960  MRN: [id number]  Referring provider: Jaycee Cook MD  Dx:   Encounter Diagnosis     ICD-10-CM    1  Trigger finger, right middle finger  M65 331                   Subjective: "It's better"      Objective: See treatment diary below      Assessment: AROM improved ~ 20 degrees ext post IASTM    Plan: Continue per plan of care        Precautions: Universal  HEP: TGE, extrinsic + intrinsic stretch, blocking      Manuals 1/20 1/22 1/27 1/29 12/31 2/8 2/15 2/22 3/8   IASTM       15 30 25   Grade 2-3 5 3 3  5' 5 5     Intrinsic stretch 15 2 2   10 5     Extrinsic stretch    5        Neuro Re-Ed            MWM       PIP distract w/ext  PIP distraction with ext                                                                           Ther Ex            EDC RB on TB x 35 R gummy 3x10 R gum 3x10 R gum 3x10 Lg new RB 3x10 R Gummy 3c 10                  PROM     10'       Wall walking     1# 10x       Finger pushups 10x 10s coins           Reverse blocking 3x 10 2x10 W/ manual resist 2x10         EDC LF lrg rb 3x10, off table 3x10 2x10 off table 2x 10 off table 2x 10 off table        Wall walking   1# x 10         Ther Activity            grasping Pow, cyl BlaPW 3x 10, digicizer B ind 3x 10 Pow, cyl BlaPW 3x 10, digicizer B ind 3x 10 Pow, cyl BlaPW 3x 10, digicizer B ind 3x 10 Pow, cyl BlaPW 3x 10, digicizer B ind 3x 10 BPW Pwr/cyl 3x10, G digigrip ind 3x10 BlaPW pwr/cyl 3x10, sustained  2d red 30 s x 5      pinching TT pinch circe x 2 BB (held, time) TT pinch circe x 2 BB TT pinch circe x 2 BB TT pinch circe x 2 BB TT pinch [place] 2x       Grasp stabilizing  WF WE S P GFB 3x10 WF WE S P GFB 3x10 WF WE S P BFB 2x10                                Modalities            [facility] 5 5 5 5 5' 5' 5  5

## 2021-03-10 DIAGNOSIS — Z23 ENCOUNTER FOR IMMUNIZATION: ICD-10-CM
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
## 2021-03-11 ENCOUNTER — OFFICE VISIT (OUTPATIENT)
Dept: PSYCHIATRY | Facility: CLINIC | Age: 61
End: 2021-03-11
Payer: COMMERCIAL

## 2021-03-11 DIAGNOSIS — F41.1 GAD (GENERALIZED ANXIETY DISORDER): ICD-10-CM

## 2021-03-11 DIAGNOSIS — F43.10 PTSD (POST-TRAUMATIC STRESS DISORDER): ICD-10-CM

## 2021-03-11 DIAGNOSIS — F33.0 MDD (MAJOR DEPRESSIVE DISORDER), RECURRENT EPISODE, MILD (HCC): ICD-10-CM

## 2021-03-11 DIAGNOSIS — F42.9 OBSESSIVE-COMPULSIVE DISORDER, UNSPECIFIED TYPE: ICD-10-CM

## 2021-03-11 PROCEDURE — 99214 OFFICE O/P EST MOD 30 MIN: CPT | Performed by: PSYCHIATRY & NEUROLOGY

## 2021-03-11 RX ORDER — NORTRIPTYLINE HYDROCHLORIDE 10 MG/1
CAPSULE ORAL
Qty: 60 CAPSULE | Refills: 1 | Status: SHIPPED | OUTPATIENT
Start: 2021-03-11 | End: 2021-03-22

## 2021-03-11 RX ORDER — NORTRIPTYLINE HYDROCHLORIDE 25 MG/1
CAPSULE ORAL
Qty: 60 CAPSULE | Refills: 1 | Status: SHIPPED | OUTPATIENT
Start: 2021-03-11 | End: 2021-04-15

## 2021-03-15 ENCOUNTER — TELEPHONE (OUTPATIENT)
Dept: OTHER | Facility: OTHER | Age: 61
End: 2021-03-15

## 2021-03-15 ENCOUNTER — OFFICE VISIT (OUTPATIENT)
Dept: OCCUPATIONAL THERAPY | Facility: CLINIC | Age: 61
End: 2021-03-15
Payer: COMMERCIAL

## 2021-03-15 DIAGNOSIS — M65.331 TRIGGER FINGER, RIGHT MIDDLE FINGER: Primary | ICD-10-CM

## 2021-03-15 PROCEDURE — 97140 MANUAL THERAPY 1/> REGIONS: CPT | Performed by: OCCUPATIONAL THERAPIST

## 2021-03-15 NOTE — TELEPHONE ENCOUNTER
Pt called because she is scheduled for a colonoscopy tomorrow and she didn't realize she couldn't eat 24 hour before the procedure and she was curious if she can still come for her appointment tomorrow or does she need to reschedule      On- call was contacted

## 2021-03-15 NOTE — PROGRESS NOTES
Daily Note     Today's date: 3/15/2021  Patient name: Coral Evans  : 1960  MRN: [id number]  Referring provider: Sophia Oconnor MD  Dx:   Encounter Diagnosis     ICD-10-CM    1  Trigger finger, right middle finger  M65 331                   Subjective: "It doesn't burn much anymore"      Objective: See treatment diary below      Assessment: Start : -12 PIP ext; post-heat: -8    Post-IASTM -2; reviewed scar massage technique and focus areas, mainly ~A2 region  Will space 10 days with minimal orthotic use and re-evaluate progress  Plan: Progress note during next visit        Precautions: Universal  HEP: TGE, extrinsic + intrinsic stretch, blocking      Manuals 3/15 1/22 1/27 1/29 12/31 2/8 2/15 2/22 3/8   IASTM 20      15 30 25   Grade 2-3  3 3  5' 5 5     Intrinsic stretch 5 2 2   10 5     Extrinsic stretch    5        Neuro Re-Ed            MWM       PIP distract w/ext  PIP distraction with ext                                                                           Ther Ex            EDC  R gummy 3x10 R gum 3x10 R gum 3x10 Lg new RB 3x10 R Gummy 3c 10                  PROM     10'       Wall walking     1# 10x       Finger pushups            Reverse blocking  2x10 W/ manual resist 2x10         EDC  2x10 off table 2x 10 off table 2x 10 off table        Wall walking   1# x 10         Ther Activity            grasping  Pow, cyl BlaPW 3x 10, digicizer B ind 3x 10 Pow, cyl BlaPW 3x 10, digicizer B ind 3x 10 Pow, cyl BlaPW 3x 10, digicizer B ind 3x 10 BPW Pwr/cyl 3x10, G digigrip ind 3x10 BlaPW pwr/cyl 3x10, sustained  2d red 30 s x 5      pinching  TT pinch circe x 2 BB TT pinch circe x 2 BB TT pinch circe x 2 BB TT pinch [place] 2x       Grasp stabilizing  WF WE S P GFB 3x10 WF WE S P GFB 3x10 WF WE S P BFB 2x10                                Modalities            MHP  5 5 5 5' 5' 5  5

## 2021-03-16 ENCOUNTER — TELEPHONE (OUTPATIENT)
Dept: PSYCHIATRY | Facility: CLINIC | Age: 61
End: 2021-03-16

## 2021-03-16 DIAGNOSIS — F41.1 GAD (GENERALIZED ANXIETY DISORDER): ICD-10-CM

## 2021-03-16 DIAGNOSIS — F42.9 OBSESSIVE-COMPULSIVE DISORDER, UNSPECIFIED TYPE: ICD-10-CM

## 2021-03-16 DIAGNOSIS — F43.10 PTSD (POST-TRAUMATIC STRESS DISORDER): ICD-10-CM

## 2021-03-16 NOTE — TELEPHONE ENCOUNTER
After discussion with Fernando, called Agnes Faust back and LM on her cell phone that provider does not feel her symptoms are related to the Nortriptyline  Increased anxiety more than likely coming from triggers she is having at home  Instructed her if she has any chest pain, SOB or any cardiac symptoms to go to the hospital   Also provided nursing number for her to call back if she has any further questions  Will refer to Dr Ira Noel for review upon his return to the office

## 2021-03-16 NOTE — TELEPHONE ENCOUNTER
Received call from No Tomlinson  She is not doing well  Is very emotional and cried the whole time on phone  States that she believes the Nortriptyline is not working out too well for her  States her hands quiver and her heart races  She feels like an emotional wreck  States her mouth and her eyes have also been very dry since starting the Nortriptyline, and her obsessiveness has gotten worse  Has been trying to get a hold of Rhodia [place] and she was hoping that Dr Anthony Reyes heard back from her but no notation in chart  States that she is waiting to see if someone can be recommended for her  Has 80year old mother also causing her a lot of anxiety  Will refer to Dusty Beard for review as Dr Anthony Reyes is out of the office

## 2021-03-22 RX ORDER — NORTRIPTYLINE HYDROCHLORIDE 10 MG/1
CAPSULE ORAL
Qty: 60 CAPSULE | Refills: 1 | Status: SHIPPED | OUTPATIENT
Start: 2021-03-22 | End: 2021-04-15

## 2021-03-25 ENCOUNTER — TELEPHONE (OUTPATIENT)
Dept: PSYCHIATRY | Facility: CLINIC | Age: 61
End: 2021-03-25

## 2021-03-25 DIAGNOSIS — Z79.899 HIGH RISK MEDICATION USE: Primary | ICD-10-CM

## 2021-03-25 NOTE — TELEPHONE ENCOUNTER
Follow up call made to Layne Caballero to see how she is doing  Layne Caballero states things are still a mess and she is not noticing any improvement with the Nortriptyline  States the only improvement is that she is sleeping better  She is still taking 35 MG at bedtime  States she is going through a lot of stress and her anxiety and OCD have not improved at all  States mom had a fall on Thursday and is in the hospital with a broken rib  Definitely adding to her stress  Then they want to re-evaluate her for her workers comp case  Also she has had SOB after going up stairs, so she went to her liver doctor  He took her off of Mora Duffy but doesn't think that was contributing  He recommends she see her PCP  States that [facility] finally got back to her and provided her with 2 names  One does not take any insurance and the other one does not come up as an [employer] provider so she has to call  Will refer to Dr Tyler Tiwari for his review

## 2021-03-25 NOTE — TELEPHONE ENCOUNTER
Cuauhtemoc Edilson is in agreement with going for blood work tomorrow and also getting an EKG     However she does not feel its a good idea to go down on her dose of Nortriptyline  States with all the stress she is under, and 35 MG not working, she does not want to lower dose  Will refer to Dr Crystal Arenas for his information

## 2021-03-26 ENCOUNTER — OFFICE VISIT (OUTPATIENT)
Dept: LAB | Facility: CLINIC | Age: 61
End: 2021-03-26
Payer: COMMERCIAL

## 2021-03-26 ENCOUNTER — IMMUNIZATIONS (OUTPATIENT)
Dept: FAMILY MEDICINE CLINIC | Facility: HOSPITAL | Age: 61
End: 2021-03-26
Payer: COMMERCIAL

## 2021-03-26 ENCOUNTER — APPOINTMENT (OUTPATIENT)
Dept: OCCUPATIONAL THERAPY | Facility: CLINIC | Age: 61
End: 2021-03-26
Payer: COMMERCIAL

## 2021-03-26 ENCOUNTER — APPOINTMENT (OUTPATIENT)
Dept: LAB | Facility: CLINIC | Age: 61
End: 2021-03-26
Payer: COMMERCIAL

## 2021-03-26 ENCOUNTER — TRANSCRIBE ORDERS (OUTPATIENT)
Dept: LAB | Facility: CLINIC | Age: 61
End: 2021-03-26

## 2021-03-26 DIAGNOSIS — Z23 ENCOUNTER FOR IMMUNIZATION: Primary | ICD-10-CM

## 2021-03-26 DIAGNOSIS — Z79.899 HIGH RISK MEDICATION USE: ICD-10-CM

## 2021-03-26 LAB
ATRIAL RATE: 78 BPM
P AXIS: 62 DEGREES
PR INTERVAL: 154 MS
QRS AXIS: 39 DEGREES
QRSD INTERVAL: 82 MS
QT INTERVAL: 396 MS
QTC INTERVAL: 451 MS
T WAVE AXIS: 58 DEGREES
VENTRICULAR RATE: 78 BPM

## 2021-03-26 PROCEDURE — 93010 ELECTROCARDIOGRAM REPORT: CPT | Performed by: INTERNAL MEDICINE

## 2021-03-26 PROCEDURE — 0001A SARS-COV-2 / COVID-19 MRNA VACCINE (PFIZER-BIONTECH) 30 MCG: CPT

## 2021-03-26 PROCEDURE — 91300 SARS-COV-2 / COVID-19 MRNA VACCINE (PFIZER-BIONTECH) 30 MCG: CPT

## 2021-03-26 PROCEDURE — 36415 COLL VENOUS BLD VENIPUNCTURE: CPT

## 2021-03-26 PROCEDURE — 93005 ELECTROCARDIOGRAM TRACING: CPT

## 2021-03-26 PROCEDURE — 80335 ANTIDEPRESSANT TRICYCLIC 1/2: CPT

## 2021-03-30 LAB — NORTRIP SERPL-MCNC: 41 NG/ML (ref 50–150)

## 2021-03-31 ENCOUNTER — OFFICE VISIT (OUTPATIENT)
Dept: OCCUPATIONAL THERAPY | Facility: CLINIC | Age: 61
End: 2021-03-31
Payer: COMMERCIAL

## 2021-03-31 DIAGNOSIS — M65.331 TRIGGER FINGER, RIGHT MIDDLE FINGER: Primary | ICD-10-CM

## 2021-03-31 PROCEDURE — 97140 MANUAL THERAPY 1/> REGIONS: CPT | Performed by: OCCUPATIONAL THERAPIST

## 2021-03-31 NOTE — PROGRESS NOTES
OT Re-Evaluation     Today's date: 3/31/2021  Patient name: Tommy Evans  : 1960  MRN: [id number]  Referring provider: Jason Cerda MD  Dx:   Encounter Diagnosis     ICD-10-CM    1  Trigger finger, right middle finger  M65 331                   Assessment  Assessment details:  Despite not wearing L+B for 3~ weeks Cee Delarosa shows improved AROM and strength  She grossly has less flexor crepitus and has no c/o clicking/locking and is passivly correctable to neutral  At this point she she shows positive trending to full AROM (with use of skills/orthotics provided)  Will DC  Goals  STG) Motion increased by 25% in 4-6 weeks MET  STG) Strength/Motor skills improved by 25% in 4-6 weeks MET  STG) SwellingEdema improved by 25% in 4-6 weeks MET  STG) Pain decreased by 25% in 4-6 weeks MET    STG) Neutral extension in 3-4 weeks PIP DIP PARTIALLY MET  STG) Grasp strength improved 10% in 3-4 weeks MET    LTG) ADL and IADL skills improved   LTG) Work skills improved  LTG) Leisure skills improved    Patient Goals: to get back to normal      Goals, plan of care and treatment condition discussed with patient  Patient expresses their understanding and questions regarding these issues were addressed        Plan  Plan details: Heavier emphasis on tissue mobilization  Planned modality interventions: thermotherapy: hydrocollator packs and ultrasound  Planned therapy interventions: joint mobilization, manual therapy, Mcclelland taping, motor coordination training, orthotic fitting/training, fine motor coordination training, therapeutic activities, therapeutic exercise, stretching, strengthening and home exercise program  Frequency: 2x week  Duration in visits: 6        Subjective Evaluation    History of Present Illness  Mechanism of injury: Cee Delarosa is S/P R LF trigger finger release on 2020; this had been symptomatic for several years, but this summer got much worse with heavy too use  Pain  Current pain ratin  At worst pain ratin    Hand dominance: right          Objective     Active Range of Motion     Right Digits   Flexion   Middle     MCP: 78    PIP: 92    DIP: 70  Extension   Middle     MCP: 10    PIP: -12    DIP: 10    Passive Range of Motion     Right Digits   Extension   Middle     PIP: 0    Strength/Myotome Testing     Left Wrist/Hand      (2nd hand position)     Trial 1: 54 7    Right Wrist/Hand      (2nd hand position)     Trial 1: 44 5    Tests     Right Wrist/Hand   Positive Bunnel-Littler and intrinsic muscle tightness       Additional Tests Details  Denies N/T    Swelling     Left Wrist/Hand   Middle     Proximal: 6 cm    Right Wrist/Hand   Middle     Proximal: 6 4 cm             Precautions: Universal  HEP: TGE, extrinsic + intrinsic stretch, blocking      Manuals             IASTM                                                    Neuro Re-Ed                                                                                                        Ther Ex             EDC             Isometric grasp             PROM             Wall walking                                                                 Ther Activity             grasping Pow, cyl PW, digicizer            pinching TT pinch circe                                                   Modalities

## 2021-03-31 NOTE — PROGRESS NOTES
Daily Note     Today's date: 3/31/2021  Patient name: Elma Gomez  : 1960  MRN: [id number]  Referring provider: Loren Reese MD  Dx:   Encounter Diagnosis     ICD-10-CM    1  Trigger finger, right middle finger  M65 331                   Subjective: "It's not bad"      Objective: See treatment diary below      Assessment: See RE      Plan: Continue per plan of care        Precautions: Universal  HEP: TGE, extrinsic + intrinsic stretch, blocking      Manuals 3/15 3/31 1/27 1/29 12/31 2/8 2/15 2/22 3/8   IASTM 20 15     15 30 25   Grade 2-3   3  5' 5 5     Intrinsic stretch 5 5 2   10 5     Extrinsic stretch    5        Neuro Re-Ed            MWM       PIP distract w/ext  PIP distraction with ext                                                                           Ther Ex            EDC   R gum 3x10 R gum 3x10 Lg new RB 3x10 R Gummy 3c 10                  PROM     10'       Wall walking     1# 10x       Finger pushups            Reverse blocking   W/ manual resist 2x10         EDC   2x 10 off table 2x 10 off table        Wall walking   1# x 10         Ther Activity            grasping   Pow, cyl BlaPW 3x 10, digicizer B ind 3x 10 Pow, cyl BlaPW 3x 10, digicizer B ind 3x 10 BPW Pwr/cyl 3x10, G digigrip ind 3x10 BlaPW pwr/cyl 3x10, sustained  2d red 30 s x 5      pinching   TT pinch circe x 2 BB TT pinch circe x 2 BB TT pinch [place] 2x       Grasp stabilizing   WF WE S P GFB 3x10 WF WE S P BFB 2x10                                Modalities            MHP  5 5 5 5' 5' 5  5

## 2021-04-02 DIAGNOSIS — B00.9 HERPES SIMPLEX: ICD-10-CM

## 2021-04-03 RX ORDER — ACYCLOVIR 200 MG/1
200 CAPSULE ORAL 3 TIMES DAILY PRN
Qty: 15 CAPSULE | Refills: 4 | Status: SHIPPED | OUTPATIENT
Start: 2021-04-03 | End: 2021-06-21 | Stop reason: SDUPTHER

## 2021-04-09 ENCOUNTER — TELEPHONE (OUTPATIENT)
Dept: INTERNAL MEDICINE CLINIC | Facility: CLINIC | Age: 61
End: 2021-04-09

## 2021-04-09 NOTE — TELEPHONE ENCOUNTER
Got posion blister    Started tuesday with bilat, right, stomach belly button   tiny cluster and swelling  swelling major on right arm   Was helping friend clean out his tree   Pt had long sleeves on   Nothing taken over the counter yet   [facility]

## 2021-04-09 NOTE — TELEPHONE ENCOUNTER
Apply over-the-counter calamine lotion b i d    Call in prescription for betamethasone 0 05% cream -apply to affected area b i d -dispense 60 gram tube with 1 refill -also take over-the-counter loratadine 10 milligrams every morning and over-the-counter diphenhydramine 25 milligrams HS if needed for severe itching- if rash is progressive over the weekend she may need oral corticosteroids we can call in a prescription for those on Monday

## 2021-04-09 NOTE — TELEPHONE ENCOUNTER
Sp/w Owen Mak, gave her all your recommendations  At this time I did not call in betamethasone cream as she already has this at home   She will call us Monday with an update if gets worse
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
## 2021-04-15 ENCOUNTER — OFFICE VISIT (OUTPATIENT)
Dept: PSYCHIATRY | Facility: CLINIC | Age: 61
End: 2021-04-15
Payer: COMMERCIAL

## 2021-04-15 VITALS — DIASTOLIC BLOOD PRESSURE: 82 MMHG | HEART RATE: 79 BPM | SYSTOLIC BLOOD PRESSURE: 119 MMHG

## 2021-04-15 DIAGNOSIS — F41.1 GAD (GENERALIZED ANXIETY DISORDER): ICD-10-CM

## 2021-04-15 DIAGNOSIS — F33.0 MDD (MAJOR DEPRESSIVE DISORDER), RECURRENT EPISODE, MILD (HCC): ICD-10-CM

## 2021-04-15 DIAGNOSIS — F42.9 OBSESSIVE-COMPULSIVE DISORDER, UNSPECIFIED TYPE: Primary | ICD-10-CM

## 2021-04-15 DIAGNOSIS — F43.10 PTSD (POST-TRAUMATIC STRESS DISORDER): ICD-10-CM

## 2021-04-15 PROCEDURE — 99213 OFFICE O/P EST LOW 20 MIN: CPT | Performed by: PSYCHIATRY & NEUROLOGY

## 2021-04-15 PROCEDURE — 90833 PSYTX W PT W E/M 30 MIN: CPT | Performed by: PSYCHIATRY & NEUROLOGY

## 2021-04-15 RX ORDER — TRAZODONE HYDROCHLORIDE 50 MG/1
25-50 TABLET ORAL
Qty: 30 TABLET | Refills: 2 | Status: SHIPPED | OUTPATIENT
Start: 2021-04-15

## 2021-04-16 ENCOUNTER — IMMUNIZATIONS (OUTPATIENT)
Dept: FAMILY MEDICINE CLINIC | Facility: HOSPITAL | Age: 61
End: 2021-04-16

## 2021-04-16 DIAGNOSIS — Z23 ENCOUNTER FOR IMMUNIZATION: Primary | ICD-10-CM

## 2021-04-16 PROCEDURE — 0002A SARS-COV-2 / COVID-19 MRNA VACCINE (PFIZER-BIONTECH) 30 MCG: CPT

## 2021-04-16 PROCEDURE — 91300 SARS-COV-2 / COVID-19 MRNA VACCINE (PFIZER-BIONTECH) 30 MCG: CPT

## 2021-04-19 ENCOUNTER — TELEPHONE (OUTPATIENT)
Dept: PSYCHIATRY | Facility: CLINIC | Age: 61
End: 2021-04-19

## 2021-04-19 NOTE — TELEPHONE ENCOUNTER
----- Message from Prabha Barry III, DO sent at 4/15/2021  4:48 PM EDT -----  Pt interested in therapy  [facility] only please    ThanksBhavesh

## 2021-04-26 ENCOUNTER — TELEPHONE (OUTPATIENT)
Dept: PSYCHIATRY | Facility: CLINIC | Age: 61
End: 2021-04-26

## 2021-04-26 NOTE — TELEPHONE ENCOUNTER
Sridhar Smith called and wanted to give you an update now that she has been off of Nortriptyline for awhile  She is not longer having issues with the stairs in her house  She is interested in trying that new medication that was suggested during her last appointment

## 2021-04-27 ENCOUNTER — TELEPHONE (OUTPATIENT)
Dept: PSYCHIATRY | Facility: CLINIC | Age: 61
End: 2021-04-27

## 2021-04-27 DIAGNOSIS — F33.0 MDD (MAJOR DEPRESSIVE DISORDER), RECURRENT EPISODE, MILD (HCC): ICD-10-CM

## 2021-04-27 DIAGNOSIS — F43.10 PTSD (POST-TRAUMATIC STRESS DISORDER): ICD-10-CM

## 2021-04-27 DIAGNOSIS — F42.9 OBSESSIVE-COMPULSIVE DISORDER, UNSPECIFIED TYPE: ICD-10-CM

## 2021-04-27 DIAGNOSIS — F41.1 GAD (GENERALIZED ANXIETY DISORDER): Primary | ICD-10-CM

## 2021-04-27 RX ORDER — LAMOTRIGINE 25 MG/1
25 TABLET ORAL DAILY
Qty: 30 TABLET | Refills: 1 | Status: SHIPPED | OUTPATIENT
Start: 2021-04-27 | End: 2021-05-04

## 2021-04-27 NOTE — TELEPHONE ENCOUNTER
Followed up with Orin Noguera  Feeling side effect concerns have resolved now  Going up and down the stairs without issue  Nursing home for mom has been horrible, so she is thinking of moving her  Sure she was on trintellix - upset stomach and vomiting  Maybe effexor? She is not sure  Buspar in 2016 with Dr Elvis Foley    Start lamictal 25mg daily    Lamotrigine PARQ completed including dizziness and balance issues, headaches, vision problems, somnolence, sleep changes, cognitive difficulties, rash (including Forrest-Efrain rash), and others

## 2021-04-30 ENCOUNTER — TELEPHONE (OUTPATIENT)
Dept: PSYCHIATRY | Facility: CLINIC | Age: 61
End: 2021-04-30

## 2021-04-30 NOTE — TELEPHONE ENCOUNTER
Maria M Sexton would like to know if you want her to keep her appointment on Tuesday or if it should be r/s

## 2021-04-30 NOTE — TELEPHONE ENCOUNTER
Returned 1-800-DOCTORS  Keena Handler states that she started her Lamictal on Wednesday and was fine  However yesterday within 1 hour of taking it, her whole body started to itch, including her head  States that she is fine now and does not see any rash nor does she have any other symptoms  Instructed her not to take Lamictal     Will refer to Dr Demi Bess for review

## 2021-04-30 NOTE — TELEPHONE ENCOUNTER
Robb Rand called and is worried that she is having a side effect from her Lamictal She states that she took it on Wednesday night and she was okay  When she took it last night, Thursday, she had such an intense itching all over her body

## 2021-04-30 NOTE — TELEPHONE ENCOUNTER
May have had hives on the back of her shoulder, and itching everywhere  Bumps resolved  No itching now  No rash at any time other than noted, nothing in mouth, hands, face, or other places  Because of conversation she was looking carefully  A: I do not believe this was SJS or a serious reaction  Discussed risks benefits again, what to do if these things are worse than believed  However, she is interested in continuing the medication  I suggested 1/2 pill, and she was reminded of 24hr urgent call service  P: Lamictal 12 5mg daily  If well tolerated without symptoms, may increase david few days to 25mg daily  If recurrence of symptoms, or new rash she will d/c

## 2021-05-04 ENCOUNTER — OFFICE VISIT (OUTPATIENT)
Dept: PSYCHIATRY | Facility: CLINIC | Age: 61
End: 2021-05-04
Payer: COMMERCIAL

## 2021-05-04 DIAGNOSIS — F43.10 PTSD (POST-TRAUMATIC STRESS DISORDER): ICD-10-CM

## 2021-05-04 DIAGNOSIS — F41.1 GAD (GENERALIZED ANXIETY DISORDER): ICD-10-CM

## 2021-05-04 DIAGNOSIS — F33.0 MDD (MAJOR DEPRESSIVE DISORDER), RECURRENT EPISODE, MILD (HCC): ICD-10-CM

## 2021-05-04 DIAGNOSIS — F42.9 OBSESSIVE-COMPULSIVE DISORDER, UNSPECIFIED TYPE: Primary | ICD-10-CM

## 2021-05-04 PROCEDURE — 99213 OFFICE O/P EST LOW 20 MIN: CPT | Performed by: PSYCHIATRY & NEUROLOGY

## 2021-05-04 RX ORDER — FLUOXETINE 20 MG/1
TABLET, FILM COATED ORAL
Qty: 30 TABLET | Refills: 1 | Status: SHIPPED | OUTPATIENT
Start: 2021-05-04 | End: 2021-06-01
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
## 2021-05-05 ENCOUNTER — TELEPHONE (OUTPATIENT)
Dept: PSYCHIATRY | Facility: CLINIC | Age: 61
End: 2021-05-05

## 2021-05-05 NOTE — TELEPHONE ENCOUNTER
Request for Prior Authorization received via fax from [facility] for Fluoxetine 20 MG tablets  Prior Authorization for Fluoxetine 20 MG tablets sent to [address] [place] [address] via [address] [place] [address]  Decision pending  Will refer to Dr Miranda Aranda for his information

## 2021-05-06 ENCOUNTER — TELEPHONE (OUTPATIENT)
Dept: PSYCHIATRY | Facility: CLINIC | Age: 61
End: 2021-05-06

## 2021-05-06 NOTE — TELEPHONE ENCOUNTER
Fax from [facility] with prior authorization approval for Fluoxetine 20 MG tablets good 5/5/2021 - 5/5/2022  Called pharmacy and notified them of approval     Roney Sandy and HELDER on her VM that medication has been approved  Will refer to Dr Sadia Perez for his information

## 2021-05-11 ENCOUNTER — SOCIAL WORK (OUTPATIENT)
Dept: BEHAVIORAL/MENTAL HEALTH CLINIC | Facility: CLINIC | Age: 61
End: 2021-05-11
Payer: COMMERCIAL

## 2021-05-11 DIAGNOSIS — F41.1 GAD (GENERALIZED ANXIETY DISORDER): ICD-10-CM

## 2021-05-11 DIAGNOSIS — F33.0 MDD (MAJOR DEPRESSIVE DISORDER), RECURRENT EPISODE, MILD (HCC): Primary | ICD-10-CM

## 2021-05-11 DIAGNOSIS — F42.9 OBSESSIVE-COMPULSIVE DISORDER, UNSPECIFIED TYPE: ICD-10-CM

## 2021-05-11 PROCEDURE — 90791 PSYCH DIAGNOSTIC EVALUATION: CPT | Performed by: COUNSELOR
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
## 2021-05-13 ENCOUNTER — APPOINTMENT (OUTPATIENT)
Dept: LAB | Facility: CLINIC | Age: 61
End: 2021-05-13
Payer: COMMERCIAL

## 2021-05-13 DIAGNOSIS — Z78.9 NEED FOR FOLLOW-UP BY SOCIAL WORKER: ICD-10-CM

## 2021-05-13 DIAGNOSIS — D47.2 SMOLDERING MULTIPLE MYELOMA (SMM): ICD-10-CM

## 2021-05-13 DIAGNOSIS — K74.3 PRIMARY BILIARY CHOLANGITIS (HCC): Chronic | ICD-10-CM

## 2021-05-13 LAB
25(OH)D3 SERPL-MCNC: 23.7 NG/ML (ref 30–100)
ALBUMIN SERPL BCP-MCNC: 3.4 G/DL (ref 3.5–5)
ALP SERPL-CCNC: 283 U/L (ref 46–116)
ALT SERPL W P-5'-P-CCNC: 46 U/L (ref 12–78)
ANION GAP SERPL CALCULATED.3IONS-SCNC: 4 MMOL/L (ref 4–13)
AST SERPL W P-5'-P-CCNC: 49 U/L (ref 5–45)
BILIRUB SERPL-MCNC: 0.37 MG/DL (ref 0.2–1)
BUN SERPL-MCNC: 14 MG/DL (ref 5–25)
CALCIUM ALBUM COR SERPL-MCNC: 9.9 MG/DL (ref 8.3–10.1)
CALCIUM SERPL-MCNC: 9.4 MG/DL (ref 8.3–10.1)
CHLORIDE SERPL-SCNC: 102 MMOL/L (ref 100–108)
CO2 SERPL-SCNC: 29 MMOL/L (ref 21–32)
CREAT SERPL-MCNC: 1.05 MG/DL (ref 0.6–1.3)
GFR SERPL CREATININE-BSD FRML MDRD: 57 ML/MIN/1.73SQ M
GLUCOSE P FAST SERPL-MCNC: 147 MG/DL (ref 65–99)
POTASSIUM SERPL-SCNC: 4 MMOL/L (ref 3.5–5.3)
PROT SERPL-MCNC: 9.2 G/DL (ref 6.4–8.2)
SODIUM SERPL-SCNC: 135 MMOL/L (ref 136–145)

## 2021-05-13 PROCEDURE — 82306 VITAMIN D 25 HYDROXY: CPT

## 2021-05-13 PROCEDURE — 36415 COLL VENOUS BLD VENIPUNCTURE: CPT

## 2021-05-13 PROCEDURE — 80053 COMPREHEN METABOLIC PANEL: CPT

## 2021-05-18 ENCOUNTER — SOCIAL WORK (OUTPATIENT)
Dept: BEHAVIORAL/MENTAL HEALTH CLINIC | Facility: CLINIC | Age: 61
End: 2021-05-18
Payer: COMMERCIAL

## 2021-05-18 DIAGNOSIS — F42.9 OBSESSIVE-COMPULSIVE DISORDER, UNSPECIFIED TYPE: ICD-10-CM

## 2021-05-18 DIAGNOSIS — F41.8 DEPRESSION WITH ANXIETY: Primary | Chronic | ICD-10-CM

## 2021-05-18 PROCEDURE — 90834 PSYTX W PT 45 MINUTES: CPT | Performed by: COUNSELOR

## 2021-05-18 NOTE — PSYCH
This note was not shared with the patient due to this is a psychotherapy note    Psychotherapy Provided: Individual Psychotherapy 50 minutes     Length of time in session: 50 minutes from 8:10-9AM, follow up in 1 week    Encounter Diagnosis     ICD-10-CM    1  Depression with anxiety  F41 8    2  Obsessive-compulsive disorder, unspecified type  F42 9        Goals addressed in session: Goal 1 and Goal 2     Pain:      none    0    Current suicide risk : Low     D: Clinician met with Klarissa Sam to discussed current obsessive thoughts and process through incidents triggering those thoughts  Klarissa Sam repots continued contact with ex paramour and processed through distress related to interactions  Clinician validated frustration with obsessive thoughts and discussed ways to manage those thoughts through healthy communication  Klarissa Sam reports having trouble identifying the intentions of her ex paramour due to his actions and words  Clinician processed thought process in attempts to bring clarity to situation  A: Klarissa Sam was open and engaged in the session  Klarissa Sam was willing to discuss possible solutions but is unsure of what she wants to do as she would lie to keep ex paramour in her life  P: Continue to meet for weekly therapy  Behavioral Health Treatment Plan [facility]: Diagnosis and Treatment Plan explained to Qi Rosales relates understanding diagnosis and is agreeable to Treatment Plan   Yes

## 2021-05-19 ENCOUNTER — TELEPHONE (OUTPATIENT)
Dept: INTERNAL MEDICINE CLINIC | Facility: CLINIC | Age: 61
End: 2021-05-19

## 2021-05-19 ENCOUNTER — OFFICE VISIT (OUTPATIENT)
Dept: INTERNAL MEDICINE CLINIC | Facility: CLINIC | Age: 61
End: 2021-05-19
Payer: COMMERCIAL

## 2021-05-19 VITALS
RESPIRATION RATE: 14 BRPM | DIASTOLIC BLOOD PRESSURE: 72 MMHG | BODY MASS INDEX: 21.86 KG/M2 | HEIGHT: 69 IN | TEMPERATURE: 97.4 F | SYSTOLIC BLOOD PRESSURE: 120 MMHG | WEIGHT: 147.6 LBS | HEART RATE: 78 BPM

## 2021-05-19 DIAGNOSIS — G57.71 COMPLEX REGIONAL PAIN SYNDROME TYPE 2 OF RIGHT LOWER EXTREMITY: ICD-10-CM

## 2021-05-19 DIAGNOSIS — E55.9 VITAMIN D DEFICIENCY: Primary | ICD-10-CM

## 2021-05-19 DIAGNOSIS — K74.3 PRIMARY BILIARY CHOLANGITIS (HCC): Chronic | ICD-10-CM

## 2021-05-19 DIAGNOSIS — M81.0 OSTEOPOROSIS WITHOUT CURRENT PATHOLOGICAL FRACTURE, UNSPECIFIED OSTEOPOROSIS TYPE: Chronic | ICD-10-CM

## 2021-05-19 DIAGNOSIS — Z12.11 SCREENING FOR COLON CANCER: ICD-10-CM

## 2021-05-19 DIAGNOSIS — M54.16 LUMBAR RADICULOPATHY: Chronic | ICD-10-CM

## 2021-05-19 DIAGNOSIS — I10 ESSENTIAL HYPERTENSION: Chronic | ICD-10-CM

## 2021-05-19 DIAGNOSIS — G90.521 COMPLEX REGIONAL PAIN SYNDROME I OF RIGHT LOWER LIMB: ICD-10-CM

## 2021-05-19 DIAGNOSIS — E55.9 VITAMIN D DEFICIENCY: ICD-10-CM

## 2021-05-19 DIAGNOSIS — Z12.31 ENCOUNTER FOR SCREENING MAMMOGRAM FOR BREAST CANCER: ICD-10-CM

## 2021-05-19 DIAGNOSIS — E78.5 HYPERLIPIDEMIA, UNSPECIFIED HYPERLIPIDEMIA TYPE: Chronic | ICD-10-CM

## 2021-05-19 PROCEDURE — 99214 OFFICE O/P EST MOD 30 MIN: CPT | Performed by: INTERNAL MEDICINE

## 2021-05-19 RX ORDER — ERGOCALCIFEROL 1.25 MG/1
50000 CAPSULE ORAL
Qty: 4 CAPSULE | Refills: 2 | Status: SHIPPED | OUTPATIENT
Start: 2021-05-19 | End: 2021-11-26
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
## 2021-05-19 NOTE — TELEPHONE ENCOUNTER
----- Message from Teddy Gramajo MD sent at 5/19/2021  7:30 AM EDT -----   She she mentioned she may need prescription for vitamin-D 70463 units 1 p o  monthly dispense 3-refill 3-also forgot to schedule DEXA scan- schedule in July or after with diagnosis of osteoporosis

## 2021-05-19 NOTE — TELEPHONE ENCOUNTER
I already ordered Dexa and Mammo at checkout,   Also I called her and left  that will send vitamin D to [facility]  Please sign this encounter in order to send RX

## 2021-05-25 ENCOUNTER — SOCIAL WORK (OUTPATIENT)
Dept: BEHAVIORAL/MENTAL HEALTH CLINIC | Facility: CLINIC | Age: 61
End: 2021-05-25
Payer: COMMERCIAL

## 2021-05-25 DIAGNOSIS — F41.1 GAD (GENERALIZED ANXIETY DISORDER): Primary | ICD-10-CM

## 2021-05-25 DIAGNOSIS — F43.10 PTSD (POST-TRAUMATIC STRESS DISORDER): ICD-10-CM

## 2021-05-25 DIAGNOSIS — F42.9 OBSESSIVE-COMPULSIVE DISORDER, UNSPECIFIED TYPE: ICD-10-CM

## 2021-05-25 PROCEDURE — 90834 PSYTX W PT 45 MINUTES: CPT | Performed by: COUNSELOR
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
## 2021-05-31 DIAGNOSIS — F41.1 GAD (GENERALIZED ANXIETY DISORDER): ICD-10-CM

## 2021-05-31 DIAGNOSIS — F43.10 PTSD (POST-TRAUMATIC STRESS DISORDER): ICD-10-CM

## 2021-05-31 DIAGNOSIS — F33.0 MDD (MAJOR DEPRESSIVE DISORDER), RECURRENT EPISODE, MILD (HCC): ICD-10-CM

## 2021-05-31 DIAGNOSIS — F42.9 OBSESSIVE-COMPULSIVE DISORDER, UNSPECIFIED TYPE: ICD-10-CM

## 2021-06-01 RX ORDER — FLUOXETINE 20 MG/1
TABLET, FILM COATED ORAL
Qty: 30 TABLET | Refills: 1 | Status: SHIPPED | OUTPATIENT
Start: 2021-06-01 | End: 2021-06-03

## 2021-06-02 ENCOUNTER — TELEPHONE (OUTPATIENT)
Dept: PSYCHIATRY | Facility: CLINIC | Age: 61
End: 2021-06-02

## 2021-06-02 DIAGNOSIS — F42.9 OBSESSIVE-COMPULSIVE DISORDER, UNSPECIFIED TYPE: ICD-10-CM

## 2021-06-02 DIAGNOSIS — F41.1 GAD (GENERALIZED ANXIETY DISORDER): ICD-10-CM

## 2021-06-02 DIAGNOSIS — F33.0 MDD (MAJOR DEPRESSIVE DISORDER), RECURRENT EPISODE, MILD (HCC): ICD-10-CM

## 2021-06-02 DIAGNOSIS — F43.10 PTSD (POST-TRAUMATIC STRESS DISORDER): ICD-10-CM

## 2021-06-02 NOTE — TELEPHONE ENCOUNTER
Returned [employer]  Tam Thomas informed me that she has a lot going on and now has her mother back due to [facility] not being a good place for her  Tam Thomas states she has not started taking the Prozac yet as she is worried about weight gain and sexual dysfunction  Discussed side effects with her as well as activation syndrome  Advised her to think about at least trying it for maybe a month to see how she feels on it  Not all side effects are present in all people  She is going to give it a try and call me back if she has any further concerns  Will refer to Dr Jaclyn Fountain for his information

## 2021-06-02 NOTE — TELEPHONE ENCOUNTER
Tam Thomas left a message to cancel her appointment on 6/4 with Dr Yeung  She is also requesting a call back from Marcia Ross to discuss Prozac  She is not comfortable taking it do to side effects and weight gain

## 2021-06-03 RX ORDER — FLUOXETINE 20 MG/1
TABLET, FILM COATED ORAL
Qty: 90 TABLET | Refills: 1 | Status: SHIPPED | OUTPATIENT
Start: 2021-06-03 | End: 2021-10-27 | Stop reason: SDUPTHER

## 2021-06-08 ENCOUNTER — APPOINTMENT (OUTPATIENT)
Dept: RADIOLOGY | Age: 61
End: 2021-06-08
Payer: COMMERCIAL

## 2021-06-08 ENCOUNTER — TELEPHONE (OUTPATIENT)
Dept: INTERNAL MEDICINE CLINIC | Facility: CLINIC | Age: 61
End: 2021-06-08

## 2021-06-08 ENCOUNTER — SOCIAL WORK (OUTPATIENT)
Dept: BEHAVIORAL/MENTAL HEALTH CLINIC | Facility: CLINIC | Age: 61
End: 2021-06-08
Payer: COMMERCIAL

## 2021-06-08 DIAGNOSIS — F42.9 OBSESSIVE-COMPULSIVE DISORDER, UNSPECIFIED TYPE: ICD-10-CM

## 2021-06-08 DIAGNOSIS — F33.0 MDD (MAJOR DEPRESSIVE DISORDER), RECURRENT EPISODE, MILD (HCC): Primary | ICD-10-CM

## 2021-06-08 DIAGNOSIS — R07.9 CHEST PAIN, UNSPECIFIED TYPE: Primary | ICD-10-CM

## 2021-06-08 DIAGNOSIS — R07.9 CHEST PAIN, UNSPECIFIED TYPE: ICD-10-CM

## 2021-06-08 DIAGNOSIS — F41.1 GAD (GENERALIZED ANXIETY DISORDER): ICD-10-CM

## 2021-06-08 PROCEDURE — 71100 X-RAY EXAM RIBS UNI 2 VIEWS: CPT

## 2021-06-08 PROCEDURE — 71046 X-RAY EXAM CHEST 2 VIEWS: CPT

## 2021-06-08 PROCEDURE — 90834 PSYTX W PT 45 MINUTES: CPT | Performed by: COUNSELOR

## 2021-06-08 NOTE — TELEPHONE ENCOUNTER
Please handle this-have her get chest x-ray and x-ray of left ribs  Today at [facility] with diagnosis of chest pain-schedule her with me tomorrow at 11:30 a m  -if she is unable to wait until tomorrow please try and schedule her this afternoon with Dr Dias- have x-rays read as stat

## 2021-06-08 NOTE — PSYCH
This note was not shared with the patient due to this is a psychotherapy note     Psychotherapy Provided: Individual Psychotherapy 60 minutes     Length of time in session: 60 minutes from 9-10am, follow up in 1 week    Encounter Diagnosis     ICD-10-CM    1  MDD (major depressive disorder), recurrent episode, mild ([facility] [address] )  F33 0    2  JOSEFINA (generalized anxiety disorder)  F41 1    3  Obsessive-compulsive disorder, unspecified type  F42 9        Goals addressed in session: Goal 1 and Goal 2       Current suicide risk : Low     D: Clinician met with Kamini Proctor in person for individual therapy  Kamini Proctor provided clinician with detailed interactions between herself and past paramour to process during session  Clinician explored how Kamini Proctor is feeling about the interactions and what she would like to see happen in the future  Clinician challenged Courtney's interpretation and her intentions to attempt to get her ex paramour to spend more time with her and ultimately take the relationship to the next step by discussed what Kamini Proctor has control over and what she does not  Clinician also discussed what Kamini Proctor is looking for in a relationship and what she is benefiting from current situation with ex paramour  A: Kamini Proctor was open and engaged in the session but continues to attempt to assume and process ex paramours intention rather than self reflection on what she wants out of current relationship  P Continue to meet with Kamini Proctor weekly for individual therapy  Behavioral Health Treatment Plan [facility]: Diagnosis and Treatment Plan explained to Miller Tejada relates understanding diagnosis and is agreeable to Treatment Plan   Yes

## 2021-06-08 NOTE — TELEPHONE ENCOUNTER
Collette Adrien said she has left rib cage area pain since yesterday   It is 8-9/10 for pain  She has some difficulty breathing if moving around to get up from bed or from sitting  She has an appt at [facility] from 9-10 this morning and was hoping you might want to order an xray that she could do at [facility]  Please advise  If she needs to be seen it would need to be after this appt            [phone]  giulia

## 2021-06-09 ENCOUNTER — OFFICE VISIT (OUTPATIENT)
Dept: INTERNAL MEDICINE CLINIC | Facility: CLINIC | Age: 61
End: 2021-06-09
Payer: COMMERCIAL

## 2021-06-09 VITALS
DIASTOLIC BLOOD PRESSURE: 70 MMHG | BODY MASS INDEX: 21.66 KG/M2 | WEIGHT: 146.2 LBS | HEIGHT: 69 IN | RESPIRATION RATE: 14 BRPM | SYSTOLIC BLOOD PRESSURE: 100 MMHG | HEART RATE: 72 BPM

## 2021-06-09 DIAGNOSIS — M85.80 OSTEOPENIA, UNSPECIFIED LOCATION: ICD-10-CM

## 2021-06-09 DIAGNOSIS — R07.9 CHEST PAIN, UNSPECIFIED TYPE: ICD-10-CM

## 2021-06-09 DIAGNOSIS — C90.00 MULTIPLE MYELOMA, REMISSION STATUS UNSPECIFIED (HCC): Primary | ICD-10-CM

## 2021-06-09 PROCEDURE — 99213 OFFICE O/P EST LOW 20 MIN: CPT | Performed by: INTERNAL MEDICINE

## 2021-06-09 PROCEDURE — 1036F TOBACCO NON-USER: CPT | Performed by: INTERNAL MEDICINE

## 2021-06-09 PROCEDURE — 3008F BODY MASS INDEX DOCD: CPT | Performed by: INTERNAL MEDICINE

## 2021-06-09 NOTE — PROGRESS NOTES
Assessment/Plan:   1  Chest wall pain -I am worried about recurrent rib fracture despite negative x-ray -patient had presented with similar symptomatology several years ago  X-ray of left ribs and chest benign but I am concerned about this fracture  Bone scan ordered -in the interim she will avoid heavy lifting -she is using acetaminophen for pain  2  Osteoporosis-predisposed by menopause, biliary cirrhosis as well as her multiple myeloma  Had received IV bisphosphonates via Oncology up until April of 2019-that is IV Zometa- bone scan previously was consistent with rib lesions felt to be from rib fracture  Repeat bone scan May 2017 showed marked decrease in activity of bilateral ribs and nothing to suggest metastatic disease  As noted scheduled now for bone scan as well as DEXA scan   3  Multiple myeloma -smoldering-monitored by Oncology-no evidence of organ involvement  Having labs every 6 months  Most recently seen in February of this year and Oncology ordering CBC, Chem profile, protein electrophoresis and free light chain ratio every 6 months     All other problems as per note of May 2021  Medical regimen unchanged  Await results of DEXA scan and bone scan  A  This patient will be seen at previously scheduled appointment with previously scheduled labs  addendum -patient had a colonoscopy done in June 2021 by [facility]- this showed mild diverticular disease of the left side of the colon otherwise normal-repeat recommended in 10 years [facility] NEXT VISIT PATIENT HAD COLONOSCOPY IN June OF 2021    Addendum- bone scan shows new area of uptake in the left 8th rib near the costochondral junction anteriorly-likely traumatic in nature -nothing to suggest metastatic disease - as discussed with the patient at the office we await results of DEXA scan but she may need to reinitiate therapy with IV bisphosphonates     a  No problem-specific Assessment & Plan notes found for this encounter  Diagnoses and all orders for this visit:    Multiple myeloma, remission status unspecified ([facility] [address] )  -     NM bone scan whole body; Future  -     DXA bone density spine hip and pelvis; Future    Osteopenia, unspecified location  -     NM bone scan whole body; Future  -     DXA bone density spine hip and pelvis; Future    Chest pain, unspecified type          Subjective:      Patient ID: Pravin Julien is a 63 y o  female  This patient is seen today as an emergency appointment  this patient has left-sided chest pain that began over the last few days  It is worse with movement, getting up and down from a sitting position and intermittently with inspiration  She also has some right-sided pain  She denies any chest trauma  She denies pain in other sites  She has a known history of osteoporosis predisposed by  Menopause, biliary cirrhosis as well as her multiple myeloma  She had received IV bisphosphonates with her last dose in April 2019     In the past she was found to have rib lesions felt to be from rib fracture  This was documented on prior bone scan  Repeat bone scan in May 2017 showed marked decrease in activity the bilateral ribs and nothing to suggest metastatic disease  Post her most recent DEXA scan was in June 2019 showing an L-spine -1 8 and hip of -2 2  This was reviewed in detail  This was done at an outside institution  She has smoldering multiple myeloma is monitored by Oncology  She has not had any evidence of organ involvement  She was most recently seen in February 2021 and they are ordering every 6 months CBC, Chem profile, protein electrophoresis and free light chain ratio       This patient denies any systemic symptoms  Specifically there has been no evidence of fever, night sweats, significant weight loss or significant decrease in appetite  She is bothered by nonhealing areas of the skin  She had contacted Dermatology but is yet to have an appointment with them  X-rays were done and reviewedLEFT RIBS     INDICATION:   History of multiple myeloma  Left lateral chest wall pain      COMPARISON:  Dual-energy chest x-ray study performed on the same date     VIEWS:  XR RIBS 2 VW LEFT         FINDINGS:     There is no displaced fracture or destructive osseous lesion      There is no pneumothorax      The visualized left hemithorax is unremarkable      Soft tissues are unremarkable      IMPRESSION:     No acute osseous abnormality     Workstation performed: [id number]  CHEST      INDICATION:   R07 9: Chest pain, unspecified      COMPARISON:  May 22, 2018      EXAM PERFORMED/VIEWS:  XR CHEST PA & LATERAL  The frontal view was performed utilizing dual energy radiographic technique         FINDINGS:     Cardiomediastinal silhouette appears unremarkable      The lungs are clear  No pneumothorax or pleural effusion      Osseous structures appear within normal limits for patient age      IMPRESSION:     No acute cardiopulmonary disease                The following portions of the patient's history were reviewed and updated as appropriate: allergies, current medications, past family history, past medical history, past social history, past surgical history and problem list     Review of Systems   Constitutional: Negative  HENT: Negative  Respiratory: Negative  Cardiovascular: Positive for chest pain  Gastrointestinal: Negative  Endocrine: Negative  Genitourinary: Negative  Musculoskeletal: Negative  Skin: Negative  Neurological: Negative  Hematological: Negative  Psychiatric/Behavioral: Negative  Objective:      Ht 5' 9" (1 753 m)   Wt 66 3 kg (146 lb 3 2 oz)   LMP  (LMP Unknown)   BMI 21 59 kg/m²          Physical Exam  Vitals signs reviewed  Constitutional:       General: She is not in acute distress  Appearance: Normal appearance  She is not ill-appearing, toxic-appearing or diaphoretic     HENT:      Head: Normocephalic and atraumatic  Right Ear: Tympanic membrane, ear canal and external ear normal       Left Ear: Tympanic membrane, ear canal and external ear normal       Nose: Nose normal  No congestion or rhinorrhea  Mouth/Throat:      Mouth: Mucous membranes are moist       Pharynx: Oropharynx is clear  No oropharyngeal exudate or posterior oropharyngeal erythema  Eyes:      General: No scleral icterus  Right eye: No discharge  Left eye: No discharge  Extraocular Movements: Extraocular movements intact  Pupils: Pupils are equal, round, and reactive to light  Neck:      Musculoskeletal: Normal range of motion and neck supple  No neck rigidity or muscular tenderness  Vascular: No carotid bruit  Cardiovascular:      Rate and Rhythm: Normal rate and regular rhythm  Pulses: Normal pulses  Heart sounds: Normal heart sounds  No murmur  No friction rub  No gallop  Pulmonary:      Effort: Pulmonary effort is normal  No respiratory distress  Breath sounds: Normal breath sounds  No stridor  No wheezing, rhonchi or rales  Chest:      Chest wall: No tenderness  Comments: Exquisite tenderness over the left lateral ribs  Abdominal:      General: Abdomen is flat  Bowel sounds are normal  There is no distension  Palpations: Abdomen is soft  There is no mass  Tenderness: There is no abdominal tenderness  There is no right CVA tenderness, left CVA tenderness, guarding or rebound  Hernia: No hernia is present  Musculoskeletal: Normal range of motion  General: No swelling, tenderness, deformity or signs of injury  Right lower leg: No edema  Left lower leg: No edema  Comments: Known changes of the right lower leg as before   Lymphadenopathy:      Cervical: No cervical adenopathy  Skin:     General: Skin is warm and dry  Coloration: Skin is not jaundiced or pale  Findings: No bruising, erythema, lesion or rash     Neurological: General: No focal deficit present  Mental Status: She is alert and oriented to person, place, and time  Mental status is at baseline  Cranial Nerves: No cranial nerve deficit  Sensory: No sensory deficit  Motor: No weakness  Coordination: Coordination normal       Gait: Gait normal       Deep Tendon Reflexes: Reflexes normal    Psychiatric:         Mood and Affect: Mood normal          Behavior: Behavior normal          Thought Content:  Thought content normal          Judgment: Judgment normal

## 2021-06-16 ENCOUNTER — SOCIAL WORK (OUTPATIENT)
Dept: BEHAVIORAL/MENTAL HEALTH CLINIC | Facility: CLINIC | Age: 61
End: 2021-06-16
Payer: COMMERCIAL

## 2021-06-16 DIAGNOSIS — F42.9 OBSESSIVE-COMPULSIVE DISORDER, UNSPECIFIED TYPE: ICD-10-CM

## 2021-06-16 DIAGNOSIS — F41.1 GAD (GENERALIZED ANXIETY DISORDER): ICD-10-CM

## 2021-06-16 DIAGNOSIS — F33.0 MDD (MAJOR DEPRESSIVE DISORDER), RECURRENT EPISODE, MILD (HCC): ICD-10-CM

## 2021-06-16 DIAGNOSIS — F43.10 PTSD (POST-TRAUMATIC STRESS DISORDER): Primary | ICD-10-CM

## 2021-06-16 PROCEDURE — 90834 PSYTX W PT 45 MINUTES: CPT | Performed by: COUNSELOR

## 2021-06-16 NOTE — PSYCH
Psychotherapy Provided: Individual Psychotherapy 50 minutes     Length of time in session: 50 minutes 9:00-9:50am, follow up in 1 week    Encounter Diagnosis     ICD-10-CM    1  PTSD (post-traumatic stress disorder)  F43 10    2  Obsessive-compulsive disorder, unspecified type  F42 9    3  JOSEFINA (generalized anxiety disorder)  F41 1    4  MDD (major depressive disorder), recurrent episode, mild ([facility] [address] )  F33 0        Goals addressed in session: Goal 1 and Goal 2     Pain:      none    0    Current suicide risk : Low     D: Clinician met with Katelin Nichols for in person individual therapy  Katelinduyen Nichols processed interaction with ex paramour  Katelindarrian Nichols reports continued efforts to spend time with him and build relatonship  Katelinduyen Nichols reports confusion between ex paramours actions, words and behaviors  Clinician processed how Katelin Nichols is feeling about current relationship and validated frustration  Clinician challenged Courtney's thoughts on her own self worth and what she wants out of an intimate relationship  A: Katelin Nichols was engaged in the session  She continues to struggle with making a decision on what she is looking for from current relationship and what it would be like to have a healthy conversations about her needs and wants  P: Continue to meet with Katelin Nichols weekly for individual therapy  Behavioral Health Treatment Plan [facility]: Diagnosis and Treatment Plan explained to Lesly Reyes relates understanding diagnosis and is agreeable to Treatment Plan   Yes

## 2021-06-18 ENCOUNTER — HOSPITAL ENCOUNTER (OUTPATIENT)
Dept: RADIOLOGY | Facility: HOSPITAL | Age: 61
Discharge: HOME | End: 2021-06-18
Payer: COMMERCIAL

## 2021-06-18 DIAGNOSIS — C90.00 MULTIPLE MYELOMA, REMISSION STATUS UNSPECIFIED (HCC): ICD-10-CM

## 2021-06-18 DIAGNOSIS — M85.80 OSTEOPENIA, UNSPECIFIED LOCATION: ICD-10-CM

## 2021-06-18 PROCEDURE — A9503 TC99M MEDRONATE: HCPCS

## 2021-06-18 PROCEDURE — 78306 BONE IMAGING WHOLE BODY: CPT

## 2021-06-18 PROCEDURE — G1004 CDSM NDSC: HCPCS

## 2021-06-21 ENCOUNTER — TELEPHONE (OUTPATIENT)
Dept: INTERNAL MEDICINE CLINIC | Facility: CLINIC | Age: 61
End: 2021-06-21

## 2021-06-21 ENCOUNTER — OFFICE VISIT (OUTPATIENT)
Dept: INTERNAL MEDICINE CLINIC | Facility: CLINIC | Age: 61
End: 2021-06-21
Payer: COMMERCIAL

## 2021-06-21 VITALS — HEART RATE: 72 BPM | RESPIRATION RATE: 12 BRPM | DIASTOLIC BLOOD PRESSURE: 70 MMHG | SYSTOLIC BLOOD PRESSURE: 120 MMHG

## 2021-06-21 DIAGNOSIS — B00.9 HERPES SIMPLEX: ICD-10-CM

## 2021-06-21 DIAGNOSIS — R21 SKIN RASH: Primary | ICD-10-CM

## 2021-06-21 PROBLEM — S22.32XD CLOSED FRACTURE OF ONE RIB OF LEFT SIDE WITH ROUTINE HEALING: Status: ACTIVE | Noted: 2021-06-21

## 2021-06-21 PROCEDURE — 99213 OFFICE O/P EST LOW 20 MIN: CPT | Performed by: INTERNAL MEDICINE

## 2021-06-21 PROCEDURE — 1036F TOBACCO NON-USER: CPT | Performed by: INTERNAL MEDICINE

## 2021-06-21 RX ORDER — METHYLPREDNISOLONE 4 MG/1
TABLET ORAL
Qty: 21 EACH | Refills: 0 | Status: SHIPPED | OUTPATIENT
Start: 2021-06-21 | End: 2021-07-23 | Stop reason: SDUPTHER

## 2021-06-21 RX ORDER — ACYCLOVIR 200 MG/1
CAPSULE ORAL
Qty: 42 CAPSULE | Refills: 4 | Status: SHIPPED | OUTPATIENT
Start: 2021-06-21 | End: 2022-02-10 | Stop reason: ALTCHOICE

## 2021-06-21 RX ORDER — BETAMETHASONE DIPROPIONATE 0.5 MG/G
CREAM TOPICAL 2 TIMES DAILY
Qty: 60 G | Refills: 1 | Status: SHIPPED | OUTPATIENT
Start: 2021-06-21 | End: 2021-11-26

## 2021-06-21 NOTE — PROGRESS NOTES
Assessment/Plan:  1  General care status post colonoscopy-await results  2  Skin rash the arms and legs-this appears to represent plan poison-placed on a Medrol Dosepak  She will use betamethasone cream 0 05% b i d  P r n  As well as calamine lotion b i d  P r n   3  Minimal perliche of the corner of her left mouth -4 flare-up she will use clotrimazole cream b i d  P r n   4  Rash to buttocks-suspect recurrent herpes simplex-placed on acyclovir 400 milligrams t i d  For 1 week-for the 2nd annular area she will also apply clotrimazole cream b i d   5  Recent noted chest pain-felt to be the basis of her rib fracture a-plain film negative bone scan shows fracture of the left 8th rib-pain is improving  6  Osteoporosis-predisposed by menopause, biliary cirrhosis as well as her multiple myeloma  Had received IV bisphosphonates be Oncology up until April 2019 that his IVs a meta  Bone scan in the past consistent with rib lesions felt to be from fractures  Repeat bone scan in May 2017 showed marked decrease in activity but now is noted with a new area  Await results of DEXA scan but will likely need referral back Oncology for reinstitution 0 med  7  Multiple myeloma-smoldering-monitor by Oncology  No evidence of organ involvement  Having labs every 6 months  Seen in February of 2021 and Oncology ordering CBC, Chem profile, protein electrophoresis and free light chain ratio every 6 months    All other problems as per note of May 2021    Insert medical regimen Medrol Dosepak use as directed, Benadryl 25 milligrams HS, calamine lotion b i d  Is a plan poison, betamethasone cream 0 05% b i d  P r n   Areas of plan poison, basically clear 200 milligrams -2 p o  T i d  For 1 week for flare-up of herpes simplex, clotrimazole cream to annular rash of the buttocks as well as area perliche, a a Actigall 407 milligrams -2 in the a m  1 in the evening although she often skip the evening dose, vitamin D3/2000 units a day, vitamin-D-50000 units monthly, multivitamin, tramadol 50 milligrams half tablet as needed for severe pain, generic Dyazide 37 5/251 p o  Daily, Luvox dose per Psychiatry, trazodone 50 milligrams 1/2 to 1 p o  HS p r n  for insomnia      This patient will be seen at previously scheduled appointment with previously scheduled labs  Addendum-bone scan done in June 2021 shows solitary uptake in the anterior aspect left 8th rib-near the costochondral junction-probably traumatic-radiology wanted to consider repeat bone scan in 3 months which would be due in September-DEXA scan done in August shows L-spine -2 4 and hip of-1 5-10 year risk of hip fracture 1 2% and 10 year risk of major fracture 13%-note sent to Oncology regarding all the above and potential reinstitution of IV bisphosphonates [facility]ES [facility]ART NEXT VISIT PATIENT HAD DEXA SCAN DONE IN AUGUST 2021 AND MAY NEED FOLLOW-UP BONE SCAN IN THE FUTURE    Addendum- patient called the office on September the 23 with symptoms of cystitis with dysuria and frequency -she is immunosuppressed with smoldering multiple myeloma -placed on Macrobid 100 milligrams 1 p o  b i d  for 1 week    Addendum- patient later called back stating symptoms unchanged -wet for formal urine culture which grew Proteus which was resistant to nitrofurantoin -she has a history of sulfa allergy    She has history of childhood reaction to penicillin -she organism is sensitive to cephalosporin-placed on cephalexin 250 milligrams 1 p o  q i d  for 1 week-treat for week because of her history of smoldering multiple myeloma    Addendum- patient called the office stating that in the past after she had received IV cephalexin she was discharged with to a bar" with blind" told this point not use cephalexin it place on Cipro 250 milligrams 1 p o  b i d  for week- REVIEW in DETAIL NEXT VISIT    Addendum -patient called the office on October 22nd with sore throat facial fullness ear pain with concerned about URTI -as noted she has smoldering multiple myeloma -empirically placed on Ceftin 250 milligrams b i d  for week  COVID swab testing will be obtained she will self quarantine until results available  She described previously her possible reaction to cephalexin -she has used other cephalosporins in the past and I feel at this point we can proceed with use of Ceftin    Addendum-COVID testing normal          No problem-specific Assessment & Plan notes found for this encounter  Diagnoses and all orders for this visit:    Skin rash  -     methylPREDNISolone 4 MG tablet therapy pack; Use as directed on package  -     betamethasone, augmented, (DIPROLENE-AF) 0 05 % cream; Apply topically 2 (two) times a day    Herpes simplex  -     acyclovir (ZOVIRAX) 200 mg capsule; 2 po tid for 1 week          Subjective:      Patient ID: Anant Spencer is a 63 y o  female  This patient is seen today as an emergency appointment  She had a recent colonoscopy  She then began with skin lesions of the buttocks  She is worried about recurrent herpes simplex  She also noted diffuse skin areas of the arms and legs and is worried about plan poison  She has been evaluated area at the corner of her left lip  She also wanted to evaluate recent bone scan  This was reviewed in detail  BONE SCAN  WHOLE BODY     INDICATION: C90 00: Multiple myeloma not having achieved remission  M85 80: Other specified disorders of bone density and structure, unspecified site     PREVIOUS FILM CORRELATION:    Previous study from May 9, 2017 previous rib series from June 8, 2021     TECHNIQUE:   This study was performed following the intravenous administration of 26 mCi Tc-99m labeled MDP    Delayed, anterior and posterior whole body images were acquired, 2-3 hours after radiopharmaceutical administration      FINDINGS:     There are no randomly distributed foci of increased uptake in the axial or appendicular skeleton to suggest metastatic disease  There is a new area of uptake in the in the left 8th rib near the costochondral junction anteriorly  This may be traumatic in nature   Degenerative uptake in the shoulder joint  Right knee arthroplasty  Uptake in the knee on degenerative basis There is no scintigraphic evidence of osseous metastasis        IMPRESSION:     1  Solitary Focal area of uptake in the anterior aspect left 8th rib near the costochondral junction probably traumatic  Suggest follow-up to demonstrate resolution in 3 months     No randomly distributed foci of increased uptake in the axial or appendicular skeleton to suggest metastatic disease  The study was marked in EPIC for significant notification     A  This patient denies any systemic symptoms  Specifically there has been no evidence of fever, night sweats, significant weight loss or significant decrease in appetite  She wanted to know about the effectiveness of the COVID vaccine with her smoldering multiple myeloma  We reviewed she is immune to per suppressed to some degree      The following portions of the patient's history were reviewed and updated as appropriate: allergies, current medications, past family history, past medical history, past social history, past surgical history and problem list     Review of Systems   Constitutional: Negative  HENT: Negative  Respiratory: Negative  Cardiovascular: Positive for chest pain  Gastrointestinal: Negative  Endocrine: Negative  Genitourinary: Negative  Musculoskeletal: Negative  Skin: Positive for rash  Neurological: Negative  Hematological: Negative  Psychiatric/Behavioral: Negative  Objective:      LMP  (LMP Unknown)          Physical Exam  Constitutional:       Appearance: Normal appearance  She is normal weight  HENT:      Head: Normocephalic and atraumatic  Mouth/Throat:      Mouth: Mucous membranes are dry        Comments: Minimal irritation of the hard palate  Eyes: Conjunctiva/sclera: Conjunctivae normal    Cardiovascular:      Rate and Rhythm: Normal rate and regular rhythm  Pulses: Normal pulses  Pulmonary:      Effort: Pulmonary effort is normal  No respiratory distress  Breath sounds: No stridor  No wheezing, rhonchi or rales  Abdominal:      General: Abdomen is flat  There is no distension  Palpations: Abdomen is soft  There is no mass  Tenderness: There is no abdominal tenderness  There is no guarding or rebound  Hernia: No hernia is present  Musculoskeletal:      Cervical back: Normal range of motion and neck supple  Skin:     Comments: Cluster of areas of the right buttock suggestive of herpes simplex-2nd annular area above the cluster    Diffuse areas of the arms and legs consistent with plant poison   Neurological:      General: No focal deficit present  Mental Status: She is alert and oriented to person, place, and time  Mental status is at baseline  Psychiatric:         Mood and Affect: Mood normal          Thought Content:  Thought content normal          Judgment: Judgment normal

## 2021-06-21 NOTE — TELEPHONE ENCOUNTER
Patient called with c/o having a endoscopy last tuesday and still very sore and itchy around the area  Also mentioned she has about a 2 inch round bite/sore above her upper butt area right side  Also she was pulling weeds yesterday and now a developed a severe rash all over her face/neck/arms/mouth  She has been using benadryl , creams and nothings helping        She is requesting an appointment today

## 2021-06-22 ENCOUNTER — SOCIAL WORK (OUTPATIENT)
Dept: BEHAVIORAL/MENTAL HEALTH CLINIC | Facility: CLINIC | Age: 61
End: 2021-06-22
Payer: COMMERCIAL

## 2021-06-22 DIAGNOSIS — F43.10 PTSD (POST-TRAUMATIC STRESS DISORDER): ICD-10-CM

## 2021-06-22 DIAGNOSIS — F42.9 OBSESSIVE-COMPULSIVE DISORDER, UNSPECIFIED TYPE: Primary | ICD-10-CM

## 2021-06-22 DIAGNOSIS — F33.0 MDD (MAJOR DEPRESSIVE DISORDER), RECURRENT EPISODE, MILD (HCC): ICD-10-CM

## 2021-06-22 DIAGNOSIS — F41.1 GAD (GENERALIZED ANXIETY DISORDER): ICD-10-CM

## 2021-06-22 PROCEDURE — 90834 PSYTX W PT 45 MINUTES: CPT | Performed by: COUNSELOR

## 2021-06-22 NOTE — PSYCH
Psychotherapy Provided: Individual Psychotherapy 60 minutes     Length of time in session: 60 minutes from 9-10am, follow up in 1 week    Encounter Diagnosis     ICD-10-CM    1  Obsessive-compulsive disorder, unspecified type  F42 9    2  JOSEFINA (generalized anxiety disorder)  F41 1    3  [facility]  F33 0    4  PTSD (post-traumatic stress disorder)  F43 10        Goals addressed in session: Goal 1     Pain:      none    0    Current suicide risk : Low     D: Clinician met with Lindsey Kebede in person for individual therapy  Lindsey Kebede processed interactions with ex paramour  Clinician explored how Lindsey Lehmanjennifer is feeling about he interactions and she would like to happen in the future  Clinician explored communication patterns and modeled assertive communications skills to assist in discussing her emotions and intentions  Clinician discussed importance of communication and working on doing so in a way that she is able to get her needs met  A: Lindsey Kebede was open and engaged in the session  She reports overall stable mood  P: Continue to meet with Lindsey Kebede for weekly individual therapy  Behavioral Health Treatment Plan [facility]: Diagnosis and Treatment Plan explained to Dean Smith relates understanding diagnosis and is agreeable to Treatment Plan   Yes

## 2021-06-23 ENCOUNTER — TELEPHONE (OUTPATIENT)
Dept: PSYCHIATRY | Facility: CLINIC | Age: 61
End: 2021-06-23

## 2021-06-23 NOTE — TELEPHONE ENCOUNTER
Patient called stating that her previous therapist tried to reach this provider at your direct line and it doesn't seem that the voicemail is set up  The patient stated that maybe you can reach out to the therapist instead and that you had the information

## 2021-06-25 DIAGNOSIS — R60.9 EDEMA, UNSPECIFIED TYPE: ICD-10-CM

## 2021-06-25 RX ORDER — TRIAMTERENE AND HYDROCHLOROTHIAZIDE 37.5; 25 MG/1; MG/1
TABLET ORAL
Qty: 90 TABLET | Refills: 3 | Status: SHIPPED | OUTPATIENT
Start: 2021-06-25 | End: 2022-07-25

## 2021-06-29 ENCOUNTER — SOCIAL WORK (OUTPATIENT)
Dept: BEHAVIORAL/MENTAL HEALTH CLINIC | Facility: CLINIC | Age: 61
End: 2021-06-29
Payer: COMMERCIAL

## 2021-06-29 DIAGNOSIS — F41.1 GAD (GENERALIZED ANXIETY DISORDER): ICD-10-CM

## 2021-06-29 DIAGNOSIS — F43.10 PTSD (POST-TRAUMATIC STRESS DISORDER): ICD-10-CM

## 2021-06-29 DIAGNOSIS — F42.9 OBSESSIVE-COMPULSIVE DISORDER, UNSPECIFIED TYPE: ICD-10-CM

## 2021-06-29 DIAGNOSIS — F33.0 MDD (MAJOR DEPRESSIVE DISORDER), RECURRENT EPISODE, MILD (HCC): Primary | ICD-10-CM

## 2021-06-29 PROCEDURE — 90834 PSYTX W PT 45 MINUTES: CPT | Performed by: COUNSELOR

## 2021-06-29 NOTE — PSYCH
Psychotherapy Provided: Individual Psychotherapy 50 minutes     Length of time in session: 50 minutes from 9:00-9:50 am, follow up in 1 week    Encounter Diagnosis     ICD-10-CM    1  MDD (major depressive disorder), recurrent episode, mild ([facility] [address] )  F33 0    2  JOSEFINA (generalized anxiety disorder)  F41 1    3  Obsessive-compulsive disorder, unspecified type  F42 9    4  PTSD (post-traumatic stress disorder)  F43 10        Goals addressed in session: Goal 1 and Goal 2     Current suicide risk : Low     D: Clinician met with Alvaro Perez in person for individual therapy  Clinician reported to Alvaro Mie that she was able to speak with past therapist to coordinate care and discussed importance of clinician remaining unbiased and not giving advance as it is not therapeutic  Alvaro Perez voices understanding  Clinician and Alvaro Mie discussed current relationship and how Alvaro Perez is feeling about interactions  Alvaro Perez reports that she is spending time with ex paramour daily and has been able to express to him that her feelings are growing for him  Clinician processed use of healthy communication and reinforced use  A: Alvaro Perez was open and engaged in the session and reports that mood is overall stable  P: Continue to meet weekly for individual therapy  Behavioral Health Treatment Plan [facility]: Diagnosis and Treatment Plan explained to Armin May relates understanding diagnosis and is agreeable to Treatment Plan   Yes

## 2021-07-13 ENCOUNTER — SOCIAL WORK (OUTPATIENT)
Dept: BEHAVIORAL/MENTAL HEALTH CLINIC | Facility: CLINIC | Age: 61
End: 2021-07-13
Payer: COMMERCIAL

## 2021-07-13 DIAGNOSIS — F41.1 GAD (GENERALIZED ANXIETY DISORDER): Primary | ICD-10-CM

## 2021-07-13 DIAGNOSIS — F43.10 PTSD (POST-TRAUMATIC STRESS DISORDER): ICD-10-CM

## 2021-07-13 DIAGNOSIS — F33.0 MDD (MAJOR DEPRESSIVE DISORDER), RECURRENT EPISODE, MILD (HCC): ICD-10-CM

## 2021-07-13 DIAGNOSIS — F42.9 OBSESSIVE-COMPULSIVE DISORDER, UNSPECIFIED TYPE: ICD-10-CM

## 2021-07-13 PROCEDURE — 90834 PSYTX W PT 45 MINUTES: CPT | Performed by: COUNSELOR

## 2021-07-13 NOTE — PSYCH
Psychotherapy Provided: Individual Psychotherapy 50 minutes     Length of time in session: 50 minutes, follow up in 2 week    Encounter Diagnosis     ICD-10-CM    1  JOSEFINA (generalized anxiety disorder)  F41 1    2  MDD (major depressive disorder), recurrent episode, mild ([facility] [address] )  F33 0    3  Obsessive-compulsive disorder, unspecified type  F42 9    4  PTSD (post-traumatic stress disorder)  F43 10        Goals addressed in session: Goal 1 and Goal 2     Current suicide risk : Low     D: Clinician met with Scott Cagle in person for individual therapy  Scott Cagle reports overall good two week since seeing this writer last for therapy  Scott Cagle processed recent interaction and communication of needs with her current paramour  She reports that although they have not labeled their relationship she is feeling better about the progression  Clinician explored Courtney's anxiety about the future and discussed reframing and coping skills  Scott Cagle processed through concerns with communication and discussed alternative ways to express her needs in an assertive way and get her needs met by paramour  A: Scott Cagle was open and engaged in the session  She reports symptoms being manageable and working towards better control over anxiety  P: Titrate therapy frequency to bi monthly at [facility] request      [address]: Diagnosis and Treatment Plan explained to Daniel Faustin relates understanding diagnosis and is agreeable to Treatment Plan   Yes

## 2021-07-23 DIAGNOSIS — R21 SKIN RASH: ICD-10-CM

## 2021-07-23 RX ORDER — METHYLPREDNISOLONE 4 MG/1
TABLET ORAL
Qty: 21 EACH | Refills: 0 | Status: SHIPPED | OUTPATIENT
Start: 2021-07-23 | End: 2021-10-27

## 2021-07-27 ENCOUNTER — SOCIAL WORK (OUTPATIENT)
Dept: BEHAVIORAL/MENTAL HEALTH CLINIC | Facility: CLINIC | Age: 61
End: 2021-07-27
Payer: COMMERCIAL

## 2021-07-27 DIAGNOSIS — F43.10 PTSD (POST-TRAUMATIC STRESS DISORDER): ICD-10-CM

## 2021-07-27 DIAGNOSIS — F42.9 OBSESSIVE-COMPULSIVE DISORDER, UNSPECIFIED TYPE: ICD-10-CM

## 2021-07-27 DIAGNOSIS — F41.1 GAD (GENERALIZED ANXIETY DISORDER): ICD-10-CM

## 2021-07-27 DIAGNOSIS — F33.0 MDD (MAJOR DEPRESSIVE DISORDER), RECURRENT EPISODE, MILD (HCC): Primary | ICD-10-CM

## 2021-07-27 PROCEDURE — 90834 PSYTX W PT 45 MINUTES: CPT | Performed by: COUNSELOR

## 2021-07-27 NOTE — PSYCH
Psychotherapy Provided: Individual Psychotherapy 50 minutes     Length of time in session: 50 minutes, follow up in 2 weeks    Encounter Diagnosis     ICD-10-CM    1  MDD (major depressive disorder), recurrent episode, mild ([facility] [address] )  F33 0    2  JOSEFINA (generalized anxiety disorder)  F41 1    3  Obsessive-compulsive disorder, unspecified type  F42 9    4  PTSD (post-traumatic stress disorder)  F43 10        Goals addressed in session: Goal 1 and Goal 2     Current suicide risk : Low     D: Clinician met with Ruiz Vyas in person for individual therapy  Ruiz Lilliam processed development in current intimate relationship and how she is feeling  Clinician explored interactions between her and paramour and discussed ways to communicate needs in a healthy way  Ruiz Vyas reports overall feeling good about progression of relationship but reports at times worrying about disclosing information in fear that paramour will decide to back off from relationship  Clinician explored causes for this feeling about possible consequences of being vulnerable with paramour while also exploring Courtney's values and needs  A: Ruiz Osmelon was open and engaged in the session  She presented with stable mood and affect and states symptoms are current manageable  P: Continue to meet with Ruiz Vyas every other week for individual therapy  Behavioral Health Treatment Plan [facility]: Diagnosis and Treatment Plan explained to Anamaria Hare relates understanding diagnosis and is agreeable to Treatment Plan   Yes

## 2021-08-04 ENCOUNTER — ANNUAL EXAM (OUTPATIENT)
Dept: OBGYN CLINIC | Facility: CLINIC | Age: 61
End: 2021-08-04
Payer: COMMERCIAL

## 2021-08-04 VITALS
SYSTOLIC BLOOD PRESSURE: 116 MMHG | HEIGHT: 69 IN | DIASTOLIC BLOOD PRESSURE: 76 MMHG | WEIGHT: 146 LBS | BODY MASS INDEX: 21.62 KG/M2

## 2021-08-04 DIAGNOSIS — A60.9 HSV (HERPES SIMPLEX VIRUS) ANOGENITAL INFECTION: ICD-10-CM

## 2021-08-04 DIAGNOSIS — Z11.3 SCREEN FOR STD (SEXUALLY TRANSMITTED DISEASE): Primary | ICD-10-CM

## 2021-08-04 DIAGNOSIS — Z12.31 ENCOUNTER FOR SCREENING MAMMOGRAM FOR BREAST CANCER: ICD-10-CM

## 2021-08-04 DIAGNOSIS — N95.2 VAGINAL ATROPHY: ICD-10-CM

## 2021-08-04 PROCEDURE — 99386 PREV VISIT NEW AGE 40-64: CPT | Performed by: OBSTETRICS & GYNECOLOGY

## 2021-08-04 PROCEDURE — 1036F TOBACCO NON-USER: CPT | Performed by: OBSTETRICS & GYNECOLOGY

## 2021-08-04 PROCEDURE — 3008F BODY MASS INDEX DOCD: CPT | Performed by: OBSTETRICS & GYNECOLOGY

## 2021-08-04 PROCEDURE — G0145 SCR C/V CYTO,THINLAYER,RESCR: HCPCS | Performed by: OBSTETRICS & GYNECOLOGY

## 2021-08-04 PROCEDURE — G0476 HPV COMBO ASSAY CA SCREEN: HCPCS | Performed by: OBSTETRICS & GYNECOLOGY

## 2021-08-04 RX ORDER — VALACYCLOVIR HYDROCHLORIDE 500 MG/1
500 TABLET, FILM COATED ORAL 2 TIMES DAILY
Qty: 10 TABLET | Refills: 0 | Status: SHIPPED | OUTPATIENT
Start: 2021-08-04 | End: 2022-02-10 | Stop reason: ALTCHOICE
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
## 2021-08-06 ENCOUNTER — APPOINTMENT (OUTPATIENT)
Dept: LAB | Facility: HOSPITAL | Age: 61
End: 2021-08-06
Attending: INTERNAL MEDICINE
Payer: COMMERCIAL

## 2021-08-06 ENCOUNTER — APPOINTMENT (OUTPATIENT)
Dept: LAB | Facility: HOSPITAL | Age: 61
End: 2021-08-06
Attending: OBSTETRICS & GYNECOLOGY
Payer: COMMERCIAL

## 2021-08-06 ENCOUNTER — TELEPHONE (OUTPATIENT)
Dept: OBGYN CLINIC | Facility: HOSPITAL | Age: 61
End: 2021-08-06

## 2021-08-06 DIAGNOSIS — Z11.3 SCREEN FOR STD (SEXUALLY TRANSMITTED DISEASE): ICD-10-CM

## 2021-08-06 LAB
ALBUMIN SERPL BCP-MCNC: 2.9 G/DL (ref 3.5–5)
ALP SERPL-CCNC: 306 U/L (ref 46–116)
ALT SERPL W P-5'-P-CCNC: 57 U/L (ref 12–78)
ANION GAP SERPL CALCULATED.3IONS-SCNC: 4 MMOL/L (ref 4–13)
AST SERPL W P-5'-P-CCNC: 50 U/L (ref 5–45)
BASOPHILS # BLD AUTO: 0.02 THOUSANDS/ΜL (ref 0–0.1)
BASOPHILS NFR BLD AUTO: 0 % (ref 0–1)
BILIRUB SERPL-MCNC: 0.29 MG/DL (ref 0.2–1)
BUN SERPL-MCNC: 21 MG/DL (ref 5–25)
CALCIUM ALBUM COR SERPL-MCNC: 9.8 MG/DL (ref 8.3–10.1)
CALCIUM SERPL-MCNC: 8.9 MG/DL (ref 8.3–10.1)
CHLORIDE SERPL-SCNC: 108 MMOL/L (ref 100–108)
CO2 SERPL-SCNC: 27 MMOL/L (ref 21–32)
CREAT SERPL-MCNC: 0.84 MG/DL (ref 0.6–1.3)
EOSINOPHIL # BLD AUTO: 0.1 THOUSAND/ΜL (ref 0–0.61)
EOSINOPHIL NFR BLD AUTO: 2 % (ref 0–6)
ERYTHROCYTE [DISTWIDTH] IN BLOOD BY AUTOMATED COUNT: 17.1 % (ref 11.6–15.1)
GFR SERPL CREATININE-BSD FRML MDRD: 75 ML/MIN/1.73SQ M
GLUCOSE SERPL-MCNC: 89 MG/DL (ref 65–140)
HCT VFR BLD AUTO: 33.4 % (ref 34.8–46.1)
HGB BLD-MCNC: 10.2 G/DL (ref 11.5–15.4)
HPV HR 12 DNA CVX QL NAA+PROBE: NEGATIVE
HPV16 DNA CVX QL NAA+PROBE: NEGATIVE
HPV18 DNA CVX QL NAA+PROBE: NEGATIVE
IGA SERPL-MCNC: 49 MG/DL (ref 70–400)
IGG SERPL-MCNC: 2470 MG/DL (ref 700–1600)
IGM SERPL-MCNC: 186 MG/DL (ref 40–230)
IMM GRANULOCYTES # BLD AUTO: 0.02 THOUSAND/UL (ref 0–0.2)
IMM GRANULOCYTES NFR BLD AUTO: 0 % (ref 0–2)
LYMPHOCYTES # BLD AUTO: 1.73 THOUSANDS/ΜL (ref 0.6–4.47)
LYMPHOCYTES NFR BLD AUTO: 34 % (ref 14–44)
MCH RBC QN AUTO: 25.5 PG (ref 26.8–34.3)
MCHC RBC AUTO-ENTMCNC: 30.5 G/DL (ref 31.4–37.4)
MCV RBC AUTO: 84 FL (ref 82–98)
MONOCYTES # BLD AUTO: 0.4 THOUSAND/ΜL (ref 0.17–1.22)
MONOCYTES NFR BLD AUTO: 8 % (ref 4–12)
NEUTROPHILS # BLD AUTO: 2.82 THOUSANDS/ΜL (ref 1.85–7.62)
NEUTS SEG NFR BLD AUTO: 56 % (ref 43–75)
NRBC BLD AUTO-RTO: 0 /100 WBCS
PLATELET # BLD AUTO: 212 THOUSANDS/UL (ref 149–390)
PMV BLD AUTO: 9.8 FL (ref 8.9–12.7)
POTASSIUM SERPL-SCNC: 4 MMOL/L (ref 3.5–5.3)
PROT SERPL-MCNC: 8.3 G/DL (ref 6.4–8.2)
RBC # BLD AUTO: 4 MILLION/UL (ref 3.81–5.12)
SODIUM SERPL-SCNC: 139 MMOL/L (ref 136–145)
WBC # BLD AUTO: 5.09 THOUSAND/UL (ref 4.31–10.16)

## 2021-08-06 PROCEDURE — 87389 HIV-1 AG W/HIV-1&-2 AB AG IA: CPT

## 2021-08-06 PROCEDURE — 86592 SYPHILIS TEST NON-TREP QUAL: CPT | Performed by: OBSTETRICS & GYNECOLOGY

## 2021-08-06 PROCEDURE — 86695 HERPES SIMPLEX TYPE 1 TEST: CPT | Performed by: OBSTETRICS & GYNECOLOGY

## 2021-08-06 PROCEDURE — 85025 COMPLETE CBC W/AUTO DIFF WBC: CPT

## 2021-08-06 PROCEDURE — 36415 COLL VENOUS BLD VENIPUNCTURE: CPT

## 2021-08-06 PROCEDURE — 80053 COMPREHEN METABOLIC PANEL: CPT

## 2021-08-06 PROCEDURE — 86334 IMMUNOFIX E-PHORESIS SERUM: CPT

## 2021-08-06 PROCEDURE — 84165 PROTEIN E-PHORESIS SERUM: CPT | Performed by: PATHOLOGY

## 2021-08-06 PROCEDURE — 86334 IMMUNOFIX E-PHORESIS SERUM: CPT | Performed by: PATHOLOGY

## 2021-08-06 PROCEDURE — 86696 HERPES SIMPLEX TYPE 2 TEST: CPT | Performed by: OBSTETRICS & GYNECOLOGY

## 2021-08-06 PROCEDURE — 84165 PROTEIN E-PHORESIS SERUM: CPT

## 2021-08-06 PROCEDURE — 82784 ASSAY IGA/IGD/IGG/IGM EACH: CPT

## 2021-08-06 PROCEDURE — 83883 ASSAY NEPHELOMETRY NOT SPEC: CPT

## 2021-08-06 NOTE — TELEPHONE ENCOUNTER
Received Physical Capacities Assessment form to return back to work  Checking with Dr Shelly Santa since patient was last seen 12/9/2020   Thank you

## 2021-08-07 LAB
C TRACH RRNA SPEC QL NAA+PROBE: NOT DETECTED
HSV1 IGG SER IA-ACNC: 46 INDEX (ref 0–0.9)
HSV2 IGG SER IA-ACNC: <0.91 INDEX (ref 0–0.9)
N GONORRHOEA RRNA SPEC QL NAA+PROBE: NOT DETECTED
T VAGINALIS RRNA SPEC QL NAA+PROBE: NOT DETECTED

## 2021-08-08 LAB — HIV 1+2 AB+HIV1 P24 AG SERPL QL IA: NORMAL

## 2021-08-09 LAB
KAPPA LC FREE SER-MCNC: 43.6 MG/L (ref 3.3–19.4)
KAPPA LC FREE/LAMBDA FREE SER: 4.49 {RATIO} (ref 0.26–1.65)
LAMBDA LC FREE SERPL-MCNC: 9.7 MG/L (ref 5.7–26.3)
RPR SER QL: NORMAL

## 2021-08-09 NOTE — TELEPHONE ENCOUNTER
Message reviewed ,  corrections required     The patient last saw Dr Rico Min May of 2020,    over a year ago     The last practitioner she saw in December as you suggested was Dr Danii Adams, for a different problem     Also there have been numerous entries by other medical subspecialties, since the patient was last seen by ourselves  The doctor and I are welcome to go over the disabilities form or physical assessment form that you discuss       Thank you,    JAMIE

## 2021-08-10 ENCOUNTER — TELEPHONE (OUTPATIENT)
Dept: HEMATOLOGY ONCOLOGY | Facility: CLINIC | Age: 61
End: 2021-08-10

## 2021-08-10 ENCOUNTER — HOSPITAL ENCOUNTER (OUTPATIENT)
Dept: RADIOLOGY | Age: 61
Discharge: HOME | End: 2021-08-10
Payer: COMMERCIAL

## 2021-08-10 ENCOUNTER — SOCIAL WORK (OUTPATIENT)
Dept: BEHAVIORAL/MENTAL HEALTH CLINIC | Facility: CLINIC | Age: 61
End: 2021-08-10
Payer: COMMERCIAL

## 2021-08-10 DIAGNOSIS — F33.0 MDD (MAJOR DEPRESSIVE DISORDER), RECURRENT EPISODE, MILD (HCC): Primary | ICD-10-CM

## 2021-08-10 DIAGNOSIS — F43.10 PTSD (POST-TRAUMATIC STRESS DISORDER): ICD-10-CM

## 2021-08-10 DIAGNOSIS — M85.80 OSTEOPENIA, UNSPECIFIED LOCATION: ICD-10-CM

## 2021-08-10 DIAGNOSIS — C90.00 MULTIPLE MYELOMA, REMISSION STATUS UNSPECIFIED (HCC): ICD-10-CM

## 2021-08-10 DIAGNOSIS — F42.9 OBSESSIVE-COMPULSIVE DISORDER, UNSPECIFIED TYPE: ICD-10-CM

## 2021-08-10 DIAGNOSIS — F41.1 GAD (GENERALIZED ANXIETY DISORDER): ICD-10-CM

## 2021-08-10 LAB
ALBUMIN SERPL ELPH-MCNC: 3.81 G/DL (ref 3.5–5)
ALBUMIN SERPL ELPH-MCNC: 46.5 % (ref 52–65)
ALPHA1 GLOB SERPL ELPH-MCNC: 0.3 G/DL (ref 0.1–0.4)
ALPHA1 GLOB SERPL ELPH-MCNC: 3.6 % (ref 2.5–5)
ALPHA2 GLOB SERPL ELPH-MCNC: 0.84 G/DL (ref 0.4–1.2)
ALPHA2 GLOB SERPL ELPH-MCNC: 10.2 % (ref 7–13)
BETA GLOB ABNORMAL SERPL ELPH-MCNC: 0.48 G/DL (ref 0.4–0.8)
BETA1 GLOB SERPL ELPH-MCNC: 5.9 % (ref 5–13)
BETA2 GLOB SERPL ELPH-MCNC: 3.4 % (ref 2–8)
BETA2+GAMMA GLOB SERPL ELPH-MCNC: 0.28 G/DL (ref 0.2–0.5)
GAMMA GLOB ABNORMAL SERPL ELPH-MCNC: 2.49 G/DL (ref 0.5–1.6)
GAMMA GLOB SERPL ELPH-MCNC: 30.4 % (ref 12–22)
IGG/ALB SER: 0.87 {RATIO} (ref 1.1–1.8)
INTERPRETATION UR IFE-IMP: NORMAL
LAB AP GYN PRIMARY INTERPRETATION: NORMAL
Lab: NORMAL
M PROTEIN 1 MFR SERPL ELPH: 22.3 %
M PROTEIN 1 SERPL ELPH-MCNC: 1.83 G/DL
PROT PATTERN SERPL ELPH-IMP: ABNORMAL
PROT SERPL-MCNC: 8.2 G/DL (ref 6.4–8.2)

## 2021-08-10 PROCEDURE — 77080 DXA BONE DENSITY AXIAL: CPT

## 2021-08-10 PROCEDURE — 90834 PSYTX W PT 45 MINUTES: CPT | Performed by: COUNSELOR

## 2021-08-10 NOTE — TELEPHONE ENCOUNTER
LVM informing Guido Perkins that her appt on 8/16 with Dr Jasvir Barrow needed to be r/s due to Dr Andreea Blelo being out office  I did r/s Courtney's appt to 8/27 at 9:40am at the [facility] location with Dr Andreea Bello  If Guido Perkins should call back and this appt date, time or location does not work for her schedule, please r/s her to a more accommodating appt, please and thank you

## 2021-08-10 NOTE — PSYCH
Psychotherapy Provided: Individual Psychotherapy 50 minutes     Length of time in session: 50 minutes, follow up in 2 week    Encounter Diagnosis     ICD-10-CM    1  MDD (major depressive disorder), recurrent episode, mild ([facility] [address] )  F33 0    2  JOSEFINA (generalized anxiety disorder)  F41 1    3  Obsessive-compulsive disorder, unspecified type  F42 9    4  PTSD (post-traumatic stress disorder)  F43 10        Goals addressed in session: Goal 1 and Goal 2     Current suicide risk : Low     D: Clinician met with Say Adorno in person for individual therapy  Say Adorno reports stress related to her mother being hospitalized and discussed overall relationship with mother and communication styles  Say Adorno reports impact on mental health because of constant conflict with mother  She reports she had attempted to put her in a nursing home about a year ago but that it did not work out and she feels guilty when she was not getting appropriate care  Clinician discussed Say Adorno self care and getting support in the home along with explored guilt related to her mothers care  Say Adorno denies any family support from her brothers in caring for her mother  Clinician and Say Adorno discussed ways to set boundaries and work on community support in caring for mother long term along with looking into other forms of care facilities  Say Adorno reports overall positive relationship with paramour and working on [employer]  A: Say Adorno presented with stable mood and affect and was engaged in the session  P: Continue to meet with Say Adorno every other week for individual therapy  Behavioral Health Treatment Plan [facility]: Diagnosis and Treatment Plan explained to Cassie Rosales relates understanding diagnosis and is agreeable to Treatment Plan   Yes

## 2021-08-11 ENCOUNTER — TELEPHONE (OUTPATIENT)
Dept: INTERNAL MEDICINE CLINIC | Facility: CLINIC | Age: 61
End: 2021-08-11

## 2021-08-11 NOTE — TELEPHONE ENCOUNTER
I called an left voice mail reading your message off to giulia  Thank you              MD Chad Durbin; [place]  Please call patient-DEXA scan shows borderline osteoporosis-let patient know I sent a note to Megan Scott for him to discuss with her at her upcoming appointment about potential reinstitution of intravenous medication because of her recent spontaneous rib fracture

## 2021-08-11 NOTE — TELEPHONE ENCOUNTER
Appointment Cancellation Or Reschedule     Person calling in Patient    Provider Dr Mendez Trevino   Office Visit Date and Time 08/27/2021   [facility]   Did patient want to reschedule their office appointment? If so, when was it scheduled to? 08/17/2021 @ [facility]    Is this patient calling to reschedule an infusion appointment? no   When is their next infusion appointment? n/a   Is this patient a Chemo patient? no   Reason for Cancellation or Reschedule appt given did not work for patient      If the patient is a treatment patient, please route this to the office nurse  If the patient is not on treatment, please route to the office MA

## 2021-08-11 NOTE — TELEPHONE ENCOUNTER
Patient called in about her results she also stated that her elbows been hurting her for 2 weeks after she had had it in wants to know if it would get better with the  Infusion treatment

## 2021-08-12 NOTE — TELEPHONE ENCOUNTER
Elbows are separate issue- if they remain a problem she will need an appt for them to be evaluated - try and schedule her with Dr Fawn Coffey if needed who she sees on a regular basis

## 2021-08-16 ENCOUNTER — TELEPHONE (OUTPATIENT)
Dept: INTERNAL MEDICINE CLINIC | Facility: CLINIC | Age: 61
End: 2021-08-16

## 2021-08-16 ENCOUNTER — TELEPHONE (OUTPATIENT)
Dept: HEMATOLOGY ONCOLOGY | Facility: CLINIC | Age: 61
End: 2021-08-16

## 2021-08-16 NOTE — TELEPHONE ENCOUNTER
Patient would like to know from Dr Mendez Trevino if she must come to her appt tomorrow, she is going through a lot right now with her mom and dealing with her mom  almost passing  Please reach back to patient at [phone]

## 2021-08-16 NOTE — TELEPHONE ENCOUNTER
patient called requesting to speak with you   Nitin Hollingsworth Her mom is not doing good , and she just wants to ask you a couple of questions  , and get your advice         Please advise when you can     [phone]

## 2021-08-17 NOTE — TELEPHONE ENCOUNTER
Spoke to Chilo Reza and informed her that she can cancel her appt w  Dr Keyur Cardona to handle her family problems and to give us a call back when she is ready to r/s  Chilo Reza expressed gratitude and understanding

## 2021-08-20 ENCOUNTER — TELEPHONE (OUTPATIENT)
Dept: PSYCHIATRY | Facility: CLINIC | Age: 61
End: 2021-08-20

## 2021-08-20 NOTE — TELEPHONE ENCOUNTER
Spoke to patient to confirm appt for 8/24/21  Patient wanted to inform provider that she may need to cancel due to mother being put into Hospice  Patient stated that as long as everything is ok, she will be at the appt

## 2021-08-23 ENCOUNTER — TELEPHONE (OUTPATIENT)
Dept: PSYCHIATRY | Facility: CLINIC | Age: 61
End: 2021-08-23

## 2021-09-14 ENCOUNTER — TELEPHONE (OUTPATIENT)
Dept: OBGYN CLINIC | Facility: CLINIC | Age: 61
End: 2021-09-14

## 2021-09-14 NOTE — TELEPHONE ENCOUNTER
----- Message from Allegra Moreno DO sent at 9/14/2021 12:53 PM EDT -----    Inform patient HS V IgG positive, consistent with history of past exposure  Please see my note, I reviewed with her on her prior visit

## 2021-09-20 ENCOUNTER — TELEPHONE (OUTPATIENT)
Dept: INTERNAL MEDICINE CLINIC | Facility: CLINIC | Age: 61
End: 2021-09-20

## 2021-09-20 NOTE — TELEPHONE ENCOUNTER
Patient called requesting a medication for when she goes to the dentist,    Please advise  Pharm [facility]

## 2021-09-20 NOTE — TELEPHONE ENCOUNTER
Please let patient know that this is no longer recommended that she does not need antibiotics prior to dental work

## 2021-09-21 ENCOUNTER — SOCIAL WORK (OUTPATIENT)
Dept: BEHAVIORAL/MENTAL HEALTH CLINIC | Facility: CLINIC | Age: 61
End: 2021-09-21
Payer: COMMERCIAL

## 2021-09-21 ENCOUNTER — TELEPHONE (OUTPATIENT)
Dept: OBGYN CLINIC | Facility: HOSPITAL | Age: 61
End: 2021-09-21

## 2021-09-21 DIAGNOSIS — F41.1 GAD (GENERALIZED ANXIETY DISORDER): ICD-10-CM

## 2021-09-21 DIAGNOSIS — F43.10 PTSD (POST-TRAUMATIC STRESS DISORDER): ICD-10-CM

## 2021-09-21 DIAGNOSIS — F42.9 OBSESSIVE-COMPULSIVE DISORDER, UNSPECIFIED TYPE: ICD-10-CM

## 2021-09-21 DIAGNOSIS — F33.0 MDD (MAJOR DEPRESSIVE DISORDER), RECURRENT EPISODE, MILD (HCC): Primary | ICD-10-CM

## 2021-09-21 PROCEDURE — 90834 PSYTX W PT 45 MINUTES: CPT | Performed by: COUNSELOR

## 2021-09-21 NOTE — PSYCH
Psychotherapy Provided: Individual Psychotherapy 50 minutes     Length of time in session: 50 minutes, follow up in 2 week    Encounter Diagnosis     ICD-10-CM    1  MDD (major depressive disorder), recurrent episode, mild ([facility] [address] )  F33 0    2  JOSEFINA (generalized anxiety disorder)  F41 1    3  Obsessive-compulsive disorder, unspecified type  F42 9    4  PTSD (post-traumatic stress disorder)  F43 10        Goals addressed in session: Goal 1 and Goal 2     Current suicide risk : Low     D: Clinician met with Trisha Moore in person for individual therapy  Trisha Moore processed recent death of her mother and the mother of her paramour  Trisha Moore discussed her experience at the hospital with her mother refusing a feeding tube and decline in health until she was taken to a hospice facility where she later passes away  Trisha Moore discussed the  and conflict with her older brother throughout the process  Clinician listened with unconditional positive regard and gave support and validation to difficult time of bereavement  Trisha Moore reports support in her paramour and feeling as if they couple has been able to come together to support one another through difficult time  Trisha Moore reports overall that she has been getting along well with paramour with less over thinking  Clinician suggested other possible resources for bereavement if needed in the future  A: Trisha Emersonman was open and engaged in the session  She became tearful during session discussing the passing of her mother but overall reports she is doing well with the loss and reaching out for social support  P: Continue to meet with Trisha Moore every other week for individual therapy  Behavioral Health Treatment Plan [facility]: Diagnosis and Treatment Plan explained to Danii Encarnacion relates understanding diagnosis and is agreeable to Treatment Plan   Yes

## 2021-09-21 NOTE — TELEPHONE ENCOUNTER
Dental antibiotic prophylaxis following joint replacement surgery is necessary only for 2 years  As this patient had a knee replacement 2018, she no longer requires routine dental antibiotic prophylaxis  Please call patient and inform of above    Thank you

## 2021-09-21 NOTE — TELEPHONE ENCOUNTER
Patient is calling because she had a rt knee replacement in 2018  Patient would like to know if she needs an abx at this point when she goes to the dentist  Patient called her pcp yesterday & they told her no but she wants to make certain that Dr Jessica Reyna does not require this  Patient is scheduled in 2 weeks but she is going to try to get in somewhere else earlier  Patient uses [facility] [address] in [place]  Please call patient to let her know if she does need abx   [phone], she will not be available from 9-10am or 1:30-2pm  We can leave a message for her

## 2021-09-23 ENCOUNTER — TELEPHONE (OUTPATIENT)
Dept: INTERNAL MEDICINE CLINIC | Facility: CLINIC | Age: 61
End: 2021-09-23

## 2021-09-23 NOTE — TELEPHONE ENCOUNTER
Nain Duty called and said she seems to have UTI symptoms, back pain, burning and frequency  She is asking if you can call in medication to her pharmacy?

## 2021-10-04 ENCOUNTER — APPOINTMENT (OUTPATIENT)
Dept: LAB | Facility: CLINIC | Age: 61
End: 2021-10-04
Payer: COMMERCIAL

## 2021-10-04 DIAGNOSIS — N39.0 URINARY TRACT INFECTION WITHOUT HEMATURIA, SITE UNSPECIFIED: ICD-10-CM

## 2021-10-04 DIAGNOSIS — N39.0 URINARY TRACT INFECTION WITHOUT HEMATURIA, SITE UNSPECIFIED: Primary | ICD-10-CM

## 2021-10-04 LAB
BILIRUB UR QL STRIP: NEGATIVE
CLARITY UR: ABNORMAL
COLOR UR: YELLOW
GLUCOSE UR STRIP-MCNC: NEGATIVE MG/DL
HGB UR QL STRIP.AUTO: ABNORMAL
KETONES UR STRIP-MCNC: NEGATIVE MG/DL
LEUKOCYTE ESTERASE UR QL STRIP: ABNORMAL
NITRITE UR QL STRIP: NEGATIVE
PH UR STRIP.AUTO: 7.5 [PH]
PROT UR STRIP-MCNC: ABNORMAL MG/DL
SP GR UR STRIP.AUTO: 1.01 (ref 1–1.03)
UROBILINOGEN UR QL STRIP.AUTO: 1 E.U./DL

## 2021-10-04 PROCEDURE — 87186 SC STD MICRODIL/AGAR DIL: CPT

## 2021-10-04 PROCEDURE — 81001 URINALYSIS AUTO W/SCOPE: CPT

## 2021-10-04 PROCEDURE — 87086 URINE CULTURE/COLONY COUNT: CPT

## 2021-10-04 PROCEDURE — 87077 CULTURE AEROBIC IDENTIFY: CPT

## 2021-10-04 NOTE — TELEPHONE ENCOUNTER
Patient called in stating that the medication for her UTI did not work  She still has back pain, frequency and burning when she goes to the bathroom  Please advise

## 2021-10-04 NOTE — TELEPHONE ENCOUNTER
She needs urine for urinalysis and culture- have her go today with diagnosis of uti- we will decide what to do after results of culture become available over next 48 hours

## 2021-10-05 ENCOUNTER — SOCIAL WORK (OUTPATIENT)
Dept: BEHAVIORAL/MENTAL HEALTH CLINIC | Facility: CLINIC | Age: 61
End: 2021-10-05
Payer: COMMERCIAL

## 2021-10-05 DIAGNOSIS — F43.10 PTSD (POST-TRAUMATIC STRESS DISORDER): ICD-10-CM

## 2021-10-05 DIAGNOSIS — F41.1 GAD (GENERALIZED ANXIETY DISORDER): ICD-10-CM

## 2021-10-05 DIAGNOSIS — F33.0 MDD (MAJOR DEPRESSIVE DISORDER), RECURRENT EPISODE, MILD (HCC): Primary | ICD-10-CM

## 2021-10-05 DIAGNOSIS — F42.9 OBSESSIVE-COMPULSIVE DISORDER, UNSPECIFIED TYPE: ICD-10-CM

## 2021-10-05 LAB
BACTERIA UR QL AUTO: ABNORMAL /HPF
NON-SQ EPI CELLS URNS QL MICRO: ABNORMAL /HPF
RBC #/AREA URNS AUTO: ABNORMAL /HPF
TRI-PHOS CRY URNS QL MICRO: ABNORMAL /HPF
WBC #/AREA URNS AUTO: ABNORMAL /HPF

## 2021-10-05 PROCEDURE — 90834 PSYTX W PT 45 MINUTES: CPT | Performed by: COUNSELOR

## 2021-10-06 LAB — BACTERIA UR CULT: ABNORMAL

## 2021-10-07 ENCOUNTER — TELEPHONE (OUTPATIENT)
Dept: INTERNAL MEDICINE CLINIC | Facility: CLINIC | Age: 61
End: 2021-10-07

## 2021-10-07 DIAGNOSIS — N39.0 URINARY TRACT INFECTION WITHOUT HEMATURIA, SITE UNSPECIFIED: Primary | ICD-10-CM

## 2021-10-07 RX ORDER — CIPROFLOXACIN 250 MG/1
250 TABLET, FILM COATED ORAL EVERY 12 HOURS SCHEDULED
Qty: 14 TABLET | Refills: 0 | Status: SHIPPED | OUTPATIENT
Start: 2021-10-07 | End: 2021-10-14

## 2021-10-07 RX ORDER — CEPHALEXIN 250 MG/1
250 CAPSULE ORAL 4 TIMES DAILY
Qty: 28 CAPSULE | Refills: 0 | Status: CANCELLED | OUTPATIENT
Start: 2021-10-07 | End: 2021-10-14

## 2021-10-19 ENCOUNTER — OFFICE VISIT (OUTPATIENT)
Dept: OBGYN CLINIC | Facility: HOSPITAL | Age: 61
End: 2021-10-19
Payer: OTHER MISCELLANEOUS

## 2021-10-19 ENCOUNTER — SOCIAL WORK (OUTPATIENT)
Dept: BEHAVIORAL/MENTAL HEALTH CLINIC | Facility: CLINIC | Age: 61
End: 2021-10-19
Payer: COMMERCIAL

## 2021-10-19 ENCOUNTER — TELEPHONE (OUTPATIENT)
Dept: OBGYN CLINIC | Facility: HOSPITAL | Age: 61
End: 2021-10-19

## 2021-10-19 VITALS
SYSTOLIC BLOOD PRESSURE: 143 MMHG | HEIGHT: 69 IN | HEART RATE: 71 BPM | BODY MASS INDEX: 21.48 KG/M2 | DIASTOLIC BLOOD PRESSURE: 81 MMHG | WEIGHT: 145 LBS

## 2021-10-19 DIAGNOSIS — M62.831 MUSCLE SPASM OF RIGHT CALF: ICD-10-CM

## 2021-10-19 DIAGNOSIS — M22.2X1 PATELLOFEMORAL PAIN SYNDROME OF RIGHT KNEE: Primary | ICD-10-CM

## 2021-10-19 DIAGNOSIS — F41.1 GAD (GENERALIZED ANXIETY DISORDER): ICD-10-CM

## 2021-10-19 DIAGNOSIS — F43.10 PTSD (POST-TRAUMATIC STRESS DISORDER): ICD-10-CM

## 2021-10-19 DIAGNOSIS — F42.9 OBSESSIVE-COMPULSIVE DISORDER, UNSPECIFIED TYPE: ICD-10-CM

## 2021-10-19 DIAGNOSIS — F33.0 MDD (MAJOR DEPRESSIVE DISORDER), RECURRENT EPISODE, MILD (HCC): Primary | ICD-10-CM

## 2021-10-19 DIAGNOSIS — R26.9 ABNORMAL GAIT: ICD-10-CM

## 2021-10-19 PROCEDURE — 90834 PSYTX W PT 45 MINUTES: CPT | Performed by: COUNSELOR

## 2021-10-19 PROCEDURE — 99213 OFFICE O/P EST LOW 20 MIN: CPT | Performed by: ORTHOPAEDIC SURGERY

## 2021-10-19 RX ORDER — OBETICHOLIC ACID 5 MG/1
TABLET, FILM COATED ORAL
COMMUNITY
Start: 2021-10-04 | End: 2022-04-01

## 2021-10-19 RX ORDER — NITROFURANTOIN 25; 75 MG/1; MG/1
CAPSULE ORAL
COMMUNITY
Start: 2021-09-23 | End: 2021-10-27

## 2021-10-22 ENCOUNTER — TELEPHONE (OUTPATIENT)
Dept: INTERNAL MEDICINE CLINIC | Facility: CLINIC | Age: 61
End: 2021-10-22

## 2021-10-22 DIAGNOSIS — H92.09 EARACHE: ICD-10-CM

## 2021-10-22 DIAGNOSIS — R06.7 SNEEZING: ICD-10-CM

## 2021-10-22 DIAGNOSIS — J02.9 SORE THROAT: Primary | ICD-10-CM

## 2021-10-22 RX ORDER — CEFUROXIME AXETIL 250 MG/1
250 TABLET ORAL EVERY 12 HOURS SCHEDULED
Qty: 14 TABLET | Refills: 0 | Status: SHIPPED | OUTPATIENT
Start: 2021-10-22 | End: 2021-10-29

## 2021-10-23 PROCEDURE — U0003 INFECTIOUS AGENT DETECTION BY NUCLEIC ACID (DNA OR RNA); SEVERE ACUTE RESPIRATORY SYNDROME CORONAVIRUS 2 (SARS-COV-2) (CORONAVIRUS DISEASE [COVID-19]), AMPLIFIED PROBE TECHNIQUE, MAKING USE OF HIGH THROUGHPUT TECHNOLOGIES AS DESCRIBED BY CMS-2020-01-R: HCPCS | Performed by: INTERNAL MEDICINE

## 2021-10-23 PROCEDURE — U0005 INFEC AGEN DETEC AMPLI PROBE: HCPCS | Performed by: INTERNAL MEDICINE

## 2021-10-25 ENCOUNTER — TELEPHONE (OUTPATIENT)
Dept: INTERNAL MEDICINE CLINIC | Facility: CLINIC | Age: 61
End: 2021-10-25

## 2021-10-27 ENCOUNTER — OFFICE VISIT (OUTPATIENT)
Dept: PSYCHIATRY | Facility: CLINIC | Age: 61
End: 2021-10-27
Payer: COMMERCIAL

## 2021-10-27 DIAGNOSIS — F43.10 PTSD (POST-TRAUMATIC STRESS DISORDER): ICD-10-CM

## 2021-10-27 DIAGNOSIS — F42.9 OBSESSIVE-COMPULSIVE DISORDER, UNSPECIFIED TYPE: ICD-10-CM

## 2021-10-27 DIAGNOSIS — F33.0 MDD (MAJOR DEPRESSIVE DISORDER), RECURRENT EPISODE, MILD (HCC): ICD-10-CM

## 2021-10-27 DIAGNOSIS — F41.1 GAD (GENERALIZED ANXIETY DISORDER): ICD-10-CM

## 2021-10-27 PROCEDURE — 90833 PSYTX W PT W E/M 30 MIN: CPT | Performed by: PSYCHIATRY & NEUROLOGY

## 2021-10-27 PROCEDURE — 99214 OFFICE O/P EST MOD 30 MIN: CPT | Performed by: PSYCHIATRY & NEUROLOGY

## 2021-10-27 RX ORDER — FLUOXETINE 20 MG/1
TABLET, FILM COATED ORAL
Qty: 30 TABLET | Refills: 1 | Status: SHIPPED | OUTPATIENT
Start: 2021-10-27 | End: 2022-03-22 | Stop reason: SDUPTHER

## 2021-10-28 ENCOUNTER — HOSPITAL ENCOUNTER (OUTPATIENT)
Dept: RADIOLOGY | Facility: HOSPITAL | Age: 61
Discharge: HOME | End: 2021-10-28
Payer: COMMERCIAL

## 2021-10-28 ENCOUNTER — OFFICE VISIT (OUTPATIENT)
Dept: OBGYN CLINIC | Facility: HOSPITAL | Age: 61
End: 2021-10-28
Payer: COMMERCIAL

## 2021-10-28 ENCOUNTER — IMMUNIZATIONS (OUTPATIENT)
Dept: FAMILY MEDICINE CLINIC | Facility: HOSPITAL | Age: 61
End: 2021-10-28
Payer: COMMERCIAL

## 2021-10-28 VITALS
BODY MASS INDEX: 21.18 KG/M2 | WEIGHT: 143 LBS | HEIGHT: 69 IN | HEART RATE: 79 BPM | SYSTOLIC BLOOD PRESSURE: 109 MMHG | DIASTOLIC BLOOD PRESSURE: 73 MMHG

## 2021-10-28 DIAGNOSIS — M25.521 PAIN IN RIGHT ELBOW: Primary | ICD-10-CM

## 2021-10-28 DIAGNOSIS — Z23 ENCOUNTER FOR IMMUNIZATION: Primary | ICD-10-CM

## 2021-10-28 DIAGNOSIS — M25.521 PAIN IN RIGHT ELBOW: ICD-10-CM

## 2021-10-28 DIAGNOSIS — M25.721 OSTEOPHYTE OF RIGHT ELBOW: ICD-10-CM

## 2021-10-28 PROCEDURE — 73080 X-RAY EXAM OF ELBOW: CPT

## 2021-10-28 PROCEDURE — 0001A COVID-19 PFIZER VACC 0.3 ML: CPT

## 2021-10-28 PROCEDURE — 91300 COVID-19 PFIZER VACC 0.3 ML: CPT

## 2021-10-28 PROCEDURE — 99213 OFFICE O/P EST LOW 20 MIN: CPT | Performed by: PHYSICIAN ASSISTANT

## 2021-10-29 ENCOUNTER — TELEPHONE (OUTPATIENT)
Dept: INTERNAL MEDICINE CLINIC | Facility: CLINIC | Age: 61
End: 2021-10-29

## 2021-10-29 DIAGNOSIS — R05.9 COUGH: Primary | ICD-10-CM

## 2021-10-29 RX ORDER — BROMPHENIRAMINE MALEATE, PSEUDOEPHEDRINE HYDROCHLORIDE, AND DEXTROMETHORPHAN HYDROBROMIDE 2; 30; 10 MG/5ML; MG/5ML; MG/5ML
2.5 SYRUP ORAL 4 TIMES DAILY PRN
Qty: 120 ML | Refills: 0 | Status: SHIPPED | OUTPATIENT
Start: 2021-10-29 | End: 2021-11-26

## 2021-11-02 ENCOUNTER — SOCIAL WORK (OUTPATIENT)
Dept: BEHAVIORAL/MENTAL HEALTH CLINIC | Facility: CLINIC | Age: 61
End: 2021-11-02
Payer: COMMERCIAL

## 2021-11-02 DIAGNOSIS — F43.10 PTSD (POST-TRAUMATIC STRESS DISORDER): ICD-10-CM

## 2021-11-02 DIAGNOSIS — F42.9 OBSESSIVE-COMPULSIVE DISORDER, UNSPECIFIED TYPE: ICD-10-CM

## 2021-11-02 DIAGNOSIS — F41.1 GAD (GENERALIZED ANXIETY DISORDER): ICD-10-CM

## 2021-11-02 DIAGNOSIS — F33.0 MDD (MAJOR DEPRESSIVE DISORDER), RECURRENT EPISODE, MILD (HCC): Primary | ICD-10-CM

## 2021-11-02 PROCEDURE — 90834 PSYTX W PT 45 MINUTES: CPT | Performed by: COUNSELOR

## 2021-11-12 ENCOUNTER — APPOINTMENT (OUTPATIENT)
Dept: LAB | Facility: CLINIC | Age: 61
End: 2021-11-12
Payer: COMMERCIAL

## 2021-11-12 DIAGNOSIS — I10 ESSENTIAL HYPERTENSION: Chronic | ICD-10-CM

## 2021-11-12 DIAGNOSIS — E78.5 HYPERLIPIDEMIA, UNSPECIFIED HYPERLIPIDEMIA TYPE: Chronic | ICD-10-CM

## 2021-11-12 DIAGNOSIS — E55.9 VITAMIN D DEFICIENCY: ICD-10-CM

## 2021-11-12 DIAGNOSIS — K74.3 PRIMARY BILIARY CHOLANGITIS (HCC): Chronic | ICD-10-CM

## 2021-11-12 DIAGNOSIS — M81.0 OSTEOPOROSIS WITHOUT CURRENT PATHOLOGICAL FRACTURE, UNSPECIFIED OSTEOPOROSIS TYPE: Chronic | ICD-10-CM

## 2021-11-12 LAB
25(OH)D3 SERPL-MCNC: 23.2 NG/ML (ref 30–100)
ALBUMIN SERPL BCP-MCNC: 3.1 G/DL (ref 3.5–5)
ALP SERPL-CCNC: 263 U/L (ref 46–116)
ALT SERPL W P-5'-P-CCNC: 33 U/L (ref 12–78)
ANION GAP SERPL CALCULATED.3IONS-SCNC: 7 MMOL/L (ref 4–13)
AST SERPL W P-5'-P-CCNC: 30 U/L (ref 5–45)
BILIRUB SERPL-MCNC: 0.41 MG/DL (ref 0.2–1)
BUN SERPL-MCNC: 17 MG/DL (ref 5–25)
CALCIUM ALBUM COR SERPL-MCNC: 9.7 MG/DL (ref 8.3–10.1)
CALCIUM SERPL-MCNC: 9 MG/DL (ref 8.3–10.1)
CHLORIDE SERPL-SCNC: 103 MMOL/L (ref 100–108)
CO2 SERPL-SCNC: 29 MMOL/L (ref 21–32)
CREAT SERPL-MCNC: 0.95 MG/DL (ref 0.6–1.3)
GFR SERPL CREATININE-BSD FRML MDRD: 65 ML/MIN/1.73SQ M
GLUCOSE P FAST SERPL-MCNC: 85 MG/DL (ref 65–99)
POTASSIUM SERPL-SCNC: 3.9 MMOL/L (ref 3.5–5.3)
PROT SERPL-MCNC: 8.8 G/DL (ref 6.4–8.2)
SODIUM SERPL-SCNC: 139 MMOL/L (ref 136–145)

## 2021-11-12 PROCEDURE — 36415 COLL VENOUS BLD VENIPUNCTURE: CPT

## 2021-11-12 PROCEDURE — 80053 COMPREHEN METABOLIC PANEL: CPT

## 2021-11-12 PROCEDURE — 82306 VITAMIN D 25 HYDROXY: CPT

## 2021-11-16 ENCOUNTER — OFFICE VISIT (OUTPATIENT)
Dept: OBGYN CLINIC | Facility: MEDICAL CENTER | Age: 61
End: 2021-11-16
Payer: COMMERCIAL

## 2021-11-16 ENCOUNTER — SOCIAL WORK (OUTPATIENT)
Dept: BEHAVIORAL/MENTAL HEALTH CLINIC | Facility: CLINIC | Age: 61
End: 2021-11-16
Payer: COMMERCIAL

## 2021-11-16 VITALS
SYSTOLIC BLOOD PRESSURE: 122 MMHG | WEIGHT: 143.4 LBS | HEIGHT: 70 IN | BODY MASS INDEX: 20.53 KG/M2 | HEART RATE: 67 BPM | DIASTOLIC BLOOD PRESSURE: 78 MMHG

## 2021-11-16 DIAGNOSIS — M25.721 OSTEOPHYTE OF RIGHT ELBOW: ICD-10-CM

## 2021-11-16 DIAGNOSIS — M25.521 PAIN IN RIGHT ELBOW: Primary | ICD-10-CM

## 2021-11-16 DIAGNOSIS — F33.0 MDD (MAJOR DEPRESSIVE DISORDER), RECURRENT EPISODE, MILD (HCC): Primary | ICD-10-CM

## 2021-11-16 DIAGNOSIS — F41.1 GAD (GENERALIZED ANXIETY DISORDER): ICD-10-CM

## 2021-11-16 PROCEDURE — 90834 PSYTX W PT 45 MINUTES: CPT | Performed by: COUNSELOR

## 2021-11-16 PROCEDURE — 99213 OFFICE O/P EST LOW 20 MIN: CPT | Performed by: EMERGENCY MEDICINE

## 2021-11-17 ENCOUNTER — TELEPHONE (OUTPATIENT)
Dept: INTERNAL MEDICINE CLINIC | Facility: CLINIC | Age: 61
End: 2021-11-17

## 2021-11-18 ENCOUNTER — OFFICE VISIT (OUTPATIENT)
Dept: INTERNAL MEDICINE CLINIC | Facility: CLINIC | Age: 61
End: 2021-11-18
Payer: COMMERCIAL

## 2021-11-18 ENCOUNTER — APPOINTMENT (OUTPATIENT)
Dept: LAB | Age: 61
End: 2021-11-18
Payer: COMMERCIAL

## 2021-11-18 ENCOUNTER — TELEPHONE (OUTPATIENT)
Dept: HEMATOLOGY ONCOLOGY | Facility: CLINIC | Age: 61
End: 2021-11-18

## 2021-11-18 ENCOUNTER — TELEPHONE (OUTPATIENT)
Dept: OBGYN CLINIC | Facility: HOSPITAL | Age: 61
End: 2021-11-18

## 2021-11-18 VITALS
BODY MASS INDEX: 20.5 KG/M2 | SYSTOLIC BLOOD PRESSURE: 120 MMHG | WEIGHT: 143.2 LBS | HEART RATE: 72 BPM | HEIGHT: 70 IN | DIASTOLIC BLOOD PRESSURE: 78 MMHG | RESPIRATION RATE: 12 BRPM

## 2021-11-18 DIAGNOSIS — M81.8 OTHER OSTEOPOROSIS WITHOUT CURRENT PATHOLOGICAL FRACTURE: ICD-10-CM

## 2021-11-18 DIAGNOSIS — I10 ESSENTIAL HYPERTENSION: Chronic | ICD-10-CM

## 2021-11-18 DIAGNOSIS — K74.3 PRIMARY BILIARY CHOLANGITIS (HCC): Chronic | ICD-10-CM

## 2021-11-18 DIAGNOSIS — R21 RASH: ICD-10-CM

## 2021-11-18 DIAGNOSIS — F41.8 DEPRESSION WITH ANXIETY: Chronic | ICD-10-CM

## 2021-11-18 DIAGNOSIS — C90.00 MULTIPLE MYELOMA, REMISSION STATUS UNSPECIFIED (HCC): ICD-10-CM

## 2021-11-18 DIAGNOSIS — E78.5 HYPERLIPIDEMIA, UNSPECIFIED HYPERLIPIDEMIA TYPE: ICD-10-CM

## 2021-11-18 DIAGNOSIS — Z23 ENCOUNTER FOR IMMUNIZATION: ICD-10-CM

## 2021-11-18 DIAGNOSIS — R04.0 EPISTAXIS: ICD-10-CM

## 2021-11-18 DIAGNOSIS — M85.80 OSTEOPENIA, UNSPECIFIED LOCATION: ICD-10-CM

## 2021-11-18 DIAGNOSIS — E55.9 VITAMIN D DEFICIENCY: ICD-10-CM

## 2021-11-18 DIAGNOSIS — D47.2 SMOLDERING MULTIPLE MYELOMA: Primary | ICD-10-CM

## 2021-11-18 DIAGNOSIS — D47.2 SMOLDERING MULTIPLE MYELOMA: ICD-10-CM

## 2021-11-18 DIAGNOSIS — D47.2 SMOLDERING MULTIPLE MYELOMA (SMM): Chronic | ICD-10-CM
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
## 2021-11-18 LAB
APTT PPP: 28 SECONDS (ref 23–37)
BASOPHILS # BLD AUTO: 0.05 THOUSANDS/ΜL (ref 0–0.1)
BASOPHILS NFR BLD AUTO: 1 % (ref 0–1)
EOSINOPHIL # BLD AUTO: 0.06 THOUSAND/ΜL (ref 0–0.61)
EOSINOPHIL NFR BLD AUTO: 1 % (ref 0–6)
ERYTHROCYTE [DISTWIDTH] IN BLOOD BY AUTOMATED COUNT: 13.6 % (ref 11.6–15.1)
HCT VFR BLD AUTO: 32.8 % (ref 34.8–46.1)
HGB BLD-MCNC: 10.2 G/DL (ref 11.5–15.4)
IMM GRANULOCYTES # BLD AUTO: 0.02 THOUSAND/UL (ref 0–0.2)
IMM GRANULOCYTES NFR BLD AUTO: 0 % (ref 0–2)
INR PPP: 0.94 (ref 0.84–1.19)
LYMPHOCYTES # BLD AUTO: 1.79 THOUSANDS/ΜL (ref 0.6–4.47)
LYMPHOCYTES NFR BLD AUTO: 34 % (ref 14–44)
MCH RBC QN AUTO: 26.7 PG (ref 26.8–34.3)
MCHC RBC AUTO-ENTMCNC: 31.1 G/DL (ref 31.4–37.4)
MCV RBC AUTO: 86 FL (ref 82–98)
MONOCYTES # BLD AUTO: 0.34 THOUSAND/ΜL (ref 0.17–1.22)
MONOCYTES NFR BLD AUTO: 6 % (ref 4–12)
NEUTROPHILS # BLD AUTO: 3.05 THOUSANDS/ΜL (ref 1.85–7.62)
NEUTS SEG NFR BLD AUTO: 58 % (ref 43–75)
NRBC BLD AUTO-RTO: 0 /100 WBCS
PLATELET # BLD AUTO: 269 THOUSANDS/UL (ref 149–390)
PMV BLD AUTO: 10.5 FL (ref 8.9–12.7)
PROTHROMBIN TIME: 12.2 SECONDS (ref 11.6–14.5)
RBC # BLD AUTO: 3.82 MILLION/UL (ref 3.81–5.12)
TSH SERPL DL<=0.05 MIU/L-ACNC: 1.38 UIU/ML (ref 0.36–3.74)
WBC # BLD AUTO: 5.31 THOUSAND/UL (ref 4.31–10.16)

## 2021-11-18 PROCEDURE — 84443 ASSAY THYROID STIM HORMONE: CPT

## 2021-11-18 PROCEDURE — 36415 COLL VENOUS BLD VENIPUNCTURE: CPT

## 2021-11-18 PROCEDURE — 85730 THROMBOPLASTIN TIME PARTIAL: CPT

## 2021-11-18 PROCEDURE — 85025 COMPLETE CBC W/AUTO DIFF WBC: CPT

## 2021-11-18 PROCEDURE — 99214 OFFICE O/P EST MOD 30 MIN: CPT | Performed by: INTERNAL MEDICINE

## 2021-11-18 PROCEDURE — 90471 IMMUNIZATION ADMIN: CPT

## 2021-11-18 PROCEDURE — 90682 RIV4 VACC RECOMBINANT DNA IM: CPT

## 2021-11-18 PROCEDURE — 85610 PROTHROMBIN TIME: CPT

## 2021-11-19 ENCOUNTER — TELEPHONE (OUTPATIENT)
Dept: INTERNAL MEDICINE CLINIC | Facility: CLINIC | Age: 61
End: 2021-11-19

## 2021-11-19 ENCOUNTER — APPOINTMENT (OUTPATIENT)
Dept: LAB | Facility: CLINIC | Age: 61
End: 2021-11-19
Payer: COMMERCIAL

## 2021-11-19 DIAGNOSIS — D64.9 ANEMIA, UNSPECIFIED TYPE: Primary | ICD-10-CM

## 2021-11-19 DIAGNOSIS — E53.8 VITAMIN B12 DEFICIENCY: ICD-10-CM

## 2021-11-19 DIAGNOSIS — D64.9 ANEMIA, UNSPECIFIED TYPE: ICD-10-CM

## 2021-11-19 LAB
BASOPHILS # BLD AUTO: 0.04 THOUSANDS/ΜL (ref 0–0.1)
BASOPHILS NFR BLD AUTO: 1 % (ref 0–1)
EOSINOPHIL # BLD AUTO: 0.07 THOUSAND/ΜL (ref 0–0.61)
EOSINOPHIL NFR BLD AUTO: 1 % (ref 0–6)
ERYTHROCYTE [DISTWIDTH] IN BLOOD BY AUTOMATED COUNT: 13.9 % (ref 11.6–15.1)
FERRITIN SERPL-MCNC: 8 NG/ML (ref 8–388)
HCT VFR BLD AUTO: 33.1 % (ref 34.8–46.1)
HGB BLD-MCNC: 10.2 G/DL (ref 11.5–15.4)
IMM GRANULOCYTES # BLD AUTO: 0.03 THOUSAND/UL (ref 0–0.2)
IMM GRANULOCYTES NFR BLD AUTO: 0 % (ref 0–2)
IRON SATN MFR SERPL: 8 % (ref 15–50)
IRON SERPL-MCNC: 43 UG/DL (ref 50–170)
LYMPHOCYTES # BLD AUTO: 2.25 THOUSANDS/ΜL (ref 0.6–4.47)
LYMPHOCYTES NFR BLD AUTO: 32 % (ref 14–44)
MCH RBC QN AUTO: 26.9 PG (ref 26.8–34.3)
MCHC RBC AUTO-ENTMCNC: 30.8 G/DL (ref 31.4–37.4)
MCV RBC AUTO: 87 FL (ref 82–98)
MONOCYTES # BLD AUTO: 0.56 THOUSAND/ΜL (ref 0.17–1.22)
MONOCYTES NFR BLD AUTO: 8 % (ref 4–12)
NEUTROPHILS # BLD AUTO: 4.04 THOUSANDS/ΜL (ref 1.85–7.62)
NEUTS SEG NFR BLD AUTO: 58 % (ref 43–75)
NRBC BLD AUTO-RTO: 0 /100 WBCS
PLATELET # BLD AUTO: 278 THOUSANDS/UL (ref 149–390)
PMV BLD AUTO: 10.9 FL (ref 8.9–12.7)
RBC # BLD AUTO: 3.79 MILLION/UL (ref 3.81–5.12)
TIBC SERPL-MCNC: 557 UG/DL (ref 250–450)
VIT B12 SERPL-MCNC: 400 PG/ML (ref 100–900)
WBC # BLD AUTO: 6.99 THOUSAND/UL (ref 4.31–10.16)

## 2021-11-19 PROCEDURE — 83540 ASSAY OF IRON: CPT

## 2021-11-19 PROCEDURE — 83550 IRON BINDING TEST: CPT

## 2021-11-19 PROCEDURE — 82607 VITAMIN B-12: CPT

## 2021-11-19 PROCEDURE — 84165 PROTEIN E-PHORESIS SERUM: CPT

## 2021-11-19 PROCEDURE — 84165 PROTEIN E-PHORESIS SERUM: CPT | Performed by: SPECIALIST

## 2021-11-19 PROCEDURE — 82728 ASSAY OF FERRITIN: CPT

## 2021-11-19 PROCEDURE — 85025 COMPLETE CBC W/AUTO DIFF WBC: CPT

## 2021-11-19 PROCEDURE — 36415 COLL VENOUS BLD VENIPUNCTURE: CPT

## 2021-11-19 PROCEDURE — 83918 ORGANIC ACIDS TOTAL QUANT: CPT

## 2021-11-23 LAB
ALBUMIN SERPL ELPH-MCNC: 4.1 G/DL (ref 3.5–5)
ALBUMIN SERPL ELPH-MCNC: 44.6 % (ref 52–65)
ALPHA1 GLOB SERPL ELPH-MCNC: 0.37 G/DL (ref 0.1–0.4)
ALPHA1 GLOB SERPL ELPH-MCNC: 4 % (ref 2.5–5)
ALPHA2 GLOB SERPL ELPH-MCNC: 0.94 G/DL (ref 0.4–1.2)
ALPHA2 GLOB SERPL ELPH-MCNC: 10.2 % (ref 7–13)
BETA GLOB ABNORMAL SERPL ELPH-MCNC: 0.54 G/DL (ref 0.4–0.8)
BETA1 GLOB SERPL ELPH-MCNC: 5.9 % (ref 5–13)
BETA2 GLOB SERPL ELPH-MCNC: 3.5 % (ref 2–8)
BETA2+GAMMA GLOB SERPL ELPH-MCNC: 0.32 G/DL (ref 0.2–0.5)
GAMMA GLOB ABNORMAL SERPL ELPH-MCNC: 2.93 G/DL (ref 0.5–1.6)
GAMMA GLOB SERPL ELPH-MCNC: 31.8 % (ref 12–22)
IGG/ALB SER: 0.81 {RATIO} (ref 1.1–1.8)
M PROTEIN 1 MFR SERPL ELPH: 24.4 %
M PROTEIN 1 SERPL ELPH-MCNC: 2.24 G/DL
PROT PATTERN SERPL ELPH-IMP: ABNORMAL
PROT SERPL-MCNC: 9.2 G/DL (ref 6.4–8.2)

## 2021-11-26 ENCOUNTER — TELEPHONE (OUTPATIENT)
Dept: OBGYN CLINIC | Facility: CLINIC | Age: 61
End: 2021-11-26

## 2021-11-26 ENCOUNTER — OFFICE VISIT (OUTPATIENT)
Dept: HEMATOLOGY ONCOLOGY | Facility: CLINIC | Age: 61
End: 2021-11-26
Payer: COMMERCIAL

## 2021-11-26 VITALS
RESPIRATION RATE: 16 BRPM | HEIGHT: 70 IN | OXYGEN SATURATION: 98 % | DIASTOLIC BLOOD PRESSURE: 80 MMHG | BODY MASS INDEX: 21.11 KG/M2 | SYSTOLIC BLOOD PRESSURE: 122 MMHG | WEIGHT: 147.5 LBS | TEMPERATURE: 97.6 F | HEART RATE: 86 BPM

## 2021-11-26 DIAGNOSIS — D50.0 IRON DEFICIENCY ANEMIA DUE TO CHRONIC BLOOD LOSS: ICD-10-CM

## 2021-11-26 DIAGNOSIS — M81.8 OTHER OSTEOPOROSIS WITHOUT CURRENT PATHOLOGICAL FRACTURE: ICD-10-CM

## 2021-11-26 DIAGNOSIS — D47.2 SMOLDERING MULTIPLE MYELOMA (SMM): Primary | Chronic | ICD-10-CM

## 2021-11-26 DIAGNOSIS — M81.0 OSTEOPOROSIS WITHOUT CURRENT PATHOLOGICAL FRACTURE, UNSPECIFIED OSTEOPOROSIS TYPE: Chronic | ICD-10-CM

## 2021-11-26 PROBLEM — T78.40XA HYPERSENSITIVITY: Status: RESOLVED | Noted: 2018-11-16 | Resolved: 2021-11-26

## 2021-11-26 PROCEDURE — 99214 OFFICE O/P EST MOD 30 MIN: CPT | Performed by: INTERNAL MEDICINE

## 2021-11-26 RX ORDER — SODIUM CHLORIDE 9 MG/ML
20 INJECTION, SOLUTION INTRAVENOUS ONCE
Status: CANCELLED | OUTPATIENT
Start: 2021-12-22

## 2021-11-27 LAB
METHYLMALONATE SERPL-SCNC: 396 NMOL/L (ref 0–378)
SL AMB DISCLAIMER: ABNORMAL

## 2021-11-30 ENCOUNTER — SOCIAL WORK (OUTPATIENT)
Dept: BEHAVIORAL/MENTAL HEALTH CLINIC | Facility: CLINIC | Age: 61
End: 2021-11-30
Payer: COMMERCIAL

## 2021-11-30 ENCOUNTER — HOSPITAL ENCOUNTER (OUTPATIENT)
Dept: RADIOLOGY | Facility: IMAGING CENTER | Age: 61
Discharge: HOME | End: 2021-11-30
Payer: COMMERCIAL

## 2021-11-30 DIAGNOSIS — M25.521 PAIN IN RIGHT ELBOW: ICD-10-CM

## 2021-11-30 DIAGNOSIS — F41.1 GAD (GENERALIZED ANXIETY DISORDER): ICD-10-CM

## 2021-11-30 DIAGNOSIS — F42.9 OBSESSIVE-COMPULSIVE DISORDER, UNSPECIFIED TYPE: ICD-10-CM

## 2021-11-30 DIAGNOSIS — F33.0 MDD (MAJOR DEPRESSIVE DISORDER), RECURRENT EPISODE, MILD (HCC): Primary | ICD-10-CM

## 2021-11-30 DIAGNOSIS — M25.721 OSTEOPHYTE OF RIGHT ELBOW: ICD-10-CM

## 2021-11-30 PROCEDURE — 73221 MRI JOINT UPR EXTREM W/O DYE: CPT

## 2021-11-30 PROCEDURE — G1004 CDSM NDSC: HCPCS

## 2021-11-30 PROCEDURE — 90834 PSYTX W PT 45 MINUTES: CPT | Performed by: COUNSELOR

## 2021-12-01 ENCOUNTER — OFFICE VISIT (OUTPATIENT)
Dept: INTERNAL MEDICINE CLINIC | Facility: CLINIC | Age: 61
End: 2021-12-01
Payer: COMMERCIAL

## 2021-12-01 VITALS
SYSTOLIC BLOOD PRESSURE: 120 MMHG | DIASTOLIC BLOOD PRESSURE: 70 MMHG | WEIGHT: 143.2 LBS | BODY MASS INDEX: 20.5 KG/M2 | HEART RATE: 72 BPM | RESPIRATION RATE: 12 BRPM | HEIGHT: 70 IN

## 2021-12-01 DIAGNOSIS — D50.9 IRON DEFICIENCY ANEMIA, UNSPECIFIED IRON DEFICIENCY ANEMIA TYPE: Primary | ICD-10-CM

## 2021-12-01 DIAGNOSIS — D50.0 IRON DEFICIENCY ANEMIA DUE TO CHRONIC BLOOD LOSS: ICD-10-CM

## 2021-12-01 DIAGNOSIS — D47.2 SMOLDERING MULTIPLE MYELOMA (SMM): Chronic | ICD-10-CM

## 2021-12-01 DIAGNOSIS — E53.8 VITAMIN B12 DEFICIENCY: ICD-10-CM

## 2021-12-01 DIAGNOSIS — R04.0 RECURRENT EPISTAXIS: ICD-10-CM

## 2021-12-01 PROCEDURE — 99214 OFFICE O/P EST MOD 30 MIN: CPT | Performed by: INTERNAL MEDICINE

## 2021-12-03 ENCOUNTER — OFFICE VISIT (OUTPATIENT)
Dept: OBGYN CLINIC | Facility: HOSPITAL | Age: 61
End: 2021-12-03
Payer: COMMERCIAL

## 2021-12-03 VITALS
HEART RATE: 77 BPM | SYSTOLIC BLOOD PRESSURE: 119 MMHG | BODY MASS INDEX: 20.5 KG/M2 | HEIGHT: 70 IN | DIASTOLIC BLOOD PRESSURE: 78 MMHG | WEIGHT: 143.2 LBS

## 2021-12-03 DIAGNOSIS — M25.721 OSTEOPHYTE OF RIGHT ELBOW: ICD-10-CM

## 2021-12-03 DIAGNOSIS — M25.521 PAIN IN RIGHT ELBOW: ICD-10-CM

## 2021-12-03 PROCEDURE — 99214 OFFICE O/P EST MOD 30 MIN: CPT | Performed by: ORTHOPAEDIC SURGERY

## 2021-12-07 ENCOUNTER — TELEPHONE (OUTPATIENT)
Dept: INFUSION CENTER | Facility: CLINIC | Age: 61
End: 2021-12-07

## 2021-12-09 ENCOUNTER — HOSPITAL ENCOUNTER (OUTPATIENT)
Dept: NUCLEAR MEDICINE | Facility: HOSPITAL | Age: 61
Discharge: HOME | End: 2021-12-09
Payer: COMMERCIAL

## 2021-12-09 DIAGNOSIS — D47.2 SMOLDERING MULTIPLE MYELOMA: ICD-10-CM

## 2021-12-09 PROCEDURE — 78306 BONE IMAGING WHOLE BODY: CPT

## 2021-12-09 PROCEDURE — A9503 TC99M MEDRONATE: HCPCS

## 2021-12-09 PROCEDURE — G1004 CDSM NDSC: HCPCS

## 2021-12-13 ENCOUNTER — TELEPHONE (OUTPATIENT)
Dept: INTERNAL MEDICINE CLINIC | Facility: CLINIC | Age: 61
End: 2021-12-13

## 2021-12-13 NOTE — TELEPHONE ENCOUNTER
Patient called to schedule appointment with ENT for Recurrent epistaxis [R04 0]   Has Hx of cauterization and patient is currently anemic    There is a referral in the chart     Please consider overbooking for this Friday, December 17, 2021    When decision is made, please call the patient to schedule      thanks

## 2021-12-14 ENCOUNTER — TELEPHONE (OUTPATIENT)
Dept: INTERNAL MEDICINE CLINIC | Facility: CLINIC | Age: 61
End: 2021-12-14

## 2021-12-16 ENCOUNTER — TELEPHONE (OUTPATIENT)
Dept: INTERNAL MEDICINE CLINIC | Facility: CLINIC | Age: 61
End: 2021-12-16

## 2021-12-16 DIAGNOSIS — R05.9 COUGH: ICD-10-CM

## 2021-12-16 DIAGNOSIS — R09.81 SINUS CONGESTION: Primary | ICD-10-CM

## 2021-12-17 PROCEDURE — 87636 SARSCOV2 & INF A&B AMP PRB: CPT | Performed by: INTERNAL MEDICINE

## 2021-12-21 ENCOUNTER — TELEPHONE (OUTPATIENT)
Dept: INTERNAL MEDICINE CLINIC | Facility: CLINIC | Age: 61
End: 2021-12-21

## 2021-12-21 ENCOUNTER — APPOINTMENT (OUTPATIENT)
Dept: LAB | Facility: CLINIC | Age: 61
End: 2021-12-21
Payer: COMMERCIAL

## 2021-12-21 DIAGNOSIS — M81.8 OTHER OSTEOPOROSIS WITHOUT CURRENT PATHOLOGICAL FRACTURE: ICD-10-CM

## 2021-12-21 DIAGNOSIS — R05.9 COUGH: Primary | ICD-10-CM

## 2021-12-21 LAB
ALBUMIN SERPL BCP-MCNC: 3 G/DL (ref 3.5–5)
ALP SERPL-CCNC: 244 U/L (ref 46–116)
ALT SERPL W P-5'-P-CCNC: 29 U/L (ref 12–78)
ANION GAP SERPL CALCULATED.3IONS-SCNC: 2 MMOL/L (ref 4–13)
AST SERPL W P-5'-P-CCNC: 29 U/L (ref 5–45)
BILIRUB SERPL-MCNC: 0.31 MG/DL (ref 0.2–1)
BUN SERPL-MCNC: 22 MG/DL (ref 5–25)
CALCIUM ALBUM COR SERPL-MCNC: 9.3 MG/DL (ref 8.3–10.1)
CALCIUM SERPL-MCNC: 8.5 MG/DL (ref 8.3–10.1)
CHLORIDE SERPL-SCNC: 107 MMOL/L (ref 100–108)
CO2 SERPL-SCNC: 29 MMOL/L (ref 21–32)
CREAT SERPL-MCNC: 0.84 MG/DL (ref 0.6–1.3)
GFR SERPL CREATININE-BSD FRML MDRD: 74 ML/MIN/1.73SQ M
GLUCOSE SERPL-MCNC: 106 MG/DL (ref 65–140)
POTASSIUM SERPL-SCNC: 4 MMOL/L (ref 3.5–5.3)
PROT SERPL-MCNC: 8.3 G/DL (ref 6.4–8.2)
SODIUM SERPL-SCNC: 138 MMOL/L (ref 136–145)

## 2021-12-21 PROCEDURE — 80053 COMPREHEN METABOLIC PANEL: CPT

## 2021-12-21 PROCEDURE — 36415 COLL VENOUS BLD VENIPUNCTURE: CPT

## 2021-12-21 RX ORDER — DOXYCYCLINE 100 MG/1
100 TABLET ORAL 2 TIMES DAILY
Qty: 14 TABLET | Refills: 0 | Status: SHIPPED | OUTPATIENT
Start: 2021-12-21 | End: 2021-12-28

## 2021-12-22 ENCOUNTER — HOSPITAL ENCOUNTER (OUTPATIENT)
Dept: INFUSION CENTER | Facility: CLINIC | Age: 61
Discharge: HOME | End: 2021-12-22
Payer: COMMERCIAL

## 2021-12-22 VITALS
HEIGHT: 70 IN | BODY MASS INDEX: 20.76 KG/M2 | HEART RATE: 65 BPM | WEIGHT: 145 LBS | DIASTOLIC BLOOD PRESSURE: 79 MMHG | SYSTOLIC BLOOD PRESSURE: 155 MMHG | TEMPERATURE: 97.4 F | RESPIRATION RATE: 18 BRPM | OXYGEN SATURATION: 96 %

## 2021-12-22 DIAGNOSIS — M81.8 OTHER OSTEOPOROSIS WITHOUT CURRENT PATHOLOGICAL FRACTURE: Primary | ICD-10-CM

## 2021-12-22 DIAGNOSIS — D50.0 IRON DEFICIENCY ANEMIA DUE TO CHRONIC BLOOD LOSS: ICD-10-CM

## 2021-12-22 PROCEDURE — 96361 HYDRATE IV INFUSION ADD-ON: CPT

## 2021-12-22 PROCEDURE — 96367 TX/PROPH/DG ADDL SEQ IV INF: CPT

## 2021-12-22 PROCEDURE — 96365 THER/PROPH/DIAG IV INF INIT: CPT

## 2021-12-22 PROCEDURE — 96375 TX/PRO/DX INJ NEW DRUG ADDON: CPT

## 2021-12-22 RX ORDER — DIPHENHYDRAMINE HYDROCHLORIDE 50 MG/ML
25 INJECTION INTRAMUSCULAR; INTRAVENOUS
Status: ACTIVE | OUTPATIENT
Start: 2021-12-22 | End: 2021-12-22

## 2021-12-22 RX ORDER — SODIUM CHLORIDE 9 MG/ML
20 INJECTION, SOLUTION INTRAVENOUS ONCE
Status: COMPLETED | OUTPATIENT
Start: 2021-12-22 | End: 2021-12-22

## 2021-12-22 RX ORDER — SODIUM CHLORIDE 9 MG/ML
20 INJECTION, SOLUTION INTRAVENOUS ONCE
Status: DISCONTINUED | OUTPATIENT
Start: 2021-12-22 | End: 2021-12-25 | Stop reason: HOSPADM

## 2021-12-22 RX ORDER — SODIUM CHLORIDE 9 MG/ML
20 INJECTION, SOLUTION INTRAVENOUS ONCE
Status: CANCELLED | OUTPATIENT
Start: 2021-12-29

## 2021-12-22 RX ORDER — IBUPROFEN 600 MG/1
600 TABLET ORAL ONCE
Status: COMPLETED | OUTPATIENT
Start: 2021-12-22 | End: 2021-12-22

## 2021-12-22 RX ORDER — SODIUM CHLORIDE 9 MG/ML
20 INJECTION, SOLUTION INTRAVENOUS ONCE
Status: CANCELLED | OUTPATIENT
Start: 2022-06-07

## 2021-12-22 RX ORDER — FAMOTIDINE 20 MG/50ML
20 INJECTION, SOLUTION INTRAVENOUS ONCE
Status: COMPLETED | OUTPATIENT
Start: 2021-12-22 | End: 2021-12-22

## 2021-12-22 RX ORDER — IBUPROFEN 600 MG/1
600 TABLET ORAL ONCE
Status: CANCELLED
Start: 2021-12-29 | End: 2021-12-29

## 2021-12-22 RX ORDER — IBUPROFEN 600 MG/1
600 TABLET ORAL ONCE
Status: CANCELLED
Start: 2021-12-22 | End: 2021-12-22

## 2021-12-22 RX ADMIN — FAMOTIDINE 20 MG: 20 INJECTION, SOLUTION INTRAVENOUS at 11:48

## 2021-12-22 RX ADMIN — IBUPROFEN 600 MG: 600 TABLET ORAL at 10:15

## 2021-12-22 RX ADMIN — ONDANSETRON 8 MG: 2 INJECTION INTRAMUSCULAR; INTRAVENOUS at 11:06

## 2021-12-22 RX ADMIN — DIPHENHYDRAMINE HYDROCHLORIDE 25 MG: 50 INJECTION, SOLUTION INTRAMUSCULAR; INTRAVENOUS at 11:45

## 2021-12-22 RX ADMIN — IRON SUCROSE 300 MG: 20 INJECTION, SOLUTION INTRAVENOUS at 08:26

## 2021-12-22 RX ADMIN — ZOLEDRONIC ACID 4 MG: 0.04 INJECTION, SOLUTION INTRAVENOUS at 10:15

## 2021-12-22 RX ADMIN — SODIUM CHLORIDE 20 ML/HR: 0.9 INJECTION, SOLUTION INTRAVENOUS at 08:25

## 2021-12-22 RX ADMIN — SODIUM CHLORIDE 500 ML: 0.9 INJECTION, SOLUTION INTRAVENOUS at 10:39

## 2021-12-22 RX ADMIN — HYDROCORTISONE SODIUM SUCCINATE 50 MG: 100 INJECTION, POWDER, FOR SOLUTION INTRAMUSCULAR; INTRAVENOUS at 11:46

## 2021-12-23 ENCOUNTER — TELEPHONE (OUTPATIENT)
Dept: INTERNAL MEDICINE CLINIC | Facility: CLINIC | Age: 61
End: 2021-12-23

## 2021-12-23 DIAGNOSIS — R05.9 COUGH: Primary | ICD-10-CM

## 2021-12-23 RX ORDER — CEFUROXIME AXETIL 500 MG/1
500 TABLET ORAL 2 TIMES DAILY
Qty: 12 TABLET | Refills: 0 | Status: SHIPPED | OUTPATIENT
Start: 2021-12-23 | End: 2021-12-29

## 2021-12-28 ENCOUNTER — SOCIAL WORK (OUTPATIENT)
Dept: BEHAVIORAL/MENTAL HEALTH CLINIC | Facility: CLINIC | Age: 61
End: 2021-12-28
Payer: COMMERCIAL

## 2021-12-28 DIAGNOSIS — F43.10 PTSD (POST-TRAUMATIC STRESS DISORDER): ICD-10-CM

## 2021-12-28 DIAGNOSIS — F33.0 MDD (MAJOR DEPRESSIVE DISORDER), RECURRENT EPISODE, MILD (HCC): ICD-10-CM

## 2021-12-28 DIAGNOSIS — F41.1 GAD (GENERALIZED ANXIETY DISORDER): Primary | ICD-10-CM

## 2021-12-28 DIAGNOSIS — F42.9 OBSESSIVE-COMPULSIVE DISORDER, UNSPECIFIED TYPE: ICD-10-CM

## 2021-12-28 PROCEDURE — 90834 PSYTX W PT 45 MINUTES: CPT | Performed by: COUNSELOR

## 2021-12-29 ENCOUNTER — HOSPITAL ENCOUNTER (OUTPATIENT)
Dept: INFUSION CENTER | Facility: CLINIC | Age: 61
Discharge: HOME | End: 2021-12-29
Payer: COMMERCIAL

## 2021-12-29 VITALS
RESPIRATION RATE: 20 BRPM | SYSTOLIC BLOOD PRESSURE: 132 MMHG | TEMPERATURE: 95.1 F | HEART RATE: 66 BPM | DIASTOLIC BLOOD PRESSURE: 70 MMHG

## 2021-12-29 DIAGNOSIS — D50.0 IRON DEFICIENCY ANEMIA DUE TO CHRONIC BLOOD LOSS: Primary | ICD-10-CM

## 2021-12-29 PROCEDURE — 96365 THER/PROPH/DIAG IV INF INIT: CPT

## 2021-12-29 PROCEDURE — 96367 TX/PROPH/DG ADDL SEQ IV INF: CPT

## 2021-12-29 RX ORDER — SODIUM CHLORIDE 9 MG/ML
20 INJECTION, SOLUTION INTRAVENOUS ONCE
Status: COMPLETED | OUTPATIENT
Start: 2021-12-29 | End: 2021-12-29

## 2021-12-29 RX ORDER — SODIUM CHLORIDE 9 MG/ML
20 INJECTION, SOLUTION INTRAVENOUS ONCE
Status: CANCELLED | OUTPATIENT
Start: 2022-01-05

## 2021-12-29 RX ADMIN — DEXAMETHASONE SODIUM PHOSPHATE 10 MG: 10 INJECTION, SOLUTION INTRAMUSCULAR; INTRAVENOUS at 13:38

## 2021-12-29 RX ADMIN — IRON SUCROSE 300 MG: 20 INJECTION, SOLUTION INTRAVENOUS at 14:32

## 2021-12-29 RX ADMIN — DIPHENHYDRAMINE HYDROCHLORIDE 50 MG: 50 INJECTION, SOLUTION INTRAMUSCULAR; INTRAVENOUS at 14:13

## 2021-12-29 RX ADMIN — SODIUM CHLORIDE 20 ML/HR: 0.9 INJECTION, SOLUTION INTRAVENOUS at 13:38

## 2021-12-29 NOTE — PATIENT INSTRUCTIONS
December 2021 Sunday Monday Tuesday Wednesday Thursday Friday Saturday                  1    OFFICE VISIT LONG PG   1:45 PM   (60 min )   Jana Henry MD   [facility] Internal Medicine 2     3    NEW PATIENT PG   8:30 AM   (15 min )   Eduardo Blanton MD   [facility] 4                5     6  Happy Birthday!     7     8     9    NM BONE CAMERA INJ   7:30 AM   (30 min )   AN NM [address] Nuclear Medicine    NM BONE SCAN WHOLE BODY  10:00 AM   (60 min )   AN NM [address] [facility] 10     11                12     13     14     15     16     17     18                19     20     21    LAB WALK IN  12:10 PM   (5 min )   BE NAZ CHAIR 1   [facility] - [address] [place] [address] Practice 22    INF THERAPY PLAN   8:00 AM   (270 min )   AN INF CHAIR [address] 23     24     25          Cycle 1, Treatment 1  + Add'l treatments      26     27     28    OFFICE VISIT LONG PG   8:45 AM   (60 min )   [facility] Therapist Lucina 29    INF THERAPY PLAN   2:00 PM   (150 min )   AN INF CHAIR [address] 30     31               Cycle 1, Treatment 2           Treatment Details       12/22/2021 - Cycle 1, Treatment 1 + Additional treatments      Supportive Care: APPT 17, ONCBCN NURSE [id number], sodium chloride, iron sucrose (VENOFER), MONITOR JULITA, ibuprofen (ADVIL,MOTRIN)      Supportive Care: ONCBCN PROVIDER [id number], ZOLEDRONIC ACID IVPB, ONDANSETRON IVPB IN 50 ML    12/29/2021 - Cycle 1, Treatment 2      Supportive Care: APPT 17, ONCBCN NURSE [id number], sodium chloride, iron sucrose (VENOFER), MONITOR JULITA        January 2022 Sunday Monday Tuesday Wednesday Thursday Friday Saturday                                 1                2     3     4     5    INF THERAPY PLAN  12:00 PM   (150 min )   AN INF CHAIR [address] 6     7     8          Cycle 1, Treatment 3      9     10     11    OFFICE VISIT LONG PG   8:45 AM   (60 min )   [facility] Therapist Lucina Carreon    INF THERAPY PLAN   2:00 PM   (150 min )   AN INF CHAIR [address] 13     14     15          Cycle 1, Treatment 4      16     17     18     19     20     21     22                23     24     25    OFFICE VISIT LONG PG   8:45 AM   (60 min )   [address] [place] [address] 89     23     83     44                00     33                                              Treatment Details       1/5/2022 - Cycle 1, Treatment 3      Supportive Care: APPT 17    1/12/2022 - Cycle 1, Treatment 4      Supportive Care: APPT 17

## 2021-12-29 NOTE — PROGRESS NOTES
Patient here for venofer and is well, no c/o or changes  She tolerated treatment today without incident after having had decadron and benadryl prior to venofer  She reported feeling "Ivone [place]' for a short time at the start of infusion but this subsided and infusion completed  Patient aware to return in a week for same  Advised patient to have benadryl on hand at home in case she would need to redose in the next 4-6 hours and she verbalized understanding

## 2022-01-05 ENCOUNTER — HOSPITAL ENCOUNTER (OUTPATIENT)
Dept: INFUSION CENTER | Facility: CLINIC | Age: 62
Discharge: HOME | End: 2022-01-05
Payer: COMMERCIAL

## 2022-01-05 ENCOUNTER — APPOINTMENT (OUTPATIENT)
Dept: LAB | Facility: CLINIC | Age: 62
End: 2022-01-05
Payer: COMMERCIAL

## 2022-01-05 VITALS
RESPIRATION RATE: 18 BRPM | SYSTOLIC BLOOD PRESSURE: 139 MMHG | HEART RATE: 72 BPM | TEMPERATURE: 98.6 F | DIASTOLIC BLOOD PRESSURE: 73 MMHG | OXYGEN SATURATION: 96 %

## 2022-01-05 DIAGNOSIS — D50.0 IRON DEFICIENCY ANEMIA DUE TO CHRONIC BLOOD LOSS: Primary | ICD-10-CM

## 2022-01-05 DIAGNOSIS — K74.3 CHOLANGITIC CIRRHOSIS (HCC): ICD-10-CM

## 2022-01-05 PROCEDURE — 96365 THER/PROPH/DIAG IV INF INIT: CPT

## 2022-01-05 PROCEDURE — 84075 ASSAY ALKALINE PHOSPHATASE: CPT

## 2022-01-05 PROCEDURE — 96367 TX/PROPH/DG ADDL SEQ IV INF: CPT

## 2022-01-05 PROCEDURE — 96366 THER/PROPH/DIAG IV INF ADDON: CPT

## 2022-01-05 PROCEDURE — 36415 COLL VENOUS BLD VENIPUNCTURE: CPT

## 2022-01-05 PROCEDURE — 84080 ASSAY ALKALINE PHOSPHATASES: CPT

## 2022-01-05 RX ORDER — ACETAMINOPHEN 325 MG/1
650 TABLET ORAL ONCE
Status: CANCELLED
Start: 2022-01-12 | End: 2022-01-12

## 2022-01-05 RX ORDER — SODIUM CHLORIDE 9 MG/ML
20 INJECTION, SOLUTION INTRAVENOUS ONCE
Status: CANCELLED | OUTPATIENT
Start: 2022-01-12

## 2022-01-05 RX ORDER — SODIUM CHLORIDE 9 MG/ML
20 INJECTION, SOLUTION INTRAVENOUS ONCE
Status: COMPLETED | OUTPATIENT
Start: 2022-01-05 | End: 2022-01-05

## 2022-01-05 RX ORDER — ACETAMINOPHEN 325 MG/1
650 TABLET ORAL ONCE
Status: COMPLETED | OUTPATIENT
Start: 2022-01-05 | End: 2022-01-05

## 2022-01-05 RX ADMIN — ACETAMINOPHEN 650 MG: 325 TABLET, FILM COATED ORAL at 13:00

## 2022-01-05 RX ADMIN — SODIUM CHLORIDE 300 MG: 0.9 INJECTION, SOLUTION INTRAVENOUS at 14:06

## 2022-01-05 RX ADMIN — DEXAMETHASONE SODIUM PHOSPHATE 10 MG: 10 INJECTION, SOLUTION INTRAMUSCULAR; INTRAVENOUS at 13:00

## 2022-01-05 RX ADMIN — SODIUM CHLORIDE 20 ML/HR: 0.9 INJECTION, SOLUTION INTRAVENOUS at 13:00

## 2022-01-05 RX ADMIN — DIPHENHYDRAMINE HYDROCHLORIDE 50 MG: 50 INJECTION, SOLUTION INTRAMUSCULAR; INTRAVENOUS at 13:33

## 2022-01-05 NOTE — PROGRESS NOTES
Pt to clinic for venofer infusion, had tylenol added to pre meds due to prt having a headache prior to start of venofer, will continue to monitor

## 2022-01-06 ENCOUNTER — CONSULT (OUTPATIENT)
Dept: MULTI SPECIALTY CLINIC | Facility: CLINIC | Age: 62
End: 2022-01-06

## 2022-01-06 VITALS
DIASTOLIC BLOOD PRESSURE: 77 MMHG | HEART RATE: 71 BPM | SYSTOLIC BLOOD PRESSURE: 127 MMHG | BODY MASS INDEX: 21.66 KG/M2 | WEIGHT: 148.8 LBS

## 2022-01-06 DIAGNOSIS — R04.0 RECURRENT EPISTAXIS: ICD-10-CM

## 2022-01-06 PROCEDURE — 3074F SYST BP LT 130 MM HG: CPT | Performed by: OTOLARYNGOLOGY

## 2022-01-06 PROCEDURE — 3078F DIAST BP <80 MM HG: CPT | Performed by: OTOLARYNGOLOGY

## 2022-01-06 PROCEDURE — 99214 OFFICE O/P EST MOD 30 MIN: CPT | Performed by: OTOLARYNGOLOGY

## 2022-01-06 PROCEDURE — 30801 ABLATE INF TURBINATE SUPERF: CPT | Performed by: OTOLARYNGOLOGY

## 2022-01-06 NOTE — PROGRESS NOTES
Assessment/Plan:     Diagnoses and all orders for this visit:    Recurrent epistaxis  -     Ambulatory Referral to Otolaryngology           There are no Patient Instructions on file for this visit  Patient ID: Kiara Mejia is a 57 y o  female  Subjective: Pt is here for epistaxis that she has had for years  Her last bleed was yesterday from the left side  The following portions of the patient's history were reviewed and updated as appropriate: allergies, current medications, past family history, past medical history, past social history, past surgical history and problem list     Review of Systems    Objective:    /77 (BP Location: Left arm, Patient Position: Sitting, Cuff Size: Standard)   Pulse 71   Wt 67 5 kg (148 lb 12 8 oz)   LMP  (LMP Unknown)   BMI 21 66 kg/m²     Physical Exam   Constitutional: Oriented to person, place, and time  Well-developed and well-nourished, no apparent distress, non-toxic appearance  Cooperative, able to hear and answer questions without difficulty  Voice: Normal voice quality  Head: Normocephalic, atraumatic  No scars, masses or lesions  Face: Symmetric, no edema, no sinus tenderness  Nose: Septum deviated with bleeding sites bilat  Mucosa moist, turbinates well appearing  No crusting, polyps or discharge evident  Oral cavity: Dentition intact  Mucosa moist, lips normal   Tongue mobile, floor of mouth normal   Hard palate unremarkable  No masses or lesions  Oropharynx: Uvula is midline, soft palate normal   Unremarkable oropharyngeal inlet  Tonsils absent  Posterior pharyngeal wall clear  No masses or lesions  Pulmonary/Chest: Normal effort and rate  No respiratory distress  Musculoskeletal: Normal range of motion  Neurological: Cranial nerves 2-12 intact  Skin: Skin is warm and dry  Psychiatric: Normal mood and affect  Procedure:  Bilat sides of septum cauterized with silver nitrate and coated with bacitracin ointment   Pt tolerated well

## 2022-01-08 LAB
ALP BONE CFR SERPL: 12 % (ref 14–68)
ALP INTEST CFR SERPL: 0 % (ref 0–18)
ALP LIVER CFR SERPL: 88 % (ref 18–85)
ALP SERPL-CCNC: 239 IU/L (ref 44–121)

## 2022-01-10 ENCOUNTER — TELEPHONE (OUTPATIENT)
Dept: PSYCHIATRY | Facility: CLINIC | Age: 62
End: 2022-01-10

## 2022-01-12 ENCOUNTER — HOSPITAL ENCOUNTER (OUTPATIENT)
Dept: INFUSION CENTER | Facility: CLINIC | Age: 62
Discharge: HOME | End: 2022-01-12
Payer: COMMERCIAL

## 2022-01-12 VITALS
SYSTOLIC BLOOD PRESSURE: 116 MMHG | DIASTOLIC BLOOD PRESSURE: 59 MMHG | RESPIRATION RATE: 20 BRPM | HEART RATE: 63 BPM | TEMPERATURE: 95.1 F

## 2022-01-12 DIAGNOSIS — D50.0 IRON DEFICIENCY ANEMIA DUE TO CHRONIC BLOOD LOSS: Primary | ICD-10-CM

## 2022-01-12 PROCEDURE — 96365 THER/PROPH/DIAG IV INF INIT: CPT

## 2022-01-12 PROCEDURE — 96367 TX/PROPH/DG ADDL SEQ IV INF: CPT

## 2022-01-12 RX ORDER — SODIUM CHLORIDE 9 MG/ML
20 INJECTION, SOLUTION INTRAVENOUS ONCE
Status: COMPLETED | OUTPATIENT
Start: 2022-01-12 | End: 2022-01-12

## 2022-01-12 RX ORDER — SODIUM CHLORIDE 9 MG/ML
20 INJECTION, SOLUTION INTRAVENOUS ONCE
Status: CANCELLED | OUTPATIENT
Start: 2022-01-12

## 2022-01-12 RX ADMIN — DIPHENHYDRAMINE HYDROCHLORIDE 50 MG: 50 INJECTION, SOLUTION INTRAMUSCULAR; INTRAVENOUS at 14:40

## 2022-01-12 RX ADMIN — DEXAMETHASONE SODIUM PHOSPHATE 10 MG: 10 INJECTION, SOLUTION INTRAMUSCULAR; INTRAVENOUS at 14:12

## 2022-01-12 RX ADMIN — SODIUM CHLORIDE 20 ML/HR: 0.9 INJECTION, SOLUTION INTRAVENOUS at 14:12

## 2022-01-12 RX ADMIN — SODIUM CHLORIDE 300 MG: 0.9 INJECTION, SOLUTION INTRAVENOUS at 14:57

## 2022-01-12 NOTE — PROGRESS NOTES
Patient here for venofer with pre meds and is doing ok one day post nasal surgery  No changes reported, pain at a 4 and tolerable per patient

## 2022-01-12 NOTE — PROGRESS NOTES
Patient tolerated treatment today without incident and was discharged post, no c/o offered, she will RTO for further dosing at physician's discretion  Patient felt well to drive home

## 2022-01-12 NOTE — PATIENT INSTRUCTIONS
January 2022 Sunday Monday Tuesday Wednesday Thursday Friday Saturday                                 1                2     3     4     5    LAB WALK IN  11:25 AM   (5 min )   AN MOB LAB PSC CHAIR 1   [facility] [address] MOB    INF THERAPY PLAN  12:00 PM   (150 min )   AN INF CHAIR [address] [facility] 6     7     8          Cycle 1, Treatment 3      9     10     11     12    INF THERAPY PLAN   2:00 PM   (180 min )   AN INF CHAIR [address] 13     14     15          Cycle 1, Treatment 4      16     17     18     19     20     21     22                23     24     25    OFFICE VISIT LONG PG   8:45 AM   (60 min )   [facility] [address] [place] [address] 34     47     70     33                32     92                                              Treatment Details       1/5/2022 - Cycle 1, Treatment 3      Supportive Care: APPT 17, ONCBCN NURSE [id number], sodium chloride, iron sucrose (VENOFER), MONITOR JULITA, acetaminophen (TYLENOL)    1/12/2022 - Cycle 1, Treatment 4      Supportive Care: APPT 17, ONCBCN NURSE [id number], sodium chloride, iron sucrose (VENOFER), MONITOR JULITA        February 2022 Sunday Monday Tuesday Wednesday Thursday Friday Saturday             1     2     3    MEDICATION MANAGEMENT PG   9:15 AM   (30 min )   Rafia Mantis, DO   [facility] 4     5                6     7     8    OFFICE VISIT LONG PG   8:45 AM   (60 min )   [facility] Therapist Lucina 9     10     11     12                13     14     15     16     17     18     19                20     21     22    OFFICE VISIT LONG PG   8:45 AM   (60 min )   [address] [place] [address] 48     27     55     03                67     30

## 2022-01-25 ENCOUNTER — SOCIAL WORK (OUTPATIENT)
Dept: BEHAVIORAL/MENTAL HEALTH CLINIC | Facility: CLINIC | Age: 62
End: 2022-01-25
Payer: COMMERCIAL

## 2022-01-25 DIAGNOSIS — F33.0 MDD (MAJOR DEPRESSIVE DISORDER), RECURRENT EPISODE, MILD (HCC): Primary | ICD-10-CM

## 2022-01-25 DIAGNOSIS — F43.10 PTSD (POST-TRAUMATIC STRESS DISORDER): ICD-10-CM

## 2022-01-25 DIAGNOSIS — F41.1 GAD (GENERALIZED ANXIETY DISORDER): ICD-10-CM

## 2022-01-25 DIAGNOSIS — F42.9 OBSESSIVE-COMPULSIVE DISORDER, UNSPECIFIED TYPE: ICD-10-CM

## 2022-01-25 PROCEDURE — 90834 PSYTX W PT 45 MINUTES: CPT | Performed by: COUNSELOR

## 2022-01-25 NOTE — PSYCH
Psychotherapy Provided: Individual Psychotherapy 50 minutes     Length of time in session: 50 minutes, follow up in 2 week    Encounter Diagnosis     ICD-10-CM    1  MDD (major depressive disorder), recurrent episode, mild ([facility] [address] )  F33 0    2  JOSEFINA (generalized anxiety disorder)  F41 1    3  Obsessive-compulsive disorder, unspecified type  F42 9    4  PTSD (post-traumatic stress disorder)  F43 10        Goals addressed in session: Goal 1 and Goal 2     Current suicide risk : Low     D: Clinician met with Yanci Devi in person for individual therapy  Yanci Devi reports recent emergency surgery on her nose due to excessive bleeding  She reports that surgery went well and she is recovering  Clinician explored recovery process and support from paramour and family  Yanci Devi reports some communication issues with paramour and working on addressing these with him  Clinician gave feedback on current communication styles and discussed use of I messages to assist in expressing feelings to paramour and how his actions impact the ways she feels about the relationship  A: Yanci Devi was open and engaged in the session and presented with stable mood and affect  Yanci Devi was open to feedback and willing to try to communicate feelings more to paramour  P: Continue to meet with Yanci Devi every other week for individual therapy  Behavioral Health Treatment Plan [facility]: Diagnosis and Treatment Plan explained to Kath Kymberly relates understanding diagnosis and is agreeable to Treatment Plan   Yes

## 2022-02-01 ENCOUNTER — TELEPHONE (OUTPATIENT)
Dept: PSYCHIATRY | Facility: CLINIC | Age: 62
End: 2022-02-01

## 2022-02-01 NOTE — TELEPHONE ENCOUNTER
[facility] [employer]  Alvaro Perez states that she has an upcoming appointment with Dr Sadia Perez however, she feels it shoud be rescheduled because she has not been compliant with taking her medications  States that she wants to allow it more time  Has had a lot going on after death of her mother and had to have emergency surgery a few weeks ago  States OCD is a challenge but she wants to give medication a fair chance  She will start taking it regularly so that she can discuss things at next appointment  She wants recommendation from Dr Sadia Perez on what she should do  Can she have more time on medication before next appointment? Can appointment for 2/3 be rescheduled to a later date? Will refer to Dr Sadia Perez for review

## 2022-02-01 NOTE — TELEPHONE ENCOUNTER
Notified Zoila Plaomares that Dr Carla Winkler would like her to be consistent with medication for 4-8 weeks prior to appointment

## 2022-02-08 ENCOUNTER — SOCIAL WORK (OUTPATIENT)
Dept: BEHAVIORAL/MENTAL HEALTH CLINIC | Facility: CLINIC | Age: 62
End: 2022-02-08
Payer: COMMERCIAL

## 2022-02-08 DIAGNOSIS — F41.1 GAD (GENERALIZED ANXIETY DISORDER): ICD-10-CM

## 2022-02-08 DIAGNOSIS — F33.0 MDD (MAJOR DEPRESSIVE DISORDER), RECURRENT EPISODE, MILD (HCC): Primary | ICD-10-CM

## 2022-02-08 DIAGNOSIS — F42.9 OBSESSIVE-COMPULSIVE DISORDER, UNSPECIFIED TYPE: ICD-10-CM

## 2022-02-08 DIAGNOSIS — F43.10 PTSD (POST-TRAUMATIC STRESS DISORDER): ICD-10-CM

## 2022-02-08 PROCEDURE — 90834 PSYTX W PT 45 MINUTES: CPT | Performed by: COUNSELOR

## 2022-02-08 NOTE — PSYCH
Psychotherapy Provided: Individual Psychotherapy 50 minutes     Length of time in session: 50 minutes, follow up in 2 week    Encounter Diagnosis     ICD-10-CM    1  MDD (major depressive disorder), recurrent episode, mild ([facility] [address] )  F33 0    2  JOSEFINA (generalized anxiety disorder)  F41 1    3  Obsessive-compulsive disorder, unspecified type  F42 9    4  PTSD (post-traumatic stress disorder)  F43 10        Goals addressed in session: Goal 1 and Goal 2     Current suicide risk : Low     D: Clinician met with Lindsey Kebede in person for individual therapy  She discussed having trouble making decisions and that she does not have people she is able to support her in decision making  She reports having felt pressure as a child and adult to help other to make decisions for themselves and that this has been a role that she has often filled  Clinician explored feelings and Courtney's decision making skills along with feeling confident that she is making choices for herself that are the best with the information provided  Lindsey Kebede reports anxiety related to finances and relationship  Clinician explored ways for Lindsey Kebede to advocate for needs and openly express concerns  A: Lindsey Kebede was open and engaged in the session and reports follow through with restating anti depressant  P: Continue to meet with Lindsey Kebede every other week for individual therapy  Behavioral Health Treatment Plan [facility]: Diagnosis and Treatment Plan explained to Dean Smith relates understanding diagnosis and is agreeable to Treatment Plan   Yes

## 2022-02-10 DIAGNOSIS — A60.9 HSV (HERPES SIMPLEX VIRUS) ANOGENITAL INFECTION: Primary | ICD-10-CM

## 2022-02-10 RX ORDER — ACYCLOVIR 200 MG/1
200 CAPSULE ORAL 2 TIMES DAILY
Qty: 60 CAPSULE | Refills: 0 | Status: SHIPPED | OUTPATIENT
Start: 2022-02-10 | End: 2022-02-14 | Stop reason: DRUGHIGH

## 2022-02-10 NOTE — TELEPHONE ENCOUNTER
Patient would like to start maitence HSV medication  Did speak to PCP about this, per conversation with you due to health problems  He suggest she go on Acyclovir instead of Valacyclovir  Patient has had 3-4 outbreaks since 08/2021  Spoke with MANDA jones to prescribe, patient is aware and to call us with follow up

## 2022-02-11 DIAGNOSIS — A60.9 HSV (HERPES SIMPLEX VIRUS) ANOGENITAL INFECTION: Primary | ICD-10-CM

## 2022-02-11 NOTE — TELEPHONE ENCOUNTER
Pt had called 2/10/2022 re: maintenance dose antiviral for herpes - her pcp suggested Acyclovir 400 mg BID - presc escribed 2/10/2022 by Nathaly Pennington for Acyclovir 200 mg BID  She is inquiring about dosage & if you feel Acyclovir is preference over Valtrex for maintenance

## 2022-02-14 RX ORDER — ACYCLOVIR 400 MG/1
400 TABLET ORAL 2 TIMES DAILY
Qty: 60 TABLET | Refills: 1 | Status: SHIPPED | OUTPATIENT
Start: 2022-02-14 | End: 2022-03-13

## 2022-02-14 NOTE — TELEPHONE ENCOUNTER
Pt's pcp had recom Acyclovir 400 mg BID - you escribed presc for 200 mg BID on 2/10/2022  What dosage do you recom for pt to take?

## 2022-02-17 ENCOUNTER — TELEPHONE (OUTPATIENT)
Dept: INTERNAL MEDICINE CLINIC | Facility: CLINIC | Age: 62
End: 2022-02-17

## 2022-02-17 NOTE — TELEPHONE ENCOUNTER
Pt called in stated that she has had cold syptoms since Sunday  Pt stated she has runny nose, congestion, no fever, cough, no body ache  Pt stated she had this in December

## 2022-02-22 ENCOUNTER — SOCIAL WORK (OUTPATIENT)
Dept: BEHAVIORAL/MENTAL HEALTH CLINIC | Facility: CLINIC | Age: 62
End: 2022-02-22
Payer: COMMERCIAL

## 2022-02-22 DIAGNOSIS — F43.10 PTSD (POST-TRAUMATIC STRESS DISORDER): ICD-10-CM

## 2022-02-22 DIAGNOSIS — F42.9 OBSESSIVE-COMPULSIVE DISORDER, UNSPECIFIED TYPE: ICD-10-CM

## 2022-02-22 DIAGNOSIS — F33.0 MDD (MAJOR DEPRESSIVE DISORDER), RECURRENT EPISODE, MILD (HCC): Primary | ICD-10-CM

## 2022-02-22 DIAGNOSIS — F41.1 GAD (GENERALIZED ANXIETY DISORDER): ICD-10-CM

## 2022-02-22 PROCEDURE — 90834 PSYTX W PT 45 MINUTES: CPT | Performed by: COUNSELOR

## 2022-02-22 NOTE — PSYCH
Psychotherapy Provided: Individual Psychotherapy 55 minutes     Length of time in session: 55 minutes, follow up in 2 week    Encounter Diagnosis     ICD-10-CM    1  MDD (major depressive disorder), recurrent episode, mild ([facility] [address] )  F33 0    2  JOSEFINA (generalized anxiety disorder)  F41 1    3  Obsessive-compulsive disorder, unspecified type  F42 9    4  PTSD (post-traumatic stress disorder)  F43 10        Goals addressed in session: Goal 1 and Goal 2     Current suicide risk : Low     D: Clinician met with Sridhar Smith in person for individual therapy  Sridhar Smith discussed recent interacts with paramour and in detail discussed their interactions  Clinician explored communication and gave feedback on passive aggressive forms of communication and the possible consequences of this style of communication  Clinician explored potential modifications in communication that could be made to better express feelings and needs to paramour  Shahrambailey Luis reports apprehension with this as she feels paramour has reacted defensively in the past to attempts at discussing feelings and clinician gave possible causes for this and discussed important of communication  Sridhar Smith processed feelings of grief and ways she is working to keep her mother's memory alive  Clinician normalized experience of grief  A: Sridhar Smith was open and engaged in the session and reports overa stable mood and affect  She reports increase in grief symptoms and working on strategies to decrease this experience  P: Continue to meet with Sridahr Smith every other week for individual therapy  Behavioral Health Treatment Plan [facility]: Diagnosis and Treatment Plan explained to David Casey relates understanding diagnosis and is agreeable to Treatment Plan   Yes

## 2022-03-08 ENCOUNTER — SOCIAL WORK (OUTPATIENT)
Dept: BEHAVIORAL/MENTAL HEALTH CLINIC | Facility: CLINIC | Age: 62
End: 2022-03-08
Payer: COMMERCIAL

## 2022-03-08 DIAGNOSIS — F33.0 MDD (MAJOR DEPRESSIVE DISORDER), RECURRENT EPISODE, MILD (HCC): Primary | ICD-10-CM

## 2022-03-08 DIAGNOSIS — F42.9 OBSESSIVE-COMPULSIVE DISORDER, UNSPECIFIED TYPE: ICD-10-CM

## 2022-03-08 DIAGNOSIS — F43.10 PTSD (POST-TRAUMATIC STRESS DISORDER): ICD-10-CM

## 2022-03-08 DIAGNOSIS — F41.1 GAD (GENERALIZED ANXIETY DISORDER): ICD-10-CM

## 2022-03-08 PROCEDURE — 90834 PSYTX W PT 45 MINUTES: CPT | Performed by: COUNSELOR

## 2022-03-08 NOTE — PSYCH
Psychotherapy Provided: Individual Psychotherapy 50 minutes     Length of time in session: 50 minutes, follow up in 2 week    Encounter Diagnosis     ICD-10-CM    1  MDD (major depressive disorder), recurrent episode, mild ([facility] [address] )  F33 0    2  JOSEFINA (generalized anxiety disorder)  F41 1    3  Obsessive-compulsive disorder, unspecified type  F42 9    4  PTSD (post-traumatic stress disorder)  F43 10        Goals addressed in session: Goal 1 and Goal 2     Current suicide risk : Low     D: Clinician met with Hans Morrison in person for individual therapy  Hans Morrison discussed increased anxiety she is experiencing at bedtime  Clinician explored anxious thoughts which Hans Morrison reports worry about her finances and financial future and grief related to her mother passing  Clinician explored Courtney's use of coping skills and facilitated conversation with worksheet about categories of coping skills and possible ways to managing increase in anxiety at bedtime  Hans Morrison processed attempts at communication with paramour and considering future planning  Clinician discussed assertive communication skills to be used and advocating for her needs  A: Hans Morrison was open and engaged in the session  She reports taking her medication as prescribed but does not feel it has been as helpful in controlling anxiety at which point clinician explored and encouraged coping skills  P: Continue to meet with Hans Morrison every other week for individual therapy  Behavioral Health Treatment Plan [facility]: Diagnosis and Treatment Plan explained to Kristin Winn relates understanding diagnosis and is agreeable to Treatment Plan   Yes

## 2022-03-11 DIAGNOSIS — A60.9 HSV (HERPES SIMPLEX VIRUS) ANOGENITAL INFECTION: ICD-10-CM

## 2022-03-17 ENCOUNTER — TELEPHONE (OUTPATIENT)
Dept: INTERNAL MEDICINE CLINIC | Facility: CLINIC | Age: 62
End: 2022-03-17

## 2022-03-17 NOTE — TELEPHONE ENCOUNTER
Todd Jarod called and is not feeling well since Tuesday  She did a home test and it is (-),  Her throat doesn't hurt but doesn't feel right when swallowing, she had right ear pain, her right sinus hurts  She is not coughing  Tuesday she couldn't not sleep but was trying to all day  She took ibuprofen that didn't help and flexeril too  Today she has right ear pain, still with the throat not feeling right, some tightness between her breasts and tigtness between her shoulder blades  Please advise      [phone]

## 2022-03-18 ENCOUNTER — OFFICE VISIT (OUTPATIENT)
Dept: INTERNAL MEDICINE CLINIC | Facility: CLINIC | Age: 62
End: 2022-03-18
Payer: COMMERCIAL

## 2022-03-18 ENCOUNTER — NURSE TRIAGE (OUTPATIENT)
Dept: OTHER | Facility: OTHER | Age: 62
End: 2022-03-18

## 2022-03-18 VITALS
WEIGHT: 149.8 LBS | SYSTOLIC BLOOD PRESSURE: 122 MMHG | BODY MASS INDEX: 21.45 KG/M2 | HEIGHT: 70 IN | HEART RATE: 68 BPM | DIASTOLIC BLOOD PRESSURE: 78 MMHG | RESPIRATION RATE: 16 BRPM | OXYGEN SATURATION: 98 % | TEMPERATURE: 98.1 F

## 2022-03-18 DIAGNOSIS — H61.21 HEARING LOSS DUE TO CERUMEN IMPACTION, RIGHT: ICD-10-CM

## 2022-03-18 DIAGNOSIS — J06.9 VIRAL URI: Primary | ICD-10-CM

## 2022-03-18 LAB
B PARAP IS1001 DNA NPH QL NAA+NON-PROBE: NOT DETECTED
B PERT.PT PRMT NPH QL NAA+NON-PROBE: NOT DETECTED
C PNEUM DNA NPH QL NAA+NON-PROBE: NOT DETECTED
FLUAV RNA NPH QL NAA+NON-PROBE: NOT DETECTED
FLUBV RNA NPH QL NAA+NON-PROBE: NOT DETECTED
HADV DNA NPH QL NAA+NON-PROBE: NOT DETECTED
HCOV 229E RNA NPH QL NAA+NON-PROBE: NOT DETECTED
HCOV HKU1 RNA NPH QL NAA+NON-PROBE: NOT DETECTED
HCOV NL63 RNA NPH QL NAA+NON-PROBE: NOT DETECTED
HCOV OC43 RNA NPH QL NAA+NON-PROBE: DETECTED
HMPV RNA NPH QL NAA+NON-PROBE: NOT DETECTED
HPIV1 RNA NPH QL NAA+NON-PROBE: NOT DETECTED
HPIV2 RNA NPH QL NAA+NON-PROBE: NOT DETECTED
HPIV3 RNA NPH QL NAA+NON-PROBE: NOT DETECTED
HPIV4 RNA NPH QL NAA+NON-PROBE: NOT DETECTED
M PNEUMO DNA NPH QL NAA+NON-PROBE: NOT DETECTED
RSV RNA NPH QL NAA+NON-PROBE: NOT DETECTED
RV+EV RNA NPH QL NAA+NON-PROBE: NOT DETECTED
SARS-COV-2 RNA NPH QL NAA+NON-PROBE: NOT DETECTED

## 2022-03-18 PROCEDURE — 0202U NFCT DS 22 TRGT SARS-COV-2: CPT | Performed by: INTERNAL MEDICINE

## 2022-03-18 PROCEDURE — 36415 COLL VENOUS BLD VENIPUNCTURE: CPT | Performed by: INTERNAL MEDICINE

## 2022-03-18 PROCEDURE — 99214 OFFICE O/P EST MOD 30 MIN: CPT | Performed by: INTERNAL MEDICINE

## 2022-03-18 PROCEDURE — 69209 REMOVE IMPACTED EAR WAX UNI: CPT | Performed by: INTERNAL MEDICINE

## 2022-03-18 RX ORDER — AZITHROMYCIN 250 MG/1
TABLET, FILM COATED ORAL
Qty: 6 TABLET | Refills: 0 | Status: SHIPPED | OUTPATIENT
Start: 2022-03-18 | End: 2022-03-23

## 2022-03-18 NOTE — TELEPHONE ENCOUNTER
Reason for Disposition   [1] Other NON-URGENT information for PCP AND [2] does not require PCP response    Additional Information   Lab result questions    Protocols used: PCP CALL - NO TRIAGE-ADULT-, INFORMATION ONLY CALL-ADULT-

## 2022-03-18 NOTE — TELEPHONE ENCOUNTER
Regarding: [facility] covid/cold question/concern  ----- Message from [facility] sent at 3/18/2022  5:58 PM EDT -----  "I was into see Dr Alexandrea Thorpe today and was given a test and I was told I could be around people but im not sure what 'Coronavirus OC43' is? I was positive for that, Im not sure if this means I have a cold?  If I have covid?"

## 2022-03-18 NOTE — PROGRESS NOTES
Ear cerumen removal    Date/Time: 3/18/2022 1:41 PM  Performed by: Betsy Sosa DO  Authorized by: Betsy Sosa DO   Universal Protocol:  Procedure performed by:  Consent: Verbal consent obtained  Written consent not obtained  Risks and benefits: risks, benefits and alternatives were discussed  Consent given by: patient  Time out: Immediately prior to procedure a "time out" was called to verify the correct patient, procedure, equipment, support staff and site/side marked as required  Patient understanding: patient states understanding of the procedure being performed  Patient consent: the patient's understanding of the procedure matches consent given  Procedure consent: procedure consent matches procedure scheduled  Patient identity confirmed: verbally with patient and provided demographic data      Patient location:  Clinic  Procedure details:     Location:  R ear    Procedure type: irrigation only      Approach:  Internal, external and natural orifice  Post-procedure details:     Complication:  None    Hearing quality:  Improved    Patient tolerance of procedure:   Tolerated well, no immediate complications

## 2022-03-18 NOTE — PROGRESS NOTES
Assessment/Plan:    #Viral URI  -present since Monday night  -covid test at home negative  -has throat irritation with HA and right frontal sinus pressure with chills alternating with warmth, nausea  -denies sneezing/cough  -ibuprofen did not provide relief  -underwent septoplasty and cautery for epistaxis on 1/11/22  -used nasal saline but had burning  -recommended flonase  -prescribed zpak but encouraged to hold off unless symptoms worsen  -obtain viral panel    #Hearing loss  -noted over right ear due to cerumen and disimpacted with relief     No problem-specific Assessment & Plan notes found for this encounter  Diagnoses and all orders for this visit:    Viral URI  -     azithromycin (Zithromax) 250 mg tablet; Take 2 tablets (500 mg total) by mouth daily for 1 day, THEN 1 tablet (250 mg total) daily for 4 days  -     Cancel: Respiratory Panel 2  1(RP2)with COVID19  -     Cancel: Respiratory Panel 2  1(RP2)with COVID19  -     Cancel: Respiratory Panel 2  1(RP2)with COVID19  -     Cancel: Respiratory Panel 2  1(RP2)with COVID19  -     Cancel: Respiratory Panel 2  1(RP2)with COVID19  -     Cancel: Respiratory Panel 2  1(RP2)with COVID19  -     Cancel: MISCELLANEOUS LAB TEST; Future  -     MISCELLANEOUS LAB TEST; Future  -     Cancel: MISCELLANEOUS LAB TEST  -     Respiratory Panel 2  1(RP2)with COVID19    Hearing loss due to cerumen impaction, right  -     Ear cerumen removal  -     Respiratory Panel 2  1(RP2)with COVID19            Current Outpatient Medications:     Cholecalciferol (VITAMIN D3) 2000 UNITS TABS, Take 2,000 Units by mouth daily  , Disp: , Rfl:     diclofenac sodium (VOLTAREN) 1 %, APPLY 2 GRAMS TO THE AFFECTED AREA(S) BY TOPICAL ROUTE 4 TIMES PER DAY, Disp: , Rfl: 3    FLUoxetine (PROzac) 20 MG tablet, Take 10mg daily for 1 week then increase to 20mg daily, Disp: 30 tablet, Rfl: 1    lidocaine (LIDODERM) 5 %, lidocaine 5 % topical patch  APPLY 1 PATCH TO AFFECTED AREA FOR 12 HOURS A DAY & REMOVE FOR 12 HOURS BEFORE APPLYING NEXT PATCH  (12 HOURS ON, 12 HOURS OFF), Disp: , Rfl:     Ocaliva 5 MG TABS,  , Disp: , Rfl:     traMADol (Ultram) 50 mg tablet, Take 1 tablet (50 mg total) by mouth every 8 (eight) hours as needed for moderate pain, Disp: 30 tablet, Rfl: 0    traZODone (DESYREL) 50 mg tablet, Take 0 5-1 tablets (25-50 mg total) by mouth daily at bedtime as needed for sleep, Disp: 30 tablet, Rfl: 2    triamterene-hydrochlorothiazide (MAXZIDE-25) 37 5-25 mg per tablet, TAKE 1 TABLET BY MOUTH EVERY DAY, Disp: 90 tablet, Rfl: 3    ursodiol (ACTIGALL) 300 mg capsule, TAKE 1 CAPSULE BY MOUTH THREE TIMES A DAY, Disp: 270 capsule, Rfl: 2    acyclovir (ZOVIRAX) 400 MG tablet, Take 1 tablet (400 mg total) by mouth 2 (two) times a day, Disp: 60 tablet, Rfl: 1    azithromycin (Zithromax) 250 mg tablet, Take 2 tablets (500 mg total) by mouth daily for 1 day, THEN 1 tablet (250 mg total) daily for 4 days  , Disp: 6 tablet, Rfl: 0    Subjective:      Patient ID: Ceferino Nam is a 57 y o  female  HPI     Patient presents for an acute visit  Reports that she started developed symptoms of a throat irritation on Monday night  She did a home COVID test that was negative  She has been having posterior neck headaches as well as right frontal sinus pressure and chills and feeling warm  She denies any sneezing or cough but does have rhinitis with clear mucus production  She has been taking ibuprofen without any relief  She states that she feels stomach irritation and nausea  On January 11, 2022 she underwent septoplasty and nasal surgery to control or epistaxis  She denies any issues with nasal bleeding at this time  She has used nasal saline as well as Ocean spray but it made it worse  We recommended that she trial Flonase  Will obtain a viral respiratory swab    Gave her script for Z-Isaac in case symptoms worsen or persist   We also disimpacted her right ear at bedside today due to cerumen impaction and difficulty hearing  The following portions of the patient's history were reviewed and updated as appropriate: allergies, current medications, past family history, past medical history, past social history, past surgical history and problem list     Review of Systems   Constitutional: Positive for chills, fatigue and fever  Negative for activity change, appetite change and diaphoresis  HENT: Positive for congestion, rhinorrhea and sore throat  Negative for trouble swallowing  Respiratory: Negative for cough and shortness of breath  Cardiovascular: Negative for chest pain and palpitations  Gastrointestinal: Positive for nausea  Negative for abdominal pain, constipation, diarrhea and vomiting  Musculoskeletal: Negative for back pain and myalgias  Neurological: Positive for headaches  Negative for dizziness  Objective:      /78   Pulse 68   Temp 98 1 °F (36 7 °C)   Resp 16   Ht 5' 9 5" (1 765 m)   Wt 67 9 kg (149 lb 12 8 oz)   LMP  (LMP Unknown)   SpO2 98%   BMI 21 80 kg/m²          Physical Exam  Constitutional:       General: She is not in acute distress  Appearance: She is well-developed  She is not diaphoretic  HENT:      Head: Normocephalic and atraumatic  Right Ear: Tenderness present  Left Ear: Tympanic membrane and ear canal normal  No drainage, swelling or tenderness  No middle ear effusion  Tympanic membrane is not erythematous  Nose: Congestion and rhinorrhea present  Mouth/Throat:      Mouth: Mucous membranes are pale and dry  No oral lesions  Pharynx: No pharyngeal swelling, oropharyngeal exudate or posterior oropharyngeal erythema  Tonsils: No tonsillar exudate or tonsillar abscesses  0 on the right  0 on the left  Eyes:      Conjunctiva/sclera: Conjunctivae normal       Pupils: Pupils are equal, round, and reactive to light  Neck:      Vascular: No JVD  Trachea: No tracheal deviation     Cardiovascular:      Rate and Rhythm: Normal rate and regular rhythm  Heart sounds: Normal heart sounds  No murmur heard  Pulmonary:      Effort: Pulmonary effort is normal  No respiratory distress  Breath sounds: Normal breath sounds  No stridor  No wheezing or rhonchi  Musculoskeletal:         General: No tenderness or deformity  Normal range of motion  Cervical back: Normal range of motion and neck supple  Lymphadenopathy:      Cervical: No cervical adenopathy  Skin:     General: Skin is warm and dry  Findings: No erythema  Neurological:      Mental Status: She is alert and oriented to person, place, and time  This note was completed in part utilizing [employer] direct voice recognition software  Grammatical errors, random word insertion, spelling mistakes, and incomplete sentences may be an occasional consequence of the system secondary to software limitations, ambient noise and hardware issues  At the time of dictation, efforts were made to edit, clarify and /or correct errors  Please read the chart carefully and recognize, using context, where substitutions have occurred  If you have any questions or concerns about the context, text or information contained within the body of this dictation, please contact myself, the provider, for further clarification

## 2022-03-21 NOTE — TELEPHONE ENCOUNTER
There are no pills to develop immunity, she will have to keep covered up to avoid her skin touching the oil residue on the poison ivy plant  If she does develop an outbreak on her skin, she should use the topical steroids

## 2022-03-21 NOTE — TELEPHONE ENCOUNTER
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

## 2022-03-21 NOTE — TELEPHONE ENCOUNTER
Please call her with response I sent to her on mobile mumt  "Hi Ms Rolan Zhu, your viral respiratory swab came back for coronavirus  One of the viruses which can cause the common cold  It is not COVID  You should be careful around young children or elderly adults or anyone immunocompromised however you do not need to quarantine  The antibiotic will not help you  This will take your body about 1 week to clear out  "

## 2022-03-22 ENCOUNTER — OFFICE VISIT (OUTPATIENT)
Dept: PSYCHIATRY | Facility: CLINIC | Age: 62
End: 2022-03-22
Payer: COMMERCIAL

## 2022-03-22 ENCOUNTER — SOCIAL WORK (OUTPATIENT)
Dept: BEHAVIORAL/MENTAL HEALTH CLINIC | Facility: CLINIC | Age: 62
End: 2022-03-22
Payer: COMMERCIAL

## 2022-03-22 DIAGNOSIS — F33.0 MDD (MAJOR DEPRESSIVE DISORDER), RECURRENT EPISODE, MILD (HCC): Primary | ICD-10-CM

## 2022-03-22 DIAGNOSIS — F33.0 MDD (MAJOR DEPRESSIVE DISORDER), RECURRENT EPISODE, MILD (HCC): ICD-10-CM

## 2022-03-22 DIAGNOSIS — F41.1 GAD (GENERALIZED ANXIETY DISORDER): ICD-10-CM

## 2022-03-22 DIAGNOSIS — F42.9 OBSESSIVE-COMPULSIVE DISORDER, UNSPECIFIED TYPE: ICD-10-CM

## 2022-03-22 DIAGNOSIS — F43.10 PTSD (POST-TRAUMATIC STRESS DISORDER): ICD-10-CM

## 2022-03-22 PROCEDURE — 90833 PSYTX W PT W E/M 30 MIN: CPT | Performed by: PSYCHIATRY & NEUROLOGY

## 2022-03-22 PROCEDURE — 90834 PSYTX W PT 45 MINUTES: CPT | Performed by: COUNSELOR

## 2022-03-22 PROCEDURE — 99214 OFFICE O/P EST MOD 30 MIN: CPT | Performed by: PSYCHIATRY & NEUROLOGY

## 2022-03-22 RX ORDER — FLUOXETINE 20 MG/1
TABLET, FILM COATED ORAL
Qty: 60 TABLET | Refills: 1 | Status: SHIPPED | OUTPATIENT
Start: 2022-03-22 | End: 2022-05-06
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
## 2022-03-22 NOTE — PSYCH
Psychotherapy Provided: Individual Psychotherapy 50 minutes     Length of time in session: 50 minutes, follow up in 2 week    Encounter Diagnosis     ICD-10-CM    1  MDD (major depressive disorder), recurrent episode, mild ([facility] [address] )  F33 0    2  JOSEFINA (generalized anxiety disorder)  F41 1    3  Obsessive-compulsive disorder, unspecified type  F42 9    4  PTSD (post-traumatic stress disorder)  F43 10        Goals addressed in session: Goal 1 and Goal 2     Current suicide risk : Low     D: Clinician met with Clay Mayberry in person for individual therapy  Clay Mayberry processed attempts at communication with paramour and desire to discuss future planning  Clinician encourage Clay Lynnmaribeth to explored her won future plans prior to discussing this with paramour in order to openly communicate her plans and needs with him  Reginaethel Galvancora reports fear of abandonment and what paramours response could be if she brought up future plans and her needs  Clinician explored communication styles and gave feedback and ways to turn passive aggressive communication into more assertive communication  A: Clay Mayberry was open and engaged in the session  She presents with stable mood and is future oriented  P: Continue to meet with Clay Mayberry every other week for individual therapy  Behavioral Health Treatment Plan [facility]: Diagnosis and Treatment Plan explained to Hilario Rodriguez relates understanding diagnosis and is agreeable to Treatment Plan   Yes

## 2022-03-24 ENCOUNTER — TELEPHONE (OUTPATIENT)
Dept: INTERNAL MEDICINE CLINIC | Facility: CLINIC | Age: 62
End: 2022-03-24

## 2022-03-24 ENCOUNTER — TELEPHONE (OUTPATIENT)
Dept: PSYCHIATRY | Facility: CLINIC | Age: 62
End: 2022-03-24

## 2022-03-24 NOTE — TELEPHONE ENCOUNTER
Called pharmacy to find out what the problem is with Prozac script  Was informed that Anton Garcia was advised by them that she should finish her script that she filled previously for the 20 MG tablets before they can fill this one  Bell Dudley who said that she was unable to fill script  Informed her that she should take 1 5 of her 20 MG tablets until they are finished and then go  next script  Instructed her to take 30 MG then increase to 2 tablets daily (40 MG) as instructed by Dr Allyson Mancera  Will refer to Dr Allyson Mancera for his information

## 2022-03-24 NOTE — TELEPHONE ENCOUNTER
Tam Thomas called and said her poison is spreading to her face and neck now  She is getting worried  Please advise  You prescribed Betametasone which she is using      [phone]

## 2022-03-24 NOTE — TELEPHONE ENCOUNTER
I let her know you ordered medication and told her she can let us know how she is doing tomorrow on it

## 2022-03-24 NOTE — TELEPHONE ENCOUNTER
Aly Wang called about her Prozac  Dr Yeung wanted Aly Notice to take 30mg then up it to 40mg  Aly Wang is confused as the pharmacy is giving her a hard time about filling the medication

## 2022-03-25 NOTE — TELEPHONE ENCOUNTER
She has an apt already but she can be seen any time but at same office for all 3 apts   She is buy n bill Patient

## 2022-03-30 ENCOUNTER — APPOINTMENT (OUTPATIENT)
Dept: LAB | Facility: CLINIC | Age: 62
End: 2022-03-30
Payer: COMMERCIAL

## 2022-03-30 DIAGNOSIS — M81.0 OSTEOPOROSIS WITHOUT CURRENT PATHOLOGICAL FRACTURE, UNSPECIFIED OSTEOPOROSIS TYPE: ICD-10-CM

## 2022-03-30 DIAGNOSIS — E78.5 HYPERLIPIDEMIA, UNSPECIFIED HYPERLIPIDEMIA TYPE: ICD-10-CM

## 2022-03-30 DIAGNOSIS — D47.2 SMOLDERING MULTIPLE MYELOMA: ICD-10-CM

## 2022-03-30 DIAGNOSIS — E55.9 VITAMIN D DEFICIENCY: ICD-10-CM

## 2022-03-30 DIAGNOSIS — D47.2 SMOLDERING MULTIPLE MYELOMA (SMM): ICD-10-CM

## 2022-03-30 LAB
25(OH)D3 SERPL-MCNC: 26.7 NG/ML (ref 30–100)
ALBUMIN SERPL BCP-MCNC: 3.2 G/DL (ref 3.5–5)
ALP SERPL-CCNC: 315 U/L (ref 46–116)
ALT SERPL W P-5'-P-CCNC: 73 U/L (ref 12–78)
ANION GAP SERPL CALCULATED.3IONS-SCNC: 1 MMOL/L (ref 4–13)
AST SERPL W P-5'-P-CCNC: 55 U/L (ref 5–45)
BASOPHILS # BLD AUTO: 0.06 THOUSANDS/ΜL (ref 0–0.1)
BASOPHILS NFR BLD AUTO: 1 % (ref 0–1)
BILIRUB SERPL-MCNC: 0.42 MG/DL (ref 0.2–1)
BUN SERPL-MCNC: 20 MG/DL (ref 5–25)
CALCIUM ALBUM COR SERPL-MCNC: 9.4 MG/DL (ref 8.3–10.1)
CALCIUM SERPL-MCNC: 8.8 MG/DL (ref 8.3–10.1)
CHLORIDE SERPL-SCNC: 107 MMOL/L (ref 100–108)
CHOLEST SERPL-MCNC: 211 MG/DL
CO2 SERPL-SCNC: 29 MMOL/L (ref 21–32)
CREAT SERPL-MCNC: 0.82 MG/DL (ref 0.6–1.3)
EOSINOPHIL # BLD AUTO: 0.14 THOUSAND/ΜL (ref 0–0.61)
EOSINOPHIL NFR BLD AUTO: 2 % (ref 0–6)
ERYTHROCYTE [DISTWIDTH] IN BLOOD BY AUTOMATED COUNT: 15.5 % (ref 11.6–15.1)
FERRITIN SERPL-MCNC: 147 NG/ML (ref 8–388)
GFR SERPL CREATININE-BSD FRML MDRD: 76 ML/MIN/1.73SQ M
GLUCOSE P FAST SERPL-MCNC: 79 MG/DL (ref 65–99)
HCT VFR BLD AUTO: 39.2 % (ref 34.8–46.1)
HDLC SERPL-MCNC: 87 MG/DL
HGB BLD-MCNC: 12.5 G/DL (ref 11.5–15.4)
IGA SERPL-MCNC: 56 MG/DL (ref 70–400)
IGG SERPL-MCNC: 2390 MG/DL (ref 700–1600)
IGM SERPL-MCNC: 236 MG/DL (ref 40–230)
IMM GRANULOCYTES # BLD AUTO: 0.05 THOUSAND/UL (ref 0–0.2)
IMM GRANULOCYTES NFR BLD AUTO: 1 % (ref 0–2)
IRON SATN MFR SERPL: 19 % (ref 15–50)
IRON SERPL-MCNC: 66 UG/DL (ref 50–170)
LDLC SERPL DIRECT ASSAY-MCNC: 99 MG/DL (ref 0–100)
LYMPHOCYTES # BLD AUTO: 1.92 THOUSANDS/ΜL (ref 0.6–4.47)
LYMPHOCYTES NFR BLD AUTO: 29 % (ref 14–44)
MCH RBC QN AUTO: 28.6 PG (ref 26.8–34.3)
MCHC RBC AUTO-ENTMCNC: 31.9 G/DL (ref 31.4–37.4)
MCV RBC AUTO: 90 FL (ref 82–98)
MONOCYTES # BLD AUTO: 0.41 THOUSAND/ΜL (ref 0.17–1.22)
MONOCYTES NFR BLD AUTO: 6 % (ref 4–12)
NEUTROPHILS # BLD AUTO: 4.06 THOUSANDS/ΜL (ref 1.85–7.62)
NEUTS SEG NFR BLD AUTO: 61 % (ref 43–75)
NRBC BLD AUTO-RTO: 0 /100 WBCS
PLATELET # BLD AUTO: 208 THOUSANDS/UL (ref 149–390)
PMV BLD AUTO: 10.5 FL (ref 8.9–12.7)
POTASSIUM SERPL-SCNC: 3.9 MMOL/L (ref 3.5–5.3)
PROT SERPL-MCNC: 8.1 G/DL (ref 6.4–8.2)
RBC # BLD AUTO: 4.37 MILLION/UL (ref 3.81–5.12)
SODIUM SERPL-SCNC: 137 MMOL/L (ref 136–145)
TIBC SERPL-MCNC: 343 UG/DL (ref 250–450)
TRIGL SERPL-MCNC: 82 MG/DL
WBC # BLD AUTO: 6.64 THOUSAND/UL (ref 4.31–10.16)

## 2022-03-30 PROCEDURE — 84165 PROTEIN E-PHORESIS SERUM: CPT | Performed by: SPECIALIST

## 2022-03-30 PROCEDURE — 82232 ASSAY OF BETA-2 PROTEIN: CPT

## 2022-03-30 PROCEDURE — 84165 PROTEIN E-PHORESIS SERUM: CPT

## 2022-03-30 PROCEDURE — 82728 ASSAY OF FERRITIN: CPT

## 2022-03-30 PROCEDURE — 83521 IG LIGHT CHAINS FREE EACH: CPT

## 2022-03-30 PROCEDURE — 82306 VITAMIN D 25 HYDROXY: CPT

## 2022-03-30 PROCEDURE — 83550 IRON BINDING TEST: CPT

## 2022-03-30 PROCEDURE — 80061 LIPID PANEL: CPT

## 2022-03-30 PROCEDURE — 83540 ASSAY OF IRON: CPT

## 2022-03-30 PROCEDURE — 82784 ASSAY IGA/IGD/IGG/IGM EACH: CPT

## 2022-03-30 PROCEDURE — 36415 COLL VENOUS BLD VENIPUNCTURE: CPT

## 2022-03-30 PROCEDURE — 83721 ASSAY OF BLOOD LIPOPROTEIN: CPT

## 2022-03-30 PROCEDURE — 85025 COMPLETE CBC W/AUTO DIFF WBC: CPT

## 2022-03-30 PROCEDURE — 80053 COMPREHEN METABOLIC PANEL: CPT

## 2022-03-31 LAB
ALBUMIN SERPL ELPH-MCNC: 3.72 G/DL (ref 3.5–5)
ALBUMIN SERPL ELPH-MCNC: 47.1 % (ref 52–65)
ALPHA1 GLOB SERPL ELPH-MCNC: 0.3 G/DL (ref 0.1–0.4)
ALPHA1 GLOB SERPL ELPH-MCNC: 3.8 % (ref 2.5–5)
ALPHA2 GLOB SERPL ELPH-MCNC: 0.88 G/DL (ref 0.4–1.2)
ALPHA2 GLOB SERPL ELPH-MCNC: 11.2 % (ref 7–13)
BETA GLOB ABNORMAL SERPL ELPH-MCNC: 0.36 G/DL (ref 0.4–0.8)
BETA1 GLOB SERPL ELPH-MCNC: 4.6 % (ref 5–13)
BETA2 GLOB SERPL ELPH-MCNC: 3.4 % (ref 2–8)
BETA2+GAMMA GLOB SERPL ELPH-MCNC: 0.27 G/DL (ref 0.2–0.5)
GAMMA GLOB ABNORMAL SERPL ELPH-MCNC: 2.36 G/DL (ref 0.5–1.6)
GAMMA GLOB SERPL ELPH-MCNC: 29.9 % (ref 12–22)
IGG/ALB SER: 0.89 {RATIO} (ref 1.1–1.8)
KAPPA LC FREE SER-MCNC: 30 MG/L (ref 3.3–19.4)
KAPPA LC FREE/LAMBDA FREE SER: 3 {RATIO} (ref 0.26–1.65)
LAMBDA LC FREE SERPL-MCNC: 10 MG/L (ref 5.7–26.3)
M PROTEIN 1 MFR SERPL ELPH: 22.6 %
M PROTEIN 1 SERPL ELPH-MCNC: 1.79 G/DL
PROT PATTERN SERPL ELPH-IMP: ABNORMAL
PROT SERPL-MCNC: 7.9 G/DL (ref 6.4–8.2)

## 2022-04-01 ENCOUNTER — OFFICE VISIT (OUTPATIENT)
Dept: INTERNAL MEDICINE CLINIC | Facility: CLINIC | Age: 62
End: 2022-04-01
Payer: COMMERCIAL

## 2022-04-01 VITALS
WEIGHT: 154 LBS | BODY MASS INDEX: 22.05 KG/M2 | HEART RATE: 62 BPM | HEIGHT: 70 IN | SYSTOLIC BLOOD PRESSURE: 134 MMHG | OXYGEN SATURATION: 99 % | DIASTOLIC BLOOD PRESSURE: 80 MMHG

## 2022-04-01 DIAGNOSIS — L23.7 POISON IVY DERMATITIS: Primary | ICD-10-CM

## 2022-04-01 DIAGNOSIS — E78.5 HYPERLIPIDEMIA, UNSPECIFIED HYPERLIPIDEMIA TYPE: ICD-10-CM

## 2022-04-01 DIAGNOSIS — G90.521 COMPLEX REGIONAL PAIN SYNDROME I OF RIGHT LOWER LIMB: ICD-10-CM

## 2022-04-01 DIAGNOSIS — I10 ESSENTIAL HYPERTENSION: ICD-10-CM

## 2022-04-01 DIAGNOSIS — D50.9 IRON DEFICIENCY ANEMIA, UNSPECIFIED IRON DEFICIENCY ANEMIA TYPE: ICD-10-CM

## 2022-04-01 DIAGNOSIS — M77.11 LATERAL EPICONDYLITIS OF RIGHT ELBOW: ICD-10-CM

## 2022-04-01 DIAGNOSIS — K74.3 PRIMARY BILIARY CHOLANGITIS (HCC): ICD-10-CM

## 2022-04-01 DIAGNOSIS — D47.2 SMOLDERING MULTIPLE MYELOMA (SMM): ICD-10-CM

## 2022-04-01 DIAGNOSIS — M54.81 BILATERAL OCCIPITAL NEURALGIA: ICD-10-CM

## 2022-04-01 LAB — B2 MICROGLOB SERPL-MCNC: 1.9 MG/L (ref 0.6–2.4)

## 2022-04-01 PROCEDURE — 99215 OFFICE O/P EST HI 40 MIN: CPT | Performed by: INTERNAL MEDICINE

## 2022-04-01 RX ORDER — IBUPROFEN 600 MG/1
600 TABLET ORAL EVERY 6 HOURS PRN
Qty: 180 TABLET | Refills: 1 | Status: SHIPPED | OUTPATIENT
Start: 2022-04-01 | End: 2022-06-29 | Stop reason: SDUPTHER

## 2022-04-01 RX ORDER — HYDROXYZINE HYDROCHLORIDE 25 MG/1
25 TABLET, FILM COATED ORAL EVERY 6 HOURS PRN
Qty: 60 TABLET | Refills: 0 | Status: SHIPPED | OUTPATIENT
Start: 2022-04-01 | End: 2022-04-08
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
## 2022-04-05 ENCOUNTER — SOCIAL WORK (OUTPATIENT)
Dept: BEHAVIORAL/MENTAL HEALTH CLINIC | Facility: CLINIC | Age: 62
End: 2022-04-05
Payer: COMMERCIAL

## 2022-04-05 DIAGNOSIS — F41.1 GAD (GENERALIZED ANXIETY DISORDER): ICD-10-CM

## 2022-04-05 DIAGNOSIS — F42.9 OBSESSIVE-COMPULSIVE DISORDER, UNSPECIFIED TYPE: ICD-10-CM

## 2022-04-05 DIAGNOSIS — F33.0 MDD (MAJOR DEPRESSIVE DISORDER), RECURRENT EPISODE, MILD (HCC): Primary | ICD-10-CM

## 2022-04-05 DIAGNOSIS — F43.10 PTSD (POST-TRAUMATIC STRESS DISORDER): ICD-10-CM

## 2022-04-05 PROCEDURE — 90834 PSYTX W PT 45 MINUTES: CPT | Performed by: COUNSELOR

## 2022-04-05 RX ORDER — ACYCLOVIR 400 MG/1
400 TABLET ORAL 2 TIMES DAILY
Qty: 60 TABLET | Refills: 4 | Status: SHIPPED | OUTPATIENT
Start: 2022-04-05 | End: 2022-07-25 | Stop reason: ALTCHOICE

## 2022-04-05 NOTE — TELEPHONE ENCOUNTER
Please sign off on presc for Acyclovir to [facility] Nisha Payer) - yearly due 8/2022

## 2022-04-05 NOTE — PSYCH
Psychotherapy Provided: Individual Psychotherapy 60 minutes     Length of time in session: 60 minutes, follow up in 2 week    Encounter Diagnosis     ICD-10-CM    1  MDD (major depressive disorder), recurrent episode, mild ([facility] [address] )  F33 0    2  JOSEFINA (generalized anxiety disorder)  F41 1    3  Obsessive-compulsive disorder, unspecified type  F42 9    4  PTSD (post-traumatic stress disorder)  F43 10        Goals addressed in session: Goal 1 and Goal 2     Current suicide risk : Low     D: Clinician met with Baldev Woo in person for individual therapy  Baldev Woo processed concerns in relationship with paramour with communication  Clinician facilitated activity to work on identifying the aspects of a relationship that are important to Baldev Woo and identifying which of those are most important that she is unwilling to compromise on  She reports communication must increase in order to continue a relationship with paramour  Clinician explored ways to communicate her needs in an assertive way utilizing "I messages " Baldev Hildaky was open to this form of communication and discussed past dismissed feelings when paramour would not respond to questions or was unwilling openly discussing needs or future plans  Clinician explored Courtney's needs and which ones she is currently getting met through relationship  A: Baldev Dusky was open and engaged in the session and report desire to work on current relationship and communication  She presented with stable mood and affect and was future focused  P: Continue to meet with Baldev Woo every other week for individual therapy  Behavioral Health Treatment Plan [facility]: Diagnosis and Treatment Plan explained to Efren Bahena relates understanding diagnosis and is agreeable to Treatment Plan   Yes

## 2022-04-08 DIAGNOSIS — L23.7 POISON IVY DERMATITIS: ICD-10-CM

## 2022-04-08 RX ORDER — HYDROXYZINE HYDROCHLORIDE 25 MG/1
TABLET, FILM COATED ORAL
Qty: 60 TABLET | Refills: 0 | Status: SHIPPED | OUTPATIENT
Start: 2022-04-08 | End: 2022-04-21

## 2022-04-15 DIAGNOSIS — A60.9 HSV (HERPES SIMPLEX VIRUS) ANOGENITAL INFECTION: ICD-10-CM

## 2022-04-19 RX ORDER — ACYCLOVIR 400 MG/1
TABLET ORAL
Qty: 60 TABLET | Refills: 1 | OUTPATIENT
Start: 2022-04-19

## 2022-04-19 NOTE — TELEPHONE ENCOUNTER
Pt informed has rfs on prev presc for same from 4/5/2022    Please remove pending presc from pt's chart (provider must do)

## 2022-04-20 ENCOUNTER — SOCIAL WORK (OUTPATIENT)
Dept: BEHAVIORAL/MENTAL HEALTH CLINIC | Facility: CLINIC | Age: 62
End: 2022-04-20
Payer: COMMERCIAL

## 2022-04-20 DIAGNOSIS — F41.1 GAD (GENERALIZED ANXIETY DISORDER): ICD-10-CM

## 2022-04-20 DIAGNOSIS — F42.9 OBSESSIVE-COMPULSIVE DISORDER, UNSPECIFIED TYPE: ICD-10-CM

## 2022-04-20 DIAGNOSIS — F43.10 PTSD (POST-TRAUMATIC STRESS DISORDER): ICD-10-CM

## 2022-04-20 DIAGNOSIS — F33.0 MDD (MAJOR DEPRESSIVE DISORDER), RECURRENT EPISODE, MILD (HCC): Primary | ICD-10-CM

## 2022-04-20 PROCEDURE — 90834 PSYTX W PT 45 MINUTES: CPT | Performed by: COUNSELOR

## 2022-04-20 NOTE — BH TREATMENT PLAN
Anh Franz  1960         Date of Initial Treatment Plan: 5/11/21   Date of Current Treatment Plan: 4/20/22     Treatment Plan Number 3     Strengths/Personal Resources for Self Care: Courtney reports that [place] is a good support to her  She denies any close friends or family as support  Agnes Faust is motivated and engaged in treatment and willing to work on mental health wellbeing       Diagnosis:   1  MDD (major depressive disorder), recurrent episode, mild ([address] )      2  JOSEFINA (generalized anxiety disorder)      3  Obsessive-compulsive disorder, unspecified type            Area of Needs: Processing past relationships and boundaries in current relationship and work on decreasing symptoms of OCD  Work on resolving past emotions with mother  Decrease anxiety overall          Long Term Goal 1: Process past relationship in order to clarify feelings and work on better boundaries     Target Date:9/17/22  Completion Date: 10/17/22         Short Term Objectives for Goal 1: 1   Process difference between healhty and unhealhty boundaries and 2  Process feelings about past relationships and work on healthy expresssion of emotions       Long Term Goal 2: Decrease obsessive thoughts and compulsive actions     Target Date: 9/17/22  Completion Date: 10/17/22     Short Term Objectives for Goal 2: 1  Utilize thought stopping techniques to decrease skin picking   2     Teach and practice distractive coping skill to be used in times of need to decrease obsessive thoughts          GOAL 1: Modality:Individual 2x per month   Completion Date 10/17/22, Medication Management and The person(s) responsible for carrying out the plan is  client, Annabelle Conley, therapist, Kedar Miller, and Dr Ira Noel      GOAL 2: Modality: Individual 2x per month   Completion Date 10/17/22, Medication Management and The person(s) responsible for carrying out the plan is  client, Annabelle Conley, therapist, Kedar Miller, and Dr Dalton Dotson Health Treatment Plan [facility]: Diagnosis and Treatment Plan explained to Courtney Valdez relates understanding diagnosis and is agreeable to Treatment Plan          Client Comments : Please share your thoughts, feelings, need and/or experiences regarding your treatment plan

## 2022-04-20 NOTE — PSYCH
Psychotherapy Provided: Individual Psychotherapy 50 minutes     Length of time in session: 50 minutes, follow up in 2 week    Encounter Diagnosis     ICD-10-CM    1  MDD (major depressive disorder), recurrent episode, mild ([facility] [address] )  F33 0    2  JOSEFINA (generalized anxiety disorder)  F41 1    3  Obsessive-compulsive disorder, unspecified type  F42 9    4  PTSD (post-traumatic stress disorder)  F43 10        Goals addressed in session: Goal 1 and Goal 2     Current suicide risk : Low     D: Clinician met with Agnes Faust in person for individual therapy  Agnes Faust discussed recent holiday spent with nas and his family and assisting him on working on his property to complete home improvement and yard work  Agnes Faust reports being unsure of what she wants in the future and being unsure of how to figure it out  Clinician explored positive and negative aspects of her relationship and what she would like in a future partner  Agnes Faust discussed past relationships and relationship that her parents modeled for her as the most 'normal' relationships she has seen and being unsure of what she wants  Clinician encouraged Agnes Jessicasvetlana to follow up with activity from last session and journal about ideal partner and future plans and attempt to start open conversation with nas about future plans  A: Agnes Faust presented with stable mood and affect and reports confusion about her future  Clinician attempted to give Agnes Lopezsvetlana activities to work on clarifying her needs and to work on better self reflection  P: Continue to meet with Agnes Faust every other week for individual therapy  Behavioral Health Treatment Plan [facility]: Diagnosis and Treatment Plan explained to Hannahtanner Tong relates understanding diagnosis and is agreeable to Treatment Plan   Yes

## 2022-04-21 DIAGNOSIS — L23.7 POISON IVY DERMATITIS: ICD-10-CM

## 2022-04-21 RX ORDER — HYDROXYZINE HYDROCHLORIDE 25 MG/1
TABLET, FILM COATED ORAL
Qty: 60 TABLET | Refills: 0 | Status: SHIPPED | OUTPATIENT
Start: 2022-04-21 | End: 2022-05-26 | Stop reason: SDUPTHER

## 2022-04-25 ENCOUNTER — TELEPHONE (OUTPATIENT)
Dept: INTERNAL MEDICINE CLINIC | Facility: CLINIC | Age: 62
End: 2022-04-25

## 2022-04-25 NOTE — TELEPHONE ENCOUNTER
Pt has recurring poison ivy in between fingers, wrist up to the elbows  She is wearing gloves on as well as doing her yard work and doubling up in the clothing and doesn't understand why she continues to get them    She is requesting another refill for steriod medication you prescribed before to be sent to     [facility]

## 2022-04-27 ENCOUNTER — VBI (OUTPATIENT)
Dept: ADMINISTRATIVE | Facility: OTHER | Age: 62
End: 2022-04-27

## 2022-05-03 ENCOUNTER — SOCIAL WORK (OUTPATIENT)
Dept: BEHAVIORAL/MENTAL HEALTH CLINIC | Facility: CLINIC | Age: 62
End: 2022-05-03
Payer: COMMERCIAL

## 2022-05-03 DIAGNOSIS — F42.9 OBSESSIVE-COMPULSIVE DISORDER, UNSPECIFIED TYPE: ICD-10-CM

## 2022-05-03 DIAGNOSIS — F41.1 GAD (GENERALIZED ANXIETY DISORDER): ICD-10-CM

## 2022-05-03 DIAGNOSIS — F33.0 MDD (MAJOR DEPRESSIVE DISORDER), RECURRENT EPISODE, MILD (HCC): Primary | ICD-10-CM

## 2022-05-03 PROCEDURE — 90834 PSYTX W PT 45 MINUTES: CPT | Performed by: COUNSELOR

## 2022-05-03 NOTE — PSYCH
Psychotherapy Provided: Individual Psychotherapy 45 minutes     Length of time in session: 45 minutes, follow up in 2 week    Encounter Diagnosis     ICD-10-CM    1  MDD [facility]major depressive disorder), [facility]  F33 0    2  JOSEFINA (generalized anxiety disorder)  F41 1    3  Obsessive-compulsive disorder, unspecified type  F42 9        Goals addressed in session: Goal 1 and Goal 2     Current suicide risk : Low     D: Clinician met with Shimon Schultz in person for individual therapy  Shimon Schultz discussed recent conflict with paramour and not speaking for the last few days  Clinician explored interaction and Courtney's feelings  She reports hurt feeling due to remarks that were made but that she did not verbalize her feelings  Shimon Schultz reports concerns are all related to lack of communication and feels she has attempted to communicate with paramour but has gotten nothing in response  Clinician explored Courtney's emotional needs and possible strategies to communicate these to paramour  Clinician also challenged Shimon Schultz about how she would like to proceed if paramour is unwilling to respect needs  Shimon Schultz reports an increase in OCD and anxiety since conflict and states that she has had trouble sleeping since the weekend  A: Shimon Schultz was open and engaged in the session and reports taking her medication as prescribed  P: Continue to meet with Shimon Schultz every other week for individual therapy  Behavioral Health Treatment Plan [facility]: Diagnosis and Treatment Plan explained to Bernardo Silva relates understanding diagnosis and is agreeable to Treatment Plan   Yes

## 2022-05-06 DIAGNOSIS — F42.9 OBSESSIVE-COMPULSIVE DISORDER, UNSPECIFIED TYPE: ICD-10-CM

## 2022-05-06 DIAGNOSIS — F41.1 GAD (GENERALIZED ANXIETY DISORDER): ICD-10-CM

## 2022-05-06 DIAGNOSIS — F33.0 MDD (MAJOR DEPRESSIVE DISORDER), RECURRENT EPISODE, MILD (HCC): ICD-10-CM

## 2022-05-06 DIAGNOSIS — F43.10 PTSD (POST-TRAUMATIC STRESS DISORDER): ICD-10-CM

## 2022-05-06 RX ORDER — FLUOXETINE 20 MG/1
TABLET, FILM COATED ORAL
Qty: 60 TABLET | Refills: 1 | Status: SHIPPED | OUTPATIENT
Start: 2022-05-06 | End: 2022-05-26 | Stop reason: SDUPTHER

## 2022-05-16 ENCOUNTER — TELEPHONE (OUTPATIENT)
Dept: PSYCHIATRY | Facility: CLINIC | Age: 62
End: 2022-05-16

## 2022-05-16 NOTE — TELEPHONE ENCOUNTER
Writer left a message for Baldev Woo to reschedule their appointment with Alize Inman on 5/24  Alize Inman will not be in the office that day  Requested a call back to get rescheduled

## 2022-05-17 ENCOUNTER — SOCIAL WORK (OUTPATIENT)
Dept: BEHAVIORAL/MENTAL HEALTH CLINIC | Facility: CLINIC | Age: 62
End: 2022-05-17
Payer: COMMERCIAL

## 2022-05-17 DIAGNOSIS — F41.1 GAD (GENERALIZED ANXIETY DISORDER): ICD-10-CM

## 2022-05-17 DIAGNOSIS — F33.0 MDD (MAJOR DEPRESSIVE DISORDER), RECURRENT EPISODE, MILD (HCC): Primary | ICD-10-CM

## 2022-05-17 DIAGNOSIS — F42.9 OBSESSIVE-COMPULSIVE DISORDER, UNSPECIFIED TYPE: ICD-10-CM

## 2022-05-17 DIAGNOSIS — F43.10 PTSD (POST-TRAUMATIC STRESS DISORDER): ICD-10-CM

## 2022-05-17 PROCEDURE — 90834 PSYTX W PT 45 MINUTES: CPT | Performed by: COUNSELOR

## 2022-05-17 NOTE — PSYCH
Psychotherapy Provided: Individual Psychotherapy 55 minutes     Length of time in session: 55 minutes, follow up in 2 week    Encounter Diagnosis     ICD-10-CM    1  MDD (major depressive disorder), recurrent episode, mild ([facility] [address] )  F33 0    2  JOSEFINA (generalized anxiety disorder)  F41 1    3  Obsessive-compulsive disorder, unspecified type  F42 9    4  PTSD (post-traumatic stress disorder)  F43 10        Goals addressed in session: Goal 1 and Goal 2     Current suicide risk : Low     D: Clinician met with Josephine Garcia in person for individual therapy  Josephine Garcia processed through recent attempts at communication of needs to her paramour and working on increase affirmations  She reports she was able to sit down with him and discuss her needs and that he was open to increasing these for her  Clinician explored how Josephine Garcia felt about the conversation and working on increase assertive communication with paramour in the future  She discussed desire to continue to work on greater communication and sharing of feelings with paramour  Clinician explored ways to do so in a healthy way  Josephine Garcia reports daily anxiety and need to work on better coping skills  Clinician explored current use of coping skills and gave possible suggestions along with provided a worksheet and journal prompts  A: Josephine Garcia was open and engaged in the session  She report daily worry and anxiety related to finances and racing thoughts at bedtime  Clinician discuss psycho education on mindfulness and meditation practices  P: Continue to meet with Josephine Garcia every other week for individual therapy  Behavioral Health Treatment Plan [facility]: Diagnosis and Treatment Plan explained to Tad Minor relates understanding diagnosis and is agreeable to Treatment Plan   Yes

## 2022-05-18 ENCOUNTER — TELEPHONE (OUTPATIENT)
Dept: INTERNAL MEDICINE CLINIC | Facility: CLINIC | Age: 62
End: 2022-05-18

## 2022-05-18 NOTE — TELEPHONE ENCOUNTER
Patient called stating her boyfriend tested positive today   She tested yesterday and it was negative  She does have a little symp , coughing running nose but that could be allergies ,    Please advise , I will put her on for a virt appt if needed

## 2022-05-18 NOTE — TELEPHONE ENCOUNTER
Please have her quarantine and isolate away from him for now  If she has to see him, both her and her boyfriend should be wearing masks  She should monitor for symptoms for now   She should retest herself in about 2-3 days, if it is positive or if she has further questions add her to the schedule for a virtual

## 2022-05-18 NOTE — TELEPHONE ENCOUNTER
Writer left a message for Teri Vidal to reschedule their appointment with Micah Perry on 5/24  Micah Perry will not be in the office that day  Requested a call back to get rescheduled

## 2022-05-19 ENCOUNTER — TELEPHONE (OUTPATIENT)
Dept: PSYCHIATRY | Facility: CLINIC | Age: 62
End: 2022-05-19

## 2022-05-19 NOTE — TELEPHONE ENCOUNTER
Patient called back in regards to her Cancel appointment and looking to rescheduled  Patient state Betsey Daria offer her new appointment time for May 26 on Dr Vernon Haynes scheduled but unfortunatly not seeing any availability on that date and time on provider scheduled   Please reach out to patient in regards to scheduling

## 2022-05-23 ENCOUNTER — APPOINTMENT (OUTPATIENT)
Dept: LAB | Facility: CLINIC | Age: 62
End: 2022-05-23
Payer: COMMERCIAL

## 2022-05-23 DIAGNOSIS — M81.8 OTHER OSTEOPOROSIS WITHOUT CURRENT PATHOLOGICAL FRACTURE: ICD-10-CM

## 2022-05-23 LAB
ALBUMIN SERPL BCP-MCNC: 3.2 G/DL (ref 3.5–5)
ALP SERPL-CCNC: 292 U/L (ref 46–116)
ALT SERPL W P-5'-P-CCNC: 65 U/L (ref 12–78)
ANION GAP SERPL CALCULATED.3IONS-SCNC: 5 MMOL/L (ref 4–13)
AST SERPL W P-5'-P-CCNC: 56 U/L (ref 5–45)
BILIRUB SERPL-MCNC: 0.57 MG/DL (ref 0.2–1)
BUN SERPL-MCNC: 20 MG/DL (ref 5–25)
CALCIUM ALBUM COR SERPL-MCNC: 9.8 MG/DL (ref 8.3–10.1)
CALCIUM SERPL-MCNC: 9.2 MG/DL (ref 8.3–10.1)
CHLORIDE SERPL-SCNC: 103 MMOL/L (ref 100–108)
CO2 SERPL-SCNC: 28 MMOL/L (ref 21–32)
CREAT SERPL-MCNC: 0.98 MG/DL (ref 0.6–1.3)
GFR SERPL CREATININE-BSD FRML MDRD: 62 ML/MIN/1.73SQ M
GLUCOSE P FAST SERPL-MCNC: 92 MG/DL (ref 65–99)
POTASSIUM SERPL-SCNC: 3.8 MMOL/L (ref 3.5–5.3)
PROT SERPL-MCNC: 8.9 G/DL (ref 6.4–8.2)
SODIUM SERPL-SCNC: 136 MMOL/L (ref 136–145)

## 2022-05-23 PROCEDURE — 36415 COLL VENOUS BLD VENIPUNCTURE: CPT

## 2022-05-23 PROCEDURE — 80053 COMPREHEN METABOLIC PANEL: CPT
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
## 2022-05-26 ENCOUNTER — TELEMEDICINE (OUTPATIENT)
Dept: PSYCHIATRY | Facility: CLINIC | Age: 62
End: 2022-05-26
Payer: COMMERCIAL

## 2022-05-26 DIAGNOSIS — F43.10 PTSD (POST-TRAUMATIC STRESS DISORDER): ICD-10-CM

## 2022-05-26 DIAGNOSIS — L23.7 POISON IVY DERMATITIS: ICD-10-CM

## 2022-05-26 DIAGNOSIS — F42.9 OBSESSIVE-COMPULSIVE DISORDER, UNSPECIFIED TYPE: ICD-10-CM

## 2022-05-26 DIAGNOSIS — F33.0 MDD (MAJOR DEPRESSIVE DISORDER), RECURRENT EPISODE, MILD (HCC): ICD-10-CM

## 2022-05-26 DIAGNOSIS — F41.1 GAD (GENERALIZED ANXIETY DISORDER): ICD-10-CM

## 2022-05-26 PROCEDURE — 90833 PSYTX W PT W E/M 30 MIN: CPT | Performed by: PSYCHIATRY & NEUROLOGY

## 2022-05-26 PROCEDURE — 99214 OFFICE O/P EST MOD 30 MIN: CPT | Performed by: PSYCHIATRY & NEUROLOGY

## 2022-05-26 RX ORDER — FENOFIBRATE 54 MG/1
TABLET ORAL
COMMUNITY
Start: 2022-05-11

## 2022-05-26 RX ORDER — FLUOXETINE 20 MG/1
60 TABLET, FILM COATED ORAL DAILY
Qty: 90 TABLET | Refills: 1 | Status: SHIPPED | OUTPATIENT
Start: 2022-05-26 | End: 2022-07-21

## 2022-05-26 RX ORDER — HYDROXYZINE HYDROCHLORIDE 25 MG/1
25-50 TABLET, FILM COATED ORAL EVERY 6 HOURS PRN
Qty: 120 TABLET | Refills: 0 | Status: SHIPPED | OUTPATIENT
Start: 2022-05-26 | End: 2022-06-19

## 2022-05-27 ENCOUNTER — OFFICE VISIT (OUTPATIENT)
Dept: HEMATOLOGY ONCOLOGY | Facility: CLINIC | Age: 62
End: 2022-05-27
Payer: COMMERCIAL

## 2022-05-27 VITALS
TEMPERATURE: 96.5 F | BODY MASS INDEX: 21.28 KG/M2 | OXYGEN SATURATION: 98 % | HEIGHT: 71 IN | WEIGHT: 152 LBS | SYSTOLIC BLOOD PRESSURE: 118 MMHG | HEART RATE: 73 BPM | RESPIRATION RATE: 18 BRPM | DIASTOLIC BLOOD PRESSURE: 76 MMHG

## 2022-05-27 DIAGNOSIS — D47.2 SMOLDERING MULTIPLE MYELOMA (SMM): Primary | Chronic | ICD-10-CM

## 2022-05-27 DIAGNOSIS — M81.8 OTHER OSTEOPOROSIS WITHOUT CURRENT PATHOLOGICAL FRACTURE: ICD-10-CM

## 2022-05-27 DIAGNOSIS — K74.3 PRIMARY BILIARY CHOLANGITIS (HCC): Chronic | ICD-10-CM

## 2022-05-27 PROBLEM — R21 SKIN RASH: Status: RESOLVED | Noted: 2021-06-21 | Resolved: 2022-05-27

## 2022-05-27 PROCEDURE — 99214 OFFICE O/P EST MOD 30 MIN: CPT | Performed by: INTERNAL MEDICINE

## 2022-05-27 NOTE — PROGRESS NOTES
Hematology Outpatient Follow - Up Note  Cruz Manzano 57 y o  female MRN: @ Encounter: [id number]        Date:  5/27/2022        Assessment/ Plan:    smoldering multiple myeloma IgG kappa, bone marrow biopsy showed 15% plasma cell normal cytogenetics and fish panel for multiple myeloma in May 2017, she had primary biliary cirrhosis    M protein came down to 1 74, will continue watchful observation and follow-up in 6 months with CBC, CMP, SPEP, free light chain, quantitative immunoglobulins    History of iron deficiency anemia she received IV iron now with normal hemoglobin and iron level    Osteoporosis Zometa every 6 months for total of 4 doses as she had significant response to previous therapy        Labs and imaging studies are reviewed by ordering provider once results are available  If there are findings that need immediate attention, you will be contacted when results available  Discussing results and the implication on your healthcare is best discussed in person at your follow-up visit         HPI:    50-year-old  female with past medical history of primary biliary cirrhosis, fracture of the right tibia, hypertension, OCD,worsening of osteoporosis on DEXA scan Done in 2016, nephrolithiasis, chronic lower back pain and possible sacroiliitis was found to have elevated total protein 3-4 years ago, serum protein electrophoresis showed IgG kappa monoclonal protein of 1 7 g/dL however no evidence of anemia, hypercalcemia, or renal insufficiency, she had persistent elevation of alkaline phosphatase secondary to PBC  had a bone scan done last year which showed activity in the ribs area  serum protein electrophoresis in April 2017 showed IgG kappa monoclonal protein of 1 96 g/dL, total protein 8 5, albumin 3 9, IgG 2580, creatinine 0 8, calcium 8 9, alkaline phosphatase 294, AST 37, ALT 46, IgM 25 in July 2016 monoclonal protein of IgG kappa of 1 73  C-reactive protein has been normal however sedimentation rate was elevated in the range of 60  Bone marrow biopsy in May 2017 showed 15% plasma cells in diffuse and interstitial pattern, normal fish panel for multiple myeloma and cytogenetics  Status post right knee replacement in June 2018  Exacerbation of anxiety/depression followed by psychiatric medicine     Osteoporosis she received Zometa 4 doses the lost dose on April 2019     Primary biliary cirrhosis followed by GI       Colonoscopy on 06/2021 showed mild diverticulosis      DEXA scan on 08/2021 showed recurrence of osteoporosis in the lumbar spine    She is on Zometa every 6 months  Interval History:        Previous Treatment:         Test Results:    Imaging: No results found  Labs:   Lab Results   Component Value Date    WBC 6 64 03/30/2022    HGB 12 5 03/30/2022    HCT 39 2 03/30/2022    MCV 90 03/30/2022     03/30/2022     Lab Results   Component Value Date     12/29/2015    K 3 8 05/23/2022     05/23/2022    CO2 28 05/23/2022    ANIONGAP 7 12/29/2015    BUN 20 05/23/2022    CREATININE 0 98 05/23/2022    GLUCOSE 134 12/29/2015    GLUF 92 05/23/2022    CALCIUM 9 2 05/23/2022    CORRECTEDCA 9 8 05/23/2022    AST 56 (H) 05/23/2022    ALT 65 05/23/2022    ALKPHOS 292 (H) 05/23/2022    PROT 8 4 (H) 12/29/2015    BILITOT 0 46 12/29/2015    EGFR 62 05/23/2022       Lab Results   Component Value Date    IRON 66 03/30/2022    TIBC 343 03/30/2022    FERRITIN 147 03/30/2022       Lab Results   Component Value Date    OPENSKGT18 400 11/19/2021         ROS: Review of Systems   Constitutional: Negative for appetite change, chills, diaphoresis, fatigue and unexpected weight change  HENT:   Negative for mouth sores, nosebleeds, sore throat, trouble swallowing and voice change  Eyes: Negative for eye problems and icterus  Respiratory: Negative for chest tightness, cough, hemoptysis and wheezing  Cardiovascular: Negative for chest pain, leg swelling and palpitations     Gastrointestinal: Negative for abdominal distention, abdominal pain, blood in stool, constipation, diarrhea, nausea and vomiting  Endocrine: Negative for hot flashes  Genitourinary: Negative for bladder incontinence, difficulty urinating, dyspareunia, dysuria and frequency  Musculoskeletal: Negative for arthralgias, back pain, gait problem, neck pain and neck stiffness  Skin: Negative for itching and rash  Neurological: Negative for dizziness, gait problem, headaches, numbness, seizures and speech difficulty  Hematological: Negative for adenopathy  Does not bruise/bleed easily  Psychiatric/Behavioral: Negative for decreased concentration, depression, sleep disturbance and suicidal ideas  The patient is not nervous/anxious  Current Medications: Reviewed  Allergies: Reviewed  PMH/FH/SH:  Reviewed      Physical Exam:    Body surface area is 1 87 meters squared  Wt Readings from Last 3 Encounters:   05/27/22 68 9 kg (152 lb)   04/01/22 69 9 kg (154 lb)   03/18/22 67 9 kg (149 lb 12 8 oz)        Temp Readings from Last 3 Encounters:   05/27/22 (!) 96 5 °F (35 8 °C)   03/18/22 98 1 °F (36 7 °C)   01/12/22 (!) 95 1 °F (35 1 °C) (Temporal)        BP Readings from Last 3 Encounters:   05/27/22 118/76   04/01/22 134/80   03/18/22 122/78         Pulse Readings from Last 3 Encounters:   05/27/22 73   04/01/22 62   03/18/22 68        Physical Exam  Vitals reviewed  Constitutional:       General: She is not in acute distress  Appearance: She is well-developed  She is not diaphoretic  HENT:      Head: Normocephalic and atraumatic  Eyes:      Conjunctiva/sclera: Conjunctivae normal    Neck:      Trachea: No tracheal deviation  Cardiovascular:      Rate and Rhythm: Normal rate and regular rhythm  Heart sounds: No murmur heard  No friction rub  No gallop  Pulmonary:      Effort: Pulmonary effort is normal  No respiratory distress  Breath sounds: Normal breath sounds  No wheezing or rales     Chest: Chest wall: No tenderness  Abdominal:      General: There is no distension  Palpations: Abdomen is soft  Tenderness: There is no abdominal tenderness  Musculoskeletal:      Cervical back: Normal range of motion and neck supple  Right lower leg: No edema  Left lower leg: No edema  Lymphadenopathy:      Cervical: No cervical adenopathy  Skin:     General: Skin is warm and dry  Coloration: Skin is not pale  Findings: No erythema  Neurological:      Mental Status: She is alert and oriented to person, place, and time  Psychiatric:         Behavior: Behavior normal          Thought Content: Thought content normal          Judgment: Judgment normal          ECO    Goals and Barriers:  Current Goal: Minimize effects of disease  Barriers: None  Patient's Capacity to Self Care:  Patient is able to self care      [facility]

## 2022-05-31 ENCOUNTER — SOCIAL WORK (OUTPATIENT)
Dept: BEHAVIORAL/MENTAL HEALTH CLINIC | Facility: CLINIC | Age: 62
End: 2022-05-31
Payer: COMMERCIAL

## 2022-05-31 DIAGNOSIS — F41.1 GAD (GENERALIZED ANXIETY DISORDER): ICD-10-CM

## 2022-05-31 DIAGNOSIS — F42.9 OBSESSIVE-COMPULSIVE DISORDER, UNSPECIFIED TYPE: ICD-10-CM

## 2022-05-31 DIAGNOSIS — F33.0 MDD (MAJOR DEPRESSIVE DISORDER), RECURRENT EPISODE, MILD (HCC): Primary | ICD-10-CM

## 2022-05-31 PROCEDURE — 90834 PSYTX W PT 45 MINUTES: CPT | Performed by: COUNSELOR

## 2022-06-01 NOTE — PSYCH
Psychotherapy Provided: Individual Psychotherapy 50 minutes     Length of time in session: 50 minutes, follow up in 2 week    Encounter Diagnosis     ICD-10-CM    1  MDD (major depressive disorder), recurrent episode, mild ([facility] [address] )  F33 0    2  JOSEFINA (generalized anxiety disorder)  F41 1    3  Obsessive-compulsive disorder, unspecified type  F42 9        Goals addressed in session: Goal 1 and Goal 2     Current suicide risk : Low     D: Clinician met with Maribelsvetlana Win in person for individual therapy  Jimmy Walden discussed attempts at journaling and barriers  She reports struggling to answer some prompts and get meaning out of the exercise  Clinician explored answers to some prompts and normalized her experience along with helped Jimmy Walden elaborated on her answers and dig deeper into her feelings  Clinician gave Jimmy Walden follow up questions to help her with future prompts and discussed psycho education on the benefits of jounraling  Jimmy Walden reports willingness to continue to work on building skill  Jimmy Walden reports continued increase in anxiety to which she reports that her medication as increased at her last  appointment  Clinician explored possible causes and current stressors with being unsure of what she wants in the future  A: Maribelsvetlana Win was open and engaged in the session  Clinician explored and encouraged self exploration and self soothing coping skills  She reports an overall increase in anxiety and OCD symptoms since last visit  P: Continue to meet with Jimmy Walden every other week for individual therapy  Behavioral Health Treatment Plan [facility]: Diagnosis and Treatment Plan explained to Andres Figueroa relates understanding diagnosis and is agreeable to Treatment Plan   Yes

## 2022-06-03 ENCOUNTER — TELEPHONE (OUTPATIENT)
Dept: OBGYN CLINIC | Facility: HOSPITAL | Age: 62
End: 2022-06-03

## 2022-06-03 NOTE — TELEPHONE ENCOUNTER
Patient returning call to schedule appointment  Placed patient on hold until  was available and call was transferred

## 2022-06-10 DIAGNOSIS — M81.8 OTHER OSTEOPOROSIS WITHOUT CURRENT PATHOLOGICAL FRACTURE: Primary | ICD-10-CM

## 2022-06-10 RX ORDER — SODIUM CHLORIDE 9 MG/ML
20 INJECTION, SOLUTION INTRAVENOUS ONCE
Status: CANCELLED | OUTPATIENT
Start: 2022-06-14

## 2022-06-14 ENCOUNTER — HOSPITAL ENCOUNTER (OUTPATIENT)
Dept: INFUSION CENTER | Facility: CLINIC | Age: 62
Discharge: HOME | End: 2022-06-14
Payer: COMMERCIAL

## 2022-06-14 ENCOUNTER — SOCIAL WORK (OUTPATIENT)
Dept: BEHAVIORAL/MENTAL HEALTH CLINIC | Facility: CLINIC | Age: 62
End: 2022-06-14
Payer: COMMERCIAL

## 2022-06-14 VITALS
DIASTOLIC BLOOD PRESSURE: 78 MMHG | BODY MASS INDEX: 22.65 KG/M2 | RESPIRATION RATE: 18 BRPM | TEMPERATURE: 97.6 F | WEIGHT: 149.47 LBS | HEIGHT: 68 IN | HEART RATE: 68 BPM | SYSTOLIC BLOOD PRESSURE: 128 MMHG

## 2022-06-14 DIAGNOSIS — M81.8 OTHER OSTEOPOROSIS WITHOUT CURRENT PATHOLOGICAL FRACTURE: Primary | ICD-10-CM

## 2022-06-14 DIAGNOSIS — F42.9 OBSESSIVE-COMPULSIVE DISORDER, UNSPECIFIED TYPE: ICD-10-CM

## 2022-06-14 DIAGNOSIS — F33.0 MDD (MAJOR DEPRESSIVE DISORDER), RECURRENT EPISODE, MILD (HCC): Primary | ICD-10-CM

## 2022-06-14 DIAGNOSIS — F41.1 GAD (GENERALIZED ANXIETY DISORDER): ICD-10-CM

## 2022-06-14 DIAGNOSIS — F43.10 PTSD (POST-TRAUMATIC STRESS DISORDER): ICD-10-CM

## 2022-06-14 PROCEDURE — 96365 THER/PROPH/DIAG IV INF INIT: CPT

## 2022-06-14 PROCEDURE — 90834 PSYTX W PT 45 MINUTES: CPT | Performed by: COUNSELOR

## 2022-06-14 RX ORDER — SODIUM CHLORIDE 9 MG/ML
20 INJECTION, SOLUTION INTRAVENOUS ONCE
Status: COMPLETED | OUTPATIENT
Start: 2022-06-14 | End: 2022-06-14

## 2022-06-14 RX ORDER — SODIUM CHLORIDE 9 MG/ML
20 INJECTION, SOLUTION INTRAVENOUS ONCE
OUTPATIENT
Start: 2022-11-29

## 2022-06-14 RX ADMIN — ZOLEDRONIC ACID 3.5 MG: 4 INJECTION, SOLUTION, CONCENTRATE INTRAVENOUS at 15:19

## 2022-06-14 RX ADMIN — SODIUM CHLORIDE 20 ML/HR: 9 INJECTION, SOLUTION INTRAVENOUS at 15:17

## 2022-06-14 NOTE — PSYCH
Psychotherapy Provided: Individual Psychotherapy 45 minutes     Length of time in session: 45 minutes, follow up in 2 week    Encounter Diagnosis     ICD-10-CM    1  MDD (major depressive disorder), recurrent episode, mild ([facility] [address] )  F33 0    2  JOSEFINA (generalized anxiety disorder)  F41 1    3  PTSD (post-traumatic stress disorder)  F43 10    4  Obsessive-compulsive disorder, unspecified type  F42 9        Goals addressed in session: Goal 1 and Goal 2     Current suicide risk : Low     D: Clinician met with Ankita Garcia in person for individual therapy  Ankita Stricklandjohns reports taking her medication as prescribed but feeling more fatigued when she wakes and struggling to get started for the day  She believes this may be medication related and plans to discuss this with her doctor at their next appointment  Ankita Stricklandjohns reports continued concerns with high anxiety in the evening when she has intrusive thoughts  Clinician explored thoughts, normalized her experience with anxiety and discussed potential strategies to assist with management of intrusive thoughts such as mindfulness and/or meditation before bed  Clinician discussed Courtney's use of journaling and gave feedback on ways to get more out of journaling and get deeper into her thoughts  A: Ankita Kwans presented with stable mood and affect  She was engaged in the session and reports continued efforts to increase usefulness of journaling  P: Continue to meet with Ankita Garcia every other week for individual therapy  Behavioral Health Treatment Plan [facility]: Diagnosis and Treatment Plan explained to Otto Flores relates understanding diagnosis and is agreeable to Treatment Plan   Yes

## 2022-06-14 NOTE — PROGRESS NOTES
Pt tolerated Zometa infusion without incident  Refused AVS   Next appt scheduled for 11/29/22  IV removed and intact

## 2022-06-18 DIAGNOSIS — L23.7 POISON IVY DERMATITIS: ICD-10-CM

## 2022-06-19 RX ORDER — HYDROXYZINE HYDROCHLORIDE 25 MG/1
TABLET, FILM COATED ORAL
Qty: 360 TABLET | Refills: 1 | Status: SHIPPED | OUTPATIENT
Start: 2022-06-19

## 2022-06-20 DIAGNOSIS — M81.0 OSTEOPOROSIS WITHOUT CURRENT PATHOLOGICAL FRACTURE, UNSPECIFIED OSTEOPOROSIS TYPE: ICD-10-CM

## 2022-06-20 DIAGNOSIS — D47.2 SMOLDERING MULTIPLE MYELOMA (SMM): Primary | ICD-10-CM

## 2022-06-20 NOTE — PROGRESS NOTES
Received call from pt who reports hip, rib and back pain which has grown increasingly worse  Pain takes away her breath and makes it difficult to sleep  Has tried Tramadol and Ibuprofen without relief  Discussed with Dr Mina Mcbride who suggested skeletal survey  Pt agreeable and will call central scheduling

## 2022-06-22 ENCOUNTER — TELEPHONE (OUTPATIENT)
Dept: HEMATOLOGY ONCOLOGY | Facility: CLINIC | Age: 62
End: 2022-06-22

## 2022-06-22 ENCOUNTER — APPOINTMENT (OUTPATIENT)
Dept: RADIOLOGY | Facility: MEDICAL CENTER | Age: 62
End: 2022-06-22
Payer: COMMERCIAL

## 2022-06-22 DIAGNOSIS — D47.2 SMOLDERING MULTIPLE MYELOMA (SMM): ICD-10-CM

## 2022-06-22 DIAGNOSIS — M81.0 OSTEOPOROSIS WITHOUT CURRENT PATHOLOGICAL FRACTURE, UNSPECIFIED OSTEOPOROSIS TYPE: ICD-10-CM

## 2022-06-22 PROCEDURE — 77075 RADEX OSSEOUS SURVEY COMPL: CPT

## 2022-06-22 NOTE — TELEPHONE ENCOUNTER
Returned call to patient, she was concerned about the xray that was done today if it was the correct one  I confirmed that this is the one that Dr Sourav Fernandez wanted  Will wait for results

## 2022-06-22 NOTE — TELEPHONE ENCOUNTER
CALL RETURN FORM   Reason for patient call? Patient calling in requesting to speak with the ROBERTAηνίτσleonora Oreilly or Erasmo Green  Patient has issues with Xray order that was placed   Patient's primary oncologist?  Dr Gallo Running    Name of person the patient was calling for? Sho or Erasmo Green    Any additional information to add, if applicable? Best call back number [phone]   Informed patient that the message will be forwarded to the team and someone will get back to them as soon as possible    Did you relay this information to the patient?  Yes

## 2022-06-27 ENCOUNTER — TELEPHONE (OUTPATIENT)
Dept: INTERNAL MEDICINE CLINIC | Facility: CLINIC | Age: 62
End: 2022-06-27

## 2022-06-27 NOTE — TELEPHONE ENCOUNTER
PT CALLED, SAID THAT SHE HAD PAIN IN HER RIBS, HIPS, AND LOWER BACK FOR ABOUT 2 WEEKS NOW    SAID THAT THE RIB PAIN HAS BEEN ON GOING FOR A LONG TIME    WAS TOLD BY DR April Pozo THAT IT'S PROBABLY CANCER RELATED BUT XR DR Sally Pérez ORDERED DID NOT SHOW ANYTHING    PT HAD FEXERIL AND TRAMADOL AT HOME THAT SHE'S BEEN TAKING OFF AND ON   IBUPOFEN 600MG IS NOT HELPING EITHER

## 2022-06-28 ENCOUNTER — APPOINTMENT (OUTPATIENT)
Dept: RADIOLOGY | Age: 62
End: 2022-06-28
Payer: COMMERCIAL

## 2022-06-28 ENCOUNTER — SOCIAL WORK (OUTPATIENT)
Dept: BEHAVIORAL/MENTAL HEALTH CLINIC | Facility: CLINIC | Age: 62
End: 2022-06-28
Payer: COMMERCIAL

## 2022-06-28 ENCOUNTER — OFFICE VISIT (OUTPATIENT)
Dept: INTERNAL MEDICINE CLINIC | Facility: CLINIC | Age: 62
End: 2022-06-28
Payer: COMMERCIAL

## 2022-06-28 ENCOUNTER — APPOINTMENT (OUTPATIENT)
Dept: LAB | Age: 62
End: 2022-06-28
Payer: COMMERCIAL

## 2022-06-28 VITALS
DIASTOLIC BLOOD PRESSURE: 78 MMHG | RESPIRATION RATE: 15 BRPM | HEIGHT: 68 IN | HEART RATE: 68 BPM | OXYGEN SATURATION: 98 % | WEIGHT: 149 LBS | BODY MASS INDEX: 22.58 KG/M2 | SYSTOLIC BLOOD PRESSURE: 122 MMHG

## 2022-06-28 DIAGNOSIS — F41.1 GAD (GENERALIZED ANXIETY DISORDER): ICD-10-CM

## 2022-06-28 DIAGNOSIS — F33.0 MDD (MAJOR DEPRESSIVE DISORDER), RECURRENT EPISODE, MILD (HCC): Primary | ICD-10-CM

## 2022-06-28 DIAGNOSIS — F43.10 PTSD (POST-TRAUMATIC STRESS DISORDER): ICD-10-CM

## 2022-06-28 DIAGNOSIS — R07.81 RIB PAIN ON LEFT SIDE: ICD-10-CM

## 2022-06-28 DIAGNOSIS — R10.2 PELVIC PAIN: ICD-10-CM

## 2022-06-28 DIAGNOSIS — R27.0 ATAXIA: ICD-10-CM

## 2022-06-28 DIAGNOSIS — F42.9 OBSESSIVE-COMPULSIVE DISORDER, UNSPECIFIED TYPE: ICD-10-CM

## 2022-06-28 DIAGNOSIS — M54.50 LUMBAR PAIN: ICD-10-CM

## 2022-06-28 DIAGNOSIS — D47.2 SMOLDERING MULTIPLE MYELOMA (SMM): Primary | ICD-10-CM

## 2022-06-28 DIAGNOSIS — R10.2 PELVIC PAIN: Primary | ICD-10-CM

## 2022-06-28 LAB
CRP SERPL QL: 3.6 MG/L
ERYTHROCYTE [SEDIMENTATION RATE] IN BLOOD: 72 MM/HOUR (ref 0–29)
FOLATE SERPL-MCNC: 17.5 NG/ML (ref 3.1–17.5)
TSH SERPL DL<=0.05 MIU/L-ACNC: 2.4 UIU/ML (ref 0.45–4.5)
VIT B12 SERPL-MCNC: 393 PG/ML (ref 100–900)

## 2022-06-28 PROCEDURE — 83918 ORGANIC ACIDS TOTAL QUANT: CPT | Performed by: INTERNAL MEDICINE

## 2022-06-28 PROCEDURE — 82607 VITAMIN B-12: CPT | Performed by: INTERNAL MEDICINE

## 2022-06-28 PROCEDURE — 85652 RBC SED RATE AUTOMATED: CPT | Performed by: INTERNAL MEDICINE

## 2022-06-28 PROCEDURE — 72110 X-RAY EXAM L-2 SPINE 4/>VWS: CPT

## 2022-06-28 PROCEDURE — 82746 ASSAY OF FOLIC ACID SERUM: CPT | Performed by: INTERNAL MEDICINE

## 2022-06-28 PROCEDURE — 99214 OFFICE O/P EST MOD 30 MIN: CPT | Performed by: INTERNAL MEDICINE

## 2022-06-28 PROCEDURE — 86038 ANTINUCLEAR ANTIBODIES: CPT | Performed by: INTERNAL MEDICINE

## 2022-06-28 PROCEDURE — 71101 X-RAY EXAM UNILAT RIBS/CHEST: CPT

## 2022-06-28 PROCEDURE — 36415 COLL VENOUS BLD VENIPUNCTURE: CPT | Performed by: INTERNAL MEDICINE

## 2022-06-28 PROCEDURE — 90834 PSYTX W PT 45 MINUTES: CPT | Performed by: COUNSELOR

## 2022-06-28 PROCEDURE — 73521 X-RAY EXAM HIPS BI 2 VIEWS: CPT

## 2022-06-28 PROCEDURE — 86140 C-REACTIVE PROTEIN: CPT | Performed by: INTERNAL MEDICINE

## 2022-06-28 PROCEDURE — 84207 ASSAY OF VITAMIN B-6: CPT | Performed by: INTERNAL MEDICINE

## 2022-06-28 PROCEDURE — 84443 ASSAY THYROID STIM HORMONE: CPT | Performed by: INTERNAL MEDICINE

## 2022-06-28 NOTE — TELEPHONE ENCOUNTER
PT CALLED, SAID THAT SHE FORGOT TO TELL YOU THAT SHE HAS PAIN IN HER PELVIS TOO    WANTS TO KNOW IF YOU CAN DO AN XR FOR THAT ALSO

## 2022-06-28 NOTE — PROGRESS NOTES
Assessment/Plan:    #Rib Pain  -obtain XR ribs  -2022 bone scan without issues  -has 9/10 pain with breathing  -sharp pain without relief with tramadol, flexeril, ibuprofen, voltaren  -defers oxycodone since it causes constipation for now    #Lumbar Pain  -acute on chronic pain  -worsening  -dull ache in lumbar region  -will obtain XR  -refer to PT    #Ataxia  -unsteady gait  -has peroneal nerve damage chronically RLE  -reports symptoms worsening  -fell over the weekend as a result  -will obtain B12, MMA, folate, TSH, BHUMI, ESR, CRP, B6  -refer to PT    #Multiple Myeloma  -MGUS and seeing hematology for surveillance    #Osteoporosis  -remains on zometa and recently had infusion    Addendum 6/29/22 ESR and CRP elevated  Will refer to rheumatology  Start on oxycodone for temporary pain control for now  No problem-specific Assessment & Plan notes found for this encounter  Diagnoses and all orders for this visit:    Smoldering multiple myeloma (SMM)    Rib pain on left side  -     XR ribs left w pa chest min 3 views; Future    Lumbar pain  -     XR spine lumbar minimum 4 views non injury; Future  -     Ambulatory referral to Physical Therapy; Future    Ataxia  -     Vitamin B12  -     Methylmalonic acid, serum  -     Folate  -     TSH, 3rd generation with Free T4 reflex  -     BHUMI Screen w/ Reflex to Titer/Pattern  -     Sedimentation rate, automated  -     C-reactive protein  -     Ambulatory referral to Physical Therapy;  Future  -     Vitamin B6            Current Outpatient Medications:     acyclovir (ZOVIRAX) 400 MG tablet, Take 1 tablet (400 mg total) by mouth 2 (two) times a day, Disp: 60 tablet, Rfl: 4    Cholecalciferol (VITAMIN D3) 2000 UNITS TABS, Take 2,000 Units by mouth daily  , Disp: , Rfl:     diclofenac sodium (VOLTAREN) 1 %, APPLY 2 GRAMS TO THE AFFECTED AREA(S) BY TOPICAL ROUTE 4 TIMES PER DAY, Disp: , Rfl: 3    fenofibrate (TRICOR) 54 MG tablet, , Disp: , Rfl:     FLUoxetine (PROzac) 20 MG tablet, Take 3 tablets (60 mg total) by mouth daily, Disp: 90 tablet, Rfl: 1    hydrOXYzine HCL (ATARAX) 25 mg tablet, TAKE 1 TO 2 TABLETS BY MOUTH EVERY 6 HOURS AS NEEDED FOR ANXIETY (MAX 4TABS/DAY), Disp: 360 tablet, Rfl: 1    ibuprofen (MOTRIN) 600 mg tablet, Take 1 tablet (600 mg total) by mouth every 6 (six) hours as needed for moderate pain, Disp: 180 tablet, Rfl: 1    lidocaine (LIDODERM) 5 %, lidocaine 5 % topical patch  APPLY 1 PATCH TO AFFECTED AREA FOR 12 HOURS A DAY & REMOVE FOR 12 HOURS BEFORE APPLYING NEXT PATCH  (12 HOURS ON, 12 HOURS OFF), Disp: , Rfl:     traMADol (Ultram) 50 mg tablet, Take 1 tablet (50 mg total) by mouth every 8 (eight) hours as needed for moderate pain, Disp: 30 tablet, Rfl: 0    traZODone (DESYREL) 50 mg tablet, Take 0 5-1 tablets (25-50 mg total) by mouth daily at bedtime as needed for sleep, Disp: 30 tablet, Rfl: 2    triamcinolone (KENALOG) 0 5 % cream, Apply topically 3 (three) times a day, Disp: 60 g, Rfl: 1    triamterene-hydrochlorothiazide (MAXZIDE-25) 37 5-25 mg per tablet, TAKE 1 TABLET BY MOUTH EVERY DAY, Disp: 90 tablet, Rfl: 3    ursodiol (ACTIGALL) 300 mg capsule, TAKE 1 CAPSULE BY MOUTH THREE TIMES A DAY, Disp: 270 capsule, Rfl: 2    Subjective:      Patient ID: Ari Sotelo is a 57 y o  female  HPI     Patient presents for an acute visit  Reports that she has 9/10 pain over her left rib axillary region  No lymph nodes were palpable today  She reports that she had this pain over year ago however it is progressively worsening  She has MGUS and is currently undergoing infusion with hematology  She also reports that her lower back has been hurting and she has been putting Biofreeze on it without improvement  She has been taking Flexeril and tramadol and ibuprofen without any relief  We suggested oxycodone however patient would like to hold off on this since it causes her constipation  We will obtain an x-ray of the rib series    X-ray of the bone scan did not reveal any abnormalities  We will also obtain an x-ray of her lumbar region  We will refer her to physical therapy to help with her balance  She states that she feels that her balance is off  We will check a B12, methylmalonic acid, folate, TSH, BHUMI, ESR, CRP  She will return to care on Friday  The following portions of the patient's history were reviewed and updated as appropriate: allergies, current medications, past family history, past medical history, past social history, past surgical history and problem list     Review of Systems   Constitutional: Negative for activity change, appetite change, chills, diaphoresis, fatigue and fever  HENT: Negative for congestion, sore throat and trouble swallowing  Respiratory: Negative for cough and shortness of breath  Cardiovascular: Positive for chest pain (left ribs)  Negative for palpitations  Gastrointestinal: Negative for abdominal pain, constipation, diarrhea, nausea and vomiting  Musculoskeletal: Positive for back pain and gait problem (unsteady)  Negative for myalgias  Neurological: Negative for dizziness and headaches  Objective:      /78   Pulse 68   Resp 15   Ht 5' 8" (1 727 m)   Wt 67 6 kg (149 lb)   LMP  (LMP Unknown)   SpO2 98%   BMI 22 66 kg/m²          Physical Exam  Constitutional:       General: She is not in acute distress  Appearance: She is well-developed  She is not diaphoretic  HENT:      Head: Normocephalic and atraumatic  Right Ear: Tympanic membrane, ear canal and external ear normal  There is no impacted cerumen  Left Ear: Tympanic membrane, ear canal and external ear normal  There is no impacted cerumen  Eyes:      Conjunctiva/sclera: Conjunctivae normal       Pupils: Pupils are equal, round, and reactive to light  Neck:      Vascular: No JVD  Trachea: No tracheal deviation  Pulmonary:      Effort: Pulmonary effort is normal  No respiratory distress  Breath sounds: Normal breath sounds  Chest:      Chest wall: Tenderness (left axillary) present  Musculoskeletal:         General: Tenderness (lumbar) present  No deformity  Normal range of motion  Cervical back: Normal range of motion and neck supple  No rigidity or tenderness  Right lower leg: No edema  Left lower leg: No edema  Lymphadenopathy:      Cervical: No cervical adenopathy  Skin:     General: Skin is warm and dry  Findings: No erythema, lesion or rash  Neurological:      Mental Status: She is alert and oriented to person, place, and time  Mental status is at baseline  Cranial Nerves: No cranial nerve deficit  Sensory: Sensory deficit (left peroneal nerve) present  Motor: No weakness  Coordination: Coordination normal       Gait: Gait normal       Deep Tendon Reflexes: Reflexes normal    Psychiatric:         Mood and Affect: Mood normal          Behavior: Behavior normal          Thought Content: Thought content normal          Judgment: Judgment normal            This note was completed in part utilizing [employer] direct voice recognition software  Grammatical errors, random word insertion, spelling mistakes, and incomplete sentences may be an occasional consequence of the system secondary to software limitations, ambient noise and hardware issues  At the time of dictation, efforts were made to edit, clarify and /or correct errors  Please read the chart carefully and recognize, using context, where substitutions have occurred  If you have any questions or concerns about the context, text or information contained within the body of this dictation, please contact myself, the provider, for further clarification

## 2022-06-29 DIAGNOSIS — G90.521 COMPLEX REGIONAL PAIN SYNDROME I OF RIGHT LOWER LIMB: ICD-10-CM

## 2022-06-29 DIAGNOSIS — R79.82 ELEVATED C-REACTIVE PROTEIN (CRP): ICD-10-CM

## 2022-06-29 DIAGNOSIS — R70.0 ELEVATED SED RATE: Primary | ICD-10-CM

## 2022-06-29 DIAGNOSIS — R07.81 RIB PAIN ON LEFT SIDE: ICD-10-CM

## 2022-06-29 DIAGNOSIS — R10.2 PELVIC PAIN: ICD-10-CM

## 2022-06-29 DIAGNOSIS — M77.11 LATERAL EPICONDYLITIS OF RIGHT ELBOW: ICD-10-CM

## 2022-06-29 DIAGNOSIS — M54.81 BILATERAL OCCIPITAL NEURALGIA: ICD-10-CM

## 2022-06-29 DIAGNOSIS — D47.2 SMOLDERING MULTIPLE MYELOMA (SMM): ICD-10-CM

## 2022-06-29 LAB — RYE IGE QN: NEGATIVE

## 2022-06-29 RX ORDER — OXYCODONE HYDROCHLORIDE 10 MG/1
10 TABLET ORAL EVERY 6 HOURS PRN
Qty: 30 TABLET | Refills: 0 | Status: SHIPPED | OUTPATIENT
Start: 2022-06-29 | End: 2023-05-16

## 2022-06-29 RX ORDER — IBUPROFEN 600 MG/1
TABLET ORAL
Qty: 120 TABLET | Refills: 2 | Status: SHIPPED | OUTPATIENT
Start: 2022-06-29

## 2022-06-29 NOTE — PSYCH
Psychotherapy Provided: Individual Psychotherapy 50 minutes     Length of time in session: 50 minutes, follow up in 2 week    Encounter Diagnosis     ICD-10-CM    1  [facility]  F33 0    2  JOSEFINA (generalized anxiety disorder)  F41 1    3  Obsessive-compulsive disorder, unspecified type  F42 9    4  PTSD (post-traumatic stress disorder)  F43 10        Goals addressed in session: Goal 1     Current suicide risk : Low     D: Clinician met with Lenka Fernandez in person for individual therapy  Clinician explored journaling prompts and worked to help Lenka Fernandez gain greater insight and work on more deeper responses  Clinician explored how Lenka Fernandez is feeling about coping skills and benefits of journaling regularly  Lenka Fernandez reports some benefit but reports difficulty at times coming up with responses or struggling to find meaning in the process  Clinician went over possible benefits and facilitated a prompts being completed in the session to discuss together  Lenka Fernandez reports overall she has been doing well and working on greater awareness of emotions and work on goals  A: Lenka Fernandez was open and engaged in the session  She reports that she is taking her medication as prescribed and feels she would like to work on greater management of anxiety especially at bed time  P: Continue to meet with Lenka Fernandez every other week for individual therapy  Behavioral Health Treatment Plan [facility]: Diagnosis and Treatment Plan explained to Veto Johns relates understanding diagnosis and is agreeable to Treatment Plan   Yes

## 2022-06-30 ENCOUNTER — TELEPHONE (OUTPATIENT)
Dept: OTHER | Facility: OTHER | Age: 62
End: 2022-06-30

## 2022-06-30 NOTE — TELEPHONE ENCOUNTER
Patient called canceled her appointment because she was told that the office is going to schedule her an appointment with rheumatologist  Patient stated that once her appointment is schedule with rheumatologist she will call the office back and reschedule her appointment

## 2022-07-01 LAB
METHYLMALONATE SERPL-SCNC: 270 NMOL/L (ref 0–378)
VIT B6 SERPL-MCNC: 3.8 UG/L (ref 3.4–65.2)

## 2022-07-01 NOTE — TELEPHONE ENCOUNTER
SPOKE WITH PT, SHE KNOWS TO CALL DR LI'S OFFICE TO SCHEDULE BUT WILL CALL ME BACK IF SHE HAS ANY TROUBLE SCHEDULING AND OR TO RESCHEDULE HER APPT HERE

## 2022-07-12 ENCOUNTER — SOCIAL WORK (OUTPATIENT)
Dept: BEHAVIORAL/MENTAL HEALTH CLINIC | Facility: CLINIC | Age: 62
End: 2022-07-12
Payer: COMMERCIAL

## 2022-07-12 DIAGNOSIS — F43.10 PTSD (POST-TRAUMATIC STRESS DISORDER): ICD-10-CM

## 2022-07-12 DIAGNOSIS — F41.1 GAD (GENERALIZED ANXIETY DISORDER): ICD-10-CM

## 2022-07-12 DIAGNOSIS — F33.0 MDD (MAJOR DEPRESSIVE DISORDER), RECURRENT EPISODE, MILD (HCC): Primary | ICD-10-CM

## 2022-07-12 DIAGNOSIS — F42.9 OBSESSIVE-COMPULSIVE DISORDER, UNSPECIFIED TYPE: ICD-10-CM

## 2022-07-12 PROCEDURE — 90834 PSYTX W PT 45 MINUTES: CPT | Performed by: COUNSELOR

## 2022-07-12 NOTE — PSYCH
Psychotherapy Provided: Individual Psychotherapy 55 minutes     Length of time in session: 55 minutes, follow up in 2 week    Encounter Diagnosis     ICD-10-CM    1  MDD (major depressive disorder), recurrent episode, mild ([facility] [address] )  F33 0    2  JOSEFINA (generalized anxiety disorder)  F41 1    3  Obsessive-compulsive disorder, unspecified type  F42 9    4  PTSD (post-traumatic stress disorder)  F43 10        Goals addressed in session: Goal 1 and Goal 2     Current suicide risk : Low     D: Clinician met with Sridhar Luis in person for individual therapy  She discussed recent communication of need and continued efforts to engage in open communication with paramour  She reports that things have been better and that after communicating she is able to see a change  Clinician explored expectation and importance of open communication rather than expecting partner to read her mind  Clinician discussed future planning and goals  Sridhar Smith reports difficulties in knowing what she wants for the future and clinician began explored possible reasons for this and gave her a journaling assignment to think about 6 month/one year and 5 year plans  A: Sridhar Smith was open and engaged in the session  She reports taking her medication as prescribed but continuing to experience ongoing anxiety especially at night  P: Continue to meet with Sridhar Smith every other week for individual therapy  Behavioral Health Treatment Plan [facility]: Diagnosis and Treatment Plan explained to David Casey relates understanding diagnosis and is agreeable to Treatment Plan   Yes

## 2022-07-21 DIAGNOSIS — F33.0 MDD (MAJOR DEPRESSIVE DISORDER), RECURRENT EPISODE, MILD (HCC): ICD-10-CM

## 2022-07-21 DIAGNOSIS — F43.10 PTSD (POST-TRAUMATIC STRESS DISORDER): ICD-10-CM

## 2022-07-21 DIAGNOSIS — F42.9 OBSESSIVE-COMPULSIVE DISORDER, UNSPECIFIED TYPE: ICD-10-CM

## 2022-07-21 DIAGNOSIS — F41.1 GAD (GENERALIZED ANXIETY DISORDER): ICD-10-CM

## 2022-07-21 RX ORDER — FLUOXETINE 20 MG/1
TABLET, FILM COATED ORAL
Qty: 270 TABLET | Refills: 1 | Status: SHIPPED | OUTPATIENT
Start: 2022-07-21 | End: 2022-09-02 | Stop reason: SDUPTHER

## 2022-07-22 DIAGNOSIS — R60.9 EDEMA, UNSPECIFIED TYPE: ICD-10-CM

## 2022-07-25 ENCOUNTER — OFFICE VISIT (OUTPATIENT)
Dept: OBGYN CLINIC | Facility: CLINIC | Age: 62
End: 2022-07-25
Payer: COMMERCIAL

## 2022-07-25 VITALS
WEIGHT: 149 LBS | BODY MASS INDEX: 22.58 KG/M2 | SYSTOLIC BLOOD PRESSURE: 118 MMHG | HEIGHT: 68 IN | DIASTOLIC BLOOD PRESSURE: 80 MMHG

## 2022-07-25 DIAGNOSIS — N76.3 SUBACUTE VULVITIS: Primary | ICD-10-CM

## 2022-07-25 DIAGNOSIS — Z86.19 HISTORY OF ELISA POSITIVE FOR HSV: ICD-10-CM

## 2022-07-25 PROCEDURE — 99214 OFFICE O/P EST MOD 30 MIN: CPT | Performed by: OBSTETRICS & GYNECOLOGY

## 2022-07-25 RX ORDER — TRIAMTERENE AND HYDROCHLOROTHIAZIDE 37.5; 25 MG/1; MG/1
TABLET ORAL
Qty: 30 TABLET | Refills: 11 | Status: SHIPPED | OUTPATIENT
Start: 2022-07-25 | End: 2022-08-16

## 2022-07-25 RX ORDER — VALACYCLOVIR HYDROCHLORIDE 500 MG/1
1000 TABLET, FILM COATED ORAL DAILY
Qty: 60 TABLET | Refills: 0 | Status: SHIPPED | OUTPATIENT
Start: 2022-07-25 | End: 2022-08-16

## 2022-07-25 RX ORDER — CLOTRIMAZOLE AND BETAMETHASONE DIPROPIONATE 10; .64 MG/G; MG/G
CREAM TOPICAL 2 TIMES DAILY
Qty: 30 G | Refills: 0 | Status: SHIPPED | OUTPATIENT
Start: 2022-07-25 | End: 2022-08-26

## 2022-07-25 NOTE — PROGRESS NOTES
Assessment/Plan:  Recommend Lotrisone cream q h s  times 10 days  Also discussed hygiene, soap products  If no improvement, would then recommend vulvar biopsy  Also discussed changing suppression, as questions whether this is HSV symptoms, on classical   All questions answered at this time  She is due for annual visit next month  No problem-specific Assessment & Plan notes found for this encounter  Diagnoses and all orders for this visit:    Subacute vulvitis  -     clotrimazole-betamethasone (LOTRISONE) 1-0 05 % cream; Apply topically 2 (two) times a day    History of REYNA positive for HSV  -     valACYclovir (VALTREX) 500 mg tablet; Take 2 tablets (1,000 mg total) by mouth daily          Subjective:      Patient ID: Mc Rodgers is a 57 y o  female  HPI     This is a pleasant 78-year-old female P0 presents complaining of external vaginal itching/burning over the last 1-2 weeks  She does have a history of HSV -1 diagnosed in her mid 25s  She has been on acyclovir 400 mg b i d since 03/2022 (prior on Valtrex x5 days p r n , admitting that she misses doses intermittently  She is sexually active and has been in a monogamous relationship over the last 1 year  She also states that her partner does have erectile dysfunction issues and plans on discussing this with primary care  They have been doing a lot of yd work moles showing, partner was diagnosed with yeast infection along upper extremities bilaterally  She denies any discharge or bladder infections  Her past medical history significant for multiple myeloma 05/2017, primary biliary cirrhosis, hypertension, OCD, osteoporosis, anxiety/depression  She follows up with her primary care, Hematology/Oncology, Gastroenterology on a regular basis          The following portions of the patient's history were reviewed and updated as appropriate: allergies, current medications, past family history, past medical history, past social history, past surgical history and problem list     Review of Systems   Constitutional: Negative for fatigue, fever and unexpected weight change  Respiratory: Negative for cough, chest tightness, shortness of breath and wheezing  Cardiovascular: Negative  Negative for chest pain and palpitations  Gastrointestinal: Negative  Negative for abdominal distention, abdominal pain, blood in stool, constipation, diarrhea, nausea and vomiting  Genitourinary: Negative  Negative for difficulty urinating, dyspareunia, dysuria, flank pain, frequency, genital sores, hematuria, pelvic pain, urgency, vaginal bleeding, vaginal discharge and vaginal pain  Skin: Negative for rash  Objective:      /80   Ht 5' 8" (1 727 m)   Wt 67 6 kg (149 lb)   LMP  (LMP Unknown)   BMI 22 66 kg/m²          Physical Exam  Constitutional:       Appearance: Normal appearance  Pulmonary:      Effort: Pulmonary effort is normal    Abdominal:      General: Bowel sounds are normal  There is no distension  Palpations: Abdomen is soft  Tenderness: There is no abdominal tenderness  There is no guarding  Genitourinary:     Labia:         Right: No rash, tenderness or lesion  Left: No rash, tenderness or lesion  Vagina: No signs of injury  No vaginal discharge or tenderness  Cervix: No cervical motion tenderness, discharge, friability, lesion, erythema or cervical bleeding  Comments: External genitalia is evident of very small fissure anal at 12o'clock  She describes itching irritation along posterior fourchette extending to left labia minora, no lesions or discolorations noted  Neurological:      Mental Status: She is alert and oriented to person, place, and time  Psychiatric:         Behavior: Behavior normal        I have spent 30 minutes with Patient  today in which greater than 50% of this time was spent in counseling/coordination of care regarding Risks and benefits of tx options

## 2022-07-27 ENCOUNTER — APPOINTMENT (OUTPATIENT)
Dept: LAB | Facility: CLINIC | Age: 62
End: 2022-07-27
Payer: COMMERCIAL

## 2022-07-27 DIAGNOSIS — K74.3 PRIMARY BILIARY CHOLANGITIS (HCC): ICD-10-CM

## 2022-07-27 DIAGNOSIS — D50.9 IRON DEFICIENCY ANEMIA, UNSPECIFIED IRON DEFICIENCY ANEMIA TYPE: ICD-10-CM

## 2022-07-27 DIAGNOSIS — E78.5 HYPERLIPIDEMIA, UNSPECIFIED HYPERLIPIDEMIA TYPE: ICD-10-CM

## 2022-07-27 DIAGNOSIS — D47.2 SMOLDERING MULTIPLE MYELOMA (SMM): ICD-10-CM

## 2022-07-27 DIAGNOSIS — L23.7 POISON IVY DERMATITIS: ICD-10-CM

## 2022-07-27 DIAGNOSIS — I10 ESSENTIAL HYPERTENSION: ICD-10-CM

## 2022-07-27 LAB
ALBUMIN SERPL BCP-MCNC: 3.5 G/DL (ref 3.5–5)
ALP SERPL-CCNC: 322 U/L (ref 46–116)
ALT SERPL W P-5'-P-CCNC: 50 U/L (ref 12–78)
ANION GAP SERPL CALCULATED.3IONS-SCNC: 5 MMOL/L (ref 4–13)
AST SERPL W P-5'-P-CCNC: 50 U/L (ref 5–45)
BASOPHILS # BLD AUTO: 0.04 THOUSANDS/ΜL (ref 0–0.1)
BASOPHILS NFR BLD AUTO: 1 % (ref 0–1)
BILIRUB SERPL-MCNC: 0.64 MG/DL (ref 0.2–1)
BUN SERPL-MCNC: 24 MG/DL (ref 5–25)
CALCIUM SERPL-MCNC: 9.3 MG/DL (ref 8.3–10.1)
CHLORIDE SERPL-SCNC: 102 MMOL/L (ref 96–108)
CHOLEST SERPL-MCNC: 223 MG/DL
CO2 SERPL-SCNC: 29 MMOL/L (ref 21–32)
CREAT SERPL-MCNC: 1.12 MG/DL (ref 0.6–1.3)
EOSINOPHIL # BLD AUTO: 0.11 THOUSAND/ΜL (ref 0–0.61)
EOSINOPHIL NFR BLD AUTO: 2 % (ref 0–6)
ERYTHROCYTE [DISTWIDTH] IN BLOOD BY AUTOMATED COUNT: 13 % (ref 11.6–15.1)
FERRITIN SERPL-MCNC: 213 NG/ML (ref 8–388)
GFR SERPL CREATININE-BSD FRML MDRD: 52 ML/MIN/1.73SQ M
GLUCOSE P FAST SERPL-MCNC: 90 MG/DL (ref 65–99)
HCT VFR BLD AUTO: 37.7 % (ref 34.8–46.1)
HDLC SERPL-MCNC: 85 MG/DL
HGB BLD-MCNC: 12.3 G/DL (ref 11.5–15.4)
IGA SERPL-MCNC: 45 MG/DL (ref 70–400)
IGG SERPL-MCNC: 2470 MG/DL (ref 700–1600)
IGM SERPL-MCNC: 259 MG/DL (ref 40–230)
IMM GRANULOCYTES # BLD AUTO: 0.02 THOUSAND/UL (ref 0–0.2)
IMM GRANULOCYTES NFR BLD AUTO: 0 % (ref 0–2)
IRON SATN MFR SERPL: 26 % (ref 15–50)
IRON SERPL-MCNC: 100 UG/DL (ref 50–170)
LDLC SERPL CALC-MCNC: 127 MG/DL (ref 0–100)
LYMPHOCYTES # BLD AUTO: 1.78 THOUSANDS/ΜL (ref 0.6–4.47)
LYMPHOCYTES NFR BLD AUTO: 30 % (ref 14–44)
MCH RBC QN AUTO: 30.4 PG (ref 26.8–34.3)
MCHC RBC AUTO-ENTMCNC: 32.6 G/DL (ref 31.4–37.4)
MCV RBC AUTO: 93 FL (ref 82–98)
MONOCYTES # BLD AUTO: 0.47 THOUSAND/ΜL (ref 0.17–1.22)
MONOCYTES NFR BLD AUTO: 8 % (ref 4–12)
NEUTROPHILS # BLD AUTO: 3.6 THOUSANDS/ΜL (ref 1.85–7.62)
NEUTS SEG NFR BLD AUTO: 59 % (ref 43–75)
NRBC BLD AUTO-RTO: 0 /100 WBCS
PLATELET # BLD AUTO: 205 THOUSANDS/UL (ref 149–390)
PMV BLD AUTO: 10.6 FL (ref 8.9–12.7)
POTASSIUM SERPL-SCNC: 4.2 MMOL/L (ref 3.5–5.3)
PROT SERPL-MCNC: 9.2 G/DL (ref 6.4–8.4)
RBC # BLD AUTO: 4.04 MILLION/UL (ref 3.81–5.12)
SODIUM SERPL-SCNC: 136 MMOL/L (ref 135–147)
TIBC SERPL-MCNC: 378 UG/DL (ref 250–450)
TRIGL SERPL-MCNC: 53 MG/DL
WBC # BLD AUTO: 6.02 THOUSAND/UL (ref 4.31–10.16)

## 2022-07-27 PROCEDURE — 83540 ASSAY OF IRON: CPT

## 2022-07-27 PROCEDURE — 86334 IMMUNOFIX E-PHORESIS SERUM: CPT

## 2022-07-27 PROCEDURE — 85025 COMPLETE CBC W/AUTO DIFF WBC: CPT

## 2022-07-27 PROCEDURE — 84165 PROTEIN E-PHORESIS SERUM: CPT

## 2022-07-27 PROCEDURE — 83550 IRON BINDING TEST: CPT

## 2022-07-27 PROCEDURE — 36415 COLL VENOUS BLD VENIPUNCTURE: CPT

## 2022-07-27 PROCEDURE — 80061 LIPID PANEL: CPT

## 2022-07-27 PROCEDURE — 82784 ASSAY IGA/IGD/IGG/IGM EACH: CPT

## 2022-07-27 PROCEDURE — 83521 IG LIGHT CHAINS FREE EACH: CPT

## 2022-07-27 PROCEDURE — 84165 PROTEIN E-PHORESIS SERUM: CPT | Performed by: PATHOLOGY

## 2022-07-27 PROCEDURE — 82728 ASSAY OF FERRITIN: CPT

## 2022-07-27 PROCEDURE — 86334 IMMUNOFIX E-PHORESIS SERUM: CPT | Performed by: PATHOLOGY

## 2022-07-27 PROCEDURE — 80053 COMPREHEN METABOLIC PANEL: CPT

## 2022-07-29 LAB
ALBUMIN SERPL ELPH-MCNC: 4.25 G/DL (ref 3.5–5)
ALBUMIN SERPL ELPH-MCNC: 48.9 % (ref 52–65)
ALPHA1 GLOB SERPL ELPH-MCNC: 0.3 G/DL (ref 0.1–0.4)
ALPHA1 GLOB SERPL ELPH-MCNC: 3.4 % (ref 2.5–5)
ALPHA2 GLOB SERPL ELPH-MCNC: 0.72 G/DL (ref 0.4–1.2)
ALPHA2 GLOB SERPL ELPH-MCNC: 8.3 % (ref 7–13)
BETA GLOB ABNORMAL SERPL ELPH-MCNC: 0.42 G/DL (ref 0.4–0.8)
BETA1 GLOB SERPL ELPH-MCNC: 4.8 % (ref 5–13)
BETA2 GLOB SERPL ELPH-MCNC: 4.2 % (ref 2–8)
BETA2+GAMMA GLOB SERPL ELPH-MCNC: 0.37 G/DL (ref 0.2–0.5)
GAMMA GLOB ABNORMAL SERPL ELPH-MCNC: 2.64 G/DL (ref 0.5–1.6)
GAMMA GLOB SERPL ELPH-MCNC: 30.4 % (ref 12–22)
IGG/ALB SER: 0.96 {RATIO} (ref 1.1–1.8)
INTERPRETATION UR IFE-IMP: NORMAL
KAPPA LC FREE SER-MCNC: 50.3 MG/L (ref 3.3–19.4)
KAPPA LC FREE/LAMBDA FREE SER: 3.99 {RATIO} (ref 0.26–1.65)
LAMBDA LC FREE SERPL-MCNC: 12.6 MG/L (ref 5.7–26.3)
M PROTEIN 1 MFR SERPL ELPH: 21.7 %
M PROTEIN 1 SERPL ELPH-MCNC: 1.89 G/DL
PROT PATTERN SERPL ELPH-IMP: ABNORMAL
PROT SERPL-MCNC: 8.7 G/DL (ref 6.4–8.2)

## 2022-08-02 ENCOUNTER — OFFICE VISIT (OUTPATIENT)
Dept: INTERNAL MEDICINE CLINIC | Facility: CLINIC | Age: 62
End: 2022-08-02
Payer: COMMERCIAL

## 2022-08-02 ENCOUNTER — TELEPHONE (OUTPATIENT)
Dept: HEMATOLOGY ONCOLOGY | Facility: CLINIC | Age: 62
End: 2022-08-02

## 2022-08-02 VITALS
HEIGHT: 68 IN | RESPIRATION RATE: 16 BRPM | WEIGHT: 151 LBS | HEART RATE: 72 BPM | BODY MASS INDEX: 22.88 KG/M2 | OXYGEN SATURATION: 98 % | SYSTOLIC BLOOD PRESSURE: 122 MMHG | DIASTOLIC BLOOD PRESSURE: 76 MMHG

## 2022-08-02 DIAGNOSIS — I10 ESSENTIAL HYPERTENSION: ICD-10-CM

## 2022-08-02 DIAGNOSIS — Z12.83 SKIN EXAM FOR MALIGNANT NEOPLASM: ICD-10-CM

## 2022-08-02 DIAGNOSIS — L98.9 SCALP LESION: ICD-10-CM

## 2022-08-02 DIAGNOSIS — R21 RASH: ICD-10-CM

## 2022-08-02 DIAGNOSIS — M81.0 AGE-RELATED OSTEOPOROSIS WITHOUT CURRENT PATHOLOGICAL FRACTURE: ICD-10-CM

## 2022-08-02 DIAGNOSIS — E55.9 VITAMIN D DEFICIENCY: ICD-10-CM

## 2022-08-02 DIAGNOSIS — D50.9 IRON DEFICIENCY ANEMIA, UNSPECIFIED IRON DEFICIENCY ANEMIA TYPE: ICD-10-CM

## 2022-08-02 DIAGNOSIS — E53.8 VITAMIN B12 DEFICIENCY: ICD-10-CM

## 2022-08-02 DIAGNOSIS — D47.2 SMOLDERING MULTIPLE MYELOMA (SMM): ICD-10-CM

## 2022-08-02 DIAGNOSIS — G90.521 COMPLEX REGIONAL PAIN SYNDROME I OF RIGHT LOWER LIMB: Primary | ICD-10-CM

## 2022-08-02 DIAGNOSIS — E78.2 MODERATE MIXED HYPERLIPIDEMIA NOT REQUIRING STATIN THERAPY: ICD-10-CM

## 2022-08-02 DIAGNOSIS — K74.3 PRIMARY BILIARY CHOLANGITIS (HCC): ICD-10-CM

## 2022-08-02 PROCEDURE — 99215 OFFICE O/P EST HI 40 MIN: CPT | Performed by: INTERNAL MEDICINE
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
## 2022-08-02 NOTE — TELEPHONE ENCOUNTER
Labs ordered per Dr Corrina Hernandez protein 1 89  (2 09 2/2021)  [place] light chain up to 51 with ratio4 (44/4 49 8/2021)  IgG stable [id number] since at least 2018  Hemoglobin normal and stable  Cr 0 8 - 1 1 since at least 2017    T  P  has been 8 1- 9 3 since at least 2017  Recommend ongoing observation    Repeat labs prior to f/u as scheduled 12/2/22

## 2022-08-09 ENCOUNTER — SOCIAL WORK (OUTPATIENT)
Dept: BEHAVIORAL/MENTAL HEALTH CLINIC | Facility: CLINIC | Age: 62
End: 2022-08-09
Payer: COMMERCIAL

## 2022-08-09 DIAGNOSIS — F41.1 GAD (GENERALIZED ANXIETY DISORDER): ICD-10-CM

## 2022-08-09 DIAGNOSIS — F33.0 MDD (MAJOR DEPRESSIVE DISORDER), RECURRENT EPISODE, MILD (HCC): Primary | ICD-10-CM

## 2022-08-09 DIAGNOSIS — F43.10 PTSD (POST-TRAUMATIC STRESS DISORDER): ICD-10-CM

## 2022-08-09 PROCEDURE — 90834 PSYTX W PT 45 MINUTES: CPT | Performed by: COUNSELOR

## 2022-08-09 NOTE — PSYCH
Psychotherapy Provided: Individual Psychotherapy 45 minutes     Length of time in session: 45 minutes, follow up in 2 week    Encounter Diagnosis     ICD-10-CM    1  MDD (major depressive disorder), recurrent episode, mild ([facility] [address] )  F33 0    2  JOSEFINA (generalized anxiety disorder)  F41 1    3  PTSD (post-traumatic stress disorder)  F43 10        Goals addressed in session: Goal 1 and Goal 2     Current suicide risk : Low     D: Clinician met with Maria M Sexton in person for individual therapy  Maria M Sexton discussed past month and interaction with paramour and his family  She reports that things have been going well overall and that she is working to figure out what she wants for the future  Clinician explored attempts at communicating her needs and desire to paramour but Maria M Darshan reports this being difficult as her paramour often shuts down in deep discussions  Clinician discussed possible ways to address needs in alternative ways and express the importance of this type of communication and reassurance  Clinician explored Courtney's anxieties about the relationship and normalized and attempted to re frame negative thoughts  A: Maria M Sexton was open and engaged in the session and continues to work to process past feelings and patterns in relationships  She reports taking her medication as prescribed  P: Continue to meet with Maria M Sexton every other week for individual therapy  Behavioral Health Treatment Plan [facility]: Diagnosis and Treatment Plan explained to Oracio Merino relates understanding diagnosis and is agreeable to Treatment Plan   Yes

## 2022-08-10 ENCOUNTER — OFFICE VISIT (OUTPATIENT)
Dept: OBGYN CLINIC | Facility: HOSPITAL | Age: 62
End: 2022-08-10
Payer: COMMERCIAL

## 2022-08-10 VITALS
BODY MASS INDEX: 23.55 KG/M2 | WEIGHT: 159 LBS | SYSTOLIC BLOOD PRESSURE: 128 MMHG | HEART RATE: 79 BPM | HEIGHT: 69 IN | DIASTOLIC BLOOD PRESSURE: 80 MMHG

## 2022-08-10 DIAGNOSIS — M65.342 TRIGGER FINGER, LEFT RING FINGER: Primary | ICD-10-CM

## 2022-08-10 PROCEDURE — 20550 NJX 1 TENDON SHEATH/LIGAMENT: CPT | Performed by: ORTHOPAEDIC SURGERY

## 2022-08-10 PROCEDURE — 99214 OFFICE O/P EST MOD 30 MIN: CPT | Performed by: ORTHOPAEDIC SURGERY

## 2022-08-10 RX ORDER — LIDOCAINE HYDROCHLORIDE 10 MG/ML
1 INJECTION, SOLUTION INFILTRATION; PERINEURAL
Status: COMPLETED | OUTPATIENT
Start: 2022-08-10 | End: 2022-08-10

## 2022-08-10 RX ORDER — BETAMETHASONE SODIUM PHOSPHATE AND BETAMETHASONE ACETATE 3; 3 MG/ML; MG/ML
6 INJECTION, SUSPENSION INTRA-ARTICULAR; INTRALESIONAL; INTRAMUSCULAR; SOFT TISSUE
Status: COMPLETED | OUTPATIENT
Start: 2022-08-10 | End: 2022-08-10

## 2022-08-10 RX ADMIN — LIDOCAINE HYDROCHLORIDE 1 ML: 10 INJECTION, SOLUTION INFILTRATION; PERINEURAL at 17:55

## 2022-08-10 RX ADMIN — BETAMETHASONE SODIUM PHOSPHATE AND BETAMETHASONE ACETATE 6 MG: 3; 3 INJECTION, SUSPENSION INTRA-ARTICULAR; INTRALESIONAL; INTRAMUSCULAR; SOFT TISSUE at 17:55

## 2022-08-10 NOTE — PROGRESS NOTES
ASSESSMENT/PLAN:    Assessment:   Trigger Finger  left  ring finger    Plan:   steroid injections  - first injection provided today     Follow Up:  6-8 weeks    To Do Next Visit:  Assess response to treatment      _____________________________________________________  CHIEF COMPLAINT:  Chief Complaint   Patient presents with    Left Ring Finger - Triggering         SUBJECTIVE:  Ronnell Pugh is a 57 y o  female who presents for follow up regarding left ring finger trigger finger  Patient has been having discomfort and some popping of the finger  She denies any trauma or injury  She did have some numbness in the long finger, which has since resolved  She denies any other acute complaints       PAST MEDICAL HISTORY:  Past Medical History:   Diagnosis Date    Abnormal breast exam     Anxiety     Asthma     childhood    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[address] )     primary per allscripts    Cancer ([facility] [address] )     IgG kappa smoldering multiple myeloma    Cardiac murmur     Chronic liver disease     resolved 12/04/2017    Depression     Endometriosis     Fracture of tibia     right tibial plateau fracture per allscripts    Heart murmur     Hepatic disease     Hypertension     last assessed 12/13/2017    Inflammatory bowel disease     Multiple myeloma (HCC)     Neuropathy     R foot     Nosebleed     OCD (obsessive compulsive disorder)     Pneumonia     RSD (reflex sympathetic dystrophy) 12/11/2018    Seasonal allergies     Wears eyeglasses     Whiplash injury to neck        PAST SURGICAL HISTORY:  Past Surgical History:   Procedure Laterality Date    BACK SURGERY      hemilaminectomy and discectomy, L5-S1, right (Dr Eliseo Payne, [facility]) onset 02/14/2005 per allscripts    EXPLORATION EXTREMITY Right 08/29/2016    Procedure: KNEE PERONEAL NERVE EXPLORATION AND RELEASE ;  Surgeon: Sarkis Rosas MD;  [facility];  Service:    [id number]      HAND SURGERY      HERNIA REPAIR      JOINT REPLACEMENT      KNEE SURGERY      MANDIBLE SURGERY      NEUROPLASTY / TRANSPOSITION MEDIAN NERVE AT CARPAL TUNNEL      ORIF TIBIAL PLATEAU Right     arthroplasty per allscripts    OTHER SURGICAL HISTORY      injection of trigger point(s) per allscripts    OK INCISE FINGER TENDON SHEATH Right 12/02/2020    Procedure: RELEASE TRIGGER FINGER-right long;  Surgeon: Angie Adams MD;  [facility];  Service: Orthopedics    OK TOTAL KNEE ARTHROPLASTY Right 06/11/2018    Procedure: ARTHROPLASTY KNEE TOTAL;  Surgeon: Dany Gann MD;  [facility];  Service: Orthopedics    SINUS SURGERY      SPINE SURGERY      TONSILLECTOMY         FAMILY HISTORY:  Family History   Problem Relation Age of Onset    Heart failure Mother     Anxiety disorder Mother         symptom per allscripts    Depression Mother     Anxiety disorder Father         symptom per allscripts    Cirrhosis Father         hepatic per allscripts    Alcohol abuse Father     Stomach cancer Maternal Grandmother     Cancer Maternal Grandmother     Stomach cancer Maternal Grandfather     Cancer Maternal Grandfather     Diabetes Paternal Grandmother     No Known Problems Paternal Grandfather     Anxiety disorder Other         symptom per allscripts    Coronary artery disease Other     Depression Other     Hyperlipidemia Other     Osteoarthritis Other     Other Other         back disorder per allscripts    Neuropathy Other     No Known Problems Paternal Aunt     No Known Problems Paternal Aunt        SOCIAL HISTORY:  Social History     Tobacco Use    Smoking status: Never Smoker    Smokeless tobacco: Never Used   Vaping Use    Vaping Use: Never used   Substance Use Topics    Alcohol use: No    Drug use: No       MEDICATIONS:    Current Outpatient Medications:     Cholecalciferol (VITAMIN D3) 2000 UNITS TABS, Take 2,000 Units by mouth daily  , Disp: , Rfl:     diclofenac sodium (VOLTAREN) 1 %, APPLY 2 GRAMS TO THE AFFECTED AREA(S) BY TOPICAL ROUTE 4 TIMES PER DAY, Disp: , Rfl: 3    fenofibrate (TRICOR) 54 MG tablet, , Disp: , Rfl:     FLUoxetine (PROzac) 20 MG tablet, TAKE 3 TABLETS BY MOUTH EVERY DAY, Disp: 270 tablet, Rfl: 1    hydrOXYzine HCL (ATARAX) 25 mg tablet, TAKE 1 TO 2 TABLETS BY MOUTH EVERY 6 HOURS AS NEEDED FOR ANXIETY (MAX 4TABS/DAY), Disp: 360 tablet, Rfl: 1    ibuprofen (MOTRIN) 600 mg tablet, TAKE 1 TABLET BY MOUTH EVERY 6 HOURS AS NEEDED FOR MODERATE PAIN , Disp: 120 tablet, Rfl: 2    lidocaine (LIDODERM) 5 %, lidocaine 5 % topical patch  APPLY 1 PATCH TO AFFECTED AREA FOR 12 HOURS A DAY & REMOVE FOR 12 HOURS BEFORE APPLYING NEXT PATCH   (12 HOURS ON, 12 HOURS OFF), Disp: , Rfl:     oxyCODONE (ROXICODONE) 10 MG TABS, Take 1 tablet (10 mg total) by mouth every 6 (six) hours as needed for severe pain (as needed but do not take together with tramadol) Max Daily Amount: 40 mg, Disp: 30 tablet, Rfl: 0    traMADol (Ultram) 50 mg tablet, Take 1 tablet (50 mg total) by mouth every 8 (eight) hours as needed for moderate pain, Disp: 30 tablet, Rfl: 0    traZODone (DESYREL) 50 mg tablet, Take 0 5-1 tablets (25-50 mg total) by mouth daily at bedtime as needed for sleep, Disp: 30 tablet, Rfl: 2    triamterene-hydrochlorothiazide (MAXZIDE-25) 37 5-25 mg per tablet, TAKE 1 TABLET BY MOUTH EVERY DAY, Disp: 30 tablet, Rfl: 11    ursodiol (ACTIGALL) 300 mg capsule, TAKE 1 CAPSULE BY MOUTH THREE TIMES A DAY, Disp: 270 capsule, Rfl: 2    valACYclovir (VALTREX) 500 mg tablet, Take 2 tablets (1,000 mg total) by mouth daily, Disp: 60 tablet, Rfl: 0    clotrimazole-betamethasone (LOTRISONE) 1-0 05 % cream, Apply topically 2 (two) times a day (Patient not taking: Reported on 8/10/2022), Disp: 30 g, Rfl: 0    triamcinolone (KENALOG) 0 5 % cream, Apply topically 3 (three) times a day (Patient not taking: Reported on 8/10/2022), Disp: 60 g, Rfl: 1    ALLERGIES:  Allergies   Allergen Reactions    Codeine GI Intolerance and Nausea Only     Other reaction(s): Other (See Comments)  Violently ill  Violently ill    Latex Rash     itching    Doxycycline GI Intolerance and Nausea Only    Cymbalta [Duloxetine Hcl]     Milk-Related Compounds - Food Allergy GI Intolerance    Morphine And Related     Ocaliva [Obeticholic Acid] Hives    Orange Fruit [Citrus - Food Allergy] Other (See Comments)     Tested  positive    Sulfa Antibiotics GI Intolerance    Tomato - Food Allergy Other (See Comments)     Tested positive;  Eats in sauces, not pure    Penicillins Other (See Comments) and Rash     Childhood reaction       REVIEW OF SYSTEMS:  Pertinent items are noted in HPI  A comprehensive review of systems was negative      LABS:  HgA1c:   Lab Results   Component Value Date    HGBA1C 6 5 (H) 05/22/2018     BMP:   Lab Results   Component Value Date    GLUCOSE 134 12/29/2015    CALCIUM 9 3 07/27/2022     12/29/2015    K 4 2 07/27/2022    CO2 29 07/27/2022     07/27/2022    BUN 24 07/27/2022    CREATININE 1 12 07/27/2022           _____________________________________________________  PHYSICAL EXAMINATION:  Vital signs: /80   Pulse 79   Ht 5' 9" (1 753 m)   Wt 72 1 kg (159 lb)   LMP  (LMP Unknown)   BMI 23 48 kg/m²   General: well developed and well nourished, alert, oriented times 3 and appears comfortable  Psychiatric: Normal  HEENT: Trachea Midline, No torticollis  Cardiovascular: No discernable arrhythmia  Pulmonary: No wheezing or stridor  Abdomen: No rebound or guarding  Extremities: No peripheral edema  Skin: No masses, erythema, lacerations, fluctation, ulcerations  Neurovascular: Sensation Intact to the Median, Ulnar, Radial Nerve, Motor Intact to the Median, Ulnar, Radial Nerve and Pulses Intact    MUSCULOSKELETAL EXAMINATION:  LEFT SIDE:  Ring Finger   Tenderness over finger  Presence of nodule A1 pulley  Crepitation of tendons  Sensation intact   Brisk capillary refill    _____________________________________________________  STUDIES REVIEWED:  No Studies to review      PROCEDURES PERFORMED:  Hand/upper extremity injection: L ring A1  Universal Protocol:  Consent: Verbal consent obtained  Consent given by: patient  Time out: Immediately prior to procedure a "time out" was called to verify the correct patient, procedure, equipment, support staff and site/side marked as required    Supporting Documentation  Indications: pain and therapeutic   Procedure Details  Condition:trigger finger Location: ring finger - L ring A1   Preparation: Patient was prepped and draped in the usual sterile fashion  Needle size: 25 G  Ultrasound guidance: no  Approach: volar  Medications administered: 1 mL lidocaine 1 %; 6 mg betamethasone acetate-betamethasone sodium phosphate 6 (3-3) mg/mL    Patient tolerance: patient tolerated the procedure well with no immediate complications  Dressing:  Sterile dressing applied              Scribe Attestation    I,:  Salbador Shaw PA-C am acting as a scribe while in the presence of the attending physician :       I,:  Mary Cunha MD personally performed the services described in this documentation    as scribed in my presence :

## 2022-08-15 NOTE — LETTER
To whom it may concern,         Given the patient's recent surgery, she is not medically cleared to  an automobile in areas with heavily congested roadways as the action of high frequency acceleration and braking could cause irritation of her surgically replaced knee  If there are questions regarding this communication, please feel free to contact the [place] [phone] office             All the best,            Magali Rinne, MD  [phone] Birth Control Pills Pregnancy And Lactation Text: This medication should be avoided if pregnant and for the first 30 days post-partum.

## 2022-08-16 DIAGNOSIS — R60.9 EDEMA, UNSPECIFIED TYPE: ICD-10-CM

## 2022-08-16 DIAGNOSIS — Z86.19 HISTORY OF ELISA POSITIVE FOR HSV: ICD-10-CM

## 2022-08-16 RX ORDER — VALACYCLOVIR HYDROCHLORIDE 500 MG/1
TABLET, FILM COATED ORAL
Qty: 180 TABLET | Refills: 0 | Status: SHIPPED | OUTPATIENT
Start: 2022-08-16 | End: 2022-11-14

## 2022-08-16 RX ORDER — TRIAMTERENE AND HYDROCHLOROTHIAZIDE 37.5; 25 MG/1; MG/1
TABLET ORAL
Qty: 90 TABLET | Refills: 4 | Status: SHIPPED | OUTPATIENT
Start: 2022-08-16

## 2022-08-22 ENCOUNTER — TELEPHONE (OUTPATIENT)
Dept: INTERNAL MEDICINE CLINIC | Facility: CLINIC | Age: 62
End: 2022-08-22

## 2022-08-22 DIAGNOSIS — S92.415A CLOSED NONDISPLACED FRACTURE OF PROXIMAL PHALANX OF LEFT GREAT TOE, INITIAL ENCOUNTER: Primary | ICD-10-CM

## 2022-08-22 NOTE — TELEPHONE ENCOUNTER
patient called stating about 2 weeks ago she dropped a vacuum on her foot  Her toe is swollen possibly black and blue but unsure  She wants to know what should she do ?     She is going away in Sept , just wants to be ok

## 2022-08-22 NOTE — TELEPHONE ENCOUNTER
Have her obtain an xray and come in later this week for an evlauation  She should radha tape it and try to not walk too much on it   Which foot is it

## 2022-08-23 ENCOUNTER — SOCIAL WORK (OUTPATIENT)
Dept: BEHAVIORAL/MENTAL HEALTH CLINIC | Facility: CLINIC | Age: 62
End: 2022-08-23
Payer: COMMERCIAL

## 2022-08-23 ENCOUNTER — APPOINTMENT (OUTPATIENT)
Dept: RADIOLOGY | Age: 62
End: 2022-08-23
Payer: COMMERCIAL

## 2022-08-23 DIAGNOSIS — S92.415A CLOSED NONDISPLACED FRACTURE OF PROXIMAL PHALANX OF LEFT GREAT TOE, INITIAL ENCOUNTER: ICD-10-CM

## 2022-08-23 DIAGNOSIS — F42.9 OBSESSIVE-COMPULSIVE DISORDER, UNSPECIFIED TYPE: ICD-10-CM

## 2022-08-23 DIAGNOSIS — F43.10 PTSD (POST-TRAUMATIC STRESS DISORDER): ICD-10-CM

## 2022-08-23 DIAGNOSIS — F33.0 MDD (MAJOR DEPRESSIVE DISORDER), RECURRENT EPISODE, MILD (HCC): Primary | ICD-10-CM

## 2022-08-23 DIAGNOSIS — F41.1 GAD (GENERALIZED ANXIETY DISORDER): ICD-10-CM

## 2022-08-23 PROCEDURE — 90834 PSYTX W PT 45 MINUTES: CPT | Performed by: COUNSELOR

## 2022-08-23 PROCEDURE — 73630 X-RAY EXAM OF FOOT: CPT

## 2022-08-24 NOTE — PSYCH
Psychotherapy Provided: Individual Psychotherapy 45 minutes     Length of time in session: 45 minutes, follow up in 2 week    Encounter Diagnosis     ICD-10-CM    1  MDD (major depressive disorder), recurrent episode, mild ([facility] [address] )  F33 0    2  JOSEFINA (generalized anxiety disorder)  F41 1    3  Obsessive-compulsive disorder, unspecified type  F42 9    4  PTSD (post-traumatic stress disorder)  F43 10        Goals addressed in session: Goal 1 and Goal 2     Current suicide risk : Low     D: Clinician met with David Godinez in person for individual therapy  David Godinez reports concerns with increased skin picking and desire to work on strategies to decrease skin picking  Clinician explored specifics on skin picking and attempts to decrease or manage behavior in the past  She denies any success with managing these symptom in the past but was able to have self awareness that the symptoms do increase during times of high stress  Clinician processed through the thoughts process that occurs when she begins to pick and ways to attempt to replace the picking behavior  Clinician also suggested reaching out to a dermatologist  to consult on skin care to assist with this as well as other distractive coping skills to help replace picking  David Godinez was open to this and explored other stressors in life currently attributing to increased picking  A: David Godinez presented with stable mood and affect and was engaged in the session  She reports continued follow through with journaling and increased coping skills  P: Continue to meet with David Godinez every other week for individual therapy  Behavioral Health Treatment Plan [facility]: Diagnosis and Treatment Plan explained to Keren Cortez relates understanding diagnosis and is agreeable to Treatment Plan   Yes

## 2022-08-26 ENCOUNTER — OFFICE VISIT (OUTPATIENT)
Dept: INTERNAL MEDICINE CLINIC | Facility: CLINIC | Age: 62
End: 2022-08-26
Payer: COMMERCIAL

## 2022-08-26 VITALS
HEIGHT: 69 IN | SYSTOLIC BLOOD PRESSURE: 138 MMHG | RESPIRATION RATE: 16 BRPM | BODY MASS INDEX: 22.39 KG/M2 | TEMPERATURE: 97.6 F | OXYGEN SATURATION: 97 % | DIASTOLIC BLOOD PRESSURE: 80 MMHG | WEIGHT: 151.2 LBS | HEART RATE: 68 BPM

## 2022-08-26 DIAGNOSIS — R09.82 POST-NASAL DRIP: ICD-10-CM

## 2022-08-26 DIAGNOSIS — L23.7 POISON IVY DERMATITIS: Primary | ICD-10-CM

## 2022-08-26 DIAGNOSIS — H92.02 LEFT EAR PAIN: ICD-10-CM

## 2022-08-26 DIAGNOSIS — B35.3 TINEA PEDIS OF LEFT FOOT: ICD-10-CM

## 2022-08-26 PROCEDURE — 99214 OFFICE O/P EST MOD 30 MIN: CPT | Performed by: INTERNAL MEDICINE

## 2022-08-26 RX ORDER — CLOTRIMAZOLE AND BETAMETHASONE DIPROPIONATE 10; .64 MG/G; MG/G
CREAM TOPICAL 2 TIMES DAILY
Qty: 45 G | Refills: 2 | Status: SHIPPED | OUTPATIENT
Start: 2022-08-26

## 2022-08-26 RX ORDER — MOMETASONE FUROATE 50 UG/1
2 SPRAY, METERED NASAL DAILY
Qty: 17 G | Refills: 0 | Status: SHIPPED | OUTPATIENT
Start: 2022-08-26 | End: 2022-09-12

## 2022-08-26 RX ORDER — METHYLPREDNISOLONE 4 MG/1
TABLET ORAL
Qty: 21 EACH | Refills: 0 | Status: SHIPPED | OUTPATIENT
Start: 2022-08-26 | End: 2022-09-02

## 2022-08-26 NOTE — PROGRESS NOTES
Assessment/Plan:    #Tinea Pedis  -pain with sharp pain and itching in 2nd left toe  -XR without issue aside from arthritis in 1st MTP  -will start on clotrimazole betamethasone due to skin fissure there    #Poison Ivy  -breakout on hands, forearms and groin after clearing brush at boyfriend's house  -will start on medrol dose pack    #PND  -chronic and continues to persist despite flonase  -will trial mometasone  -to see ENT if symptoms persist  -had 2022 septoplasty  -will trial OT childrens allergy medication to reduce fatigue but help with allergies    #Left Ear Pain  -otoscope examination today without issue  -patient reports pain over left lower ear but now improving  -denies water into the ear  -provided reassurance    #Pain over Dorsal Foot   -noted in right foot  -has history of chronic regional pain syndrome and is likely flaring up  -will try voltaren gel    No problem-specific Assessment & Plan notes found for this encounter         Diagnoses and all orders for this visit:    Poison ivy dermatitis  -     methylPREDNISolone 4 MG tablet therapy pack; Use as directed on package    Post-nasal drip  -     mometasone (NASONEX) 50 mcg/act nasal spray; 2 sprays into each nostril daily    Tinea pedis of left foot  -     clotrimazole-betamethasone (LOTRISONE) 1-0 05 % cream; Apply topically 2 (two) times a day    Left ear pain            Current Outpatient Medications:     Cholecalciferol (VITAMIN D3) 2000 UNITS TABS, Take 2,000 Units by mouth daily  , Disp: , Rfl:     clotrimazole-betamethasone (LOTRISONE) 1-0 05 % cream, Apply topically 2 (two) times a day, Disp: 45 g, Rfl: 2    diclofenac sodium (VOLTAREN) 1 %, APPLY 2 GRAMS TO THE AFFECTED AREA(S) BY TOPICAL ROUTE 4 TIMES PER DAY, Disp: , Rfl: 3    fenofibrate (TRICOR) 54 MG tablet, , Disp: , Rfl:     FLUoxetine (PROzac) 20 MG tablet, TAKE 3 TABLETS BY MOUTH EVERY DAY, Disp: 270 tablet, Rfl: 1    hydrOXYzine HCL (ATARAX) 25 mg tablet, TAKE 1 TO 2 TABLETS BY MOUTH EVERY 6 HOURS AS NEEDED FOR ANXIETY (MAX 4TABS/DAY), Disp: 360 tablet, Rfl: 1    ibuprofen (MOTRIN) 600 mg tablet, TAKE 1 TABLET BY MOUTH EVERY 6 HOURS AS NEEDED FOR MODERATE PAIN , Disp: 120 tablet, Rfl: 2    lidocaine (LIDODERM) 5 %, lidocaine 5 % topical patch  APPLY 1 PATCH TO AFFECTED AREA FOR 12 HOURS A DAY & REMOVE FOR 12 HOURS BEFORE APPLYING NEXT PATCH  (12 HOURS ON, 12 HOURS OFF), Disp: , Rfl:     methylPREDNISolone 4 MG tablet therapy pack, Use as directed on package, Disp: 21 each, Rfl: 0    mometasone (NASONEX) 50 mcg/act nasal spray, 2 sprays into each nostril daily, Disp: 17 g, Rfl: 0    oxyCODONE (ROXICODONE) 10 MG TABS, Take 1 tablet (10 mg total) by mouth every 6 (six) hours as needed for severe pain (as needed but do not take together with tramadol) Max Daily Amount: 40 mg, Disp: 30 tablet, Rfl: 0    traMADol (Ultram) 50 mg tablet, Take 1 tablet (50 mg total) by mouth every 8 (eight) hours as needed for moderate pain, Disp: 30 tablet, Rfl: 0    traZODone (DESYREL) 50 mg tablet, Take 0 5-1 tablets (25-50 mg total) by mouth daily at bedtime as needed for sleep, Disp: 30 tablet, Rfl: 2    triamcinolone (KENALOG) 0 5 % cream, Apply topically 3 (three) times a day, Disp: 60 g, Rfl: 1    triamterene-hydrochlorothiazide (MAXZIDE-25) 37 5-25 mg per tablet, TAKE 1 TABLET BY MOUTH EVERY DAY, Disp: 90 tablet, Rfl: 4    ursodiol (ACTIGALL) 300 mg capsule, TAKE 1 CAPSULE BY MOUTH THREE TIMES A DAY, Disp: 270 capsule, Rfl: 2    valACYclovir (VALTREX) 500 mg tablet, TAKE 2 TABLETS BY MOUTH EVERY DAY, Disp: 180 tablet, Rfl: 0    Subjective:      Patient ID: Nasim Veliz is a 57 y o  female  HPI    Patient presents for an acute visit  Reports that she has been having swelling in her left 2nd toe for the past several weeks  She performed an x-ray that was unremarkable  She denies any trauma to the area  She states that she feels a stabbing sensation with itching at times    There is an area of skin fissure along with tinea pedis  We will treat her with clotrimazole betamethasone  Patient was also noted to have poison ivy dermatitis on her hands as well as her forearms and her groin area  We will start her on a Medrol Dosepak  She has been drip putting triamcinolone on the areas without any relief  She also has postnasal drip with nasal congestion  She has tried Flonase without much relief  We will start her on mometasone to see if this will help  If her symptoms continue to persist she will need to follow-up with ENT  She also has left ear pressure and pain  Otoscope examination did not reveal any abnormalities today  There is no point tenderness on the outside at this time  She reports that her symptoms are improving  We will continue to monitor this  She also has right foot dorsal surface pain  She has chronic regional pain syndrome and that lower extremity  Recommended that she apply Voltaren gel to the area  The following portions of the patient's history were reviewed and updated as appropriate: allergies, current medications, past family history, past medical history, past social history, past surgical history and problem list     Review of Systems   Constitutional: Negative for activity change, appetite change, chills, diaphoresis, fatigue and fever  HENT: Positive for ear pain (left ear), postnasal drip and rhinorrhea  Negative for congestion, sore throat and trouble swallowing  Respiratory: Negative for cough and shortness of breath  Cardiovascular: Negative for chest pain and palpitations  Gastrointestinal: Negative for abdominal pain, constipation, diarrhea, nausea and vomiting  Musculoskeletal: Positive for arthralgias (left 2nd toe pain, right foot pain)  Negative for back pain and myalgias  Skin: Positive for rash (poison ivy)  Neurological: Negative for dizziness and headaches           Objective:      /80 (BP Location: Left arm, Patient Position: Sitting, Cuff Size: Standard)   Pulse 68   Temp 97 6 °F (36 4 °C) (Tympanic)   Resp 16   Ht 5' 9" (1 753 m)   Wt 68 6 kg (151 lb 3 2 oz)   LMP  (LMP Unknown)   SpO2 97%   BMI 22 33 kg/m²          Physical Exam  Constitutional:       General: She is not in acute distress  Appearance: She is well-developed  She is not diaphoretic  HENT:      Head: Normocephalic and atraumatic  Right Ear: Tympanic membrane, ear canal and external ear normal  There is no impacted cerumen  Left Ear: Tympanic membrane, ear canal and external ear normal  There is no impacted cerumen  Nose: Rhinorrhea present  Mouth/Throat:      Pharynx: Oropharynx is clear  No oropharyngeal exudate or posterior oropharyngeal erythema  Eyes:      Conjunctiva/sclera: Conjunctivae normal       Pupils: Pupils are equal, round, and reactive to light  Neck:      Vascular: No JVD  Trachea: No tracheal deviation  Pulmonary:      Effort: Pulmonary effort is normal  No respiratory distress  Musculoskeletal:         General: Tenderness (left 2nd toe with tinea pedis and skin fissure) present  No deformity  Normal range of motion  Cervical back: Normal range of motion and neck supple  Right lower leg: No edema  Left lower leg: No edema  Skin:     General: Skin is warm and dry  Findings: Rash (poison ivy on right hand, arms, groin) present  No bruising, erythema or lesion  Neurological:      Mental Status: She is alert and oriented to person, place, and time  This note was completed in part utilizing m-TUBE fluency direct voice recognition software  Grammatical errors, random word insertion, spelling mistakes, and incomplete sentences may be an occasional consequence of the system secondary to software limitations, ambient noise and hardware issues  At the time of dictation, efforts were made to edit, clarify and /or correct errors    Please read the chart carefully and recognize, using context, where substitutions have occurred  If you have any questions or concerns about the context, text or information contained within the body of this dictation, please contact myself, the provider, for further clarification

## 2022-09-02 ENCOUNTER — TELEMEDICINE (OUTPATIENT)
Dept: PSYCHIATRY | Facility: CLINIC | Age: 62
End: 2022-09-02
Payer: COMMERCIAL

## 2022-09-02 DIAGNOSIS — F43.10 PTSD (POST-TRAUMATIC STRESS DISORDER): ICD-10-CM

## 2022-09-02 DIAGNOSIS — F42.9 OBSESSIVE-COMPULSIVE DISORDER, UNSPECIFIED TYPE: ICD-10-CM

## 2022-09-02 DIAGNOSIS — F41.1 GAD (GENERALIZED ANXIETY DISORDER): ICD-10-CM

## 2022-09-02 DIAGNOSIS — F33.0 MDD (MAJOR DEPRESSIVE DISORDER), RECURRENT EPISODE, MILD (HCC): ICD-10-CM

## 2022-09-02 DIAGNOSIS — L23.7 POISON IVY DERMATITIS: ICD-10-CM

## 2022-09-02 PROCEDURE — 99214 OFFICE O/P EST MOD 30 MIN: CPT | Performed by: PSYCHIATRY & NEUROLOGY

## 2022-09-02 RX ORDER — FLUOXETINE 20 MG/1
40 TABLET, FILM COATED ORAL DAILY
Qty: 180 TABLET | Refills: 1 | Status: SHIPPED | OUTPATIENT
Start: 2022-09-02 | End: 2022-10-04

## 2022-09-02 RX ORDER — HYDROXYZINE HYDROCHLORIDE 25 MG/1
25-50 TABLET, FILM COATED ORAL 3 TIMES DAILY PRN
Qty: 180 TABLET | Refills: 1 | Status: SHIPPED | OUTPATIENT
Start: 2022-09-02 | End: 2022-10-04

## 2022-09-02 RX ORDER — TRAZODONE HYDROCHLORIDE 50 MG/1
25-50 TABLET ORAL
Qty: 30 TABLET | Refills: 2 | Status: SHIPPED | OUTPATIENT
Start: 2022-09-02 | End: 2022-10-04
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
## 2022-09-06 ENCOUNTER — SOCIAL WORK (OUTPATIENT)
Dept: BEHAVIORAL/MENTAL HEALTH CLINIC | Facility: CLINIC | Age: 62
End: 2022-09-06
Payer: COMMERCIAL

## 2022-09-06 DIAGNOSIS — F42.9 OBSESSIVE-COMPULSIVE DISORDER, UNSPECIFIED TYPE: ICD-10-CM

## 2022-09-06 DIAGNOSIS — F41.1 GAD (GENERALIZED ANXIETY DISORDER): ICD-10-CM

## 2022-09-06 DIAGNOSIS — F33.0 MDD (MAJOR DEPRESSIVE DISORDER), RECURRENT EPISODE, MILD (HCC): Primary | ICD-10-CM

## 2022-09-06 DIAGNOSIS — F43.10 PTSD (POST-TRAUMATIC STRESS DISORDER): ICD-10-CM

## 2022-09-06 PROCEDURE — 90834 PSYTX W PT 45 MINUTES: CPT | Performed by: COUNSELOR

## 2022-09-06 NOTE — PSYCH
Psychotherapy Provided: Individual Psychotherapy 55 minutes     Length of time in session: 55 minutes, follow up in 2 week    Encounter Diagnosis     ICD-10-CM    1  MDD (major depressive disorder), recurrent episode, mild ([facility] [address] )  F33 0    2  JOSEFINA (generalized anxiety disorder)  F41 1    3  Obsessive-compulsive disorder, unspecified type  F42 9    4  PTSD (post-traumatic stress disorder)  F43 10        Goals addressed in session: Goal 1 and Goal 2     Current suicide risk : Low     D: Clinician met with Megan Triana in person for individual therapy  Megan Triana processed relationship and communication with paramour  Clinician explored healthy and unhealthy characteristics in relationship and working towards more assertive communication of needs  Clinician discussed ways to express needs and utilize open communication along with active listening skills to strengthen laura  Megan Triana reports that she is unsure about future plans with paramour and would like to work on being more decisive with her decision making  Overall she reports healthy level of compromise but wanting to work on this within the relationship  Clinician discussed ways to do so    A: Megan Triana presented with stable mood and affect and was engaged in the session  She reports desire to work on goal setting and decision making and clinician provided her with worksheets to work on this for homework  P: Continue to meet with Megan Triana every other week for individual therapy  Behavioral Health Treatment Plan [facility]: Diagnosis and Treatment Plan explained to Keyla Sparks relates understanding diagnosis and is agreeable to Treatment Plan   Yes

## 2022-09-09 ENCOUNTER — TELEPHONE (OUTPATIENT)
Dept: OBGYN CLINIC | Facility: CLINIC | Age: 62
End: 2022-09-09

## 2022-09-09 NOTE — TELEPHONE ENCOUNTER
Pt had unsuccessful intercourse earlier this wk & having spotting since (1 pantiliner/day)  Feels "dry & tight" even with lubrication  She is using largest dilator in kit but feels this is too small  Do you want her to purchase larger dilators? She will be on vacation 9/14/2022 x 1 wk

## 2022-09-09 NOTE — TELEPHONE ENCOUNTER
Patient call- attempted intercourse and due to vaginal dryness and stenosis experienced discomfort and now has some    spotting  She has used lubrication and vaginal dilators as recommended  Patient is leaving for vacation and is wondering if there is anything she needs to do regarding spotting    Please call

## 2022-09-12 DIAGNOSIS — L23.7 POISON IVY DERMATITIS: ICD-10-CM

## 2022-09-12 RX ORDER — TRIAMCINOLONE ACETONIDE 5 MG/G
CREAM TOPICAL
Qty: 60 G | Refills: 1 | Status: SHIPPED | OUTPATIENT
Start: 2022-09-12

## 2022-09-12 NOTE — TELEPHONE ENCOUNTER
Pt informed of KA's recom  She has been using hyalo-gyn vag moisturizer (30 gm tube is $30)  appt scheduled

## 2022-09-26 ENCOUNTER — TELEPHONE (OUTPATIENT)
Dept: INTERNAL MEDICINE CLINIC | Facility: CLINIC | Age: 62
End: 2022-09-26

## 2022-09-26 DIAGNOSIS — J06.9 UPPER RESPIRATORY TRACT INFECTION, UNSPECIFIED TYPE: Primary | ICD-10-CM

## 2022-09-26 RX ORDER — AZITHROMYCIN 250 MG/1
TABLET, FILM COATED ORAL
Qty: 6 TABLET | Refills: 0 | Status: SHIPPED | OUTPATIENT
Start: 2022-09-26 | End: 2022-10-01

## 2022-09-26 NOTE — TELEPHONE ENCOUNTER
Azithromycin is sent into the pharmacy, she should come in later this week if she still has symptoms

## 2022-09-26 NOTE — TELEPHONE ENCOUNTER
Collette Jurado called and stated having symptoms yesterday of bronchitis, sinus drainage, chills all night, coughing, achiness, chest hurts , also had bladder infections symptoms for a few weeks - frequency and burning  Please advise        [phone]

## 2022-09-29 ENCOUNTER — TELEMEDICINE (OUTPATIENT)
Dept: INTERNAL MEDICINE CLINIC | Facility: CLINIC | Age: 62
End: 2022-09-29
Payer: COMMERCIAL

## 2022-09-29 DIAGNOSIS — U07.1 COVID-19: Primary | ICD-10-CM

## 2022-09-29 PROCEDURE — G2012 BRIEF CHECK IN BY MD/QHP: HCPCS | Performed by: INTERNAL MEDICINE

## 2022-09-29 RX ORDER — NIRMATRELVIR AND RITONAVIR 300-100 MG
3 KIT ORAL 2 TIMES DAILY
Qty: 30 TABLET | Refills: 0 | Status: SHIPPED | OUTPATIENT
Start: 2022-09-29 | End: 2022-10-04

## 2022-09-29 NOTE — PROGRESS NOTES
COVID-19 Outpatient Progress Note    Assessment/Plan:    Problem List Items Addressed This Visit    None     Visit Diagnoses     COVID-19    -  Primary    Relevant Medications    nirmatrelvir & ritonavir (Paxlovid, 300/100,) tablet therapy pack        Patient presents for an acute visit  Reports that she started to develop symptoms on Monday night  She stopped that she had diffuse body aches as well as pains  She has felt pressure in her ears as well as a headache and chills and a dry cough  She vomited once on Monday  She did not do a home COVID test until today  It came back positive  She has been taking azithromycin without much relief  At this time we discussed Paxlovid therapy and she will start on it  She will quarantine for an additional day and wear a face mask whenever she goes out for the next 5 days after that  Disposition:     Patient has asymptomatic or mild COVID-19 infection  Based off CDC guidelines, they were recommended to isolate for 5 days  If they are asymptomatic or symptoms are improving with no fevers in the past 24 hours, isolation may be ended followed by 5 days of wearing a mask when around othes to minimize risk of infecting others  If still have a fever or other symptoms have not improved, continue to isolate until they improve  Regardless of when they end isolation, avoid being around people who are more likely to get very sick from COVID-19 until at least day 11  Patient with moderate or severe illness or has a weakened immune system  They should isolate from others through at least day 10  Isolation can be ended if symptoms are improving and they are fever free for the past 24 hours  If they still have fever or other symptoms have not improved, continue to isolate until they improve  Regardless of when you isolation is ended, avoid being around people who are more likely to get very sick from COVID-19 until at least day 11       Patient meets criteria for PAXLOVID and they have been counseled appropriately according to EUA documentation released by the FDA  After discussion, patient agrees to treatment  Larita Blizzard is an investigational medicine used to treat mild-to-moderate COVID-19 in adults and children (15years of age and older weighing at least 80 pounds (40 kg)) with positive results of direct SARS-CoV-2 viral testing, and who are at high risk for progression to severe COVID-19, including hospitalization or death  PAXLOVID is investigational because it is still being studied  There is limited information about the safety and effectiveness of using PAXLOVID to treat people with mild-to-moderate COVID-19  The FDA has authorized the emergency use of PAXLOVID for the treatment of mild-tomoderate COVID-19 in adults and children (15years of age and older weighing at least 80 pounds (40 kg)) with a positive test for the virus that causes COVID-19, and who are at high risk for progression to severe COVID-19, including hospitalization or death, under an EUA  What should I tell my healthcare provider before I take PAXLOVID? Tell your healthcare provider if you:  - Have any allergies  - Have liver or kidney disease  - Are pregnant or plan to become pregnant  - Are breastfeeding a child  - Have any serious illnesses    Tell your healthcare provider about all the medicines you take, including prescription and over-the-counter medicines, vitamins, and herbal supplements  Some medicines may interact with PAXLOVID and may cause serious side effects  Keep a list of your medicines to show your healthcare provider and pharmacist when you get a new medicine  You can ask your healthcare provider or pharmacist for a list of medicines that interact with PAXLOVID  Do not start taking a new medicine without telling your healthcare provider  Your healthcare provider can tell you if it is safe to take PAXLOVID with other medicines      Tell your healthcare provider if you are taking combined hormonal contraceptive  PAXLOVID may affect how your birth control pills work  Females who are able to become pregnant should use another effective alternative form of contraception or an additional barrier method of contraception  Talk to your healthcare provider if you have any questions about contraceptive methods that might be right for you  How do I take PAXLOVID? PAXLOVID consists of 2 medicines: nirmatrelvir and ritonavir  - Take 2 pink tablets of nirmatrelvir with 1 white tablet of ritonavir by mouth 2 times each day (in the morning and in the evening) for 5 days  For each dose, take all 3 tablets at the same time  - If you have kidney disease, talk to your healthcare provider  You may need a different dose  - Swallow the tablets whole  Do not chew, break, or crush the tablets  - Take PAXLOVID with or without food  - Do not stop taking PAXLOVID without talking to your healthcare provider, even if you feel better  - If you miss a dose of PAXLOVID within 8 hours of the time it is usually taken, take it as soon as you remember  If you miss a dose by more than 8 hours, skip the missed dose and take the next dose at your regular time  Do not take 2 doses of PAXLOVID at the same time  - If you take too much PAXLOVID, call your healthcare provider or go to the nearest hospital emergency room right away  - If you are taking a ritonavir- or cobicistat-containing medicine to treat hepatitis C or Human Immunodeficiency Virus (HIV), you should continue to take your medicine as prescribed by your healthcare provider   - Talk to your healthcare provider if you do not feel better or if you feel worse after 5 days  Who should generally not take PAXLOVID? Do not take PAXLOVID if:  You are allergic to nirmatrelvir, ritonavir, or any of the ingredients in PAXLOVID      You are taking any of the following medicines:  - Alfuzosin  - Pethidine, piroxicam, propoxyphene  - Ranolazine  - Amiodarone, dronedarone, flecainide, propafenone, quinidine  - Colchicine  - Lurasidone, pimozide, clozapine  - Dihydroergotamine, ergotamine, methylergonovine  - Lovastatin, simvastatin  - Sildenafil (Revatio®) for pulmonary arterial hypertension (PAH)  - Triazolam, oral midazolam  - Apalutamide  - Carbamazepine, phenobarbital, phenytoin  - Rifampin  - St  Cyruss Wort (hypericum perforatum)    What are the important possible side effects of PAXLOVID? Possible side effects of PAXLOVID are:  - Liver Problems  Tell your healthcare provider right away if you have any of these signs and symptoms of liver problems: loss of appetite, yellowing of your skin and the whites of eyes (jaundice), dark-colored urine, pale colored stools and itchy skin, stomach area (abdominal) pain  - Resistance to HIV Medicines  If you have untreated HIV infection, PAXLOVID may lead to some HIV medicines not working as well in the future  - Other possible side effects include: altered sense of taste, diarrhea, high blood pressure, or muscle aches    These are not all the possible side effects of PAXLOVID  Not many people have taken PAXLOVID  Serious and unexpected side effects may happen  Vaishali Arcos is still being studied, so it is possible that all of the risks are not known at this time  What other treatment choices are there? Like Betty Fails may allow for the emergency use of other medicines to treat people with COVID-19  Go to https://[employer]/ for information on the emergency use of other medicines that are authorized by FDA to treat people with COVID-19  Your healthcare provider may talk with you about clinical trials for which you may be eligible  It is your choice to be treated or not to be treated with PAXLOVID   Should you decide not to receive it or for your child not to receive it, it will not change your standard medical care     What if I am pregnant or breastfeeding? There is no experience treating pregnant women or breastfeeding mothers with PAXLOVID  For a mother and unborn baby, the benefit of taking PAXLOVID may be greater than the risk from the treatment  If you are pregnant, discuss your options and specific situation with your healthcare provider  It is recommended that you use effective barrier contraception or do not have sexual activity while taking PAXLOVID  If you are breastfeeding, discuss your options and specific situation with your healthcare provider  How do I report side effects with PAXLOVID? Contact your healthcare provider if you have any side effects that bother you or do not go away  Report side effects to FDA MedWatch at www fda gov/medwatch or call 0-457-EYB0780 or you can report side effects to [employer]  at the contact information provided below  Website Fax number Telephone number   [employer] [phone]     How should I store Lucero Johnston? Store PAXLOVID tablets at room temperature between 68°F to 77°F (20°C to 25°C)  Full fact sheet for patients, parents, and caregivers can be found at: [employer]    I have spent 10 minutes directly with the patient  Greater than 50% of this time was spent in counseling/coordination of care regarding: diagnostic results, prognosis, risks and benefits of treatment options, instructions for management, patient and family education, importance of treatment compliance, risk factor reductions and impressions         Encounter provider: Minor Mora DO     Provider located at: [address] [place] [address]  [address]  [phone]     Recent Visits  Date Type Provider Dept   09/26/22 Telephone Cleve Bruner [facility] Med   Showing recent visits within past 7 days and meeting all other requirements  Future Appointments  No visits were found meeting these conditions  Showing future appointments within next 150 days and meeting all other requirements     This virtual check-in was done via telephone and she agrees to proceed  Patient agrees to participate in a virtual check in via telephone or video visit instead of presenting to the office to address urgent/immediate medical needs  Patient is aware this is a billable service  She acknowledged consent and understanding of privacy and security of the video platform  The patient has agreed to participate and understands they can discontinue the visit at any time  After connecting through Telephone, the patient was identified by name and date of birth  Alexandrea Choudhury was informed that this was a telemedicine visit and that the exam was being conducted confidentially over secure lines  My office door was closed  No one else was in the room  Alexandrea Choudhury acknowledged consent and understanding of privacy and security of the telemedicine visit  I informed the patient that I have reviewed her record in Epic and presented the opportunity for her to ask any questions regarding the visit today  The patient agreed to participate  It was my intent to perform this visit via video technology but the patient was not able to do a video connection so the visit was completed via audio telephone only  Verification of patient location:  Patient is located in the following state in which I hold an active license: [place]    Subjective:   Alexandrea Choudhury is a 57 y o  female who has been screened for COVID-19  Symptom change since last report: unchanged  Patient's symptoms include chills, nasal congestion, cough and vomiting  Patient denies fever, fatigue, malaise, rhinorrhea, sore throat, anosmia, loss of taste, shortness of breath, chest tightness, abdominal pain, nausea, diarrhea, myalgias and headaches       - Date of symptom onset: 9/26/2022  - Date of positive COVID-19 test: 9/29/2022  Type of test: Home antigen  Patient with typical symptoms of COVID-19 and they attest that they were positive on home rapid antigen testing  Image of positive result is not able to be uploaded into their chart  COVID-19 vaccination status: Fully vaccinated with booster    Kong Simmons has been staying home and has isolated themselves in her home  She is taking care to not share personal items and is cleaning all surfaces that are touched often, like counters, tabletops, and doorknobs using household cleaning sprays or wipes  She is wearing a mask when she leaves her room  Lab Results   Component Value Date    SARSCOV2 Not Detected 03/18/2022    SARSCOV2 Negative 12/17/2021       Review of Systems   Constitutional: Positive for chills  Negative for fatigue and fever  HENT: Positive for congestion  Negative for rhinorrhea and sore throat  Respiratory: Positive for cough  Negative for chest tightness and shortness of breath  Gastrointestinal: Positive for vomiting  Negative for abdominal pain, diarrhea and nausea  Musculoskeletal: Negative for myalgias  Neurological: Negative for headaches  Current Outpatient Medications on File Prior to Visit   Medication Sig    azithromycin (Zithromax) 250 mg tablet Take 2 tablets (500 mg total) by mouth daily for 1 day, THEN 1 tablet (250 mg total) daily for 4 days      Cholecalciferol (VITAMIN D3) 2000 UNITS TABS Take 2,000 Units by mouth daily      clotrimazole-betamethasone (LOTRISONE) 1-0 05 % cream Apply topically 2 (two) times a day    diclofenac sodium (VOLTAREN) 1 % APPLY 2 GRAMS TO THE AFFECTED AREA(S) BY TOPICAL ROUTE 4 TIMES PER DAY    fenofibrate (TRICOR) 54 MG tablet     FLUoxetine (PROzac) 20 MG tablet Take 2 tablets (40 mg total) by mouth daily    hydrOXYzine HCL (ATARAX) 25 mg tablet Take 1-2 tablets (25-50 mg total) by mouth 3 (three) times a day as needed for itching or anxiety (Do not exceed 100mg/day)    ibuprofen (MOTRIN) 600 mg tablet TAKE 1 TABLET BY MOUTH EVERY 6 HOURS AS NEEDED FOR MODERATE PAIN   lidocaine (LIDODERM) 5 % lidocaine 5 % topical patch   APPLY 1 PATCH TO AFFECTED AREA FOR 12 HOURS A DAY & REMOVE FOR 12 HOURS BEFORE APPLYING NEXT PATCH   (12 HOURS ON, 12 HOURS OFF)    mometasone (NASONEX) 50 mcg/act nasal spray SPRAY 2 SPRAYS INTO EACH NOSTRIL EVERY DAY    oxyCODONE (ROXICODONE) 10 MG TABS Take 1 tablet (10 mg total) by mouth every 6 (six) hours as needed for severe pain (as needed but do not take together with tramadol) Max Daily Amount: 40 mg    traMADol (Ultram) 50 mg tablet Take 1 tablet (50 mg total) by mouth every 8 (eight) hours as needed for moderate pain    traZODone (DESYREL) 50 mg tablet Take 0 5-1 tablets (25-50 mg total) by mouth daily at bedtime as needed for sleep    triamcinolone (KENALOG) 0 5 % cream APPLY TO AFFECTED AREA 3 TIMES A DAY    triamterene-hydrochlorothiazide (MAXZIDE-25) 37 5-25 mg per tablet TAKE 1 TABLET BY MOUTH EVERY DAY    ursodiol (ACTIGALL) 300 mg capsule TAKE 1 CAPSULE BY MOUTH THREE TIMES A DAY    valACYclovir (VALTREX) 500 mg tablet TAKE 2 TABLETS BY MOUTH EVERY DAY       Objective:    LMP  (LMP Unknown)      Physical Exam  Kendell Eastman DO

## 2022-10-03 ENCOUNTER — OFFICE VISIT (OUTPATIENT)
Dept: OBGYN CLINIC | Facility: CLINIC | Age: 62
End: 2022-10-03
Payer: COMMERCIAL

## 2022-10-03 VITALS
SYSTOLIC BLOOD PRESSURE: 122 MMHG | BODY MASS INDEX: 22.36 KG/M2 | WEIGHT: 151 LBS | HEIGHT: 69 IN | DIASTOLIC BLOOD PRESSURE: 70 MMHG

## 2022-10-03 DIAGNOSIS — N94.10 DYSPAREUNIA, FEMALE: Primary | ICD-10-CM

## 2022-10-03 DIAGNOSIS — K74.3 PRIMARY BILIARY CHOLANGITIS (HCC): Chronic | ICD-10-CM

## 2022-10-03 DIAGNOSIS — N95.2 VAGINAL ATROPHY: ICD-10-CM

## 2022-10-03 PROCEDURE — 99214 OFFICE O/P EST MOD 30 MIN: CPT | Performed by: OBSTETRICS & GYNECOLOGY

## 2022-10-03 RX ORDER — ESTRADIOL 10 UG/1
1 INSERT VAGINAL 2 TIMES WEEKLY
Qty: 8 TABLET | Refills: 2 | Status: SHIPPED | OUTPATIENT
Start: 2022-10-03 | End: 2022-10-23

## 2022-10-03 NOTE — PROGRESS NOTES
Assessment/Plan:  Discussed treatment options including over-the-counter vaginal moisturizer 2 times per week  She does express concerns regarding expense of products  We discussed vaginal estrogen, risks and benefits reviewed with minimal systemic absorption  Her liver enzymes are just slightly elevated  She will start Vagifem 2 times intravaginally per week  She will have her liver enzymes checked in approximately 4 months  We also discussed trying a different vaginal moisturizer  I also emphasize use of vaginal dilators at least 2 times per week/30 minute sessions  If there is minimal improvement, discussed possibly referring to pelvic floor physical therapy  All questions answered at this time  She will return to office 02/2023 for annual visit as well as follow-up or p r n  No problem-specific Assessment & Plan notes found for this encounter  Diagnoses and all orders for this visit:    Dyspareunia, female  -     estradiol (Vagifem) 10 MCG TABS vaginal tablet; Insert 1 tablet (10 mcg total) into the vagina 2 (two) times a week    Vaginal atrophy  -     estradiol (Vagifem) 10 MCG TABS vaginal tablet; Insert 1 tablet (10 mcg total) into the vagina 2 (two) times a week    Primary biliary cholangitis (HCC)          Subjective:      Patient ID: Saida Solano is a 57 y o  female  HPI     This is a pleasant 24-year-old female G0 who presents complaining of discomfort with intercourse, vaginal dryness, difficulty with penetration  She does have a very supportive partner (working with physician for erectile dysfunction)  They have been together for approximately 1 year  He has now started using Viagra  She has been using vaginal moisturizer and lubricant intermittently  She has also been using vaginal dilators intermittently      The following portions of the patient's history were reviewed and updated as appropriate: allergies, current medications, past family history, past medical history, past social history, past surgical history and problem list     Review of Systems   Constitutional: Negative for fatigue, fever and unexpected weight change  Respiratory: Negative for cough, chest tightness, shortness of breath and wheezing  Cardiovascular: Negative  Negative for chest pain and palpitations  Gastrointestinal: Negative  Negative for abdominal distention, abdominal pain, blood in stool, constipation, diarrhea, nausea and vomiting  Genitourinary: Positive for dyspareunia  Negative for difficulty urinating, dysuria, flank pain, frequency, genital sores, hematuria, pelvic pain, urgency, vaginal bleeding, vaginal discharge and vaginal pain  Skin: Negative for rash  Objective:      /70   Ht 5' 9" (1 753 m)   Wt 68 5 kg (151 lb)   LMP  (LMP Unknown)   BMI 22 30 kg/m²          Physical Exam  Constitutional:       Appearance: Normal appearance  Pulmonary:      Effort: Pulmonary effort is normal    Abdominal:      General: There is no distension  Tenderness: There is no abdominal tenderness  There is no guarding  Genitourinary:     Labia:         Right: No rash, tenderness or lesion  Left: No rash, tenderness or lesion  Vagina: No signs of injury  No vaginal discharge, tenderness or lesions  Cervix: No cervical motion tenderness, discharge, friability, lesion, erythema or cervical bleeding  Neurological:      Mental Status: She is alert and oriented to person, place, and time  Psychiatric:         Behavior: Behavior normal        external genitalia is within normal limits  The vagina is evident of estrogen deficiency  Cervix is small nulliparous  No pelvic floor prolapse  I have spent 30 minutes with Patient  today in which greater than 50% of this time was spent in counseling/coordination of care regarding Risks and benefits of tx options

## 2022-10-04 ENCOUNTER — SOCIAL WORK (OUTPATIENT)
Dept: BEHAVIORAL/MENTAL HEALTH CLINIC | Facility: CLINIC | Age: 62
End: 2022-10-04
Payer: COMMERCIAL

## 2022-10-04 DIAGNOSIS — F33.0 MDD (MAJOR DEPRESSIVE DISORDER), RECURRENT EPISODE, MILD (HCC): Primary | ICD-10-CM

## 2022-10-04 DIAGNOSIS — F43.10 PTSD (POST-TRAUMATIC STRESS DISORDER): ICD-10-CM

## 2022-10-04 DIAGNOSIS — F42.9 OBSESSIVE-COMPULSIVE DISORDER, UNSPECIFIED TYPE: ICD-10-CM

## 2022-10-04 DIAGNOSIS — F41.1 GAD (GENERALIZED ANXIETY DISORDER): ICD-10-CM

## 2022-10-04 PROCEDURE — 90834 PSYTX W PT 45 MINUTES: CPT | Performed by: COUNSELOR

## 2022-10-04 NOTE — BH TREATMENT PLAN
Rosina Sheikh  1960         Date of Initial Treatment Plan: 5/11/21   Date of Current Treatment Plan: 10/4/22     Treatment Plan Number 4     Strengths/Personal Resources for Self Care: Courtney reports that [facility] is a good support to her  She denies any close friends or family as support  Alvaro Perez is motivated and engaged in treatment and willing to work on mental health wellbeing   Alvaro Perez has been working on healthy relationships and communication skills       Diagnosis:   1  MDD (major depressive disorder), recurrent episode, mild ([facility] [address] )      2  JOSEFINA (generalized anxiety disorder)      3  Obsessive-compulsive disorder, unspecified type            Area of Needs: Processing past relationships and boundaries in current relationship and work on decreasing symptoms of OCD  Work on resolving past emotions with mother  Decrease anxiety overall          Long Term Goal 1: Process past relationship in order to clarify feelings and work on better boundaries     Target Date:3/2/23  Completion Date:4/2/23         Short Term Objectives for Goal 1: 1   Process difference between healhty and unhealhty boundaries and 2  Process feelings about past relationships and work on healthy expresssion of emotions       Long Term Goal 2: Decrease obsessive thoughts and compulsive actions     Target Date:3/2/23  Completion Date:4/2/23     Short Term Objectives for Goal 2: 1    Utilize thought stopping techniques to decrease skin picking   2     Teach and practice distractive coping skill to be used in times of need to decrease obsessive thoughts          GOAL 1: Modality:Individual 2x per month   Completion Date 4/2/23, Medication Management and The person(s) responsible for carrying out the plan is  client, Delma Shukla, therapist, Christin Dickinson, and Dr Sadia Perez      GOAL 2: Modality: Individual 2x per month   Completion Date 4/2/23, Medication Management and The person(s) responsible for carrying out the plan is  client, Delma Shukla, therapist, Saeid Nuñez, and Dr Arthur Generous and Treatment Plan explained to Courtney, Courtney relates understanding diagnosis and is agreeable to Treatment Plan          Client Comments : Please share your thoughts, feelings, need and/or experiences regarding your treatment plan

## 2022-10-04 NOTE — PSYCH
Psychotherapy Provided: Individual Psychotherapy 50 minutes from 9:05-9:55am    Length of time in session: 50 minutes, follow up in 2 week    Encounter Diagnosis     ICD-10-CM    1  MDD (major depressive disorder), recurrent episode, mild ([facility] [address] )  F33 0    2  JOSEFINA (generalized anxiety disorder)  F41 1    3  Obsessive-compulsive disorder, unspecified type  F42 9    4  PTSD (post-traumatic stress disorder)  F43 10        Goals addressed in session: Goal 1 and Goal 2     Current suicide risk : Low     D: Clinician met with Aly Wang in person for individual therapy  Aly Wang processed recent trip with paramour and working on figuring out what she wants for the future of the relationships  She reports that she recently was sick with COVID and that this gave her the opportunity to see how supportive her paramour was going to be  Clinician challenged Aly Wang to express her needs to her paramour directly in order to get her needs made as different people like to be supported in different ways  She reports awareness of this as her paramour prefers to be given space when he does not feel good  Aly Wang report recent experience has made her think about the future of the relationship  Clinician assisted Aly Wang in working through expectations of relationship and what she wants in the future by discussing her values and if those values are shared with paramour  Clinician discussed treatment plan goals and updated goals during the session  A: Aly Wang presented with stable mood and affect  She reports fatigue related to having COVID has limited her ability to keep up typical level of energy and that she has therefore been less active  She also reports less symptoms of OCD and anxiety while fatigues and attempting to rest and recovery from [place]  P: Continue to meet with Aly Wang every other week for individual therapy      Behavioral Health Treatment Plan [facility]: Diagnosis and Treatment Plan explained to Greg Cool relates understanding diagnosis and is agreeable to Treatment Plan   Yes

## 2022-10-12 PROBLEM — R05.9 COUGH: Status: RESOLVED | Noted: 2020-11-23 | Resolved: 2022-10-12

## 2022-10-18 ENCOUNTER — SOCIAL WORK (OUTPATIENT)
Dept: BEHAVIORAL/MENTAL HEALTH CLINIC | Facility: CLINIC | Age: 62
End: 2022-10-18
Payer: COMMERCIAL

## 2022-10-18 DIAGNOSIS — F41.1 GAD (GENERALIZED ANXIETY DISORDER): ICD-10-CM

## 2022-10-18 DIAGNOSIS — F33.0 MDD (MAJOR DEPRESSIVE DISORDER), RECURRENT EPISODE, MILD (HCC): Primary | ICD-10-CM

## 2022-10-18 DIAGNOSIS — F42.9 OBSESSIVE-COMPULSIVE DISORDER, UNSPECIFIED TYPE: ICD-10-CM

## 2022-10-18 DIAGNOSIS — F43.10 PTSD (POST-TRAUMATIC STRESS DISORDER): ICD-10-CM

## 2022-10-18 PROCEDURE — 90834 PSYTX W PT 45 MINUTES: CPT | Performed by: COUNSELOR

## 2022-10-18 NOTE — PSYCH
Psychotherapy Provided: Individual Psychotherapy 50 minutes   Visit Time    Visit Start Time: 9:03 AM  Visit Stop Time: 9:53 AM  Total Visit Duration: 50 minutes    Length of time in session: 50 minutes, follow up in 2 week    Encounter Diagnosis     ICD-10-CM    1  MDD (major depressive disorder), recurrent episode, mild ([facility] [address] )  F33 0    2  JOSEFINA (generalized anxiety disorder)  F41 1    3  Obsessive-compulsive disorder, unspecified type  F42 9    4  PTSD (post-traumatic stress disorder)  F43 10        Goals addressed in session: Goal 1 and Goal 2     Current suicide risk : Low     D: Clinician met with Anton Garcia via telehealth for individual therapy  Anton Garcia processed recent anxieties within her relationship and how she addressed this with paramour  Clinician validated frustration with lack of communication on her paramours part and willingness to work through her feelings and concerns  Clinician explored ways to address these feelings and work on greater open communication  Clinician also provided Anton Garcia with a worksheet to assist her in challenging her anxieties and thinking more about rational and irrational thoughts  A: Anton Garcia presented with stable mood and affect and was engaged in the session and learning additional interventions to work on challenging anxiety and increasing use of assertive communication  P: Continue to meet with Anton Garcia every other week for individual therapy  Behavioral Health Treatment Plan [facility]: Diagnosis and Treatment Plan explained to Kristi Pham relates understanding diagnosis and is agreeable to Treatment Plan   Yes

## 2022-10-19 DIAGNOSIS — L23.7 POISON IVY DERMATITIS: ICD-10-CM

## 2022-10-19 RX ORDER — HYDROXYZINE HYDROCHLORIDE 25 MG/1
TABLET, FILM COATED ORAL
Qty: 540 TABLET | Refills: 1 | Status: SHIPPED | OUTPATIENT
Start: 2022-10-19

## 2022-10-23 DIAGNOSIS — N95.2 VAGINAL ATROPHY: ICD-10-CM

## 2022-10-23 DIAGNOSIS — N94.10 DYSPAREUNIA, FEMALE: ICD-10-CM

## 2022-10-23 RX ORDER — ESTRADIOL 10 UG/1
INSERT VAGINAL
Qty: 24 TABLET | Refills: 1 | Status: SHIPPED | OUTPATIENT
Start: 2022-10-23

## 2022-11-01 ENCOUNTER — SOCIAL WORK (OUTPATIENT)
Dept: BEHAVIORAL/MENTAL HEALTH CLINIC | Facility: CLINIC | Age: 62
End: 2022-11-01

## 2022-11-01 DIAGNOSIS — F42.9 OBSESSIVE-COMPULSIVE DISORDER, UNSPECIFIED TYPE: ICD-10-CM

## 2022-11-01 DIAGNOSIS — F43.10 PTSD (POST-TRAUMATIC STRESS DISORDER): ICD-10-CM

## 2022-11-01 DIAGNOSIS — F41.1 GAD (GENERALIZED ANXIETY DISORDER): ICD-10-CM

## 2022-11-01 DIAGNOSIS — F33.0 MDD (MAJOR DEPRESSIVE DISORDER), RECURRENT EPISODE, MILD (HCC): Primary | ICD-10-CM

## 2022-11-01 NOTE — PSYCH
Psychotherapy Provided: Individual Psychotherapy 50 minutes     Length of time in session: 50 minutes, follow up in 2 week    Encounter Diagnosis     ICD-10-CM    1  MDD (major depressive disorder), recurrent episode, mild ([facility] [address] )  F33 0    2  JOSEFINA (generalized anxiety disorder)  F41 1    3  Obsessive-compulsive disorder, unspecified type  F42 9    4  PTSD (post-traumatic stress disorder)  F43 10        Goals addressed in session: Goal 1 and Goal 2     Current suicide risk : Low     D: Clinician met with Don Tessie in person for individual therapy  Don Kurtz processed communication issues within relationship with paramour and continued concerns with his drinking  Clinician explored specific reasons for concerns with paramours drinking and changes she would like to see made in his behavior to help strengthen relationship  Clinician explored her attempts at communicating her needs to paramour in the past and being realistic with expectation  Clinician role played potential ways to communicate reasons for her feelings and past experiences with her father being an alcoholic and well and learning to be more open and engaged with paramour about her feelings  A: Don Kurtz presented with stable mood and affect and was engaged in the session  She reports taking her medication as prescribed  P:Continue to meet with Don Tessie every other week for individual therapy  Behavioral Health Treatment Plan [facility]: Diagnosis and Treatment Plan explained to Randolph Owusu relates understanding diagnosis and is agreeable to Treatment Plan   Yes     Visit start and stop times:    11/01/22  Start Time: 0900  Stop Time: 0950  Total Visit Time: 50 minutes

## 2022-11-15 ENCOUNTER — SOCIAL WORK (OUTPATIENT)
Dept: BEHAVIORAL/MENTAL HEALTH CLINIC | Facility: CLINIC | Age: 62
End: 2022-11-15

## 2022-11-15 DIAGNOSIS — F41.1 GAD (GENERALIZED ANXIETY DISORDER): ICD-10-CM

## 2022-11-15 DIAGNOSIS — F43.10 PTSD (POST-TRAUMATIC STRESS DISORDER): ICD-10-CM

## 2022-11-15 DIAGNOSIS — F42.9 OBSESSIVE-COMPULSIVE DISORDER, UNSPECIFIED TYPE: ICD-10-CM

## 2022-11-15 DIAGNOSIS — F33.0 MDD (MAJOR DEPRESSIVE DISORDER), RECURRENT EPISODE, MILD (HCC): Primary | ICD-10-CM

## 2022-11-15 NOTE — PSYCH
Psychotherapy Provided: Individual Psychotherapy 50 minutes     Length of time in session: 50 minutes, follow up in 2 week    Encounter Diagnosis     ICD-10-CM    1  MDD (major depressive disorder), recurrent episode, mild ([facility] [address] )  F33 0    2  JOSEFINA (generalized anxiety disorder)  F41 1    3  Obsessive-compulsive disorder, unspecified type  F42 9    4  PTSD (post-traumatic stress disorder)  F43 10        Goals addressed in session: Goal 1 and Goal 2     Current suicide risk : Low     D: Clinician met with Thor Organ in person for individual therapy  Clinician and Lenny Lockhart worked on homework assignment from past session looking into core values  Lenny Lockhart went over the core values that she had chosen from the list and discussed how each one was important and established in her life  Clinician explored how core valued impact decision making and interaction with others  Clinician encouraged Lenny Lockhart to take list home and elaborate of core values as well and narrow list down to 10  A: Lenny Lockhart presented with stable mood and affect and was open and engaged in the session  P: Continue to meet with Lenny Lockhart every other week for individual therapy  Behavioral Health Treatment Plan [facility]: Diagnosis and Treatment Plan explained to Shruti Fox relates understanding diagnosis and is agreeable to Treatment Plan   Yes     Visit start and stop times:    11/15/22  Start Time: 0900  Stop Time: 0952  Total Visit Time: 52 minutes

## 2022-11-25 ENCOUNTER — APPOINTMENT (OUTPATIENT)
Dept: LAB | Facility: CLINIC | Age: 62
End: 2022-11-25

## 2022-11-25 DIAGNOSIS — M81.8 OTHER OSTEOPOROSIS WITHOUT CURRENT PATHOLOGICAL FRACTURE: ICD-10-CM

## 2022-11-25 DIAGNOSIS — K74.3 PRIMARY BILIARY CHOLANGITIS (HCC): Chronic | ICD-10-CM

## 2022-11-25 DIAGNOSIS — D47.2 SMOLDERING MULTIPLE MYELOMA (SMM): Chronic | ICD-10-CM

## 2022-11-25 LAB
ALBUMIN SERPL BCP-MCNC: 3.1 G/DL (ref 3.5–5)
ALP SERPL-CCNC: 477 U/L (ref 46–116)
ALT SERPL W P-5'-P-CCNC: 96 U/L (ref 12–78)
ANION GAP SERPL CALCULATED.3IONS-SCNC: 5 MMOL/L (ref 4–13)
AST SERPL W P-5'-P-CCNC: 96 U/L (ref 5–45)
BASOPHILS # BLD AUTO: 0.04 THOUSANDS/ÂΜL (ref 0–0.1)
BASOPHILS NFR BLD AUTO: 1 % (ref 0–1)
BILIRUB SERPL-MCNC: 0.57 MG/DL (ref 0.2–1)
BUN SERPL-MCNC: 14 MG/DL (ref 5–25)
CALCIUM ALBUM COR SERPL-MCNC: 9.9 MG/DL (ref 8.3–10.1)
CALCIUM SERPL-MCNC: 9.2 MG/DL (ref 8.3–10.1)
CHLORIDE SERPL-SCNC: 102 MMOL/L (ref 96–108)
CO2 SERPL-SCNC: 29 MMOL/L (ref 21–32)
CREAT SERPL-MCNC: 0.87 MG/DL (ref 0.6–1.3)
EOSINOPHIL # BLD AUTO: 0.18 THOUSAND/ÂΜL (ref 0–0.61)
EOSINOPHIL NFR BLD AUTO: 4 % (ref 0–6)
ERYTHROCYTE [DISTWIDTH] IN BLOOD BY AUTOMATED COUNT: 12.9 % (ref 11.6–15.1)
GFR SERPL CREATININE-BSD FRML MDRD: 71 ML/MIN/1.73SQ M
GLUCOSE P FAST SERPL-MCNC: 99 MG/DL (ref 65–99)
HCT VFR BLD AUTO: 39.9 % (ref 34.8–46.1)
HGB BLD-MCNC: 13 G/DL (ref 11.5–15.4)
IGA SERPL-MCNC: 51 MG/DL (ref 70–400)
IGG SERPL-MCNC: 2660 MG/DL (ref 700–1600)
IGM SERPL-MCNC: 274 MG/DL (ref 40–230)
IMM GRANULOCYTES # BLD AUTO: 0.02 THOUSAND/UL (ref 0–0.2)
IMM GRANULOCYTES NFR BLD AUTO: 0 % (ref 0–2)
LYMPHOCYTES # BLD AUTO: 1.54 THOUSANDS/ÂΜL (ref 0.6–4.47)
LYMPHOCYTES NFR BLD AUTO: 32 % (ref 14–44)
MCH RBC QN AUTO: 30.2 PG (ref 26.8–34.3)
MCHC RBC AUTO-ENTMCNC: 32.6 G/DL (ref 31.4–37.4)
MCV RBC AUTO: 93 FL (ref 82–98)
MONOCYTES # BLD AUTO: 0.39 THOUSAND/ÂΜL (ref 0.17–1.22)
MONOCYTES NFR BLD AUTO: 8 % (ref 4–12)
NEUTROPHILS # BLD AUTO: 2.68 THOUSANDS/ÂΜL (ref 1.85–7.62)
NEUTS SEG NFR BLD AUTO: 55 % (ref 43–75)
NRBC BLD AUTO-RTO: 0 /100 WBCS
PLATELET # BLD AUTO: 222 THOUSANDS/UL (ref 149–390)
PMV BLD AUTO: 10.6 FL (ref 8.9–12.7)
POTASSIUM SERPL-SCNC: 4.1 MMOL/L (ref 3.5–5.3)
PROT SERPL-MCNC: 9.1 G/DL (ref 6.4–8.4)
RBC # BLD AUTO: 4.3 MILLION/UL (ref 3.81–5.12)
SODIUM SERPL-SCNC: 136 MMOL/L (ref 135–147)
WBC # BLD AUTO: 4.85 THOUSAND/UL (ref 4.31–10.16)

## 2022-11-26 LAB
KAPPA LC FREE SER-MCNC: 48 MG/L (ref 3.3–19.4)
KAPPA LC FREE/LAMBDA FREE SER: 3.18 {RATIO} (ref 0.26–1.65)
LAMBDA LC FREE SERPL-MCNC: 15.1 MG/L (ref 5.7–26.3)

## 2022-11-28 ENCOUNTER — TELEPHONE (OUTPATIENT)
Dept: PSYCHIATRY | Facility: CLINIC | Age: 62
End: 2022-11-28

## 2022-11-28 NOTE — TELEPHONE ENCOUNTER
Debbi Martinez and/or Patient requested a call back to discuss appt on 11/30/2022 at 9:00 a m  Pt stated that she needs to see provider Wednesday afternoon or Friday  Writer informed her that there are no openings available for those days  They can be reached at P# [phone]  Thank you

## 2022-11-29 ENCOUNTER — HOSPITAL ENCOUNTER (OUTPATIENT)
Dept: INFUSION CENTER | Facility: CLINIC | Age: 62
Discharge: HOME | End: 2022-11-29

## 2022-11-29 VITALS
BODY MASS INDEX: 23.18 KG/M2 | SYSTOLIC BLOOD PRESSURE: 138 MMHG | DIASTOLIC BLOOD PRESSURE: 84 MMHG | RESPIRATION RATE: 18 BRPM | WEIGHT: 157 LBS | HEART RATE: 71 BPM | OXYGEN SATURATION: 99 % | TEMPERATURE: 97.2 F

## 2022-11-29 DIAGNOSIS — M81.8 OTHER OSTEOPOROSIS WITHOUT CURRENT PATHOLOGICAL FRACTURE: Primary | ICD-10-CM

## 2022-11-29 LAB
ALBUMIN SERPL ELPH-MCNC: 4.03 G/DL (ref 3.5–5)
ALBUMIN SERPL ELPH-MCNC: 46.3 % (ref 52–65)
ALPHA1 GLOB SERPL ELPH-MCNC: 0.37 G/DL (ref 0.1–0.4)
ALPHA1 GLOB SERPL ELPH-MCNC: 4.2 % (ref 2.5–5)
ALPHA2 GLOB SERPL ELPH-MCNC: 0.97 G/DL (ref 0.4–1.2)
ALPHA2 GLOB SERPL ELPH-MCNC: 11.1 % (ref 7–13)
BETA GLOB ABNORMAL SERPL ELPH-MCNC: 0.44 G/DL (ref 0.4–0.8)
BETA1 GLOB SERPL ELPH-MCNC: 5 % (ref 5–13)
BETA2 GLOB SERPL ELPH-MCNC: 4.1 % (ref 2–8)
BETA2+GAMMA GLOB SERPL ELPH-MCNC: 0.36 G/DL (ref 0.2–0.5)
GAMMA GLOB ABNORMAL SERPL ELPH-MCNC: 2.55 G/DL (ref 0.5–1.6)
GAMMA GLOB SERPL ELPH-MCNC: 29.3 % (ref 12–22)
IGG/ALB SER: 0.86 {RATIO} (ref 1.1–1.8)
M PROTEIN 1 MFR SERPL ELPH: 21.6 %
M PROTEIN 1 SERPL ELPH-MCNC: 1.88 G/DL
PROT PATTERN SERPL ELPH-IMP: ABNORMAL
PROT SERPL-MCNC: 8.7 G/DL (ref 6.4–8.2)

## 2022-11-29 RX ORDER — SODIUM CHLORIDE 9 MG/ML
20 INJECTION, SOLUTION INTRAVENOUS ONCE
Status: COMPLETED | OUTPATIENT
Start: 2022-11-29 | End: 2022-11-29

## 2022-11-29 RX ORDER — SODIUM CHLORIDE 9 MG/ML
20 INJECTION, SOLUTION INTRAVENOUS ONCE
OUTPATIENT
Start: 2023-05-16

## 2022-11-29 RX ADMIN — SODIUM CHLORIDE 20 ML/HR: 0.9 INJECTION, SOLUTION INTRAVENOUS at 09:26

## 2022-11-29 RX ADMIN — ZOLEDRONIC ACID: 0.04 INJECTION, SOLUTION INTRAVENOUS at 09:33

## 2022-11-29 NOTE — PROGRESS NOTES
Patient arrives to infusion center for Zometa today  Patient offers no acute complaints - patient aware of elevated LFTs, does have known liver disease and has a follow up with Dr Mina Mcbride 12/2  Labs reviewed from 11/25/22, calcium 9 2 and calculated CrCl 59 2 within parameters today  VSS, PIV inserted by JUAN CHATMAN without issue  Patient resting on recliner chair, call bell within reach  Patient reports RIGHT rib pain over the past couple of days, reports she felt like she was due for the Zometa  Chronic R leg pain due to complex regional pain syndrome

## 2022-11-29 NOTE — PROGRESS NOTES
Patient tolerated Zometa without issue  PIV removed intact for DC, coban wrap in place  Patient aware of follow up appointments - patient needs to schedule next Zometa as well  Ambulatory upon DC

## 2022-12-01 ENCOUNTER — TELEMEDICINE (OUTPATIENT)
Dept: PSYCHIATRY | Facility: CLINIC | Age: 62
End: 2022-12-01

## 2022-12-01 DIAGNOSIS — F43.10 PTSD (POST-TRAUMATIC STRESS DISORDER): ICD-10-CM

## 2022-12-01 DIAGNOSIS — F33.0 MDD (MAJOR DEPRESSIVE DISORDER), RECURRENT EPISODE, MILD (HCC): ICD-10-CM

## 2022-12-01 DIAGNOSIS — F42.9 OBSESSIVE-COMPULSIVE DISORDER, UNSPECIFIED TYPE: ICD-10-CM

## 2022-12-01 DIAGNOSIS — F41.1 GAD (GENERALIZED ANXIETY DISORDER): Primary | ICD-10-CM

## 2022-12-01 RX ORDER — FLUOXETINE HYDROCHLORIDE 40 MG/1
40 CAPSULE ORAL DAILY
Qty: 90 CAPSULE | Refills: 1 | Status: SHIPPED | OUTPATIENT
Start: 2022-12-01
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
## 2022-12-02 ENCOUNTER — TELEPHONE (OUTPATIENT)
Dept: PSYCHIATRY | Facility: CLINIC | Age: 62
End: 2022-12-02

## 2022-12-02 ENCOUNTER — OFFICE VISIT (OUTPATIENT)
Dept: HEMATOLOGY ONCOLOGY | Facility: CLINIC | Age: 62
End: 2022-12-02

## 2022-12-02 VITALS
TEMPERATURE: 98 F | BODY MASS INDEX: 23.47 KG/M2 | DIASTOLIC BLOOD PRESSURE: 80 MMHG | HEIGHT: 69 IN | HEART RATE: 76 BPM | WEIGHT: 158.5 LBS | SYSTOLIC BLOOD PRESSURE: 132 MMHG | RESPIRATION RATE: 16 BRPM | OXYGEN SATURATION: 97 %

## 2022-12-02 DIAGNOSIS — D47.2 SMOLDERING MULTIPLE MYELOMA (SMM): Primary | Chronic | ICD-10-CM

## 2022-12-02 PROBLEM — R10.30 LOWER ABDOMINAL PAIN: Status: RESOLVED | Noted: 2018-10-01 | Resolved: 2022-12-02

## 2022-12-02 NOTE — TELEPHONE ENCOUNTER
Called patient and lvm regarding 12/8 and 12/13 appts with Kong Fong  Appts have been cancelled from patient's appt desk due to therapist being out on leave  Voicemail advised patient to return call to  if any further assistance is needed

## 2022-12-02 NOTE — PROGRESS NOTES
Hematology Outpatient Follow - Up Note  Yared Garibay 57 y o  female MRN: @ Encounter: [id number]        Date:  12/2/2022        Assessment/ Plan:    Smoldering multiple myeloma IgG kappa, bone marrow biopsy showed 15% plasma cell normal cytogenetics and fish panel for multiple myeloma in May 2017, she had primary biliary cirrhosis     M protein, IgG, kappa light chain are stable for many years, no evidence of end-organ damage  will continue watchful observation and follow-up in 6 months with CBC, CMP, SPEP, free light chain, quantitative immunoglobulins     History of iron deficiency anemia she received IV iron now with normal hemoglobin and iron level     Osteoporosis Zometa every 6 months for total of 4 doses as she had significant response to previous therapy        Labs and imaging studies are reviewed by ordering provider once results are available  If there are findings that need immediate attention, you will be contacted when results available  Discussing results and the implication on your healthcare is best discussed in person at your follow-up visit         HPI:    71-year-old  female with past medical history of primary biliary cirrhosis, fracture of the right tibia, hypertension, OCD,worsening of osteoporosis on DEXA scan Done in 2016, nephrolithiasis, chronic lower back pain and possible sacroiliitis was found to have elevated total protein 3-4 years ago, serum protein electrophoresis showed IgG kappa monoclonal protein of 1 7 g/dL however no evidence of anemia, hypercalcemia, or renal insufficiency, she had persistent elevation of alkaline phosphatase secondary to PBC  had a bone scan done last year which showed activity in the ribs area  serum protein electrophoresis in April 2017 showed IgG kappa monoclonal protein of 1 96 g/dL, total protein 8 5, albumin 3 9, IgG 2580, creatinine 0 8, calcium 8 9, alkaline phosphatase 294, AST 37, ALT 46, IgM 25 in July 2016 monoclonal protein of IgG kappa of 1 73  C-reactive protein has been normal however sedimentation rate was elevated in the range of 60  Bone marrow biopsy in May 2017 showed 15% plasma cells in diffuse and interstitial pattern, normal fish panel for multiple myeloma and cytogenetics  Status post right knee replacement in June 2018  Exacerbation of anxiety/depression followed by psychiatric medicine     Osteoporosis she received Zometa 4 doses the lost dose on April 2019     Primary biliary cirrhosis followed by GI       Colonoscopy on 06/2021 showed mild diverticulosis      DEXA scan on 08/2021 showed recurrence of osteoporosis in the lumbar spine     She is on Zometa every 6 months  Interval History:        Previous Treatment:         Test Results:    Imaging: No results found  Labs:   Lab Results   Component Value Date    WBC 4 85 11/25/2022    HGB 13 0 11/25/2022    HCT 39 9 11/25/2022    MCV 93 11/25/2022     11/25/2022     Lab Results   Component Value Date     12/29/2015    K 4 1 11/25/2022     11/25/2022    CO2 29 11/25/2022    ANIONGAP 7 12/29/2015    BUN 14 11/25/2022    CREATININE 0 87 11/25/2022    GLUCOSE 134 12/29/2015    GLUF 99 11/25/2022    CALCIUM 9 2 11/25/2022    CORRECTEDCA 9 9 11/25/2022    AST 96 (H) 11/25/2022    ALT 96 (H) 11/25/2022    ALKPHOS 477 (H) 11/25/2022    PROT 8 4 (H) 12/29/2015    BILITOT 0 46 12/29/2015    EGFR 71 11/25/2022       Lab Results   Component Value Date    IRON 100 07/27/2022    TIBC 378 07/27/2022    FERRITIN 213 07/27/2022       Lab Results   Component Value Date    GFBWAZVR51 393 06/28/2022         ROS: Review of Systems   Constitutional: Negative for appetite change, chills, diaphoresis, fatigue and unexpected weight change  HENT:   Negative for mouth sores, nosebleeds, sore throat, trouble swallowing and voice change  Eyes: Negative for eye problems and icterus  Respiratory: Negative for chest tightness, cough, hemoptysis and wheezing      Cardiovascular: Negative for chest pain, leg swelling and palpitations  Gastrointestinal: Negative for abdominal distention, abdominal pain, blood in stool, constipation, diarrhea, nausea and vomiting  Endocrine: Negative for hot flashes  Genitourinary: Negative for bladder incontinence, difficulty urinating, dyspareunia, dysuria and frequency  Musculoskeletal: Positive for arthralgias, flank pain, myalgias and neck pain  Negative for back pain, gait problem and neck stiffness  Skin: Negative for itching and rash  Neurological: Negative for dizziness, gait problem, headaches, numbness, seizures and speech difficulty  Hematological: Negative for adenopathy  Does not bruise/bleed easily  Psychiatric/Behavioral: Negative for decreased concentration, depression, sleep disturbance and suicidal ideas  The patient is nervous/anxious  Current Medications: Reviewed  Allergies: Reviewed  PMH/FH/SH:  Reviewed      Physical Exam:    Body surface area is 1 87 meters squared  Wt Readings from Last 3 Encounters:   12/02/22 71 9 kg (158 lb 8 oz)   11/29/22 71 2 kg (157 lb)   10/25/22 68 5 kg (151 lb)        Temp Readings from Last 3 Encounters:   12/02/22 98 °F (36 7 °C)   11/29/22 (!) 97 2 °F (36 2 °C) (Temporal)   08/26/22 97 6 °F (36 4 °C) (Tympanic)        BP Readings from Last 3 Encounters:   12/02/22 132/80   11/29/22 138/84   10/03/22 122/70         Pulse Readings from Last 3 Encounters:   12/02/22 76   11/29/22 71   08/26/22 68        Physical Exam  Vitals reviewed  Constitutional:       General: She is not in acute distress  Appearance: She is well-developed and well-nourished  She is not diaphoretic  HENT:      Head: Normocephalic and atraumatic  Eyes:      Conjunctiva/sclera: Conjunctivae normal    Neck:      Trachea: No tracheal deviation  Cardiovascular:      Rate and Rhythm: Normal rate and regular rhythm  Heart sounds: No murmur heard  No friction rub  No gallop     Pulmonary: Effort: Pulmonary effort is normal  No respiratory distress  Breath sounds: Normal breath sounds  No wheezing or rales  Chest:      Chest wall: No tenderness  Abdominal:      General: There is no distension  Palpations: Abdomen is soft  Tenderness: There is no abdominal tenderness  Musculoskeletal:      Cervical back: Normal range of motion and neck supple  Right lower leg: No edema  Left lower leg: No edema  Lymphadenopathy:      Cervical: No cervical adenopathy  Skin:     General: Skin is warm and dry  Coloration: Skin is not pale  Findings: No erythema  Neurological:      Mental Status: She is alert and oriented to person, place, and time  Psychiatric:         Mood and Affect: Mood and affect normal          Behavior: Behavior normal          Thought Content: Thought content normal          Judgment: Judgment normal          ECO    Goals and Barriers:  Current Goal: Minimize effects of disease  Barriers: None  Patient's Capacity to Self Care:  Patient is able to self care      Code Status: [unfilled]

## 2023-02-18 ENCOUNTER — APPOINTMENT (OUTPATIENT)
Dept: LAB | Age: 63
End: 2023-02-18

## 2023-02-18 DIAGNOSIS — K74.3 CHOLANGITIC CIRRHOSIS (HCC): ICD-10-CM

## 2023-02-18 LAB
ALBUMIN SERPL BCP-MCNC: 3.6 G/DL (ref 3.5–5)
ALP SERPL-CCNC: 207 U/L (ref 46–116)
ALT SERPL W P-5'-P-CCNC: 55 U/L (ref 12–78)
ANION GAP SERPL CALCULATED.3IONS-SCNC: 1 MMOL/L (ref 4–13)
AST SERPL W P-5'-P-CCNC: 50 U/L (ref 5–45)
BILIRUB SERPL-MCNC: 0.51 MG/DL (ref 0.2–1)
BUN SERPL-MCNC: 25 MG/DL (ref 5–25)
CALCIUM SERPL-MCNC: 9.5 MG/DL (ref 8.3–10.1)
CHLORIDE SERPL-SCNC: 102 MMOL/L (ref 96–108)
CO2 SERPL-SCNC: 30 MMOL/L (ref 21–32)
CREAT SERPL-MCNC: 1.01 MG/DL (ref 0.6–1.3)
GFR SERPL CREATININE-BSD FRML MDRD: 59 ML/MIN/1.73SQ M
GLUCOSE P FAST SERPL-MCNC: 90 MG/DL (ref 65–99)
POTASSIUM SERPL-SCNC: 4.1 MMOL/L (ref 3.5–5.3)
PROT SERPL-MCNC: 9.1 G/DL (ref 6.4–8.4)
SODIUM SERPL-SCNC: 133 MMOL/L (ref 135–147)

## 2023-02-21 LAB
ALBUMIN SERPL ELPH-MCNC: 4.05 G/DL (ref 3.5–5)
ALBUMIN SERPL ELPH-MCNC: 46.5 % (ref 52–65)
ALPHA1 GLOB SERPL ELPH-MCNC: 0.35 G/DL (ref 0.1–0.4)
ALPHA1 GLOB SERPL ELPH-MCNC: 4 % (ref 2.5–5)
ALPHA2 GLOB SERPL ELPH-MCNC: 0.97 G/DL (ref 0.4–1.2)
ALPHA2 GLOB SERPL ELPH-MCNC: 11.1 % (ref 7–13)
BETA GLOB ABNORMAL SERPL ELPH-MCNC: 0.47 G/DL (ref 0.4–0.8)
BETA1 GLOB SERPL ELPH-MCNC: 5.4 % (ref 5–13)
BETA2 GLOB SERPL ELPH-MCNC: 4.4 % (ref 2–8)
BETA2+GAMMA GLOB SERPL ELPH-MCNC: 0.38 G/DL (ref 0.2–0.5)
GAMMA GLOB ABNORMAL SERPL ELPH-MCNC: 2.49 G/DL (ref 0.5–1.6)
GAMMA GLOB SERPL ELPH-MCNC: 28.6 % (ref 12–22)
IGG/ALB SER: 0.87 {RATIO} (ref 1.1–1.8)
M PROTEIN 1 MFR SERPL ELPH: 20.5 %
M PROTEIN 1 SERPL ELPH-MCNC: 1.78 G/DL
PROT PATTERN SERPL ELPH-IMP: ABNORMAL
PROT SERPL-MCNC: 8.7 G/DL (ref 6.4–8.2)

## 2023-02-23 ENCOUNTER — TELEPHONE (OUTPATIENT)
Dept: INTERNAL MEDICINE CLINIC | Facility: CLINIC | Age: 63
End: 2023-02-23

## 2023-02-23 DIAGNOSIS — J40 BRONCHITIS: Primary | ICD-10-CM

## 2023-02-23 RX ORDER — AZITHROMYCIN 250 MG/1
TABLET, FILM COATED ORAL
Qty: 6 TABLET | Refills: 0 | Status: SHIPPED | OUTPATIENT
Start: 2023-02-23 | End: 2023-02-28 | Stop reason: SDUPTHER

## 2023-02-23 NOTE — TELEPHONE ENCOUNTER
Patient called with c/o bronchitis     X 1 week now   A lot of nasal drainage, ( clear in color) coughing no other symp  No fever or sob   OTC : ibuprofen, Claritin    Pharm : [facility] on file

## 2023-02-28 ENCOUNTER — OFFICE VISIT (OUTPATIENT)
Dept: INTERNAL MEDICINE CLINIC | Facility: CLINIC | Age: 63
End: 2023-02-28

## 2023-02-28 ENCOUNTER — APPOINTMENT (OUTPATIENT)
Dept: RADIOLOGY | Age: 63
End: 2023-02-28

## 2023-02-28 ENCOUNTER — TELEPHONE (OUTPATIENT)
Dept: PSYCHIATRY | Facility: CLINIC | Age: 63
End: 2023-02-28

## 2023-02-28 VITALS
OXYGEN SATURATION: 97 % | WEIGHT: 158 LBS | BODY MASS INDEX: 23.4 KG/M2 | HEIGHT: 69 IN | HEART RATE: 76 BPM | TEMPERATURE: 98.9 F | RESPIRATION RATE: 16 BRPM

## 2023-02-28 DIAGNOSIS — C90.00 MULTIPLE MYELOMA NOT HAVING ACHIEVED REMISSION (HCC): ICD-10-CM

## 2023-02-28 DIAGNOSIS — M85.89 OSTEOPENIA OF MULTIPLE SITES: ICD-10-CM

## 2023-02-28 DIAGNOSIS — F33.0 MDD (MAJOR DEPRESSIVE DISORDER), RECURRENT EPISODE, MILD (HCC): ICD-10-CM

## 2023-02-28 DIAGNOSIS — E55.9 VITAMIN D DEFICIENCY: ICD-10-CM

## 2023-02-28 DIAGNOSIS — E53.8 VITAMIN B12 DEFICIENCY: ICD-10-CM

## 2023-02-28 DIAGNOSIS — M46.1 SACROILIITIS (HCC): ICD-10-CM

## 2023-02-28 DIAGNOSIS — G90.521 COMPLEX REGIONAL PAIN SYNDROME I OF RIGHT LOWER LIMB: Primary | ICD-10-CM

## 2023-02-28 DIAGNOSIS — J40 BRONCHITIS: ICD-10-CM

## 2023-02-28 DIAGNOSIS — Z12.31 ENCOUNTER FOR SCREENING MAMMOGRAM FOR MALIGNANT NEOPLASM OF BREAST: ICD-10-CM

## 2023-02-28 DIAGNOSIS — Z00.00 MEDICARE ANNUAL WELLNESS VISIT, INITIAL: ICD-10-CM

## 2023-02-28 DIAGNOSIS — D47.2 SMOLDERING MULTIPLE MYELOMA (SMM): ICD-10-CM

## 2023-02-28 DIAGNOSIS — K74.3 PRIMARY BILIARY CHOLANGITIS (HCC): ICD-10-CM

## 2023-02-28 DIAGNOSIS — E78.2 MODERATE MIXED HYPERLIPIDEMIA NOT REQUIRING STATIN THERAPY: ICD-10-CM

## 2023-02-28 DIAGNOSIS — D50.9 IRON DEFICIENCY ANEMIA, UNSPECIFIED IRON DEFICIENCY ANEMIA TYPE: ICD-10-CM

## 2023-02-28 DIAGNOSIS — U07.1 COVID: ICD-10-CM

## 2023-02-28 RX ORDER — AZITHROMYCIN 250 MG/1
TABLET, FILM COATED ORAL
Qty: 6 TABLET | Refills: 0 | Status: SHIPPED | OUTPATIENT
Start: 2023-02-28 | End: 2023-03-05

## 2023-02-28 RX ORDER — DEXTROMETHORPHAN HYDROBROMIDE AND PROMETHAZINE HYDROCHLORIDE 15; 6.25 MG/5ML; MG/5ML
5 SOLUTION ORAL 4 TIMES DAILY PRN
Qty: 240 ML | Refills: 0 | Status: SHIPPED | OUTPATIENT
Start: 2023-02-28

## 2023-02-28 RX ORDER — FENOFIBRATE 120 MG/1
120 TABLET ORAL DAILY
COMMUNITY
Start: 2023-02-09
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
## 2023-02-28 NOTE — TELEPHONE ENCOUNTER
Patient is calling regarding cancelling an appointment  Date/Time: 3/1/23 at 9:00a    Reason: patient not feeling well    Patient was rescheduled: YES [] NO [x]  If yes, when was Patient reschedule for:     Patient requesting call back to reschedule: YES [] NO [x]    Patient stated she will call the office back at a later time to reschedule once she is feeling better

## 2023-02-28 NOTE — PROGRESS NOTES
Assessment and Plan:     Problem List Items Addressed This Visit        Digestive    Primary biliary cholangitis (HCC) (Chronic)       Nervous and Auditory    Complex regional pain syndrome i of right lower limb - Primary       Musculoskeletal and Integument    Sacroiliitis (HCC) (Chronic)    Osteopenia    Relevant Orders    DXA bone density spine hip and pelvis       Other    Hyperlipidemia (Chronic)    Relevant Medications    fenofibrate (FENOGLIDE) 120 MG TABS    Other Relevant Orders    Lipid Panel with Direct LDL reflex    Smoldering multiple myeloma (SMM) (Chronic)    Relevant Orders    DXA bone density spine hip and pelvis    Vitamin D deficiency    Relevant Orders    Vitamin D 25 hydroxy    MDD (major depressive disorder), recurrent episode, mild (HCC)    Vitamin B12 deficiency    Relevant Orders    Vitamin B12    Multiple myeloma not having achieved remission (HCC)   Other Visit Diagnoses     Bronchitis        Relevant Medications    Promethazine-DM (PHENERGAN-DM) 6 25-15 mg/5 mL oral syrup    azithromycin (Zithromax) 250 mg tablet    Other Relevant Orders    XR chest pa & lateral    XR foot 3+ vw right    Covid/Flu- Office Collect    Sputum culture and Gram stain    Iron deficiency anemia, unspecified iron deficiency anemia type        Relevant Orders    Iron, TIBC and Ferritin Panel    Encounter for screening mammogram for malignant neoplasm of breast        Relevant Orders    Mammo screening bilateral w 3d & cad    COVID        Relevant Orders    Covid/Flu- Office Collect    Medicare annual wellness visit, initial               Preventive health issues were discussed with patient, and age appropriate screening tests were ordered as noted in patient's After Visit Summary  Personalized health advice and appropriate referrals for health education or preventive services given if needed, as noted in patient's After Visit Summary       History of Present Illness:     Patient presents for a [employer] Visit    HPI   Patient Care Team:  Alicia Magallon DO as PCP - General (Internal Medicine)  Ramone Marshall MD as PCP - PCP-[facility]  Alicia Magallon DO as PCP - [address] [place], [place] [address] Floor [facility] ([facility]E)  Alicia Magallon DO as PCP - PCP-[employer] ([place])  MD Ramone Mcallister MD Ames Morin, MD Elza Mains, MD Sharlot Hipps, MD Vernette Perks, MD     Review of Systems:     Review of Systems     Problem List:     Patient Active Problem List   Diagnosis   • Peripheral neuropathy   • Bilateral elbow joint pain   • Lateral epicondylitis of right elbow   • Medial epicondylitis, left elbow   • Chronic pain of right knee   • Post-traumatic osteoarthritis of right knee   • Medial epicondylitis of left elbow   • Abnormal mammogram   • Arthritis   • Chronic pain disorder   • Chronic right hip pain   • Cirrhosis ([address] )   • Dense breasts   • Depression with anxiety   • Hydronephrosis   • Hypergammaglobulinemia   • Hyperlipidemia   • Lumbar degenerative disc disease   • Lumbar radiculopathy   • Nephrolithiasis   • Occipital neuralgia   • Primary biliary cholangitis ([address] )   • Pruritus   • Sacroiliitis ([address] )   • Smoldering multiple myeloma (SMM)   • Vitamin D deficiency   • Status post total right knee replacement   • Essential hypertension   • Herpes simplex   • MDD (major depressive disorder), recurrent episode, mild (HCC)   • JOSEFINA (generalized anxiety disorder)   • OCD (obsessive compulsive disorder)   • Syncope   • PTSD (post-traumatic stress disorder)   • Complex regional pain syndrome i of right lower limb   • Quadriceps weakness   • Chronic left-sided low back pain without sciatica   • Left leg pain   • Complex regional pain syndrome type 2 of right lower extremity   • Trigger finger, right middle finger   • Chest pain   • Osteopenia   • Closed fracture of one rib of left side with routine healing   • Epistaxis   • Other osteoporosis without current pathological fracture • Iron deficiency anemia due to chronic blood loss   • Vitamin B12 deficiency   • Multiple myeloma not having achieved remission [facility])      Past Medical and Surgical History:     Past Medical History:   Diagnosis Date   • Abnormal breast exam    • Anxiety    • Asthma     childhood   • 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[facility] [address] )     primary per allscripts   • Cancer (HCC)     IgG kappa smoldering multiple myeloma   • Cardiac murmur    • Chronic liver disease     resolved 12/04/2017   • COVID    • Depression    • Endometriosis    • Fracture of tibia     right tibial plateau fracture per allscripts   • Heart murmur    • Hepatic disease    • Hypertension     last assessed 12/13/2017   • Inflammatory bowel disease    • Multiple myeloma ([facility] [address] )    • Neuropathy     R foot    • Nosebleed    • OCD (obsessive compulsive disorder)    • Pneumonia    • RSD (reflex sympathetic dystrophy) 12/11/2018   • Seasonal allergies    • Wears eyeglasses    • Whiplash injury to neck      Past Surgical History:   Procedure Laterality Date   • BACK SURGERY      hemilaminectomy and discectomy, L5-S1, right ([facility]) onset 02/14/2005 per allscripts   • EXPLORATION EXTREMITY Right 08/29/2016    Procedure: KNEE PERONEAL NERVE EXPLORATION AND RELEASE ;  Surgeon: Zuleyka Chris MD;  [facility];  Service:    • FOOT SURGERY     • FRACTURE SURGERY     • HAND SURGERY     • HERNIA REPAIR     • JOINT REPLACEMENT     • KNEE SURGERY     • MANDIBLE SURGERY     • NEUROPLASTY / TRANSPOSITION MEDIAN NERVE AT CARPAL TUNNEL     • ORIF TIBIAL PLATEAU Right     arthroplasty per allscripts   • OTHER SURGICAL HISTORY      injection of trigger point(s) per allscripts   • AZ ARTHRP KNE CONDYLE&PLATU MEDIAL&LAT COMPARTMENTS Right 06/11/2018    Procedure: ARTHROPLASTY KNEE TOTAL;  Surgeon: Zuleyka Chris MD;  Location[facility];  Service: Orthopedics   • AZ TENDON SHEATH INCISION Right 12/02/2020    Procedure: RELEASE TRIGGER FINGER-right long;  Surgeon: Felicia Dao MD;  Location: [facility];  Service: Orthopedics   • SINUS SURGERY     • SPINE SURGERY     • TONSILLECTOMY        Family History:     Family History   Problem Relation Age of Onset   • Heart failure Mother    • Anxiety disorder Mother         symptom per allscripts   • Depression Mother    • Anxiety disorder Father         symptom per allscripts   • Cirrhosis Father         hepatic per allscripts   • Alcohol abuse Father    • Stomach cancer Maternal Grandmother    • Cancer Maternal Grandmother    • Stomach cancer Maternal Grandfather    • Cancer Maternal Grandfather    • Diabetes Paternal Grandmother    • No Known Problems Paternal Grandfather    • Anxiety disorder Other         symptom per allscripts   • Coronary artery disease Other    • Depression Other    • Hyperlipidemia Other    • Osteoarthritis Other    • Other Other         back disorder per allscripts   • Neuropathy Other    • No Known Problems Paternal Aunt    • No Known Problems Paternal Aunt       Social History:     Social History     Socioeconomic History   • Marital status: Single     Spouse name: None   • Number of children: None   • Years of education: completed college   • Highest education level: None   Occupational History   • Occupation:    • Occupation:    Tobacco Use   • Smoking status: Never   • Smokeless tobacco: Never   Vaping Use   • Vaping Use: Never used   Substance and Sexual Activity   • Alcohol use: No   • Drug use: No   • Sexual activity: Yes     Partners: Male     Birth control/protection: Post-menopausal   Other Topics Concern   • None   Social History Narrative    Does not participate in activities outside of the home; participates in moderate activities inside the home    Lives with mother     Social Determinants of Health     Financial Resource Strain: Low Risk    • Difficulty of Paying Living Expenses: Not hard at all   Food Insecurity: Not on file   Transportation Needs: No Transportation Needs   • Lack of Transportation (Medical): No   • Lack of Transportation (Non-Medical): No   Physical Activity: Not on file   Stress: Not on file   Social Connections: Not on file   Intimate Partner Violence: Not on file   Housing Stability: Not on file      Medications and Allergies:     Current Outpatient Medications   Medication Sig Dispense Refill   • azithromycin (Zithromax) 250 mg tablet Take 2 tablets (500 mg total) by mouth daily for 1 day, THEN 1 tablet (250 mg total) daily for 4 days  6 tablet 0   • Promethazine-DM (PHENERGAN-DM) 6 25-15 mg/5 mL oral syrup Take 5 mL by mouth 4 (four) times a day as needed for cough 240 mL 0   • Cholecalciferol (VITAMIN D3) 2000 UNITS TABS Take 2,000 Units by mouth daily       • clotrimazole-betamethasone (LOTRISONE) 1-0 05 % cream Apply topically 2 (two) times a day 45 g 2   • diclofenac sodium (VOLTAREN) 1 % APPLY 2 GRAMS TO THE AFFECTED AREA(S) BY TOPICAL ROUTE 4 TIMES PER DAY  3   • estradiol (VAGIFEM, YUVAFEM) 10 MCG TABS vaginal tablet INSERT 1 TABLET INTO THE VAGINA 2 TIMES A WEEK  24 tablet 1   • fenofibrate (FENOGLIDE) 120 MG TABS Take 120 mg by mouth daily     • fenofibrate (TRICOR) 54 MG tablet      • FLUoxetine (PROzac) 40 MG capsule Take 1 capsule (40 mg total) by mouth daily 90 capsule 1   • hydrOXYzine HCL (ATARAX) 25 mg tablet TAKE 1 TO 2 TABLETS BY MOUTH 3 TIMES A DAY AS NEEDED FOR ITCHING OR ANXIETY (MAX 4TABS/DAY) 540 tablet 1   • ibuprofen (MOTRIN) 600 mg tablet TAKE 1 TABLET BY MOUTH EVERY 6 HOURS AS NEEDED FOR MODERATE PAIN  120 tablet 2   • lidocaine (LIDODERM) 5 % lidocaine 5 % topical patch   APPLY 1 PATCH TO AFFECTED AREA FOR 12 HOURS A DAY & REMOVE FOR 12 HOURS BEFORE APPLYING NEXT PATCH   (12 HOURS ON, 12 HOURS OFF)     • mometasone (NASONEX) 50 mcg/act nasal spray SPRAY 2 SPRAYS INTO EACH NOSTRIL EVERY DAY 42 g 3   • oxyCODONE (ROXICODONE) 10 MG TABS Take 1 tablet (10 mg total) by mouth every 6 (six) hours as needed for severe pain (as needed but do not take together with tramadol) Max Daily Amount: 40 mg 30 tablet 0   • traMADol (Ultram) 50 mg tablet Take 1 tablet (50 mg total) by mouth every 8 (eight) hours as needed for moderate pain 30 tablet 0   • traZODone (DESYREL) 50 mg tablet TAKE 1/2 TO 1 TABLET BY MOUTH AT BEDTIME AS NEEDED FOR SLEEP 90 tablet 1   • triamcinolone (KENALOG) 0 5 % cream APPLY TO AFFECTED AREA 3 TIMES A DAY 60 g 1   • triamterene-hydrochlorothiazide (MAXZIDE-25) 37 5-25 mg per tablet TAKE 1 TABLET BY MOUTH EVERY DAY 90 tablet 4   • ursodiol (ACTIGALL) 300 mg capsule TAKE 1 CAPSULE BY MOUTH THREE TIMES A  capsule 2   • valACYclovir (VALTREX) 500 mg tablet TAKE 2 TABLETS BY MOUTH EVERY  tablet 0     No current facility-administered medications for this visit  Allergies   Allergen Reactions   • Codeine GI Intolerance and Nausea Only     Other reaction(s):  Other (See Comments)  Violently ill  Violently ill   • Latex Rash     itching   • Doxycycline GI Intolerance and Nausea Only   • Cymbalta [Duloxetine Hcl]    • Milk-Related Compounds - Food Allergy GI Intolerance   • Morphine And Related    • Ocaliva [Obeticholic Acid] Hives   • Orange Fruit [Citrus - Food Allergy] Other (See Comments)     Tested  positive   • Sulfa Antibiotics GI Intolerance   • Tomato - Food Allergy Other (See Comments)     Tested positive;  Eats in sauces, not pure   • Penicillins Other (See Comments) and Rash     Childhood reaction      Immunizations:     Immunization History   Administered Date(s) Administered   • COVID-19 PFIZER VACCINE 0 3 ML IM 03/26/2021, 04/16/2021, 10/28/2021   • Hep A, adult 04/29/2015   • INFLUENZA 11/10/2014, 09/02/2015, 10/20/2016, 10/24/2018   • Influenza Quadrivalent Preservative Free 3 years and older IM 11/10/2014, 09/02/2015   • Influenza Quadrivalent, 6-35 Months IM 10/20/2016   • Influenza, high dose seasonal 0 7 mL 12/22/2020   • Influenza, recombinant, quadrivalent,injectable, preservative free 10/24/2018, 10/08/2019, 11/18/2021   • Influenza, seasonal, injectable 10/15/2012   • Pneumococcal Conjugate 13-Valent 12/08/2014   • Pneumococcal Polysaccharide PPV23 10/08/2019   • Tdap 11/05/2012, 07/17/2019      Health Maintenance:         Topic Date Due   • Breast Cancer Screening: Mammogram  06/26/2021   • Cervical Cancer Screening  08/04/2022   • DXA SCAN  08/10/2023   • Colorectal Cancer Screening  06/15/2031   • HIV Screening  Completed   • Hepatitis C Screening  Completed         Topic Date Due   • Hepatitis A Vaccine (2 of 2 - Risk 2-dose series) 10/29/2015   • Hepatitis B Vaccine (1 of 3 - Risk 3-dose series) Never done   • COVID-19 Vaccine (4 - Booster for Pfizer series) 12/23/2021   • Influenza Vaccine (1) 09/01/2022      Medicare Screening Tests and Risk Assessments:     Marguerite Lyon is here for her Subsequent Wellness visit  Last Medicare Wellness visit information reviewed, patient interviewed and updates made to the record today  Health Risk Assessment:   Patient rates overall health as fair  Patient feels that their physical health rating is same  Patient is satisfied with their life  Eyesight was rated as same  Hearing was rated as same  Patient feels that their emotional and mental health rating is slightly better  Patients states they are sometimes angry  Patient states they are always unusually tired/fatigued  Pain experienced in the last 7 days has been a lot  Patient's pain rating has been 9/10  Patient states that she has experienced weight loss or gain in last 6 months  Depression Screening:   PHQ-9 Score: 0      Fall Risk Screening: In the past year, patient has experienced: history of falling in past year    Number of falls: 2 or more  Injured during fall?: No    Feels unsteady when standing or walking?: Yes    Worried about falling?: Yes      Urinary Incontinence Screening:   Patient has leaked urine accidently in the last six months       Home Safety:  Patient has trouble with stairs inside or outside of their home  Patient has working smoke alarms and has working carbon monoxide detector  Home safety hazards include: none  Nutrition:   Current diet is Regular  Medications:   Patient is currently taking over-the-counter supplements  OTC medications include: see medication list  Patient is able to manage medications  Activities of Daily Living (ADLs)/Instrumental Activities of Daily Living (IADLs):   Walk and transfer into and out of bed and chair?: Yes  Dress and groom yourself?: Yes    Bathe or shower yourself?: Yes    Feed yourself? Yes  Do your laundry/housekeeping?: Yes  Manage your money, pay your bills and track your expenses?: Yes  Make your own meals?: Yes    Do your own shopping?: Yes    Previous Hospitalizations:   Any hospitalizations or ED visits within the last 12 months?: No      Advance Care Planning:   Living will: No    Durable POA for healthcare: No    Advanced directive: No    Five wishes given: No      PREVENTIVE SCREENINGS      Cardiovascular Screening:    General: Screening Not Indicated and History Lipid Disorder      Diabetes Screening:     General: Screening Current      Colorectal Cancer Screening:     General: Screening Current      Cervical Cancer Screening:    General: Screening Current      Osteoporosis Screening:    General: Screening Not Indicated and History Osteoporosis      Lung Cancer Screening:     General: Screening Not Indicated      Hepatitis C Screening:    General: Screening Current    Screening, Brief Intervention, and Referral to Treatment (SBIRT)    Screening  Typical number of drinks in a day: 0  Typical number of drinks in a week: 0  Interpretation: Low risk drinking behavior      AUDIT-C Screenin) How often did you have a drink containing alcohol in the past year? never  2) How many drinks did you have on a typical day when you were drinking in the past year? 0  3) How often did you have 6 or more drinks on one occasion in the past year? never    AUDIT-C Score: 0  Interpretation: Score 0-2 (female): Negative screen for alcohol misuse    Single Item Drug Screening:  How often have you used an illegal drug (including marijuana) or a prescription medication for non-medical reasons in the past year? never    Single Item Drug Screen Score: 0  Interpretation: Negative screen for possible drug use disorder    Review of Current Opioid Use    Opioid Risk Tool (ORT) Interpretation: Complete Opioid Risk Tool (ORT)    No results found       Physical Exam:     Pulse 76   Temp 98 9 °F (37 2 °C)   Resp 16   Ht 5' 9" (1 753 m)   Wt 71 7 kg (158 lb)   LMP  (LMP Unknown)   SpO2 97%   BMI 23 33 kg/m²     Physical Exam     Yumi Pablo,

## 2023-02-28 NOTE — PROGRESS NOTES
Assessment and Plan:     Problem List Items Addressed This Visit        Digestive    Primary biliary cholangitis (HCC) (Chronic)       Nervous and Auditory    Complex regional pain syndrome i of right lower limb - Primary       Musculoskeletal and Integument    Sacroiliitis (HCC) (Chronic)    Osteopenia    Relevant Orders    DXA bone density spine hip and pelvis       Other    Hyperlipidemia (Chronic)    Relevant Medications    fenofibrate (FENOGLIDE) 120 MG TABS    Other Relevant Orders    Lipid Panel with Direct LDL reflex    Smoldering multiple myeloma (SMM) (Chronic)    Relevant Orders    DXA bone density spine hip and pelvis    Vitamin D deficiency    Relevant Orders    Vitamin D 25 hydroxy    MDD (major depressive disorder), recurrent episode, mild (HCC)    Vitamin B12 deficiency    Relevant Orders    Vitamin B12    Multiple myeloma not having achieved remission (HCC)   Other Visit Diagnoses     Bronchitis        Relevant Medications    Promethazine-DM (PHENERGAN-DM) 6 25-15 mg/5 mL oral syrup    azithromycin (Zithromax) 250 mg tablet    Other Relevant Orders    XR chest pa & lateral    XR foot 3+ vw right    Covid/Flu- Office Collect    Sputum culture and Gram stain    Iron deficiency anemia, unspecified iron deficiency anemia type        Relevant Orders    Iron, TIBC and Ferritin Panel    Encounter for screening mammogram for malignant neoplasm of breast        Relevant Orders    Mammo screening bilateral w 3d & cad    COVID        Relevant Orders    Covid/Flu- Office Collect           Preventive health issues were discussed with patient, and age appropriate screening tests were ordered as noted in patient's After Visit Summary  Personalized health advice and appropriate referrals for health education or preventive services given if needed, as noted in patient's After Visit Summary       History of Present Illness:     Patient presents for a Medicare Wellness Visit    HPI   Patient Care Team:  Shady Ball DO as PCP - General (Internal Medicine)  Sridevi Alvarado MD as PCP - PCP-[facility]  Bell Wheeler, DO as PCP - [address] [place], [place] [id number] [facility] ([facility]E)  Bell Wheeler, DO as PCP - PCP-[employer] (RTE)  MD Sridevi Nguyen MD Clayborne Rust, MD Lenord Raring, MD Merritt Flair, MD Radonna Low, MD     Review of Systems:     Review of Systems     Problem List:     Patient Active Problem List   Diagnosis   • Peripheral neuropathy   • Bilateral elbow joint pain   • Lateral epicondylitis of right elbow   • Medial epicondylitis, left elbow   • Chronic pain of right knee   • Post-traumatic osteoarthritis of right knee   • Medial epicondylitis of left elbow   • Abnormal mammogram   • Arthritis   • Chronic pain disorder   • Chronic right hip pain   • Cirrhosis ([address] )   • Dense breasts   • Depression with anxiety   • Hydronephrosis   • Hypergammaglobulinemia   • Hyperlipidemia   • Lumbar degenerative disc disease   • Lumbar radiculopathy   • Nephrolithiasis   • Occipital neuralgia   • Primary biliary cholangitis ([address] )   • Pruritus   • Sacroiliitis ([address] )   • Smoldering multiple myeloma (SMM)   • Vitamin D deficiency   • Status post total right knee replacement   • Essential hypertension   • Herpes simplex   • MDD (major depressive disorder), recurrent episode, mild (HCC)   • JOSEFINA (generalized anxiety disorder)   • OCD (obsessive compulsive disorder)   • Syncope   • PTSD (post-traumatic stress disorder)   • Complex regional pain syndrome i of right lower limb   • Quadriceps weakness   • Chronic left-sided low back pain without sciatica   • Left leg pain   • Complex regional pain syndrome type 2 of right lower extremity   • Trigger finger, right middle finger   • Chest pain   • Osteopenia   • Closed fracture of one rib of left side with routine healing   • Epistaxis   • Other osteoporosis without current pathological fracture   • Iron deficiency anemia due to chronic blood loss   • Vitamin B12 deficiency   • Multiple myeloma not having achieved remission [facility])      Past Medical and Surgical History:     Past Medical History:   Diagnosis Date   • Abnormal breast exam    • Anxiety    • Asthma     childhood   • 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[facility] [address] )     primary per allscripts   • Cancer (HCC)     IgG kappa smoldering multiple myeloma   • Cardiac murmur    • Chronic liver disease     resolved 12/04/2017   • COVID    • Depression    • Endometriosis    • Fracture of tibia     right tibial plateau fracture per allscripts   • Heart murmur    • Hepatic disease    • Hypertension     last assessed 12/13/2017   • Inflammatory bowel disease    • Multiple myeloma (HCC)    • Neuropathy     R foot    • Nosebleed    • OCD (obsessive compulsive disorder)    • Pneumonia    • RSD (reflex sympathetic dystrophy) 12/11/2018   • Seasonal allergies    • Wears eyeglasses    • Whiplash injury to neck      Past Surgical History:   Procedure Laterality Date   • BACK SURGERY      hemilaminectomy and discectomy, L5-S1, right (Dr Lucretia Cunningham, [facility] at [facility]) onset 02/14/2005 per allscripts   • EXPLORATION EXTREMITY Right 08/29/2016    Procedure: KNEE PERONEAL NERVE EXPLORATION AND RELEASE ;  Surgeon: Lilian Blair MD;  Location: [facility];  Service:    • FOOT SURGERY     • FRACTURE SURGERY     • HAND SURGERY     • HERNIA REPAIR     • JOINT REPLACEMENT     • KNEE SURGERY     • MANDIBLE SURGERY     • NEUROPLASTY / TRANSPOSITION MEDIAN NERVE AT CARPAL TUNNEL     • ORIF TIBIAL PLATEAU Right     arthroplasty per allscripts   • OTHER SURGICAL HISTORY      injection of trigger point(s) per allscripts   • MD ARTHRP KNE CONDYLE&PLATU MEDIAL&LAT COMPARTMENTS Right 06/11/2018    Procedure: ARTHROPLASTY KNEE TOTAL;  Surgeon: Lilian Blair MD;  Location: [facility];  Service: Orthopedics   • MD TENDON SHEATH INCISION Right 12/02/2020    Procedure: Phillipo Lis buchanan;  Surgeon: Agueda Avelar MD;  Location: [facility];  Service: Orthopedics   • SINUS SURGERY     • SPINE SURGERY     • TONSILLECTOMY        Family History:     Family History   Problem Relation Age of Onset   • Heart failure Mother    • Anxiety disorder Mother         symptom per allscripts   • Depression Mother    • Anxiety disorder Father         symptom per allscripts   • Cirrhosis Father         hepatic per allscripts   • Alcohol abuse Father    • Stomach cancer Maternal Grandmother    • Cancer Maternal Grandmother    • Stomach cancer Maternal Grandfather    • Cancer Maternal Grandfather    • Diabetes Paternal Grandmother    • No Known Problems Paternal Grandfather    • Anxiety disorder Other         symptom per allscripts   • Coronary artery disease Other    • Depression Other    • Hyperlipidemia Other    • Osteoarthritis Other    • Other Other         back disorder per allscripts   • Neuropathy Other    • No Known Problems Paternal Aunt    • No Known Problems Paternal Aunt       Social History:     Social History     Socioeconomic History   • Marital status: Single     Spouse name: None   • Number of children: None   • Years of education: completed college   • Highest education level: None   Occupational History   • Occupation:    • Occupation:    Tobacco Use   • Smoking status: Never   • Smokeless tobacco: Never   Vaping Use   • Vaping Use: Never used   Substance and Sexual Activity   • Alcohol use: No   • Drug use: No   • Sexual activity: Yes     Partners: Male     Birth control/protection: Post-menopausal   Other Topics Concern   • None   Social History Narrative    Does not participate in activities outside of the home; participates in moderate activities inside the home    Lives with mother     Social Determinants of Health     Financial Resource Strain: Not on file   Food Insecurity: Not on file   Transportation Needs: Not on file   Physical Activity: Not on file   Stress: Not on file   Social Connections: Not on file   Intimate Partner Violence: Not on file   Housing Stability: Not on file      Medications and Allergies:     Current Outpatient Medications   Medication Sig Dispense Refill   • azithromycin (Zithromax) 250 mg tablet Take 2 tablets (500 mg total) by mouth daily for 1 day, THEN 1 tablet (250 mg total) daily for 4 days  6 tablet 0   • Promethazine-DM (PHENERGAN-DM) 6 25-15 mg/5 mL oral syrup Take 5 mL by mouth 4 (four) times a day as needed for cough 240 mL 0   • Cholecalciferol (VITAMIN D3) 2000 UNITS TABS Take 2,000 Units by mouth daily       • clotrimazole-betamethasone (LOTRISONE) 1-0 05 % cream Apply topically 2 (two) times a day 45 g 2   • diclofenac sodium (VOLTAREN) 1 % APPLY 2 GRAMS TO THE AFFECTED AREA(S) BY TOPICAL ROUTE 4 TIMES PER DAY  3   • estradiol (VAGIFEM, YUVAFEM) 10 MCG TABS vaginal tablet INSERT 1 TABLET INTO THE VAGINA 2 TIMES A WEEK  24 tablet 1   • fenofibrate (FENOGLIDE) 120 MG TABS Take 120 mg by mouth daily     • fenofibrate (TRICOR) 54 MG tablet      • FLUoxetine (PROzac) 40 MG capsule Take 1 capsule (40 mg total) by mouth daily 90 capsule 1   • hydrOXYzine HCL (ATARAX) 25 mg tablet TAKE 1 TO 2 TABLETS BY MOUTH 3 TIMES A DAY AS NEEDED FOR ITCHING OR ANXIETY (MAX 4TABS/DAY) 540 tablet 1   • ibuprofen (MOTRIN) 600 mg tablet TAKE 1 TABLET BY MOUTH EVERY 6 HOURS AS NEEDED FOR MODERATE PAIN  120 tablet 2   • lidocaine (LIDODERM) 5 % lidocaine 5 % topical patch   APPLY 1 PATCH TO AFFECTED AREA FOR 12 HOURS A DAY & REMOVE FOR 12 HOURS BEFORE APPLYING NEXT PATCH   (12 HOURS ON, 12 HOURS OFF)     • mometasone (NASONEX) 50 mcg/act nasal spray SPRAY 2 SPRAYS INTO EACH NOSTRIL EVERY DAY 42 g 3   • oxyCODONE (ROXICODONE) 10 MG TABS Take 1 tablet (10 mg total) by mouth every 6 (six) hours as needed for severe pain (as needed but do not take together with tramadol) Max Daily Amount: 40 mg 30 tablet 0   • traMADol (Ultram) 50 mg tablet Take 1 tablet (50 mg total) by mouth every 8 (eight) hours as needed for moderate pain 30 tablet 0   • traZODone (DESYREL) 50 mg tablet TAKE 1/2 TO 1 TABLET BY MOUTH AT BEDTIME AS NEEDED FOR SLEEP 90 tablet 1   • triamcinolone (KENALOG) 0 5 % cream APPLY TO AFFECTED AREA 3 TIMES A DAY 60 g 1   • triamterene-hydrochlorothiazide (MAXZIDE-25) 37 5-25 mg per tablet TAKE 1 TABLET BY MOUTH EVERY DAY 90 tablet 4   • ursodiol (ACTIGALL) 300 mg capsule TAKE 1 CAPSULE BY MOUTH THREE TIMES A  capsule 2   • valACYclovir (VALTREX) 500 mg tablet TAKE 2 TABLETS BY MOUTH EVERY  tablet 0     No current facility-administered medications for this visit  Allergies   Allergen Reactions   • Codeine GI Intolerance and Nausea Only     Other reaction(s):  Other (See Comments)  Violently ill  Violently ill   • Latex Rash     itching   • Doxycycline GI Intolerance and Nausea Only   • Cymbalta [Duloxetine Hcl]    • Milk-Related Compounds - Food Allergy GI Intolerance   • Morphine And Related    • Ocaliva [Obeticholic Acid] Hives   • Orange Fruit [Citrus - Food Allergy] Other (See Comments)     Tested  positive   • Sulfa Antibiotics GI Intolerance   • Tomato - Food Allergy Other (See Comments)     Tested positive;  Eats in sauces, not pure   • Penicillins Other (See Comments) and Rash     Childhood reaction      Immunizations:     Immunization History   Administered Date(s) Administered   • COVID-19 PFIZER VACCINE 0 3 ML IM 03/26/2021, 04/16/2021, 10/28/2021   • Hep A, adult 04/29/2015   • INFLUENZA 11/10/2014, 09/02/2015, 10/20/2016, 10/24/2018   • Influenza Quadrivalent Preservative Free 3 years and older IM 11/10/2014, 09/02/2015   • Influenza Quadrivalent, 6-35 Months IM 10/20/2016   • Influenza, high dose seasonal 0 7 mL 12/22/2020   • Influenza, recombinant, quadrivalent,injectable, preservative free 10/24/2018, 10/08/2019, 11/18/2021   • Influenza, seasonal, injectable 10/15/2012   • Pneumococcal Conjugate 13-Valent 12/08/2014   • Pneumococcal Polysaccharide PPV23 10/08/2019   • Tdap 11/05/2012, 07/17/2019 Health Maintenance:         Topic Date Due   • Breast Cancer Screening: Mammogram  06/26/2021   • Cervical Cancer Screening  08/04/2022   • DXA SCAN  08/10/2023   • Colorectal Cancer Screening  06/15/2031   • HIV Screening  Completed   • Hepatitis C Screening  Completed         Topic Date Due   • Hepatitis A Vaccine (2 of 2 - Risk 2-dose series) 10/29/2015   • Hepatitis B Vaccine (1 of 3 - Risk 3-dose series) Never done   • COVID-19 Vaccine (4 - Booster for Pfizer series) 12/23/2021   • Influenza Vaccine (1) 09/01/2022      Medicare Screening Tests and Risk Assessments:     Yadi Lauren is here for her Subsequent Wellness visit  Last Medicare Wellness visit information reviewed, patient interviewed and updates made to the record today  Health Risk Assessment:   Patient rates overall health as fair  Patient feels that their physical health rating is same  Patient is satisfied with their life  Eyesight was rated as same  Hearing was rated as same  Patient feels that their emotional and mental health rating is slightly better  Patients states they are sometimes angry  Patient states they are always unusually tired/fatigued  Pain experienced in the last 7 days has been a lot  Patient's pain rating has been 9/10  Patient states that she has experienced weight loss or gain in last 6 months  Fall Risk Screening: In the past year, patient has experienced: history of falling in past year    Number of falls: 2 or more  Injured during fall?: No    Feels unsteady when standing or walking?: Yes    Worried about falling?: Yes      Urinary Incontinence Screening:   Patient has leaked urine accidently in the last six months  Home Safety:  Patient has trouble with stairs inside or outside of their home  Patient has working smoke alarms and has working carbon monoxide detector  Home safety hazards include: none  Nutrition:   Current diet is Regular       Medications:   Patient is currently taking over-the-counter supplements  OTC medications include: see medication list  Patient is able to manage medications  Activities of Daily Living (ADLs)/Instrumental Activities of Daily Living (IADLs):   Walk and transfer into and out of bed and chair?: Yes  Dress and groom yourself?: Yes    Bathe or shower yourself?: Yes    Feed yourself? Yes  Do your laundry/housekeeping?: Yes  Manage your money, pay your bills and track your expenses?: Yes  Make your own meals?: Yes    Do your own shopping?: Yes    Previous Hospitalizations:   Any hospitalizations or ED visits within the last 12 months?: No      Advance Care Planning:   Living will: No    Durable POA for healthcare: No    Advanced directive: No    Five wishes given: No      PREVENTIVE SCREENINGS      Cardiovascular Screening:    General: Screening Not Indicated and History Lipid Disorder      Diabetes Screening:     General: Screening Current      Colorectal Cancer Screening:     General: Screening Current      Cervical Cancer Screening:    General: Screening Current      Osteoporosis Screening:    General: Screening Not Indicated and History Osteoporosis      Lung Cancer Screening:     General: Screening Not Indicated      Hepatitis C Screening:    General: Screening Current    Screening, Brief Intervention, and Referral to Treatment (SBIRT)    Screening  Typical number of drinks in a day: 0  Typical number of drinks in a week: 0  Interpretation: Low risk drinking behavior      AUDIT-C Screenin) How often did you have a drink containing alcohol in the past year? never  2) How many drinks did you have on a typical day when you were drinking in the past year? 0  3) How often did you have 6 or more drinks on one occasion in the past year? never    AUDIT-C Score: 0  Interpretation: Score 0-2 (female): Negative screen for alcohol misuse    Single Item Drug Screening:  How often have you used an illegal drug (including marijuana) or a prescription medication for non-medical reasons in the past year? never    Single Item Drug Screen Score: 0  Interpretation: Negative screen for possible drug use disorder    Review of Current Opioid Use    Opioid Risk Tool (ORT) Interpretation: Complete Opioid Risk Tool (ORT)    No results found       Physical Exam:     Pulse 76   Temp 98 9 °F (37 2 °C)   Resp 16   Ht 5' 9" (1 753 m)   Wt 71 7 kg (158 lb)   LMP  (LMP Unknown)   SpO2 97%   BMI 23 33 kg/m²     Physical Exam     Zeyad Ocasio DO

## 2023-02-28 NOTE — PATIENT INSTRUCTIONS
Medicare Preventive Visit Patient Instructions  Thank you for completing your Welcome to Medicare Visit or Medicare Annual Wellness Visit today  Your next wellness visit will be due in one year (2/29/2024)  The screening/preventive services that you may require over the next 5-10 years are detailed below  Some tests may not apply to you based off risk factors and/or age  Screening tests ordered at today's visit but not completed yet may show as past due  Also, please note that scanned in results may not display below  Preventive Screenings:  Service Recommendations Previous Testing/Comments   Colorectal Cancer Screening  * Colonoscopy    * Fecal Occult Blood Test (FOBT)/Fecal Immunochemical Test (FIT)  * Fecal DNA/Cologuard Test  * Flexible Sigmoidoscopy Age: 39-70 years old   Colonoscopy: every 10 years (may be performed more frequently if at higher risk)  OR  FOBT/FIT: every 1 year  OR  Cologuard: every 3 years  OR  Sigmoidoscopy: every 5 years  Screening may be recommended earlier than age 39 if at higher risk for colorectal cancer  Also, an individualized decision between you and your healthcare provider will decide whether screening between the ages of 74-80 would be appropriate  Colonoscopy: 06/15/2021  FOBT/FIT: Not on file  Cologuard: Not on file  Sigmoidoscopy: Not on file    Screening Current     Breast Cancer Screening Age: 36 years old  Frequency: every 1-2 years  Not required if history of left and right mastectomy Mammogram: 06/26/2020        Cervical Cancer Screening Between the ages of 21-29, pap smear recommended once every 3 years  Between the ages of 33-67, can perform pap smear with HPV co-testing every 5 years     Recommendations may differ for women with a history of total hysterectomy, cervical cancer, or abnormal pap smears in past  Pap Smear: 08/04/2021    Screening Current   Hepatitis C Screening Once for adults born between 1945 and 1965  More frequently in patients at high risk for Hepatitis C Hep C Antibody: 07/31/2014    Screening Current   Diabetes Screening 1-2 times per year if you're at risk for diabetes or have pre-diabetes Fasting glucose: 90 mg/dL (2/18/2023)  A1C: 6 5 % (5/22/2018)  Screening Current   Cholesterol Screening Once every 5 years if you don't have a lipid disorder  May order more often based on risk factors  Lipid panel: 07/27/2022    Screening Not Indicated  History Lipid Disorder     Other Preventive Screenings Covered by Medicare:  1  Abdominal Aortic Aneurysm (AAA) Screening: covered once if your at risk  You're considered to be at risk if you have a family history of AAA  2  Lung Cancer Screening: covers low dose CT scan once per year if you meet all of the following conditions: (1) Age 50-69; (2) No signs or symptoms of lung cancer; (3) Current smoker or have quit smoking within the last 15 years; (4) You have a tobacco smoking history of at least 20 pack years (packs per day multiplied by number of years you smoked); (5) You get a written order from a healthcare provider  3  Glaucoma Screening: covered annually if you're considered high risk: (1) You have diabetes OR (2) Family history of glaucoma OR (3)  aged 48 and older OR (3)  American aged 72 and older  3  Osteoporosis Screening: covered every 2 years if you meet one of the following conditions: (1) You're estrogen deficient and at risk for osteoporosis based off medical history and other findings; (2) Have a vertebral abnormality; (3) On glucocorticoid therapy for more than 3 months; (4) Have primary hyperparathyroidism; (5) On osteoporosis medications and need to assess response to drug therapy  · Last bone density test (DXA Scan): 08/10/2021   5  HIV Screening: covered annually if you're between the age of 15-65  Also covered annually if you are younger than 13 and older than 72 with risk factors for HIV infection   For pregnant patients, it is covered up to 3 times per pregnancy  Immunizations:  Immunization Recommendations   Influenza Vaccine Annual influenza vaccination during flu season is recommended for all persons aged >= 6 months who do not have contraindications   Pneumococcal Vaccine   * Pneumococcal conjugate vaccine = PCV13 (Prevnar 13), PCV15 (Vaxneuvance), PCV20 (Prevnar 20)  * Pneumococcal polysaccharide vaccine = PPSV23 (Pneumovax) Adults 25-60 years old: 1-3 doses may be recommended based on certain risk factors  Adults 72 years old: 1-2 doses may be recommended based off what pneumonia vaccine you previously received   Hepatitis B Vaccine 3 dose series if at intermediate or high risk (ex: diabetes, end stage renal disease, liver disease)   Tetanus (Td) Vaccine - COST NOT COVERED BY MEDICARE PART B Following completion of primary series, a booster dose should be given every 10 years to maintain immunity against tetanus  Td may also be given as tetanus wound prophylaxis  Tdap Vaccine - COST NOT COVERED BY MEDICARE PART B Recommended at least once for all adults  For pregnant patients, recommended with each pregnancy  Shingles Vaccine (Shingrix) - COST NOT COVERED BY MEDICARE PART B  2 shot series recommended in those aged 48 and above     Health Maintenance Due:      Topic Date Due   • Breast Cancer Screening: Mammogram  06/26/2021   • Cervical Cancer Screening  08/04/2022   • DXA SCAN  08/10/2023   • Colorectal Cancer Screening  06/15/2031   • HIV Screening  Completed   • Hepatitis C Screening  Completed     Immunizations Due:      Topic Date Due   • Hepatitis A Vaccine (2 of 2 - Risk 2-dose series) 10/29/2015   • Hepatitis B Vaccine (1 of 3 - Risk 3-dose series) Never done   • COVID-19 Vaccine (4 - Booster for Pfizer series) 12/23/2021   • Influenza Vaccine (1) 09/01/2022     Advance Directives   What are advance directives? Advance directives are legal documents that state your wishes and plans for medical care   These plans are made ahead of time in case you lose your ability to make decisions for yourself  Advance directives can apply to any medical decision, such as the treatments you want, and if you want to donate organs  What are the types of advance directives? There are many types of advance directives, and each state has rules about how to use them  You may choose a combination of any of the following:  · Living will: This is a written record of the treatment you want  You can also choose which treatments you do not want, which to limit, and which to stop at a certain time  This includes surgery, medicine, IV fluid, and tube feedings  · Durable power of  for healthcare [facility]): This is a written record that states who you want to make healthcare choices for you when you are unable to make them for yourself  This person, called a proxy, is usually a family member or a friend  You may choose more than 1 proxy  · Do not resuscitate (DNR) order:  A DNR order is used in case your heart stops beating or you stop breathing  It is a request not to have certain forms of treatment, such as CPR  A DNR order may be included in other types of advance directives  · Medical directive: This covers the care that you want if you are in a coma, near death, or unable to make decisions for yourself  You can list the treatments you want for each condition  Treatment may include pain medicine, surgery, blood transfusions, dialysis, IV or tube feedings, and a ventilator (breathing machine)  · Values history: This document has questions about your views, beliefs, and how you feel and think about life  This information can help others choose the care that you would choose  Why are advance directives important? An advance directive helps you control your care  Although spoken wishes may be used, it is better to have your wishes written down  Spoken wishes can be misunderstood, or not followed  Treatments may be given even if you do not want them   An advance directive may make it easier for your family to make difficult choices about your care  Urinary Incontinence   Urinary incontinence (UI)  is when you lose control of your bladder  UI develops because your bladder cannot store or empty urine properly  The 3 most common types of UI are stress incontinence, urge incontinence, or both  Medicines:   · May be given to help strengthen your bladder control  Report any side effects of medication to your healthcare provider  Do pelvic muscle exercises often:  Your pelvic muscles help you stop urinating  Squeeze these muscles tight for 5 seconds, then relax for 5 seconds  Gradually work up to squeezing for 10 seconds  Do 3 sets of 15 repetitions a day, or as directed  This will help strengthen your pelvic muscles and improve bladder control  Train your bladder:  Go to the bathroom at set times, such as every 2 hours, even if you do not feel the urge to go  You can also try to hold your urine when you feel the urge to go  For example, hold your urine for 5 minutes when you feel the urge to go  As that becomes easier, hold your urine for 10 minutes  Self-care:   · Keep a UI record  Write down how often you leak urine and how much you leak  Make a note of what you were doing when you leaked urine  · Drink liquids as directed  You may need to limit the amount of liquid you drink to help control your urine leakage  Do not drink any liquid right before you go to bed  Limit or do not have drinks that contain caffeine or alcohol  · Prevent constipation  Eat a variety of high-fiber foods  Good examples are high-fiber cereals, beans, vegetables, and whole-grain breads  Walking is the best way to trigger your intestines to have a bowel movement  · Exercise regularly and maintain a healthy weight  Weight loss and exercise will decrease pressure on your bladder and help you control your leakage  · Use a catheter as directed  to help empty your bladder   A catheter is a tiny, plastic tube that is put into your bladder to drain your urine  · Go to behavior therapy as directed  Behavior therapy may be used to help you learn to control your urge to urinate  Narcotic (Opioid) Safety    Use narcotics safely:  · Take prescribed narcotics exactly as directed  · Do not give narcotics to others or take narcotics that belong to someone else  · Do not mix narcotics without medicines or alcohol  · Do not drive or operate heavy machinery after you take the narcotic  · Monitor for side effects and notify your healthcare provider if you experienced side effects such as nausea, sleepiness, itching, or trouble thinking clearly  Manage constipation:    Constipation is the most common side effect of narcotic medicine  Constipation is when you have hard, dry bowel movements, or you go longer than usual between bowel movements  Tell your healthcare provider about all changes in your bowel movements while you are taking narcotics  He or she may recommend laxative medicine to help you have a bowel movement  He or she may also change the kind of narcotic you are taking, or change when you take it  The following are more ways you can prevent or relieve constipation:    · Drink liquids as directed  You may need to drink extra liquids to help soften and move your bowels  Ask how much liquid to drink each day and which liquids are best for you  · Eat high-fiber foods  This may help decrease constipation by adding bulk to your bowel movements  High-fiber foods include fruits, vegetables, whole-grain breads and cereals, and beans  Your healthcare provider or dietitian can help you create a high-fiber meal plan  Your provider may also recommend a fiber supplement if you cannot get enough fiber from food  · Exercise regularly  Regular physical activity can help stimulate your intestines  Walking is a good exercise to prevent or relieve constipation  Ask which exercises are best for you    · Schedule a time each day to have a bowel movement  This may help train your body to have regular bowel movements  Bend forward while you are on the toilet to help move the bowel movement out  Sit on the toilet for at least 10 minutes, even if you do not have a bowel movement  Store narcotics safely:   · Store narcotics where others cannot easily get them  Keep them in a locked cabinet or secure area  Do not  keep them in a purse or other bag you carry with you  A person may be looking for something else and find the narcotics  · Make sure narcotics are stored out of the reach of children  A child can easily overdose on narcotics  Narcotics may look like candy to a small child  The best way to dispose of narcotics: The laws vary by country and area  In the [place], the best way is to return the narcotics through a take-back program  This program is offered by the [employer] ([employer])  The following are options for using the program:  · Take the narcotics to a SHELLIE collection site  The site is often a law enforcement center  Call your local law enforcement center for scheduled take-back days in your area  You will be given information on where to go if the collection site is in a different location  · Take the narcotics to an approved pharmacy or hospital   A pharmacy or hospital may be set up as a collection site  You will need to ask if it is a SHELLIE collection site if you were not directed there  A pharmacy or doctor's office may not be able to take back narcotics unless it is a SHELLIE site  · Use a mail-back system  This means you are given containers to put the narcotics into  You will then mail them in the containers  · Use a take-back drop box  This is a place to leave the narcotics at any time  People and animals will not be able to get into the box  Your local law enforcement agency can tell you where to find a drop box in your area      Other ways to manage pain:   · Ask your healthcare provider about non-narcotic medicines to control pain  Nonprescription medicines include NSAIDs (such as ibuprofen) and acetaminophen  Prescription medicines include muscle relaxers, antidepressants, and steroids  · Pain may be managed without any medicines  Some ways to relieve pain include massage, aromatherapy, or meditation  Physical or occupational therapy may also help  For more information:   · Drug Enforcement Administration  [address] [facility] [address]  Phone: 0- 863 - 012-9649  Web Address: [facility]/drug_disposal/    · [address]  and Drug Administration  [place] Yamileth Perez , [address] [facility]  Phone: 3- 990 - 447-5347  Web Address: http://[employer]/     © Copyright [employer] 2018 Information is for End User's use only and may not be sold, redistributed or otherwise used for commercial purposes   All illustrations and images included in CareNotes® are the copyrighted property of A D A M , Inc  or [address] [place] [address]

## 2023-03-01 ENCOUNTER — TELEPHONE (OUTPATIENT)
Dept: INTERNAL MEDICINE CLINIC | Facility: CLINIC | Age: 63
End: 2023-03-01

## 2023-03-01 LAB
FLUAV RNA RESP QL NAA+PROBE: NEGATIVE
FLUBV RNA RESP QL NAA+PROBE: NEGATIVE
SARS-COV-2 RNA RESP QL NAA+PROBE: NEGATIVE

## 2023-03-01 NOTE — TELEPHONE ENCOUNTER
----- Message from Chetna Cota DO sent at 3/1/2023  3:23 PM EST -----  Please let pateint know her covid and flu test and sputum culture came back normal  She likely has a viral infection and should rest and give it time  She can try the second round of antibiotic azithromycin to see if it will help

## 2023-03-02 ENCOUNTER — TELEMEDICINE (OUTPATIENT)
Dept: PSYCHIATRY | Facility: CLINIC | Age: 63
End: 2023-03-02

## 2023-03-02 DIAGNOSIS — F41.1 GAD (GENERALIZED ANXIETY DISORDER): ICD-10-CM

## 2023-03-02 DIAGNOSIS — F42.9 OBSESSIVE-COMPULSIVE DISORDER, UNSPECIFIED TYPE: ICD-10-CM

## 2023-03-02 DIAGNOSIS — F43.10 PTSD (POST-TRAUMATIC STRESS DISORDER): ICD-10-CM

## 2023-03-02 DIAGNOSIS — F33.0 MDD (MAJOR DEPRESSIVE DISORDER), RECURRENT EPISODE, MILD (HCC): ICD-10-CM

## 2023-03-02 LAB
BACTERIA SPT RESP CULT: NORMAL
GRAM STN SPEC: NORMAL

## 2023-03-02 RX ORDER — FLUOXETINE HYDROCHLORIDE 40 MG/1
40 CAPSULE ORAL DAILY
Qty: 90 CAPSULE | Refills: 1 | Status: SHIPPED | OUTPATIENT
Start: 2023-03-02
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
## 2023-04-03 ENCOUNTER — TELEPHONE (OUTPATIENT)
Dept: DERMATOLOGY | Facility: CLINIC | Age: 63
End: 2023-04-03

## 2023-05-13 ENCOUNTER — HOSPITAL ENCOUNTER (INPATIENT)
Facility: HOSPITAL | Age: 63
LOS: 3 days | Discharge: HOME | End: 2023-05-16
Attending: EMERGENCY MEDICINE | Admitting: INTERNAL MEDICINE

## 2023-05-13 ENCOUNTER — APPOINTMENT (EMERGENCY)
Dept: RADIOLOGY | Facility: HOSPITAL | Age: 63
End: 2023-05-13

## 2023-05-13 DIAGNOSIS — J18.9 PNEUMONIA OF RIGHT MIDDLE LOBE DUE TO INFECTIOUS ORGANISM: ICD-10-CM

## 2023-05-13 DIAGNOSIS — N28.89 RENAL MASS: ICD-10-CM

## 2023-05-13 DIAGNOSIS — E87.6 HYPOKALEMIA: ICD-10-CM

## 2023-05-13 DIAGNOSIS — E87.1 HYPONATREMIA: ICD-10-CM

## 2023-05-13 DIAGNOSIS — J18.9 RIGHT UPPER LOBE PNEUMONIA: Primary | ICD-10-CM

## 2023-05-13 PROBLEM — R11.2 NAUSEA & VOMITING: Status: ACTIVE | Noted: 2023-05-13

## 2023-05-13 PROBLEM — M79.10 MYALGIA: Status: ACTIVE | Noted: 2023-05-13

## 2023-05-13 LAB
ALBUMIN SERPL BCP-MCNC: 3.2 G/DL (ref 3.5–5)
ALP SERPL-CCNC: 376 U/L (ref 46–116)
ALT SERPL W P-5'-P-CCNC: 59 U/L (ref 12–78)
AMORPH URATE CRY URNS QL MICRO: ABNORMAL
ANION GAP SERPL CALCULATED.3IONS-SCNC: 4 MMOL/L (ref 4–13)
AST SERPL W P-5'-P-CCNC: 42 U/L (ref 5–45)
ATRIAL RATE: 91 BPM
BACTERIA UR QL AUTO: ABNORMAL /HPF
BASOPHILS # BLD AUTO: 0.02 THOUSANDS/ÂΜL (ref 0–0.1)
BASOPHILS NFR BLD AUTO: 0 % (ref 0–1)
BILIRUB SERPL-MCNC: 0.82 MG/DL (ref 0.2–1)
BILIRUB UR QL STRIP: NEGATIVE
BUN SERPL-MCNC: 19 MG/DL (ref 5–25)
CALCIUM ALBUM COR SERPL-MCNC: 9.3 MG/DL (ref 8.3–10.1)
CALCIUM SERPL-MCNC: 8.7 MG/DL (ref 8.3–10.1)
CHLORIDE SERPL-SCNC: 98 MMOL/L (ref 96–108)
CLARITY UR: CLEAR
CO2 SERPL-SCNC: 26 MMOL/L (ref 21–32)
COLOR UR: YELLOW
CREAT SERPL-MCNC: 1.22 MG/DL (ref 0.6–1.3)
EOSINOPHIL # BLD AUTO: 0 THOUSAND/ÂΜL (ref 0–0.61)
EOSINOPHIL NFR BLD AUTO: 0 % (ref 0–6)
ERYTHROCYTE [DISTWIDTH] IN BLOOD BY AUTOMATED COUNT: 13.2 % (ref 11.6–15.1)
EST. AVERAGE GLUCOSE BLD GHB EST-MCNC: 105 MG/DL
FLUAV RNA RESP QL NAA+PROBE: NEGATIVE
FLUBV RNA RESP QL NAA+PROBE: NEGATIVE
GFR SERPL CREATININE-BSD FRML MDRD: 47 ML/MIN/1.73SQ M
GLUCOSE SERPL-MCNC: 166 MG/DL (ref 65–140)
GLUCOSE UR STRIP-MCNC: NEGATIVE MG/DL
HBA1C MFR BLD: 5.3 %
HCT VFR BLD AUTO: 36.3 % (ref 34.8–46.1)
HGB BLD-MCNC: 12.2 G/DL (ref 11.5–15.4)
HGB UR QL STRIP.AUTO: ABNORMAL
IMM GRANULOCYTES # BLD AUTO: 0.16 THOUSAND/UL (ref 0–0.2)
IMM GRANULOCYTES NFR BLD AUTO: 1 % (ref 0–2)
KETONES UR STRIP-MCNC: NEGATIVE MG/DL
LEUKOCYTE ESTERASE UR QL STRIP: ABNORMAL
LIPASE SERPL-CCNC: 90 U/L (ref 73–393)
LYMPHOCYTES # BLD AUTO: 1.23 THOUSANDS/ÂΜL (ref 0.6–4.47)
LYMPHOCYTES NFR BLD AUTO: 8 % (ref 14–44)
MAGNESIUM SERPL-MCNC: 2.1 MG/DL (ref 1.6–2.6)
MCH RBC QN AUTO: 30.8 PG (ref 26.8–34.3)
MCHC RBC AUTO-ENTMCNC: 33.6 G/DL (ref 31.4–37.4)
MCV RBC AUTO: 92 FL (ref 82–98)
MONOCYTES # BLD AUTO: 0.97 THOUSAND/ÂΜL (ref 0.17–1.22)
MONOCYTES NFR BLD AUTO: 6 % (ref 4–12)
MUCOUS THREADS UR QL AUTO: ABNORMAL
NEUTROPHILS # BLD AUTO: 14.01 THOUSANDS/ÂΜL (ref 1.85–7.62)
NEUTS SEG NFR BLD AUTO: 85 % (ref 43–75)
NITRITE UR QL STRIP: NEGATIVE
NON-SQ EPI CELLS URNS QL MICRO: ABNORMAL /HPF
NRBC BLD AUTO-RTO: 0 /100 WBCS
P AXIS: 40 DEGREES
PH UR STRIP.AUTO: 5.5 [PH]
PLATELET # BLD AUTO: 167 THOUSANDS/UL (ref 149–390)
PMV BLD AUTO: 10.5 FL (ref 8.9–12.7)
POTASSIUM SERPL-SCNC: 3.2 MMOL/L (ref 3.5–5.3)
PR INTERVAL: 120 MS
PROCALCITONIN SERPL-MCNC: 2.43 NG/ML
PROT SERPL-MCNC: 8.8 G/DL (ref 6.4–8.4)
PROT UR STRIP-MCNC: ABNORMAL MG/DL
QRS AXIS: 50 DEGREES
QRSD INTERVAL: 88 MS
QT INTERVAL: 362 MS
QTC INTERVAL: 445 MS
RBC # BLD AUTO: 3.96 MILLION/UL (ref 3.81–5.12)
RBC #/AREA URNS AUTO: ABNORMAL /HPF
RSV RNA RESP QL NAA+PROBE: NEGATIVE
SARS-COV-2 RNA RESP QL NAA+PROBE: NEGATIVE
SODIUM SERPL-SCNC: 128 MMOL/L (ref 135–147)
SP GR UR STRIP.AUTO: 1.02 (ref 1–1.03)
T WAVE AXIS: 8 DEGREES
TSH SERPL DL<=0.05 MIU/L-ACNC: 0.71 UIU/ML (ref 0.45–4.5)
UROBILINOGEN UR STRIP-ACNC: <2 MG/DL
VENTRICULAR RATE: 91 BPM
WBC # BLD AUTO: 16.39 THOUSAND/UL (ref 4.31–10.16)
WBC #/AREA URNS AUTO: ABNORMAL /HPF

## 2023-05-13 RX ORDER — TRAZODONE HYDROCHLORIDE 50 MG/1
25 TABLET ORAL
Status: DISCONTINUED | OUTPATIENT
Start: 2023-05-13 | End: 2023-05-16 | Stop reason: HOSPADM

## 2023-05-13 RX ORDER — POTASSIUM CHLORIDE 14.9 MG/ML
20 INJECTION INTRAVENOUS
Status: DISCONTINUED | OUTPATIENT
Start: 2023-05-13 | End: 2023-05-13

## 2023-05-13 RX ORDER — FLUOXETINE HYDROCHLORIDE 20 MG/1
40 CAPSULE ORAL DAILY
Status: DISCONTINUED | OUTPATIENT
Start: 2023-05-14 | End: 2023-05-16 | Stop reason: HOSPADM

## 2023-05-13 RX ORDER — ENOXAPARIN SODIUM 100 MG/ML
40 INJECTION SUBCUTANEOUS DAILY
Status: DISCONTINUED | OUTPATIENT
Start: 2023-05-14 | End: 2023-05-16 | Stop reason: HOSPADM

## 2023-05-13 RX ORDER — LIDOCAINE 50 MG/G
1 PATCH TOPICAL DAILY
Status: DISCONTINUED | OUTPATIENT
Start: 2023-05-14 | End: 2023-05-16 | Stop reason: HOSPADM

## 2023-05-13 RX ORDER — OXYCODONE HYDROCHLORIDE 5 MG/1
5 TABLET ORAL EVERY 4 HOURS PRN
Status: DISCONTINUED | OUTPATIENT
Start: 2023-05-13 | End: 2023-05-13

## 2023-05-13 RX ORDER — KETOROLAC TROMETHAMINE 30 MG/ML
15 INJECTION, SOLUTION INTRAMUSCULAR; INTRAVENOUS ONCE
Status: COMPLETED | OUTPATIENT
Start: 2023-05-13 | End: 2023-05-13

## 2023-05-13 RX ORDER — METHOCARBAMOL 500 MG/1
500 TABLET, FILM COATED ORAL EVERY 6 HOURS SCHEDULED
Status: DISCONTINUED | OUTPATIENT
Start: 2023-05-13 | End: 2023-05-16 | Stop reason: HOSPADM

## 2023-05-13 RX ORDER — HYDROMORPHONE HCL/PF 1 MG/ML
0.5 SYRINGE (ML) INJECTION ONCE
Status: COMPLETED | OUTPATIENT
Start: 2023-05-13 | End: 2023-05-13

## 2023-05-13 RX ORDER — OXYCODONE HYDROCHLORIDE 10 MG/1
10 TABLET ORAL EVERY 6 HOURS PRN
Status: DISCONTINUED | OUTPATIENT
Start: 2023-05-13 | End: 2023-05-16 | Stop reason: HOSPADM

## 2023-05-13 RX ORDER — TRAMADOL HYDROCHLORIDE 50 MG/1
50 TABLET ORAL EVERY 6 HOURS PRN
Status: DISCONTINUED | OUTPATIENT
Start: 2023-05-13 | End: 2023-05-16 | Stop reason: HOSPADM

## 2023-05-13 RX ORDER — LIDOCAINE 50 MG/G
1 PATCH TOPICAL ONCE
Status: COMPLETED | OUTPATIENT
Start: 2023-05-13 | End: 2023-05-14

## 2023-05-13 RX ORDER — AZITHROMYCIN 500 MG/1
500 TABLET, FILM COATED ORAL ONCE
Status: COMPLETED | OUTPATIENT
Start: 2023-05-13 | End: 2023-05-13

## 2023-05-13 RX ORDER — URSODIOL 300 MG/1
300 CAPSULE ORAL 3 TIMES DAILY
Status: DISCONTINUED | OUTPATIENT
Start: 2023-05-13 | End: 2023-05-16 | Stop reason: HOSPADM

## 2023-05-13 RX ORDER — SODIUM CHLORIDE, SODIUM GLUCONATE, SODIUM ACETATE, POTASSIUM CHLORIDE, MAGNESIUM CHLORIDE, SODIUM PHOSPHATE, DIBASIC, AND POTASSIUM PHOSPHATE .53; .5; .37; .037; .03; .012; .00082 G/100ML; G/100ML; G/100ML; G/100ML; G/100ML; G/100ML; G/100ML
75 INJECTION, SOLUTION INTRAVENOUS CONTINUOUS
Status: DISCONTINUED | OUTPATIENT
Start: 2023-05-13 | End: 2023-05-14

## 2023-05-13 RX ORDER — ONDANSETRON 2 MG/ML
4 INJECTION INTRAMUSCULAR; INTRAVENOUS ONCE
Status: COMPLETED | OUTPATIENT
Start: 2023-05-13 | End: 2023-05-13

## 2023-05-13 RX ORDER — MELATONIN
2000 DAILY
Status: DISCONTINUED | OUTPATIENT
Start: 2023-05-14 | End: 2023-05-16 | Stop reason: HOSPADM

## 2023-05-13 RX ORDER — FENOFIBRATE 48 MG/1
120 TABLET, COATED ORAL DAILY
Status: DISCONTINUED | OUTPATIENT
Start: 2023-05-14 | End: 2023-05-16 | Stop reason: HOSPADM

## 2023-05-13 RX ORDER — AZITHROMYCIN 250 MG/1
500 TABLET, FILM COATED ORAL EVERY 24 HOURS
Status: DISCONTINUED | OUTPATIENT
Start: 2023-05-14 | End: 2023-05-14

## 2023-05-13 RX ORDER — FENTANYL CITRATE 50 UG/ML
50 INJECTION, SOLUTION INTRAMUSCULAR; INTRAVENOUS ONCE
Status: DISCONTINUED | OUTPATIENT
Start: 2023-05-13 | End: 2023-05-13

## 2023-05-13 RX ORDER — POTASSIUM CHLORIDE 20 MEQ/1
20 TABLET, EXTENDED RELEASE ORAL ONCE
Status: COMPLETED | OUTPATIENT
Start: 2023-05-13 | End: 2023-05-13

## 2023-05-13 RX ORDER — ACETAMINOPHEN 325 MG/1
650 TABLET ORAL EVERY 6 HOURS PRN
Status: DISCONTINUED | OUTPATIENT
Start: 2023-05-13 | End: 2023-05-16 | Stop reason: HOSPADM

## 2023-05-13 RX ORDER — ACETAMINOPHEN 325 MG/1
650 TABLET ORAL ONCE
Status: DISCONTINUED | OUTPATIENT
Start: 2023-05-13 | End: 2023-05-13

## 2023-05-13 RX ADMIN — SODIUM CHLORIDE 1000 ML: 0.9 INJECTION, SOLUTION INTRAVENOUS at 17:12

## 2023-05-13 RX ADMIN — LIDOCAINE 5% 1 PATCH: 700 PATCH TOPICAL at 19:18

## 2023-05-13 RX ADMIN — ONDANSETRON 4 MG: 2 INJECTION INTRAMUSCULAR; INTRAVENOUS at 17:13

## 2023-05-13 RX ADMIN — TRAMADOL HYDROCHLORIDE 50 MG: 50 TABLET, COATED ORAL at 20:29

## 2023-05-13 RX ADMIN — POTASSIUM CHLORIDE 20 MEQ: 14.9 INJECTION, SOLUTION INTRAVENOUS at 20:28

## 2023-05-13 RX ADMIN — CEFTRIAXONE SODIUM 1000 MG: 10 INJECTION, POWDER, FOR SOLUTION INTRAVENOUS at 19:02

## 2023-05-13 RX ADMIN — HYDROMORPHONE HYDROCHLORIDE 0.5 MG: 1 INJECTION, SOLUTION INTRAMUSCULAR; INTRAVENOUS; SUBCUTANEOUS at 18:25

## 2023-05-13 RX ADMIN — METHOCARBAMOL 500 MG: 500 TABLET ORAL at 20:29

## 2023-05-13 RX ADMIN — POTASSIUM CHLORIDE 20 MEQ: 1500 TABLET, EXTENDED RELEASE ORAL at 22:54

## 2023-05-13 RX ADMIN — AZITHROMYCIN 500 MG: 500 TABLET, FILM COATED ORAL at 18:25

## 2023-05-13 RX ADMIN — OXYCODONE HYDROCHLORIDE 10 MG: 10 TABLET ORAL at 22:15

## 2023-05-13 RX ADMIN — KETOROLAC TROMETHAMINE 15 MG: 30 INJECTION, SOLUTION INTRAMUSCULAR; INTRAVENOUS at 17:13

## 2023-05-13 RX ADMIN — HYDROMORPHONE HYDROCHLORIDE 0.5 MG: 1 INJECTION, SOLUTION INTRAMUSCULAR; INTRAVENOUS; SUBCUTANEOUS at 23:20

## 2023-05-13 RX ADMIN — SODIUM CHLORIDE, SODIUM GLUCONATE, SODIUM ACETATE, POTASSIUM CHLORIDE, MAGNESIUM CHLORIDE, SODIUM PHOSPHATE, DIBASIC, AND POTASSIUM PHOSPHATE 75 ML/HR: .53; .5; .37; .037; .03; .012; .00082 INJECTION, SOLUTION INTRAVENOUS at 20:28

## 2023-05-13 RX ADMIN — DICLOFENAC SODIUM 2 G: 10 GEL TOPICAL at 22:57

## 2023-05-13 RX ADMIN — TRAZODONE HYDROCHLORIDE 25 MG: 50 TABLET ORAL at 23:20

## 2023-05-13 RX ADMIN — URSODIOL 300 MG: 300 CAPSULE ORAL at 23:20

## 2023-05-13 NOTE — ED ATTENDING ATTESTATION
5/13/2023  ITrena DO, saw and evaluated the patient  I have discussed the patient with the resident/non-physician practitioner and agree with the resident's/non-physician practitioner's findings, Plan of Care, and MDM as documented in the resident's/non-physician practitioner's note, except where noted  All available labs and Radiology studies were reviewed  I was present for key portions of any procedure(s) performed by the resident/non-physician practitioner and I was immediately available to provide assistance  At this point I agree with the current assessment done in the Emergency Department  I have conducted an independent evaluation of this patient a history and physical is as follows:    ED Course     58-year-old female who follows with hematology for reports of smoldering multiple myeloma and also with history of primary biliary sclerosis coming in for a few days of feeling unwell with intermittent fevers chills, night sweats, nausea and vomiting with difficulty tolerating p o  Patient trying Tylenol Motrin at home without any relief  Also with cough but denying chest pain or shortness of breath  ROS: otherwise negative    Physical Exam  Vitals and nursing note reviewed  Constitutional:       General: She is not in acute distress  Appearance: She is well-developed  She is not diaphoretic  HENT:      Head: Normocephalic and atraumatic  Right Ear: External ear normal       Left Ear: External ear normal       Nose: Nose normal    Eyes:      General: No scleral icterus  Right eye: No discharge  Left eye: No discharge  Conjunctiva/sclera: Conjunctivae normal       Pupils: Pupils are equal, round, and reactive to light  Neck:      Vascular: No JVD  Trachea: No tracheal deviation  Cardiovascular:      Rate and Rhythm: Normal rate and regular rhythm  Heart sounds: Normal heart sounds  No murmur heard  No friction rub  No gallop  Pulmonary:      Effort: Pulmonary effort is normal  No respiratory distress  Breath sounds: Normal breath sounds  No stridor  No wheezing or rales  Abdominal:      General: Bowel sounds are normal  There is no distension  Palpations: Abdomen is soft  There is no mass  Tenderness: There is no abdominal tenderness  There is no guarding  Musculoskeletal:         General: No tenderness or deformity  Normal range of motion  Cervical back: Normal range of motion and neck supple  Skin:     General: Skin is warm and dry  Coloration: Skin is not pale  Findings: No erythema or rash  Neurological:      Mental Status: She is alert and oriented to person, place, and time  Cranial Nerves: No cranial nerve deficit  Sensory: No sensory deficit  Motor: No abnormal muscle tone  Psychiatric:         Behavior: Behavior normal          Thought Content: Thought content normal          Judgment: Judgment normal          Assessment:  61 yo female with generalized myalgias, fevers, night sweats, chills        Plan:  Blood work, ekg,   Toradol, IV fluids      Critical Care Time  Procedures

## 2023-05-13 NOTE — ED ATTENDING ATTESTATION
5/13/2023  IBelkis DO, saw and evaluated the patient  I have discussed the patient with the resident/non-physician practitioner and agree with the resident's/non-physician practitioner's findings, Plan of Care, and MDM as documented in the resident's/non-physician practitioner's note, except where noted  All available labs and Radiology studies were reviewed  I was present for key portions of any procedure(s) performed by the resident/non-physician practitioner and I was immediately available to provide assistance  At this point I agree with the current assessment done in the Emergency Department  I have conducted an independent evaluation of this patient a history and physical is as follows:    ED Course     Myalgias, fevers, chills, vomiting and generally weak, night sweats, cough for a few days  Unable to tolerate PO at home  Tried tylenol/motrin/oxycodone at home without any relief  No sick contacts noted  Denies CP/SOB, urine symptoms, diarrhea  ROS: Otherwise negative    Physical Exam  Vitals and nursing note reviewed  Constitutional:       General: She is not in acute distress  Appearance: She is well-developed  She is not diaphoretic  HENT:      Head: Normocephalic and atraumatic  Right Ear: External ear normal       Left Ear: External ear normal       Nose: Nose normal    Eyes:      General: No scleral icterus  Right eye: No discharge  Left eye: No discharge  Conjunctiva/sclera: Conjunctivae normal       Pupils: Pupils are equal, round, and reactive to light  Neck:      Vascular: No JVD  Trachea: No tracheal deviation  Cardiovascular:      Rate and Rhythm: Normal rate and regular rhythm  Heart sounds: Normal heart sounds  No murmur heard  No friction rub  No gallop  Pulmonary:      Effort: Pulmonary effort is normal  No respiratory distress  Breath sounds: Normal breath sounds  No stridor  No wheezing or rales  Abdominal:      General: Bowel sounds are normal  There is no distension  Palpations: Abdomen is soft  There is no mass  Tenderness: There is no abdominal tenderness  There is no guarding  Musculoskeletal:         General: No tenderness or deformity  Normal range of motion  Cervical back: Normal range of motion and neck supple  Skin:     General: Skin is warm and dry  Coloration: Skin is not pale  Findings: No erythema or rash  Neurological:      Mental Status: She is alert and oriented to person, place, and time  Cranial Nerves: No cranial nerve deficit  Sensory: No sensory deficit  Motor: No abnormal muscle tone  Psychiatric:         Behavior: Behavior normal          Thought Content:  Thought content normal          Judgment: Judgment normal            Assessment:  Cardiac workup    Plan:  Noted to have pneumonia with underlying multiple myeloma, given antibiotics  Signed out to Dr Kristina Lazcano pending likely admission            Critical Care Time  Procedures

## 2023-05-13 NOTE — ED PROVIDER NOTES
History  Chief Complaint   Patient presents with   • Muscle Pain     Pt reports muscle pain since Wednesday  States she has muscle and bone pain  Reports she tried OTC medications for at home pain management with no relief  PT also reports cold like symptoms, chills, vomiting  • Cold Like Symptoms     44-year-old female with past medical history of smoldering multiple myeloma follows up with Dr Ngozi Rees, complex regional pain syndrome, primary biliary cholangitis, [facility] emergency department complaining of myalgias has been ongoing since Monday, fever, chills, night sweats, nausea that started on Wednesday  No one else around her has been sick  She is concerned that she has been progressively not able to tolerate oral intake  She is trying Tylenol and Motrin for pain which has not worked  She tried Percocet at home for pain which did not help  Does not endorse chest pain, shortness of breath, abdominal pain  Prior to Admission Medications   Prescriptions Last Dose Informant Patient Reported? Taking? Cholecalciferol (VITAMIN D3) 2000 UNITS TABS 5/12/2023  Yes Yes   Sig: Take 2,000 Units by mouth daily     FLUoxetine (PROzac) 40 MG capsule   No No   Sig: Take 1 capsule (40 mg total) by mouth daily   Promethazine-DM (PHENERGAN-DM) 6 25-15 mg/5 mL oral syrup Not Taking  No No   Sig: Take 5 mL by mouth 4 (four) times a day as needed for cough   Patient not taking: Reported on 5/13/2023   clotrimazole-betamethasone (LOTRISONE) 1-0 05 % cream Not Taking  No No   Sig: Apply topically 2 (two) times a day   Patient not taking: Reported on 5/13/2023   diclofenac sodium (VOLTAREN) 1 %   Yes No   Sig: APPLY 2 GRAMS TO THE AFFECTED AREA(S) BY TOPICAL ROUTE 4 TIMES PER DAY   estradiol (VAGIFEM, YUVAFEM) 10 MCG TABS vaginal tablet   No No   Sig: INSERT 1 TABLET INTO THE VAGINA 2 TIMES A WEEK     fenofibrate (FENOGLIDE) 120 MG TABS   Yes No   Sig: Take 120 mg by mouth daily   fenofibrate (TRICOR) 54 MG tablet Not Taking  Yes No   Patient not taking: Reported on 5/13/2023   hydrOXYzine HCL (ATARAX) 25 mg tablet Not Taking  No No   Sig: TAKE 1 TO 2 TABLETS BY MOUTH 3 TIMES A DAY AS NEEDED FOR ITCHING OR ANXIETY (MAX 4TABS/DAY)   Patient not taking: Reported on 5/13/2023   ibuprofen (MOTRIN) 600 mg tablet   No No   Sig: TAKE 1 TABLET BY MOUTH EVERY 6 HOURS AS NEEDED FOR MODERATE PAIN    lidocaine (LIDODERM) 5 %   Yes No   Sig: lidocaine 5 % topical patch   APPLY 1 PATCH TO AFFECTED AREA FOR 12 HOURS A DAY & REMOVE FOR 12 HOURS BEFORE APPLYING NEXT PATCH   (12 HOURS ON, 12 HOURS OFF)   mometasone (NASONEX) 50 mcg/act nasal spray   No No   Sig: SPRAY 2 SPRAYS INTO EACH NOSTRIL EVERY DAY   oxyCODONE (ROXICODONE) 10 MG TABS   No No   Sig: Take 1 tablet (10 mg total) by mouth every 6 (six) hours as needed for severe pain (as needed but do not take together with tramadol) Max Daily Amount: 40 mg   traMADol (Ultram) 50 mg tablet   No No   Sig: Take 1 tablet (50 mg total) by mouth every 8 (eight) hours as needed for moderate pain   traZODone (DESYREL) 50 mg tablet   No No   Sig: TAKE 1/2 TO 1 TABLET BY MOUTH AT BEDTIME AS NEEDED FOR SLEEP   triamcinolone (KENALOG) 0 5 % cream Not Taking  No No   Sig: APPLY TO AFFECTED AREA 3 TIMES A DAY   Patient not taking: Reported on 5/13/2023   triamterene-hydrochlorothiazide (MAXZIDE-25) 37 5-25 mg per tablet   No No   Sig: TAKE 1 TABLET BY MOUTH EVERY DAY   ursodiol (ACTIGALL) 300 mg capsule   No No   Sig: TAKE 1 CAPSULE BY MOUTH THREE TIMES A DAY   valACYclovir (VALTREX) 500 mg tablet   No No   Sig: TAKE 2 TABLETS BY MOUTH EVERY DAY   Patient taking differently: As needed      Facility-Administered Medications: None       Past Medical History:   Diagnosis Date   • Abnormal breast exam    • Anxiety    • Asthma     childhood   • 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HCC)     primary per allscripts   • Cancer (HCC)     IgG kappa smoldering multiple myeloma   • Cardiac murmur    • Chronic liver disease     resolved 12/04/2017   • COVID    • Depression    • Endometriosis    • Fracture of tibia     right tibial plateau fracture per allscripts   • Heart murmur    • Hepatic disease    • Hypertension     last assessed 12/13/2017   • Inflammatory bowel disease    • Multiple myeloma (HCC)    • Neuropathy     R foot    • Nosebleed    • OCD (obsessive compulsive disorder)    • Pneumonia    • RSD (reflex sympathetic dystrophy) 12/11/2018   • Seasonal allergies    • Wears eyeglasses    • Whiplash injury to neck        Past Surgical History:   Procedure Laterality Date   • BACK SURGERY      hemilaminectomy and discectomy, L5-S1, right (Dr Chris Mckinley, [facility] at [facility]) onset 02/14/2005 per allscripts   • EXPLORATION EXTREMITY Right 08/29/2016    Procedure: KNEE PERONEAL NERVE EXPLORATION AND RELEASE ;  Surgeon: Leelee Zimmerman MD;  Location[facility];  Service:    • FOOT SURGERY     • FRACTURE SURGERY     • HAND SURGERY     • HERNIA REPAIR     • JOINT REPLACEMENT     • KNEE SURGERY     • MANDIBLE SURGERY     • NEUROPLASTY / TRANSPOSITION MEDIAN NERVE AT CARPAL TUNNEL     • ORIF TIBIAL PLATEAU Right     arthroplasty per allscripts   • OTHER SURGICAL HISTORY      injection of trigger point(s) per allscripts   • IL ARTHRP KNE CONDYLE&PLATU MEDIAL&LAT COMPARTMENTS Right 06/11/2018    Procedure: ARTHROPLASTY KNEE TOTAL;  Surgeon: Leelee Zimmerman MD;  Location: [facility];  Service: Orthopedics   • IL TENDON SHEATH INCISION Right 12/02/2020    Procedure: Mary Kay buchanan;  Surgeon: Sabino Georges MD;  Location[facility];  Service: Orthopedics   • SINUS SURGERY     • SPINE SURGERY     • TONSILLECTOMY         Family History   Problem Relation Age of Onset   • Heart failure Mother    • Anxiety disorder Mother         symptom per allscripts   • Depression Mother    • Anxiety disorder Father         symptom per allscripts   • Cirrhosis Father         hepatic per allscripts   • Alcohol abuse Father    • Stomach cancer Maternal Grandmother    • Cancer Maternal Grandmother    • Stomach cancer Maternal Grandfather    • Cancer Maternal Grandfather    • Diabetes Paternal Grandmother    • No Known Problems Paternal Grandfather    • Anxiety disorder Other         symptom per allscripts   • Coronary artery disease Other    • Depression Other    • Hyperlipidemia Other    • Osteoarthritis Other    • Other Other         back disorder per allscripts   • Neuropathy Other    • No Known Problems Paternal Aunt    • No Known Problems Paternal Aunt      I have reviewed and agree with the history as documented  E-Cigarette/Vaping   • E-Cigarette Use Never User      E-Cigarette/Vaping Substances   • Nicotine No    • THC No    • CBD No    • Flavoring No    • Other No    • Unknown No      Social History     Tobacco Use   • Smoking status: Never   • Smokeless tobacco: Never   Vaping Use   • Vaping Use: Never used   Substance Use Topics   • Alcohol use: No   • Drug use: No        Review of Systems   Constitutional: Positive for appetite change and chills  Respiratory: Positive for cough  Gastrointestinal: Positive for nausea and vomiting  Musculoskeletal: Positive for arthralgias and myalgias  All other systems reviewed and are negative  Physical Exam  ED Triage Vitals [05/13/23 1615]   Temperature Pulse Respirations Blood Pressure SpO2   99 1 °F (37 3 °C) 95 18 168/84 99 %      Temp Source Heart Rate Source Patient Position - Orthostatic VS BP Location FiO2 (%)   Oral Monitor -- Left arm --      Pain Score       10 - Worst Possible Pain             Orthostatic Vital Signs  Vitals:    05/13/23 1900 05/13/23 2000 05/13/23 2200 05/13/23 2215   BP: 140/65 130/61 115/66    Pulse: 86 84 82 94       Physical Exam  Vitals and nursing note reviewed  Constitutional:       Appearance: She is well-developed  She is ill-appearing  HENT:      Head: Normocephalic and atraumatic        Mouth/Throat:      Mouth: Mucous membranes are moist       Pharynx: No oropharyngeal exudate or posterior oropharyngeal erythema  Eyes:      Extraocular Movements: Extraocular movements intact  Conjunctiva/sclera: Conjunctivae normal       Pupils: Pupils are equal, round, and reactive to light  Cardiovascular:      Rate and Rhythm: Normal rate and regular rhythm  Heart sounds: No murmur heard  Pulmonary:      Effort: Pulmonary effort is normal  No respiratory distress  Breath sounds: Normal breath sounds  Abdominal:      Palpations: Abdomen is soft  Tenderness: There is no abdominal tenderness  Musculoskeletal:         General: No swelling  Cervical back: Neck supple  Right lower leg: No edema  Left lower leg: No edema  Skin:     General: Skin is warm and dry  Capillary Refill: Capillary refill takes less than 2 seconds  Neurological:      Mental Status: She is alert     Psychiatric:         Mood and Affect: Mood normal          ED Medications  Medications   lidocaine (LIDODERM) 5 % patch 1 patch (1 patch Topical Medication Applied 5/13/23 1918)   multi-electrolyte (PLASMALYTE-A/ISOLYTE-S PH 7 4) IV solution (75 mL/hr Intravenous New Bag 5/13/23 2028)   potassium chloride 20 mEq IVPB (premix) (20 mEq Intravenous New Bag 5/13/23 2028)   traMADol (ULTRAM) tablet 50 mg (50 mg Oral Given 5/13/23 2029)   acetaminophen (TYLENOL) tablet 650 mg (has no administration in time range)   lidocaine (LIDODERM) 5 % patch 1 patch (has no administration in time range)   methocarbamol (ROBAXIN) tablet 500 mg (500 mg Oral Given 5/13/23 2029)   cholecalciferol (VITAMIN D3) tablet 2,000 Units (has no administration in time range)   Diclofenac Sodium (VOLTAREN) 1 % topical gel 2 g (has no administration in time range)   fenofibrate (TRICOR) tablet 120 mg (has no administration in time range)   FLUoxetine (PROzac) capsule 40 mg (has no administration in time range)   traZODone (DESYREL) tablet 25 mg (has no administration in time range)   ursodiol (ACTIGALL) capsule 300 mg (has no administration in time range)   enoxaparin (LOVENOX) subcutaneous injection 40 mg (has no administration in time range)   oxyCODONE (ROXICODONE) immediate release tablet 10 mg (10 mg Oral Given 5/13/23 2215)   cefTRIAXone (ROCEPHIN) 1,000 mg in dextrose 5 % 50 mL IVPB (has no administration in time range)   azithromycin (ZITHROMAX) tablet 500 mg (has no administration in time range)   sodium chloride 0 9 % bolus 1,000 mL (0 mL Intravenous Stopped 5/13/23 1905)   ondansetron (ZOFRAN) injection 4 mg (4 mg Intravenous Given 5/13/23 1713)   ketorolac (TORADOL) injection 15 mg (15 mg Intravenous Given 5/13/23 1713)   HYDROmorphone (DILAUDID) injection 0 5 mg (0 5 mg Intravenous Given 5/13/23 1825)   cefTRIAXone (ROCEPHIN) 1,000 mg in dextrose 5 % 50 mL IVPB (0 mg Intravenous Stopped 5/13/23 1932)   azithromycin (ZITHROMAX) tablet 500 mg (500 mg Oral Given 5/13/23 1825)       Diagnostic Studies  Results Reviewed     Procedure Component Value Units Date/Time    Urine Microscopic [[id number]]  (Abnormal) Collected: 05/13/23 2028    Lab Status: Final result Specimen: Urine, Clean Catch Updated: 05/13/23 2117     RBC, UA 2-4 /hpf      WBC, UA 4-10 /hpf      Epithelial Cells Occasional /hpf      Bacteria, UA Occasional /hpf      MUCUS THREADS Occasional     Amorphous Crystals, UA Occasional    UA w Reflex to Microscopic w Reflex to Culture [[id number]]  (Abnormal) Collected: 05/13/23 2028    Lab Status: Final result Specimen: Urine, Clean Catch Updated: 05/13/23 2104     Color, UA Yellow     Clarity, UA Clear     Specific Gravity, UA 1 016     pH, UA 5 5     Leukocytes, UA Small     Nitrite, UA Negative     Protein, UA Trace mg/dl      Glucose, UA Negative mg/dl      Ketones, UA Negative mg/dl      Urobilinogen, UA <2 0 mg/dl      Bilirubin, UA Negative     Occult Blood, UA Trace    Magnesium [[id number]]  (Normal) Collected: 05/13/23 1714    Lab Status: Final result Specimen: Blood from Arm, Left Updated: 05/13/23 2103     Magnesium 2 1 mg/dL     TSH, 3rd generation with Free T4 reflex [[id number]]  (Normal) Collected: 05/13/23 1714    Lab Status: Final result Specimen: Blood from Arm, Left Updated: 05/13/23 2103     TSH 3RD GENERATON 0 707 uIU/mL     Narrative:      Patients undergoing fluorescein dye angiography may retain small amounts of fluorescein in the body for 48-72 hours post procedure  Samples containing fluorescein can produce falsely depressed TSH values  If the patient had this procedure,a specimen should be resubmitted post fluorescein clearance  Platelet count [[id number]]     Lab Status: No result Specimen: Blood     Legionella antigen, Urine [[id number]] Collected: 05/13/23 2028    Lab Status: In process Specimen: Urine, Clean Catch Updated: 05/13/23 2038    Strep Pneumoniae, Urine [[id number]] Collected: 05/13/23 2028    Lab Status: In process Specimen: Urine, Clean Catch Updated: 05/13/23 2038    Blood culture [[id number]] Collected: 05/13/23 2013    Lab Status: In process Specimen: Blood from Arm, Left Updated: 05/13/23 2020    Blood culture [[id number]] Collected: 05/13/23 2016    Lab Status:  In process Specimen: Blood from Hand, Right Updated: 05/13/23 2020    Procalcitonin [[id number]]  (Abnormal) Collected: 05/13/23 1714    Lab Status: Final result Specimen: Blood from Arm, Left Updated: 05/13/23 2006     Procalcitonin 2 43 ng/ml     Hemoglobin A1C [[id number]] Collected: 05/13/23 1828    Lab Status: Final result Specimen: Blood from Arm, Left Updated: 05/13/23 1947     Hemoglobin A1C 5 3 %       mg/dl     CBC and differential [[id number]]  (Abnormal) Collected: 05/13/23 1828    Lab Status: Final result Specimen: Blood from Arm, Left Updated: 05/13/23 1838     WBC 16 39 Thousand/uL      RBC 3 96 Million/uL      Hemoglobin 12 2 g/dL      Hematocrit 36 3 %      MCV 92 fL      MCH 30 8 pg      MCHC 33 6 g/dL      RDW 13 2 %      MPV 10 5 fL      Platelets 429 Thousands/uL nRBC 0 /100 WBCs      Neutrophils Relative 85 %      Immat GRANS % 1 %      Lymphocytes Relative 8 %      Monocytes Relative 6 %      Eosinophils Relative 0 %      Basophils Relative 0 %      Neutrophils Absolute 14 01 Thousands/µL      Immature Grans Absolute 0 16 Thousand/uL      Lymphocytes Absolute 1 23 Thousands/µL      Monocytes Absolute 0 97 Thousand/µL      Eosinophils Absolute 0 00 Thousand/µL      Basophils Absolute 0 02 Thousands/µL     FLU/RSV/COVID - if FLU/RSV clinically relevant [[id number]]  (Normal) Collected: 05/13/23 1714    Lab Status: Final result Specimen: Nares from Nose Updated: 05/13/23 1816     SARS-CoV-2 Negative     INFLUENZA A PCR Negative     INFLUENZA B PCR Negative     RSV PCR Negative    Narrative:      FOR PEDIATRIC PATIENTS - copy/paste COVID Guidelines URL to browser: [email or web address]  [email or web address]    SARS-CoV-2 assay is a Nucleic Acid Amplification assay intended for the  qualitative detection of nucleic acid from SARS-CoV-2 in nasopharyngeal  swabs  Results are for the presumptive identification of SARS-CoV-2 RNA  Positive results are indicative of infection with SARS-CoV-2, the virus  causing COVID-19, but do not rule out bacterial infection or co-infection  with other viruses  Laboratories within the [place] and its  territories are required to report all positive results to the appropriate  public health authorities  Negative results do not preclude SARS-CoV-2  infection and should not be used as the sole basis for treatment or other  patient management decisions  Negative results must be combined with  clinical observations, patient history, and epidemiological information  This test has not been FDA cleared or approved  This test has been authorized by FDA under an Emergency Use Authorization  (EUA)   This test is only authorized for the duration of time the  declaration that circumstances exist justifying the authorization of the  emergency use of an in vitro diagnostic tests for detection of SARS-CoV-2  virus and/or diagnosis of COVID-19 infection under section 564(b)(1) of  the Act, 21 U  S C  627OVG-1(U)(9), unless the authorization is terminated  or revoked sooner  The test has been validated but independent review by FDA  and CLIA is pending  Test performed using [employer]pert: This RT-PCR assay targets N2,  a region unique to SARS-CoV-2  A conserved region in the E-gene was chosen  for pan-Sarbecovirus detection which includes SARS-CoV-2  According to CMS-2020-01-R, this platform meets the definition of high-throughput technology      Comprehensive metabolic panel [[id number]]  (Abnormal) Collected: 05/13/23 1714    Lab Status: Final result Specimen: Blood from Arm, Left Updated: 05/13/23 1740     Sodium 128 mmol/L      Potassium 3 2 mmol/L      Chloride 98 mmol/L      CO2 26 mmol/L      ANION GAP 4 mmol/L      BUN 19 mg/dL      Creatinine 1 22 mg/dL      Glucose 166 mg/dL      Calcium 8 7 mg/dL      Corrected Calcium 9 3 mg/dL      AST 42 U/L      ALT 59 U/L      Alkaline Phosphatase 376 U/L      Total Protein 8 8 g/dL      Albumin 3 2 g/dL      Total Bilirubin 0 82 mg/dL      eGFR 47 ml/min/1 73sq m     Narrative:      [place] guidelines for Chronic Kidney Disease (CKD):   •  Stage 1 with normal or high GFR (GFR > 90 mL/min/1 73 square meters)  •  Stage 2 Mild CKD (GFR = 60-89 mL/min/1 73 square meters)  •  Stage 3A Moderate CKD (GFR = 45-59 mL/min/1 73 square meters)  •  Stage 3B Moderate CKD (GFR = 30-44 mL/min/1 73 square meters)  •  Stage 4 Severe CKD (GFR = 15-29 mL/min/1 73 square meters)  •  Stage 5 End Stage CKD (GFR <15 mL/min/1 73 square meters)  Note: GFR calculation is accurate only with a steady state creatinine    Lipase [[id number]]  (Normal) Collected: 05/13/23 1714    Lab Status: Final result Specimen: Blood from Arm, Left Updated: 05/13/23 1740     Lipase 90 u/L XR chest 2 views   ED Interpretation by Eduardo Mejia DO (05/13 1747)   Abnormal            Procedures  ECG 12 Lead Documentation Only    Date/Time: 5/13/2023 6:02 PM  Performed by: Natividad Alvarado DO  Authorized by: Natividad Alvarado DO     Indications / Diagnosis:  Cough  ECG reviewed by me, the ED Provider: no    Patient location:  ED  Previous ECG:     Previous ECG:  Unavailable    Comparison to cardiac monitor: No    Interpretation:     Interpretation: normal    Rate:     ECG rate:  91    ECG rate assessment: normal    Rhythm:     Rhythm: sinus rhythm    Ectopy:     Ectopy: none    QRS:     QRS axis:  Normal    QRS intervals:  Normal  Conduction:     Conduction: normal    ST segments:     ST segments:  Normal  T waves:     T waves: normal            ED Course  ED Course as of 05/13/23 2230   Sat May 13, 2023   1743 XR chest 2 views(!)  Right upper lobe pneumonia   1757 Sodium(!): 128   1757 Potassium(!): 3 2   1757 She will get admitted to hospital for pneumonia, hyponatremia, hypokalemia  Started on ceftriaxone and Doxy  1811 CBC and differential   1848 WBC(!): 16 39   1909 Admitted to [facility] for pneumonia                             SBIRT 20yo+    Flowsheet Row Most Recent Value   Initial Alcohol Screen: US AUDIT-C     1  How often do you have a drink containing alcohol? 0 Filed at: 05/13/2023 1843   2  How many drinks containing alcohol do you have on a typical day you are drinking? 0 Filed at: 05/13/2023 1843   3a  Male UNDER 65: How often do you have five or more drinks on one occasion? 0 Filed at: 05/13/2023 1843   3b  FEMALE Any Age, or MALE 65+: How often do you have 4 or more drinks on one occassion? 0 Filed at: 05/13/2023 1843   Audit-C Score 0 Filed at: 05/13/2023 1843   KEVAN: How many times in the past year have you    Used an illegal drug or used a prescription medication for non-medical reasons?  Never Filed at: 05/13/2023 [address] [place] [address] Making  56-year-old female with past medical history of multiple myeloma presents emergency department with myalgias, fevers, chills, night sweats, nausea, vomiting    DDx: Viral infection, pneumonia, dysrhythmia    Plan: Basic labs, chest x-ray, pain control    Work-up performed in the emergency department is concerning for hyponatremia, elevated white blood cell count, hypokalemia  Chest x-ray shows a right upper lobe pneumonia  Patient responded well to pain medication provided  Patient was started on antibiotics and admitted to hospitalist service given her nausea, vomiting inability to tolerate p o  intake, and pneumonia  Amount and/or Complexity of Data Reviewed  Labs: ordered  Decision-making details documented in ED Course  Radiology: ordered and independent interpretation performed  Decision-making details documented in ED Course  Risk  Prescription drug management  Decision regarding hospitalization  Disposition  Final diagnoses:   Right upper lobe pneumonia   Hyponatremia   Hypokalemia     Time reflects when diagnosis was documented in both MDM as applicable and the Disposition within this note     Time User Action Codes Description Comment    5/13/2023  6:03 PM Kimberly Sails Add [J18 9] Right upper lobe pneumonia     5/13/2023  6:03 PM Kimberly Sails Add [E87 1] Hyponatremia     5/13/2023  6:03 PM Kimberly Sails Add [E87 6] Hypokalemia       ED Disposition     ED Disposition   Admit    Condition   Stable    Date/Time   Sat May 13, 2023  7:08 PM    Comment   Case was discussed with Dr Timothy De La Rosa and the patient's admission status was agreed to be Admission Status: inpatient status to the service of Dr Timothy De La Rosa   Follow-up Information    None         Patient's Medications   Discharge Prescriptions    No medications on file     No discharge procedures on file      PDMP Review       Value Time User    PDMP Reviewed  Yes 6/29/2022  8:11 AM José Manuel Sahu DO           ED Provider  Attending physically available and evaluated Niranita Vasques  AJ managed the patient along with the ED Attending      Electronically Signed by         Nasim Doss DO  05/13/23 4837

## 2023-05-13 NOTE — ASSESSMENT & PLAN NOTE
· Suspect viral syndrome although CXR concerning for possible right pneumonia (not sure if this is related to aspiration)  · ?bacteremia? · Nausea and vomiting but no abdominal pain  Has been having normal BMs  · Myalgia and chills  No reproetd fever  · Had some cough but not recently as per her  · CXR concerning for right pneumonia  Follow official read  · COVID 19/ FLU/ RSV negative  · On RA    PLAN:  · Received ceftriaxone and azithromycin in ER, continue the same given leukocytosis with neutrophil predominance, chills and concerns for PNA     · Check and trend procalcitonin  If negative twice, consider monitoring off abx    · Incentive spirometry  · Check blood cultures  · Check UA  · Trend cbc with diff  · Consider CT c/P ?P if no improvement in symptoms despite above measures  · Prn zofran  · Pt ot eval

## 2023-05-13 NOTE — H&P
[address] [facility]  H&P  Name: Mitch Villanueva 60 y o  female I MRN: [id number]  Unit/Bed#: ED 10 I Date of Admission: 5/13/2023   Date of Service: 5/13/2023 I Hospital Day: 0      Assessment/Plan   * Nausea , vomiting, myalgia, chills  Assessment & Plan  · Viral syndrome vs pneumonia (susopect aspiration with vomiting) vs bacteremia? · Nausea and vomiting but no abdominal pain  Has been having normal BMs  · Myalgia and chills  No reproetd fever  · Had some cough but not recently as per her  · CXR concerning for right pneumonia  Follow official read  · COVID 19/ FLU/ RSV negative  · On RA    PLAN:  · Received ceftriaxone and azithromycin in ER, continue the same given leukocytosis with neutrophil predominance, chills and concerns for PNA     · Check and trend procalcitonin  If negative twice, consider monitoring off abx  · Incentive spirometry  · Check blood cultures  · Check UA  · Trend cbc with diff  · Consider CT c/P ?P if no improvement in symptoms despite above measures  · Prn zofran  · Pt ot eval    Hypokalemia  Assessment & Plan  · Likely 2/2 vomiting  · replete and trend K  · Check Mg  · Hold HCTZ    Hyponatremia  Assessment & Plan  · Likely in setting of volume depletion along with HCTZ use  · Hold HCTZ  · iVF   · Check TSH  · Trend BMP      Essential hypertension  Assessment & Plan  Within acceptable range  Hold HCTZ given hyponatremia and hypokalemia    Smoldering multiple myeloma (SMM)  Assessment & Plan  · Follows up with Dr Katie Carvajal op  · continue OP hem onc follow up  Primary biliary cirrhosis (HCC)  Assessment & Plan  · known h/o  · Chronic elevation in ALP noted  · Continues OP GI follow up      Chronic pain disorder  Assessment & Plan  Continue hpme dose tramadol           VTE Prophylaxis: Enoxaparin (Lovenox)  / sequential compression device   Code Status: full    Anticipated Length of Stay:  Patient will be admitted on an Inpatient basis with an anticipated length of stay of  More than  2 midnights  Justification for Hospital Stay: ongoing eval    Total Time for Visit, including Counseling / Coordination of Care: 60 minutes  Greater than 50% of this total time spent on direct patient counseling and coordination of care  Chief Complaint:   Myalgia, chills, nausea, vomiting    History of Present Illness:    Beatriz Salazar is a 60 y o  female who presents to ER with main complaints of myalgia, chills, nausea , vomiting , fatigue which has been going on for last 2-3 days  She had some cough but not recently  She felt warm and had profuse sweating last night  Symptoms did not get better and she decided to come to ER  She does not have abdominal pain and has been having regular BMs  Mild shortness of breath  Also has some headache and pain in neck which is her typical headache  No neck stiffness  She has noticed weight gain of 20 pounds? In  last 1 month  Review of Systems:    Review of Systems   Constitutional: Positive for appetite change, chills, diaphoresis and fatigue  HENT: Negative  Respiratory: Negative for cough and wheezing  Cardiovascular: Negative  Gastrointestinal: Positive for nausea and vomiting  Negative for abdominal pain and diarrhea  Genitourinary: Negative  Musculoskeletal: Positive for arthralgias, back pain, myalgias and neck pain  Negative for neck stiffness  Neurological: Negative          Past Medical and Surgical History:     Past Medical History:   Diagnosis Date   • Abnormal breast exam    • Anxiety    • Asthma     childhood   • 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[facility] [address] )     primary per allscripts   • Cancer (HCC)     IgG kappa smoldering multiple myeloma   • Cardiac murmur    • Chronic liver disease     resolved 12/04/2017   • COVID    • Depression    • Endometriosis    • Fracture of tibia     right tibial plateau fracture per allscripts   • Heart murmur    • Hepatic disease    • Hypertension     last assessed 12/13/2017   • Inflammatory bowel disease    • Multiple myeloma (HCC)    • Neuropathy     R foot    • Nosebleed    • OCD (obsessive compulsive disorder)    • Pneumonia    • RSD (reflex sympathetic dystrophy) 12/11/2018   • Seasonal allergies    • Wears eyeglasses    • Whiplash injury to neck        Past Surgical History:   Procedure Laterality Date   • BACK SURGERY      hemilaminectomy and discectomy, L5-S1, right (Dr Gabrielle Goodman, [facility] at [facility]) onset 02/14/2005 per allscripts   • EXPLORATION EXTREMITY Right 08/29/2016    Procedure: KNEE PERONEAL NERVE EXPLORATION AND RELEASE ;  Surgeon: Eleno Jc MD;  Location: [facility];  Service:    • FOOT SURGERY     • FRACTURE SURGERY     • HAND SURGERY     • HERNIA REPAIR     • JOINT REPLACEMENT     • KNEE SURGERY     • MANDIBLE SURGERY     • NEUROPLASTY / TRANSPOSITION MEDIAN NERVE AT CARPAL TUNNEL     • ORIF TIBIAL PLATEAU Right     arthroplasty per allscripts   • OTHER SURGICAL HISTORY      injection of trigger point(s) per allscripts   • MI ARTHRP KNE CONDYLE&PLATU MEDIAL&LAT COMPARTMENTS Right 06/11/2018    Procedure: ARTHROPLASTY KNEE TOTAL;  Surgeon: Eelno Jc MD;  Location: [facility];  Service: Orthopedics   • MI TENDON SHEATH INCISION Right 12/02/2020    Procedure: RELEASE TRIGGER FINGER-right long;  Surgeon: Marcio Sinclair MD;  Location: [facility];  Service: Orthopedics   • SINUS SURGERY     • SPINE SURGERY     • TONSILLECTOMY         Meds/Allergies:    Prior to Admission medications    Medication Sig Start Date End Date Taking?  Authorizing Provider   Cholecalciferol (VITAMIN D3) 2000 UNITS TABS Take 2,000 Units by mouth daily     Yes Historical Provider, MD   clotrimazole-betamethasone (LOTRISONE) 1-0 05 % cream Apply topically 2 (two) times a day  Patient not taking: Reported on 5/13/2023 8/26/22   Kim Dias DO   diclofenac sodium (VOLTAREN) 1 % APPLY 2 GRAMS TO THE AFFECTED AREA(S) BY TOPICAL ROUTE 4 TIMES PER DAY 5/23/19   Historical Provider, MD   estradiol (VAGIFEM, YUVAFEM) 10 MCG TABS vaginal tablet INSERT 1 TABLET INTO THE VAGINA 2 TIMES A WEEK  10/23/22   Lata Felipe DO   fenofibrate (FENOGLIDE) 120 MG TABS Take 120 mg by mouth daily 2/9/23   Historical Provider, MD   fenofibrate (TRICOR) 54 MG tablet  5/11/22   Historical Provider, MD   FLUoxetine (PROzac) 40 MG capsule Take 1 capsule (40 mg total) by mouth daily 3/2/23   Joaquin Dimas III, DO   hydrOXYzine HCL (ATARAX) 25 mg tablet TAKE 1 TO 2 TABLETS BY MOUTH 3 TIMES A DAY AS NEEDED FOR ITCHING OR ANXIETY (MAX 4TABS/DAY)  Patient not taking: Reported on 5/13/2023 10/19/22   Joaquin Dimas III, DO   ibuprofen (MOTRIN) 600 mg tablet TAKE 1 TABLET BY MOUTH EVERY 6 HOURS AS NEEDED FOR MODERATE PAIN  6/29/22   Kim Dias DO   lidocaine (LIDODERM) 5 % lidocaine 5 % topical patch   APPLY 1 PATCH TO AFFECTED AREA FOR 12 HOURS A DAY & REMOVE FOR 12 HOURS BEFORE APPLYING NEXT PATCH   (12 HOURS ON, 12 HOURS OFF)    Historical Provider, MD   mometasone (NASONEX) 50 mcg/act nasal spray SPRAY 2 SPRAYS INTO EACH NOSTRIL EVERY DAY 9/12/22   Kim Dias DO   oxyCODONE (ROXICODONE) 10 MG TABS Take 1 tablet (10 mg total) by mouth every 6 (six) hours as needed for severe pain (as needed but do not take together with tramadol) Max Daily Amount: 40 mg 6/29/22   Kim Dias DO   Promethazine-[facility]) 6 25-15 mg/5 mL oral syrup Take 5 mL by mouth 4 (four) times a day as needed for cough  Patient not taking: Reported on 5/13/2023 2/28/23   Luz Maria Billingsley DO   traMADol (Ultram) 50 mg tablet Take 1 tablet (50 mg total) by mouth every 8 (eight) hours as needed for moderate pain 5/4/20   Humberto Mukherjee MD   traZODone (DESYREL) 50 mg tablet TAKE 1/2 TO 1 TABLET BY MOUTH AT BEDTIME AS NEEDED FOR SLEEP 10/4/22   Joaquin Dimas III, DO   triamcinolone (KENALOG) 0 5 % cream APPLY TO AFFECTED AREA 3 TIMES A DAY  Patient not taking: Reported on 5/13/2023 9/12/22   Luz aMria Billingsley DO   triamterene-hydrochlorothiazide [facility]) 37 5-25 mg per tablet TAKE 1 TABLET BY MOUTH EVERY DAY 8/16/22   Caryle Bode, DO   ursodiol (ACTIGALL) 300 mg capsule TAKE 1 CAPSULE BY MOUTH THREE TIMES A DAY 7/11/20   Sonya Rice MD   valACYclovir (VALTREX) 500 mg tablet TAKE 2 TABLETS BY MOUTH EVERY DAY  Patient taking differently: As needed 8/16/22 11/14/22  Nighat Rueda DO     I have reviewed home medications with patient personally  Allergies: Allergies   Allergen Reactions   • Codeine GI Intolerance and Nausea Only     Other reaction(s): Other (See Comments)  Violently ill  Violently ill   • Latex Rash     itching   • Doxycycline GI Intolerance and Nausea Only   • Cymbalta [Duloxetine Hcl]    • Milk-Related Compounds - Food Allergy GI Intolerance   • Morphine And Related    • Ocaliva [Obeticholic Acid] Hives   • Orange Fruit [Citrus - Food Allergy] Other (See Comments)     Tested  positive   • Sulfa Antibiotics GI Intolerance   • Tomato - Food Allergy Other (See Comments)     Tested positive;  Eats in sauces, not pure   • Penicillins Other (See Comments) and Rash     Childhood reaction       Social History:     Marital Status: Single     Substance Use History:   Social History     Substance and Sexual Activity   Alcohol Use No     Social History     Tobacco Use   Smoking Status Never   Smokeless Tobacco Never     Social History     Substance and Sexual Activity   Drug Use No       Physical Exam:     Vitals:   Blood Pressure: 130/61 (05/13/23 2000)  Pulse: 84 (05/13/23 2000)  Temperature: 99 1 °F (37 3 °C) (05/13/23 1615)  Temp Source: Oral (05/13/23 1615)  Respirations: 18 (05/13/23 1900)  SpO2: 93 % (05/13/23 1900)    Physical Exam  Constitutional:       General: She is not in acute distress  Neck:      Comments: No neck stiffness  Cardiovascular:      Rate and Rhythm: Normal rate and regular rhythm  Heart sounds: Murmur heard  Pulmonary:      Effort: No respiratory distress  Breath sounds: Normal breath sounds   No wheezing or rales    Abdominal:      General: Bowel sounds are normal  There is no distension  Palpations: Abdomen is soft  Tenderness: There is no abdominal tenderness  Musculoskeletal:      Cervical back: No rigidity  Skin:     General: Skin is warm  Neurological:      Mental Status: She is alert  Comments: Awake alert and communicative  Moves all extremities          Additional Data:     Lab Results: I have personally reviewed pertinent reports  Results from last 7 days   Lab Units 05/13/23  1828   WBC Thousand/uL 16 39*   HEMOGLOBIN g/dL 12 2   HEMATOCRIT % 36 3   PLATELETS Thousands/uL 167   NEUTROS PCT % 85*   LYMPHS PCT % 8*   MONOS PCT % 6   EOS PCT % 0     Results from last 7 days   Lab Units 05/13/23  1714   SODIUM mmol/L 128*   POTASSIUM mmol/L 3 2*   CHLORIDE mmol/L 98   CO2 mmol/L 26   BUN mg/dL 19   CREATININE mg/dL 1 22   ANION GAP mmol/L 4   CALCIUM mg/dL 8 7   ALBUMIN g/dL 3 2*   TOTAL BILIRUBIN mg/dL 0 82   ALK PHOS U/L 376*   ALT U/L 59   AST U/L 42   GLUCOSE RANDOM mg/dL 166*             Results from last 7 days   Lab Units 05/13/23  1828   HEMOGLOBIN A1C % 5 3     Results from last 7 days   Lab Units 05/13/23  1714   PROCALCITONIN ng/ml 2 43*       Imaging: I have personally reviewed pertinent reports  XR chest 2 views   ED Interpretation by Patrick Ivy DO (05/13 1747)   Abnormal          Allscripts / Epic Records Reviewed: Yes     ** Please Note: This note has been constructed using a voice recognition system   **

## 2023-05-13 NOTE — ASSESSMENT & PLAN NOTE
· Likely in setting of volume depletion along with HCTZ use  · Hold HCTZ  · iVF   · Check TSH  · Trend BMP

## 2023-05-14 ENCOUNTER — APPOINTMENT (INPATIENT)
Dept: RADIOLOGY | Facility: HOSPITAL | Age: 63
End: 2023-05-14

## 2023-05-14 PROBLEM — J18.9 PNEUMONIA DUE TO INFECTIOUS ORGANISM: Status: ACTIVE | Noted: 2023-05-14

## 2023-05-14 LAB
ANION GAP SERPL CALCULATED.3IONS-SCNC: 4 MMOL/L (ref 4–13)
BASOPHILS # BLD AUTO: 0.02 THOUSANDS/ÂΜL (ref 0–0.1)
BASOPHILS NFR BLD AUTO: 0 % (ref 0–1)
BUN SERPL-MCNC: 20 MG/DL (ref 5–25)
CALCIUM SERPL-MCNC: 7.7 MG/DL (ref 8.3–10.1)
CHLORIDE SERPL-SCNC: 104 MMOL/L (ref 96–108)
CO2 SERPL-SCNC: 22 MMOL/L (ref 21–32)
CREAT SERPL-MCNC: 1 MG/DL (ref 0.6–1.3)
EOSINOPHIL # BLD AUTO: 0.01 THOUSAND/ÂΜL (ref 0–0.61)
EOSINOPHIL NFR BLD AUTO: 0 % (ref 0–6)
ERYTHROCYTE [DISTWIDTH] IN BLOOD BY AUTOMATED COUNT: 13.6 % (ref 11.6–15.1)
GFR SERPL CREATININE-BSD FRML MDRD: 60 ML/MIN/1.73SQ M
GLUCOSE SERPL-MCNC: 150 MG/DL (ref 65–140)
HCT VFR BLD AUTO: 31.7 % (ref 34.8–46.1)
HGB BLD-MCNC: 10.3 G/DL (ref 11.5–15.4)
IMM GRANULOCYTES # BLD AUTO: 0.13 THOUSAND/UL (ref 0–0.2)
IMM GRANULOCYTES NFR BLD AUTO: 1 % (ref 0–2)
L PNEUMO1 AG UR QL IA.RAPID: NEGATIVE
LYMPHOCYTES # BLD AUTO: 1.74 THOUSANDS/ÂΜL (ref 0.6–4.47)
LYMPHOCYTES NFR BLD AUTO: 10 % (ref 14–44)
MAGNESIUM SERPL-MCNC: 2.4 MG/DL (ref 1.6–2.6)
MCH RBC QN AUTO: 30.3 PG (ref 26.8–34.3)
MCHC RBC AUTO-ENTMCNC: 32.5 G/DL (ref 31.4–37.4)
MCV RBC AUTO: 93 FL (ref 82–98)
MONOCYTES # BLD AUTO: 1.18 THOUSAND/ÂΜL (ref 0.17–1.22)
MONOCYTES NFR BLD AUTO: 7 % (ref 4–12)
NEUTROPHILS # BLD AUTO: 13.63 THOUSANDS/ÂΜL (ref 1.85–7.62)
NEUTS SEG NFR BLD AUTO: 82 % (ref 43–75)
NRBC BLD AUTO-RTO: 0 /100 WBCS
PLATELET # BLD AUTO: 132 THOUSANDS/UL (ref 149–390)
PMV BLD AUTO: 10 FL (ref 8.9–12.7)
POTASSIUM SERPL-SCNC: 4 MMOL/L (ref 3.5–5.3)
PROCALCITONIN SERPL-MCNC: 2.09 NG/ML
RBC # BLD AUTO: 3.4 MILLION/UL (ref 3.81–5.12)
S PNEUM AG UR QL: NEGATIVE
SODIUM SERPL-SCNC: 130 MMOL/L (ref 135–147)
WBC # BLD AUTO: 16.71 THOUSAND/UL (ref 4.31–10.16)

## 2023-05-14 RX ORDER — DOCOSANOL 100 MG/G
CREAM TOPICAL
Status: DISCONTINUED | OUTPATIENT
Start: 2023-05-14 | End: 2023-05-16 | Stop reason: HOSPADM

## 2023-05-14 RX ORDER — LANOLIN ALCOHOL/MO/W.PET/CERES
6 CREAM (GRAM) TOPICAL
Status: DISCONTINUED | OUTPATIENT
Start: 2023-05-14 | End: 2023-05-16 | Stop reason: HOSPADM

## 2023-05-14 RX ORDER — ONDANSETRON 2 MG/ML
4 INJECTION INTRAMUSCULAR; INTRAVENOUS EVERY 6 HOURS PRN
Status: DISCONTINUED | OUTPATIENT
Start: 2023-05-14 | End: 2023-05-16 | Stop reason: HOSPADM

## 2023-05-14 RX ORDER — HYDROMORPHONE HCL/PF 1 MG/ML
0.5 SYRINGE (ML) INJECTION EVERY 4 HOURS PRN
Status: DISCONTINUED | OUTPATIENT
Start: 2023-05-14 | End: 2023-05-15

## 2023-05-14 RX ADMIN — ENOXAPARIN SODIUM 40 MG: 40 INJECTION SUBCUTANEOUS at 11:15

## 2023-05-14 RX ADMIN — DICLOFENAC SODIUM 2 G: 10 GEL TOPICAL at 17:39

## 2023-05-14 RX ADMIN — FLUOXETINE 40 MG: 20 CAPSULE ORAL at 11:15

## 2023-05-14 RX ADMIN — OXYCODONE HYDROCHLORIDE 10 MG: 10 TABLET ORAL at 11:19

## 2023-05-14 RX ADMIN — OXYCODONE HYDROCHLORIDE 10 MG: 10 TABLET ORAL at 23:43

## 2023-05-14 RX ADMIN — DOCOSANOL: 100 CREAM TOPICAL at 17:40

## 2023-05-14 RX ADMIN — URSODIOL 300 MG: 300 CAPSULE ORAL at 12:06

## 2023-05-14 RX ADMIN — DICLOFENAC SODIUM 2 G: 10 GEL TOPICAL at 21:31

## 2023-05-14 RX ADMIN — Medication 2000 UNITS: at 11:15

## 2023-05-14 RX ADMIN — DOCOSANOL: 100 CREAM TOPICAL at 15:52

## 2023-05-14 RX ADMIN — GADOBUTROL 7 ML: 604.72 INJECTION INTRAVENOUS at 23:23

## 2023-05-14 RX ADMIN — URSODIOL 300 MG: 300 CAPSULE ORAL at 20:19

## 2023-05-14 RX ADMIN — METHOCARBAMOL 500 MG: 500 TABLET ORAL at 20:19

## 2023-05-14 RX ADMIN — OXYCODONE HYDROCHLORIDE 10 MG: 10 TABLET ORAL at 04:51

## 2023-05-14 RX ADMIN — METHOCARBAMOL 500 MG: 500 TABLET ORAL at 11:15

## 2023-05-14 RX ADMIN — CEFTRIAXONE SODIUM 1000 MG: 10 INJECTION, POWDER, FOR SOLUTION INTRAVENOUS at 20:19

## 2023-05-14 RX ADMIN — METHOCARBAMOL 500 MG: 500 TABLET ORAL at 17:40

## 2023-05-14 RX ADMIN — MELATONIN TAB 3 MG 6 MG: 3 TAB at 21:29

## 2023-05-14 RX ADMIN — URSODIOL 300 MG: 300 CAPSULE ORAL at 15:50

## 2023-05-14 RX ADMIN — SODIUM CHLORIDE, SODIUM GLUCONATE, SODIUM ACETATE, POTASSIUM CHLORIDE, MAGNESIUM CHLORIDE, SODIUM PHOSPHATE, DIBASIC, AND POTASSIUM PHOSPHATE 75 ML/HR: .53; .5; .37; .037; .03; .012; .00082 INJECTION, SOLUTION INTRAVENOUS at 11:17

## 2023-05-14 RX ADMIN — FENOFIBRATE 120 MG: 48 TABLET ORAL at 12:06

## 2023-05-14 RX ADMIN — HYDROMORPHONE HYDROCHLORIDE 0.5 MG: 1 INJECTION, SOLUTION INTRAMUSCULAR; INTRAVENOUS; SUBCUTANEOUS at 15:50

## 2023-05-14 RX ADMIN — DOCOSANOL 1 APPLICATION.: 100 CREAM TOPICAL at 11:17

## 2023-05-14 RX ADMIN — DOCOSANOL: 100 CREAM TOPICAL at 21:31

## 2023-05-14 NOTE — ASSESSMENT & PLAN NOTE
· She has known history of primary biliary cholangitis  No hx of cirrhosis as not all PBC patient's have cirrhotic liver  She is maintained on ursodiol  · She reports approximate 20 pound weight gain over the last month and some increasing abdominal distention    Clinically, she possibly has ascites on exam   · We will check abdominal ultrasound

## 2023-05-14 NOTE — PROGRESS NOTES
[facility]  Progress Note  Name: Roberto Alexandre  MRN: [id number]  Unit/Bed#: -60 I Date of Admission: 5/13/2023   Date of Service: 5/14/2023 I Hospital Day: 1     Addendum: Received notification from Radiology department regarding abd u/s findings of a right upper renal pole 4 7 x 5 6 x 4 8 cm vascular mass concerning for renal cell carcinoma/neoplasm  MRI renal protocol ordered to further characterize  Assessment/Plan   * Pneumonia due to infectious organism  Assessment & Plan  · Chest x-ray obtained on admission showed right upper lobe opacity possibly pneumonia or fissural fluid/pseudotumor, new from the prior study  Other etiologies not excluded  Follow-up repeat radiograph in 6 weeks recommended to assess for resolution  · Check CT chest without contrast to further clarify  · Niccoli improving from a respiratory standpoint  Currently on 2 L saturating around 97%, wean oxygen as tolerated to maintain sats greater than 92% on room air  ISS  · Continue empiric antibiotics with Rocephin  Discontinue azithromycin  · Okay to discontinue aspiration precautions as patient even if she did have aspiration pneumonia had nausea and vomiting and likely aspirated in the setting  She has no chronic aspiration issues and does not report coughing with meals  Monitor  · Trend procal and leukocytosis  Nausea , vomiting, myalgia, chills  Assessment & Plan  · Suspect this was secondary to pneumonia  · Improved  · IV antiemetic prn    Hypokalemia  Assessment & Plan  · Improved with repletion  Hyponatremia  Assessment & Plan  · Hold HCTZ  Na 130, monitor      Essential hypertension  Assessment & Plan  · Within acceptable range  · Consider resuming hydrochlorothiazide as admission if needed for better blood pressure control    Smoldering multiple myeloma (SMM)  Assessment & Plan  · Follows up with Dr Haroon Cardoza op  · continue OP hem onc follow up      Primary biliary cholangitis ([facility] [address] )  Assessment & Plan  · She has known history of primary biliary cholangitis  No hx of cirrhosis as not all PBC patient's have cirrhotic liver  She is maintained on ursodiol  · She reports approximate 20 pound weight gain over the last month and some increasing abdominal distention  Clinically, she possibly has ascites on exam   · We will check abdominal ultrasound    Chronic pain disorder  Assessment & Plan  · Continue home dose tramadol        VTE Pharmacologic Prophylaxis:   Moderate Risk (Score 3-4) - Pharmacological DVT Prophylaxis Ordered: enoxaparin (Lovenox)  Patient Centered Rounds: I performed bedside rounds with nursing staff today  Discussions with Specialists or Other Care Team Provider: None    Education and Discussions with Family / Patient: Patient declined call to   Total Time Spent on Date of Encounter in care of patient: 35 minutes This time was spent on one or more of the following: performing physical exam; counseling and coordination of care; obtaining or reviewing history; documenting in the medical record; reviewing/ordering tests, medications or procedures; communicating with other healthcare professionals and discussing with patient's family/caregivers  Current Length of Stay: 1 day(s)  Current Patient Status: Inpatient   Certification Statement: The patient will continue to require additional inpatient hospital stay due to IV abx, CT chest  Discharge Plan: Anticipate discharge in 48 hrs to Pending PT/OT    Code Status: Level 1 - Full Code    Subjective:   Patient seen and examined this morning  No acute events overnight  She denies any further nausea or vomiting  She is starting to feel a little bit better  Afebrile  Denies any significant shortness of breath  Mild cough      Objective:     Vitals:   Temp (24hrs), Av 1 °F (37 3 °C), Min:99 1 °F (37 3 °C), Max:99 1 °F (37 3 °C)    Temp:  [99 1 °F (37 3 °C)] 99 1 °F (37 3 °C)  HR: [62-95] 62  Resp:  [18-20] 18  BP: ()/(50-84) 90/54  SpO2:  [91 %-99 %] 95 %  There is no height or weight on file to calculate BMI  Input and Output Summary (last 24 hours): Intake/Output Summary (Last 24 hours) at 5/14/2023 1419  Last data filed at 5/13/2023 2228  Gross per 24 hour   Intake 1150 ml   Output --   Net 1150 ml       Physical Exam:   Physical Exam  Vitals reviewed  Constitutional:       General: She is not in acute distress  Appearance: She is not ill-appearing  Cardiovascular:      Rate and Rhythm: Normal rate  Pulmonary:      Effort: No respiratory distress  Breath sounds: No wheezing, rhonchi or rales  Comments: Coarse breath sounds  Abdominal:      General: There is distension  Tenderness: There is abdominal tenderness (Mild tenderness with palpation RLQ)  There is no guarding  Musculoskeletal:         General: No swelling  Skin:     General: Skin is warm and dry  Coloration: Skin is not jaundiced  Neurological:      Mental Status: Mental status is at baseline            Additional Data:     Labs:  Results from last 7 days   Lab Units 05/14/23 0454   WBC Thousand/uL 16 71*   HEMOGLOBIN g/dL 10 3*   HEMATOCRIT % 31 7*   PLATELETS Thousands/uL 132*   NEUTROS PCT % 82*   LYMPHS PCT % 10*   MONOS PCT % 7   EOS PCT % 0     Results from last 7 days   Lab Units 05/14/23 0454 05/13/23  1714   SODIUM mmol/L 130* 128*   POTASSIUM mmol/L 4 0 3 2*   CHLORIDE mmol/L 104 98   CO2 mmol/L 22 26   BUN mg/dL 20 19   CREATININE mg/dL 1 00 1 22   ANION GAP mmol/L 4 4   CALCIUM mg/dL 7 7* 8 7   ALBUMIN g/dL  --  3 2*   TOTAL BILIRUBIN mg/dL  --  0 82   ALK PHOS U/L  --  376*   ALT U/L  --  59   AST U/L  --  42   GLUCOSE RANDOM mg/dL 150* 166*             Results from last 7 days   Lab Units 05/13/23  1828   HEMOGLOBIN A1C % 5 3     Results from last 7 days   Lab Units 05/14/23 0454 05/13/23  1714   PROCALCITONIN ng/ml 2 09* 2 43*       Lines/Drains:  Invasive Devices Peripheral Intravenous Line  Duration           Peripheral IV 05/13/23 Left;Ventral (anterior) Forearm <1 day                Imaging: Reviewed radiology reports from this admission including: chest xray    Recent Cultures (last 7 days):   Results from last 7 days   Lab Units 05/13/23 2028 05/13/23 2016 05/13/23 2013   BLOOD CULTURE   --  Received in Microbiology Lab  Culture in Progress  Received in Microbiology Lab  Culture in Progress  LEGIONELLA URINARY ANTIGEN  Negative  --   --        Last 24 Hours Medication List:   Current Facility-Administered Medications   Medication Dose Route Frequency Provider Last Rate   • acetaminophen  650 mg Oral Q6H PRN Devonte Hoffman MD     • cefTRIAXone  1,000 mg Intravenous Q24H Devonte Hoffman MD     • cholecalciferol  2,000 Units Oral Daily Devonte Hoffman MD     • Diclofenac Sodium  2 g Topical 4x Daily Devonte Hoffman MD     • docosanol   Topical 5x Daily Jim Heck MD     • enoxaparin  40 mg Subcutaneous Daily Devonte Hoffman MD     • fenofibrate  120 mg Oral Daily Devonte Hoffman MD     • FLUoxetine  40 mg Oral Daily Devonte Hoffman MD     • HYDROmorphone  0 5 mg Intravenous Q4H PRN Jim Heck MD     • lidocaine  1 patch Topical Daily Devonte Hoffman MD     • methocarbamol  500 mg Oral Q6H [address] Devonte Hoffman MD     • multi-electrolyte  75 mL/hr Intravenous Continuous Devonte Hoffman MD 75 mL/hr (05/14/23 1117)   • oxyCODONE  10 mg Oral Q6H PRN Devonte Hoffman MD     • traMADol  50 mg Oral Q6H PRN Devonte Hoffman MD     • traZODone  25 mg Oral HS PRN Devonte Hoffman MD     • ursodiol  300 mg Oral TID Devonte Hoffman MD          Today, Patient Was Seen By: Jim Heck MD    **Please Note: This note may have been constructed using a voice recognition system  **

## 2023-05-14 NOTE — RESPIRATORY THERAPY NOTE
RT Protocol Note  Jethro Meter 60 y o  female MRN: [id number]  Unit/Bed#: ED 10 Encounter: [id number]    Assessment    Principal Problem:    Nausea , vomiting, myalgia, chills  Active Problems:    Chronic pain disorder    Primary biliary cirrhosis (HCC)    Smoldering multiple myeloma (SMM)    Essential hypertension    Hyponatremia    Hypokalemia      Home Pulmonary Medications:  none       Past Medical History:   Diagnosis Date    Abnormal breast exam     Anxiety     Asthma     childhood    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[facility] [address] )     primary per allscripts    [facility] ([facility] )     IgG kappa smoldering multiple myeloma    Cardiac murmur     Chronic liver disease     resolved 12/04/2017    COVID     Depression     Endometriosis     Fracture of tibia     right tibial plateau fracture per allscripts    Heart murmur     Hepatic disease     Hypertension     last assessed 12/13/2017    Inflammatory bowel disease     Multiple myeloma (HCC)     Neuropathy     R foot     Nosebleed     OCD (obsessive compulsive disorder)     Pneumonia     RSD (reflex sympathetic dystrophy) 12/11/2018    Seasonal allergies     Wears eyeglasses     Whiplash injury to neck      Social History     Socioeconomic History    Marital status: Single     Spouse name: Not on file    Number of children: Not on file    Years of education: completed college    Highest education level: Not on file   Occupational History    Occupation:     Occupation:    Tobacco Use    Smoking status: Never    Smokeless tobacco: Never   Vaping Use    Vaping Use: Never used   Substance and Sexual Activity    Alcohol use: No    Drug use: No    Sexual activity: Yes     Partners: Male     Birth control/protection: Post-menopausal   Other Topics Concern    Not on file   Social History Narrative    Does not participate in activities outside of the home; participates in moderate activities inside the home    Lives with mother     Social Determinants of Health     Financial Resource Strain: Low Risk     Difficulty of Paying Living Expenses: Not hard at all   Food Insecurity: Not on file   Transportation Needs: No Transportation Needs    Lack of Transportation (Medical): No    Lack of Transportation (Non-Medical): No   Physical Activity: Not on file   Stress: Not on file   Social Connections: Not on file   Intimate Partner Violence: Not on file   Housing Stability: Not on file       Subjective         Objective    Physical Exam:   Assessment Type: Assess only  General Appearance: Alert, Awake  Respiratory Pattern: Normal  Chest Assessment: Chest expansion symmetrical  Bilateral Breath Sounds: Clear    Vitals:  Blood pressure 115/66, pulse 84, temperature 99 1 °F (37 3 °C), temperature source Oral, resp  rate 18, SpO2 91 %  Imaging and other studies: I have personally reviewed pertinent reports  05/13/23 2170   Respiratory Protocol   Protocol Initiated? Yes   Protocol Selection Respiratory; Airway Clearance   Language Barrier? No   Medical & Social History Reviewed? Yes   Diagnostic Studies Reviewed? Yes   Physical Assessment Performed? Yes   Airway Clearance Plan Incentive Spirometer   Respiratory Plan Discontinue Protocol   Respiratory Assessment   Assessment Type Assess only   General Appearance Alert; Awake   Respiratory Pattern Normal   Chest Assessment Chest expansion symmetrical   Bilateral Breath Sounds Clear   Resp Comments Assessment of Respiratory and Airway protocol  Patient was nausea, vomiting, inc  cough, moist and s o b   BS mostly clear; few rhonchi clears with cough  Pt on 2 l/m 95%  Not on O2 at home  Neg for Covid, Flu and RSV  Xrays not read yet; poss rt pneumonia  Patient does not have a Pulmonary/Asthma hx  Does not take any resp  tx  Start IS  IS 300cc, could be due to alot of pain she said                 Plan    Respiratory Plan: Discontinue Protocol  Airway Clearance Plan: Incentive Spirometer     Resp Comments: Assessment of Respiratory and Airway protocol  Patient was nausea, vomiting, inc  cough, moist and s o b   BS mostly clear; few rhonchi clears with cough  Pt on 2 l/m 95%  Not on O2 at home  Neg for Covid, Flu and RSV  Xrays not read yet; poss rt pneumonia  Patient does not have a Pulmonary/Asthma hx  Does not take any resp  tx  Start IS  IS 300cc, could be due to alot of pain she said

## 2023-05-14 NOTE — ASSESSMENT & PLAN NOTE
· Chest x-ray obtained on admission showed right upper lobe opacity possibly pneumonia or fissural fluid/pseudotumor, new from the prior study  Other etiologies not excluded  Follow-up repeat radiograph in 6 weeks recommended to assess for resolution  · Check CT chest without contrast to further clarify  · Niccoli improving from a respiratory standpoint  Currently on 2 L saturating around 97%, wean oxygen as tolerated to maintain sats greater than 92% on room air  ISS  · Continue empiric antibiotics with Rocephin  Discontinue azithromycin  · Okay to discontinue aspiration precautions as patient even if she did have aspiration pneumonia had nausea and vomiting and likely aspirated in the setting  She has no chronic aspiration issues and does not report coughing with meals  Monitor  · Trend procal and leukocytosis

## 2023-05-15 ENCOUNTER — TELEPHONE (OUTPATIENT)
Dept: HEMATOLOGY ONCOLOGY | Facility: CLINIC | Age: 63
End: 2023-05-15

## 2023-05-15 ENCOUNTER — APPOINTMENT (INPATIENT)
Dept: RADIOLOGY | Facility: HOSPITAL | Age: 63
End: 2023-05-15

## 2023-05-15 PROBLEM — N28.89 RENAL MASS: Status: ACTIVE | Noted: 2023-05-15

## 2023-05-15 PROBLEM — E87.6 HYPOKALEMIA: Status: RESOLVED | Noted: 2023-05-13 | Resolved: 2023-05-15

## 2023-05-15 PROBLEM — R11.2 NAUSEA & VOMITING: Status: RESOLVED | Noted: 2023-05-13 | Resolved: 2023-05-15

## 2023-05-15 LAB
ANION GAP SERPL CALCULATED.3IONS-SCNC: 1 MMOL/L (ref 4–13)
BASOPHILS # BLD AUTO: 0.02 THOUSANDS/ÂΜL (ref 0–0.1)
BASOPHILS NFR BLD AUTO: 0 % (ref 0–1)
BUN SERPL-MCNC: 17 MG/DL (ref 5–25)
CALCIUM SERPL-MCNC: 7.8 MG/DL (ref 8.3–10.1)
CHLORIDE SERPL-SCNC: 103 MMOL/L (ref 96–108)
CO2 SERPL-SCNC: 29 MMOL/L (ref 21–32)
CREAT SERPL-MCNC: 0.72 MG/DL (ref 0.6–1.3)
EOSINOPHIL # BLD AUTO: 0.18 THOUSAND/ÂΜL (ref 0–0.61)
EOSINOPHIL NFR BLD AUTO: 2 % (ref 0–6)
ERYTHROCYTE [DISTWIDTH] IN BLOOD BY AUTOMATED COUNT: 13.2 % (ref 11.6–15.1)
GFR SERPL CREATININE-BSD FRML MDRD: 89 ML/MIN/1.73SQ M
GLUCOSE SERPL-MCNC: 109 MG/DL (ref 65–140)
HCT VFR BLD AUTO: 33.4 % (ref 34.8–46.1)
HGB BLD-MCNC: 10.4 G/DL (ref 11.5–15.4)
IMM GRANULOCYTES # BLD AUTO: 0.05 THOUSAND/UL (ref 0–0.2)
IMM GRANULOCYTES NFR BLD AUTO: 1 % (ref 0–2)
LYMPHOCYTES # BLD AUTO: 1.81 THOUSANDS/ÂΜL (ref 0.6–4.47)
LYMPHOCYTES NFR BLD AUTO: 22 % (ref 14–44)
MCH RBC QN AUTO: 29.8 PG (ref 26.8–34.3)
MCHC RBC AUTO-ENTMCNC: 31.1 G/DL (ref 31.4–37.4)
MCV RBC AUTO: 96 FL (ref 82–98)
MONOCYTES # BLD AUTO: 0.43 THOUSAND/ÂΜL (ref 0.17–1.22)
MONOCYTES NFR BLD AUTO: 5 % (ref 4–12)
NEUTROPHILS # BLD AUTO: 5.69 THOUSANDS/ÂΜL (ref 1.85–7.62)
NEUTS SEG NFR BLD AUTO: 70 % (ref 43–75)
NRBC BLD AUTO-RTO: 0 /100 WBCS
PLATELET # BLD AUTO: 139 THOUSANDS/UL (ref 149–390)
PMV BLD AUTO: 10.1 FL (ref 8.9–12.7)
POTASSIUM SERPL-SCNC: 3.6 MMOL/L (ref 3.5–5.3)
PROCALCITONIN SERPL-MCNC: 1.24 NG/ML
RBC # BLD AUTO: 3.49 MILLION/UL (ref 3.81–5.12)
SODIUM SERPL-SCNC: 133 MMOL/L (ref 135–147)
WBC # BLD AUTO: 8.18 THOUSAND/UL (ref 4.31–10.16)

## 2023-05-15 RX ADMIN — DOCOSANOL: 100 CREAM TOPICAL at 21:54

## 2023-05-15 RX ADMIN — TRAZODONE HYDROCHLORIDE 25 MG: 50 TABLET ORAL at 21:55

## 2023-05-15 RX ADMIN — DICLOFENAC SODIUM 2 G: 10 GEL TOPICAL at 21:54

## 2023-05-15 RX ADMIN — URSODIOL 300 MG: 300 CAPSULE ORAL at 11:44

## 2023-05-15 RX ADMIN — TRAMADOL HYDROCHLORIDE 50 MG: 50 TABLET, COATED ORAL at 21:55

## 2023-05-15 RX ADMIN — METHOCARBAMOL 500 MG: 500 TABLET ORAL at 21:55

## 2023-05-15 RX ADMIN — URSODIOL 300 MG: 300 CAPSULE ORAL at 17:06

## 2023-05-15 RX ADMIN — FENOFIBRATE 120 MG: 48 TABLET ORAL at 09:01

## 2023-05-15 RX ADMIN — DOCOSANOL 1 APPLICATION.: 100 CREAM TOPICAL at 11:45

## 2023-05-15 RX ADMIN — DOCOSANOL: 100 CREAM TOPICAL at 17:07

## 2023-05-15 RX ADMIN — METHOCARBAMOL 500 MG: 500 TABLET ORAL at 17:06

## 2023-05-15 RX ADMIN — FLUOXETINE 40 MG: 20 CAPSULE ORAL at 09:01

## 2023-05-15 RX ADMIN — ENOXAPARIN SODIUM 40 MG: 40 INJECTION SUBCUTANEOUS at 09:02

## 2023-05-15 RX ADMIN — CEFTRIAXONE SODIUM 1000 MG: 10 INJECTION, POWDER, FOR SOLUTION INTRAVENOUS at 20:22

## 2023-05-15 RX ADMIN — TRAMADOL HYDROCHLORIDE 50 MG: 50 TABLET, COATED ORAL at 04:52

## 2023-05-15 RX ADMIN — Medication 2000 UNITS: at 09:01

## 2023-05-15 RX ADMIN — DICLOFENAC SODIUM 2 G: 10 GEL TOPICAL at 17:07

## 2023-05-15 RX ADMIN — METHOCARBAMOL 500 MG: 500 TABLET ORAL at 01:31

## 2023-05-15 RX ADMIN — DOCOSANOL 1 APPLICATION.: 100 CREAM TOPICAL at 14:01

## 2023-05-15 RX ADMIN — MELATONIN TAB 3 MG 6 MG: 3 TAB at 21:55

## 2023-05-15 RX ADMIN — URSODIOL 300 MG: 300 CAPSULE ORAL at 21:54

## 2023-05-15 RX ADMIN — ONDANSETRON 4 MG: 2 INJECTION INTRAMUSCULAR; INTRAVENOUS at 04:46

## 2023-05-15 RX ADMIN — METHOCARBAMOL 500 MG: 500 TABLET ORAL at 09:01

## 2023-05-15 NOTE — ASSESSMENT & PLAN NOTE
Right upper renal pole 4 7 x 5 6 x 4 8 cm vascular mass concerning for renal cell carcinoma/neoplasm noted on RUQ US    · MRCP pending
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
## 2023-05-15 NOTE — ASSESSMENT & PLAN NOTE
Presented with myaglias, chills, N/V and fatigue x 2-3 days  CXR showed RUL opacity, possible PNA versus fissural fluid/pseudotumor, new from the prior study  · With noted leukocytosis, elevated procalcitonin on admission, started on antibiotics with Rocephin, day 3 Rx  · Urine strep and Legionella negative  · CT chest pending given abnormal CXR as above for further clarification  · Initially with mild acute respiratory failure requiring 2 L nasal cannula however now improved and on room air  · Clinically improving, consider transition to oral antibiotics within next 24 hours    · Rest of as below
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
## 2023-05-15 NOTE — TELEPHONE ENCOUNTER
Patient Call    Who are you speaking with? Patient    If it is not the patient, are they listed on an active communication consent form? N/A   What is the reason for this call? Pt just got diagnosed with clear cell renal cancer  She wanted to discuss treatment and who she should see with Dr Smith Person   Does this require a call back? Yes   If a call back is required, please list best call back number [phone]   If a call back is required, advise that a message will be forwarded to their care team and someone will return their call as soon as possible  Did you relay this information to the patient?  Yes

## 2023-05-15 NOTE — ASSESSMENT & PLAN NOTE
She has known history of primary biliary cholangitis  No hx of cirrhosis as not all PBC patient's have cirrhotic liver  She is maintained on ursodiol  · She reports approximate 20 pound weight gain over the last month and some increasing abdominal distention? · No ascites on RUQ US but did show renal mass as above

## 2023-05-15 NOTE — TELEPHONE ENCOUNTER
Returned call to patient  She is currently still admitted  Advised she could ask for inpatient consult to hem/onc services  Or this can be discussed in f/u on 6/2 visit  Patient is already established for [facility]

## 2023-05-15 NOTE — PLAN OF CARE
Problem: PHYSICAL THERAPY ADULT  Goal: Performs mobility at highest level of function for planned discharge setting  See evaluation for individualized goals  Description:            See flowsheet documentation for full assessment, interventions and recommendations  Note:       Assessment: Pt is 60 y o  female seen for a PT evaluation s/p admit to [facility] on 5/13/2023  Pt presenting w/ principal dx of pneumonia due to infectious organism  Please see above for other active problem list / PMH  PT now consulted to assess functional mobility and needs for safe d/c planning  Prior to admission, pt was I w/ ambulation mostly without AD, uses SPC prn, lives alone in a house w/ stairs  Currently pt is S for bed skills; S for functional transfers; S for ambulation w/o AD  Pt presents near functional baseline  Encouraged pt to mobilize at least 5x/day while in-house to prevent functional decline  No further skilled acute PT needs  Will d/c from caseload  PT Discharge Recommendation: (S) No rehabilitation needs    See flowsheet documentation for full assessment

## 2023-05-15 NOTE — QUICK NOTE
MRCP and CT chest findings discussed with patient at bedside  Urology consult pending         Shira Leal, [address] [facility]

## 2023-05-16 ENCOUNTER — TRANSITIONAL CARE MANAGEMENT (OUTPATIENT)
Dept: INTERNAL MEDICINE CLINIC | Facility: CLINIC | Age: 63
End: 2023-05-16

## 2023-05-16 VITALS
SYSTOLIC BLOOD PRESSURE: 128 MMHG | OXYGEN SATURATION: 93 % | HEART RATE: 66 BPM | RESPIRATION RATE: 20 BRPM | TEMPERATURE: 97.6 F | DIASTOLIC BLOOD PRESSURE: 81 MMHG

## 2023-05-16 PROBLEM — E87.1 HYPONATREMIA: Status: RESOLVED | Noted: 2023-05-13 | Resolved: 2023-05-16

## 2023-05-16 LAB
ANION GAP SERPL CALCULATED.3IONS-SCNC: -3 MMOL/L (ref 4–13)
BASOPHILS # BLD AUTO: 0.04 THOUSANDS/ÂΜL (ref 0–0.1)
BASOPHILS NFR BLD AUTO: 1 % (ref 0–1)
BUN SERPL-MCNC: 10 MG/DL (ref 5–25)
CALCIUM SERPL-MCNC: 8.7 MG/DL (ref 8.3–10.1)
CHLORIDE SERPL-SCNC: 105 MMOL/L (ref 96–108)
CO2 SERPL-SCNC: 32 MMOL/L (ref 21–32)
CREAT SERPL-MCNC: 0.84 MG/DL (ref 0.6–1.3)
EOSINOPHIL # BLD AUTO: 0.19 THOUSAND/ÂΜL (ref 0–0.61)
EOSINOPHIL NFR BLD AUTO: 4 % (ref 0–6)
ERYTHROCYTE [DISTWIDTH] IN BLOOD BY AUTOMATED COUNT: 13 % (ref 11.6–15.1)
GFR SERPL CREATININE-BSD FRML MDRD: 74 ML/MIN/1.73SQ M
GLUCOSE SERPL-MCNC: 88 MG/DL (ref 65–140)
HCT VFR BLD AUTO: 33.7 % (ref 34.8–46.1)
HGB BLD-MCNC: 11 G/DL (ref 11.5–15.4)
IMM GRANULOCYTES # BLD AUTO: 0.06 THOUSAND/UL (ref 0–0.2)
IMM GRANULOCYTES NFR BLD AUTO: 1 % (ref 0–2)
LYMPHOCYTES # BLD AUTO: 1.52 THOUSANDS/ÂΜL (ref 0.6–4.47)
LYMPHOCYTES NFR BLD AUTO: 32 % (ref 14–44)
MCH RBC QN AUTO: 30.7 PG (ref 26.8–34.3)
MCHC RBC AUTO-ENTMCNC: 32.6 G/DL (ref 31.4–37.4)
MCV RBC AUTO: 94 FL (ref 82–98)
MONOCYTES # BLD AUTO: 0.37 THOUSAND/ÂΜL (ref 0.17–1.22)
MONOCYTES NFR BLD AUTO: 8 % (ref 4–12)
NEUTROPHILS # BLD AUTO: 2.51 THOUSANDS/ÂΜL (ref 1.85–7.62)
NEUTS SEG NFR BLD AUTO: 54 % (ref 43–75)
NRBC BLD AUTO-RTO: 0 /100 WBCS
PLATELET # BLD AUTO: 176 THOUSANDS/UL (ref 149–390)
PMV BLD AUTO: 10.7 FL (ref 8.9–12.7)
POTASSIUM SERPL-SCNC: 4 MMOL/L (ref 3.5–5.3)
PROCALCITONIN SERPL-MCNC: 0.68 NG/ML
RBC # BLD AUTO: 3.58 MILLION/UL (ref 3.81–5.12)
SODIUM SERPL-SCNC: 134 MMOL/L (ref 135–147)
WBC # BLD AUTO: 4.69 THOUSAND/UL (ref 4.31–10.16)

## 2023-05-16 RX ORDER — CEFUROXIME AXETIL 500 MG/1
500 TABLET ORAL EVERY 12 HOURS SCHEDULED
Qty: 6 TABLET | Refills: 0 | Status: SHIPPED | OUTPATIENT
Start: 2023-05-16 | End: 2023-05-19

## 2023-05-16 RX ORDER — CEFUROXIME AXETIL 500 MG/1
500 TABLET ORAL EVERY 12 HOURS SCHEDULED
Qty: 4 TABLET | Refills: 0 | Status: CANCELLED | OUTPATIENT
Start: 2023-05-16 | End: 2023-05-18

## 2023-05-16 RX ORDER — CEFUROXIME AXETIL 250 MG/1
500 TABLET ORAL EVERY 12 HOURS SCHEDULED
Status: DISCONTINUED | OUTPATIENT
Start: 2023-05-16 | End: 2023-05-16 | Stop reason: HOSPADM

## 2023-05-16 RX ADMIN — FENOFIBRATE 120 MG: 48 TABLET ORAL at 08:11

## 2023-05-16 RX ADMIN — DOCOSANOL: 100 CREAM TOPICAL at 08:14

## 2023-05-16 RX ADMIN — Medication 2000 UNITS: at 08:12

## 2023-05-16 RX ADMIN — DOCOSANOL: 100 CREAM TOPICAL at 12:02

## 2023-05-16 RX ADMIN — METHOCARBAMOL 500 MG: 500 TABLET ORAL at 08:12

## 2023-05-16 RX ADMIN — URSODIOL 300 MG: 300 CAPSULE ORAL at 08:12

## 2023-05-16 RX ADMIN — FLUOXETINE 40 MG: 20 CAPSULE ORAL at 08:12

## 2023-05-16 RX ADMIN — CEFUROXIME AXETIL 500 MG: 250 TABLET ORAL at 13:31

## 2023-05-16 RX ADMIN — ENOXAPARIN SODIUM 40 MG: 40 INJECTION SUBCUTANEOUS at 08:13

## 2023-05-16 NOTE — CONSULTS
Consult - Urology   Adrian Lindquist 1960, 60 y o  female MRN: [id number]    Unit/Bed#: -01 Encounter: [id number]    Renal mass  Assessment & Plan  · Incidental finding of 5 5 x 5 1 cm right upper pole renal mass with features suggestive of clear-cell renal cell carcinoma  · Afebrile, vital signs stable  · Creatinine 0 72  · Urine microscopic 2-4 RBCs/high-power field  · Dr Aiden Martinez will evaluate patient 5/16/2023 and determine plan of care        Subjective:   58-year-old female presented to the hospital for fatigue, nausea and vomiting, worsening of chronic back pain and chills  A chest x-ray revealed pneumonia in the right upper lobe  She also had reported weight gain and increased appetite  She underwent an ultrasound of her right upper quadrant which revealed an abnormality in her right upper renal pole concerning for renal cell carcinoma  Subsequent MRI of the abdomen with and without contrast revealed a 5 5 x 5 1 cm right renal tumor in the upper pole  Features are suggestive of clear-cell renal cell carcinoma  She additionally had indeterminant incidentally noted spinal lesions  There was no intra-abdominal metastatic disease noted no retroperitoneal lymphadenopathy  She denies family history of kidney or bladder cancer  She is a lifetime non-smoker  She reports mild intermittent stress incontinence and urinary frequency  Denies any gross hematuria  She has a history of smoldering multiple myeloma follows with Dr Anastacia Craven  She also has a history of primary biliary cirrhosis, pretension, hyponatremia, hypokalemia with peripheral neuropathy depression with anxiety, PTSD, OCD, hypergammaglobulinemia hyperlipidemia, nephrolithiasis with laser lithotripsy and stent placement in 2011 vitamin B12 deficiency, and deficiency anemia, osteoporosis, osteopenia, endometriosis, vaginal dryness, hernia repair age 14      Review of Systems   Constitutional: Negative for activity change, appetite change, chills, fatigue, fever and unexpected weight change  HENT: Negative for facial swelling  Eyes: Negative for discharge  Respiratory: Positive for cough  Negative for shortness of breath  Cardiovascular: Negative for chest pain and leg swelling  Gastrointestinal: Negative  Negative for abdominal distention, abdominal pain, constipation, diarrhea, nausea and vomiting  Endocrine: Negative  Genitourinary: Positive for frequency  Negative for decreased urine volume, difficulty urinating, dysuria, enuresis, flank pain, genital sores, hematuria and urgency  Musculoskeletal: Positive for back pain  Negative for myalgias  Skin: Negative for pallor and rash  Allergic/Immunologic: Negative  Negative for immunocompromised state  Neurological: Negative for facial asymmetry and speech difficulty  Psychiatric/Behavioral: Negative for agitation and confusion  Objective:  Vitals: Blood pressure 136/80, pulse 72, temperature 98 °F (36 7 °C), resp  rate 14, SpO2 97 %  ,There is no height or weight on file to calculate BMI  Physical Exam  Vitals and nursing note reviewed  Constitutional:       General: She is not in acute distress  Appearance: Normal appearance  She is normal weight  She is not ill-appearing, toxic-appearing or diaphoretic  HENT:      Head: Normocephalic  Cardiovascular:      Rate and Rhythm: Normal rate  Pulmonary:      Effort: Pulmonary effort is normal  No respiratory distress  Abdominal:      General: Abdomen is flat  There is no distension  Musculoskeletal:         General: No swelling  Cervical back: Normal range of motion  Right lower leg: No edema  Left lower leg: No edema  Skin:     Coloration: Skin is not pale  Neurological:      General: No focal deficit present  Mental Status: She is alert and oriented to person, place, and time     Psychiatric:         Mood and Affect: Mood normal          Behavior: Behavior normal  Thought Content: Thought content normal          Judgment: Judgment normal          Imaging:  MRI   MRI - ABDOMEN - WITH AND WITHOUT CONTRAST     INDICATION: 62 years / Female  Right upper renal pole 4 7 x 5 6 x 4 8 cm vascular mass concerning for renal cell carcinoma/neoplasm        COMPARISON: Partial comparison to abdominal ultrasound, same date      TECHNIQUE:  Multiplanar/multisequence MRI of the abdomen was performed before and after administration of contrast      IV Contrast:  7 mL of Gadobutrol injection (SINGLE-DOSE)     FINDINGS:     LOWER CHEST:   Bandlike consolidation in the perihilar right lung, not further characterized, please see separately dictated CT chest, 5/15/2023  Trace pleural fluid      LIVER:  18 9 cm craniocaudad  1 2 cm T2 hyperintense lesion in the left hepatic dome, with apparent peripheral nodular enhancement, isointense on more delayed images, likely a small hemangioma  Characterization is somewhat limited by small size  No other focal lesions  The hepatic veins and portal veins are patent      BILE DUCTS: No intrahepatic or extrahepatic bile duct dilation      GALLBLADDER:  Normal      PANCREAS:  Unremarkable      ADRENAL GLANDS:  Normal      SPLEEN: 14 7 cm craniocaudad  No focal lesion      KIDNEYS/PROXIMAL URETERS: Solid 5 5 x 5 1 cm upper pole right renal mass  It is circumscribed, protruding into the collecting system  It demonstrates central area of necrosis  The remaining tumor tissue is avidly enhancing with areas of washout  There is   apparent dropout of signal on out of phase T1 suggestive of microscopic lipid  No macroscopic fat  The tumor abuts but does not macroscopically invades the liver  No other suspicious renal lesions are noted on either side  The IVC and the right renal   vein are patent      BOWEL:   No dilated loops of bowel      PERITONEUM/RETROPERITONEUM: No mass    No ascites      LYMPH NODES: No abdominal lymphadenopathy      VASCULAR STRUCTURES:  No aneurysm      ABDOMINAL WALL:  Unremarkable      OSSEOUS STRUCTURES: Nondestructive mildly T2 hyperintense foci in the L4, L3 and T9, appearance suggestive of lipid poor hemangiomata  Comparison with prior outside studies would be helpful, if available  Fat suppressing similar lesion in L5, compatible   with a lipid rich adenoma, 2 2 cm      IMPRESSION:     5 5 x 5 1 cm previously reported right renal tumor in the upper pole, MR features are suggestive of clear cell renal cell carcinoma  Indeterminate incidentally noted spinal lesions, currently nondestructive and therefore favoring lipid poor hemangiomas  Comparison with remote prior MRIs would be helpful, if available  If not, consider follow-up MRI lumbar spine in 2-3 months  No evidence of intra-abdominal metastatic disease  No retroperitoneal lymphadenopathy  Clear IVC and right renal vein  Mild hepatosplenomegaly  Small left hepatic lobe lesion likely reflecting a hemangioma  Recommend attention on further follow-ups to confirm size stability  Comparison with prior outside studies would also be helpful, if available           Imaging reviewed - both report and images personally reviewed       Labs:  Recent Labs     05/13/23  1828 05/14/23  0454 05/15/23  0453   WBC 16 39* 16 71* 8 18     Recent Labs     05/13/23  1828 05/14/23  0454 05/15/23  0453   HGB 12 2 10 3* 10 4*       Recent Labs     05/13/23  1714 05/14/23  0454 05/15/23  0453   CREATININE 1 22 1 00 0 72       Microbiology: None    History:  Social History     Socioeconomic History   • Marital status: Single     Spouse name: None   • Number of children: None   • Years of education: completed college   • Highest education level: None   Occupational History   • Occupation:    • Occupation:    Tobacco Use   • Smoking status: Never   • Smokeless tobacco: Never   Vaping Use   • Vaping Use: Never used   Substance and Sexual Activity   • Alcohol use: Not Currently   • Drug use: No   • Sexual activity: Yes     Partners: Male     Birth control/protection: Post-menopausal   Other Topics Concern   • None   Social History Narrative    Does not participate in activities outside of the home; participates in moderate activities inside the home    Lives with mother     Social Determinants of Health     Financial Resource Strain: Low Risk    • Difficulty of Paying Living Expenses: Not hard at all   Food Insecurity: Not on file   Transportation Needs: No Transportation Needs   • Lack of Transportation (Medical): No   • Lack of Transportation (Non-Medical): No   Physical Activity: Not on file   Stress: Not on file   Social Connections: Not on file   Intimate Partner Violence: Not on file   Housing Stability: Not on file     Financial Resource Strain: Low Risk    • Difficulty of Paying Living Expenses: Not hard at all   Food Insecurity: Not on file   Transportation Needs: No Transportation Needs   • Lack of Transportation (Medical): No   • Lack of Transportation (Non-Medical):  No   Physical Activity: Not on file   Stress: Not on file   Social Connections: Not on file   Intimate Partner Violence: Not on file   Housing Stability: Not on file      Diagnosis Date   • Abnormal breast exam    • Anxiety    • Asthma     childhood   • 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[facility] [address] )     primary per allscripts   • Cancer (HCC)     IgG kappa smoldering multiple myeloma   • Cardiac murmur    • Chronic liver disease     resolved 12/04/2017   • COVID    • Depression    • Endometriosis    • Fracture of tibia     right tibial plateau fracture per allscripts   • Heart murmur    • Hepatic disease    • Hypertension     last assessed 12/13/2017   • Inflammatory bowel disease    • Multiple myeloma (HCC)    • Neuropathy     R foot    • Nosebleed    • OCD (obsessive compulsive disorder)    • Pneumonia    • RSD (reflex sympathetic dystrophy) 12/11/2018   • Seasonal allergies    • Wears eyeglasses    • Whiplash injury to neck      Past Surgical History:   Procedure Laterality Date   • BACK SURGERY      hemilaminectomy and discectomy, L5-S1, right ([facility] at [facility]) onset 02/14/2005 per allscripts   • EXPLORATION EXTREMITY Right 08/29/2016    Procedure: KNEE PERONEAL NERVE EXPLORATION AND RELEASE ;  Surgeon: Jannie Amador MD;  [facility];  Service:    • FOOT SURGERY     • FRACTURE SURGERY     • HAND SURGERY     • HERNIA REPAIR     • JOINT REPLACEMENT     • KNEE SURGERY     • MANDIBLE SURGERY     • NEUROPLASTY / TRANSPOSITION MEDIAN NERVE AT CARPAL TUNNEL     • ORIF TIBIAL PLATEAU Right     arthroplasty per allscripts   • OTHER SURGICAL HISTORY      injection of trigger point(s) per allscripts   • PA ARTHRP KNE CONDYLE&PLATU MEDIAL&LAT COMPARTMENTS Right 06/11/2018    Procedure: ARTHROPLASTY KNEE TOTAL;  Surgeon: Jannie Amador MD;  Location[facility];  Service: Orthopedics   • PA TENDON SHEATH INCISION Right 12/02/2020    Procedure: [place] long;  Surgeon: Sally Cody MD;  [facility];  Service: Orthopedics   • SINUS SURGERY     • SPINE SURGERY     • TONSILLECTOMY       Family History   Problem Relation Age of Onset   • Heart failure Mother    • Anxiety disorder Mother         symptom per allscripts   • Depression Mother    • Anxiety disorder Father         symptom per allscripts   • Cirrhosis Father         hepatic per allscripts   • Alcohol abuse Father    • Stomach cancer Maternal Grandmother    • Cancer Maternal Grandmother    • Stomach cancer Maternal Grandfather    • Cancer Maternal Grandfather    • Diabetes Paternal Grandmother    • No Known Problems Paternal Grandfather    • Anxiety disorder Other         symptom per allscripts   • Coronary artery disease Other    • Depression Other    • Hyperlipidemia Other    • Osteoarthritis Other    • Other Other         back disorder per allscripts   • Neuropathy Other    • No Known Problems Paternal Aunt    • No Known Problems Paternal Aunt        The following portions of the patient's history were reviewed and updated as appropriate: allergies, current medications, past family history, past medical history, past social history, past surgical history and problem list    OLIVIA Montgomery  Date: 5/15/2023 Time: 9:20 PM late entry

## 2023-05-16 NOTE — CASE MANAGEMENT
Case Management Discharge Planning Note    Patient name Gina Shell  Location [address] [id number]/-[id number] MRN [id number]  : 1960 Date 2023       Current Admission Date: 2023  Current Admission Diagnosis:Pneumonia due to infectious organism   Patient Active Problem List    Diagnosis Date Noted   • Renal mass 05/15/2023   • Pneumonia due to infectious organism 2023   • Multiple myeloma not having achieved remission ([facility] [address] ) 2023   • Vitamin B12 deficiency 2021   • Iron deficiency anemia due to chronic blood loss 2021   • Epistaxis 2021   • Other osteoporosis without current pathological fracture 2021   • Closed fracture of one rib of left side with routine healing 2021   • Chest pain 2021   • Osteopenia 2021   • Trigger finger, right middle finger 2020   • Complex regional pain syndrome type 2 of right lower extremity 2020   • Chronic left-sided low back pain without sciatica 02/10/2020   • Left leg pain 02/10/2020   • Complex regional pain syndrome i of right lower limb 2019   • Quadriceps weakness 2019   • PTSD (post-traumatic stress disorder) 2018   • Syncope 2018   • MDD (major depressive disorder), recurrent episode, mild ([facility] [address] ) 10/22/2018   • JOSEFINA (generalized anxiety disorder) 10/22/2018   • OCD (obsessive compulsive disorder) 10/22/2018   • Herpes simplex 2018   • Essential hypertension 2018   • Status post total right knee replacement 2018   • Medial epicondylitis of left elbow 2018   • Chronic pain of right knee 2018   • Post-traumatic osteoarthritis of right knee 2018   • Bilateral elbow joint pain 2018   • Lateral epicondylitis of right elbow 2018   • Medial epicondylitis, left elbow 2018   • Vitamin D deficiency 2017   • Cirrhosis ([facility] [address] ) 2017   • Smoldering multiple myeloma ([facility]) 2017   • Chronic pain disorder 05/10/2017   • Hypergammaglobulinemia 12/20/2016   • Peripheral neuropathy 08/29/2016   • Chronic right hip pain 07/21/2016   • Dense breasts 06/08/2016   • Lumbar degenerative disc disease 10/22/2015   • Abnormal mammogram 03/31/2015   • Pruritus 03/31/2015   • Sacroiliitis ([facility] [address] ) 03/11/2015   • Lumbar radiculopathy 02/17/2015   • Arthritis 12/08/2014   • Primary biliary cholangitis ([facility] [address] ) 08/01/2014   • Hyperlipidemia 11/16/2013   • Occipital neuralgia 04/19/2013   • Depression with anxiety 10/11/2012   • Hydronephrosis 10/11/2012   • Nephrolithiasis 10/11/2012      LOS (days): 3  Geometric Mean LOS (GMLOS) (days): 2 90  Days to GMLOS:0 1     OBJECTIVE:  Risk of Unplanned Readmission Score: 12 41         Current admission status: Inpatient   Preferred Pharmacy:   [id number], [address] [place] [address],   [address] [place] [address]  Phone: [phone] Fax: [phone]    Primary Care Provider: Quyen Hunt DO    Primary Insurance: MEDICARE  Secondary Insurance: Tasha Muñoz    DISCHARGE DETAILS:          Comments - Freedom of Choice: CM called to patients room she had insurance questions and other questions reguarding a new cancer diagnosis  Pt wanted to know why her oncologist could not do the surgery she needs for her new diagnosis  CM directed her to speak to both Urology and her ONcologist about these concerns  Pt had billing questions  CM directed her to call the number on her bill and speak to a financial councelor                                 Other Referral/Resources/Interventions Provided:  Financial Resources Provided: Financial Counselor

## 2023-05-16 NOTE — ASSESSMENT & PLAN NOTE
· Incidental finding of 5 5 x 5 1 cm right upper pole renal mass with features suggestive of clear-cell renal cell carcinoma  · Afebrile, vital signs stable  · Creatinine 0 72  · Urine microscopic 2-4 RBCs/high-power field  · Dr Esther Lynn will evaluate patient 5/16/2023 and determine plan of care

## 2023-05-16 NOTE — INCIDENTAL FINDINGS
The following findings require follow up:  Radiographic finding   Finding: Indeterminate incidentally noted spinal lesions, currently nondestructive and therefore favoring lipid poor hemangiomas   Follow up required: MRI lumbar spine    Follow up should be done within 2-3 month(s)    Please notify the following clinician to assist with the follow up:   Dr Keith Miller

## 2023-05-16 NOTE — INCIDENTAL FINDINGS
The following findings require follow up:  Radiographic finding   Finding: Small left hepatic lobe lesion likely reflecting a hemangioma  Follow up required:  Follow up surveillance imaging   Follow up should be done within 6 month(s)    Please notify the following clinician to assist with the follow up:   Dr Kiana Barbour

## 2023-05-16 NOTE — PLAN OF CARE
Problem: INFECTION - ADULT  Goal: Absence or prevention of progression during hospitalization  Description: INTERVENTIONS:  - Assess and monitor for signs and symptoms of infection  - Monitor lab/diagnostic results  - Monitor all insertion sites, i e  indwelling lines, tubes, and drains  - Monitor endotracheal if appropriate and nasal secretions for changes in amount and color  - [place] appropriate cooling/warming therapies per order  - Administer medications as ordered  - Instruct and encourage patient and family to use good hand hygiene technique  - Identify and instruct in appropriate isolation precautions for identified infection/condition  Outcome: Progressing     Problem: RESPIRATORY - ADULT  Goal: Achieves optimal ventilation and oxygenation  Description: INTERVENTIONS:  - Assess for changes in respiratory status  - Assess for changes in mentation and behavior  - Position to facilitate oxygenation and minimize respiratory effort  - Oxygen administered by appropriate delivery if ordered  - Initiate smoking cessation education as indicated  - Encourage broncho-pulmonary hygiene including cough, deep breathe, Incentive Spirometry  - Assess the need for suctioning and aspirate as needed  - Assess and instruct to report SOB or any respiratory difficulty  - Respiratory Therapy support as indicated  Outcome: Progressing

## 2023-05-16 NOTE — DISCHARGE INSTR - AVS FIRST PAGE
Caryle Barn,    Take antibiotics as prescribed for a few more days to treat your pneumonia  You should follow up with Dr Galilea Molina for further discussion about next steps for your kidney mass  Would hold off on your Maxide (blood pressure pill) for now  You can discuss with Dr Reno Hodgson if you should continue or restart this at your next follow up       Thank you,  Sudeep Cates  Internal Medicine  [phone]

## 2023-05-16 NOTE — DISCHARGE SUMMARY
Discharge Summary - [facility] Medicine    Patient Information: Ly Trinidad 60 y o  female MRN: [id number]  Unit/Bed#: -01 Encounter: [id number]    Discharging Physician / Practitioner: OLIVIA Fontanez  PCP: Josephine Chavez DO  Admission Date: 5/13/2023  Discharge Date: 05/16/23    Reason for Admission: CAP, renal mass    Discharge Diagnoses:     Principal Problem:    Pneumonia due to infectious organism  Active Problems:    Renal mass    Essential hypertension    Chronic pain disorder    Primary biliary cholangitis ([address] )    Smoldering multiple myeloma (SMM)  Resolved Problems:    Nausea , vomiting, myalgia, chills    Hyponatremia    Hypokalemia      Consultations During Hospital Stay:  · Urology    Procedures Performed:     · None     Significant Findings / Test Results:     · CXR Right upper lobe opacity possibly pneumonia or fissural fluid/pseudotumor, new from the prior study  Other etiologies not excluded  Follow-up repeat radiograph in 6 weeks recommended to assess for resolution  · US abdomen Right upper renal pole 4 7 x 5 6 x 4 8 cm vascular mass concerning for renal cell carcinoma/neoplasm  Follow-up contrast-enhanced CT renal protocol abdominal MRI renal protocol is recommended for further evaluation  · MRI abdomen with and without contrast 5 5 x 5 1 cm previously reported right renal tumor in the upper pole, MR features are suggestive of clear cell renal cell carcinoma  Indeterminate incidentally noted spinal lesions, currently nondestructive and therefore favoring lipid poor hemangiomas  Comparison with remote prior MRIs would be helpful, if available  If not, consider follow-up MRI lumbar spine in 2-3 months  No evidence of intra-abdominal metastatic disease  No retroperitoneal lymphadenopathy  Clear IVC and right renal vein  Mild hepatosplenomegaly  Small left hepatic lobe lesion likely reflecting a hemangioma  Recommend attention on further follow-ups to confirm size stability  Comparison with prior outside studies would also be helpful, if available  · CT chest without Large right upper lobe pneumonia  Mild interstitial edema with trace effusions  Right renal mass not included with nothing to indicate metastatic disease  · Urine strep and legionella negative  · BC negative at 48 hours  · COVID FLU RSV negative     Incidental Findings:   · Indeterminate incidentally noted spinal lesions, currently nondestructive and therefore favoring lipid poor hemangiomas  Comparison with remote prior MRIs would be helpful, if available  If not, consider follow-up MRI lumbar spine in 2-3 months  · Small left hepatic lobe lesion likely reflecting a hemangioma  Recommend attention on further follow-ups to confirm size stability  Comparison with prior outside studies would also be helpful, if available  Test Results Pending at Discharge (will require follow up): · End points of Blood cultures      Outpatient Tests Requested:  · Outpatient f/u with PCP as scheduled  · Repeat CXR/CT chest in 4-6 weeks to f/u resolution of PNA  · Outpatient f/u with urology, Dr Jake Mendez regarding [address]  · Outpatient f/u with Dr Tejinder Major     Complications:  None     Hospital Course:     Jethro Lvoell is a 60 y o  female patient with a past medical history of smoldering multiple myeloma, chronic pain disorder, primary biliary cholangitis, hypertension who originally presented to the hospital on 5/13/2023 due to myalgias, chills, nausea vomiting and fatigue for 2 to 3 days  X-ray concerning for pneumonia, procalcitonin elevated  Noted on IV antibiotics with improvement of symptoms  Initially with mild acute respiratory failure requiring 2 L nasal cannula however improved and now on room air  Abdominal ultrasound was done as patient complaining of some increasing abdominal distention, this did not show ascites however did show right upper renal pole vascular mass concerning for renal cell carcinoma    MRI abdomen was done which showed MRI features suggestive of clear renal cell carcinoma  Urology was consulted, they are recommending outpatient discussion to  regarding nephrectomy  Patient will follow-up with urology Dr Willian Siemens in the outpatient setting  She is also known to Dr Yaritza Alvarenga given her SMM diagnosis, she will follow-up with him as well  Incidental findings as mentioned above, patient is aware of these findings and plans to discuss with PCP  Additionally, would recommend repeat chest imaging in 4 to 6 weeks to follow-up resolution of pneumonia  Stable for discharge home today  Condition at Discharge: stable     Discharge Day Visit / Exam:     Subjective: Patient offers no acute complaints  Feels better overall  Agreeable for discharge home today  Verbalized discharge instructions including need for follow-ups  Vitals: Blood Pressure: 128/81 (05/16/23 0721)  Pulse: 66 (05/16/23 0721)  Temperature: 97 6 °F (36 4 °C) (05/16/23 0721)  Temp Source: Oral (05/13/23 1615)  Respirations: 20 (05/15/23 2230)  SpO2: 93 % (05/16/23 0721)  Exam:   Physical Exam  Vitals and nursing note reviewed  Constitutional:       General: She is not in acute distress  Comments: On RA   Cardiovascular:      Rate and Rhythm: Normal rate  Pulmonary:      Breath sounds: Examination of the right-upper field reveals decreased breath sounds  Examination of the right-middle field reveals decreased breath sounds  Examination of the right-lower field reveals decreased breath sounds  Decreased breath sounds present  Abdominal:      Tenderness: There is no abdominal tenderness  Musculoskeletal:         General: No swelling  Skin:     General: Skin is warm  Neurological:      Mental Status: She is alert and oriented to person, place, and time  Mental status is at baseline     Psychiatric:         Mood and Affect: Mood normal          Discussion with Family: Patient    Discharge instructions/Information to patient and family: See after visit summary for information provided to patient and family  Provisions for Follow-Up Care:  See after visit summary for information related to follow-up care and any pertinent home health orders  Disposition:     Home    For Discharges to [facility] SNF:   · Not Applicable to this Patient - Not Applicable to this Patient    Planned Readmission: no     Discharge Statement:  I spent 40 minutes discharging the patient  This time was spent on the day of discharge  I had direct contact with the patient on the day of discharge  Greater than 50% of the total time was spent examining patient, answering all patient questions, arranging and discussing plan of care with patient as well as directly providing post-discharge instructions  Additional time then spent on discharge activities  Discharge Medications:  See after visit summary for reconciled discharge medications provided to patient and family        ** Please Note: This note has been constructed using a voice recognition system **

## 2023-05-17 ENCOUNTER — TELEPHONE (OUTPATIENT)
Dept: UROLOGY | Facility: AMBULATORY SURGERY CENTER | Age: 63
End: 2023-05-17

## 2023-05-17 ENCOUNTER — PREP FOR PROCEDURE (OUTPATIENT)
Dept: INTERVENTIONAL RADIOLOGY/VASCULAR | Facility: CLINIC | Age: 63
End: 2023-05-17

## 2023-05-17 DIAGNOSIS — N28.89 RENAL MASS: Primary | ICD-10-CM

## 2023-05-17 NOTE — TELEPHONE ENCOUNTER
I spoke with patient after reviewing her MRI again  I do not recommend observation  It does have the appearance of malignancy with central necrosis, best seen on coronal images  We discussed possible nephrectomy and percutaneous biopsy  After discussion she would like to start with biopsy  I do not think this is unreasonable  Please assist with scheduling  I have ordered IR consult for biopsy right kidney mass

## 2023-05-17 NOTE — TELEPHONE ENCOUNTER
Referral Dr Sterling:Renal Mass: pt consulted in hospital please review records/results if time sensitive/scheduling protocol

## 2023-05-18 ENCOUNTER — RA CDI HCC (OUTPATIENT)
Dept: OTHER | Facility: HOSPITAL | Age: 63
End: 2023-05-18

## 2023-05-18 NOTE — PROGRESS NOTES
[address]  coding opportunities       Chart reviewed, no opportunity found: CHART REVIEWED, NO OPPORTUNITY FOUND        Patients Insurance     Medicare Insurance: Medicare

## 2023-05-18 NOTE — TELEPHONE ENCOUNTER
PT called and stated she received a call from IR dept to schedule appt  IR told pt soonest appt is 6/20/23 and there is no cancellation list  Pt is upset that she would have to wait that long to she what the outcome of biopsy    Pt is asking if Dr Tommy Douglas can get her a sooner appt     Pt call [phone]

## 2023-05-19 ENCOUNTER — TELEPHONE (OUTPATIENT)
Dept: INTERNAL MEDICINE CLINIC | Facility: CLINIC | Age: 63
End: 2023-05-19

## 2023-05-19 LAB
BACTERIA BLD CULT: NORMAL
BACTERIA BLD CULT: NORMAL

## 2023-05-19 NOTE — TELEPHONE ENCOUNTER
Was admitted on may 5/13/23 and released the 5/16/23  Pt stated the ER took her off her blood pressure medication and is very concern and would like a call back [phone]  Pt refuses virtual visits and will wait til thursdays visit

## 2023-05-19 NOTE — TELEPHONE ENCOUNTER
PT called and stated she missed a call but no VM left   Pt is asking if her procedure has been moved to earlier date     Pt call [phone]

## 2023-05-19 NOTE — PRE-PROCEDURE INSTRUCTIONS
Pre-procedure Instructions for Interventional Radiology  [address]  [place] [address] [phone]    You are scheduled for a/an Kidney Mass Biopsy  On Thursday 5/25/23  Your tentative arrival time is 0900  Short stay will notify you the day before your procedure with the exact arrival time and the location to arrive  To prepare for your procedure:  1  Please arrange for someone to drive you home after the procedure and stay with you until the next morning if you are instructed to do so  This is typically for patients receiving some type of sedative or anesthetic for the procedure  2  DO NOT EAT OR DRINK ANYTHING after midnight on the evening before your procedure including candy & gum   3  ONLY SIPS OF WATER with your medications are allowed on the morning of your procedure  4  TAKE ALL OF YOUR REGULAR MEDICATIONS THE MORNING OF YOUR PROCEDURE with sips of water! We may call you to stop some of your blood sugar, blood pressure and blood thinning medications depending on the procedure  Please take all of these medications unless we instruct you to stop them  5  If you have an allergy to x-ray dye, please contact Interventional Radiology for an x-ray dye preparation which usually consists of an oral steroid and Benadryl  The day of your procedure:  1  Bring a list of the medications you take at home  2  Bring medications you take for breathing problems (such as inhalers), medications for chest pain, or both  3  Bring a case for your glasses or contacts  4  Bring your insurance card and a form of photo ID   5  Please leave all valuables such as credit cards and jewelry at home  6  Report to the registration desk in the main lobby at the [facility] [place] [address]  Ask to be directed to [facility]  7  While your procedure is being performed, your family may wait in the Radiology Waiting Room on the 1st floor in Radiology  if they need to leave, they may provide a number to be called following the procedure  8  Be prepared to stay overnight just in case  Sometimes procedures will indicate the need for further observation or treatment  9  If you are scheduled for a follow-up visit with the Interventional Radiologist after your procedure, you will be called with a date and time      Special Instructions (Medications to stop taking before your procedure etc ):

## 2023-05-19 NOTE — TELEPHONE ENCOUNTER
Called and informed patient that Dr Corin Pat has reached out to IR about a sooner appointment and we are waiting to hear back from them  She was appreciative for the call

## 2023-05-19 NOTE — TELEPHONE ENCOUNTER
The ER took her off the BP medication but if her BP is elevated with the systolic top number over 074, then she may resume the triamterene-HCTZ she was taking before

## 2023-05-22 ENCOUNTER — TELEMEDICINE (OUTPATIENT)
Dept: INTERNAL MEDICINE CLINIC | Facility: CLINIC | Age: 63
End: 2023-05-22

## 2023-05-22 DIAGNOSIS — J18.9 PNEUMONIA OF RIGHT UPPER LOBE DUE TO INFECTIOUS ORGANISM: ICD-10-CM

## 2023-05-22 DIAGNOSIS — I10 ESSENTIAL HYPERTENSION: Primary | ICD-10-CM

## 2023-05-22 DIAGNOSIS — N28.89 RIGHT RENAL MASS: ICD-10-CM

## 2023-05-22 RX ORDER — LOSARTAN POTASSIUM 50 MG/1
50 TABLET ORAL DAILY
Qty: 90 TABLET | Refills: 1 | Status: SHIPPED | OUTPATIENT
Start: 2023-05-22

## 2023-05-22 NOTE — PROGRESS NOTES
Virtual TCM Visit:    Verification of patient location:    Patient is located at Home in the following state in which I hold an active license [place]    Assessment/Plan:        Problem List Items Addressed This Visit        Respiratory    Pneumonia due to infectious organism       Cardiovascular and Mediastinum    Essential hypertension - Primary (Chronic)    Relevant Medications    losartan (COZAAR) 50 mg tablet   Other Visit Diagnoses     Right renal mass             #Renal Mass  -reports pain in lower back especially over left side  -US abd shows right renal mass, confirmed on MRI  -will need to undergo IR bx and then determine next steps  -denies weight loss  -no lymphadenopathy on MRI imaging    #Pneumonia  -seen on CXR and CT chest  -denies cough or CARLISLE  -strep antigen and legionella and blood cultures negative  -treated with 1 dose azithromycin and then switched to cefuroximine  -is breathing 1500ml into incentive spirometer    #HTN  -BP stable in patient but patient was ill with pneumonia and now resolved  -reports BP medications were held during admission but now at home she is having headaches  -will stop triamterene-HCTZ and instead switch to losartan  -does not have a BP cuff at home    Reason for visit is renal mass, pneumonia    Encounter provider Yaneth Terrell DO     Provider located at [address] [place] [address]  [address]  [phone]    Recent Visits  Date Type Provider Dept   05/19/23 Telephone [facility]   Showing recent visits within past 7 days and meeting all other requirements  Today's Visits  Date Type Provider Dept   05/22/23 [address] [facility]   Showing today's visits and meeting all other requirements  Future Appointments  No visits were found meeting these conditions    Showing future appointments within next 150 days and meeting all other requirements       After connecting through [employer], the patient was identified by name and date of birth  Rosa Li was informed that this is a telemedicine visit and that the visit is being conducted through the [employer]  She agrees to proceed     My office door was closed  No one else was in the room  She acknowledged consent and understanding of privacy and security of the video platform  The patient has agreed to participate and understands they can discontinue the visit at any time  Patient is aware this is a billable service  Transitional Care Management Review:  Rosa Li is a 60 y o  female here for TCM follow up  During the TCM phone call patient stated:    TCM Call     Date and time call was made  5/16/2023  4:20 PM    Hospital care reviewed  Records reviewed    Patient was hospitialized at  [facility]    Date of Admission  05/13/23    Date of discharge  05/16/23    Diagnosis  Pneumonia due to infectious organism    Disposition  Home    Current Symptoms  None      TCM Call     Post hospital issues  None    Scheduled for follow up? Yes    Not clinically warranted  PLANNED SURGERY    I have advised the patient to call PCP with any new or worsening symptoms  errol ponce    Living Arrangements  Alone; Spouse or Significiant other    Support System  Family; Friends; [place]-based; Partner    The type of support provided  Emotional; Physical    Do you have social support  Yes, as much as I need        Subjective:     Patient ID: Rosa Li is a 60 y o  female  Patient presents for TCM appointment  She was admitted to the hospital on 5/13/2023 through 5/16/2023 for acute episode of diffuse muscle pains as well as lower back pains  She underwent a chest x-ray revealing right upper lobe pneumonia  She was started on azithromycin and then switched over to cefuroxime    Her procalcitonin was elevated however blood cultures and Legionella and strep pneumonia urine antigen were all normal   She denies any symptoms including cough or shortness of breath  She then also underwent an ultrasound of the abdomen right upper pole renal vascular mass and underwent an MRI confirming this  She will need to undergo IR biopsy and then follow-up with urology  Patient reports that she is slowly feeling a little bit improved  She denies any weight loss  Her weight is 160 pounds  She reports that her transferring hydrochlorothiazide was discontinued  Her blood pressures have not been checked at home however she reports that she had does have a mild headache that is pretty similar to what she gets when her pressures are high  At this time we will send in for losartan 50 mg instead  Patient will notify us if she continues to have headaches  HPI  Review of Systems   Constitutional: Positive for fatigue  Negative for activity change, appetite change, chills, diaphoresis and fever  HENT: Negative for congestion, sore throat and trouble swallowing  Respiratory: Negative for cough and shortness of breath  Cardiovascular: Negative for chest pain and palpitations  Gastrointestinal: Negative for abdominal pain, constipation, diarrhea, nausea and vomiting  Musculoskeletal: Positive for back pain  Negative for myalgias  Neurological: Negative for dizziness and headaches  Objective: There were no vitals filed for this visit  Physical Exam  Constitutional:       General: She is not in acute distress  Appearance: She is well-developed  She is not diaphoretic  HENT:      Head: Normocephalic and atraumatic  Eyes:      Conjunctiva/sclera: Conjunctivae normal       Pupils: Pupils are equal, round, and reactive to light  Neck:      Vascular: No JVD  Trachea: No tracheal deviation  Pulmonary:      Effort: Pulmonary effort is normal  No respiratory distress  Musculoskeletal:         General: No tenderness or deformity   Normal range of motion  Cervical back: Normal range of motion and neck supple  Skin:     General: Skin is warm and dry  Findings: No erythema  Neurological:      Mental Status: She is alert and oriented to person, place, and time  Medications have been reviewed by provider in current encounter    I spent 25 minutes with the patient during this visit  Edwin Alexandre DO      VIRTUAL VISIT [address] verbally agrees to participate in [facility]  Pt is aware that [facility] could be limited without vital signs or the ability to perform a full hands-on physical Danton Vergara understands she or the provider may request at any time to terminate the video visit and request the patient to seek care or treatment in person

## 2023-05-23 NOTE — TELEPHONE ENCOUNTER
Called and spoke with patient  Informed of the appt on 5/25  Informed that we are looking for a follow up appt spot and the nurse will reach out to him for one

## 2023-05-24 NOTE — TELEPHONE ENCOUNTER
Spoke to patient and appointment has been scheduled with Dr Shruthi Mensah on June 6 at 1:00 PM in the [place] office  Location confirmed  Patient inquired that Dr Shruthi Mensah advised that she would be able to have surgery on June 12  Advised do not see notation of that, however, will contact Dr Shruthi Mensah regarding it  Patient is understanding

## 2023-05-24 NOTE — TELEPHONE ENCOUNTER
Patient calling back to speak with urology nurse to get visit scheduled with Dr Sterling  Patient requesting call back       CB: [phone]

## 2023-05-24 NOTE — TELEPHONE ENCOUNTER
Left message per communication form advising patient was contacting her to schedule a follow up appointment with Dr Alexia Reveles of appointment availability with Dr Brenda Marshall on June 6 in the afternoon in the [facility] office  Office number provided

## 2023-05-25 ENCOUNTER — HOSPITAL ENCOUNTER (OUTPATIENT)
Dept: RADIOLOGY | Facility: HOSPITAL | Age: 63
Discharge: HOME | End: 2023-05-25
Attending: RADIOLOGY

## 2023-05-25 VITALS
HEIGHT: 70 IN | HEART RATE: 67 BPM | WEIGHT: 158 LBS | OXYGEN SATURATION: 98 % | BODY MASS INDEX: 22.62 KG/M2 | SYSTOLIC BLOOD PRESSURE: 116 MMHG | TEMPERATURE: 97.3 F | RESPIRATION RATE: 16 BRPM | DIASTOLIC BLOOD PRESSURE: 65 MMHG

## 2023-05-25 DIAGNOSIS — N28.89 RENAL MASS: ICD-10-CM

## 2023-05-25 LAB
ERYTHROCYTE [DISTWIDTH] IN BLOOD BY AUTOMATED COUNT: 13 % (ref 11.6–15.1)
HCT VFR BLD AUTO: 37.8 % (ref 34.8–46.1)
HGB BLD-MCNC: 12.2 G/DL (ref 11.5–15.4)
INR PPP: 0.91 (ref 0.84–1.19)
MCH RBC QN AUTO: 30.2 PG (ref 26.8–34.3)
MCHC RBC AUTO-ENTMCNC: 32.3 G/DL (ref 31.4–37.4)
MCV RBC AUTO: 94 FL (ref 82–98)
PLATELET # BLD AUTO: 241 THOUSANDS/UL (ref 149–390)
PMV BLD AUTO: 9.6 FL (ref 8.9–12.7)
PROTHROMBIN TIME: 12.4 SECONDS (ref 11.6–14.5)
RBC # BLD AUTO: 4.04 MILLION/UL (ref 3.81–5.12)
WBC # BLD AUTO: 4.82 THOUSAND/UL (ref 4.31–10.16)

## 2023-05-25 RX ORDER — HYDROMORPHONE HCL/PF 1 MG/ML
0.5 SYRINGE (ML) INJECTION ONCE
Status: DISCONTINUED | OUTPATIENT
Start: 2023-05-25 | End: 2023-05-26 | Stop reason: HOSPADM

## 2023-05-25 RX ORDER — SODIUM CHLORIDE 9 MG/ML
75 INJECTION, SOLUTION INTRAVENOUS CONTINUOUS
Status: DISCONTINUED | OUTPATIENT
Start: 2023-05-25 | End: 2023-05-26 | Stop reason: HOSPADM

## 2023-05-25 RX ORDER — ACETAMINOPHEN 325 MG/1
325 TABLET ORAL EVERY 4 HOURS PRN
Status: DISCONTINUED | OUTPATIENT
Start: 2023-05-25 | End: 2023-05-26 | Stop reason: HOSPADM

## 2023-05-25 RX ORDER — OXYCODONE HYDROCHLORIDE 5 MG/1
5 TABLET ORAL EVERY 4 HOURS PRN
Status: COMPLETED | OUTPATIENT
Start: 2023-05-25 | End: 2023-05-25

## 2023-05-25 RX ORDER — HYDROXYZINE HYDROCHLORIDE 25 MG/1
25 TABLET, FILM COATED ORAL ONCE
Status: COMPLETED | OUTPATIENT
Start: 2023-05-25 | End: 2023-05-25

## 2023-05-25 RX ORDER — DIPHENHYDRAMINE HYDROCHLORIDE 50 MG/ML
50 INJECTION INTRAMUSCULAR; INTRAVENOUS ONCE
Status: COMPLETED | OUTPATIENT
Start: 2023-05-25 | End: 2023-05-25

## 2023-05-25 RX ORDER — LIDOCAINE HYDROCHLORIDE 10 MG/ML
INJECTION, SOLUTION EPIDURAL; INFILTRATION; INTRACAUDAL; PERINEURAL AS NEEDED
Status: COMPLETED | OUTPATIENT
Start: 2023-05-25 | End: 2023-05-25

## 2023-05-25 RX ORDER — FENTANYL CITRATE 50 UG/ML
INJECTION, SOLUTION INTRAMUSCULAR; INTRAVENOUS AS NEEDED
Status: COMPLETED | OUTPATIENT
Start: 2023-05-25 | End: 2023-05-25

## 2023-05-25 RX ORDER — MIDAZOLAM HYDROCHLORIDE 2 MG/2ML
INJECTION, SOLUTION INTRAMUSCULAR; INTRAVENOUS AS NEEDED
Status: COMPLETED | OUTPATIENT
Start: 2023-05-25 | End: 2023-05-25

## 2023-05-25 RX ADMIN — MIDAZOLAM 0.5 MG: 1 INJECTION INTRAMUSCULAR; INTRAVENOUS at 11:07

## 2023-05-25 RX ADMIN — MIDAZOLAM 1 MG: 1 INJECTION INTRAMUSCULAR; INTRAVENOUS at 10:57

## 2023-05-25 RX ADMIN — FENTANYL CITRATE 50 MCG: 50 INJECTION, SOLUTION INTRAMUSCULAR; INTRAVENOUS at 10:49

## 2023-05-25 RX ADMIN — OXYCODONE HYDROCHLORIDE 5 MG: 5 TABLET ORAL at 12:17

## 2023-05-25 RX ADMIN — LIDOCAINE HYDROCHLORIDE 10 ML: 10 INJECTION, SOLUTION EPIDURAL; INFILTRATION; INTRACAUDAL; PERINEURAL at 11:07

## 2023-05-25 RX ADMIN — MIDAZOLAM 1 MG: 1 INJECTION INTRAMUSCULAR; INTRAVENOUS at 10:48

## 2023-05-25 RX ADMIN — FENTANYL CITRATE 25 MCG: 50 INJECTION, SOLUTION INTRAMUSCULAR; INTRAVENOUS at 11:07

## 2023-05-25 RX ADMIN — HYDROXYZINE HYDROCHLORIDE 25 MG: 25 TABLET, FILM COATED ORAL at 15:36

## 2023-05-25 RX ADMIN — FENTANYL CITRATE 50 MCG: 50 INJECTION, SOLUTION INTRAMUSCULAR; INTRAVENOUS at 10:57

## 2023-05-25 RX ADMIN — DIPHENHYDRAMINE HYDROCHLORIDE 50 MG: 50 INJECTION, SOLUTION INTRAMUSCULAR; INTRAVENOUS at 12:15

## 2023-05-25 RX ADMIN — SODIUM CHLORIDE 75 ML/HR: 0.9 INJECTION, SOLUTION INTRAVENOUS at 09:13

## 2023-05-25 NOTE — H&P
"Interventional Radiology  History and Physical 5/25/2023     Jessica Shi   1960   [id number]    Assessment/Plan:    60year old female with a large right renal mass identified on recent comparison MRI  Plan for CT guided biopsy  Procedure, risks, benefits, and alternatives were discussed with the patient  Consent obtained at bedside  /82   Pulse 63   Temp 97 8 °F (36 6 °C) (Temporal)   Resp 18   Ht 5' 9 5\" (1 765 m)   Wt 71 7 kg (158 lb)   LMP  (LMP Unknown)   SpO2 97%   BMI 23 00 kg/m²       Problem List Items Addressed This Visit        Other    Renal mass    Relevant Orders    IR biopsy kidney mass          Subjective: Normal state of health, no acute complaints  Patient ID: Jessica Shi is a 60 y o  female  History of Present Illness  See A/P above  Review of Systems   Respiratory: Negative  Cardiovascular: Negative            Past Medical History:   Diagnosis Date   • Abnormal breast exam    • Anxiety    • Asthma     childhood   • 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[facility] [address] )     primary per allscripts   • Cancer (HCC)     IgG kappa smoldering multiple myeloma   • Cardiac murmur    • Chronic liver disease     resolved 12/04/2017   • COVID    • Depression    • Endometriosis    • Fracture of tibia     right tibial plateau fracture per allscripts   • Heart murmur    • Hepatic disease    • Hypertension     last assessed 12/13/2017   • Inflammatory bowel disease    • Multiple myeloma (HCC)    • Neuropathy     R foot    • Nosebleed    • OCD (obsessive compulsive disorder)    • Pneumonia    • RSD (reflex sympathetic dystrophy) 12/11/2018   • Seasonal allergies    • Wears eyeglasses    • Whiplash injury to neck         Past Surgical History:   Procedure Laterality Date   • BACK SURGERY      hemilaminectomy and discectomy, L5-S1, right (Dr Orville Fiore, [facility]) onset 02/14/2005 per allscripts   • EXPLORATION EXTREMITY Right 08/29/2016    Procedure: KNEE PERONEAL NERVE EXPLORATION AND RELEASE " ;  Surgeon: Marlene Garcia MD;  Location: [facility];  Service:    • FOOT SURGERY     • FRACTURE SURGERY     • HAND SURGERY     • HERNIA REPAIR     • JOINT REPLACEMENT     • KNEE SURGERY     • MANDIBLE SURGERY     • NEUROPLASTY / TRANSPOSITION MEDIAN NERVE AT CARPAL TUNNEL     • ORIF TIBIAL PLATEAU Right     arthroplasty per allscripts   • OTHER SURGICAL HISTORY      injection of trigger point(s) per allscripts   • CA ARTHRP KNE CONDYLE&PLATU MEDIAL&LAT COMPARTMENTS Right 06/11/2018    Procedure: ARTHROPLASTY KNEE TOTAL;  Surgeon: Marlene Garcia MD;  Location: [facility];  Service: Orthopedics   • CA TENDON SHEATH INCISION Right 12/02/2020    Procedure: RELEASE TRIGGER FINGER-right long;  Surgeon: Lita Rock MD;  Location: [facility];  Service: Orthopedics   • SINUS SURGERY     • SPINE SURGERY     • TONSILLECTOMY          Social History     Tobacco Use   Smoking Status Never   Smokeless Tobacco Never        Social History     Substance and Sexual Activity   Alcohol Use Not Currently        Social History     Substance and Sexual Activity   Drug Use No        Allergies   Allergen Reactions   • Codeine GI Intolerance and Nausea Only     Other reaction(s):  Other (See Comments)  Violently ill  Violently ill   • Latex Rash     itching   • Nortriptyline Shortness Of Breath   • Doxycycline GI Intolerance and Nausea Only   • Cymbalta [Duloxetine Hcl]    • Morphine And Related    • Ocaliva [Obeticholic Acid] Hives   • Sulfa Antibiotics GI Intolerance   • Penicillins Other (See Comments) and Rash     Childhood reaction       Current Outpatient Medications   Medication Sig Dispense Refill   • fenofibrate (FENOGLIDE) 120 MG TABS Take 120 mg by mouth daily     • FLUoxetine (PROzac) 40 MG capsule Take 1 capsule (40 mg total) by mouth daily 90 capsule 1   • ibuprofen (MOTRIN) 600 mg tablet TAKE 1 TABLET BY MOUTH EVERY 6 HOURS AS NEEDED FOR MODERATE PAIN  120 tablet 2   • losartan (COZAAR) 50 mg tablet Take 1 tablet (50 "mg total) by mouth daily 90 tablet 1   • ursodiol (ACTIGALL) 300 mg capsule TAKE 1 CAPSULE BY MOUTH THREE TIMES A  capsule 2   • Cholecalciferol (VITAMIN D3) 2000 UNITS TABS Take 2,000 Units by mouth daily       • estradiol (VAGIFEM, YUVAFEM) 10 MCG TABS vaginal tablet INSERT 1 TABLET INTO THE VAGINA 2 TIMES A WEEK  24 tablet 1   • lidocaine (LIDODERM) 5 % lidocaine 5 % topical patch   APPLY 1 PATCH TO AFFECTED AREA FOR 12 HOURS A DAY & REMOVE FOR 12 HOURS BEFORE APPLYING NEXT PATCH   (12 HOURS ON, 12 HOURS OFF)     • mometasone (NASONEX) 50 mcg/act nasal spray SPRAY 2 SPRAYS INTO EACH NOSTRIL EVERY DAY 42 g 3   • traMADol (Ultram) 50 mg tablet Take 1 tablet (50 mg total) by mouth every 8 (eight) hours as needed for moderate pain 30 tablet 0   • traZODone (DESYREL) 50 mg tablet TAKE 1/2 TO 1 TABLET BY MOUTH AT BEDTIME AS NEEDED FOR SLEEP 90 tablet 1   • valACYclovir (VALTREX) 500 mg tablet TAKE 2 TABLETS BY MOUTH EVERY DAY (Patient taking differently: As needed) 180 tablet 0     Current Facility-Administered Medications   Medication Dose Route Frequency Provider Last Rate Last Admin   • sodium chloride 0 9 % infusion  75 mL/hr Intravenous Continuous Shilpa Garrison MD 75 mL/hr at 05/25/23 0913 75 mL/hr at 05/25/23 0913          Objective:    Vitals:    05/25/23 0924   BP: 143/82   Pulse: 63   Resp: 18   Temp: 97 8 °F (36 6 °C)   TempSrc: Temporal   SpO2: 97%   Weight: 71 7 kg (158 lb)   Height: 5' 9 5\" (1 765 m)        Physical Exam  Pulmonary:      Effort: Pulmonary effort is normal            No results found for: \"BNP\"   Lab Results   Component Value Date    HCT 37 8 05/25/2023    HGB 12 2 05/25/2023    MCV 94 05/25/2023     05/25/2023    WBC 4 82 05/25/2023     Lab Results   Component Value Date    INR 0 91 05/25/2023    INR 0 94 11/18/2021    INR 0 92 02/12/2021    PROTIME 12 4 05/25/2023    PROTIME 12 2 11/18/2021    PROTIME 12 3 02/12/2021     Lab Results   Component Value Date    PTT 28 11/18/2021 " I have personally reviewed pertinent imaging and laboratory results  Code Status: Prior  Advance Directive and Living Will:      Power of :    POLST:      This text is generated with voice recognition software  There may be translation, syntax,  or grammatical errors  If you have any questions, please contact the dictating provider

## 2023-05-25 NOTE — DISCHARGE INSTRUCTIONS
Percutaneous Kidney Biopsy   WHAT YOU NEED TO KNOW:   A percutaneous kidney biopsy is a procedure to remove a small sample of kidney tissue  It may also be done to check for kidney disease or cancer  DISCHARGE INSTRUCTIONS:   Follow up with your healthcare provider as directed:  Write down your questions so you remember to ask them during your visits  Wound care: The Band-Aid may be removed in 24 hours  For more information:   National [employer] Information Clearinghouse  [place] [address] [address]  Phone: [phone]  Web Address: http://kidney  niddk nih gov/   Care after your procedure:    1  Limit your activities for 36 hours after your biopsy  2  No driving day of biopsy  3  Return to your normal diet  Flat sips of flat soda helps with mild nausea  4  Remove band-aid or dressing 24 hours after procedure  Contact Interventional Radiology at [phone]    Rajani PATIENTS: Contact Interventional Radiology at [phone]) Jole Pierre PATIENTS: Contact Interventional Radiology at [phone]) if:    1  Difficulty breathing, nausea or vomiting  2  You feel weak or dizzy  3  Chills or fever above 101 degrees F  You have persistent nausea or vomiting    4  You have severe pain in your abdomen or where You feel weak or dizzy  your           procedure was done  5  You have blood in your urine  You urinate small amounts or not at all  4  Develop any redness, swelling, heat, unusual drainage, heavy bruising or bleeding     from biopsy site

## 2023-05-25 NOTE — BRIEF OP NOTE (RAD/CATH)
INTERVENTIONAL RADIOLOGY PROCEDURE NOTE    Date: 5/25/2023    Procedure:   Procedure Summary     Date: 05/25/23 Room / Location: [address] [place] [address]    Anesthesia Start:  Anesthesia Stop:     Procedure: IR BIOPSY KIDNEY MASS Diagnosis:       Renal mass      (Right renal mass)    Scheduled Providers:  Responsible Provider:     Anesthesia Type: Not recorded ASA Status: Not recorded          Preoperative diagnosis:   1  Renal mass         Postoperative diagnosis: Same  Surgeon: Houston Topete MD     Assistant: None  No qualified resident was available  Blood loss: 5cc    Specimens: Three 1 3cm 18g core  Findings:     US guided right renal mass biopsy  Complications:     None immediate  Bleeding from trocar noted after samples taken, D-stat applied  Post biopsy CT without hematoma  Pressure dressing applied  Recommend patient lay on biopsy site under pressure was well      4 hour monitoring in recovery       Anesthesia: conscious sedation

## 2023-05-25 NOTE — SEDATION DOCUMENTATION
Right renal mass biopsy by Dr Maynard  Patient tolerated procedure fine  Dry dressing placed on site and report called to KEVAN GARRISON [facility] - SYDNEY HARVEY RN   Bed rest start time 1149

## 2023-05-30 ENCOUNTER — APPOINTMENT (OUTPATIENT)
Dept: LAB | Facility: CLINIC | Age: 63
End: 2023-05-30

## 2023-05-30 ENCOUNTER — TELEPHONE (OUTPATIENT)
Dept: HEMATOLOGY ONCOLOGY | Facility: CLINIC | Age: 63
End: 2023-05-30

## 2023-05-30 DIAGNOSIS — D47.2 SMOLDERING MULTIPLE MYELOMA (SMM): Chronic | ICD-10-CM

## 2023-05-30 LAB
ALBUMIN SERPL BCP-MCNC: 3.1 G/DL (ref 3.5–5)
ALP SERPL-CCNC: 395 U/L (ref 46–116)
ALT SERPL W P-5'-P-CCNC: 66 U/L (ref 12–78)
ANION GAP SERPL CALCULATED.3IONS-SCNC: 2 MMOL/L (ref 4–13)
AST SERPL W P-5'-P-CCNC: 52 U/L (ref 5–45)
BILIRUB SERPL-MCNC: 0.52 MG/DL (ref 0.2–1)
BUN SERPL-MCNC: 18 MG/DL (ref 5–25)
CALCIUM ALBUM COR SERPL-MCNC: 9.7 MG/DL (ref 8.3–10.1)
CALCIUM SERPL-MCNC: 9 MG/DL (ref 8.3–10.1)
CHLORIDE SERPL-SCNC: 104 MMOL/L (ref 96–108)
CO2 SERPL-SCNC: 27 MMOL/L (ref 21–32)
CREAT SERPL-MCNC: 0.85 MG/DL (ref 0.6–1.3)
GFR SERPL CREATININE-BSD FRML MDRD: 73 ML/MIN/1.73SQ M
GLUCOSE P FAST SERPL-MCNC: 100 MG/DL (ref 65–99)
IGA SERPL-MCNC: 55 MG/DL (ref 70–400)
IGG SERPL-MCNC: 2490 MG/DL (ref 700–1600)
IGM SERPL-MCNC: 283 MG/DL (ref 40–230)
LDH SERPL-CCNC: 171 U/L (ref 81–234)
POTASSIUM SERPL-SCNC: 4.1 MMOL/L (ref 3.5–5.3)
PROT SERPL-MCNC: 8.9 G/DL (ref 6.4–8.4)
SODIUM SERPL-SCNC: 133 MMOL/L (ref 135–147)

## 2023-05-30 NOTE — TELEPHONE ENCOUNTER
Lab Inquiry   Who are you speaking with? Patient     If it is not the patient, are they listed on an active communication consent form? N/A   Name of ordering provider Dr Melodie Ohara   What is being requested?  Requesting clarification - Are labs needed for upcoming appointment on 6/2/23   Lab draw location [facility]   What is the best call back number? n/a

## 2023-05-31 LAB
KAPPA LC FREE SER-MCNC: 49.8 MG/L (ref 3.3–19.4)
KAPPA LC FREE/LAMBDA FREE SER: 4.18 {RATIO} (ref 0.26–1.65)
LAMBDA LC FREE SERPL-MCNC: 11.9 MG/L (ref 5.7–26.3)

## 2023-06-01 LAB
ALBUMIN SERPL ELPH-MCNC: 3.79 G/DL (ref 3.5–5)
ALBUMIN SERPL ELPH-MCNC: 45.1 % (ref 52–65)
ALPHA1 GLOB SERPL ELPH-MCNC: 0.35 G/DL (ref 0.1–0.4)
ALPHA1 GLOB SERPL ELPH-MCNC: 4.2 % (ref 2.5–5)
ALPHA2 GLOB SERPL ELPH-MCNC: 0.96 G/DL (ref 0.4–1.2)
ALPHA2 GLOB SERPL ELPH-MCNC: 11.4 % (ref 7–13)
BETA GLOB ABNORMAL SERPL ELPH-MCNC: 0.44 G/DL (ref 0.4–0.8)
BETA1 GLOB SERPL ELPH-MCNC: 5.2 % (ref 5–13)
BETA2 GLOB SERPL ELPH-MCNC: 4.2 % (ref 2–8)
BETA2+GAMMA GLOB SERPL ELPH-MCNC: 0.35 G/DL (ref 0.2–0.5)
GAMMA GLOB ABNORMAL SERPL ELPH-MCNC: 2.51 G/DL (ref 0.5–1.6)
GAMMA GLOB SERPL ELPH-MCNC: 29.9 % (ref 12–22)
IGG/ALB SER: 0.82 {RATIO} (ref 1.1–1.8)
M PROTEIN 1 MFR SERPL ELPH: 21.6 %
M PROTEIN 1 SERPL ELPH-MCNC: 1.81 G/DL
PROT PATTERN SERPL ELPH-IMP: ABNORMAL
PROT SERPL-MCNC: 8.4 G/DL (ref 6.4–8.2)

## 2023-06-02 ENCOUNTER — OFFICE VISIT (OUTPATIENT)
Dept: HEMATOLOGY ONCOLOGY | Facility: CLINIC | Age: 63
End: 2023-06-02

## 2023-06-02 VITALS
OXYGEN SATURATION: 98 % | SYSTOLIC BLOOD PRESSURE: 146 MMHG | WEIGHT: 160 LBS | HEIGHT: 69 IN | HEART RATE: 80 BPM | TEMPERATURE: 97.5 F | RESPIRATION RATE: 16 BRPM | BODY MASS INDEX: 23.7 KG/M2 | DIASTOLIC BLOOD PRESSURE: 80 MMHG

## 2023-06-02 DIAGNOSIS — K74.3 PRIMARY BILIARY CHOLANGITIS (HCC): Primary | ICD-10-CM

## 2023-06-02 DIAGNOSIS — D47.2 SMOLDERING MULTIPLE MYELOMA (SMM): Chronic | ICD-10-CM

## 2023-06-02 NOTE — PROGRESS NOTES
Hematology Outpatient Follow - Up Note  Jarad Cruz 60 y o  female MRN: @ Encounter: [id number]        Date:  6/2/2023        Assessment/ Plan:    Smoldering multiple myeloma IgG kappa, bone marrow biopsy showed 15% plasma cell normal cytogenetics and fish panel for multiple myeloma in May 2017, she had primary biliary cirrhosis     M protein, IgG, kappa light chain are stable for many years, no evidence of end-organ damage        will continue watchful observation and follow-up in 6 months with CBC, CMP, SPEP, free light chain, quantitative immunoglobulins  To process on Zometa every 6 months for 4 doses with significant response and improvement on DEXA scan to osteopenia    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 scheduled for nephrectomy in June 2023 and depends on the final pathology, staging will determine the need for watchful observation versus adjuvant pembrolizumab        Labs and imaging studies are reviewed by ordering provider once results are available  If there are findings that need immediate attention, you will be contacted when results available  Discussing results and the implication on your healthcare is best discussed in person at your follow-up visit         HPI:    58-year-old  female with past medical history of primary biliary cirrhosis, fracture of the right tibia, hypertension, OCD,worsening of osteoporosis on DEXA scan Done in 2016, nephrolithiasis, chronic lower back pain and possible sacroiliitis was found to have elevated total protein 3-4 years ago, serum protein electrophoresis showed IgG kappa monoclonal protein of 1 7 g/dL however no evidence of anemia, hypercalcemia, or renal insufficiency, she had persistent elevation of alkaline phosphatase secondary to PBC  had a bone scan done last year which showed activity in the ribs area  serum protein electrophoresis in April 2017 showed IgG kappa monoclonal protein of 1 96 g/dL, total protein 8 5, albumin 3 9, IgG 2580, creatinine 0 8, calcium 8 9, alkaline phosphatase 294, AST 37, ALT 46, IgM 25 in July 2016 monoclonal protein of IgG kappa of 1 73  C-reactive protein has been normal however sedimentation rate was elevated in the range of 60  Bone marrow biopsy in May 2017 showed 15% plasma cells in diffuse and interstitial pattern, normal fish panel for multiple myeloma and cytogenetics  Status post right knee replacement in June 2018  Exacerbation of anxiety/depression followed by psychiatric medicine     Osteoporosis she received Zometa 4 doses the lost dose on April 2019     Primary biliary cirrhosis followed by GI       Colonoscopy on 06/2021 showed mild diverticulosis      DEXA scan on 08/2021 showed recurrence of osteoporosis in the lumbar spine     She is on Zometa every 6 months  Interval History:        Previous Treatment:         Test Results:    Imaging: IR biopsy kidney mass    Result Date: 5/26/2023  Narrative: PROCEDURE: Ultrasound-guided right renal mass biopsy  STAFF: ALMAZ Romo  NUMBER OF IMAGES: Multiple  DOSE LENGTH PRODUCT: 570 mGy-cm  COMPLICATIONS: None  MEDICATIONS: Local lidocaine  Moderate sedation with fentanyl and Versed was monitored by radiology nursing staff  Monitoring of conscious sedation totaled 45 minutes  INDICATION: Enhancing right renal mass, concerning for malignancy  PROCEDURE: Initial  CT demonstrated the large right upper pole renal mass, however the mass was largely indistinct and the comparison MRI demonstrated several cystic regions with possible central necrosis  Decision was made to evaluate under ultrasound with the goal of being able to biopsy the appropriate location within the mass  Under real-time ultrasound guidance, a 17-gauge coaxial needle was advanced into the periphery of the lesion  Under ultrasound guidance, a total of three  1 3 cm 18g core biopsies were obtained  The tract with embolized with Gelfoam, and the coaxial needle was removed   Post procedure CT demonstrated evidence of the D-Stat tract embolization, without adjacent hematoma  FINDINGS: 1  Pre-procedural ultrasound showed a large upper pole right renal mass, consistent with the comparison MRI    2   Intra-procedural ultrasound confirmed good positioning of the biopsy needle within the periphery of the lesion  3   The in-room pathology team confirmed adequacy of samples, some necrosis was noted  4   Post-procedural ultrasound and CT showed no immediate complication  Impression: Ultrasound-guided right upper pole renal mass biopsy  Workstation performed: [id number]     MRI abdomen w wo contrast    Result Date: 5/15/2023  Narrative: MRI - ABDOMEN - WITH AND WITHOUT CONTRAST INDICATION: 60 years / Female  Right upper renal pole 4 7 x 5 6 x 4 8 cm vascular mass concerning for renal cell carcinoma/neoplasm    COMPARISON: Partial comparison to abdominal ultrasound, same date  TECHNIQUE:  Multiplanar/multisequence MRI of the abdomen was performed before and after administration of contrast  IV Contrast:  7 mL of Gadobutrol injection (SINGLE-DOSE) FINDINGS: LOWER CHEST:   Bandlike consolidation in the perihilar right lung, not further characterized, please see separately dictated CT chest, 5/15/2023  Trace pleural fluid  LIVER: 18 9 cm craniocaudad  1 2 cm T2 hyperintense lesion in the left hepatic dome, with apparent peripheral nodular enhancement, isointense on more delayed images, likely a small hemangioma  Characterization is somewhat limited by small size  No other focal lesions  The hepatic veins and portal veins are patent  BILE DUCTS: No intrahepatic or extrahepatic bile duct dilation  GALLBLADDER:  Normal  PANCREAS:  Unremarkable  ADRENAL GLANDS:  Normal  SPLEEN: 14 7 cm craniocaudad  No focal lesion  KIDNEYS/PROXIMAL URETERS: Solid 5 5 x 5 1 cm upper pole right renal mass  It is circumscribed, protruding into the collecting system  It demonstrates central area of necrosis   The remaining tumor tissue is avidly enhancing with areas of washout  There is  apparent dropout of signal on out of phase T1 suggestive of microscopic lipid  No macroscopic fat  The tumor abuts but does not macroscopically invades the liver  No other suspicious renal lesions are noted on either side  The IVC and the right renal vein are patent  BOWEL:   No dilated loops of bowel  PERITONEUM/RETROPERITONEUM: No mass  No ascites  LYMPH NODES: No abdominal lymphadenopathy  VASCULAR STRUCTURES:  No aneurysm  ABDOMINAL WALL:  Unremarkable  OSSEOUS STRUCTURES: Nondestructive mildly T2 hyperintense foci in the L4, L3 and T9, appearance suggestive of lipid poor hemangiomata  Comparison with prior outside studies would be helpful, if available  Fat suppressing similar lesion in L5, compatible with a lipid rich adenoma, 2 2 cm  Impression: 5 5 x 5 1 cm previously reported right renal tumor in the upper pole, MR features are suggestive of clear cell renal cell carcinoma  Indeterminate incidentally noted spinal lesions, currently nondestructive and therefore favoring lipid poor hemangiomas  Comparison with remote prior MRIs would be helpful, if available  If not, consider follow-up MRI lumbar spine in 2-3 months  No evidence of intra-abdominal metastatic disease  No retroperitoneal lymphadenopathy  Clear IVC and right renal vein  Mild hepatosplenomegaly  Small left hepatic lobe lesion likely reflecting a hemangioma  Recommend attention on further follow-ups to confirm size stability  Comparison with prior outside studies would also be helpful, if available  Workstation performed: [id number]     CT chest wo contrast    Result Date: 5/15/2023  Narrative: CT CHEST WITHOUT IV CONTRAST INDICATION:   Abnormal xray - lung opacity/opacities Abnormal CXR, need further clarification findings  Per my review of the medical record, patient has a right renal mass on ultrasound and MRI  COMPARISON: Chest radiograph 5/13/2023 and 2/28/2023  Abdominal MRI 5/14/2023  "TECHNIQUE: Chest CT without intravenous contrast   Axial, sagittal, coronal 2D reformats and coronal MIPS from source data  Radiation dose length product (DLP):  326 02 mGy-cm   Radiation dose exposure minimized using iterative reconstruction and automated exposure control  FINDINGS: LUNGS: Severe consolidation in the right upper lobe, new from February 2023  Mild septal thickening, likely interstitial edema  Minimal dependent atelectasis in the lower lobes  No metastases  AIRWAYS: No significant filling defects  PLEURA: Trace effusions  HEART/GREAT VESSELS:  Normal for age  MEDIASTINUM AND ZHANG:  Unremarkable  CHEST WALL AND LOWER NECK: Unremarkable  UPPER ABDOMEN:  Unremarkable  OSSEOUS STRUCTURES: Mild degenerative disease in the spine  Healed left lateral sixth rib fracture  Impression: Large right upper lobe pneumonia  Mild interstitial edema with trace effusions  Right renal mass not included with nothing to indicate metastatic disease  Workstation performed: [id number]     US abdomen limited    Result Date: 5/14/2023  Narrative: RIGHT UPPER QUADRANT ULTRASOUND INDICATION:    Hx of PBC  +weight gain, clinically may have ascites    COMPARISON:  None  TECHNIQUE:   Real-time ultrasound of the right upper quadrant was performed with a curvilinear transducer with both volumetric sweeps and still imaging techniques  FINDINGS: Right upper renal pole 4 7 x 5 6 x 4 8 cm vascular mass concerning for renal cell carcinoma/neoplasm  Follow-up contrast-enhanced CT renal protocol or abdominal MRI renal protocol is recommended for further evaluation  Impression: Right upper renal pole 4 7 x 5 6 x 4 8 cm vascular mass concerning for renal cell carcinoma/neoplasm  Follow-up contrast-enhanced CT renal protocol abdominal MRI renal protocol is recommended for further evaluation  Findings were marked as \"immediate\"in Epic and will now be related to the ordering physician or covering clinical team by the radiology liaison   I " personally communicated this study with Judy Monsalve on 5/14/2023 4:18 PM  Workstation performed: [id number]     XR chest 2 views    Result Date: 5/14/2023  Narrative: CHEST INDICATION:   cough  COMPARISON:  None EXAM PERFORMED/VIEWS:  XR CHEST PA & LATERAL FINDINGS: Heart shadow appears unremarkable  Atherosclerotic vascular calcifications are noted  Patchy density in the right midlung field in the upper lobe, abutting the horizontal fissure, new from the prior study  Left lung clear  Osseous structures appear within normal limits for patient age  Impression: Right upper lobe opacity possibly pneumonia or fissural fluid/pseudotumor, new from the prior study  Other etiologies not excluded  Follow-up repeat radiograph in 6 weeks recommended to assess for resolution  The preliminary findings noted by the ER physician concur with this final interpretation  Workstation performed: [id number]       Labs:   Lab Results   Component Value Date    HCT 37 8 05/25/2023    HGB 12 2 05/25/2023    MCV 94 05/25/2023     05/25/2023    WBC 4 82 05/25/2023     Lab Results   Component Value Date    ALKPHOS 395 (H) 05/30/2023    ALT 66 05/30/2023    ANIONGAP 7 12/29/2015    AST 52 (H) 05/30/2023    BILITOT 0 46 12/29/2015    BUN 18 05/30/2023    CALCIUM 9 0 05/30/2023     05/30/2023    CO2 27 05/30/2023    CORRECTEDCA 9 7 05/30/2023    CREATININE 0 85 05/30/2023    EGFR 73 05/30/2023    GLUCOSE 134 12/29/2015    GLUF 100 (H) 05/30/2023    K 4 1 05/30/2023     12/29/2015    PROT 8 4 (H) 12/29/2015       Lab Results   Component Value Date    FERRITIN 213 07/27/2022    IRON 100 07/27/2022    TIBC 378 07/27/2022       Lab Results   Component Value Date    TOJPHEPZ75 393 06/28/2022         ROS: Review of Systems   Constitutional: Negative for appetite change, chills, diaphoresis, fatigue and unexpected weight change  HENT:   Negative for mouth sores, nosebleeds, sore throat, trouble swallowing and voice change  Eyes: Negative for eye problems and icterus  Respiratory: Negative for chest tightness, cough, hemoptysis and wheezing  Cardiovascular: Negative for chest pain, leg swelling and palpitations  Gastrointestinal: Negative for abdominal distention, abdominal pain, blood in stool, constipation, diarrhea, nausea and vomiting  Endocrine: Negative for hot flashes  Genitourinary: Negative for bladder incontinence, difficulty urinating, dyspareunia, dysuria and frequency  Musculoskeletal: Negative for arthralgias, back pain, gait problem, neck pain and neck stiffness  Skin: Negative for itching and rash  Neurological: Negative for dizziness, gait problem, headaches, numbness, seizures and speech difficulty  Hematological: Negative for adenopathy  Does not bruise/bleed easily  Psychiatric/Behavioral: Negative for decreased concentration, depression, sleep disturbance and suicidal ideas  The patient is not nervous/anxious  Current Medications: Reviewed  Allergies: Reviewed  PMH/FH/SH:  Reviewed      Physical Exam:    Body surface area is 1 88 meters squared  Wt Readings from Last 3 Encounters:   06/02/23 72 6 kg (160 lb)   05/25/23 71 7 kg (158 lb)   05/14/23 71 7 kg (158 lb 1 1 oz)        Temp Readings from Last 3 Encounters:   06/02/23 97 5 °F (36 4 °C) (Temporal)   05/25/23 (!) 97 3 °F (36 3 °C) (Temporal)   05/16/23 97 6 °F (36 4 °C)        BP Readings from Last 3 Encounters:   06/02/23 146/80   05/25/23 116/65   05/16/23 128/81         Pulse Readings from Last 3 Encounters:   06/02/23 80   05/25/23 67   05/16/23 66        Physical Exam  Vitals reviewed  Constitutional:       General: She is not in acute distress  Appearance: She is well-developed  She is not diaphoretic  HENT:      Head: Normocephalic and atraumatic  Eyes:      Conjunctiva/sclera: Conjunctivae normal    Neck:      Trachea: No tracheal deviation     Cardiovascular:      Rate and Rhythm: Normal rate and regular rhythm  Heart sounds: No murmur heard  No friction rub  No gallop  Pulmonary:      Effort: Pulmonary effort is normal  No respiratory distress  Breath sounds: Normal breath sounds  No wheezing or rales  Chest:      Chest wall: No tenderness  Abdominal:      General: There is no distension  Palpations: Abdomen is soft  Tenderness: There is no abdominal tenderness  Musculoskeletal:      Cervical back: Normal range of motion and neck supple  Right lower leg: No edema  Left lower leg: No edema  Lymphadenopathy:      Cervical: No cervical adenopathy  Skin:     General: Skin is warm and dry  Coloration: Skin is not pale  Findings: No erythema  Neurological:      Mental Status: She is alert and oriented to person, place, and time  Psychiatric:         Behavior: Behavior normal          Thought Content: Thought content normal          Judgment: Judgment normal          ECO    Goals and Barriers:  Current Goal: Minimize effects of disease  Barriers: None  Patient's Capacity to Self Care:  Patient is able to self care      Code Status: [unfilled]

## 2023-06-05 ENCOUNTER — PREP FOR PROCEDURE (OUTPATIENT)
Dept: UROLOGY | Facility: AMBULATORY SURGERY CENTER | Age: 63
End: 2023-06-05

## 2023-06-05 DIAGNOSIS — R39.89 SUSPECTED UTI: ICD-10-CM

## 2023-06-05 DIAGNOSIS — Z01.818 PREOP EXAMINATION: ICD-10-CM

## 2023-06-05 DIAGNOSIS — Z79.01 LONG TERM (CURRENT) USE OF ANTICOAGULANTS: ICD-10-CM

## 2023-06-05 DIAGNOSIS — C64.1 RENAL CELL CARCINOMA OF RIGHT KIDNEY (HCC): Primary | ICD-10-CM

## 2023-06-05 DIAGNOSIS — N28.89 RENAL MASS: Primary | ICD-10-CM

## 2023-06-05 RX ORDER — CEFAZOLIN SODIUM 1 G/50ML
1000 SOLUTION INTRAVENOUS ONCE
OUTPATIENT
Start: 2023-06-05 | End: 2023-06-05

## 2023-06-06 ENCOUNTER — OFFICE VISIT (OUTPATIENT)
Dept: UROLOGY | Facility: AMBULATORY SURGERY CENTER | Age: 63
End: 2023-06-06
Payer: MEDICARE

## 2023-06-06 ENCOUNTER — PREP FOR PROCEDURE (OUTPATIENT)
Dept: UROLOGY | Facility: AMBULATORY SURGERY CENTER | Age: 63
End: 2023-06-06

## 2023-06-06 VITALS
SYSTOLIC BLOOD PRESSURE: 132 MMHG | OXYGEN SATURATION: 96 % | HEIGHT: 69 IN | BODY MASS INDEX: 23.7 KG/M2 | DIASTOLIC BLOOD PRESSURE: 86 MMHG | HEART RATE: 87 BPM | WEIGHT: 160 LBS

## 2023-06-06 DIAGNOSIS — N28.89 RENAL MASS: Primary | ICD-10-CM

## 2023-06-06 PROCEDURE — 87086 URINE CULTURE/COLONY COUNT: CPT | Performed by: UROLOGY

## 2023-06-06 PROCEDURE — 99215 OFFICE O/P EST HI 40 MIN: CPT | Performed by: UROLOGY

## 2023-06-06 NOTE — H&P (VIEW-ONLY)
6/6/2023    Joyce Ocasio  1960  [id number]        Assessment  Right renal cell carcinoma    Plan  We reviewed her biopsy results indicating renal cell carcinoma definitively  We also reviewed her imaging once again indicating large right upper pole mass  She has been tentatively scheduled for robotic nephrectomy within the next couple of weeks  Technique, risk, benefits reviewed in detail  We discussed risk of bleeding as well as adjacent organ injury specifically  This includes liver, colon, vascular injury  Discussed potential risk of converting to open if needed  We also discussed her convalescence both in hospital and at home, avoiding strenuous activity for 4 to 6 weeks for adequate healing and avoidance of issues with her incisions including hernia  We will likely consult with nephrology regarding hypertension management postoperatively, as well as GI to determine appropriate analgesics postoperatively as neither ibuprofen nor acetaminophen will be ideal   All questions answered to her satisfaction and she agrees with the plan  Consent was obtained in the office today  History of Present Illness  Misha Heller is a 60 y o  female admitted with pneumonia  She was incidentally found to have a large right renal mass  I reviewed his MRI and we discussed options  She opted for biopsy, which returned consistent with renal cell carcinoma  She has history of smoldering multiple myeloma, observing with Dr Altagracia Ramires  She had previous laparoscopy for endometriosis, no other abdominal surgery  She has diagnosis of primary biliary cirrhosis, followed by GI  No current treatment  She takes ibuprofen as needed for pain  She was advised to avoid acetaminophen  Liver enzyme testing normal, except for alkaline phosphatase elevation  Review of Systems  Review of Systems   Constitutional: Negative  HENT: Negative  Respiratory: Negative  Cardiovascular: Negative      Gastrointestinal: Negative  Genitourinary:        As per HPI   Musculoskeletal: Negative  Skin: Negative  Neurological: Negative  Hematological: Negative            Past Medical History  Past Medical History:   Diagnosis Date   • Abnormal breast exam    • Anxiety    • Asthma     childhood   • 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[facility] [address] )     primary per allscripts   • Cancer (HCC)     IgG kappa smoldering multiple myeloma   • Cardiac murmur    • Chronic liver disease     resolved 12/04/2017   • COVID    • Depression    • Endometriosis    • Fracture of tibia     right tibial plateau fracture per allscripts   • Heart murmur    • Hepatic disease    • Hypertension     last assessed 12/13/2017   • Inflammatory bowel disease    • Multiple myeloma (HCC)    • Neuropathy     R foot    • Nosebleed    • OCD (obsessive compulsive disorder)    • Pneumonia    • RSD (reflex sympathetic dystrophy) 12/11/2018   • Seasonal allergies    • Wears eyeglasses    • Whiplash injury to neck        Past Social History  Past Surgical History:   Procedure Laterality Date   • BACK SURGERY      hemilaminectomy and discectomy, L5-S1, right (Dr Pina Munoz, [facility] at [facility]) onset 02/14/2005 per allscripts   • EXPLORATION EXTREMITY Right 08/29/2016    Procedure: KNEE PERONEAL NERVE EXPLORATION AND RELEASE ;  Surgeon: Amaya Gresham MD;  [facility];  Service:    • FOOT SURGERY     • FRACTURE SURGERY     • HAND SURGERY     • HERNIA REPAIR     • IR BIOPSY KIDNEY MASS  5/25/2023   • JOINT REPLACEMENT     • KNEE SURGERY     • MANDIBLE SURGERY     • NEUROPLASTY / TRANSPOSITION MEDIAN NERVE AT CARPAL TUNNEL     • ORIF TIBIAL PLATEAU Right     arthroplasty per allscripts   • OTHER SURGICAL HISTORY      injection of trigger point(s) per allscripts   • NE ARTHRP KNE CONDYLE&PLATU MEDIAL&LAT COMPARTMENTS Right 06/11/2018    Procedure: ARTHROPLASTY KNEE TOTAL;  Surgeon: Amaya Gresham MD;  [facility];  Service: Orthopedics   • NE TENDON SHEATH INCISION Right 12/02/2020 Procedure: RELEASE TRIGGER FINGER-right long;  Surgeon: Horacio Laguerre MD;  [facility];  Service: Orthopedics   • SINUS SURGERY     • SPINE SURGERY     • TONSILLECTOMY         Past Family History  Family History   Problem Relation Age of Onset   • Heart failure Mother    • Anxiety disorder Mother         symptom per allscripts   • Depression Mother    • Anxiety disorder Father         symptom per allscripts   • Cirrhosis Father         hepatic per allscripts   • Alcohol abuse Father    • Stomach cancer Maternal Grandmother    • Cancer Maternal Grandmother    • Stomach cancer Maternal Grandfather    • Cancer Maternal Grandfather    • Diabetes Paternal Grandmother    • No Known Problems Paternal Grandfather    • Anxiety disorder Other         symptom per allscripts   • Coronary artery disease Other    • Depression Other    • Hyperlipidemia Other    • Osteoarthritis Other    • Other Other         back disorder per allscripts   • Neuropathy Other    • No Known Problems Paternal Aunt    • No Known Problems Paternal Aunt        Past Social history  Social History     Socioeconomic History   • Marital status: Single     Spouse name: Not on file   • Number of children: Not on file   • Years of education: completed college   • Highest education level: Not on file   Occupational History   • Occupation:    • Occupation:    Tobacco Use   • Smoking status: Never   • Smokeless tobacco: Never   Vaping Use   • Vaping Use: Never used   Substance and Sexual Activity   • Alcohol use: Not Currently   • Drug use: No   • Sexual activity: Yes     Partners: Male     Birth control/protection: Post-menopausal   Other Topics Concern   • Not on file   Social History Narrative    Does not participate in activities outside of the home; participates in moderate activities inside the home    Lives with mother     Social Determinants of Health     Financial Resource Strain: Low Risk  (2/28/2023)    Overall Financial Resource Strain (CARDIA)    • Difficulty of Paying Living Expenses: Not hard at all   Food Insecurity: Not on file   Transportation Needs: No Transportation Needs (2/28/2023)    PRAPARE - Transportation    • Lack of Transportation (Medical): No    • Lack of Transportation (Non-Medical): No   Physical Activity: Not on file   Stress: Not on file   Social Connections: Not on file   Intimate Partner Violence: Not on file   Housing Stability: Not on file     Social History     Tobacco Use   Smoking Status Never   Smokeless Tobacco Never       Current Medications  Current Outpatient Medications   Medication Sig Dispense Refill   • Cholecalciferol (VITAMIN D3) 2000 UNITS TABS Take 2,000 Units by mouth daily       • estradiol (VAGIFEM, YUVAFEM) 10 MCG TABS vaginal tablet INSERT 1 TABLET INTO THE VAGINA 2 TIMES A WEEK  24 tablet 1   • fenofibrate (FENOGLIDE) 120 MG TABS Take 120 mg by mouth daily     • FLUoxetine (PROzac) 40 MG capsule Take 1 capsule (40 mg total) by mouth daily 90 capsule 1   • ibuprofen (MOTRIN) 600 mg tablet TAKE 1 TABLET BY MOUTH EVERY 6 HOURS AS NEEDED FOR MODERATE PAIN  120 tablet 2   • lidocaine (LIDODERM) 5 % lidocaine 5 % topical patch   APPLY 1 PATCH TO AFFECTED AREA FOR 12 HOURS A DAY & REMOVE FOR 12 HOURS BEFORE APPLYING NEXT PATCH   (12 HOURS ON, 12 HOURS OFF)     • losartan (COZAAR) 50 mg tablet Take 1 tablet (50 mg total) by mouth daily 90 tablet 1   • mometasone (NASONEX) 50 mcg/act nasal spray SPRAY 2 SPRAYS INTO EACH NOSTRIL EVERY DAY 42 g 3   • traMADol (Ultram) 50 mg tablet Take 1 tablet (50 mg total) by mouth every 8 (eight) hours as needed for moderate pain 30 tablet 0   • traZODone (DESYREL) 50 mg tablet TAKE 1/2 TO 1 TABLET BY MOUTH AT BEDTIME AS NEEDED FOR SLEEP 90 tablet 1   • ursodiol (ACTIGALL) 300 mg capsule TAKE 1 CAPSULE BY MOUTH THREE TIMES A  capsule 2   • valACYclovir (VALTREX) 500 mg tablet TAKE 2 TABLETS BY MOUTH EVERY DAY (Patient taking differently: As "needed) 180 tablet 0     No current facility-administered medications for this visit  Allergies  Allergies   Allergen Reactions   • Codeine GI Intolerance and Nausea Only     Other reaction(s): Other (See Comments)  Violently ill  Violently ill   • Latex Rash     itching   • Nortriptyline Shortness Of Breath   • Doxycycline GI Intolerance and Nausea Only   • Cymbalta [Duloxetine Hcl]    • Morphine And Related    • Ocaliva [Obeticholic Acid] Hives   • Sulfa Antibiotics GI Intolerance   • Penicillins Other (See Comments) and Rash     Childhood reaction       Past Medical History, Social History, Family History, medications and allergies were reviewed  Vitals  Vitals:    06/06/23 1251   BP: 132/86   Pulse: 87   SpO2: 96%   Weight: 72 6 kg (160 lb)   Height: 5' 9\" (1 753 m)       Physical Exam  Physical Exam  Vitals reviewed  Constitutional:       Appearance: She is well-developed  HENT:      Head: Normocephalic and atraumatic  Eyes:      Conjunctiva/sclera: Conjunctivae normal    Cardiovascular:      Rate and Rhythm: Normal rate and regular rhythm  Pulmonary:      Effort: Pulmonary effort is normal       Breath sounds: Normal breath sounds  Abdominal:      Palpations: Abdomen is soft  Genitourinary:     Comments: No CVAT  Musculoskeletal:         General: Normal range of motion  Skin:     General: Skin is warm and dry  Neurological:      Mental Status: She is alert and oriented to person, place, and time     Psychiatric:         Mood and Affect: Mood normal            Results  No results found for: \"PSA\"  Lab Results   Component Value Date    BUN 18 05/30/2023    CALCIUM 9 0 05/30/2023     05/30/2023    CO2 27 05/30/2023    CREATININE 0 85 05/30/2023    GLUCOSE 134 12/29/2015    K 4 1 05/30/2023     12/29/2015     Lab Results   Component Value Date    HCT 37 8 05/25/2023    HGB 12 2 05/25/2023    MCV 94 05/25/2023     05/25/2023    WBC 4 82 05/25/2023           "

## 2023-06-06 NOTE — PROGRESS NOTES
6/6/2023    Terese Puri  1960  [id number]        Assessment  Right renal cell carcinoma    Plan  We reviewed her biopsy results indicating renal cell carcinoma definitively  We also reviewed her imaging once again indicating large right upper pole mass  She has been tentatively scheduled for robotic nephrectomy within the next couple of weeks  Technique, risk, benefits reviewed in detail  We discussed risk of bleeding as well as adjacent organ injury specifically  This includes liver, colon, vascular injury  Discussed potential risk of converting to open if needed  We also discussed her convalescence both in hospital and at home, avoiding strenuous activity for 4 to 6 weeks for adequate healing and avoidance of issues with her incisions including hernia  We will likely consult with nephrology regarding hypertension management postoperatively, as well as GI to determine appropriate analgesics postoperatively as neither ibuprofen nor acetaminophen will be ideal   All questions answered to her satisfaction and she agrees with the plan  Consent was obtained in the office today  History of Present Illness  Horacio Lopes is a 60 y o  female admitted with pneumonia  She was incidentally found to have a large right renal mass  I reviewed his MRI and we discussed options  She opted for biopsy, which returned consistent with renal cell carcinoma  She has history of smoldering multiple myeloma, observing with Dr Amira Santiago  She had previous laparoscopy for endometriosis, no other abdominal surgery  She has diagnosis of primary biliary cirrhosis, followed by GI  No current treatment  She takes ibuprofen as needed for pain  She was advised to avoid acetaminophen  Liver enzyme testing normal, except for alkaline phosphatase elevation  Review of Systems  Review of Systems   Constitutional: Negative  HENT: Negative  Respiratory: Negative  Cardiovascular: Negative      Gastrointestinal: Negative  Genitourinary:        As per HPI   Musculoskeletal: Negative  Skin: Negative  Neurological: Negative  Hematological: Negative            Past Medical History  Past Medical History:   Diagnosis Date   • Abnormal breast exam    • Anxiety    • Asthma     childhood   • 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[facility] [address] )     primary per allscripts   • Cancer (HCC)     IgG kappa smoldering multiple myeloma   • Cardiac murmur    • Chronic liver disease     resolved 12/04/2017   • COVID    • Depression    • Endometriosis    • Fracture of tibia     right tibial plateau fracture per allscripts   • Heart murmur    • Hepatic disease    • Hypertension     last assessed 12/13/2017   • Inflammatory bowel disease    • Multiple myeloma (HCC)    • Neuropathy     R foot    • Nosebleed    • OCD (obsessive compulsive disorder)    • Pneumonia    • RSD (reflex sympathetic dystrophy) 12/11/2018   • Seasonal allergies    • Wears eyeglasses    • Whiplash injury to neck        Past Social History  Past Surgical History:   Procedure Laterality Date   • BACK SURGERY      hemilaminectomy and discectomy, L5-S1, right (Dr Jody Ruiz, [facility]) onset 02/14/2005 per allscripts   • EXPLORATION EXTREMITY Right 08/29/2016    Procedure: KNEE PERONEAL NERVE EXPLORATION AND RELEASE ;  Surgeon: Sharyle Slim, MD;  [facility];  Service:    • FOOT SURGERY     • FRACTURE SURGERY     • HAND SURGERY     • HERNIA REPAIR     • IR BIOPSY KIDNEY MASS  5/25/2023   • JOINT REPLACEMENT     • KNEE SURGERY     • MANDIBLE SURGERY     • NEUROPLASTY / TRANSPOSITION MEDIAN NERVE AT CARPAL TUNNEL     • ORIF TIBIAL PLATEAU Right     arthroplasty per allscripts   • OTHER SURGICAL HISTORY      injection of trigger point(s) per allscripts   • DE ARTHRP KNE CONDYLE&PLATU MEDIAL&LAT COMPARTMENTS Right 06/11/2018    Procedure: ARTHROPLASTY KNEE TOTAL;  Surgeon: Sharyle Slim, MD;  [facility];  Service: Orthopedics   • DE TENDON SHEATH INCISION Right 12/02/2020 Procedure: RELEASE TRIGGER FINGER-right long;  Surgeon: Kevin Wyatt MD;  [facility];  Service: Orthopedics   • SINUS SURGERY     • SPINE SURGERY     • TONSILLECTOMY         Past Family History  Family History   Problem Relation Age of Onset   • Heart failure Mother    • Anxiety disorder Mother         symptom per allscripts   • Depression Mother    • Anxiety disorder Father         symptom per allscripts   • Cirrhosis Father         hepatic per allscripts   • Alcohol abuse Father    • Stomach cancer Maternal Grandmother    • Cancer Maternal Grandmother    • Stomach cancer Maternal Grandfather    • Cancer Maternal Grandfather    • Diabetes Paternal Grandmother    • No Known Problems Paternal Grandfather    • Anxiety disorder Other         symptom per allscripts   • Coronary artery disease Other    • Depression Other    • Hyperlipidemia Other    • Osteoarthritis Other    • Other Other         back disorder per allscripts   • Neuropathy Other    • No Known Problems Paternal Aunt    • No Known Problems Paternal Aunt        Past Social history  Social History     Socioeconomic History   • Marital status: Single     Spouse name: Not on file   • Number of children: Not on file   • Years of education: completed college   • Highest education level: Not on file   Occupational History   • Occupation:    • Occupation:    Tobacco Use   • Smoking status: Never   • Smokeless tobacco: Never   Vaping Use   • Vaping Use: Never used   Substance and Sexual Activity   • Alcohol use: Not Currently   • Drug use: No   • Sexual activity: Yes     Partners: Male     Birth control/protection: Post-menopausal   Other Topics Concern   • Not on file   Social History Narrative    Does not participate in activities outside of the home; participates in moderate activities inside the home    Lives with mother     Social Determinants of Health     Financial Resource Strain: Low Risk  (2/28/2023)    Overall Financial Resource Strain (CARDIA)    • Difficulty of Paying Living Expenses: Not hard at all   Food Insecurity: Not on file   Transportation Needs: No Transportation Needs (2/28/2023)    PRAPARE - Transportation    • Lack of Transportation (Medical): No    • Lack of Transportation (Non-Medical): No   Physical Activity: Not on file   Stress: Not on file   Social Connections: Not on file   Intimate Partner Violence: Not on file   Housing Stability: Not on file     Social History     Tobacco Use   Smoking Status Never   Smokeless Tobacco Never       Current Medications  Current Outpatient Medications   Medication Sig Dispense Refill   • Cholecalciferol (VITAMIN D3) 2000 UNITS TABS Take 2,000 Units by mouth daily       • estradiol (VAGIFEM, YUVAFEM) 10 MCG TABS vaginal tablet INSERT 1 TABLET INTO THE VAGINA 2 TIMES A WEEK  24 tablet 1   • fenofibrate (FENOGLIDE) 120 MG TABS Take 120 mg by mouth daily     • FLUoxetine (PROzac) 40 MG capsule Take 1 capsule (40 mg total) by mouth daily 90 capsule 1   • ibuprofen (MOTRIN) 600 mg tablet TAKE 1 TABLET BY MOUTH EVERY 6 HOURS AS NEEDED FOR MODERATE PAIN  120 tablet 2   • lidocaine (LIDODERM) 5 % lidocaine 5 % topical patch   APPLY 1 PATCH TO AFFECTED AREA FOR 12 HOURS A DAY & REMOVE FOR 12 HOURS BEFORE APPLYING NEXT PATCH   (12 HOURS ON, 12 HOURS OFF)     • losartan (COZAAR) 50 mg tablet Take 1 tablet (50 mg total) by mouth daily 90 tablet 1   • mometasone (NASONEX) 50 mcg/act nasal spray SPRAY 2 SPRAYS INTO EACH NOSTRIL EVERY DAY 42 g 3   • traMADol (Ultram) 50 mg tablet Take 1 tablet (50 mg total) by mouth every 8 (eight) hours as needed for moderate pain 30 tablet 0   • traZODone (DESYREL) 50 mg tablet TAKE 1/2 TO 1 TABLET BY MOUTH AT BEDTIME AS NEEDED FOR SLEEP 90 tablet 1   • ursodiol (ACTIGALL) 300 mg capsule TAKE 1 CAPSULE BY MOUTH THREE TIMES A  capsule 2   • valACYclovir (VALTREX) 500 mg tablet TAKE 2 TABLETS BY MOUTH EVERY DAY (Patient taking differently: As "needed) 180 tablet 0     No current facility-administered medications for this visit  Allergies  Allergies   Allergen Reactions   • Codeine GI Intolerance and Nausea Only     Other reaction(s): Other (See Comments)  Violently ill  Violently ill   • Latex Rash     itching   • Nortriptyline Shortness Of Breath   • Doxycycline GI Intolerance and Nausea Only   • Cymbalta [Duloxetine Hcl]    • Morphine And Related    • Ocaliva [Obeticholic Acid] Hives   • Sulfa Antibiotics GI Intolerance   • Penicillins Other (See Comments) and Rash     Childhood reaction       Past Medical History, Social History, Family History, medications and allergies were reviewed  Vitals  Vitals:    06/06/23 1251   BP: 132/86   Pulse: 87   SpO2: 96%   Weight: 72 6 kg (160 lb)   Height: 5' 9\" (1 753 m)       Physical Exam  Physical Exam  Vitals reviewed  Constitutional:       Appearance: She is well-developed  HENT:      Head: Normocephalic and atraumatic  Eyes:      Conjunctiva/sclera: Conjunctivae normal    Cardiovascular:      Rate and Rhythm: Normal rate and regular rhythm  Pulmonary:      Effort: Pulmonary effort is normal       Breath sounds: Normal breath sounds  Abdominal:      Palpations: Abdomen is soft  Genitourinary:     Comments: No CVAT  Musculoskeletal:         General: Normal range of motion  Skin:     General: Skin is warm and dry  Neurological:      Mental Status: She is alert and oriented to person, place, and time     Psychiatric:         Mood and Affect: Mood normal            Results  No results found for: \"PSA\"  Lab Results   Component Value Date    BUN 18 05/30/2023    CALCIUM 9 0 05/30/2023     05/30/2023    CO2 27 05/30/2023    CREATININE 0 85 05/30/2023    GLUCOSE 134 12/29/2015    K 4 1 05/30/2023     12/29/2015     Lab Results   Component Value Date    HCT 37 8 05/25/2023    HGB 12 2 05/25/2023    MCV 94 05/25/2023     05/25/2023    WBC 4 82 05/25/2023           "

## 2023-06-07 ENCOUNTER — APPOINTMENT (OUTPATIENT)
Dept: LAB | Facility: CLINIC | Age: 63
DRG: 657 | End: 2023-06-07
Payer: MEDICARE

## 2023-06-07 DIAGNOSIS — Z01.818 PREOP EXAMINATION: ICD-10-CM

## 2023-06-07 DIAGNOSIS — N28.89 RENAL MASS: ICD-10-CM

## 2023-06-07 DIAGNOSIS — Z79.01 LONG TERM (CURRENT) USE OF ANTICOAGULANTS: ICD-10-CM

## 2023-06-07 DIAGNOSIS — R39.89 SUSPECTED UTI: ICD-10-CM

## 2023-06-07 LAB
ABO GROUP BLD: NORMAL
APTT PPP: 27 SECONDS (ref 23–37)
BACTERIA UR CULT: NORMAL
BLD GP AB SCN SERPL QL: NEGATIVE
INR PPP: 0.91 (ref 0.84–1.19)
PROTHROMBIN TIME: 12.5 SECONDS (ref 11.6–14.5)
RH BLD: POSITIVE
SPECIMEN EXPIRATION DATE: NORMAL

## 2023-06-07 PROCEDURE — 85730 THROMBOPLASTIN TIME PARTIAL: CPT

## 2023-06-07 PROCEDURE — 86850 RBC ANTIBODY SCREEN: CPT

## 2023-06-07 PROCEDURE — 86901 BLOOD TYPING SEROLOGIC RH(D): CPT

## 2023-06-07 PROCEDURE — 86923 COMPATIBILITY TEST ELECTRIC: CPT

## 2023-06-07 PROCEDURE — 86900 BLOOD TYPING SEROLOGIC ABO: CPT

## 2023-06-07 PROCEDURE — 36415 COLL VENOUS BLD VENIPUNCTURE: CPT

## 2023-06-07 PROCEDURE — 85610 PROTHROMBIN TIME: CPT

## 2023-06-09 ENCOUNTER — TELEMEDICINE (OUTPATIENT)
Dept: PSYCHIATRY | Facility: CLINIC | Age: 63
End: 2023-06-09
Payer: MEDICARE

## 2023-06-09 DIAGNOSIS — F33.0 MDD (MAJOR DEPRESSIVE DISORDER), RECURRENT EPISODE, MILD (HCC): ICD-10-CM

## 2023-06-09 DIAGNOSIS — F42.9 OBSESSIVE-COMPULSIVE DISORDER, UNSPECIFIED TYPE: ICD-10-CM

## 2023-06-09 DIAGNOSIS — F43.10 PTSD (POST-TRAUMATIC STRESS DISORDER): ICD-10-CM

## 2023-06-09 DIAGNOSIS — F41.1 GAD (GENERALIZED ANXIETY DISORDER): ICD-10-CM

## 2023-06-09 PROCEDURE — 90833 PSYTX W PT W E/M 30 MIN: CPT | Performed by: PSYCHIATRY & NEUROLOGY

## 2023-06-09 PROCEDURE — 99214 OFFICE O/P EST MOD 30 MIN: CPT | Performed by: PSYCHIATRY & NEUROLOGY

## 2023-06-09 RX ORDER — TRAZODONE HYDROCHLORIDE 50 MG/1
25-50 TABLET ORAL
Qty: 90 TABLET | Refills: 1 | Status: SHIPPED | OUTPATIENT
Start: 2023-06-09

## 2023-06-09 RX ORDER — FLUOXETINE HYDROCHLORIDE 40 MG/1
40 CAPSULE ORAL DAILY
Qty: 90 CAPSULE | Refills: 1 | Status: SHIPPED | OUTPATIENT
Start: 2023-06-09

## 2023-06-09 NOTE — BH TREATMENT PLAN
Leander Mathew  1960         Date of Initial Treatment Plan: 5/11/21   Date of Current Treatment Plan: 6/9/2023     Treatment Plan      Strengths/Personal Resources for Self Care: Courtney reports that [facility] is a good support to her  She denies any close friends or family as support  Benja Moss is motivated and engaged in treatment and willing to work on mental health wellbeing   Benja Moss has been working on healthy relationships and communication skills       Diagnosis:   1  MDD (major depressive disorder), recurrent episode, mild ([facility] [address] )      2  JOSEFINA (generalized anxiety disorder)      3  Obsessive-compulsive disorder, unspecified type            Area of Needs: Processing past relationships and boundaries in current relationship and work on decreasing symptoms of OCD  Work on resolving past emotions with mother  Decrease anxiety overall          Long Term Goal 1: Process past relationship in order to clarify feelings and work on better boundaries     Target Date:3/2/23  Completion Date:4/2/23         Short Term Objectives for Goal 1: 1   Process difference between healhty and unhealhty boundaries and 2  Process feelings about past relationships and work on healthy expresssion of emotions       Long Term Goal 2: Decrease obsessive thoughts and compulsive actions     Target Date:3/2/23  Completion Date:4/2/23     Short Term Objectives for Goal 2: 1    Utilize thought stopping techniques to decrease skin picking   2     Teach and practice distractive coping skill to be used in times of need to decrease obsessive thoughts         GOAL - Re-establish with therapy    GOAL 1: Modality:Individual 2x per month   Completion Date 4/2/23, Medication Management and The person(s) responsible for carrying out the plan is  client, Kai Ksesler, therapist, Gorge Jain, and Dr Jose Luis Georges      GOAL 2: Modality: Individual 2x per month   Completion Date 4/2/23, Medication Management and The person(s) responsible for carrying out the plan John Alvarenga, therapist, Gorge Jain, and Dr Daysi Saxena and Treatment Plan explained to Courtney, Courtney relates understanding diagnosis and is agreeable to Treatment Plan          Client Comments : Please share your thoughts, feelings, need and/or experiences regarding your treatment plan
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
## 2023-06-13 ENCOUNTER — TELEPHONE (OUTPATIENT)
Dept: INTERNAL MEDICINE CLINIC | Facility: CLINIC | Age: 63
End: 2023-06-13

## 2023-06-13 DIAGNOSIS — J01.00 ACUTE NON-RECURRENT MAXILLARY SINUSITIS: Primary | ICD-10-CM

## 2023-06-13 RX ORDER — AZITHROMYCIN 250 MG/1
TABLET, FILM COATED ORAL
Qty: 6 TABLET | Refills: 0 | Status: SHIPPED | OUTPATIENT
Start: 2023-06-13 | End: 2023-06-18

## 2023-06-13 NOTE — TELEPHONE ENCOUNTER
Has sinus infection, no fever, soar throat  Mucous and cough      Are you able to call something in for her

## 2023-06-14 ENCOUNTER — ANESTHESIA EVENT (OUTPATIENT)
Dept: PERIOP | Facility: HOSPITAL | Age: 63
DRG: 657 | End: 2023-06-14
Payer: MEDICARE

## 2023-06-21 ENCOUNTER — HOSPITAL ENCOUNTER (INPATIENT)
Facility: HOSPITAL | Age: 63
LOS: 4 days | Discharge: HOME | DRG: 657 | End: 2023-06-25
Attending: UROLOGY | Admitting: UROLOGY
Payer: MEDICARE

## 2023-06-21 ENCOUNTER — ANESTHESIA (OUTPATIENT)
Dept: PERIOP | Facility: HOSPITAL | Age: 63
DRG: 657 | End: 2023-06-21
Payer: MEDICARE

## 2023-06-21 DIAGNOSIS — C64.1 RENAL CELL CARCINOMA OF RIGHT KIDNEY (HCC): Primary | ICD-10-CM

## 2023-06-21 DIAGNOSIS — G90.521 COMPLEX REGIONAL PAIN SYNDROME I OF RIGHT LOWER LIMB: ICD-10-CM

## 2023-06-21 DIAGNOSIS — I10 ESSENTIAL HYPERTENSION: ICD-10-CM

## 2023-06-21 DIAGNOSIS — K74.3 PRIMARY BILIARY CHOLANGITIS (HCC): ICD-10-CM

## 2023-06-21 DIAGNOSIS — N28.89 RENAL MASS: ICD-10-CM

## 2023-06-21 LAB
ANION GAP SERPL CALCULATED.3IONS-SCNC: 0 MMOL/L
BUN SERPL-MCNC: 17 MG/DL (ref 5–25)
CALCIUM SERPL-MCNC: 8.1 MG/DL (ref 8.3–10.1)
CHLORIDE SERPL-SCNC: 113 MMOL/L (ref 96–108)
CO2 SERPL-SCNC: 26 MMOL/L (ref 21–32)
CREAT SERPL-MCNC: 0.98 MG/DL (ref 0.6–1.3)
ERYTHROCYTE [DISTWIDTH] IN BLOOD BY AUTOMATED COUNT: 12.8 % (ref 11.6–15.1)
GFR SERPL CREATININE-BSD FRML MDRD: 61 ML/MIN/1.73SQ M
GLUCOSE SERPL-MCNC: 125 MG/DL (ref 65–140)
HCT VFR BLD AUTO: 32.5 % (ref 34.8–46.1)
HGB BLD-MCNC: 10.4 G/DL (ref 11.5–15.4)
MCH RBC QN AUTO: 30.1 PG (ref 26.8–34.3)
MCHC RBC AUTO-ENTMCNC: 32 G/DL (ref 31.4–37.4)
MCV RBC AUTO: 94 FL (ref 82–98)
PLATELET # BLD AUTO: 154 THOUSANDS/UL (ref 149–390)
PLATELET # BLD AUTO: 166 THOUSANDS/UL (ref 149–390)
PMV BLD AUTO: 10 FL (ref 8.9–12.7)
PMV BLD AUTO: 9.9 FL (ref 8.9–12.7)
POTASSIUM SERPL-SCNC: 3.5 MMOL/L (ref 3.5–5.3)
RBC # BLD AUTO: 3.45 MILLION/UL (ref 3.81–5.12)
SODIUM SERPL-SCNC: 139 MMOL/L (ref 135–147)
WBC # BLD AUTO: 5.1 THOUSAND/UL (ref 4.31–10.16)

## 2023-06-21 PROCEDURE — 99222 1ST HOSP IP/OBS MODERATE 55: CPT | Performed by: INTERNAL MEDICINE

## 2023-06-21 PROCEDURE — 85027 COMPLETE CBC AUTOMATED: CPT | Performed by: UROLOGY

## 2023-06-21 PROCEDURE — 85049 AUTOMATED PLATELET COUNT: CPT | Performed by: UROLOGY

## 2023-06-21 PROCEDURE — 50546 LAPAROSCOPIC NEPHRECTOMY: CPT | Performed by: PHYSICIAN ASSISTANT

## 2023-06-21 PROCEDURE — 0TT04ZZ RESECTION OF RIGHT KIDNEY, PERCUTANEOUS ENDOSCOPIC APPROACH: ICD-10-PCS | Performed by: UROLOGY

## 2023-06-21 PROCEDURE — 50546 LAPAROSCOPIC NEPHRECTOMY: CPT | Performed by: UROLOGY

## 2023-06-21 PROCEDURE — 80048 BASIC METABOLIC PNL TOTAL CA: CPT | Performed by: UROLOGY

## 2023-06-21 PROCEDURE — 88307 TISSUE EXAM BY PATHOLOGIST: CPT | Performed by: PATHOLOGY

## 2023-06-21 RX ORDER — DOCUSATE SODIUM 100 MG/1
100 CAPSULE, LIQUID FILLED ORAL 2 TIMES DAILY
Status: DISCONTINUED | OUTPATIENT
Start: 2023-06-21 | End: 2023-06-25 | Stop reason: HOSPADM

## 2023-06-21 RX ORDER — BUPIVACAINE HYDROCHLORIDE 5 MG/ML
INJECTION, SOLUTION EPIDURAL; INTRACAUDAL AS NEEDED
Status: DISCONTINUED | OUTPATIENT
Start: 2023-06-21 | End: 2023-06-21 | Stop reason: HOSPADM

## 2023-06-21 RX ORDER — SODIUM CHLORIDE, SODIUM LACTATE, POTASSIUM CHLORIDE, CALCIUM CHLORIDE 600; 310; 30; 20 MG/100ML; MG/100ML; MG/100ML; MG/100ML
INJECTION, SOLUTION INTRAVENOUS CONTINUOUS PRN
Status: DISCONTINUED | OUTPATIENT
Start: 2023-06-21 | End: 2023-06-21

## 2023-06-21 RX ORDER — HYDROMORPHONE HCL/PF 1 MG/ML
0.5 SYRINGE (ML) INJECTION
Status: DISCONTINUED | OUTPATIENT
Start: 2023-06-21 | End: 2023-06-21 | Stop reason: HOSPADM

## 2023-06-21 RX ORDER — HEPARIN SODIUM 5000 [USP'U]/ML
5000 INJECTION, SOLUTION INTRAVENOUS; SUBCUTANEOUS EVERY 12 HOURS SCHEDULED
Status: DISCONTINUED | OUTPATIENT
Start: 2023-06-21 | End: 2023-06-25 | Stop reason: HOSPADM

## 2023-06-21 RX ORDER — URSODIOL 300 MG/1
300 CAPSULE ORAL 3 TIMES DAILY
Status: DISCONTINUED | OUTPATIENT
Start: 2023-06-21 | End: 2023-06-25 | Stop reason: HOSPADM

## 2023-06-21 RX ORDER — ONDANSETRON 2 MG/ML
INJECTION INTRAMUSCULAR; INTRAVENOUS AS NEEDED
Status: DISCONTINUED | OUTPATIENT
Start: 2023-06-21 | End: 2023-06-21

## 2023-06-21 RX ORDER — ONDANSETRON 2 MG/ML
4 INJECTION INTRAMUSCULAR; INTRAVENOUS EVERY 6 HOURS PRN
Status: DISCONTINUED | OUTPATIENT
Start: 2023-06-21 | End: 2023-06-25 | Stop reason: HOSPADM

## 2023-06-21 RX ORDER — HYDROMORPHONE HCL/PF 1 MG/ML
SYRINGE (ML) INJECTION AS NEEDED
Status: DISCONTINUED | OUTPATIENT
Start: 2023-06-21 | End: 2023-06-21

## 2023-06-21 RX ORDER — SODIUM CHLORIDE 9 MG/ML
75 INJECTION, SOLUTION INTRAVENOUS CONTINUOUS
Status: DISCONTINUED | OUTPATIENT
Start: 2023-06-21 | End: 2023-06-23

## 2023-06-21 RX ORDER — PROPOFOL 10 MG/ML
INJECTION, EMULSION INTRAVENOUS AS NEEDED
Status: DISCONTINUED | OUTPATIENT
Start: 2023-06-21 | End: 2023-06-21

## 2023-06-21 RX ORDER — HYDROMORPHONE HCL/PF 1 MG/ML
0.5 SYRINGE (ML) INJECTION EVERY 2 HOUR PRN
Status: DISCONTINUED | OUTPATIENT
Start: 2023-06-21 | End: 2023-06-22

## 2023-06-21 RX ORDER — MAGNESIUM HYDROXIDE 1200 MG/15ML
LIQUID ORAL AS NEEDED
Status: DISCONTINUED | OUTPATIENT
Start: 2023-06-21 | End: 2023-06-21 | Stop reason: HOSPADM

## 2023-06-21 RX ORDER — GLYCOPYRROLATE 0.2 MG/ML
INJECTION INTRAMUSCULAR; INTRAVENOUS AS NEEDED
Status: DISCONTINUED | OUTPATIENT
Start: 2023-06-21 | End: 2023-06-21

## 2023-06-21 RX ORDER — LORAZEPAM 2 MG/ML
0.5 INJECTION INTRAMUSCULAR EVERY 6 HOURS PRN
Status: DISCONTINUED | OUTPATIENT
Start: 2023-06-21 | End: 2023-06-25 | Stop reason: HOSPADM

## 2023-06-21 RX ORDER — FLUOXETINE HYDROCHLORIDE 20 MG/1
40 CAPSULE ORAL DAILY
Status: DISCONTINUED | OUTPATIENT
Start: 2023-06-22 | End: 2023-06-25 | Stop reason: HOSPADM

## 2023-06-21 RX ORDER — TRAZODONE HYDROCHLORIDE 50 MG/1
50 TABLET ORAL
Status: DISCONTINUED | OUTPATIENT
Start: 2023-06-21 | End: 2023-06-23

## 2023-06-21 RX ORDER — ROCURONIUM BROMIDE 10 MG/ML
INJECTION, SOLUTION INTRAVENOUS AS NEEDED
Status: DISCONTINUED | OUTPATIENT
Start: 2023-06-21 | End: 2023-06-21

## 2023-06-21 RX ORDER — CEFAZOLIN SODIUM 1 G/50ML
1000 SOLUTION INTRAVENOUS ONCE
Status: DISCONTINUED | OUTPATIENT
Start: 2023-06-21 | End: 2023-06-21 | Stop reason: HOSPADM

## 2023-06-21 RX ORDER — FENTANYL CITRATE 50 UG/ML
INJECTION, SOLUTION INTRAMUSCULAR; INTRAVENOUS AS NEEDED
Status: DISCONTINUED | OUTPATIENT
Start: 2023-06-21 | End: 2023-06-21

## 2023-06-21 RX ORDER — KETAMINE HCL IN NACL, ISO-OSM 100MG/10ML
SYRINGE (ML) INJECTION AS NEEDED
Status: DISCONTINUED | OUTPATIENT
Start: 2023-06-21 | End: 2023-06-21

## 2023-06-21 RX ORDER — EPHEDRINE SULFATE 50 MG/ML
INJECTION INTRAVENOUS AS NEEDED
Status: DISCONTINUED | OUTPATIENT
Start: 2023-06-21 | End: 2023-06-21

## 2023-06-21 RX ORDER — CEFAZOLIN SODIUM 1 G/3ML
INJECTION, POWDER, FOR SOLUTION INTRAMUSCULAR; INTRAVENOUS AS NEEDED
Status: DISCONTINUED | OUTPATIENT
Start: 2023-06-21 | End: 2023-06-21

## 2023-06-21 RX ORDER — TRAMADOL HYDROCHLORIDE 50 MG/1
50 TABLET ORAL EVERY 8 HOURS PRN
Status: DISCONTINUED | OUTPATIENT
Start: 2023-06-21 | End: 2023-06-22

## 2023-06-21 RX ORDER — ALBUMIN, HUMAN INJ 5% 5 %
SOLUTION INTRAVENOUS CONTINUOUS PRN
Status: DISCONTINUED | OUTPATIENT
Start: 2023-06-21 | End: 2023-06-21

## 2023-06-21 RX ORDER — DEXAMETHASONE SODIUM PHOSPHATE 10 MG/ML
INJECTION, SOLUTION INTRAMUSCULAR; INTRAVENOUS AS NEEDED
Status: DISCONTINUED | OUTPATIENT
Start: 2023-06-21 | End: 2023-06-21

## 2023-06-21 RX ORDER — MIDAZOLAM HYDROCHLORIDE 2 MG/2ML
INJECTION, SOLUTION INTRAMUSCULAR; INTRAVENOUS AS NEEDED
Status: DISCONTINUED | OUTPATIENT
Start: 2023-06-21 | End: 2023-06-21

## 2023-06-21 RX ORDER — DIPHENHYDRAMINE HCL 25 MG
25 TABLET ORAL EVERY 6 HOURS PRN
Status: DISCONTINUED | OUTPATIENT
Start: 2023-06-21 | End: 2023-06-25 | Stop reason: HOSPADM

## 2023-06-21 RX ORDER — SODIUM CHLORIDE 9 MG/ML
INJECTION, SOLUTION INTRAVENOUS CONTINUOUS PRN
Status: DISCONTINUED | OUTPATIENT
Start: 2023-06-21 | End: 2023-06-21

## 2023-06-21 RX ORDER — FLUTICASONE PROPIONATE 50 MCG
2 SPRAY, SUSPENSION (ML) NASAL DAILY
Status: DISCONTINUED | OUTPATIENT
Start: 2023-06-21 | End: 2023-06-25 | Stop reason: HOSPADM

## 2023-06-21 RX ORDER — HYDROMORPHONE HCL IN WATER/PF 6 MG/30 ML
0.2 PATIENT CONTROLLED ANALGESIA SYRINGE INTRAVENOUS
Status: COMPLETED | OUTPATIENT
Start: 2023-06-21 | End: 2023-06-21

## 2023-06-21 RX ORDER — FENTANYL CITRATE/PF 50 MCG/ML
50 SYRINGE (ML) INJECTION
Status: DISCONTINUED | OUTPATIENT
Start: 2023-06-21 | End: 2023-06-21 | Stop reason: HOSPADM

## 2023-06-21 RX ORDER — LIDOCAINE HYDROCHLORIDE 10 MG/ML
INJECTION, SOLUTION EPIDURAL; INFILTRATION; INTRACAUDAL; PERINEURAL AS NEEDED
Status: DISCONTINUED | OUTPATIENT
Start: 2023-06-21 | End: 2023-06-21

## 2023-06-21 RX ADMIN — HYDROMORPHONE HYDROCHLORIDE 0.5 MG: 1 INJECTION, SOLUTION INTRAMUSCULAR; INTRAVENOUS; SUBCUTANEOUS at 15:19

## 2023-06-21 RX ADMIN — CEFAZOLIN 1000 MG: 1 INJECTION, POWDER, FOR SOLUTION INTRAMUSCULAR; INTRAVENOUS at 08:56

## 2023-06-21 RX ADMIN — PROPOFOL 200 MG: 10 INJECTION, EMULSION INTRAVENOUS at 08:30

## 2023-06-21 RX ADMIN — HYDROMORPHONE HYDROCHLORIDE 0.5 MG: 1 INJECTION, SOLUTION INTRAMUSCULAR; INTRAVENOUS; SUBCUTANEOUS at 17:24

## 2023-06-21 RX ADMIN — DIPHENHYDRAMINE HCL 25 MG: 25 TABLET, COATED ORAL at 14:13

## 2023-06-21 RX ADMIN — LIDOCAINE HYDROCHLORIDE 2 ML: 10 INJECTION, SOLUTION EPIDURAL; INFILTRATION; INTRACAUDAL; PERINEURAL at 08:30

## 2023-06-21 RX ADMIN — GLYCOPYRROLATE 0.2 MG: 0.2 INJECTION, SOLUTION INTRAMUSCULAR; INTRAVENOUS at 09:34

## 2023-06-21 RX ADMIN — EPHEDRINE SULFATE 5 MG: 50 INJECTION INTRAVENOUS at 08:50

## 2023-06-21 RX ADMIN — DEXAMETHASONE SODIUM PHOSPHATE 10 MG: 10 INJECTION, SOLUTION INTRAMUSCULAR; INTRAVENOUS at 09:35

## 2023-06-21 RX ADMIN — MIDAZOLAM 2 MG: 1 INJECTION INTRAMUSCULAR; INTRAVENOUS at 08:20

## 2023-06-21 RX ADMIN — SODIUM CHLORIDE, SODIUM LACTATE, POTASSIUM CHLORIDE, AND CALCIUM CHLORIDE: .6; .31; .03; .02 INJECTION, SOLUTION INTRAVENOUS at 07:40

## 2023-06-21 RX ADMIN — HYDROMORPHONE HYDROCHLORIDE 0.2 MG: 0.2 INJECTION, SOLUTION INTRAMUSCULAR; INTRAVENOUS; SUBCUTANEOUS at 11:37

## 2023-06-21 RX ADMIN — HYDROMORPHONE HYDROCHLORIDE 0.2 MG: 0.2 INJECTION, SOLUTION INTRAMUSCULAR; INTRAVENOUS; SUBCUTANEOUS at 12:24

## 2023-06-21 RX ADMIN — HYDROMORPHONE HYDROCHLORIDE 0.2 MG: 0.2 INJECTION, SOLUTION INTRAMUSCULAR; INTRAVENOUS; SUBCUTANEOUS at 11:59

## 2023-06-21 RX ADMIN — HYDROMORPHONE HYDROCHLORIDE 0.5 MG: 1 INJECTION, SOLUTION INTRAMUSCULAR; INTRAVENOUS; SUBCUTANEOUS at 12:51

## 2023-06-21 RX ADMIN — ROCURONIUM BROMIDE 50 MG: 10 INJECTION, SOLUTION INTRAVENOUS at 08:30

## 2023-06-21 RX ADMIN — ONDANSETRON 4 MG: 2 INJECTION INTRAMUSCULAR; INTRAVENOUS at 10:06

## 2023-06-21 RX ADMIN — HYDROMORPHONE HYDROCHLORIDE 0.5 MG: 1 INJECTION, SOLUTION INTRAMUSCULAR; INTRAVENOUS; SUBCUTANEOUS at 09:24

## 2023-06-21 RX ADMIN — SODIUM CHLORIDE: 0.9 INJECTION, SOLUTION INTRAVENOUS at 08:33

## 2023-06-21 RX ADMIN — FENTANYL CITRATE 50 MCG: 50 INJECTION INTRAMUSCULAR; INTRAVENOUS at 11:14

## 2023-06-21 RX ADMIN — FENTANYL CITRATE 50 MCG: 50 INJECTION INTRAMUSCULAR; INTRAVENOUS at 12:31

## 2023-06-21 RX ADMIN — FENTANYL CITRATE 100 MCG: 50 INJECTION INTRAMUSCULAR; INTRAVENOUS at 08:30

## 2023-06-21 RX ADMIN — FENTANYL CITRATE 50 MCG: 50 INJECTION INTRAMUSCULAR; INTRAVENOUS at 11:18

## 2023-06-21 RX ADMIN — EPHEDRINE SULFATE 5 MG: 50 INJECTION INTRAVENOUS at 08:47

## 2023-06-21 RX ADMIN — URSODIOL 300 MG: 300 CAPSULE ORAL at 21:09

## 2023-06-21 RX ADMIN — URSODIOL 300 MG: 300 CAPSULE ORAL at 17:23

## 2023-06-21 RX ADMIN — TRAMADOL HYDROCHLORIDE 50 MG: 50 TABLET, COATED ORAL at 14:13

## 2023-06-21 RX ADMIN — SODIUM CHLORIDE 125 ML/HR: 0.9 INJECTION, SOLUTION INTRAVENOUS at 12:40

## 2023-06-21 RX ADMIN — Medication 30 MG: at 09:44

## 2023-06-21 RX ADMIN — Medication 20 MG: at 10:10

## 2023-06-21 RX ADMIN — SODIUM CHLORIDE 125 ML/HR: 0.9 INJECTION, SOLUTION INTRAVENOUS at 19:45

## 2023-06-21 RX ADMIN — HYDROMORPHONE HYDROCHLORIDE 0.5 MG: 1 INJECTION, SOLUTION INTRAMUSCULAR; INTRAVENOUS; SUBCUTANEOUS at 19:40

## 2023-06-21 RX ADMIN — FENTANYL CITRATE 50 MCG: 50 INJECTION INTRAMUSCULAR; INTRAVENOUS at 12:35

## 2023-06-21 RX ADMIN — NOREPINEPHRINE BITARTRATE 2 MCG/MIN: 1 INJECTION INTRAVENOUS at 08:54

## 2023-06-21 RX ADMIN — SODIUM CHLORIDE, SODIUM LACTATE, POTASSIUM CHLORIDE, AND CALCIUM CHLORIDE: .6; .31; .03; .02 INJECTION, SOLUTION INTRAVENOUS at 09:32

## 2023-06-21 RX ADMIN — EPHEDRINE SULFATE 10 MG: 50 INJECTION INTRAVENOUS at 09:13

## 2023-06-21 RX ADMIN — ALBUMIN (HUMAN): 12.5 INJECTION, SOLUTION INTRAVENOUS at 09:33

## 2023-06-21 RX ADMIN — LORAZEPAM 0.5 MG: 2 INJECTION INTRAMUSCULAR; INTRAVENOUS at 21:09

## 2023-06-21 RX ADMIN — SUGAMMADEX 150 MG: 100 INJECTION, SOLUTION INTRAVENOUS at 10:49

## 2023-06-21 RX ADMIN — HYDROMORPHONE HYDROCHLORIDE 0.2 MG: 0.2 INJECTION, SOLUTION INTRAMUSCULAR; INTRAVENOUS; SUBCUTANEOUS at 11:45

## 2023-06-21 NOTE — INTERVAL H&P NOTE
H&P reviewed  After examining the patient I find no changes in the patients condition since the H&P had been written  Vitals:    06/21/23 0731   BP: 150/90   Pulse: 74   Resp: 18   Temp: 98 8 °F (37 1 °C)   SpO2: 97%     Procedure, technique, risks benefits Right laparoscopic nephrectomy reviewed  Questions answered
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
## 2023-06-21 NOTE — ANESTHESIA POSTPROCEDURE EVALUATION
Post-Op Assessment Note    CV Status:  Stable  Pain Score: 0    Pain management: adequate     Mental Status:  Arousable   Hydration Status:  Euvolemic   PONV Controlled:  Controlled   Airway Patency:  Patent      Post Op Vitals Reviewed: Yes      Staff: Anesthesiologist, CRNA         No notable events documented      BP   113/60   Temp   97 7   Pulse  71   Resp   16   SpO2   99 face mask

## 2023-06-21 NOTE — ANESTHESIA PREPROCEDURE EVALUATION
Procedure:  NEPHRECTOMY LAPAROSCOPIC ASSISTED (Abdomen)    Relevant Problems   CARDIO   (+) Chest pain   (+) Essential hypertension   (+) Hyperlipidemia      GI/HEPATIC   (+) Cirrhosis (HCC)   (+) Primary biliary cholangitis (HCC)      /RENAL   (+) Hydronephrosis   (+) Nephrolithiasis   (+) Renal cell carcinoma of right kidney (HCC)      HEMATOLOGY   (+) Iron deficiency anemia due to chronic blood loss   (+) Multiple myeloma not having achieved remission (HCC)   (+) Smoldering multiple myeloma (SMM)      MUSCULOSKELETAL   (+) Arthritis   (+) Chronic left-sided low back pain without sciatica   (+) Lateral epicondylitis of right elbow   (+) Lumbar degenerative disc disease   (+) Post-traumatic osteoarthritis of right knee   (+) Sacroiliitis (HCC)      NEURO/PSYCH   (+) Chronic left-sided low back pain without sciatica   (+) Chronic pain disorder   (+) Complex regional pain syndrome i of right lower limb   (+) Complex regional pain syndrome type 2 of right lower extremity   (+) Depression with anxiety   (+) JOSEFINA (generalized anxiety disorder)   (+) MDD (major depressive disorder), recurrent episode, mild (HCC)   (+) OCD (obsessive compulsive disorder)   (+) PTSD (post-traumatic stress disorder)   (+) Quadriceps weakness      PULMONARY   (+) Pneumonia due to infectious organism      48-year-old  female with past medical history of primary biliary cirrhosis, fracture of the right tibia, hypertension, OCD,worsening of osteoporosis on DEXA scan Done in 2016, nephrolithiasis, chronic lower back pain and possible sacroiliitis was found to have elevated total protein 3-4 years ago, serum protein electrophoresis showed IgG kappa monoclonal protein of 1 7 g/dL however no evidence of anemia, hypercalcemia, or renal insufficiency, she had persistent elevation of alkaline phosphatase secondary to PBC  had a bone scan done last year which showed activity in the ribs area  serum protein electrophoresis in April 2017 showed IgG kappa monoclonal protein of 1 96 g/dL, total protein 8 5, albumin 3 9, IgG 2580, creatinine 0 8, calcium 8 9, alkaline phosphatase 294, AST 37, ALT 46, IgM 25 in July 2016 monoclonal protein of IgG kappa of 1 73  C-reactive protein has been normal however sedimentation rate was elevated in the range of 60  Bone marrow biopsy in May 2017 showed 15% plasma cells in diffuse and interstitial pattern, normal fish panel for multiple myeloma and cytogenetics  Status post right knee replacement in June 2018  Physical Exam    Airway    Mallampati score: II         Dental       Cardiovascular  Cardiovascular exam normal    Pulmonary  Pulmonary exam normal     Other Findings        Anesthesia Plan  ASA Score- 3     Anesthesia Type- general with ASA Monitors  Additional Monitors:   Airway Plan: ETT  Plan Factors-Exercise tolerance (METS): >4 METS  Chart reviewed  EKG reviewed  Imaging results reviewed  Existing labs reviewed  Patient summary reviewed  Patient is not a current smoker  Patient not instructed to abstain from smoking on day of procedure  Induction- intravenous  Postoperative Plan-     Informed Consent- Anesthetic plan and risks discussed with patient  I personally reviewed this patient with the CRNA  Discussed and agreed on the Anesthesia Plan with the CRNA  Anyi Saez

## 2023-06-21 NOTE — OP NOTE
OPERATIVE REPORT  PATIENT NAME: Dimple Juan    :  1960  MRN: [id number]  Pt Location: BE OR ROOM 14    SURGERY DATE: 2023    Surgeon(s) and Role:     * Rajiv Wright MD - Primary     * Kolby Carpenter PA-C - Assisting  NOTE:  There were no qualified teaching residents to assist with this case    *The physician assistant was medically necessary and integral during the entire procedure to help maintain retraction irrigation suction and instrument insertion during the procedure      Preop Diagnosis:  Renal cell carcinoma of right kidney ([address] ) [C64 1]    Post-Op Diagnosis Codes:     * Renal cell carcinoma of right kidney ([address] ) [C64 1]    Procedure(s):  Right - NEPHRECTOMY LAPAROSCOPIC ASSISTED    Specimen(s):  ID Type Source Tests Collected by Time Destination   1 :  Tissue Kidney, Right TISSUE EXAM Rajiv Wright MD 2023 [id number]        Estimated Blood Loss:   Minimal    Drains:  Urethral Catheter Non-latex;Straight-tip 16 Fr  (Active)   Number of days: 0       Anesthesia Type:   General    Operative Indications:  Renal cell carcinoma of right kidney ([address] ) [C64 1]      Operative Findings:  Organ confined right renal mass    Complications:   None    Procedure and Technique:  Dimple Juan is a 60y o -year-old female   Based on the size and location of the mass I did not feel that it would be amenable to a partial nephrectomy  I therefore recommended a right hand-assisted laparoscopic nephrectomy  Risk and benefits of the procedure were discussed and reviewed  Informed consent was obtained  The patient was identified, brought to the operating room and placed on the table in supine position  After induction of general endotracheal anesthesia, the patient was placed in a modified left lateral decubitus position  The right side was up and a wedge foam pillow was placed beneath the back  The bottom leg was gently flexed and pressure points were padded   Tierney catheter had been placed at the start of the case  The top leg was relatively straight and padded as well  The bed was gently flexed  The patient was secured to the bed with padding, towels, and tape  A lateral arm positioner was utilized to place the right arm appropriately and the left arm was left on an armboard  Axillary roll was placed  All joints and pressure points were padded with foam padding  The entire abdomen was then prepped and draped in sterile fashion  Intravenous antibiotics were administered  A timeout was performed with all members of the operative team confirming the patient's identity, procedure to be performed, and laterality of the case  A 7 cm right lower quadrant incision was made and taken down through the subcutaneous fat to the fascia  The fascia was opened muscle was divided  The peritoneum was identified  The peritoneum was elevated and entered sharply with scissors  I then placed a gel hand port  A pneumoperitoneum was created placing a blunt 12 mm port through the GelPort  Next the patient was rolled into a steep lateral decubitus position  2 additional 12 mm ports were placed in the upper abdomen in the midline  A third 5 mm subxiphoid port was placed under direct vision for a liver retractor  The colon was mobilized along the white line of [place] utilizing the Harmonic scalpel  This was reflected medially  The duodenum was kocherized and reflected medially  The retroperitoneum was entered  The lower pole was mobilized and the ureter was identified  This was then clipped and divided  The ureter was followed superiorly until the hilum was identified  The vessels were carefully dissected and divided individually using the endovascular laparoscopic stapler  Next the kidney was mobilized from the adrenal gland  A plane was created between the top of the kidney and the adrenal gland with the Harmonic scalpel   The last remaining attachments of the kidney were superiorly between the kidney and liver  These were carefully taken down with the Harmonic scalpel until the specimen was completely free and mobile  The specimen was removed through the hand port  The renal bed was inspected  The hilum was inspected  Surgiflow was applied  Hemostasis was excellent  All ports were then removed  The right lower quadrant fascia was closed in 2 layers with PDS  The 12 mm fascial incisions were closed with Vicryl UR-6  All incisions were irrigated and fat re-approximated with Vicryl  The skin was closed with 4-0 Monocryl and histoacryl  Overall, the patient tolerated the procedure well and there were no complications  The patient was extubated in the operating room and transferred to the PACU in stable condition  I was present for the entire procedure      Patient Disposition:  PACU         SIGNATURE: Ritu Rangel MD  DATE: June 21, 2023  TIME: 10:32 AM

## 2023-06-22 LAB
ABO GROUP BLD BPU: NORMAL
ABO GROUP BLD BPU: NORMAL
ANION GAP SERPL CALCULATED.3IONS-SCNC: 3 MMOL/L
BPU ID: NORMAL
BPU ID: NORMAL
BUN SERPL-MCNC: 16 MG/DL (ref 5–25)
CALCIUM SERPL-MCNC: 7.7 MG/DL (ref 8.3–10.1)
CHLORIDE SERPL-SCNC: 110 MMOL/L (ref 96–108)
CO2 SERPL-SCNC: 25 MMOL/L (ref 21–32)
CREAT SERPL-MCNC: 1.14 MG/DL (ref 0.6–1.3)
CROSSMATCH: NORMAL
CROSSMATCH: NORMAL
ERYTHROCYTE [DISTWIDTH] IN BLOOD BY AUTOMATED COUNT: 12.8 % (ref 11.6–15.1)
GFR SERPL CREATININE-BSD FRML MDRD: 51 ML/MIN/1.73SQ M
GLUCOSE SERPL-MCNC: 128 MG/DL (ref 65–140)
HCT VFR BLD AUTO: 32.8 % (ref 34.8–46.1)
HGB BLD-MCNC: 10.3 G/DL (ref 11.5–15.4)
MCH RBC QN AUTO: 29.8 PG (ref 26.8–34.3)
MCHC RBC AUTO-ENTMCNC: 31.4 G/DL (ref 31.4–37.4)
MCV RBC AUTO: 95 FL (ref 82–98)
PLATELET # BLD AUTO: 167 THOUSANDS/UL (ref 149–390)
PMV BLD AUTO: 10.4 FL (ref 8.9–12.7)
POTASSIUM SERPL-SCNC: 4.1 MMOL/L (ref 3.5–5.3)
RBC # BLD AUTO: 3.46 MILLION/UL (ref 3.81–5.12)
SODIUM SERPL-SCNC: 138 MMOL/L (ref 135–147)
UNIT DISPENSE STATUS: NORMAL
UNIT DISPENSE STATUS: NORMAL
UNIT PRODUCT CODE: NORMAL
UNIT PRODUCT CODE: NORMAL
UNIT PRODUCT VOLUME: 350 ML
UNIT PRODUCT VOLUME: 350 ML
UNIT RH: NORMAL
UNIT RH: NORMAL
WBC # BLD AUTO: 8.32 THOUSAND/UL (ref 4.31–10.16)

## 2023-06-22 PROCEDURE — 99232 SBSQ HOSP IP/OBS MODERATE 35: CPT | Performed by: INTERNAL MEDICINE

## 2023-06-22 PROCEDURE — 80048 BASIC METABOLIC PNL TOTAL CA: CPT | Performed by: UROLOGY

## 2023-06-22 PROCEDURE — 99024 POSTOP FOLLOW-UP VISIT: CPT | Performed by: UROLOGY

## 2023-06-22 PROCEDURE — 85027 COMPLETE CBC AUTOMATED: CPT | Performed by: UROLOGY

## 2023-06-22 RX ORDER — TRAMADOL HYDROCHLORIDE 50 MG/1
50 TABLET ORAL EVERY 6 HOURS PRN
Status: DISCONTINUED | OUTPATIENT
Start: 2023-06-22 | End: 2023-06-22

## 2023-06-22 RX ORDER — CALCIUM CARBONATE 500(1250)
1 TABLET ORAL ONCE
Status: COMPLETED | OUTPATIENT
Start: 2023-06-22 | End: 2023-06-22

## 2023-06-22 RX ORDER — TRAMADOL HYDROCHLORIDE 50 MG/1
100 TABLET ORAL EVERY 6 HOURS PRN
Status: DISCONTINUED | OUTPATIENT
Start: 2023-06-22 | End: 2023-06-22

## 2023-06-22 RX ORDER — HYDROMORPHONE HYDROCHLORIDE 4 MG/1
4 TABLET ORAL EVERY 4 HOURS PRN
Status: DISCONTINUED | OUTPATIENT
Start: 2023-06-22 | End: 2023-06-25 | Stop reason: HOSPADM

## 2023-06-22 RX ORDER — HYDROMORPHONE HCL/PF 1 MG/ML
0.5 SYRINGE (ML) INJECTION
Status: DISCONTINUED | OUTPATIENT
Start: 2023-06-22 | End: 2023-06-25 | Stop reason: HOSPADM

## 2023-06-22 RX ORDER — SENNOSIDES 8.6 MG
1 TABLET ORAL
Status: DISCONTINUED | OUTPATIENT
Start: 2023-06-22 | End: 2023-06-25 | Stop reason: HOSPADM

## 2023-06-22 RX ORDER — HYDROMORPHONE HYDROCHLORIDE 2 MG/1
2 TABLET ORAL EVERY 4 HOURS PRN
Status: DISCONTINUED | OUTPATIENT
Start: 2023-06-22 | End: 2023-06-25 | Stop reason: HOSPADM

## 2023-06-22 RX ORDER — SIMETHICONE 80 MG
80 TABLET,CHEWABLE ORAL
Status: DISCONTINUED | OUTPATIENT
Start: 2023-06-22 | End: 2023-06-25 | Stop reason: HOSPADM

## 2023-06-22 RX ADMIN — HYDROMORPHONE HYDROCHLORIDE 0.5 MG: 1 INJECTION, SOLUTION INTRAMUSCULAR; INTRAVENOUS; SUBCUTANEOUS at 04:42

## 2023-06-22 RX ADMIN — SODIUM CHLORIDE 75 ML/HR: 0.9 INJECTION, SOLUTION INTRAVENOUS at 11:44

## 2023-06-22 RX ADMIN — HYDROMORPHONE HYDROCHLORIDE 0.5 MG: 1 INJECTION, SOLUTION INTRAMUSCULAR; INTRAVENOUS; SUBCUTANEOUS at 23:33

## 2023-06-22 RX ADMIN — URSODIOL 300 MG: 300 CAPSULE ORAL at 15:33

## 2023-06-22 RX ADMIN — HYDROMORPHONE HYDROCHLORIDE 0.5 MG: 1 INJECTION, SOLUTION INTRAMUSCULAR; INTRAVENOUS; SUBCUTANEOUS at 02:33

## 2023-06-22 RX ADMIN — ONDANSETRON 4 MG: 2 INJECTION INTRAMUSCULAR; INTRAVENOUS at 20:02

## 2023-06-22 RX ADMIN — SIMETHICONE 80 MG: 80 TABLET, CHEWABLE ORAL at 11:41

## 2023-06-22 RX ADMIN — HEPARIN SODIUM 5000 UNITS: 5000 INJECTION INTRAVENOUS; SUBCUTANEOUS at 08:18

## 2023-06-22 RX ADMIN — CALCIUM 1 TABLET: 500 TABLET ORAL at 10:15

## 2023-06-22 RX ADMIN — HYDROMORPHONE HYDROCHLORIDE 0.5 MG: 1 INJECTION, SOLUTION INTRAMUSCULAR; INTRAVENOUS; SUBCUTANEOUS at 06:44

## 2023-06-22 RX ADMIN — HYDROMORPHONE HYDROCHLORIDE 4 MG: 4 TABLET ORAL at 21:14

## 2023-06-22 RX ADMIN — SIMETHICONE 80 MG: 80 TABLET, CHEWABLE ORAL at 21:13

## 2023-06-22 RX ADMIN — HEPARIN SODIUM 5000 UNITS: 5000 INJECTION INTRAVENOUS; SUBCUTANEOUS at 21:14

## 2023-06-22 RX ADMIN — ONDANSETRON 4 MG: 2 INJECTION INTRAMUSCULAR; INTRAVENOUS at 00:47

## 2023-06-22 RX ADMIN — SODIUM CHLORIDE 75 ML/HR: 0.9 INJECTION, SOLUTION INTRAVENOUS at 21:29

## 2023-06-22 RX ADMIN — SIMETHICONE 80 MG: 80 TABLET, CHEWABLE ORAL at 15:33

## 2023-06-22 RX ADMIN — FLUOXETINE 40 MG: 20 CAPSULE ORAL at 06:44

## 2023-06-22 RX ADMIN — SODIUM CHLORIDE 125 ML/HR: 0.9 INJECTION, SOLUTION INTRAVENOUS at 02:36

## 2023-06-22 RX ADMIN — HYDROMORPHONE HYDROCHLORIDE 4 MG: 4 TABLET ORAL at 15:32

## 2023-06-22 RX ADMIN — URSODIOL 300 MG: 300 CAPSULE ORAL at 06:47

## 2023-06-22 RX ADMIN — URSODIOL 300 MG: 300 CAPSULE ORAL at 21:15

## 2023-06-22 RX ADMIN — TRAZODONE HYDROCHLORIDE 50 MG: 50 TABLET ORAL at 21:14

## 2023-06-22 RX ADMIN — TRAMADOL HYDROCHLORIDE 100 MG: 50 TABLET, COATED ORAL at 11:41

## 2023-06-22 NOTE — PROGRESS NOTES
"Progress Note - Nephrology   Jeana Mclean 60 y o  female MRN: [id number]  Unit/Bed#: [facility] 908-01 Encounter: [id number]    ASSESSMENT AND PLAN:  1   CKD 3A: Status post right nephrectomy for renal cell CA  - Baseline approximately 1 0  - Current creatinine close to baseline at 1 14  - Electrolytes all normal    Recommend:  - IV fluids but decrease rate  - Hold losartan for now potentially restart in the next 1 to 2 days  - Avoid nephrotoxic agents such as NSAIDs and contrast if possible: In particular avoiding ibuprofen for now    2  Acceptable at this time at 131/75 hold losartan potentially restart in a m     3   Status post right nephrectomy for renal cell CA    4  Other medical problems:  - Primary biliary cirrhosis followed by GI  - 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: Current hemoglobin 10 3 acceptable we will check iron studies        Subjective:   Patient is complaining of significant pain around the surgical site  Some mild nausea no vomiting  No diarrhea  No chest pain or shortness of breath  Tierney catheter in place    Objective:     Vitals: Blood pressure 131/75, pulse 61, temperature 97 7 °F (36 5 °C), resp  rate 20, height 5' 9\" (1 753 m), weight 72 6 kg (160 lb), SpO2 96 %  ,Body mass index is 23 63 kg/m²  Weight (last 2 days)     Date/Time Weight    06/21/23 0731 72 6 (160)            Intake/Output Summary (Last 24 hours) at 6/22/2023 0904  Last data filed at 6/22/2023 0830  Gross per 24 hour   Intake 4241 67 ml   Output 905 ml   Net 3336 67 ml       Urethral Catheter Non-latex;Straight-tip 16 Fr   (Active)   Tierney Care Done 06/21/23 2100   Collection Container Standard drainage bag 06/21/23 1300   Securement Method Securing device (Describe) 06/21/23 1300   Output (mL) 100 mL 06/22/23 0830       Physical Exam: General:  No acute distress  Skin:  No acute rash  Eyes:  No scleral icterus and noninjected  ENT:  Moist mucous membranes  Neck:  Supple, no jugular venous " distention, trachea midline, overall appearance is normal  Chest:  Clear to auscultation  CVS:  Regular rate and rhythm, without a rub or gallops  Abdomen: Soft some tenderness over the right upper quadrant incision site but it looks clean and dry  Extremities:  No edema, and no cyanosis, no significant arthritic changes  Neuro:  No gross focality  Psych:  Alert and oriented and appropriate                Medications:    Scheduled Meds:  Current Facility-Administered Medications   Medication Dose Route Frequency Provider Last Rate   • calcium carbonate  1 tablet Oral Once Roro Darnell PA-C     • diphenhydrAMINE  25 mg Oral Q6H PRN Roro Darnell PA-C     • docusate sodium  100 mg Oral BID Sandra Chong MD     • FLUoxetine  40 mg Oral Daily Sandra Chong MD     • fluticasone  2 spray Each Nare Daily Sandra Chong MD     • heparin (porcine)  5,000 Units Subcutaneous Q12H [address] Sandra Chong MD     • HYDROmorphone  0 5 mg Intravenous Q2H PRN Sandra Chong MD     • lactated ringers  1,000 mL Intravenous Once PRN Sandra Chong MD      And   • lactated ringers  1,000 mL Intravenous Once PRN Sandra Chong MD     • LORazepam  0 5 mg Intravenous Q6H PRN OLIVIA Carlisle     • ondansetron  4 mg Intravenous Q6H PRN Sandra Chong MD     • sodium chloride  1,000 mL Intravenous Once PRN Sandra Chong MD      And   • sodium chloride  1,000 mL Intravenous Once PRN Sandra Chong MD     • sodium chloride  125 mL/hr Intravenous Continuous Sandra Chong  mL/hr (06/22/23 0236)   • traMADol  100 mg Oral Q6H PRN Roro Darnell PA-C     • traMADol  50 mg Oral Q6H PRN Roro Darnell PA-C     • traZODone  50 mg Oral HS PRN Sandra Chong MD     • ursodiol  300 mg Oral TID Sandra Chong MD         PRN Meds: •  diphenhydrAMINE  •  HYDROmorphone  •  lactated ringers **AND** lactated ringers  • "LORazepam  •  ondansetron  •  sodium chloride **AND** sodium chloride  •  traMADol  •  traMADol  •  traZODone    Continuous Infusions:sodium chloride, 125 mL/hr, Last Rate: 125 mL/hr (06/22/23 0236)        Lab, Imaging and other studies: I have personally reviewed pertinent labs  Laboratory Results:  Results from last 7 days   Lab Units 06/22/23  0514 06/21/23  1412 06/21/23  1115   WBC Thousand/uL 8 32  --  5 10   HEMOGLOBIN g/dL 10 3*  --  10 4*   HEMATOCRIT % 32 8*  --  32 5*   PLATELETS Thousands/uL 167 166 154   POTASSIUM mmol/L 4 1  --  3 5   CHLORIDE mmol/L 110*  --  113*   CO2 mmol/L 25  --  26   BUN mg/dL 16  --  17   CREATININE mg/dL 1 14  --  0 98   CALCIUM mg/dL 7 7*  --  8 1*     Urinalysis:   Lab Results   Component Value Date    COLORU Yellow 05/13/2023    COLORU Yellow 05/09/2015    CLARITYU Clear 05/13/2023    CLARITYU Clear 05/09/2015    SPECGRAV 1 016 05/13/2023    SPECGRAV 1 015 05/09/2015    PHUR 5 5 05/13/2023    PHUR 7 0 05/09/2015    LEUKOCYTESUR Small (A) 05/13/2023    LEUKOCYTESUR Trace (A) 05/09/2015    NITRITE Negative 05/13/2023    NITRITE Negative 05/09/2015    PROTEINUA Negative 05/09/2015    GLUCOSEU Negative 05/13/2023    GLUCOSEU Negative 05/09/2015    KETONESU Negative 05/13/2023    KETONESU Negative 05/09/2015    BILIRUBINUR Negative 05/13/2023    BILIRUBINUR Negative 05/09/2015    BLOODU Trace (A) 05/13/2023    BLOODU Negative 05/09/2015     ABGs: No results found for: \"PH\"  Radiology review:     Portions of the record may have been created with voice recognition software  Occasional wrong word or \"sound a like\" substitutions may have occurred due to the inherent limitations of voice recognition software  Read the chart carefully and recognize, using context, where substitutions have occurred                      "

## 2023-06-22 NOTE — QUICK NOTE
Reevaluated patient this afternoon with continued severe pain  Discontinue tramadol we will escalate pain meds to 2 mg Dilaudid for moderate, 4 mg Dilaudid severe and breakthrough 0 5 Dilaudid every 4 hours  Will de-escalate medications as pain improves  Hopefully patient will be discharged on home dose tramadol at time of discharge  If pain continues to be uncontrolled will contact acute pain services for assistance with management        Israel Penny PA-C

## 2023-06-22 NOTE — PLAN OF CARE
Problem: SAFETY ADULT  Goal: Patient will remain free of falls  Description: INTERVENTIONS:  - Educate patient/family on patient safety including physical limitations  - Instruct patient to call for assistance with activity   - Consult OT/PT to assist with strengthening/mobility   - Keep Call bell within reach  - Keep bed low and locked with side rails adjusted as appropriate  - Keep care items and personal belongings within reach  - Initiate and maintain comfort rounds  - Make Fall Risk Sign visible to staff  - Offer Toileting every 2 Hours, in advance of need  - Apply yellow socks and bracelet for high fall risk patients  - Consider moving patient to room near nurses station  Outcome: Progressing

## 2023-06-22 NOTE — CASE MANAGEMENT
Case Management Assessment & Discharge Planning Note    Patient name Adrian King  Location [facility] 908/[facility] 339-61 MRN [id number]  : 1960 Date 2023       Current Admission Date: 2023  Current Admission Diagnosis:Renal cell carcinoma of right kidney [facility])   Patient Active Problem List    Diagnosis Date Noted   • Renal cell carcinoma of right kidney ([address] ) 2023   • Renal mass 05/15/2023   • Pneumonia due to infectious organism 2023   • Multiple myeloma not having achieved remission ([address] ) 2023   • Vitamin B12 deficiency 2021   • Iron deficiency anemia due to chronic blood loss 2021   • Epistaxis 2021   • Other osteoporosis without current pathological fracture 2021   • Closed fracture of one rib of left side with routine healing 2021   • Chest pain 2021   • Osteopenia 2021   • Trigger finger, right middle finger 2020   • Complex regional pain syndrome type 2 of right lower extremity 2020   • Chronic left-sided low back pain without sciatica 02/10/2020   • Left leg pain 02/10/2020   • Complex regional pain syndrome i of right lower limb 2019   • Quadriceps weakness 2019   • PTSD (post-traumatic stress disorder) 2018   • Syncope 2018   • MDD (major depressive disorder), recurrent episode, mild ([address] ) 10/22/2018   • JOSEFINA (generalized anxiety disorder) 10/22/2018   • OCD (obsessive compulsive disorder) 10/22/2018   • Herpes simplex 2018   • Essential hypertension 2018   • Status post total right knee replacement 2018   • Medial epicondylitis of left elbow 2018   • Chronic pain of right knee 2018   • Post-traumatic osteoarthritis of right knee 2018   • Bilateral elbow joint pain 2018   • Lateral epicondylitis of right elbow 2018   • Medial epicondylitis, left elbow 2018   • Vitamin D deficiency 2017   • Cirrhosis ([address] ) 2017   • Smoldering Patient father call in and will like a call back regarding a letter he receive through portal, to schedule an appointment and will like to schedule appointment today.   multiple myeloma (SMM) 06/05/2017   • Chronic pain disorder 05/10/2017   • Hypergammaglobulinemia 12/20/2016   • Peripheral neuropathy 08/29/2016   • Chronic right hip pain 07/21/2016   • Dense breasts 06/08/2016   • Lumbar degenerative disc disease 10/22/2015   • Abnormal mammogram 03/31/2015   • Pruritus 03/31/2015   • [address]roiliitis [address] ) 03/11/2015   • Lumbar radiculopathy 02/17/2015   • Arthritis 12/08/2014   • Primary biliary cholangitis ([facility] [address] [facility] ) 08/01/2014   • Hyperlipidemia 11/16/2013   • Occipital neuralgia 04/19/2013   • Depression with anxiety 10/11/2012   • Hydronephrosis 10/11/2012   • Nephrolithiasis 10/11/2012      LOS (days): 1  Geometric Mean LOS (GMLOS) (days):   Days to GMLOS:     OBJECTIVE:    Risk of Unplanned Readmission Score: 11 58         Current admission status: Inpatient       Preferred Pharmacy:   [id number] [address] [facility], [address] [place] [address],  [facility] [address]  [address] [facility] [address]  Phone: [phone] Fax: [address] [place] [address], [address] [facility] [facility] [address] [address] [place] [address]  Phone: [phone] Fax: [phone]    Primary Care Provider: Mateus Coelho DO    Primary Insurance: MEDICARE  Secondary Insurance: [address] [place] [address]    ASSESSMENT:  Active Health Care Proxies    There are no active Health Care Proxies on file  Advance Directives  Does patient have a [address] [facility] [facility]?: No  Does patient have Advance Directives?: No              Patient Information  Admitted from[de-identified] Home  Mental Status: Alert  During Assessment patient was accompanied by: Not accompanied during assessment  Assessment information provided by[de-identified] Patient  Primary Caregiver: Self  Support Systems: Spouse/significant other, Family members  Home entry access options   Select all that apply : Stairs  Number of steps to enter home : 2  Type of Current Residence: [place]Comment[place]  In the last 12 months, was there a time when you were not able to pay the mortgage or rent on time?: No  In the last 12 months, how many places have you lived?: 1  In the last 12 months, was there a time when you did not have a steady place to sleep or slept in a shelter (including now)?: No  Living Arrangements: Lives Alone    Activities of Daily Living Prior to Admission  Functional Status: Independent  Completes ADLs independently?: Yes  Ambulates independently?: Yes  Does patient use assisted devices?: Yes  Assisted Devices (DME) used: Straight Cane  Does patient currently own DME?: Yes  What DME does the patient currently own?: Straight Cane  Does patient have a history of Outpatient Therapy (PT/OT)?: No  Does the patient have a history of Short-Term Rehab?: No  Does patient have a history of HHC?: No         Patient Information Continued  Income Source: SSI/SSD  Does patient have prescription coverage?: Yes  Within the past 12 months, you worried that your food would run out before you got the money to buy more : Never true  Within the past 12 months, the food you bought just didn't last and you didn't have money to get more : Never true  Does patient receive dialysis treatments?: No  Does patient have a history of substance abuse?: No  Does patient have a history of Mental Health Diagnosis?: Yes (depression/anxiety, follows psychologist)  Is patient receiving treatment for mental health?: Yes  Has patient received inpatient treatment related to mental health in the last 2 years?: No         Means of Transportation  Means of Transport to Appts[de-identified] Drives Self  In the past 12 months, has lack of transportation kept you from medical appointments or from getting medications?: No  In the past 12 months, has lack of transportation kept you from meetings, work, or from getting things needed for daily living?: No        DISCHARGE DETAILS:    Discharge planning discussed with[de-identified] pt  Freedom of Choice: Yes                   Contacts  Patient Contacts: Emilia Fontenot Gene  Relationship to Patient[de-identified] Friend  Contact Method: Phone  Phone Number: [phone]  Reason/Outcome: Continuity of Care, Emergency Contact, Discharge Planning                   Would you like to participate in our [address] service program?  : No - Declined     CM reviewed d/c planning process including the following: identifying help at home, patient preference for d/c planning needs, Discharge Lounge, [facility] Meds to Bed program, availability of treatment team to discuss questions or concerns patient and/or family may have regarding understanding medications and recognizing signs and symptoms once discharged  CM also encouraged patient to follow up with all recommended appointments after discharge  Patient advised of importance for patient and family to participate in managing patient’s medical well being  Patient/caregiver received discharge checklist   Content reviewed  Patient/caregiver encouraged to participate in discharge plan of care prior to discharge home
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
## 2023-06-22 NOTE — PLAN OF CARE
Problem: PAIN - ADULT  Goal: Verbalizes/displays adequate comfort level or baseline comfort level  Description: Interventions:  - Encourage patient to monitor pain and request assistance  - Assess pain using appropriate pain scale  - Administer analgesics based on type and severity of pain and evaluate response  - Implement non-pharmacological measures as appropriate and evaluate response  - Consider cultural and social influences on pain and pain management  - Notify physician/advanced practitioner if interventions unsuccessful or patient reports new pain  Outcome: Progressing     Problem: INFECTION - ADULT  Goal: Absence or prevention of progression during hospitalization  Description: INTERVENTIONS:  - Assess and monitor for signs and symptoms of infection  - Monitor lab/diagnostic results  - Monitor all insertion sites, i e  indwelling lines, tubes, and drains  - Monitor endotracheal if appropriate and nasal secretions for changes in amount and color  - [place] appropriate cooling/warming therapies per order  - Administer medications as ordered  - Instruct and encourage patient and family to use good hand hygiene technique  - Identify and instruct in appropriate isolation precautions for identified infection/condition  Outcome: Progressing     Problem: SAFETY ADULT  Goal: Patient will remain free of falls  Description: INTERVENTIONS:  - Educate patient/family on patient safety including physical limitations  - Instruct patient to call for assistance with activity   - Consult OT/PT to assist with strengthening/mobility   - Keep Call bell within reach  - Keep bed low and locked with side rails adjusted as appropriate  - Keep care items and personal belongings within reach  - Initiate and maintain comfort rounds  - Make Fall Risk Sign visible to staff  - Apply yellow socks and bracelet for high fall risk patients  - Consider moving patient to room near nurses station  Outcome: Progressing     Problem: DISCHARGE PLANNING  Goal: Discharge to home or other facility with appropriate resources  Description: INTERVENTIONS:  - Identify barriers to discharge w/patient and caregiver  - Arrange for needed discharge resources and transportation as appropriate  - Identify discharge learning needs (meds, wound care, etc )  - Arrange for interpretive services to assist at discharge as needed  - Refer to Case Management Department for coordinating discharge planning if the patient needs post-hospital services based on physician/advanced practitioner order or complex needs related to functional status, cognitive ability, or social support system  Outcome: Progressing

## 2023-06-23 ENCOUNTER — APPOINTMENT (OUTPATIENT)
Dept: RADIOLOGY | Facility: HOSPITAL | Age: 63
DRG: 657 | End: 2023-06-23
Payer: MEDICARE

## 2023-06-23 ENCOUNTER — APPOINTMENT (INPATIENT)
Dept: NON INVASIVE DIAGNOSTICS | Facility: HOSPITAL | Age: 63
DRG: 657 | End: 2023-06-23
Payer: MEDICARE

## 2023-06-23 LAB
ANION GAP SERPL CALCULATED.3IONS-SCNC: 2 MMOL/L
AORTIC ROOT: 2.7 CM
APICAL FOUR CHAMBER EJECTION FRACTION: 53 %
ASCENDING AORTA: 3.4 CM
AV LVOT MEAN GRADIENT: 1 MMHG
AV LVOT PEAK GRADIENT: 3 MMHG
BASOPHILS # BLD AUTO: 0.02 THOUSANDS/ÂΜL (ref 0–0.1)
BASOPHILS NFR BLD AUTO: 0 % (ref 0–1)
BUN SERPL-MCNC: 8 MG/DL (ref 5–25)
CALCIUM SERPL-MCNC: 8.3 MG/DL (ref 8.3–10.1)
CHLORIDE SERPL-SCNC: 107 MMOL/L (ref 96–108)
CO2 SERPL-SCNC: 25 MMOL/L (ref 21–32)
CREAT SERPL-MCNC: 0.9 MG/DL (ref 0.6–1.3)
DOP CALC LVOT PEAK VEL VTI: 15.64 CM
DOP CALC LVOT PEAK VEL: 0.83 M/S
E WAVE DECELERATION TIME: 225 MS
EOSINOPHIL # BLD AUTO: 0.08 THOUSAND/ÂΜL (ref 0–0.61)
EOSINOPHIL NFR BLD AUTO: 1 % (ref 0–6)
ERYTHROCYTE [DISTWIDTH] IN BLOOD BY AUTOMATED COUNT: 12.8 % (ref 11.6–15.1)
FERRITIN SERPL-MCNC: 143 NG/ML (ref 11–307)
FRACTIONAL SHORTENING: 32 (ref 28–44)
GFR SERPL CREATININE-BSD FRML MDRD: 68 ML/MIN/1.73SQ M
GLUCOSE SERPL-MCNC: 96 MG/DL (ref 65–140)
HCT VFR BLD AUTO: 35.8 % (ref 34.8–46.1)
HGB BLD-MCNC: 11 G/DL (ref 11.5–15.4)
IMM GRANULOCYTES # BLD AUTO: 0.03 THOUSAND/UL (ref 0–0.2)
IMM GRANULOCYTES NFR BLD AUTO: 0 % (ref 0–2)
INTERVENTRICULAR SEPTUM IN DIASTOLE (PARASTERNAL SHORT AXIS VIEW): 0.8 CM
INTERVENTRICULAR SEPTUM: 0.8 CM (ref 0.6–1.1)
IRON SATN MFR SERPL: 15 % (ref 15–50)
IRON SERPL-MCNC: 64 UG/DL (ref 50–170)
LAAS-AP4: 13.9 CM2
LEFT ATRIUM SIZE: 3.3 CM
LEFT INTERNAL DIMENSION IN SYSTOLE: 3.6 CM (ref 2.1–4)
LEFT VENTRICULAR INTERNAL DIMENSION IN DIASTOLE: 5.3 CM (ref 3.5–6)
LEFT VENTRICULAR POSTERIOR WALL IN END DIASTOLE: 0.8 CM
LEFT VENTRICULAR STROKE VOLUME: 79 ML
LVSV (TEICH): 79 ML
LYMPHOCYTES # BLD AUTO: 1.56 THOUSANDS/ÂΜL (ref 0.6–4.47)
LYMPHOCYTES NFR BLD AUTO: 20 % (ref 14–44)
MAGNESIUM SERPL-MCNC: 2.1 MG/DL (ref 1.6–2.6)
MCH RBC QN AUTO: 29.8 PG (ref 26.8–34.3)
MCHC RBC AUTO-ENTMCNC: 30.7 G/DL (ref 31.4–37.4)
MCV RBC AUTO: 97 FL (ref 82–98)
MONOCYTES # BLD AUTO: 0.47 THOUSAND/ÂΜL (ref 0.17–1.22)
MONOCYTES NFR BLD AUTO: 6 % (ref 4–12)
MV PEAK A VEL: 0.63 M/S
MV PEAK E VEL: 61 CM/S
MV STENOSIS PRESSURE HALF TIME: 65 MS
MV VALVE AREA P 1/2 METHOD: 3.38
NEUTROPHILS # BLD AUTO: 5.6 THOUSANDS/ÂΜL (ref 1.85–7.62)
NEUTS SEG NFR BLD AUTO: 73 % (ref 43–75)
NRBC BLD AUTO-RTO: 0 /100 WBCS
PLATELET # BLD AUTO: 155 THOUSANDS/UL (ref 149–390)
PMV BLD AUTO: 10.1 FL (ref 8.9–12.7)
POTASSIUM SERPL-SCNC: 4 MMOL/L (ref 3.5–5.3)
RBC # BLD AUTO: 3.69 MILLION/UL (ref 3.81–5.12)
RIGHT ATRIUM AREA SYSTOLE A4C: 12.8 CM2
RIGHT VENTRICLE ID DIMENSION: 3.6 CM
SL CV LV EF: 55
SL CV PED ECHO LEFT VENTRICLE DIASTOLIC VOLUME (MOD BIPLANE) 2D: 133 ML
SL CV PED ECHO LEFT VENTRICLE SYSTOLIC VOLUME (MOD BIPLANE) 2D: 54 ML
SODIUM SERPL-SCNC: 134 MMOL/L (ref 135–147)
TIBC SERPL-MCNC: 424 UG/DL (ref 250–450)
TR MAX PG: 23 MMHG
TR PEAK VELOCITY: 2.4 M/S
TRICUSPID ANNULAR PLANE SYSTOLIC EXCURSION: 2 CM
TRICUSPID VALVE PEAK REGURGITATION VELOCITY: 2.4 M/S
WBC # BLD AUTO: 7.76 THOUSAND/UL (ref 4.31–10.16)

## 2023-06-23 PROCEDURE — 82728 ASSAY OF FERRITIN: CPT | Performed by: INTERNAL MEDICINE

## 2023-06-23 PROCEDURE — 80048 BASIC METABOLIC PNL TOTAL CA: CPT | Performed by: UROLOGY

## 2023-06-23 PROCEDURE — 99024 POSTOP FOLLOW-UP VISIT: CPT | Performed by: UROLOGY

## 2023-06-23 PROCEDURE — 99223 1ST HOSP IP/OBS HIGH 75: CPT | Performed by: STUDENT IN AN ORGANIZED HEALTH CARE EDUCATION/TRAINING PROGRAM

## 2023-06-23 PROCEDURE — 83550 IRON BINDING TEST: CPT | Performed by: INTERNAL MEDICINE

## 2023-06-23 PROCEDURE — 83540 ASSAY OF IRON: CPT | Performed by: INTERNAL MEDICINE

## 2023-06-23 PROCEDURE — 71045 X-RAY EXAM CHEST 1 VIEW: CPT

## 2023-06-23 PROCEDURE — 93306 TTE W/DOPPLER COMPLETE: CPT

## 2023-06-23 PROCEDURE — 93306 TTE W/DOPPLER COMPLETE: CPT | Performed by: INTERNAL MEDICINE

## 2023-06-23 PROCEDURE — 97163 PT EVAL HIGH COMPLEX 45 MIN: CPT

## 2023-06-23 PROCEDURE — 83735 ASSAY OF MAGNESIUM: CPT | Performed by: INTERNAL MEDICINE

## 2023-06-23 PROCEDURE — 99232 SBSQ HOSP IP/OBS MODERATE 35: CPT | Performed by: INTERNAL MEDICINE

## 2023-06-23 PROCEDURE — NC001 PR NO CHARGE: Performed by: NURSE PRACTITIONER

## 2023-06-23 PROCEDURE — 85025 COMPLETE CBC W/AUTO DIFF WBC: CPT | Performed by: INTERNAL MEDICINE

## 2023-06-23 PROCEDURE — 97167 OT EVAL HIGH COMPLEX 60 MIN: CPT

## 2023-06-23 RX ORDER — GLYCOPYRROLATE 0.2 MG/ML
0.2 INJECTION INTRAMUSCULAR; INTRAVENOUS EVERY 4 HOURS PRN
Status: DISCONTINUED | OUTPATIENT
Start: 2023-06-23 | End: 2023-06-23

## 2023-06-23 RX ORDER — LIDOCAINE 50 MG/G
2 PATCH TOPICAL DAILY
Status: DISCONTINUED | OUTPATIENT
Start: 2023-06-23 | End: 2023-06-25 | Stop reason: HOSPADM

## 2023-06-23 RX ORDER — POLYETHYLENE GLYCOL 3350 17 G/17G
17 POWDER, FOR SOLUTION ORAL DAILY
Status: DISCONTINUED | OUTPATIENT
Start: 2023-06-23 | End: 2023-06-25 | Stop reason: HOSPADM

## 2023-06-23 RX ORDER — ACETAMINOPHEN 325 MG/1
975 TABLET ORAL EVERY 8 HOURS
Status: DISCONTINUED | OUTPATIENT
Start: 2023-06-23 | End: 2023-06-23

## 2023-06-23 RX ORDER — HALOPERIDOL 5 MG/ML
2 INJECTION INTRAMUSCULAR
Status: DISCONTINUED | OUTPATIENT
Start: 2023-06-23 | End: 2023-06-23

## 2023-06-23 RX ORDER — TRAZODONE HYDROCHLORIDE 50 MG/1
50 TABLET ORAL
Status: DISCONTINUED | OUTPATIENT
Start: 2023-06-23 | End: 2023-06-25 | Stop reason: HOSPADM

## 2023-06-23 RX ORDER — METHOCARBAMOL 500 MG/1
500 TABLET, FILM COATED ORAL EVERY 6 HOURS PRN
Status: DISCONTINUED | OUTPATIENT
Start: 2023-06-23 | End: 2023-06-23

## 2023-06-23 RX ORDER — BISACODYL 10 MG
10 SUPPOSITORY, RECTAL RECTAL ONCE
Status: COMPLETED | OUTPATIENT
Start: 2023-06-23 | End: 2023-06-23

## 2023-06-23 RX ORDER — LORAZEPAM 2 MG/ML
0.5 INJECTION INTRAMUSCULAR EVERY 4 HOURS PRN
Status: DISCONTINUED | OUTPATIENT
Start: 2023-06-23 | End: 2023-06-23

## 2023-06-23 RX ORDER — METHOCARBAMOL 500 MG/1
500 TABLET, FILM COATED ORAL EVERY 6 HOURS SCHEDULED
Status: DISCONTINUED | OUTPATIENT
Start: 2023-06-23 | End: 2023-06-25 | Stop reason: HOSPADM

## 2023-06-23 RX ORDER — LOSARTAN POTASSIUM 25 MG/1
25 TABLET ORAL DAILY
Status: DISCONTINUED | OUTPATIENT
Start: 2023-06-24 | End: 2023-06-24

## 2023-06-23 RX ORDER — LOSARTAN POTASSIUM 50 MG/1
50 TABLET ORAL DAILY
Status: DISCONTINUED | OUTPATIENT
Start: 2023-06-23 | End: 2023-06-23

## 2023-06-23 RX ORDER — FERROUS SULFATE 325(65) MG
325 TABLET ORAL
Status: DISCONTINUED | OUTPATIENT
Start: 2023-06-24 | End: 2023-06-25 | Stop reason: HOSPADM

## 2023-06-23 RX ORDER — FUROSEMIDE 10 MG/ML
40 INJECTION INTRAMUSCULAR; INTRAVENOUS ONCE
Status: COMPLETED | OUTPATIENT
Start: 2023-06-23 | End: 2023-06-23

## 2023-06-23 RX ADMIN — FLUOXETINE 40 MG: 20 CAPSULE ORAL at 05:35

## 2023-06-23 RX ADMIN — LIDOCAINE 2 PATCH: 50 PATCH CUTANEOUS at 15:34

## 2023-06-23 RX ADMIN — BISACODYL 10 MG: 10 SUPPOSITORY RECTAL at 12:27

## 2023-06-23 RX ADMIN — HYDROMORPHONE HYDROCHLORIDE 4 MG: 4 TABLET ORAL at 22:35

## 2023-06-23 RX ADMIN — SIMETHICONE 80 MG: 80 TABLET, CHEWABLE ORAL at 09:30

## 2023-06-23 RX ADMIN — METHOCARBAMOL TABLETS 500 MG: 500 TABLET, COATED ORAL at 17:53

## 2023-06-23 RX ADMIN — HYDROMORPHONE HYDROCHLORIDE 4 MG: 4 TABLET ORAL at 05:35

## 2023-06-23 RX ADMIN — SIMETHICONE 80 MG: 80 TABLET, CHEWABLE ORAL at 22:36

## 2023-06-23 RX ADMIN — METHOCARBAMOL TABLETS 500 MG: 500 TABLET, COATED ORAL at 11:03

## 2023-06-23 RX ADMIN — SIMETHICONE 80 MG: 80 TABLET, CHEWABLE ORAL at 15:57

## 2023-06-23 RX ADMIN — HEPARIN SODIUM 5000 UNITS: 5000 INJECTION INTRAVENOUS; SUBCUTANEOUS at 09:30

## 2023-06-23 RX ADMIN — POLYETHYLENE GLYCOL 3350 17 G: 17 POWDER, FOR SOLUTION ORAL at 12:27

## 2023-06-23 RX ADMIN — SIMETHICONE 80 MG: 80 TABLET, CHEWABLE ORAL at 12:27

## 2023-06-23 RX ADMIN — TRAZODONE HYDROCHLORIDE 50 MG: 50 TABLET ORAL at 22:36

## 2023-06-23 RX ADMIN — ONDANSETRON 4 MG: 2 INJECTION INTRAMUSCULAR; INTRAVENOUS at 05:41

## 2023-06-23 RX ADMIN — URSODIOL 300 MG: 300 CAPSULE ORAL at 15:57

## 2023-06-23 RX ADMIN — DOCUSATE SODIUM 100 MG: 100 CAPSULE, LIQUID FILLED ORAL at 17:53

## 2023-06-23 RX ADMIN — SENNOSIDES 8.6 MG: 8.6 TABLET, FILM COATED ORAL at 22:36

## 2023-06-23 RX ADMIN — URSODIOL 300 MG: 300 CAPSULE ORAL at 09:30

## 2023-06-23 RX ADMIN — FUROSEMIDE 40 MG: 10 INJECTION, SOLUTION INTRAMUSCULAR; INTRAVENOUS at 12:27

## 2023-06-23 RX ADMIN — URSODIOL 300 MG: 300 CAPSULE ORAL at 22:36

## 2023-06-23 RX ADMIN — HEPARIN SODIUM 5000 UNITS: 5000 INJECTION INTRAVENOUS; SUBCUTANEOUS at 22:35

## 2023-06-23 RX ADMIN — SODIUM CHLORIDE 75 ML/HR: 0.9 INJECTION, SOLUTION INTRAVENOUS at 11:01

## 2023-06-23 NOTE — PROGRESS NOTES
UROLOGY PROGRESS NOTE   Patient Identifiers: Avril Mathews (MRN [id number])  Date of Service: 6/23/2023        Assessment:   Patient is postoperative day 2 status post right laparoscopic nephrectomy    Plan:     Patient personally seen and examined this morning  Have concerns about the patient's respiratory status  Patient is having significant pain control issues that are precluding her from pulmonary toilet deep breathing, incentive spirometry or flutter valve use  Patient reports a recent history of pneumonia in May  Chest x-ray was performed this morning and I have concerns about potential consolidation versus atelectasis of the right lower lobe  Initially, we were considering CT angiogram to rule out pulmonary embolism and reassess for underlying pneumonia  However, nephrology has since seen the patient and restarted her ARB and given a dose of Lasix to diurese  Because of this, additional renal toxic contrast would be contraindicated  We will closely follow and consider CT noncontrast of the chest tomorrow if continued concern  Appreciate the input of the nephrology service given her recent nephron loss  Consultation to the acute pain service is appreciated as the patient has pre-existing regional pain syndrome and outpatient need for tramadol  Inability to control pain in the postoperative period has limited ambulation and pulmonary toilet and continued use of narcotics appears to be leading towards additional risk of ileus  Appreciate the regimen of narcotics as well as muscle relaxant and Lidoderm recommendations  Agree that local regional block may be indicated if the patient is not improving  Continue stool softeners in the form of Colace and senna with the addition of MiraLAX  Physical and Occupational Therapy have been involved although again patient's ability to work with them has been limited by pain control      Case discussed with Dr Carla Robles who is aware of the patient's condition  Subjective:     24 HR EVENTS:   Continues to have significant issues with pain control  Patient not able to take deep breaths or perform incentive spirometry  Not yet having flatus        Objective:     VITALS:    Vitals:    06/23/23 1529   BP: 149/84   Pulse: 69   Resp:    Temp: (!) 97 4 °F (36 3 °C)   SpO2: 95%       LABS:  Lab Results   Component Value Date    HGB 11 0 (L) 06/23/2023    HCT 35 8 06/23/2023    WBC 7 76 06/23/2023     06/23/2023   ]    Lab Results   Component Value Date     12/29/2015    K 4 0 06/23/2023     06/23/2023    CO2 25 06/23/2023    BUN 8 06/23/2023    CREATININE 0 90 06/23/2023    CALCIUM 8 3 06/23/2023    GLUCOSE 134 12/29/2015   ]    INPATIENT MEDS:    Current Facility-Administered Medications:   •  diphenhydrAMINE (BENADRYL) tablet 25 mg, 25 mg, Oral, Q6H PRN, Alena Chao PA-C, 25 mg at 06/21/23 1413  •  docusate sodium (COLACE) capsule 100 mg, 100 mg, Oral, BID, Judi Mcnamara MD  •  [START ON 6/24/2023] ferrous sulfate tablet 325 mg, 325 mg, Oral, Daily With Breakfast, Dilia Zapata MD  •  FLUoxetine (PROzac) capsule 40 mg, 40 mg, Oral, Daily, Judi Mcnamara MD, 40 mg at 06/23/23 0535  •  fluticasone (FLONASE) 50 mcg/act nasal spray 2 spray, 2 spray, Each Nare, Daily, Judi Mcnamara MD  •  heparin (porcine) subcutaneous injection 5,000 Units, 5,000 Units, Subcutaneous, Q12H [address], 5,000 Units at 06/23/23 0930 **AND** [COMPLETED] Platelet count, , , Once, Judi Mcnamara MD  •  HYDROmorphone (DILAUDID) injection 0 5 mg, 0 5 mg, Intravenous, Q3H PRN, Alena Chao PA-C, 0 5 mg at 06/22/23 2333  •  HYDROmorphone (DILAUDID) tablet 2 mg, 2 mg, Oral, Q4H PRN, Alena Chao PA-C  •  HYDROmorphone (DILAUDID) tablet 4 mg, 4 mg, Oral, Q4H PRN, Alena Chao PA-C, 4 mg at 06/23/23 0535  •  lidocaine (LIDODERM) 5 % patch 2 patch, 2 patch, Topical, Daily, Bright Bere Ulloa MD, 2 patch at 06/23/23 "1534  •  LORazepam (ATIVAN) injection 0 5 mg, 0 5 mg, Intravenous, Q6H PRN, OLIVIA Mcmillan, 0 5 mg at 23 2109  •  [START ON 2023] losartan (COZAAR) tablet 25 mg, 25 mg, Oral, Daily, Luba Saleh MD  •  methocarbamol (ROBAXIN) tablet 500 mg, 500 mg, Oral, Q6H [address], Bright Bere Sugar Donaldson MD  •  ondansetron [facility]) injection 4 mg, 4 mg, Intravenous, Q6H PRN, Marleny Gonzáles MD, 4 mg at 23 0541  •  polyethylene glycol (MIRALAX) packet 17 g, 17 g, Oral, Daily, Sarkis Ricks MD, 17 g at 23 1227  •  senna (SENOKOT) tablet 8 6 mg, 1 tablet, Oral, HS, Amanda Camarena PA-C  •  simethicone (MYLICON) chewable tablet 80 mg, 80 mg, Oral, 4x Daily (with meals and at bedtime), Amanda Camarena PA-C, 80 mg at 23 1557  •  traZODone (DESYREL) tablet 50 mg, 50 mg, Oral, HS, Greig Apgar, CRNP  •  ursodiol (ACTIGALL) capsule 300 mg, 300 mg, Oral, TID, Marleny Gonzáles MD, 300 mg at 23 1557      Physical Exam:   /84   Pulse 69   Temp (!) 97 4 °F (36 3 °C)   Resp 17   Ht 5' 9\" (1 753 m)   Wt 72 6 kg (160 lb)   LMP  (LMP Unknown)   SpO2 95%   BMI 23 63 kg/m²   GEN: Patient uncomfortable, rapid shallow breathing  RESP: tachypnic  CV: Left lower extremity with mild peripheral edema, unable to examine right lower extremity given history of regional pain syndrome  ABD: distended  INCISION: clean dry and intact    RADIOLOGY:   23  CHEST     INDICATION:   Shortness of breath tubular breath sounds rule out CHF      COMPARISON: 2023     EXAM PERFORMED/VIEWS:  XR CHEST PORTABLE        FINDINGS:     Cardiomediastinal silhouette appears unremarkable  There is suboptimal inspiratory effort  There is a left basal density likely reflecting pleural effusion and/or parenchymal consolidation  There are right infrahilar airspace opacities, possibly hypoventilatory due to poor inspiratory effort   No pneumothorax is noted      No acute osseous abnormality " identified within limitations of portable radiography,     IMPRESSION:     No acute cardiopulmonary disease  Suboptimal inspiratory effort  Left basilar density, likely small pleural effusion and/or parenchymal consolidation  Right infrahilar opacities, possibly hypoventilatory but nonspecific
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
## 2023-06-23 NOTE — CASE MANAGEMENT
Case Management Discharge Planning Note    Patient name Karon Sampson  Location [facility] 908/[facility] 517-67 MRN [id number]  : 1960 Date 2023       Current Admission Date: 2023  Current Admission Diagnosis:Renal cell carcinoma of right kidney [facility])   Patient Active Problem List    Diagnosis Date Noted   • Renal cell carcinoma of right kidney ([facility] [address] ) 2023   • Renal mass 05/15/2023   • Pneumonia due to infectious organism 2023   • Multiple myeloma not having achieved remission ([facility] [address] ) 2023   • Vitamin B12 deficiency 2021   • Iron deficiency anemia due to chronic blood loss 2021   • Epistaxis 2021   • Other osteoporosis without current pathological fracture 2021   • Closed fracture of one rib of left side with routine healing 2021   • Chest pain 2021   • Osteopenia 2021   • Trigger finger, right middle finger 2020   • Complex regional pain syndrome type 2 of right lower extremity 2020   • Chronic left-sided low back pain without sciatica 02/10/2020   • Left leg pain 02/10/2020   • Complex regional pain syndrome i of right lower limb 2019   • Quadriceps weakness 2019   • PTSD (post-traumatic stress disorder) 2018   • Syncope 2018   • MDD (major depressive disorder), recurrent episode, mild ([facility] [address] ) 10/22/2018   • JOSEFINA (generalized anxiety disorder) 10/22/2018   • OCD (obsessive compulsive disorder) 10/22/2018   • Herpes simplex 2018   • Essential hypertension 2018   • Status post total right knee replacement 2018   • Medial epicondylitis of left elbow 2018   • Chronic pain of right knee 2018   • Post-traumatic osteoarthritis of right knee 2018   • Bilateral elbow joint pain 2018   • Lateral epicondylitis of right elbow 2018   • Medial epicondylitis, left elbow 2018   • Vitamin D deficiency 2017   • Cirrhosis ([facility] [address] ) 2017   • 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   • Chronic pain disorder 05/10/2017   • Hypergammaglobulinemia 12/20/2016   • Peripheral neuropathy 08/29/2016   • Chronic right hip pain 07/21/2016   • Dense breasts 06/08/2016   • Lumbar degenerative disc disease 10/22/2015   • Abnormal mammogram 03/31/2015   • Pruritus 03/31/2015   • Sacroiliitis ([facility] [address] ) 03/11/2015   • Lumbar radiculopathy 02/17/2015   • Arthritis 12/08/2014   • Primary biliary cholangitis ([facility] [address] ) 08/01/2014   • Hyperlipidemia 11/16/2013   • Occipital neuralgia 04/19/2013   • Depression with anxiety 10/11/2012   • Hydronephrosis 10/11/2012   • Nephrolithiasis 10/11/2012      LOS (days): 2  Geometric Mean LOS (GMLOS) (days):   Days to GMLOS:     OBJECTIVE:  Risk of Unplanned Readmission Score: 10 87         Current admission status: Inpatient   Preferred Pharmacy:   [id number]    [address] [place] [address]  Phone: [phone] Fax: [address] [place] [address] [address] [facility] [address] [place] [address] [facility] [address] [address]  Phone: [phone] Fax: [phone]    Primary Care Provider: Mary Jane Reed DO    Primary Insurance: MEDICARE  Secondary Insurance: [address] [place] [address]    DISCHARGE DETAILS:    Discussed HC with pt for therapy, declined
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
## 2023-06-23 NOTE — PROGRESS NOTES
"Progress Note - Nephrology   Maynor Zimmer 60 y o  female MRN: [id number]  Unit/Bed#: [facility] 908-01 Encounter: [id number]    ASSESSMENT AND PLAN:  1   CKD 3A: Status post right nephrectomy for renal cell CA  - Baseline approximately 1 0  - Current creatinine at 0 90 at baseline despite surgery  - Mild hyponatremia we will place on fluid restriction Hep-Lock fluids     Recommend:  - Hep-Lock fluids  - Fluid restriction  - Restart losartan  - Avoid nephrotoxic agents such as NSAIDs and contrast if possible: In particular avoiding ibuprofen for now     2  Hypertension: Slightly elevated possibly part related to pain restart losartan as renal function is back to baseline     3  Status post right nephrectomy for renal cell CA     4   Other medical problems:  - Primary biliary cirrhosis followed by GI  - 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: Current hemoglobin 11 0 acceptable  - Iron studies are slightly low with a saturation of 15% we will give oral iron (had a reaction to IV iron)  - Shortness of breath chest x-ray by my personal review demonstrates increased vascular markings probable mild CHF, atelectasis at the right base  I will give 1 dose of furosemide 40 mg IV and subsequently hold losartan for the moment as we are actively diuresing her restart losartan in a m  I will check an echocardiogram for completeness as well        Subjective:   Patient is complaining of ongoing pain over the incision site with some mild shortness of breath and some mild inspiratory chest pain  Some nausea no vomiting, no diarrhea  Urinating on her own well no gross hematuria as far she is aware of    Objective:     Vitals: Blood pressure 144/75, pulse 61, temperature 98 °F (36 7 °C), resp  rate 17, height 5' 9\" (1 753 m), weight 72 6 kg (160 lb), SpO2 91 %  ,Body mass index is 23 63 kg/m²      Weight (last 2 days)     Date/Time Weight    06/21/23 0731 72 6 (160)            Intake/Output Summary (Last 24 " hours) at 6/23/2023 1103  Last data filed at 6/23/2023 0725  Gross per 24 hour   Intake 0 ml   Output 1275 ml   Net -1275 ml            Physical Exam: General:  No acute distress  Skin:  No acute rash  Eyes:  No scleral icterus and noninjected  ENT:  Moist mucous membranes  Neck:  Supple, no jugular venous distention, trachea midline, overall appearance is normal  Chest:  Clear to auscultation; except mild tubular breath sounds at the bases  CVS:  Regular rate and rhythm, without a rub or gallops  Abdomen:  Normal bowel sounds, soft and tenderness over the right upper quadrant incision site, very minimal distention  Extremities: Mild distal lower extremity edema minimally pitting two thirds of up the pretibial region, and no cyanosis, no significant arthritic changes  Neuro:  No gross focality  Psych:  Alert and oriented and appropriate                Medications:    Scheduled Meds:  Current Facility-Administered Medications   Medication Dose Route Frequency Provider Last Rate   • diphenhydrAMINE  25 mg Oral Q6H PRN Cleve Green PA-C     • docusate sodium  100 mg Oral BID Christine Chance MD     • FLUoxetine  40 mg Oral Daily Christine Chance MD     • fluticasone  2 spray Each Nare Daily Christine Chance MD     • heparin (porcine)  5,000 Units Subcutaneous Q12H [address] Christine Chance MD     • HYDROmorphone  0 5 mg Intravenous Q3H PRN Cleve Green PA-C     • HYDROmorphone  2 mg Oral Q4H PRN Cleve Green PA-C     • HYDROmorphone  4 mg Oral Q4H PRN Cleve Green PA-C     • LORazepam  0 5 mg Intravenous Q6H PRN OLIVIA Covington     • methocarbamol  500 mg Oral Q6H PRN Cleve Green PA-C     • ondansetron  4 mg Intravenous Q6H PRN Christine Chance MD     • senna  1 tablet Oral HS Cleve Green PA-C     • simethicone  80 mg Oral 4x Daily (with meals and at bedtime) Cleve Green PA-C     • sodium chloride  75 mL/hr Intravenous Continuous Sajan Minor, "MD 75 mL/hr (06/23/23 1101)   • traZODone  50 mg Oral HS PRN Ramón Rincon MD     • ursodiol  300 mg Oral TID Ramón Rincon MD         PRN Meds: •  diphenhydrAMINE  •  HYDROmorphone  •  HYDROmorphone  •  HYDROmorphone  •  LORazepam  •  methocarbamol  •  ondansetron  •  traZODone    Continuous Infusions:sodium chloride, 75 mL/hr, Last Rate: 75 mL/hr (06/23/23 1101)        Lab, Imaging and other studies: I have personally reviewed pertinent labs  Laboratory Results:  Results from last 7 days   Lab Units 06/23/23  0644 06/22/23  0514 06/21/23  1412 06/21/23  1115   WBC Thousand/uL 7 76 8 32  --  5 10   HEMOGLOBIN g/dL 11 0* 10 3*  --  10 4*   HEMATOCRIT % 35 8 32 8*  --  32 5*   PLATELETS Thousands/uL 155 167 166 154   POTASSIUM mmol/L 4 0 4 1  --  3 5   CHLORIDE mmol/L 107 110*  --  113*   CO2 mmol/L 25 25  --  26   BUN mg/dL 8 16  --  17   CREATININE mg/dL 0 90 1 14  --  0 98   CALCIUM mg/dL 8 3 7 7*  --  8 1*   MAGNESIUM mg/dL 2 1  --   --   --      Urinalysis:   Lab Results   Component Value Date    COLORU Yellow 05/13/2023    COLORU Yellow 05/09/2015    CLARITYU Clear 05/13/2023    CLARITYU Clear 05/09/2015    SPECGRAV 1 016 05/13/2023    SPECGRAV 1 015 05/09/2015    PHUR 5 5 05/13/2023    PHUR 7 0 05/09/2015    LEUKOCYTESUR Small (A) 05/13/2023    LEUKOCYTESUR Trace (A) 05/09/2015    NITRITE Negative 05/13/2023    NITRITE Negative 05/09/2015    PROTEINUA Negative 05/09/2015    GLUCOSEU Negative 05/13/2023    GLUCOSEU Negative 05/09/2015    KETONESU Negative 05/13/2023    KETONESU Negative 05/09/2015    BILIRUBINUR Negative 05/13/2023    BILIRUBINUR Negative 05/09/2015    BLOODU Trace (A) 05/13/2023    BLOODU Negative 05/09/2015     ABGs: No results found for: \"PH\"  Radiology review:     Portions of the record may have been created with voice recognition software    Occasional wrong word or \"sound a like\" substitutions may have occurred due to the inherent limitations of voice recognition " software  Read the chart carefully and recognize, using context, where substitutions have occurred

## 2023-06-23 NOTE — PLAN OF CARE
Problem: PHYSICAL THERAPY ADULT  Goal: Performs mobility at highest level of function for planned discharge setting  See evaluation for individualized goals  Description: Treatment/Interventions: ADL retraining, Functional transfer training, Elevations, LE strengthening/ROM, Therapeutic exercise, Endurance training, Bed mobility, Gait training, Spoke to nursing, OT          See flowsheet documentation for full assessment, interventions and recommendations  Note: Prognosis: Fair  Problem List: Decreased strength, Decreased range of motion, Decreased endurance, Impaired balance, Decreased mobility, Pain  Assessment: PT orders received and acknowledged  Patient was seen today for high complexity PT evaluation  High complexity evaluation due to Ongoing medical management for primary dx, Decreased activity tolerance compared to baseline, Fall risk, Continuous pulse oximetry monitoring , s/p surgical intervention Patient is a 60 y o  female who was [facility] to [facility] on 6/21/23 with renal cell carcinoma of R kidney  Patient presented to [facility] for [address] assisted nephrectomy and is currently POD #2  Patients current diagnosis/problem list include pneumonia, osteoporosis, syncope   Patient performed functional mobility as described above  Patient displayed increased pain throughout therapy session with all functional mobility today  Patient required increased encouragement to participate in therapy services today  Patient required hands on assist to transfer from supine to sit, but was able to ambulate to bedside recliner without hands on assist  Patient educated about safety in standing in the hospital  Patient reports understanding  Patient currently presents below baseline with limitations in transfers, balance, and gait  Patient will benefit from continued PT services while in hospital in order to address remaining limitations   The patients AM-PAC Basic Mobility Inpatient Short From Raw Score is 19   A Raw score of 19 suggests that the patient may benefit from discharge to home   PT recommendation at this time is for home with HHPT services   Please also refer to the recommendation of the Physical Therapist for safe discharge planning  Barriers to Discharge: Inaccessible home environment     PT Discharge Recommendation: Home with home health rehabilitation (pending progress made in hospital and medical clearance)    See flowsheet documentation for full assessment

## 2023-06-24 ENCOUNTER — ANESTHESIA (INPATIENT)
Dept: SURGERY | Facility: HOSPITAL | Age: 63
DRG: 657 | End: 2023-06-24
Payer: MEDICARE

## 2023-06-24 ENCOUNTER — ANESTHESIA EVENT (INPATIENT)
Dept: SURGERY | Facility: HOSPITAL | Age: 63
DRG: 657 | End: 2023-06-24
Payer: MEDICARE

## 2023-06-24 ENCOUNTER — APPOINTMENT (OUTPATIENT)
Dept: SURGERY | Facility: HOSPITAL | Age: 63
DRG: 657 | End: 2023-06-24
Payer: MEDICARE

## 2023-06-24 LAB
ANION GAP SERPL CALCULATED.3IONS-SCNC: 3 MMOL/L
BUN SERPL-MCNC: 16 MG/DL (ref 5–25)
CALCIUM SERPL-MCNC: 9.2 MG/DL (ref 8.3–10.1)
CHLORIDE SERPL-SCNC: 100 MMOL/L (ref 96–108)
CO2 SERPL-SCNC: 32 MMOL/L (ref 21–32)
CREAT SERPL-MCNC: 1.38 MG/DL (ref 0.6–1.3)
GFR SERPL CREATININE-BSD FRML MDRD: 40 ML/MIN/1.73SQ M
GLUCOSE SERPL-MCNC: 99 MG/DL (ref 65–140)
MAGNESIUM SERPL-MCNC: 2.2 MG/DL (ref 1.6–2.6)
POTASSIUM SERPL-SCNC: 3.2 MMOL/L (ref 3.5–5.3)
SODIUM SERPL-SCNC: 135 MMOL/L (ref 135–147)

## 2023-06-24 PROCEDURE — 99024 POSTOP FOLLOW-UP VISIT: CPT | Performed by: UROLOGY

## 2023-06-24 PROCEDURE — 80048 BASIC METABOLIC PNL TOTAL CA: CPT | Performed by: UROLOGY

## 2023-06-24 PROCEDURE — 99232 SBSQ HOSP IP/OBS MODERATE 35: CPT | Performed by: INTERNAL MEDICINE

## 2023-06-24 PROCEDURE — 83735 ASSAY OF MAGNESIUM: CPT | Performed by: INTERNAL MEDICINE

## 2023-06-24 PROCEDURE — 99232 SBSQ HOSP IP/OBS MODERATE 35: CPT | Performed by: ANESTHESIOLOGY

## 2023-06-24 RX ORDER — POTASSIUM CHLORIDE 20 MEQ/1
40 TABLET, EXTENDED RELEASE ORAL ONCE
Status: COMPLETED | OUTPATIENT
Start: 2023-06-24 | End: 2023-06-24

## 2023-06-24 RX ORDER — BUPIVACAINE HYDROCHLORIDE 2.5 MG/ML
INJECTION, SOLUTION EPIDURAL; INFILTRATION; INTRACAUDAL AS NEEDED
Status: DISCONTINUED | OUTPATIENT
Start: 2023-06-24 | End: 2023-06-24

## 2023-06-24 RX ORDER — MIDAZOLAM HYDROCHLORIDE 2 MG/2ML
INJECTION, SOLUTION INTRAMUSCULAR; INTRAVENOUS AS NEEDED
Status: DISCONTINUED | OUTPATIENT
Start: 2023-06-24 | End: 2023-06-24

## 2023-06-24 RX ORDER — FENTANYL CITRATE 50 UG/ML
INJECTION, SOLUTION INTRAMUSCULAR; INTRAVENOUS AS NEEDED
Status: DISCONTINUED | OUTPATIENT
Start: 2023-06-24 | End: 2023-06-24

## 2023-06-24 RX ADMIN — SIMETHICONE 80 MG: 80 TABLET, CHEWABLE ORAL at 21:37

## 2023-06-24 RX ADMIN — METHOCARBAMOL TABLETS 500 MG: 500 TABLET, COATED ORAL at 06:26

## 2023-06-24 RX ADMIN — METHOCARBAMOL TABLETS 500 MG: 500 TABLET, COATED ORAL at 00:36

## 2023-06-24 RX ADMIN — MIDAZOLAM 2 MG: 1 INJECTION INTRAMUSCULAR; INTRAVENOUS at 09:56

## 2023-06-24 RX ADMIN — FLUOXETINE 40 MG: 20 CAPSULE ORAL at 06:26

## 2023-06-24 RX ADMIN — URSODIOL 300 MG: 300 CAPSULE ORAL at 17:03

## 2023-06-24 RX ADMIN — SENNOSIDES 8.6 MG: 8.6 TABLET, FILM COATED ORAL at 21:37

## 2023-06-24 RX ADMIN — SIMETHICONE 80 MG: 80 TABLET, CHEWABLE ORAL at 17:03

## 2023-06-24 RX ADMIN — METHOCARBAMOL TABLETS 500 MG: 500 TABLET, COATED ORAL at 17:04

## 2023-06-24 RX ADMIN — HYDROMORPHONE HYDROCHLORIDE 4 MG: 4 TABLET ORAL at 08:55

## 2023-06-24 RX ADMIN — LIDOCAINE 2 PATCH: 50 PATCH CUTANEOUS at 08:54

## 2023-06-24 RX ADMIN — DOCUSATE SODIUM 100 MG: 100 CAPSULE, LIQUID FILLED ORAL at 08:50

## 2023-06-24 RX ADMIN — DOCUSATE SODIUM 100 MG: 100 CAPSULE, LIQUID FILLED ORAL at 17:04

## 2023-06-24 RX ADMIN — METHOCARBAMOL TABLETS 500 MG: 500 TABLET, COATED ORAL at 23:19

## 2023-06-24 RX ADMIN — FERROUS SULFATE TAB 325 MG (65 MG ELEMENTAL FE) 325 MG: 325 (65 FE) TAB at 08:44

## 2023-06-24 RX ADMIN — METHOCARBAMOL TABLETS 500 MG: 500 TABLET, COATED ORAL at 11:52

## 2023-06-24 RX ADMIN — SIMETHICONE 80 MG: 80 TABLET, CHEWABLE ORAL at 08:44

## 2023-06-24 RX ADMIN — FENTANYL CITRATE 100 MCG: 50 INJECTION, SOLUTION INTRAMUSCULAR; INTRAVENOUS at 09:56

## 2023-06-24 RX ADMIN — LOSARTAN POTASSIUM 25 MG: 25 TABLET, FILM COATED ORAL at 08:44

## 2023-06-24 RX ADMIN — HEPARIN SODIUM 5000 UNITS: 5000 INJECTION INTRAVENOUS; SUBCUTANEOUS at 21:38

## 2023-06-24 RX ADMIN — TRAZODONE HYDROCHLORIDE 50 MG: 50 TABLET ORAL at 21:37

## 2023-06-24 RX ADMIN — POLYETHYLENE GLYCOL 3350 17 G: 17 POWDER, FOR SOLUTION ORAL at 08:39

## 2023-06-24 RX ADMIN — URSODIOL 300 MG: 300 CAPSULE ORAL at 08:49

## 2023-06-24 RX ADMIN — POTASSIUM CHLORIDE 40 MEQ: 1500 TABLET, EXTENDED RELEASE ORAL at 09:10

## 2023-06-24 RX ADMIN — URSODIOL 300 MG: 300 CAPSULE ORAL at 21:37

## 2023-06-24 RX ADMIN — HEPARIN SODIUM 5000 UNITS: 5000 INJECTION INTRAVENOUS; SUBCUTANEOUS at 09:04

## 2023-06-24 RX ADMIN — BUPIVACAINE HYDROCHLORIDE 50 ML: 2.5 INJECTION, SOLUTION EPIDURAL; INFILTRATION; INTRACAUDAL; PERINEURAL at 10:13

## 2023-06-24 RX ADMIN — SIMETHICONE 80 MG: 80 TABLET, CHEWABLE ORAL at 11:52

## 2023-06-24 NOTE — ANESTHESIA PROCEDURE NOTES
Peripheral Block    Patient location during procedure: holding area  Start time: 6/24/2023 10:13 AM  Reason for block: at surgeon's request and post-op pain management  Staffing  Performed by: Aldo Erazo MD  Authorized by: Aldo Erazo MD    Preanesthetic Checklist  Completed: patient identified, IV checked, site marked, risks and benefits discussed, surgical consent, monitors and equipment checked, pre-op evaluation and timeout performed  Peripheral Block  Patient position: supine (left tilt)  Prep: ChloraPrep  Patient monitoring: continuous pulse ox and frequent blood pressure checks  Anesthesia block type: quadratus lumborum  Laterality: right  Injection technique: single-shot  Procedures: ultrasound guided, Ultrasound guidance required for the procedure to increase accuracy and safety of medication placement and decrease risk of complications  Ultrasound permanent image saved  Needle  Needle type: Stimuplex   Needle gauge: 20 G  Needle length: 4 in  Needle localization: anatomical landmarks and ultrasound guidance  Test dose: negative  Assessment  Injection assessment: incremental injection, local visualized surrounding nerve on ultrasound, negative aspiration for heme and no paresthesia on injection  Paresthesia pain: none  Heart rate change: no  Slow fractionated injection: yes  Post-procedure:  site cleaned  patient tolerated the procedure well with no immediate complications  Additional Notes  Good visualization of injection for QL1 or lateral approach

## 2023-06-24 NOTE — ANESTHESIA PREPROCEDURE EVALUATION
Procedure:  ANESTHESIA PERIPHERAL BLOCK    Relevant Problems   CARDIO   (+) Chest pain   (+) Essential hypertension   (+) Hyperlipidemia      GI/HEPATIC   (+) Cirrhosis (HCC)   (+) Primary biliary cholangitis (HCC)      /RENAL   (+) Hydronephrosis   (+) Nephrolithiasis   (+) Renal cell carcinoma of right kidney (HCC)      HEMATOLOGY   (+) Iron deficiency anemia due to chronic blood loss   (+) Multiple myeloma not having achieved remission (HCC)   (+) Smoldering multiple myeloma (SMM)      MUSCULOSKELETAL   (+) Arthritis   (+) Chronic left-sided low back pain without sciatica   (+) Lateral epicondylitis of right elbow   (+) Lumbar degenerative disc disease   (+) Post-traumatic osteoarthritis of right knee   (+) Sacroiliitis (HCC)      NEURO/PSYCH   (+) Chronic left-sided low back pain without sciatica   (+) Chronic pain disorder   (+) Complex regional pain syndrome i of right lower limb   (+) Complex regional pain syndrome type 2 of right lower extremity   (+) Depression with anxiety   (+) JOSEFINA (generalized anxiety disorder)   (+) MDD (major depressive disorder), recurrent episode, mild (HCC)   (+) OCD (obsessive compulsive disorder)   (+) PTSD (post-traumatic stress disorder)   (+) Quadriceps weakness      PULMONARY   (+) Pneumonia due to infectious organism        Physical Exam    Airway    Mallampati score: II  TM Distance: >3 FB  Neck ROM: full     Dental   No notable dental hx     Cardiovascular  Cardiovascular exam normal    Pulmonary  Pulmonary exam normal     Other Findings        Anesthesia Plan  ASA Score- 3     Anesthesia Type- regional with ASA Monitors  Additional Monitors:   Airway Plan:           Plan Factors-    Chart reviewed  EKG reviewed  Existing labs reviewed  Patient summary reviewed  Induction-     Postoperative Plan-     Informed Consent- Anesthetic plan and risks discussed with patient

## 2023-06-24 NOTE — PROGRESS NOTES
Progress Note - Acute Pain Service    Shamar Lua 60 y o  female MRN: [id number]  Unit/Bed#: [facility] 908-01 Encounter: [id number]      Assessment:   Principal Problem:    Renal cell carcinoma of right kidney ([address] )    Shamar Lua is a 60 y o  female PMHx of RLE CRPS managed with tramadol PRN, PTSD, chronic low back and neck pain, JOSEFINA/MDD, primary biliary cirrhosis, and smoldering myeloma who is s/p right laparoscopic nephrectomy on 6/21 (POD 4)  Pt received a right QL block yesterday  Pt seen in her room: she is awake, alert, conversant  She did say the QL block worked for much of the day yesterday  She did noticed upon awakening that the block was wearing off, and had slight escalation in pain  She was content with its use to help relieve her pain temporarily  She is anticipating discharge home later today  She is aware she will be provided scripts of dilaudid and robaxin  She has not required IV narcotic for over 24 hrs  Plan:   - Continue PO dilaudid 2/4mg q4hr PRN for moderate-severe pain  - Avoid tylenol and NSAID  - Continue robaxin 500 mg PO QID  - continue lidocaine patches  - continue ativan 0 5 mg IV QID PRN anxiety    Bowel Regimen:  - continue miralax, senokot, colace orders    APS will sign off  Thank you for the consult  Pain History  Current pain location(s): surgical incision site  Pain Scale:  0-5/1-0  24 hour history: right QL block placed for pain control, block has worn off, pt is comfortable with current pain regimen and anticipates discharge home later today  Opioid requirement previous 24 hours: no additional narcotics used since QL block       Meds/Allergies   all current active meds have been reviewed, current meds:   Current Facility-Administered Medications   Medication Dose Route Frequency   • diphenhydrAMINE (BENADRYL) tablet 25 mg  25 mg Oral Q6H PRN   • docusate sodium (COLACE) capsule 100 mg  100 mg Oral BID   • ferrous sulfate tablet 325 mg  325 mg Oral Daily With Breakfast   • FLUoxetine (PROzac) capsule 40 mg  40 mg Oral Daily   • fluticasone (FLONASE) 50 mcg/act nasal spray 2 spray  2 spray Each Nare Daily   • heparin (porcine) subcutaneous injection 5,000 Units  5,000 Units Subcutaneous Q12H [address]   • HYDROmorphone (DILAUDID) injection 0 5 mg  0 5 mg Intravenous Q3H PRN   • HYDROmorphone (DILAUDID) tablet 2 mg  2 mg Oral Q4H PRN   • HYDROmorphone (DILAUDID) tablet 4 mg  4 mg Oral Q4H PRN   • lidocaine (LIDODERM) 5 % patch 2 patch  2 patch Topical Daily   • LORazepam (ATIVAN) injection 0 5 mg  0 5 mg Intravenous Q6H PRN   • methocarbamol (ROBAXIN) tablet 500 mg  500 mg Oral Q6H [address]   • ondansetron (ZOFRAN) injection 4 mg  4 mg Intravenous Q6H PRN   • polyethylene glycol (MIRALAX) packet 17 g  17 g Oral Daily   • senna (SENOKOT) tablet 8 6 mg  1 tablet Oral HS   • simethicone (MYLICON) chewable tablet 80 mg  80 mg Oral 4x Daily (with meals and at bedtime)   • traZODone (DESYREL) tablet 50 mg  50 mg Oral HS   • ursodiol (ACTIGALL) capsule 300 mg  300 mg Oral TID    and PTA meds:   Prior to Admission Medications   Prescriptions Last Dose Informant Patient Reported? Taking? Cholecalciferol (VITAMIN D3) 2000 UNITS TABS More than a month Self Yes No   Sig: Take 2,000 Units by mouth daily     FLUoxetine (PROzac) 40 MG capsule 6/21/2023 at 0600  No Yes   Sig: Take 1 capsule (40 mg total) by mouth daily   estradiol (VAGIFEM, YUVAFEM) 10 MCG TABS vaginal tablet More than a month Self No No   Sig: INSERT 1 TABLET INTO THE VAGINA 2 TIMES A WEEK  fenofibrate (FENOGLIDE) 120 MG TABS 6/21/2023 at 0600 Self Yes Yes   Sig: Take 120 mg by mouth daily   ibuprofen (MOTRIN) 600 mg tablet More than a month Self No No   Sig: TAKE 1 TABLET BY MOUTH EVERY 6 HOURS AS NEEDED FOR MODERATE PAIN    lidocaine (LIDODERM) 5 % More than a month Self Yes No   Sig: lidocaine 5 % topical patch   APPLY 1 PATCH TO AFFECTED AREA FOR 12 HOURS A DAY & REMOVE FOR 12 HOURS BEFORE APPLYING NEXT PATCH   (12 HOURS ON, 12 HOURS OFF)   losartan (COZAAR) 50 mg tablet 6/19/2023 Self No Yes   Sig: Take 1 tablet (50 mg total) by mouth daily   mometasone (NASONEX) 50 mcg/act nasal spray More than a month Self No No   Sig: SPRAY 2 SPRAYS INTO EACH NOSTRIL EVERY DAY   traMADol (Ultram) 50 mg tablet More than a month Self No No   Sig: Take 1 tablet (50 mg total) by mouth every 8 (eight) hours as needed for moderate pain   traZODone (DESYREL) 50 mg tablet More than a month  No No   Sig: Take 0 5-1 tablets (25-50 mg total) by mouth daily at bedtime as needed for sleep   ursodiol (ACTIGALL) 300 mg capsule 6/21/2023 at 0600 Self No Yes   Sig: TAKE 1 CAPSULE BY MOUTH THREE TIMES A DAY   valACYclovir (VALTREX) 500 mg tablet   No Yes   Sig: TAKE 2 TABLETS BY MOUTH EVERY DAY   Patient taking differently: As needed      Facility-Administered Medications: None       Allergies   Allergen Reactions   • Codeine GI Intolerance and Nausea Only     Other reaction(s): Other (See Comments)  Violently ill  Violently ill   • Latex Rash     itching   • Morphine And Related Nausea Only     GI intolerance nausea   • Nortriptyline Shortness Of Breath   • Ocaliva [Obeticholic Acid] Hives   • Doxycycline GI Intolerance and Nausea Only   • Sulfa Antibiotics GI Intolerance   • Cymbalta [Duloxetine Hcl]    • Penicillins Other (See Comments) and Rash     Childhood reaction       Objective     Temp:  [97 7 °F (36 5 °C)-98 °F (36 7 °C)] 97 8 °F (36 6 °C)  HR:  [67-78] 74  Resp:  [16-20] 20  BP: ()/(58-71) 104/58    Physical Exam  Constitutional:       Appearance: Normal appearance  HENT:      Head: Normocephalic and atraumatic  Nose: Nose normal       Mouth/Throat:      Mouth: Mucous membranes are moist    Eyes:      Extraocular Movements: Extraocular movements intact  Pupils: Pupils are equal, round, and reactive to light  Comments: Wear glasses   Cardiovascular:      Rate and Rhythm: Normal rate  Pulses: Normal pulses     Pulmonary: Effort: Pulmonary effort is normal    Musculoskeletal:         General: Normal range of motion  Cervical back: Normal range of motion  Skin:     General: Skin is warm  Neurological:      General: No focal deficit present  Mental Status: She is alert and oriented to person, place, and time  Mental status is at baseline  Psychiatric:         Mood and Affect: Mood normal          Behavior: Behavior normal          Thought Content: Thought content normal          Judgment: Judgment normal          Lab Results:   Results from last 7 days   Lab Units 06/23/23  0644   WBC Thousand/uL 7 76   HEMOGLOBIN g/dL 11 0*   HEMATOCRIT % 35 8   PLATELETS Thousands/uL 155      Results from last 7 days   Lab Units 06/24/23  0631   POTASSIUM mmol/L 3 2*   CHLORIDE mmol/L 100   CO2 mmol/L 32   BUN mg/dL 16   CREATININE mg/dL 1 38*   CALCIUM mg/dL 9 2       Imaging Studies: I have personally reviewed pertinent reports  EKG, Pathology, and Other Studies: I have personally reviewed pertinent reports  Counseling / Coordination of Care  Total floor / unit time spent today 15 minutes  Greater than 50% of total time was spent with the patient and / or family counseling and / or coordination of care  A description of the counseling / coordination of care: discussed pain management upon discharge to home and further management of pain once QL block fully wears off  Please note that the APS provides consultative services regarding pain management only  With the exception of ketamine and epidural infusions and except when indicated, final decisions regarding starting or changing doses of analgesic medications are at the discretion of the consulting service  Off hours consultation and/or medication management is generally not available      Artis Irby MD  Acute Pain Service

## 2023-06-24 NOTE — PROGRESS NOTES
Progress Note - Acute Pain Service    Eugene Mejia 60 y o  female MRN: [id number]  Unit/Bed#: [facility] 908-01 Encounter: [id number]      Assessment:   Principal Problem:    Renal cell carcinoma of right kidney ([facility] [address] )    Eugene Mejia is a 60 y o  female  with PMHx of RLE CRPS managed with tramadol PRN, PTSD, chronic low back and neck pain, JOSEFINA/MDD, primary biliary cirrhosis, and smoldering myeloma who is s/p right laparoscopic nephrectomy on 6/21  APS consulted for post-operative pain control  Patient seen on rounds today  She reports improvement in her pain from yesterday, however pain still persists  She is able to ambulate the hallways and tolerated a full diet but not much appetite  She is on 3L O2 NC with O2 sats > 92  Her IS use is 500-750 and appears to cause her pain  She had declined the ketamine infusion as a pain management option  She denies side effects of opioids but reports they make her sleepy  We discussed the option for a nerve block today and expectations that it may dull the abdominal pain but won't bring her pain score to zero  Risks and benefits discussed  She would like to try the nerve block in hopes that it dulls her pain and improves her respiratory status      Plan:   - Continue PO dilaudid 2/4mg q4hr PRN for moderate-severe pain  - Continue IV dilaudid 0 5mg q3hr PRN for breakthrough  - Avoid tylenol due to PBC  - Avoid NSAIDs given nephroectomy  - Continue robaxin and lidocaine patches  - Patient refused ketamine    - Quadratus lumborum nerve block today for right sided abdominal pain    Bowel Regimen:  - Polyethylene glycol (Miralax) 17g PO once daily PRN    Pain History  Current pain location(s): right abdomen  Pain Scale: moderate to severe  Quality: ache, sharp, throb  24 hour history: as above    Opioid requirement previous 24 hours: dilaudid 4mg x2 doses    Meds/Allergies   all current active meds have been reviewed    Allergies   Allergen Reactions   • Codeine GI Intolerance and Nausea Only     Other reaction(s): Other (See Comments)  Violently ill  Violently ill   • Latex Rash     itching   • Morphine And Related Nausea Only     GI intolerance nausea   • Nortriptyline Shortness Of Breath   • Ocaliva [Obeticholic Acid] Hives   • Doxycycline GI Intolerance and Nausea Only   • Sulfa Antibiotics GI Intolerance   • Cymbalta [Duloxetine Hcl]    • Penicillins Other (See Comments) and Rash     Childhood reaction       Objective     Temp:  [97 4 °F (36 3 °C)-98 °F (36 7 °C)] 97 7 °F (36 5 °C)  HR:  [61-77] 67  Resp:  [16-20] 17  BP: ()/(58-84) 89/58    Physical Exam  Constitutional:       General: She is not in acute distress  Appearance: Normal appearance  HENT:      Nose:      Comments: Nasal cannula  Eyes:      Extraocular Movements: Extraocular movements intact  Conjunctiva/sclera: Conjunctivae normal    Cardiovascular:      Rate and Rhythm: Normal rate and regular rhythm  Pulmonary:      Effort: Pulmonary effort is normal  No respiratory distress  Abdominal:      General: Abdomen is flat  There is no distension  Tenderness: There is abdominal tenderness  Musculoskeletal:         General: Normal range of motion  Cervical back: Normal range of motion  Skin:     General: Skin is warm and dry  Neurological:      General: No focal deficit present  Mental Status: She is alert and oriented to person, place, and time  Psychiatric:         Mood and Affect: Mood normal          Behavior: Behavior normal          Lab Results:   Results from last 7 days   Lab Units 06/23/23  0644   WBC Thousand/uL 7 76   HEMOGLOBIN g/dL 11 0*   HEMATOCRIT % 35 8   PLATELETS Thousands/uL 155      Results from last 7 days   Lab Units 06/24/23  0631   POTASSIUM mmol/L 3 2*   CHLORIDE mmol/L 100   CO2 mmol/L 32   BUN mg/dL 16   CREATININE mg/dL 1 38*   CALCIUM mg/dL 9 2       Please note that the APS provides consultative services regarding pain management only    With the exception of ketamine and epidural infusions and except when indicated, final decisions regarding starting or changing doses of analgesic medications are at the discretion of the consulting service  Off hours consultation and/or medication management is generally not available      Alecia Gao MD  Acute Pain Service

## 2023-06-24 NOTE — PROGRESS NOTES
NEPHROLOGY PROGRESS NOTE   Kristi Sena 60 y o  female MRN: [id number]  Unit/Bed#: [facility] 908-01 Encounter: [id number]      ASSESSMENT & PLAN:  1 chronic kidney disease stage II/IIIa status post right nephrectomy due to renal cell carcinoma on 6/21  Previous creatinine around 0 8-1  Noted creatinine today increased to 1 3 after IV diuresis yesterday  Noted also patient received losartan this morning, I have discontinued ARB for now given rising creatinine  Avoid relative hypotension, follow labs in the morning  Currently off IV fluids    2 renal cell carcinoma status post right nephrectomy on 6/21    Postoperative management as per urology    3 hypertension, blood pressure is stable, losartan was resumed this morning, noted creatinine higher, hold losartan for now    #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    5 anemia with a smoldering multiple myeloma, follows with hematology, last hemoglobin 11 on 6/23      SUBJECTIVE:  Seen and examined, feeling better, reports shortness of breath improved, denies any chest pain, no nausea vomiting, reports ate breakfast this morning without issues, continue with some on and off abdominal discomfort    OBJECTIVE:  Current Weight: Weight - Scale: 72 6 kg (160 lb)  Vitals:    06/24/23 0843   BP: 133/71   Pulse: 70   Resp:    Temp:    SpO2: 94%       Intake/Output Summary (Last 24 hours) at 6/24/2023 1145  Last data filed at 6/24/2023 1100  Gross per 24 hour   Intake 1000 ml   Output 2650 ml   Net -1650 ml       General: conscious, cooperative, in not acute distress  Eyes: conjunctivae pink, anicteric sclerae  ENT: lips and mucous membranes moist, oxygen via nasal cannula  Neck: supple, no JVD  Chest: clear breath sounds bilateral, no crackles, ronchus or wheezings  CVS: distinct S1 & S2, normal rate, regular rhythm  Abdomen: soft, mildly-tender, non-distended, normoactive bowel sounds  Extremities: no edema of both legs, right leg very sensitive to palpation  Skin: no rash  Neuro: awake, alert, oriented        Medications:    Current Facility-Administered Medications:   •  diphenhydrAMINE (BENADRYL) tablet 25 mg, 25 mg, Oral, Q6H PRN, Jazmin Candis, PA-C, 25 mg at 06/21/23 1413  •  docusate sodium (COLACE) capsule 100 mg, 100 mg, Oral, BID, Cha Blake MD, 100 mg at 06/24/23 5934  •  ferrous sulfate tablet 325 mg, 325 mg, Oral, Daily With Breakfast, Lisa Soares MD, 325 mg at 06/24/23 0844  •  FLUoxetine (PROzac) capsule 40 mg, 40 mg, Oral, Daily, Cha Blake MD, 40 mg at 06/24/23 8083  •  fluticasone (FLONASE) 50 mcg/act nasal spray 2 spray, 2 spray, Each Nare, Daily, Cha Blake MD  •  heparin (porcine) subcutaneous injection 5,000 Units, 5,000 Units, Subcutaneous, Q12H [address], 5,000 Units at 06/24/23 0904 **AND** [COMPLETED] Platelet count, , , Once, Cha Blake MD  •  HYDROmorphone (DILAUDID) injection 0 5 mg, 0 5 mg, Intravenous, Q3H PRN, Jazmin Candis, PA-C, 0 5 mg at 06/22/23 2333  •  HYDROmorphone (DILAUDID) tablet 2 mg, 2 mg, Oral, Q4H PRN, Jazmin Candis, PA-C  •  HYDROmorphone (DILAUDID) tablet 4 mg, 4 mg, Oral, Q4H PRN, Jazmin Candis, PA-C, 4 mg at 06/24/23 0855  •  lidocaine (LIDODERM) 5 % patch 2 patch, 2 patch, Topical, Daily, Glimar Anderson MD, 2 patch at 06/24/23 0854  •  LORazepam (ATIVAN) injection 0 5 mg, 0 5 mg, Intravenous, Q6H PRN, OLIVIA Zacarias, 0 5 mg at 06/21/23 2109  •  methocarbamol (ROBAXIN) tablet 500 mg, 500 mg, Oral, Q6H [address], Gilmar Diaz MD, 500 mg at 06/24/23 0427  •  ondansetron [facility]) injection 4 mg, 4 mg, Intravenous, Q6H PRN, Cha Blake MD, 4 mg at 06/23/23 0541  •  polyethylene glycol (MIRALAX) packet 17 g, 17 g, Oral, Daily, Jaime Roche MD, 17 g at 06/24/23 0839  •  senna (SENOKOT) tablet 8 6 mg, 1 tablet, Oral, HS, Jazmin Chirinos PA-C, 8 6 mg at 06/23/23 2236  •  simethicone (MYLICON) chewable tablet 80 mg, 80 mg, Oral, 4x Daily (with "meals and at bedtime), Soni Medina PA-C, 80 mg at 06/24/23 0844  •  traZODone (DESYREL) tablet 50 mg, 50 mg, Oral, HS, OLIVIA Gardner, 50 mg at 06/23/23 2236  •  ursodiol (ACTIGALL) capsule 300 mg, 300 mg, Oral, TID, Gio Peraza MD, 300 mg at 06/24/23 0849    Invasive Devices:        Lab Results:   Results from last 7 days   Lab Units 06/24/23  0631 06/23/23  0644 06/22/23  0514 06/21/23  1412 06/21/23  1115   WBC Thousand/uL  --  7 76 8 32  --  5 10   HEMOGLOBIN g/dL  --  11 0* 10 3*  --  10 4*   HEMATOCRIT %  --  35 8 32 8*  --  32 5*   PLATELETS Thousands/uL  --  155 167 166 154   SODIUM mmol/L 135 134* 138  --  139   POTASSIUM mmol/L 3 2* 4 0 4 1  --  3 5   CHLORIDE mmol/L 100 107 110*  --  113*   CO2 mmol/L 32 25 25  --  26   BUN mg/dL 16 8 16  --  17   CREATININE mg/dL 1 38* 0 90 1 14  --  0 98   CALCIUM mg/dL 9 2 8 3 7 7*  --  8 1*   MAGNESIUM mg/dL 2 2 2 1  --   --   --        Previous work up:  See previous notes      Portions of the record may have been created with voice recognition software  Occasional wrong word or \"sound a like\" substitutions may have occurred due to the inherent limitations of voice recognition software  Read the chart carefully and recognize, using context, where substitutions have occurred  If you have any questions, please contact the dictating provider    "

## 2023-06-24 NOTE — PROGRESS NOTES
UROLOGY PROGRESS NOTE   Patient Identifiers: Faye Marks (MRN [id number])  Date of Service: 6/24/2023        Assessment:   Postoperative day 3 status post right laparoscopic nephrectomy    Plan:     Patient looks visibly improved from yesterday  She is no longer having visible respiratory distress although she is on 3 L of nasal cannula oxygen  She reports continuing to use her incentive spirometry but is not having success with the flutter valve  Would like to wean oxygen support as able  I do not feel strongly that the patient needs to undergo CT of the chest today  Adjustment of pain regimen by the acute pain service has been beneficial   They are planning local regional block today  Certainly think this will benefit the patient as the sluggish recovery is mostly driven by pain control  Patient is having small amounts of oral intake of solid diet  She has not yet had flatus or bowel movement  She has been out of bed, Colace senna and MiraLAX have been added to her bowel regimen  I have encouraged ambulation with both the patient and her nursing staff  Physical therapy will be reconsulted  Patient can shower  SCDs and subcu heparin for DVT prophylaxis  Subjective:     24 HR EVENTS:   Adjustment of pain regimen has benefited the patient  She has gotten herself out of bed and ambulated to the nursing station  She is interested in having a shower  She is continue to work with incentive spirometry    She is trying solid food this morning with cereal       Objective:     VITALS:    Vitals:    06/24/23 0843   BP: 133/71   Pulse: 70   Resp:    Temp:    SpO2: 94%       INS & OUTS:  3 5L UOP    LABS:  Lab Results   Component Value Date    HGB 11 0 (L) 06/23/2023    HCT 35 8 06/23/2023    WBC 7 76 06/23/2023     06/23/2023   ]    Lab Results   Component Value Date     12/29/2015    K 3 2 (L) 06/24/2023     06/24/2023    CO2 32 06/24/2023    BUN 16 06/24/2023    CREATININE 1 38 (H) 06/24/2023    CALCIUM 9 2 06/24/2023    GLUCOSE 134 12/29/2015   ]    INPATIENT MEDS:    Current Facility-Administered Medications:   •  diphenhydrAMINE (BENADRYL) tablet 25 mg, 25 mg, Oral, Q6H PRN, Inell Bloch, PA-C, 25 mg at 06/21/23 1413  •  docusate sodium (COLACE) capsule 100 mg, 100 mg, Oral, BID, Jessica Pabon MD, 100 mg at 06/24/23 5003  •  ferrous sulfate tablet 325 mg, 325 mg, Oral, Daily With Breakfast, Yumiko Brasher MD, 325 mg at 06/24/23 0844  •  FLUoxetine (PROzac) capsule 40 mg, 40 mg, Oral, Daily, Jessica Pabon MD, 40 mg at 06/24/23 8744  •  fluticasone (FLONASE) 50 mcg/act nasal spray 2 spray, 2 spray, Each Nare, Daily, Jessica Pabon MD  •  heparin (porcine) subcutaneous injection 5,000 Units, 5,000 Units, Subcutaneous, Q12H [address], 5,000 Units at 06/23/23 2235 **AND** [COMPLETED] Platelet count, , , Once, Jessica Pabon MD  •  HYDROmorphone (DILAUDID) injection 0 5 mg, 0 5 mg, Intravenous, Q3H PRN, Inell Bloch, PA-C, 0 5 mg at 06/22/23 2333  •  HYDROmorphone (DILAUDID) tablet 2 mg, 2 mg, Oral, Q4H PRN, Inell Bloch, PA-C  •  HYDROmorphone (DILAUDID) tablet 4 mg, 4 mg, Oral, Q4H PRN, Inell Bloch, PA-C, 4 mg at 06/24/23 0855  •  lidocaine (LIDODERM) 5 % patch 2 patch, 2 patch, Topical, Daily, Gilmar Mojica MD, 2 patch at 06/24/23 2182  •  LORazepam (ATIVAN) injection 0 5 mg, 0 5 mg, Intravenous, Q6H PRN, OLIVIA Rendon, 0 5 mg at 06/21/23 2109  •  losartan (COZAAR) tablet 25 mg, 25 mg, Oral, Daily, Yumiko Brasher MD, 25 mg at 06/24/23 0844  •  methocarbamol (ROBAXIN) tablet 500 mg, 500 mg, Oral, Q6H [address], Bright Elizabeth Lopez MD, 500 mg at 06/24/23 7940  •  ondansetron [facility]) injection 4 mg, 4 mg, Intravenous, Q6H PRN, Jessica Pabon MD, 4 mg at 06/23/23 0541  •  polyethylene glycol (MIRALAX) packet 17 g, 17 g, Oral, Daily, Marci Funes MD, 17 g at 06/24/23 0839  •  senna (SENOKOT) tablet 8 6 mg, 1 tablet, "Oral, HS, Jazmin Buttertanner, PA-C, 8 6 mg at 06/23/23 2236  •  simethicone (MYLICON) chewable tablet 80 mg, 80 mg, Oral, 4x Daily (with meals and at bedtime), Jazmin Buttertanner, PA-C, 80 mg at 06/24/23 0844  •  traZODone (DESYREL) tablet 50 mg, 50 mg, Oral, HS, OLIVIA Aguilar, 50 mg at 06/23/23 2236  •  ursodiol (ACTIGALL) capsule 300 mg, 300 mg, Oral, TID, Sho Hernandez MD, 300 mg at 06/24/23 9275      Physical Exam:   /71   Pulse 70   Temp 97 7 °F (36 5 °C) (Oral)   Resp 17   Ht 5' 9\" (1 753 m)   Wt 72 6 kg (160 lb)   LMP  (LMP Unknown)   SpO2 94%   BMI 23 63 kg/m²   GEN: Appears more comfortable  RESP: breathing comfortably with no accessory muscle use  CV: bilateral peripheral edema  ABD: soft and appropriately tender to palpation  INCISION: clean dry and intact    RADIOLOGY:   6/23/23  CHEST     INDICATION:   Shortness of breath tubular breath sounds rule out CHF      COMPARISON: 5/13/2023     EXAM PERFORMED/VIEWS:  XR CHEST PORTABLE        FINDINGS:     Cardiomediastinal silhouette appears unremarkable  There is suboptimal inspiratory effort  There is a left basal density likely reflecting pleural effusion and/or parenchymal consolidation  There are right infrahilar airspace opacities, possibly hypoventilatory due to poor inspiratory effort  No pneumothorax is noted      No acute osseous abnormality identified within limitations of portable radiography,     IMPRESSION:     No acute cardiopulmonary disease  Suboptimal inspiratory effort  Left basilar density, likely small pleural effusion and/or parenchymal consolidation  Right infrahilar opacities, possibly hypoventilatory but nonspecific              "

## 2023-06-25 VITALS
WEIGHT: 160 LBS | TEMPERATURE: 97.4 F | BODY MASS INDEX: 23.7 KG/M2 | HEART RATE: 61 BPM | SYSTOLIC BLOOD PRESSURE: 119 MMHG | RESPIRATION RATE: 17 BRPM | OXYGEN SATURATION: 94 % | HEIGHT: 69 IN | DIASTOLIC BLOOD PRESSURE: 68 MMHG

## 2023-06-25 LAB
ANION GAP SERPL CALCULATED.3IONS-SCNC: 2 MMOL/L
BUN SERPL-MCNC: 19 MG/DL (ref 5–25)
CALCIUM SERPL-MCNC: 9.3 MG/DL (ref 8.3–10.1)
CHLORIDE SERPL-SCNC: 101 MMOL/L (ref 96–108)
CO2 SERPL-SCNC: 33 MMOL/L (ref 21–32)
CREAT SERPL-MCNC: 1.18 MG/DL (ref 0.6–1.3)
ERYTHROCYTE [DISTWIDTH] IN BLOOD BY AUTOMATED COUNT: 12.6 % (ref 11.6–15.1)
GFR SERPL CREATININE-BSD FRML MDRD: 49 ML/MIN/1.73SQ M
GLUCOSE SERPL-MCNC: 109 MG/DL (ref 65–140)
HCT VFR BLD AUTO: 32.5 % (ref 34.8–46.1)
HGB BLD-MCNC: 10.7 G/DL (ref 11.5–15.4)
MAGNESIUM SERPL-MCNC: 2.1 MG/DL (ref 1.6–2.6)
MCH RBC QN AUTO: 30.2 PG (ref 26.8–34.3)
MCHC RBC AUTO-ENTMCNC: 32.9 G/DL (ref 31.4–37.4)
MCV RBC AUTO: 92 FL (ref 82–98)
PLATELET # BLD AUTO: 192 THOUSANDS/UL (ref 149–390)
PMV BLD AUTO: 10.2 FL (ref 8.9–12.7)
POTASSIUM SERPL-SCNC: 3.5 MMOL/L (ref 3.5–5.3)
RBC # BLD AUTO: 3.54 MILLION/UL (ref 3.81–5.12)
SODIUM SERPL-SCNC: 136 MMOL/L (ref 135–147)
WBC # BLD AUTO: 4.76 THOUSAND/UL (ref 4.31–10.16)

## 2023-06-25 PROCEDURE — 80048 BASIC METABOLIC PNL TOTAL CA: CPT | Performed by: UROLOGY

## 2023-06-25 PROCEDURE — 83735 ASSAY OF MAGNESIUM: CPT | Performed by: UROLOGY

## 2023-06-25 PROCEDURE — 99232 SBSQ HOSP IP/OBS MODERATE 35: CPT | Performed by: ANESTHESIOLOGY

## 2023-06-25 PROCEDURE — 99024 POSTOP FOLLOW-UP VISIT: CPT | Performed by: UROLOGY

## 2023-06-25 PROCEDURE — 99232 SBSQ HOSP IP/OBS MODERATE 35: CPT | Performed by: INTERNAL MEDICINE

## 2023-06-25 PROCEDURE — NC001 PR NO CHARGE: Performed by: UROLOGY

## 2023-06-25 PROCEDURE — 85027 COMPLETE CBC AUTOMATED: CPT | Performed by: UROLOGY

## 2023-06-25 RX ORDER — HYDROMORPHONE HYDROCHLORIDE 2 MG/1
2 TABLET ORAL EVERY 4 HOURS PRN
Qty: 20 TABLET | Refills: 0 | Status: SHIPPED | OUTPATIENT
Start: 2023-06-25 | End: 2023-07-05

## 2023-06-25 RX ORDER — METHOCARBAMOL 500 MG/1
500 TABLET, FILM COATED ORAL EVERY 6 HOURS SCHEDULED
Qty: 30 TABLET | Refills: 0 | Status: SHIPPED | OUTPATIENT
Start: 2023-06-25 | End: 2023-07-12

## 2023-06-25 RX ORDER — SENNOSIDES 8.6 MG
8.6 TABLET ORAL
Qty: 30 TABLET | Refills: 0 | Status: SHIPPED | OUTPATIENT
Start: 2023-06-25 | End: 2023-07-12

## 2023-06-25 RX ORDER — DOCUSATE SODIUM 100 MG/1
100 CAPSULE, LIQUID FILLED ORAL 2 TIMES DAILY
Qty: 30 CAPSULE | Refills: 0 | Status: SHIPPED | OUTPATIENT
Start: 2023-06-25

## 2023-06-25 RX ADMIN — SIMETHICONE 80 MG: 80 TABLET, CHEWABLE ORAL at 10:07

## 2023-06-25 RX ADMIN — HEPARIN SODIUM 5000 UNITS: 5000 INJECTION INTRAVENOUS; SUBCUTANEOUS at 10:08

## 2023-06-25 RX ADMIN — POLYETHYLENE GLYCOL 3350 17 G: 17 POWDER, FOR SOLUTION ORAL at 10:07

## 2023-06-25 RX ADMIN — FERROUS SULFATE TAB 325 MG (65 MG ELEMENTAL FE) 325 MG: 325 (65 FE) TAB at 10:07

## 2023-06-25 RX ADMIN — LIDOCAINE 2 PATCH: 50 PATCH CUTANEOUS at 10:01

## 2023-06-25 RX ADMIN — METHOCARBAMOL TABLETS 500 MG: 500 TABLET, COATED ORAL at 05:05

## 2023-06-25 RX ADMIN — DOCUSATE SODIUM 100 MG: 100 CAPSULE, LIQUID FILLED ORAL at 10:07

## 2023-06-25 RX ADMIN — FLUOXETINE 40 MG: 20 CAPSULE ORAL at 05:05

## 2023-06-25 RX ADMIN — METHOCARBAMOL TABLETS 500 MG: 500 TABLET, COATED ORAL at 11:49

## 2023-06-25 RX ADMIN — SIMETHICONE 80 MG: 80 TABLET, CHEWABLE ORAL at 11:49

## 2023-06-25 RX ADMIN — URSODIOL 300 MG: 300 CAPSULE ORAL at 10:10

## 2023-06-25 NOTE — DISCHARGE SUMMARY
Discharge Summary - Urologic Surgery   Sade Bentley 60 y o  female MRN: [id number]  Unit/Bed#: [facility] 908-01 Encounter: [id number]    Admission Date:   Admission Orders (From admission, onward)     Ordered        06/21/23 0800  Inpatient Admission  Once                         Discharge Date: 6/25/2023     Admitting Diagnosis: Renal cell carcinoma of right kidney [facility]) [C64 1]    Discharge Diagnosis: Same    Medical Problems     Resolved Problems  Date Reviewed: 6/21/2023   None         Attending: Prakash Pillai Physician(s): Nephrology (6/21/23), Acute Pain (6/23/23)    Procedures Performed: Right laparoscopic radical nephrectomy (6/21), regional pain block performed by pain service (6/24)    Pathology: Pending    Hospital Course: Patient admitted after surgical removal of the right kidney  Patient has a history of significant pre-existing pain issues  This complicated her early recovery  Patient had difficulty with pulmonary toilet and ambulation  On 23 June, patient was having significant respiratory distress and chest x-ray was obtained  Nephrology had been involved given the removal of her right kidney  Single dose of diuretic was given with respiratory improvement  Acute pain surgery was consulted for adjustment of oral and IV regimen  On 24 June, the patient had some improvement but regional block was offered by the pain service  This added to additional improvement  Patient's oxygen was carefully weaned  Bowel regimen was maximized  Physical therapy and Occupational Therapy were involved  On 25 June, the patient was able to be weaned off oxygen with maintenance of her saturations with ambulation  She was having flatus and tolerating a diet  Pain was adequately controlled with oral regimen  Patient was stabilized for discharged home at this time  Rolling walker was prescribed for the patient's discharge      Condition at Discharge: good     Discharge instructions/Information to patient and family:   See after visit summary for information provided to patient and family  Provisions for Follow-Up Care:  See after visit summary for information related to follow-up care and any pertinent home health orders  Disposition: Home          Planned Readmission: No    Discharge Statement   I spent 15 minutes discharging the patient  This time was spent on the day of discharge  I had direct contact with the patient on the day of discharge  Additional documentation is required if more than 30 minutes were spent on discharge  Discharge Medications:  See after visit summary for reconciled discharge medications provided to patient and family

## 2023-06-25 NOTE — DISCHARGE INSTR - AVS FIRST PAGE
After surgery, you should be active when at home  You will need to take plenty of rest  No heavy lifting (weight limit is approximately a gallon jug of milk held in each hand ) You can shower but do not submerge in water; no tubs/pools/baths  You can walk up and down stairs  Your incision has disolvable sutures and surgical glue  If there is any concern about the incision (redness, drainage, bleeding) please call your surgeon's office  You can eat and drink whatever you like, but monitor for signs of constipation  You should be having daily bowel movements  Take your stool softeners until your bowel begin to function  If you are still constipated, call your surgeon's office to discuss additional medication

## 2023-06-25 NOTE — PROGRESS NOTES
UROLOGY PROGRESS NOTE   Patient Identifiers: Maribel Roberson (MRN [id number])  Date of Service: 6/25/2023        Assessment:   Postoperative day 4 status post right laparoscopic nephrectomy    Plan:     Further improvement  Patient did undergo regional block yesterday  Short duration improvement  Patient is comfortable while laying still and has pain with movement  This appears well controlled with current regimen  Patient has been out of bed and ambulating  Has started to have flatus  Rating a diet  Continue Colace, senna, MiraLAX  Patient on 3 L yesterday morning down to 2 L yesterday afternoon, now 1 L  Oxygen saturation above 92% while laying  Discussed with nursing walk test in the [place]  If patient remains above 92% off oxygen, think it is reasonable to begin discharge planning  Patient will continue heparin every 12 hours and SCDs for DVT prophylaxis  Appreciate nephrology involvement given nephrectomy  Subjective:     24 HR EVENTS:   Status post local regional block with the pain service yesterday  Continued improvement      Objective:     VITALS:    Vitals:    06/25/23 0733   BP: 119/68   Pulse: 61   Resp: 17   Temp: (!) 97 4 °F (36 3 °C)   SpO2: 94%       INS & OUTS:  700 cc urine output    LABS:  Lab Results   Component Value Date    HGB 10 7 (L) 06/25/2023    HCT 32 5 (L) 06/25/2023    WBC 4 76 06/25/2023     06/25/2023   ]    Lab Results   Component Value Date     12/29/2015    K 3 5 06/25/2023     06/25/2023    CO2 33 (H) 06/25/2023    BUN 19 06/25/2023    CREATININE 1 18 06/25/2023    CALCIUM 9 3 06/25/2023    GLUCOSE 134 12/29/2015   ]    INPATIENT MEDS:    Current Facility-Administered Medications:   •  diphenhydrAMINE (BENADRYL) tablet 25 mg, 25 mg, Oral, Q6H PRN, Sang Gama PA-C, 25 mg at 06/21/23 1413  •  docusate sodium (COLACE) capsule 100 mg, 100 mg, Oral, BID, Jacob Hutchison MD, 100 mg at 06/24/23 1704  •  ferrous sulfate tablet 325 mg, 325 mg, Oral, Daily With Breakfast, Issa Morley MD, 325 mg at 06/24/23 0844  •  FLUoxetine (PROzac) capsule 40 mg, 40 mg, Oral, Daily, Ramya Hinojosa MD, 40 mg at 06/25/23 0505  •  fluticasone (FLONASE) 50 mcg/act nasal spray 2 spray, 2 spray, Each Nare, Daily, Ramya Hinojosa MD  •  heparin (porcine) subcutaneous injection 5,000 Units, 5,000 Units, Subcutaneous, Q12H [facility], 5,000 Units at 06/24/23 2138 **AND** [COMPLETED] Platelet count, , , Once, Ramya Hinojosa MD  •  HYDROmorphone (DILAUDID) injection 0 5 mg, 0 5 mg, Intravenous, Q3H PRN, Elizabeth Rodrigez PA-C, 0 5 mg at 06/22/23 2333  •  HYDROmorphone (DILAUDID) tablet 2 mg, 2 mg, Oral, Q4H PRN, Elizabeth Rodrigez PA-C  •  HYDROmorphone (DILAUDID) tablet 4 mg, 4 mg, Oral, Q4H PRN, Elizabeth Rodrigez PA-C, 4 mg at 06/24/23 0855  •  lidocaine (LIDODERM) 5 % patch 2 patch, 2 patch, Topical, Daily, Gilmar Collazo MD, 2 patch at 06/24/23 0854  •  LORazepam (ATIVAN) injection 0 5 mg, 0 5 mg, Intravenous, Q6H PRN, OLIVIA Gold, 0 5 mg at 06/21/23 2109  •  methocarbamol (ROBAXIN) tablet 500 mg, 500 mg, Oral, Q6H [facility], Gilmar Blanc MD, 500 mg at 06/25/23 0505  •  ondansetron [facility]) injection 4 mg, 4 mg, Intravenous, Q6H PRN, Ramya Hinojosa MD, 4 mg at 06/23/23 0541  •  polyethylene glycol (MIRALAX) packet 17 g, 17 g, Oral, Daily, Alva Rose MD, 17 g at 06/24/23 1306  •  senna (SENOKOT) tablet 8 6 mg, 1 tablet, Oral, HS, Elizabeth Rodrigez PA-C, 8 6 mg at 06/24/23 2137  •  simethicone (MYLICON) chewable tablet 80 mg, 80 mg, Oral, 4x Daily (with meals and at bedtime), Elizabeth Rodrigez PA-C, 80 mg at 06/24/23 2137  •  traZODone (DESYREL) tablet 50 mg, 50 mg, Oral, HS, OLIVIA Lynn, 50 mg at 06/24/23 2137  •  ursodiol (ACTIGALL) capsule 300 mg, 300 mg, Oral, TID, Ramya Hinojosa MD, 300 mg at 06/24/23 2137      Physical Exam:   /68   Pulse 61   Temp (!) 97 4 °F (36 3 °C)   Resp "17   Ht 5' 9\" (1 753 m)   Wt 72 6 kg (160 lb)   LMP  (LMP Unknown)   SpO2 94%   BMI 23 63 kg/m²   GEN: No acute distress  RESP: breathing comfortably with no accessory muscle use  CV: bilateral peripheral edema  ABD: soft and appropriately tender to palpation, distention improved  INCISION: clean dry and intact            "

## 2023-06-25 NOTE — PROGRESS NOTES
NEPHROLOGY PROGRESS NOTE   Wileen Going 60 y o  female MRN: [id number]  Unit/Bed#: [facility] 908-01 Encounter: [id number]      ASSESSMENT & PLAN:  1 chronic kidney disease stage II status post right nephrectomy due to renal cell carcinoma on 6/21  Previous creatinine around 0 8-1  Creatinine increased to 1 38 yesterday after IV diuretics today before  Creatinine down to 1 18 today  Pressure currently well controlled, losartan was held after morning dose yesterday  Stable from renal standpoint of view to be discharged, keep holding losartan for now, that can be resumed as an outpatient  Discussed with patient about importance to avoid NSAIDs as well as to follow low-salt diet  Recommend to have repeat labs after discharge as well as follow-up with primary care doctor and urology  We will be more than happy to see her back in the office if needed    2 renal cell carcinoma status post right nephrectomy on 6/21    Postoperative management as per urology    3 hypertension, blood pressure is currently well controlled, losartan currently on hold  Recommend to follow low-salt diet, losartan can be resumed as an outpatient    #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    5 anemia with a smoldering multiple myeloma, follows with hematology, last hemoglobin 10 7 this morning      SUBJECTIVE:  Patient seen and examined, in general feeling better, off oxygen, denies significant chest pain or shortness of breath, no nausea, no vomiting, abdominal pain seems to be improving    OBJECTIVE:  Current Weight: Weight - Scale: 72 6 kg (160 lb)  Vitals:    06/25/23 0733   BP: 119/68   Pulse: 61   Resp: 17   Temp: (!) 97 4 °F (36 3 °C)   SpO2: 94%       Intake/Output Summary (Last 24 hours) at 6/25/2023 1029  Last data filed at 6/25/2023 0501  Gross per 24 hour   Intake 930 ml   Output 701 ml   Net 229 ml       General: conscious, cooperative, in not acute distress  Eyes: conjunctivae pink, anicteric sclerae  ENT: lips and mucous membranes moist, on room air  Neck: supple, no JVD  Chest: clear breath sounds bilateral, no crackles, ronchus or wheezings  CVS: distinct S1 & S2, normal rate, regular rhythm  Abdomen: soft, mildly-tender, non-distended, normoactive bowel sounds  Extremities: no significant edema of both legs  Skin: no rash  Neuro: awake, alert, oriented        Medications:    Current Facility-Administered Medications:   •  diphenhydrAMINE (BENADRYL) tablet 25 mg, 25 mg, Oral, Q6H PRN, Ana De La Cruz PA-C, 25 mg at 06/21/23 1413  •  docusate sodium (COLACE) capsule 100 mg, 100 mg, Oral, BID, Garett Gonzalez MD, 100 mg at 06/25/23 1007  •  ferrous sulfate tablet 325 mg, 325 mg, Oral, Daily With Breakfast, Marcella Rush MD, 325 mg at 06/25/23 1007  •  FLUoxetine (PROzac) capsule 40 mg, 40 mg, Oral, Daily, Garett Gonzalez MD, 40 mg at 06/25/23 0505  •  fluticasone (FLONASE) 50 mcg/act nasal spray 2 spray, 2 spray, Each Nare, Daily, Garett Gonzalez MD  •  heparin (porcine) subcutaneous injection 5,000 Units, 5,000 Units, Subcutaneous, Q12H [address], 5,000 Units at 06/25/23 1008 **AND** [COMPLETED] Platelet count, , , Once, Garett Gonzalez MD  •  HYDROmorphone (DILAUDID) injection 0 5 mg, 0 5 mg, Intravenous, Q3H PRN, Ana De La Cruz PA-C, 0 5 mg at 06/22/23 2333  •  HYDROmorphone (DILAUDID) tablet 2 mg, 2 mg, Oral, Q4H PRN, Ana De La Cruz PA-C  •  HYDROmorphone (DILAUDID) tablet 4 mg, 4 mg, Oral, Q4H PRN, Ana De La Cruz PA-C, 4 mg at 06/24/23 0855  •  lidocaine (LIDODERM) 5 % patch 2 patch, 2 patch, Topical, Daily, Gilmar Woods MD, 2 patch at 06/25/23 1001  •  LORazepam (ATIVAN) injection 0 5 mg, 0 5 mg, Intravenous, Q6H PRN, OLIVIA Leon, 0 5 mg at 06/21/23 2109  •  methocarbamol (ROBAXIN) tablet 500 mg, 500 mg, Oral, Q6H [address], Bright Yuki Sood MD, 500 mg at 06/25/23 0505  •  ondansetron (ZOFRAN) injection 4 mg, 4 mg, Intravenous, Q6H PRN, Garett Gonzalez MD, 4 mg at "06/23/23 0541  •  polyethylene glycol (MIRALAX) packet 17 g, 17 g, Oral, Daily, Asad Gallardo MD, 17 g at 06/25/23 1007  •  senna (SENOKOT) tablet 8 6 mg, 1 tablet, Oral, HS, Israel Penny PA-C, 8 6 mg at 06/24/23 2137  •  simethicone (MYLICON) chewable tablet 80 mg, 80 mg, Oral, 4x Daily (with meals and at bedtime), Israel Penny PA-C, 80 mg at 06/25/23 1007  •  traZODone (DESYREL) tablet 50 mg, 50 mg, Oral, HS, OLIVIA Reynoso, 50 mg at 06/24/23 2137  •  ursodiol (ACTIGALL) capsule 300 mg, 300 mg, Oral, TID, Kb Macias MD, 300 mg at 06/25/23 1010    Invasive Devices:        Lab Results:   Results from last 7 days   Lab Units 06/25/23  0508 06/24/23  0631 06/23/23  0644 06/22/23  0514   WBC Thousand/uL 4 76  --  7 76 8 32   HEMOGLOBIN g/dL 10 7*  --  11 0* 10 3*   HEMATOCRIT % 32 5*  --  35 8 32 8*   PLATELETS Thousands/uL 192  --  155 167   SODIUM mmol/L 136 135 134* 138   POTASSIUM mmol/L 3 5 3 2* 4 0 4 1   CHLORIDE mmol/L 101 100 107 110*   CO2 mmol/L 33* 32 25 25   BUN mg/dL 19 16 8 16   CREATININE mg/dL 1 18 1 38* 0 90 1 14   CALCIUM mg/dL 9 3 9 2 8 3 7 7*   MAGNESIUM mg/dL 2 1 2 2 2 1  --        Previous work up:  See previous notes      Portions of the record may have been created with voice recognition software  Occasional wrong word or \"sound a like\" substitutions may have occurred due to the inherent limitations of voice recognition software  Read the chart carefully and recognize, using context, where substitutions have occurred  If you have any questions, please contact the dictating provider    "

## 2023-06-25 NOTE — CASE MANAGEMENT
Case Management Discharge Planning Note    Patient name Adrian King  [facility] MRN [id number]  : 1960 Date 2023       Current Admission Date: 2023  Current Admission Diagnosis:Renal cell carcinoma of right kidney [facility])   Patient Active Problem List    Diagnosis Date Noted   • Renal cell carcinoma of right kidney ([facility] [address] ) 2023   • Renal mass 05/15/2023   • Pneumonia due to infectious organism 2023   • Multiple myeloma not having achieved remission ([facility] [address] ) 2023   • Vitamin B12 deficiency 2021   • Iron deficiency anemia due to chronic blood loss 2021   • Epistaxis 2021   • Other osteoporosis without current pathological fracture 2021   • Closed fracture of one rib of left side with routine healing 2021   • Chest pain 2021   • Osteopenia 2021   • Trigger finger, right middle finger 2020   • Complex regional pain syndrome type 2 of right lower extremity 2020   • Chronic left-sided low back pain without sciatica 02/10/2020   • Left leg pain 02/10/2020   • Complex regional pain syndrome i of right lower limb 2019   • Quadriceps weakness 2019   • PTSD (post-traumatic stress disorder) 2018   • Syncope 2018   • MDD (major depressive disorder), recurrent episode, mild ([facility] [address] ) 10/22/2018   • JOSEFINA (generalized anxiety disorder) 10/22/2018   • OCD (obsessive compulsive disorder) 10/22/2018   • Herpes simplex 2018   • Essential hypertension 2018   • Status post total right knee replacement 2018   • Medial epicondylitis of left elbow 2018   • Chronic pain of right knee 2018   • Post-traumatic osteoarthritis of right knee 2018   • Bilateral elbow joint pain 2018   • Lateral epicondylitis of right elbow 2018   • Medial epicondylitis, left elbow 2018   • Vitamin D deficiency 2017   • Cirrhosis ([facility] [address] ) 2017   • 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   • Chronic pain disorder 05/10/2017   • Hypergammaglobulinemia 12/20/2016   • Peripheral neuropathy 08/29/2016   • Chronic right hip pain 07/21/2016   • Dense breasts 06/08/2016   • Lumbar degenerative disc disease 10/22/2015   • Abnormal mammogram 03/31/2015   • Pruritus 03/31/2015   • Sacroiliitis ([facility] [address] ) 03/11/2015   • Lumbar radiculopathy 02/17/2015   • Arthritis 12/08/2014   • Primary biliary cholangitis ([facility] [address] ) 08/01/2014   • Hyperlipidemia 11/16/2013   • Occipital neuralgia 04/19/2013   • Depression with anxiety 10/11/2012   • Hydronephrosis 10/11/2012   • Nephrolithiasis 10/11/2012      LOS (days): 4  Geometric Mean LOS (GMLOS) (days):   Days to GMLOS:     OBJECTIVE:  Risk of Unplanned Readmission Score: 10 7         Current admission status: Inpatient   Preferred Pharmacy:   [id number] [facility], [facility] [address],  [address]  [address] [place] [address]  Phone: [phone] Fax: [address], [address] [address] [facility] 77 [address]  Phone: [phone] Fax: [phone]    Primary Care Provider: Kwaku Landa DO    Primary Insurance: MEDICARE  Secondary Insurance: [employer]    DISCHARGE DETAILS:      CM informed that pt is stable for d/c but requires a walker  CM sent referral to [place] and provided the walker to pt's room, from consignment     Pt aware that recommendation was for home OT/PT but she prefers to forego this recommendation and d/c home with just the walker

## 2023-06-26 ENCOUNTER — TRANSITIONAL CARE MANAGEMENT (OUTPATIENT)
Dept: FAMILY MEDICINE CLINIC | Facility: CLINIC | Age: 63
End: 2023-06-26

## 2023-06-26 LAB
DME PARACHUTE DELIVERY DATE ACTUAL: NORMAL
DME PARACHUTE DELIVERY DATE REQUESTED: NORMAL
DME PARACHUTE ITEM DESCRIPTION: NORMAL
DME PARACHUTE ORDER STATUS: NORMAL
DME PARACHUTE SUPPLIER NAME: NORMAL
DME PARACHUTE SUPPLIER PHONE: NORMAL

## 2023-06-26 PROCEDURE — 88307 TISSUE EXAM BY PATHOLOGIST: CPT | Performed by: PATHOLOGY

## 2023-06-28 ENCOUNTER — TELEPHONE (OUTPATIENT)
Dept: UROLOGY | Facility: AMBULATORY SURGERY CENTER | Age: 63
End: 2023-06-28

## 2023-06-28 ENCOUNTER — OFFICE VISIT (OUTPATIENT)
Dept: UROLOGY | Facility: AMBULATORY SURGERY CENTER | Age: 63
End: 2023-06-28

## 2023-06-28 VITALS
SYSTOLIC BLOOD PRESSURE: 118 MMHG | HEIGHT: 69 IN | BODY MASS INDEX: 23.7 KG/M2 | DIASTOLIC BLOOD PRESSURE: 84 MMHG | WEIGHT: 160 LBS | HEART RATE: 77 BPM | OXYGEN SATURATION: 98 %

## 2023-06-28 DIAGNOSIS — C64.1 MALIGNANT NEOPLASM OF RIGHT KIDNEY (HCC): Primary | ICD-10-CM

## 2023-06-28 PROCEDURE — 99024 POSTOP FOLLOW-UP VISIT: CPT | Performed by: UROLOGY

## 2023-06-28 NOTE — TELEPHONE ENCOUNTER
Please advise on what day pt can be scheduled per provider    Provider:  Return in about 2 months (around 8/28/2023) for BMP, CT 2mo f/u      CT is scheduled for 8/28

## 2023-06-28 NOTE — Clinical Note
Kai Moreno, would you review pathology from nephrectomy and comment whether she should consider adjuvant pembro or alternative  I am planning CT c/a/p in a couple of months

## 2023-06-28 NOTE — PROGRESS NOTES
Patient is status post laparoscopic right nephrectomy on 6/21/2023  Postoperative course was complicated by pain syndrome requiring intervention from pain service  She is doing better now  She also was not assisted with ambulation for the first few days postoperatively  This led to postoperative ileus  She is doing well with good bowel function  She has been taking Colace intermittently due to intermittent loose stools  She was initially diuresed in the hospital   She received conflicting recommendation regarding fluid intake  On examination abdomen is soft, nondistended  There is alex-incisional tenderness  There is slight ecchymosis around the right lower quadrant incision  There is no surgical glue on this incision as she has been placing lidocaine patches which likely removed the glue  Smaller laparoscopic incisions are well-healed  There is no incisional hernia  No peripheral edema  We reviewed her pathology indicating high-grade, stage III renal cell carcinoma with invasion of renal sinus and vessels  We discussed the implications and prognosis  Would start with short-term imaging in the next few months with CT chest, abdomen, pelvis  If creatinine allows, would ideally perform scans with IV contrast   She does have follow-up with her PCP already scheduled and should be scheduled as well as with nephrology  I will also inquire with oncology whether adjuvant therapy such as Laura Scott would be appropriate in her case versus imaging surveillance  All questions answered to their satisfaction and they agree with the plan

## 2023-06-28 NOTE — TELEPHONE ENCOUNTER
Spoke to patient and appointment has been scheduled with Dr Derrick Perales on September 15 in the [place] office  Location confirmed

## 2023-06-29 ENCOUNTER — OFFICE VISIT (OUTPATIENT)
Dept: INTERNAL MEDICINE CLINIC | Facility: CLINIC | Age: 63
End: 2023-06-29
Payer: MEDICARE

## 2023-06-29 ENCOUNTER — TELEPHONE (OUTPATIENT)
Dept: NEPHROLOGY | Facility: CLINIC | Age: 63
End: 2023-06-29

## 2023-06-29 VITALS
DIASTOLIC BLOOD PRESSURE: 82 MMHG | SYSTOLIC BLOOD PRESSURE: 116 MMHG | HEIGHT: 69 IN | WEIGHT: 160 LBS | RESPIRATION RATE: 15 BRPM | BODY MASS INDEX: 23.7 KG/M2 | OXYGEN SATURATION: 99 % | HEART RATE: 63 BPM

## 2023-06-29 DIAGNOSIS — C64.1 RENAL CELL CARCINOMA OF RIGHT KIDNEY (HCC): ICD-10-CM

## 2023-06-29 DIAGNOSIS — Z13.820 SCREENING FOR OSTEOPOROSIS: ICD-10-CM

## 2023-06-29 DIAGNOSIS — G57.71 COMPLEX REGIONAL PAIN SYNDROME TYPE 2 OF RIGHT LOWER EXTREMITY: ICD-10-CM

## 2023-06-29 DIAGNOSIS — E78.2 MODERATE MIXED HYPERLIPIDEMIA NOT REQUIRING STATIN THERAPY: ICD-10-CM

## 2023-06-29 DIAGNOSIS — F33.0 MDD (MAJOR DEPRESSIVE DISORDER), RECURRENT EPISODE, MILD (HCC): ICD-10-CM

## 2023-06-29 DIAGNOSIS — I10 ESSENTIAL HYPERTENSION: Primary | ICD-10-CM

## 2023-06-29 DIAGNOSIS — Z78.0 ASYMPTOMATIC MENOPAUSAL STATE: ICD-10-CM

## 2023-06-29 DIAGNOSIS — E55.9 VITAMIN D DEFICIENCY: ICD-10-CM

## 2023-06-29 DIAGNOSIS — C90.00 MULTIPLE MYELOMA NOT HAVING ACHIEVED REMISSION (HCC): ICD-10-CM

## 2023-06-29 DIAGNOSIS — E61.1 IRON DEFICIENCY: ICD-10-CM

## 2023-06-29 DIAGNOSIS — Z12.31 ENCOUNTER FOR SCREENING MAMMOGRAM FOR MALIGNANT NEOPLASM OF BREAST: ICD-10-CM

## 2023-06-29 PROCEDURE — 99496 TRANSJ CARE MGMT HIGH F2F 7D: CPT | Performed by: INTERNAL MEDICINE

## 2023-06-29 NOTE — PROGRESS NOTES
Assessment/Plan:    TCM Call     Date and time call was made  6/26/2023  9:23 AM    Hospital care reviewed  Records reviewed    Patient was hospitialized at  [facility]    Date of Admission  06/21/23    Date of discharge  06/25/23    Diagnosis  Renal cell carcinoma of right kidney    Disposition  Home    Current Symptoms  Back pain - right side    Back pain, right side, severity  Moderate    Back pain, right side, onset  Ongoing      TCM Call     Post hospital issues  None    Scheduled for follow up?   Yes    Not clinically warranted  PLANNED SURGERY    Did you obtain your prescribed medications  Yes    Do you need help managing your prescriptions or medications  No    Is transportation to your appointment needed  No    I have advised the patient to call PCP with any new or worsening symptoms  Lia Hollingsworth MA    [address]   Family    The type of support provided  Emotional    Do you have social support  Yes, as much as I need    Are you recieving any outpatient services  No    Are you recieving home care services  No    Are you using any community resources  No    Current waiver services  No    Have you fallen in the last 12 months  No    Interperter language line needed  No    Counseling  Caregiver    Counseling topics  Activities of daily living        #Renal Cell Ca  -found incidentially over right side due to imaging for pneumonia  -pneumonia has resolved  -underwent right laparoscopic nephrectomy 6/21/23  -seeing urology  -EMILY has resolved  -remains on losartan  -unable to tolerate tylenol due to liver enzymes, unable to take NSAIDs due to single kidney  -continue tramadol for pain  -urology has reached out to oncology to see if patient will need supplemental treatment such as [place]  -CT A/P pending for 8/28/23  -CXR left basilar opacity  -ECHO EF 55% trace MVR, trace AVR    #Nasal Drainage  -encouraged to followup ENT since symptoms persist despite sinus surgery    No problem-specific Assessment & Plan notes found for this encounter  Diagnoses and all orders for this visit:    Essential hypertension  -     CBC and differential; Future  -     Comprehensive metabolic panel; Future    Complex regional pain syndrome type 2 of right lower extremity  -     CBC and differential; Future  -     Comprehensive metabolic panel; Future    Multiple myeloma not having achieved remission (HCC)  -     CBC and differential; Future  -     Comprehensive metabolic panel; Future  -     LD,Blood; Future  -     Protein electrophoresis, serum; Future  -     IgG, IgA, IgM; Future  -     Immunoglobulin free LT chains blood; Future    Renal cell carcinoma of right kidney (HCC)  -     CBC and differential; Future  -     Comprehensive metabolic panel; Future    Moderate mixed hyperlipidemia not requiring statin therapy  -     Lipid Panel with Direct LDL reflex; Future    Vitamin D deficiency  -     Vitamin D 25 hydroxy; Future    Iron deficiency  -     Iron, TIBC and Ferritin Panel; Future    Encounter for screening mammogram for malignant neoplasm of breast  -     Mammo screening bilateral w 3d & cad; Future    Screening for osteoporosis  -     DXA bone density spine hip and pelvis; Future    MDD (major depressive disorder), recurrent episode, mild (HCC)    Asymptomatic menopausal state  -     DXA bone density spine hip and pelvis;  Future            Current Outpatient Medications:   •  Cholecalciferol (VITAMIN D3) 2000 UNITS TABS, Take 2,000 Units by mouth daily  , Disp: , Rfl:   •  docusate sodium (COLACE) 100 mg capsule, Take 1 capsule (100 mg total) by mouth 2 (two) times a day, Disp: 30 capsule, Rfl: 0  •  estradiol (VAGIFEM, YUVAFEM) 10 MCG TABS vaginal tablet, INSERT 1 TABLET INTO THE VAGINA 2 TIMES A WEEK , Disp: 24 tablet, Rfl: 1  •  fenofibrate (FENOGLIDE) 120 MG TABS, Take 120 mg by mouth daily, Disp: , Rfl:   •  FLUoxetine (PROzac) 40 MG capsule, Take 1 capsule (40 mg total) by mouth daily, Disp: 90 capsule, Rfl: 1  •  HYDROmorphone (DILAUDID) 2 mg tablet, Take 1 tablet (2 mg total) by mouth every 4 (four) hours as needed for moderate pain for up to 10 days Max Daily Amount: 12 mg (Patient not taking: Reported on 6/28/2023), Disp: 20 tablet, Rfl: 0  •  lidocaine (LIDODERM) 5 %, lidocaine 5 % topical patch  APPLY 1 PATCH TO AFFECTED AREA FOR 12 HOURS A DAY & REMOVE FOR 12 HOURS BEFORE APPLYING NEXT PATCH  (12 HOURS ON, 12 HOURS OFF), Disp: , Rfl:   •  losartan (COZAAR) 50 mg tablet, Take 1 tablet (50 mg total) by mouth daily, Disp: 90 tablet, Rfl: 1  •  methocarbamol (ROBAXIN) 500 mg tablet, Take 1 tablet (500 mg total) by mouth every 6 (six) hours (Patient not taking: Reported on 6/28/2023), Disp: 30 tablet, Rfl: 0  •  mometasone (NASONEX) 50 mcg/act nasal spray, SPRAY 2 SPRAYS INTO EACH NOSTRIL EVERY DAY (Patient not taking: Reported on 6/28/2023), Disp: 42 g, Rfl: 3  •  senna (SENOKOT) 8 6 mg, Take 1 tablet (8 6 mg total) by mouth daily at bedtime (Patient not taking: Reported on 6/28/2023), Disp: 30 tablet, Rfl: 0  •  traMADol (Ultram) 50 mg tablet, Take 1 tablet (50 mg total) by mouth every 8 (eight) hours as needed for moderate pain, Disp: 30 tablet, Rfl: 0  •  traZODone (DESYREL) 50 mg tablet, Take 0 5-1 tablets (25-50 mg total) by mouth daily at bedtime as needed for sleep, Disp: 90 tablet, Rfl: 1  •  ursodiol (ACTIGALL) 300 mg capsule, TAKE 1 CAPSULE BY MOUTH THREE TIMES A DAY, Disp: 270 capsule, Rfl: 2  •  valACYclovir (VALTREX) 500 mg tablet, TAKE 2 TABLETS BY MOUTH EVERY DAY (Patient taking differently: As needed), Disp: 180 tablet, Rfl: 0    Subjective:      Patient ID: Martin Dial is a 60 y o  female  HPI   Patient presents for routine checkup  She underwent  A right nephrectomy laparoscopically on 6/21/2023 for renal cell cancer  He was noted to be stage III on pathology    She will follow-up with a repeat CT scan of the abdomen and pelvis in August   She denies any "discomfort aside from mild pain over the abdominal surgical sites  She will continue with tramadol as needed  Chest x-ray revealed left basilar density likely secondary from residual pneumonia  This is read improving  She did undergo an echo revealing EF of 55% with trace aortic valve and mitral valve regurgitation  Her creatinine is much improved down to creatinine 1 18  Her hemoglobin remains low at 10 7  We will continue to monitor her labs  She is awaiting to hear back from oncology if she needs to go for supplemental treatments  She will return to care in 4 months  The following portions of the patient's history were reviewed and updated as appropriate: allergies, current medications, past family history, past medical history, past social history, past surgical history and problem list     Review of Systems   Constitutional: Negative for activity change, appetite change, chills, fatigue and fever  HENT: Negative for congestion, ear pain, facial swelling, hearing loss, sore throat, tinnitus and trouble swallowing  Eyes: Negative for photophobia and visual disturbance  Respiratory: Negative for cough, shortness of breath and wheezing  Cardiovascular: Negative for chest pain and leg swelling  Gastrointestinal: Positive for abdominal distention and abdominal pain  Negative for blood in stool, constipation, diarrhea, nausea and vomiting  Genitourinary: Negative for difficulty urinating, dysuria and pelvic pain  Musculoskeletal: Positive for arthralgias  Negative for back pain, gait problem, joint swelling, myalgias, neck pain and neck stiffness  Skin: Negative for rash and wound  Neurological: Negative for dizziness, tremors, light-headedness and headaches  Objective:      /82   Pulse 63   Resp 15   Ht 5' 9\" (1 753 m)   Wt 72 6 kg (160 lb)   LMP  (LMP Unknown)   SpO2 99%   BMI 23 63 kg/m²          Physical Exam  Vitals reviewed     Constitutional:       Appearance: " Normal appearance  She is well-developed  HENT:      Head: Normocephalic and atraumatic  Right Ear: Tympanic membrane, ear canal and external ear normal  There is no impacted cerumen  Left Ear: Tympanic membrane, ear canal and external ear normal  There is no impacted cerumen  Nose: Nose normal       Mouth/Throat:      Pharynx: Oropharynx is clear  No oropharyngeal exudate or posterior oropharyngeal erythema  Eyes:      Conjunctiva/sclera: Conjunctivae normal       Pupils: Pupils are equal, round, and reactive to light  Neck:      Thyroid: No thyromegaly  Vascular: No JVD  Cardiovascular:      Rate and Rhythm: Normal rate and regular rhythm  Pulses: Normal pulses  Heart sounds: Normal heart sounds  No murmur heard  Pulmonary:      Effort: Pulmonary effort is normal  No respiratory distress  Breath sounds: Normal breath sounds  No stridor  No wheezing, rhonchi or rales  Abdominal:      General: Bowel sounds are normal  There is no distension  Palpations: Abdomen is soft  There is no mass  Tenderness: There is abdominal tenderness (abdominal surgical scars)  There is no guarding or rebound  Musculoskeletal:         General: Tenderness (right foot) present  Normal range of motion  Cervical back: Normal range of motion and neck supple  Right lower leg: No edema  Left lower leg: No edema  Lymphadenopathy:      Cervical: No cervical adenopathy  Skin:     General: Skin is warm  Findings: No erythema or rash  Neurological:      Mental Status: She is alert and oriented to person, place, and time  Mental status is at baseline  Deep Tendon Reflexes: Reflexes are normal and symmetric  Psychiatric:         Mood and Affect: Mood normal          Behavior: Behavior normal          Thought Content:  Thought content normal          Judgment: Judgment normal            This note was completed in part utilizing m-modal fluency direct voice recognition software  Grammatical errors, random word insertion, spelling mistakes, and incomplete sentences may be an occasional consequence of the system secondary to software limitations, ambient noise and hardware issues  At the time of dictation, efforts were made to edit, clarify and /or correct errors  Please read the chart carefully and recognize, using context, where substitutions have occurred  If you have any questions or concerns about the context, text or information contained within the body of this dictation, please contact myself, the provider, for further clarification

## 2023-06-29 NOTE — TELEPHONE ENCOUNTER
Called pt to see if interested in scheduling hosp f/u - per last hospital note from nephrology an office follow up isn't totally needed   She will speak with family physician today and call the office back

## 2023-07-07 ENCOUNTER — TELEPHONE (OUTPATIENT)
Dept: HEMATOLOGY ONCOLOGY | Facility: CLINIC | Age: 63
End: 2023-07-07

## 2023-07-07 NOTE — TELEPHONE ENCOUNTER
Patient Call    Who are you speaking with? Patient    If it is not the patient, are they listed on an active communication consent form? N/A   What is the reason for this call? The patient is calling to see if Dr. Thalia Chase has talked to Dr. Silvia Holden about what they would like to do with the patient moving forward. If she should be starting [facility] or not. Does this require a call back? Yes   If a call back is required, please list [facility] call back number [phone]   If a call back is required, advise that a message will be forwarded to their care team and someone will return their call as soon as possible. Did you relay this information to the patient?  Yes

## 2023-07-10 NOTE — TELEPHONE ENCOUNTER
Spoke with patient, she is agreeable to follow up on 7/12 10:20 AM [facility] to discuss [address].

## 2023-07-12 ENCOUNTER — TELEPHONE (OUTPATIENT)
Dept: GYNECOLOGIC ONCOLOGY | Facility: CLINIC | Age: 63
End: 2023-07-12

## 2023-07-12 ENCOUNTER — OFFICE VISIT (OUTPATIENT)
Dept: HEMATOLOGY ONCOLOGY | Facility: CLINIC | Age: 63
End: 2023-07-12
Payer: MEDICARE

## 2023-07-12 VITALS
BODY MASS INDEX: 22.84 KG/M2 | WEIGHT: 154.2 LBS | HEIGHT: 69 IN | DIASTOLIC BLOOD PRESSURE: 88 MMHG | OXYGEN SATURATION: 97 % | HEART RATE: 80 BPM | RESPIRATION RATE: 17 BRPM | SYSTOLIC BLOOD PRESSURE: 142 MMHG | TEMPERATURE: 97.9 F

## 2023-07-12 DIAGNOSIS — C64.1 RENAL CELL CARCINOMA OF RIGHT KIDNEY (HCC): Primary | ICD-10-CM

## 2023-07-12 DIAGNOSIS — D47.2 SMOLDERING MULTIPLE MYELOMA (SMM): Chronic | ICD-10-CM

## 2023-07-12 PROCEDURE — 99215 OFFICE O/P EST HI 40 MIN: CPT | Performed by: INTERNAL MEDICINE

## 2023-07-12 RX ORDER — SODIUM CHLORIDE 9 MG/ML
20 INJECTION, SOLUTION INTRAVENOUS ONCE
OUTPATIENT
Start: 2023-09-05

## 2023-07-12 RX ORDER — SODIUM CHLORIDE 9 MG/ML
20 INJECTION, SOLUTION INTRAVENOUS ONCE
OUTPATIENT
Start: 2023-08-15

## 2023-07-12 NOTE — PROGRESS NOTES
Hematology Outpatient Follow - Up Note  Christine Jeffries 60 y.o. female MRN: @ Encounter: [id number]        Date:  7/12/2023        Assessment/ Plan:    #1. Smoldering multiple myeloma, IgG kappa bone marrow biopsy showed 15% plasma cells with normal cytogenetics and FISH panel for multiple myeloma diagnosed in May 2017 she had been on watchful observation no evidence of endorgan damage  2. Primary biliary cirrhosis of the liver with persistent elevation of alkaline phosphatase and intermittent pruritus  3. Recent diagnosis with renal cell carcinoma of the right side, clear cell subtype, stage III, primary 5.1 cm, unifocal, grade 1, with renal vein involvement, all margins are negative    We talk about adjuvant treatment with pembrolizumab for high risk stage II or stage III renal cell carcinoma    We talk about increase in disease progression free survival 77% for Keytruda versus 68% for placebo in 2 years    After discussion she decided on pembrolizumab 200 mg every 3 weeks for total of 1 year    However the patient has autoimmune primary biliary cirrhosis we have to watch very carefully for autoimmune side effects such as hepatitis, pancreatitis, dermatitis, pneumonitis, adrenal insufficiency, colitis etc., she signed the consent    We will check TSH, T3 every other treatment    If more pruritus we might use prednisone        Labs and imaging studies are reviewed by ordering provider once results are available. If there are findings that need immediate attention, you will be contacted when results available. Discussing results and the implication on your healthcare is best discussed in person at your follow-up visit.        HPI:    68-year-old  female with past medical history of primary biliary cirrhosis, fracture of the right tibia, hypertension, OCD,worsening of osteoporosis on DEXA scan Done in 2016, nephrolithiasis, chronic lower back pain and possible sacroiliitis was found to have elevated total protein 3-4 years ago, serum protein electrophoresis showed IgG kappa monoclonal protein of 1.7 g/dL however no evidence of anemia, hypercalcemia, or renal insufficiency, she had persistent elevation of alkaline phosphatase secondary to PBC  had a bone scan done last year which showed activity in the ribs area  serum protein electrophoresis in April 2017 showed IgG kappa monoclonal protein of 1.96 g/dL, total protein 8.5, albumin 3.9, IgG 2580, creatinine 0.8, calcium 8.9, alkaline phosphatase 294, AST 37, ALT 46, IgM 25 in July 2016 monoclonal protein of IgG kappa of 1.73  C-reactive protein has been normal however sedimentation rate was elevated in the range of 60  Bone marrow biopsy in May 2017 showed 15% plasma cells in diffuse and interstitial pattern, normal fish panel for multiple myeloma and cytogenetics  Status post right knee replacement in June 2018  Exacerbation of anxiety/depression followed by psychiatric medicine     Osteoporosis she received Zometa 4 doses the lost dose on April 2019     Primary biliary cirrhosis followed by GI       Colonoscopy on 06/2021 showed mild diverticulosis      DEXA scan on 08/2021 showed recurrence of osteoporosis in the lumbar spine     She is on Zometa every 6 months  Interval History: Right renal mass status post right nephrectomy on 6/21/2023 showing clear-cell renal cell carcinoma, unifocal, 5.1 cm, grade 1 with extension into the renal vein, margins clear stage III (P T3a, PN X, grade 1, M0)      Pathology:    Final Diagnosis   A. Kidney, Right, nephrectomy:  - Clear cell renal cell carcinoma. See comment and synoptic report. - Lymphovascular invasion of large renal sinus vessels is present.     Comment: There is a small focus of tumor present in perinephric fat without apparent blood vessel or tissue reaction. This is favored to represent artifactual displacement.     8th Ed AJCC Tumor Stage:  at least Stage III - pT3a, pNX, G1.           Test Results:    Imaging: US bedside procedure    Result Date: 6/26/2023  Narrative: [id number]. [id number]    Echo complete w/ contrast if indicated    Result Date: 6/23/2023  Narrative: •  Left Ventricle: Left ventricular cavity size is normal. Wall thickness is normal. The left ventricular ejection fraction is 55%. Systolic function is normal. Wall motion is normal. Diastolic function is normal. •  Right Ventricle: Right ventricular cavity size is normal. Systolic function is normal. •  Aortic Valve: There is trace regurgitation. •  Mitral Valve: There is trace regurgitation. XR chest portable    Result Date: 6/23/2023  Narrative: CHEST INDICATION:   Shortness of breath tubular breath sounds rule out CHF. COMPARISON: 5/13/2023 EXAM PERFORMED/VIEWS:  XR CHEST PORTABLE FINDINGS: Cardiomediastinal silhouette appears unremarkable. There is suboptimal inspiratory effort. There is a left basal density likely reflecting pleural effusion and/or parenchymal consolidation. There are right infrahilar airspace opacities, possibly hypoventilatory due to poor inspiratory effort. No pneumothorax is noted. No acute osseous abnormality identified within limitations of portable radiography,     Impression: No acute cardiopulmonary disease. Suboptimal inspiratory effort. Left basilar density, likely small pleural effusion and/or parenchymal consolidation. Right infrahilar opacities, possibly hypoventilatory but nonspecific.  Workstation performed: [id number]       Labs:   Lab Results   Component Value Date    WBC 4.76 06/25/2023    HGB 10.7 (L) 06/25/2023    HCT 32.5 (L) 06/25/2023    MCV 92 06/25/2023     06/25/2023     Lab Results   Component Value Date     12/29/2015    K 3.5 06/25/2023     06/25/2023    CO2 33 (H) 06/25/2023    ANIONGAP 7 12/29/2015    BUN 19 06/25/2023    CREATININE 1.18 06/25/2023    GLUCOSE 134 12/29/2015    GLUF 100 (H) 05/30/2023    CALCIUM 9.3 06/25/2023    CORRECTEDCA 9.7 05/30/2023    AST 52 (H) 05/30/2023    ALT 66 05/30/2023    ALKPHOS 395 (H) 05/30/2023    PROT 8.4 (H) 12/29/2015    BILITOT 0.46 12/29/2015    EGFR 49 06/25/2023       Lab Results   Component Value Date    IRON 64 06/23/2023    TIBC 424 06/23/2023    FERRITIN 143 06/23/2023       Lab Results   Component Value Date    RLRJMYXR21 378 06/28/2022         ROS: Review of Systems   Constitutional: Negative for appetite change, chills, diaphoresis, fatigue and unexpected weight change. HENT:   Negative for mouth sores, nosebleeds, sore throat, trouble swallowing and voice change. Eyes: Negative for eye problems and icterus. Respiratory: Negative for chest tightness, cough, hemoptysis and wheezing. Cardiovascular: Negative for chest pain, leg swelling and palpitations. Gastrointestinal: Negative for abdominal distention, abdominal pain, blood in stool, constipation, diarrhea, nausea and vomiting. Endocrine: Negative for hot flashes. Genitourinary: Negative for bladder incontinence, difficulty urinating, dyspareunia, dysuria and frequency. Musculoskeletal: Negative for arthralgias, back pain, gait problem, neck pain and neck stiffness. Skin: Positive for itching. Negative for rash. Neurological: Negative for dizziness, gait problem, headaches, numbness, seizures and speech difficulty. Hematological: Negative for adenopathy. Does not bruise/bleed easily. Psychiatric/Behavioral: Negative for decreased concentration, depression, sleep disturbance and suicidal ideas. The patient is not nervous/anxious. Current Medications: Reviewed  Allergies: Reviewed  PMH/FH/SH:  Reviewed      Physical Exam:    Body surface area is 1.85 meters squared.     Wt Readings from Last 3 Encounters:   07/12/23 69.9 kg (154 lb 3.2 oz)   06/29/23 72.6 kg (160 lb)   06/28/23 72.6 kg (160 lb)        Temp Readings from Last 3 Encounters:   07/12/23 97.9 °F (36.6 °C) (Temporal)   06/25/23 (!) 97.4 °F (36.3 °C)   06/02/23 97.5 °F (36.4 °C) (Temporal)        BP Readings from Last 3 Encounters:   23 142/88   23 116/82   23 118/84         Pulse Readings from Last 3 Encounters:   23 80   23 63   23 77        Physical Exam  Vitals reviewed. Constitutional:       General: She is not in acute distress. Appearance: She is well-developed. She is not diaphoretic. HENT:      Head: Normocephalic and atraumatic. Eyes:      Conjunctiva/sclera: Conjunctivae normal.   Neck:      Trachea: No tracheal deviation. Cardiovascular:      Rate and Rhythm: Normal rate and regular rhythm. Heart sounds: No murmur heard. No friction rub. No gallop. Pulmonary:      Effort: Pulmonary effort is normal. No respiratory distress. Breath sounds: Normal breath sounds. No wheezing or rales. Chest:      Chest wall: No tenderness. Abdominal:      General: There is no distension. Palpations: Abdomen is soft. Tenderness: There is no abdominal tenderness. Musculoskeletal:      Cervical back: Normal range of motion and neck supple. Right lower leg: No edema. Left lower leg: No edema. Lymphadenopathy:      Cervical: No cervical adenopathy. Skin:     General: Skin is warm and dry. Coloration: Skin is not pale. Findings: No erythema. Neurological:      Mental Status: She is alert and oriented to person, place, and time. Psychiatric:         Behavior: Behavior normal.         Thought Content: Thought content normal.         Judgment: Judgment normal.         ECO  Goals and Barriers:  Current Goal: Minimize effects of disease. Barriers: None. Patient's Capacity to Self Care:  Patient is able to self care.     Code Status: [unfilled]

## 2023-07-12 NOTE — TELEPHONE ENCOUNTER
Please schedule patient for Keytruda at [facility].  No Mondays, prefers mid morning

## 2023-07-13 ENCOUNTER — TELEPHONE (OUTPATIENT)
Dept: HEMATOLOGY ONCOLOGY | Facility: CLINIC | Age: 63
End: 2023-07-13

## 2023-07-13 NOTE — TELEPHONE ENCOUNTER
Patient Call    Who are you speaking with? Patient    If it is not the patient, are they listed on an active communication consent form? N/A   What is the reason for this call? Pt said Clemente Little from the [facility] office gave her the number for [employer] for insurance. Pt called the number and couldn't get through. She wanted to confirm the number   Does this require a call back? Yes   If a call back is required, please list best call back number [phone]   If a call back is required, advise that a message will be forwarded to their care team and someone will return their call as soon as possible. Did you relay this information to the patient?  Yes

## 2023-07-15 PROBLEM — J18.9 PNEUMONIA DUE TO INFECTIOUS ORGANISM: Status: RESOLVED | Noted: 2023-05-14 | Resolved: 2023-07-15

## 2023-07-21 ENCOUNTER — HOSPITAL ENCOUNTER (OUTPATIENT)
Dept: RADIOLOGY | Age: 63
Discharge: HOME | End: 2023-07-21
Payer: MEDICARE

## 2023-07-21 DIAGNOSIS — C64.1 MALIGNANT NEOPLASM OF RIGHT KIDNEY (HCC): ICD-10-CM

## 2023-07-21 PROCEDURE — G1004 CDSM NDSC: HCPCS

## 2023-07-21 PROCEDURE — 74178 CT ABD&PLV WO CNTR FLWD CNTR: CPT

## 2023-07-21 PROCEDURE — 71260 CT THORAX DX C+: CPT

## 2023-07-21 RX ADMIN — IOHEXOL 100 ML: 350 INJECTION, SOLUTION INTRAVENOUS at 12:59

## 2023-07-25 ENCOUNTER — TELEPHONE (OUTPATIENT)
Dept: HEMATOLOGY ONCOLOGY | Facility: CLINIC | Age: 63
End: 2023-07-25

## 2023-07-25 NOTE — TELEPHONE ENCOUNTER
Patient Call    Who are you speaking with? Patient    If it is not the patient, are they listed on an active communication consent form? N/A   What is the reason for this call? Pt would like to speak to Lanie d'Ivoirrajesh regarding insurance issue   Does this require a call back? Yes   If a call back is required, please list best call back number [phone]   If a call back is required, advise that a message will be forwarded to their care team and someone will return their call as soon as possible. Did you relay this information to the patient?  Yes

## 2023-07-28 ENCOUNTER — TELEPHONE (OUTPATIENT)
Dept: UROLOGY | Facility: CLINIC | Age: 63
End: 2023-07-28

## 2023-07-28 NOTE — TELEPHONE ENCOUNTER
Relayed information to Jaylen Dias and she was concerned about surgical clips - explained they were purposely left in there to stitch up after surgery.

## 2023-07-28 NOTE — TELEPHONE ENCOUNTER
PT calling she has questions about her CT and would like to speak with a nurse.  Pt requesting a call back before the end of the day

## 2023-07-28 NOTE — TELEPHONE ENCOUNTER
No evidence of disease recurrence since right nephrectomy. 2 punctate cysts and nonobstructing calculus seen in left kidney, otherwise unremarkable. Additional incidental findings including nodule in right upper lobe of lung consistent with prior pneumonia and liver lesion seen. Patient should follow-up with PCP. She has an upcoming appointment scheduled with Dr. Davin Trejo in September.

## 2023-08-14 ENCOUNTER — TELEPHONE (OUTPATIENT)
Dept: INFUSION CENTER | Facility: CLINIC | Age: 63
End: 2023-08-14

## 2023-08-15 ENCOUNTER — APPOINTMENT (OUTPATIENT)
Dept: LAB | Facility: CLINIC | Age: 63
End: 2023-08-15
Payer: MEDICARE

## 2023-08-15 ENCOUNTER — HOSPITAL ENCOUNTER (OUTPATIENT)
Dept: INFUSION CENTER | Facility: CLINIC | Age: 63
Discharge: HOME | End: 2023-08-15
Payer: MEDICARE

## 2023-08-15 VITALS
DIASTOLIC BLOOD PRESSURE: 80 MMHG | BODY MASS INDEX: 23.92 KG/M2 | OXYGEN SATURATION: 98 % | WEIGHT: 162 LBS | RESPIRATION RATE: 16 BRPM | HEART RATE: 67 BPM | TEMPERATURE: 97.2 F | SYSTOLIC BLOOD PRESSURE: 134 MMHG

## 2023-08-15 DIAGNOSIS — C64.1 RENAL CELL CARCINOMA OF RIGHT KIDNEY (HCC): Primary | ICD-10-CM

## 2023-08-15 DIAGNOSIS — C64.1 RENAL CELL CARCINOMA OF RIGHT KIDNEY (HCC): ICD-10-CM

## 2023-08-15 LAB
ALBUMIN SERPL BCP-MCNC: 3.9 G/DL (ref 3.5–5)
ALP SERPL-CCNC: 227 U/L (ref 34–104)
ALT SERPL W P-5'-P-CCNC: 45 U/L (ref 7–52)
ANION GAP SERPL CALCULATED.3IONS-SCNC: 4 MMOL/L
AST SERPL W P-5'-P-CCNC: 43 U/L (ref 13–39)
BASOPHILS # BLD AUTO: 0.03 THOUSANDS/ÂΜL (ref 0–0.1)
BASOPHILS NFR BLD AUTO: 1 % (ref 0–1)
BILIRUB SERPL-MCNC: 0.62 MG/DL (ref 0.2–1)
BUN SERPL-MCNC: 28 MG/DL (ref 5–25)
CALCIUM SERPL-MCNC: 9.1 MG/DL (ref 8.4–10.2)
CHLORIDE SERPL-SCNC: 102 MMOL/L (ref 96–108)
CO2 SERPL-SCNC: 29 MMOL/L (ref 21–32)
CREAT SERPL-MCNC: 1.18 MG/DL (ref 0.6–1.3)
EOSINOPHIL # BLD AUTO: 0.15 THOUSAND/ÂΜL (ref 0–0.61)
EOSINOPHIL NFR BLD AUTO: 3 % (ref 0–6)
ERYTHROCYTE [DISTWIDTH] IN BLOOD BY AUTOMATED COUNT: 13.4 % (ref 11.6–15.1)
GFR SERPL CREATININE-BSD FRML MDRD: 49 ML/MIN/1.73SQ M
GLUCOSE P FAST SERPL-MCNC: 92 MG/DL (ref 65–99)
HCT VFR BLD AUTO: 37.9 % (ref 34.8–46.1)
HGB BLD-MCNC: 12.2 G/DL (ref 11.5–15.4)
IMM GRANULOCYTES # BLD AUTO: 0.02 THOUSAND/UL (ref 0–0.2)
IMM GRANULOCYTES NFR BLD AUTO: 0 % (ref 0–2)
LYMPHOCYTES # BLD AUTO: 1.64 THOUSANDS/ÂΜL (ref 0.6–4.47)
LYMPHOCYTES NFR BLD AUTO: 33 % (ref 14–44)
MCH RBC QN AUTO: 30.1 PG (ref 26.8–34.3)
MCHC RBC AUTO-ENTMCNC: 32.2 G/DL (ref 31.4–37.4)
MCV RBC AUTO: 94 FL (ref 82–98)
MONOCYTES # BLD AUTO: 0.37 THOUSAND/ÂΜL (ref 0.17–1.22)
MONOCYTES NFR BLD AUTO: 7 % (ref 4–12)
NEUTROPHILS # BLD AUTO: 2.78 THOUSANDS/ÂΜL (ref 1.85–7.62)
NEUTS SEG NFR BLD AUTO: 56 % (ref 43–75)
NRBC BLD AUTO-RTO: 0 /100 WBCS
PLATELET # BLD AUTO: 188 THOUSANDS/UL (ref 149–390)
PMV BLD AUTO: 10.6 FL (ref 8.9–12.7)
POTASSIUM SERPL-SCNC: 4.6 MMOL/L (ref 3.5–5.3)
PROT SERPL-MCNC: 8.4 G/DL (ref 6.4–8.4)
RBC # BLD AUTO: 4.05 MILLION/UL (ref 3.81–5.12)
SODIUM SERPL-SCNC: 135 MMOL/L (ref 135–147)
T3FREE SERPL-MCNC: 2.52 PG/ML (ref 2.5–3.9)
TSH SERPL DL<=0.05 MIU/L-ACNC: 1.35 UIU/ML (ref 0.45–4.5)
WBC # BLD AUTO: 4.99 THOUSAND/UL (ref 4.31–10.16)

## 2023-08-15 PROCEDURE — 84481 FREE ASSAY (FT-3): CPT

## 2023-08-15 PROCEDURE — 84443 ASSAY THYROID STIM HORMONE: CPT

## 2023-08-15 PROCEDURE — 85025 COMPLETE CBC W/AUTO DIFF WBC: CPT

## 2023-08-15 PROCEDURE — 80053 COMPREHEN METABOLIC PANEL: CPT

## 2023-08-15 PROCEDURE — 36415 COLL VENOUS BLD VENIPUNCTURE: CPT

## 2023-08-15 RX ORDER — SODIUM CHLORIDE 9 MG/ML
20 INJECTION, SOLUTION INTRAVENOUS ONCE
Status: COMPLETED | OUTPATIENT
Start: 2023-08-15 | End: 2023-08-15

## 2023-08-15 RX ADMIN — SODIUM CHLORIDE 20 ML/HR: 0.9 INJECTION, SOLUTION INTRAVENOUS at 10:47

## 2023-08-15 RX ADMIN — SODIUM CHLORIDE 200 MG: 9 INJECTION, SOLUTION INTRAVENOUS at 11:08

## 2023-08-15 NOTE — PROGRESS NOTES
Pt tolerated treatment well. Aware of next appt. AVS declined but appt calender given and pt is aware when she needs to gets labs prior to treatment. Verbalized understanding. Pt has mychart.

## 2023-08-15 NOTE — PROGRESS NOTES
Pt resting with no complaints, vitals stable, labs in process for treatment, call bell within reach. All questions answered and emotional support given. All needs met at this time.

## 2023-08-21 ENCOUNTER — APPOINTMENT (OUTPATIENT)
Dept: LAB | Facility: CLINIC | Age: 63
End: 2023-08-21
Payer: MEDICARE

## 2023-08-21 DIAGNOSIS — C64.1 MALIGNANT NEOPLASM OF RIGHT KIDNEY (HCC): ICD-10-CM

## 2023-08-21 DIAGNOSIS — C64.1 RENAL CELL CARCINOMA OF RIGHT KIDNEY (HCC): ICD-10-CM

## 2023-08-21 LAB
ALBUMIN SERPL BCP-MCNC: 3.3 G/DL (ref 3.5–5)
ALP SERPL-CCNC: 318 U/L (ref 46–116)
ALT SERPL W P-5'-P-CCNC: 100 U/L (ref 12–78)
ANION GAP SERPL CALCULATED.3IONS-SCNC: 5 MMOL/L
AST SERPL W P-5'-P-CCNC: 95 U/L (ref 5–45)
BASOPHILS # BLD AUTO: 0.03 THOUSANDS/ÂΜL (ref 0–0.1)
BASOPHILS NFR BLD AUTO: 1 % (ref 0–1)
BILIRUB SERPL-MCNC: 0.64 MG/DL (ref 0.2–1)
BUN SERPL-MCNC: 21 MG/DL (ref 5–25)
CALCIUM ALBUM COR SERPL-MCNC: 9.2 MG/DL (ref 8.3–10.1)
CALCIUM SERPL-MCNC: 8.6 MG/DL (ref 8.3–10.1)
CHLORIDE SERPL-SCNC: 105 MMOL/L (ref 96–108)
CO2 SERPL-SCNC: 26 MMOL/L (ref 21–32)
CREAT SERPL-MCNC: 1.34 MG/DL (ref 0.6–1.3)
EOSINOPHIL # BLD AUTO: 0.18 THOUSAND/ÂΜL (ref 0–0.61)
EOSINOPHIL NFR BLD AUTO: 4 % (ref 0–6)
ERYTHROCYTE [DISTWIDTH] IN BLOOD BY AUTOMATED COUNT: 13.5 % (ref 11.6–15.1)
GFR SERPL CREATININE-BSD FRML MDRD: 42 ML/MIN/1.73SQ M
GLUCOSE P FAST SERPL-MCNC: 93 MG/DL (ref 65–99)
HCT VFR BLD AUTO: 37.6 % (ref 34.8–46.1)
HGB BLD-MCNC: 12 G/DL (ref 11.5–15.4)
IMM GRANULOCYTES # BLD AUTO: 0.02 THOUSAND/UL (ref 0–0.2)
IMM GRANULOCYTES NFR BLD AUTO: 1 % (ref 0–2)
LYMPHOCYTES # BLD AUTO: 1.25 THOUSANDS/ÂΜL (ref 0.6–4.47)
LYMPHOCYTES NFR BLD AUTO: 28 % (ref 14–44)
MCH RBC QN AUTO: 29.8 PG (ref 26.8–34.3)
MCHC RBC AUTO-ENTMCNC: 31.9 G/DL (ref 31.4–37.4)
MCV RBC AUTO: 93 FL (ref 82–98)
MONOCYTES # BLD AUTO: 0.3 THOUSAND/ÂΜL (ref 0.17–1.22)
MONOCYTES NFR BLD AUTO: 7 % (ref 4–12)
NEUTROPHILS # BLD AUTO: 2.63 THOUSANDS/ÂΜL (ref 1.85–7.62)
NEUTS SEG NFR BLD AUTO: 59 % (ref 43–75)
NRBC BLD AUTO-RTO: 0 /100 WBCS
PLATELET # BLD AUTO: 184 THOUSANDS/UL (ref 149–390)
PMV BLD AUTO: 10.9 FL (ref 8.9–12.7)
POTASSIUM SERPL-SCNC: 4.1 MMOL/L (ref 3.5–5.3)
PROT SERPL-MCNC: 8.8 G/DL (ref 6.4–8.4)
RBC # BLD AUTO: 4.03 MILLION/UL (ref 3.81–5.12)
SODIUM SERPL-SCNC: 136 MMOL/L (ref 135–147)
WBC # BLD AUTO: 4.41 THOUSAND/UL (ref 4.31–10.16)

## 2023-08-21 PROCEDURE — 80053 COMPREHEN METABOLIC PANEL: CPT

## 2023-08-21 PROCEDURE — 36415 COLL VENOUS BLD VENIPUNCTURE: CPT

## 2023-08-21 PROCEDURE — 85025 COMPLETE CBC W/AUTO DIFF WBC: CPT

## 2023-08-24 ENCOUNTER — ANNUAL EXAM (OUTPATIENT)
Dept: OBGYN CLINIC | Facility: CLINIC | Age: 63
End: 2023-08-24
Payer: MEDICARE

## 2023-08-24 VITALS
BODY MASS INDEX: 23.54 KG/M2 | HEIGHT: 70 IN | SYSTOLIC BLOOD PRESSURE: 138 MMHG | WEIGHT: 164.4 LBS | DIASTOLIC BLOOD PRESSURE: 78 MMHG

## 2023-08-24 DIAGNOSIS — Z01.419 ENCOUNTER FOR ANNUAL ROUTINE GYNECOLOGICAL EXAMINATION: Primary | ICD-10-CM

## 2023-08-24 DIAGNOSIS — N94.10 DYSPAREUNIA, FEMALE: ICD-10-CM

## 2023-08-24 DIAGNOSIS — N95.2 VAGINAL ATROPHY: ICD-10-CM

## 2023-08-24 DIAGNOSIS — Z12.39 ENCOUNTER FOR SCREENING FOR MALIGNANT NEOPLASM OF BREAST, UNSPECIFIED SCREENING MODALITY: ICD-10-CM

## 2023-08-24 PROCEDURE — G0101 CA SCREEN;PELVIC/BREAST EXAM: HCPCS | Performed by: OBSTETRICS & GYNECOLOGY

## 2023-08-24 PROCEDURE — G0145 SCR C/V CYTO,THINLAYER,RESCR: HCPCS | Performed by: OBSTETRICS & GYNECOLOGY

## 2023-08-24 PROCEDURE — G0476 HPV COMBO ASSAY CA SCREEN: HCPCS | Performed by: OBSTETRICS & GYNECOLOGY

## 2023-08-24 NOTE — PROGRESS NOTES
Assessment/Plan:  Pap done as well annual.   encouraged self breast examination as well as calcium supplementation. Continue annual mammogram.  Reviewed colon cancer screening, up-to-date. Again discussed treatment options for symptomatic vaginal atrophy/dyspareunia. She will restart Vagifem 2 times per week, vaginal moisturizer 2 times per week. She will initiate vaginal dilators as needed. Also discussed possible consideration pelvic floor physical therapy. She will continue Valtrex as needed. Continue follow-up with oncology, urology, primary care as scheduled. Return to office in 1 year or as needed  No problem-specific Assessment & Plan notes found for this encounter. Diagnoses and all orders for this visit:    Encounter for annual routine gynecological examination    Vaginal atrophy    Dyspareunia, female    Encounter for screening for malignant neoplasm of breast, unspecified screening modality          Subjective:      Patient ID: Elliot Theodore is a 60 y.o. female. HPI   This is a very pleasant 76-year-old female G0 presents for GYN exam.  She went through menopause at age 47. She has never been on hormone replacement therapy. She denies any vaginal bleeding or spotting. No changes in bowel or bladder function. She is sexually active and has been in a monogamous relationship for 2 years. She has had significant vaginal dryness, discomfort with intercourse. She admits to being noncompliant with vaginal estrogen and vaginal moisturizer. At one point she was using a vaginal dilator with some improvement. Her partner is very supportive. More recently she underwent a right nephrectomy diagnosed with stage III clear-cell renal CA following up with oncology also for history of multiple myeloma.     6/2023 rt nephrectomy, clear cell renal ca    The following portions of the patient's history were reviewed and updated as appropriate: allergies, current medications, past family history, past medical history, past social history, past surgical history and problem list.    Review of Systems   Constitutional: Negative for fatigue, fever and unexpected weight change. Respiratory: Negative for cough, chest tightness, shortness of breath and wheezing. Cardiovascular: Negative. Negative for chest pain and palpitations. Gastrointestinal: Negative. Negative for abdominal distention, abdominal pain, blood in stool, constipation, diarrhea, nausea and vomiting. Genitourinary: Negative. Negative for difficulty urinating, dyspareunia, dysuria, flank pain, frequency, genital sores, hematuria, pelvic pain, urgency, vaginal bleeding, vaginal discharge and vaginal pain. Skin: Negative for rash. Objective:      /78   Ht 5' 9.5" (1.765 m)   Wt 74.6 kg (164 lb 6.4 oz)   LMP  (LMP Unknown)   BMI 23.93 kg/m²          Physical Exam  Constitutional:       Appearance: Normal appearance. She is well-developed. Cardiovascular:      Rate and Rhythm: Normal rate and regular rhythm. Pulmonary:      Effort: Pulmonary effort is normal.      Breath sounds: Normal breath sounds. Chest:   Breasts:     Right: No inverted nipple, mass, nipple discharge, skin change or tenderness. Left: No inverted nipple, mass, nipple discharge, skin change or tenderness. Abdominal:      General: Bowel sounds are normal. There is no distension. Palpations: Abdomen is soft. Tenderness: There is no abdominal tenderness. There is no guarding or rebound. Genitourinary:     Labia:         Right: No rash, tenderness or lesion. Left: No rash, tenderness or lesion. Vagina: Normal. No signs of injury. No vaginal discharge or tenderness. Cervix: No cervical motion tenderness, discharge, friability, lesion, erythema or cervical bleeding. Uterus: Not enlarged and not tender. Adnexa:         Right: No mass, tenderness or fullness. Left: No mass, tenderness or fullness. Neurological:      Mental Status: She is alert and oriented to person, place, and time. External genitalia is within normal limits. The vagina is evident of estrogen deficiency. Cervix is small nulliparous. No pelvic floor prolapse.

## 2023-08-28 LAB
HPV HR 12 DNA CVX QL NAA+PROBE: NEGATIVE
HPV16 DNA CVX QL NAA+PROBE: NEGATIVE
HPV18 DNA CVX QL NAA+PROBE: NEGATIVE

## 2023-08-30 LAB
LAB AP GYN PRIMARY INTERPRETATION: NORMAL
Lab: NORMAL

## 2023-09-01 ENCOUNTER — APPOINTMENT (OUTPATIENT)
Dept: LAB | Facility: CLINIC | Age: 63
End: 2023-09-01
Payer: MEDICARE

## 2023-09-01 DIAGNOSIS — C64.1 RENAL CELL CARCINOMA OF RIGHT KIDNEY (HCC): ICD-10-CM

## 2023-09-01 LAB
ALBUMIN SERPL BCP-MCNC: 3.8 G/DL (ref 3.5–5)
ALP SERPL-CCNC: 196 U/L (ref 34–104)
ALT SERPL W P-5'-P-CCNC: 44 U/L (ref 7–52)
ANION GAP SERPL CALCULATED.3IONS-SCNC: 9 MMOL/L
AST SERPL W P-5'-P-CCNC: 50 U/L (ref 13–39)
BASOPHILS # BLD AUTO: 0.03 THOUSANDS/ÂΜL (ref 0–0.1)
BASOPHILS NFR BLD AUTO: 1 % (ref 0–1)
BILIRUB SERPL-MCNC: 0.7 MG/DL (ref 0.2–1)
BUN SERPL-MCNC: 23 MG/DL (ref 5–25)
CALCIUM SERPL-MCNC: 9.1 MG/DL (ref 8.4–10.2)
CHLORIDE SERPL-SCNC: 100 MMOL/L (ref 96–108)
CO2 SERPL-SCNC: 25 MMOL/L (ref 21–32)
CREAT SERPL-MCNC: 1.29 MG/DL (ref 0.6–1.3)
EOSINOPHIL # BLD AUTO: 0.17 THOUSAND/ÂΜL (ref 0–0.61)
EOSINOPHIL NFR BLD AUTO: 4 % (ref 0–6)
ERYTHROCYTE [DISTWIDTH] IN BLOOD BY AUTOMATED COUNT: 13.5 % (ref 11.6–15.1)
GFR SERPL CREATININE-BSD FRML MDRD: 44 ML/MIN/1.73SQ M
GLUCOSE P FAST SERPL-MCNC: 72 MG/DL (ref 65–99)
HCT VFR BLD AUTO: 37.8 % (ref 34.8–46.1)
HGB BLD-MCNC: 12.2 G/DL (ref 11.5–15.4)
IMM GRANULOCYTES # BLD AUTO: 0.03 THOUSAND/UL (ref 0–0.2)
IMM GRANULOCYTES NFR BLD AUTO: 1 % (ref 0–2)
LYMPHOCYTES # BLD AUTO: 1.4 THOUSANDS/ÂΜL (ref 0.6–4.47)
LYMPHOCYTES NFR BLD AUTO: 29 % (ref 14–44)
MCH RBC QN AUTO: 30.5 PG (ref 26.8–34.3)
MCHC RBC AUTO-ENTMCNC: 32.3 G/DL (ref 31.4–37.4)
MCV RBC AUTO: 95 FL (ref 82–98)
MONOCYTES # BLD AUTO: 0.33 THOUSAND/ÂΜL (ref 0.17–1.22)
MONOCYTES NFR BLD AUTO: 7 % (ref 4–12)
NEUTROPHILS # BLD AUTO: 2.8 THOUSANDS/ÂΜL (ref 1.85–7.62)
NEUTS SEG NFR BLD AUTO: 58 % (ref 43–75)
NRBC BLD AUTO-RTO: 0 /100 WBCS
PLATELET # BLD AUTO: 194 THOUSANDS/UL (ref 149–390)
PMV BLD AUTO: 11.3 FL (ref 8.9–12.7)
POTASSIUM SERPL-SCNC: 4.6 MMOL/L (ref 3.5–5.3)
PROT SERPL-MCNC: 8.4 G/DL (ref 6.4–8.4)
RBC # BLD AUTO: 4 MILLION/UL (ref 3.81–5.12)
SODIUM SERPL-SCNC: 134 MMOL/L (ref 135–147)
T3FREE SERPL-MCNC: 2.96 PG/ML (ref 2.5–3.9)
TSH SERPL DL<=0.05 MIU/L-ACNC: 1.9 UIU/ML (ref 0.45–4.5)
WBC # BLD AUTO: 4.76 THOUSAND/UL (ref 4.31–10.16)

## 2023-09-01 PROCEDURE — 84443 ASSAY THYROID STIM HORMONE: CPT

## 2023-09-01 PROCEDURE — 80053 COMPREHEN METABOLIC PANEL: CPT

## 2023-09-01 PROCEDURE — 84481 FREE ASSAY (FT-3): CPT

## 2023-09-01 PROCEDURE — 85025 COMPLETE CBC W/AUTO DIFF WBC: CPT

## 2023-09-01 PROCEDURE — 36415 COLL VENOUS BLD VENIPUNCTURE: CPT

## 2023-09-05 ENCOUNTER — HOSPITAL ENCOUNTER (OUTPATIENT)
Dept: INFUSION CENTER | Facility: CLINIC | Age: 63
Discharge: HOME | End: 2023-09-05
Payer: MEDICARE

## 2023-09-05 VITALS
OXYGEN SATURATION: 98 % | DIASTOLIC BLOOD PRESSURE: 76 MMHG | HEART RATE: 72 BPM | SYSTOLIC BLOOD PRESSURE: 130 MMHG | TEMPERATURE: 97 F | RESPIRATION RATE: 18 BRPM

## 2023-09-05 DIAGNOSIS — C64.1 RENAL CELL CARCINOMA OF RIGHT KIDNEY (HCC): Primary | ICD-10-CM

## 2023-09-05 PROCEDURE — 96413 CHEMO IV INFUSION 1 HR: CPT

## 2023-09-05 RX ORDER — SODIUM CHLORIDE 9 MG/ML
20 INJECTION, SOLUTION INTRAVENOUS ONCE
Status: COMPLETED | OUTPATIENT
Start: 2023-09-05 | End: 2023-09-05

## 2023-09-05 RX ADMIN — SODIUM CHLORIDE 200 MG: 9 INJECTION, SOLUTION INTRAVENOUS at 10:58

## 2023-09-05 RX ADMIN — SODIUM CHLORIDE 20 ML/HR: 0.9 INJECTION, SOLUTION INTRAVENOUS at 10:27

## 2023-09-05 NOTE — PATIENT INSTRUCTIONS
September 2023 Sunday Monday Tuesday Wednesday Thursday Friday Saturday                            1    LAB WALK IN  12:10 PM   (5 min.)   WILBUR MENJIVAR CHAIR   [facility] - [address] 2                3     4     5    INF ONCOLOGY TX-TREATMENT PLAN  10:00 AM   (120 min.)   AN INF BED 6   [address] [facility] 6     7     8     9         Cycle 2, Day 1       10     11     12     13     14     15    OFFICE VISIT LONG PG  10:45 AM   (30 min.)   Martina Tran MD   [facility] [address]                17     18     19     20     21     22     23                24     25     26    INF ONCOLOGY TX-TREATMENT PLAN  11:00 AM   (120 min.)   [facility] [address] [facility]     28     29     30         Cycle 3, Day 1              Treatment Details         9/5/2023 - Cycle 2, Day 1      Chemotherapy: ONCBCN PROVIDER COMMUNICATION, PEMBROLIZUMAB IVPB    9/26/2023 - Cycle 3, Day 1      Chemotherapy: ONCBCN PROVIDER COMMUNICATION, PEMBROLIZUMAB IVPB

## 2023-09-05 NOTE — PROGRESS NOTES
Patient here for [facility] and is well, no medication/allergy changes. Patient does c/o of a rash to both arms and the back of both thighs since her last dosing. Rash noted as raised bumps, no redness, patient describes intermittent pruritus. Reported to Simon Shannon for Dr. Gui Abarca, pictures sent. Ok to proceed with treatment and per Hem/Onc, patient to call the office if her rashes worsen after today's treatment. Patient verbalized understanding.

## 2023-09-06 ENCOUNTER — TELEPHONE (OUTPATIENT)
Dept: HEMATOLOGY ONCOLOGY | Facility: CLINIC | Age: 63
End: 2023-09-06

## 2023-09-06 DIAGNOSIS — C64.1 RENAL CELL CARCINOMA OF RIGHT KIDNEY (HCC): Primary | ICD-10-CM

## 2023-09-06 DIAGNOSIS — R21 RASH: ICD-10-CM

## 2023-09-06 RX ORDER — PREDNISONE 10 MG/1
10 TABLET ORAL DAILY
Qty: 30 TABLET | Refills: 0 | Status: SHIPPED | OUTPATIENT
Start: 2023-09-06

## 2023-09-06 NOTE — TELEPHONE ENCOUNTER
Received from voice mail:    "Hi, my name is Mateusz Painter. I'm a patient of Doctor Micah. I only see him. I had my second Keytruda infusion yesterday and they sent up pictures of the rash I have all over my arms. But I do also have a rash somewhat on my legs, a different type. This all [facility] They were OK yesterday to give me the 2nd Keytruda, but I was supposed to watch the rash to see if got worse and if I itched yesterday at the site where they injected the infusion went in it. It's the entire time and the good majority of the day. Yesterday as I fell asleep, I was wondering if Doctor Violet Chandler wants to see this or what I need to do about it. I don't want it to get worse and I am going away on vacation starting next Wednesday. You talked with Doctor Lacie Stahl and find out what I should do. My number is [phone].  Thank you."

## 2023-09-06 NOTE — TELEPHONE ENCOUNTER
Per Clemente Delvalle, patient to start 10 mg Prednisone PO daily for 30 days and then reevaluate. Patient instructed to take with food to avoid stomach upset. Patient educated about side effects- infection, mood swings, food retention, and increased appetite. Script pended to provider for review.

## 2023-09-19 RX ORDER — SODIUM CHLORIDE 9 MG/ML
20 INJECTION, SOLUTION INTRAVENOUS ONCE
Status: CANCELLED | OUTPATIENT
Start: 2023-09-26

## 2023-09-21 ENCOUNTER — TELEPHONE (OUTPATIENT)
Dept: HEMATOLOGY ONCOLOGY | Facility: CLINIC | Age: 63
End: 2023-09-21

## 2023-09-21 DIAGNOSIS — M81.8 OTHER OSTEOPOROSIS WITHOUT CURRENT PATHOLOGICAL FRACTURE: Primary | ICD-10-CM

## 2023-09-21 RX ORDER — SODIUM CHLORIDE 9 MG/ML
20 INJECTION, SOLUTION INTRAVENOUS ONCE
OUTPATIENT
Start: 2023-10-17

## 2023-09-21 NOTE — TELEPHONE ENCOUNTER
Per Dr. Ella Simmons pt needs updated Dexa Scan. Order was placed by PCP. VM left for patient, asking for a call back to discuss if DEXA scan was completed at a different facility. Stated Dr. Ella Simmons is recommended an updated scan at this time. Asked for a call back to discuss. My direct call back was provided. Will await return call.

## 2023-09-25 ENCOUNTER — TELEPHONE (OUTPATIENT)
Dept: HEMATOLOGY ONCOLOGY | Facility: CLINIC | Age: 63
End: 2023-09-25

## 2023-09-25 ENCOUNTER — APPOINTMENT (OUTPATIENT)
Dept: LAB | Facility: CLINIC | Age: 63
End: 2023-09-25
Payer: MEDICARE

## 2023-09-25 DIAGNOSIS — D47.2 SMOLDERING MULTIPLE MYELOMA (SMM): ICD-10-CM

## 2023-09-25 DIAGNOSIS — C64.1 RENAL CELL CARCINOMA OF RIGHT KIDNEY (HCC): Primary | ICD-10-CM

## 2023-09-25 DIAGNOSIS — R53.0 NEOPLASTIC (MALIGNANT) RELATED FATIGUE: ICD-10-CM

## 2023-09-25 DIAGNOSIS — C64.1 RENAL CELL CARCINOMA OF RIGHT KIDNEY (HCC): ICD-10-CM

## 2023-09-25 LAB
ALBUMIN SERPL BCP-MCNC: 3.9 G/DL (ref 3.5–5)
ALP SERPL-CCNC: 145 U/L (ref 34–104)
ALT SERPL W P-5'-P-CCNC: 62 U/L (ref 7–52)
ANION GAP SERPL CALCULATED.3IONS-SCNC: 7 MMOL/L
AST SERPL W P-5'-P-CCNC: 53 U/L (ref 13–39)
BASOPHILS # BLD AUTO: 0.04 THOUSANDS/ÂΜL (ref 0–0.1)
BASOPHILS NFR BLD AUTO: 1 % (ref 0–1)
BILIRUB SERPL-MCNC: 0.57 MG/DL (ref 0.2–1)
BUN SERPL-MCNC: 22 MG/DL (ref 5–25)
CALCIUM SERPL-MCNC: 9.1 MG/DL (ref 8.4–10.2)
CHLORIDE SERPL-SCNC: 100 MMOL/L (ref 96–108)
CO2 SERPL-SCNC: 28 MMOL/L (ref 21–32)
CREAT SERPL-MCNC: 1.25 MG/DL (ref 0.6–1.3)
EOSINOPHIL # BLD AUTO: 0.14 THOUSAND/ÂΜL (ref 0–0.61)
EOSINOPHIL NFR BLD AUTO: 2 % (ref 0–6)
ERYTHROCYTE [DISTWIDTH] IN BLOOD BY AUTOMATED COUNT: 13.5 % (ref 11.6–15.1)
GFR SERPL CREATININE-BSD FRML MDRD: 45 ML/MIN/1.73SQ M
GLUCOSE SERPL-MCNC: 115 MG/DL (ref 65–140)
HCT VFR BLD AUTO: 39 % (ref 34.8–46.1)
HGB BLD-MCNC: 12.9 G/DL (ref 11.5–15.4)
IMM GRANULOCYTES # BLD AUTO: 0.05 THOUSAND/UL (ref 0–0.2)
IMM GRANULOCYTES NFR BLD AUTO: 1 % (ref 0–2)
LYMPHOCYTES # BLD AUTO: 2.19 THOUSANDS/ÂΜL (ref 0.6–4.47)
LYMPHOCYTES NFR BLD AUTO: 36 % (ref 14–44)
MCH RBC QN AUTO: 30.4 PG (ref 26.8–34.3)
MCHC RBC AUTO-ENTMCNC: 33.1 G/DL (ref 31.4–37.4)
MCV RBC AUTO: 92 FL (ref 82–98)
MONOCYTES # BLD AUTO: 0.22 THOUSAND/ÂΜL (ref 0.17–1.22)
MONOCYTES NFR BLD AUTO: 4 % (ref 4–12)
NEUTROPHILS # BLD AUTO: 3.48 THOUSANDS/ÂΜL (ref 1.85–7.62)
NEUTS SEG NFR BLD AUTO: 56 % (ref 43–75)
NRBC BLD AUTO-RTO: 0 /100 WBCS
PLATELET # BLD AUTO: 192 THOUSANDS/UL (ref 149–390)
PMV BLD AUTO: 10.5 FL (ref 8.9–12.7)
POTASSIUM SERPL-SCNC: 3.9 MMOL/L (ref 3.5–5.3)
PROT SERPL-MCNC: 8.4 G/DL (ref 6.4–8.4)
RBC # BLD AUTO: 4.25 MILLION/UL (ref 3.81–5.12)
SODIUM SERPL-SCNC: 135 MMOL/L (ref 135–147)
T3FREE SERPL-MCNC: 2.7 PG/ML (ref 2.5–3.9)
TSH SERPL DL<=0.05 MIU/L-ACNC: 2.33 UIU/ML (ref 0.45–4.5)
WBC # BLD AUTO: 6.12 THOUSAND/UL (ref 4.31–10.16)

## 2023-09-25 PROCEDURE — 36415 COLL VENOUS BLD VENIPUNCTURE: CPT

## 2023-09-25 PROCEDURE — 80053 COMPREHEN METABOLIC PANEL: CPT

## 2023-09-25 PROCEDURE — 84443 ASSAY THYROID STIM HORMONE: CPT

## 2023-09-25 PROCEDURE — 84481 FREE ASSAY (FT-3): CPT

## 2023-09-25 NOTE — TELEPHONE ENCOUNTER
Patient Call    Who are you speaking with? self   If it is not the patient, are they listed on an active communication consent form? self   What is the reason for this call? Are the labs done for other providers good for what Dr Juan Carlos Terry needs? Does this require a call back? yes   If a call back is required, please list best call back number [phone]   If a call back is required, advise that a message will be forwarded to their care team and someone will return their call as soon as possible. Did you relay this information to the patient?  yes

## 2023-09-26 ENCOUNTER — HOSPITAL ENCOUNTER (OUTPATIENT)
Dept: INFUSION CENTER | Facility: CLINIC | Age: 63
Discharge: HOME | End: 2023-09-26
Payer: MEDICARE

## 2023-09-26 VITALS
RESPIRATION RATE: 18 BRPM | DIASTOLIC BLOOD PRESSURE: 85 MMHG | SYSTOLIC BLOOD PRESSURE: 145 MMHG | HEART RATE: 75 BPM | OXYGEN SATURATION: 98 % | TEMPERATURE: 97.5 F

## 2023-09-26 DIAGNOSIS — C64.1 RENAL CELL CARCINOMA OF RIGHT KIDNEY (HCC): Primary | ICD-10-CM

## 2023-09-26 PROCEDURE — 96413 CHEMO IV INFUSION 1 HR: CPT

## 2023-09-26 RX ORDER — SODIUM CHLORIDE 9 MG/ML
20 INJECTION, SOLUTION INTRAVENOUS ONCE
Status: COMPLETED | OUTPATIENT
Start: 2023-09-26 | End: 2023-09-26

## 2023-09-26 RX ADMIN — SODIUM CHLORIDE 20 ML/HR: 0.9 INJECTION, SOLUTION INTRAVENOUS at 11:24

## 2023-09-26 RX ADMIN — SODIUM CHLORIDE 200 MG: 9 INJECTION, SOLUTION INTRAVENOUS at 11:26

## 2023-09-26 NOTE — PROGRESS NOTES
Patient to infusion for Keytruda. She states she is still dealing with rash, however no rash visible upon assessment. She tolerated treatment today without incident.   Next appointment reviewed, she declined AVS

## 2023-09-27 ENCOUNTER — TELEPHONE (OUTPATIENT)
Dept: OBGYN CLINIC | Facility: CLINIC | Age: 63
End: 2023-09-27

## 2023-09-27 NOTE — TELEPHONE ENCOUNTER
Patient called office. She has been using [facility] which is $30.00/box and has to order on line. She is asking if Replens would be comparable as she can get that from the pharmacy cheaper.

## 2023-10-02 ENCOUNTER — TELEPHONE (OUTPATIENT)
Dept: INTERNAL MEDICINE CLINIC | Facility: CLINIC | Age: 63
End: 2023-10-02

## 2023-10-02 DIAGNOSIS — I10 ESSENTIAL HYPERTENSION: Primary | ICD-10-CM

## 2023-10-02 RX ORDER — LISINOPRIL 20 MG/1
20 TABLET ORAL DAILY
Qty: 90 TABLET | Refills: 1 | Status: SHIPPED | OUTPATIENT
Start: 2023-10-02

## 2023-10-02 NOTE — TELEPHONE ENCOUNTER
Wei Ware December 6th, [id number] I want to  my blood pressure medicine. The new one list Losartan potassium 15 MG tabs and the pharmacist said to me that it's flagging that there's something in that I'm allergic to. I've been getting lumps up and down my arms and I thought it was because of the [facility]. They put me on Prednisone to get rid of those and they have not gone away. So now I'm wondering if it really is a Keytruda and it's this high blood pressure medicine. Can I get a return phone call regarding that again? Wei Ware December 6th, 1951 nine [phone]. Thank you.

## 2023-10-03 DIAGNOSIS — R21 RASH: ICD-10-CM

## 2023-10-03 DIAGNOSIS — C64.1 RENAL CELL CARCINOMA OF RIGHT KIDNEY (HCC): ICD-10-CM

## 2023-10-03 RX ORDER — PREDNISONE 10 MG/1
10 TABLET ORAL DAILY
Qty: 30 TABLET | Refills: 0 | Status: SHIPPED | OUTPATIENT
Start: 2023-10-03

## 2023-10-05 ENCOUNTER — TELEPHONE (OUTPATIENT)
Dept: INTERNAL MEDICINE CLINIC | Facility: CLINIC | Age: 63
End: 2023-10-05

## 2023-10-05 DIAGNOSIS — J06.9 UPPER RESPIRATORY TRACT INFECTION, UNSPECIFIED TYPE: Primary | ICD-10-CM

## 2023-10-05 RX ORDER — DEXTROMETHORPHAN HYDROBROMIDE AND PROMETHAZINE HYDROCHLORIDE 15; 6.25 MG/5ML; MG/5ML
5 SYRUP ORAL 4 TIMES DAILY PRN
Qty: 240 ML | Refills: 0 | Status: SHIPPED | OUTPATIENT
Start: 2023-10-05 | End: 2023-11-24 | Stop reason: SDUPTHER

## 2023-10-05 RX ORDER — AZITHROMYCIN 250 MG/1
TABLET, FILM COATED ORAL
Qty: 6 TABLET | Refills: 0 | Status: SHIPPED | OUTPATIENT
Start: 2023-10-05 | End: 2023-10-10

## 2023-10-05 NOTE — TELEPHONE ENCOUNTER
Hi, this is Merary Foster December 6, 1960. Patient and doctor lays. I have for a few days now had a somewhat of a sore throat. It's not really sore, but I mean I can swallow with no problem, but I know that my throat isn't right, my mouth area and I have problem breathing. I don't know if I pulled the muscle behind my right shoulder blade or if it has something to do with whatever else is going on. Last night I had to sleep. Sitting up because of drainage made me cough. I took some of the cough medicine that I had leftover from the last time. That seemed to help, but I don't want to go through this weekend and not get better, so I don't know if we can call in Metaline or something else. Could you please return my call at [phone]? Thank you.

## 2023-10-10 RX ORDER — SODIUM CHLORIDE 9 MG/ML
20 INJECTION, SOLUTION INTRAVENOUS ONCE
Status: CANCELLED | OUTPATIENT
Start: 2023-10-17

## 2023-10-16 ENCOUNTER — APPOINTMENT (OUTPATIENT)
Dept: LAB | Facility: CLINIC | Age: 63
End: 2023-10-16
Payer: MEDICARE

## 2023-10-16 DIAGNOSIS — E61.1 IRON DEFICIENCY: ICD-10-CM

## 2023-10-16 DIAGNOSIS — E55.9 VITAMIN D DEFICIENCY: ICD-10-CM

## 2023-10-16 DIAGNOSIS — K74.3 PRIMARY BILIARY CHOLANGITIS (HCC): ICD-10-CM

## 2023-10-16 DIAGNOSIS — D47.2 SMOLDERING MULTIPLE MYELOMA (SMM): ICD-10-CM

## 2023-10-16 DIAGNOSIS — C90.00 MULTIPLE MYELOMA NOT HAVING ACHIEVED REMISSION (HCC): ICD-10-CM

## 2023-10-16 DIAGNOSIS — G57.71 COMPLEX REGIONAL PAIN SYNDROME TYPE 2 OF RIGHT LOWER EXTREMITY: ICD-10-CM

## 2023-10-16 DIAGNOSIS — C64.1 RENAL CELL CARCINOMA OF RIGHT KIDNEY (HCC): ICD-10-CM

## 2023-10-16 DIAGNOSIS — I10 ESSENTIAL HYPERTENSION: ICD-10-CM

## 2023-10-16 DIAGNOSIS — E78.2 MODERATE MIXED HYPERLIPIDEMIA NOT REQUIRING STATIN THERAPY: ICD-10-CM

## 2023-10-16 LAB
ALBUMIN SERPL BCP-MCNC: 3.6 G/DL (ref 3.5–5)
ALP SERPL-CCNC: 175 U/L (ref 34–104)
ALT SERPL W P-5'-P-CCNC: 68 U/L (ref 7–52)
ANION GAP SERPL CALCULATED.3IONS-SCNC: 5 MMOL/L
AST SERPL W P-5'-P-CCNC: 62 U/L (ref 13–39)
BASOPHILS # BLD AUTO: 0.04 THOUSANDS/ÂΜL (ref 0–0.1)
BASOPHILS NFR BLD AUTO: 1 % (ref 0–1)
BILIRUB SERPL-MCNC: 0.52 MG/DL (ref 0.2–1)
BUN SERPL-MCNC: 25 MG/DL (ref 5–25)
CALCIUM SERPL-MCNC: 9.1 MG/DL (ref 8.4–10.2)
CHLORIDE SERPL-SCNC: 101 MMOL/L (ref 96–108)
CO2 SERPL-SCNC: 29 MMOL/L (ref 21–32)
CREAT SERPL-MCNC: 1.34 MG/DL (ref 0.6–1.3)
EOSINOPHIL # BLD AUTO: 0.18 THOUSAND/ÂΜL (ref 0–0.61)
EOSINOPHIL NFR BLD AUTO: 3 % (ref 0–6)
ERYTHROCYTE [DISTWIDTH] IN BLOOD BY AUTOMATED COUNT: 13 % (ref 11.6–15.1)
GFR SERPL CREATININE-BSD FRML MDRD: 42 ML/MIN/1.73SQ M
GLUCOSE SERPL-MCNC: 99 MG/DL (ref 65–140)
HCT VFR BLD AUTO: 37.1 % (ref 34.8–46.1)
HGB BLD-MCNC: 11.9 G/DL (ref 11.5–15.4)
IMM GRANULOCYTES # BLD AUTO: 0.05 THOUSAND/UL (ref 0–0.2)
IMM GRANULOCYTES NFR BLD AUTO: 1 % (ref 0–2)
LYMPHOCYTES # BLD AUTO: 1.26 THOUSANDS/ÂΜL (ref 0.6–4.47)
LYMPHOCYTES NFR BLD AUTO: 23 % (ref 14–44)
MCH RBC QN AUTO: 30.3 PG (ref 26.8–34.3)
MCHC RBC AUTO-ENTMCNC: 32.1 G/DL (ref 31.4–37.4)
MCV RBC AUTO: 94 FL (ref 82–98)
MONOCYTES # BLD AUTO: 0.4 THOUSAND/ÂΜL (ref 0.17–1.22)
MONOCYTES NFR BLD AUTO: 7 % (ref 4–12)
NEUTROPHILS # BLD AUTO: 3.65 THOUSANDS/ÂΜL (ref 1.85–7.62)
NEUTS SEG NFR BLD AUTO: 65 % (ref 43–75)
NRBC BLD AUTO-RTO: 0 /100 WBCS
PLATELET # BLD AUTO: 197 THOUSANDS/UL (ref 149–390)
PMV BLD AUTO: 11.8 FL (ref 8.9–12.7)
POTASSIUM SERPL-SCNC: 4.5 MMOL/L (ref 3.5–5.3)
PROT SERPL-MCNC: 7.8 G/DL (ref 6.4–8.4)
RBC # BLD AUTO: 3.93 MILLION/UL (ref 3.81–5.12)
SODIUM SERPL-SCNC: 135 MMOL/L (ref 135–147)
WBC # BLD AUTO: 5.58 THOUSAND/UL (ref 4.31–10.16)

## 2023-10-16 PROCEDURE — 86334 IMMUNOFIX E-PHORESIS SERUM: CPT

## 2023-10-16 PROCEDURE — 80053 COMPREHEN METABOLIC PANEL: CPT

## 2023-10-16 PROCEDURE — 36415 COLL VENOUS BLD VENIPUNCTURE: CPT

## 2023-10-16 PROCEDURE — 84165 PROTEIN E-PHORESIS SERUM: CPT

## 2023-10-16 PROCEDURE — 85025 COMPLETE CBC W/AUTO DIFF WBC: CPT

## 2023-10-17 ENCOUNTER — HOSPITAL ENCOUNTER (OUTPATIENT)
Dept: INFUSION CENTER | Facility: CLINIC | Age: 63
Discharge: HOME | End: 2023-10-17
Payer: MEDICARE

## 2023-10-17 VITALS
RESPIRATION RATE: 18 BRPM | TEMPERATURE: 98.7 F | OXYGEN SATURATION: 97 % | SYSTOLIC BLOOD PRESSURE: 128 MMHG | DIASTOLIC BLOOD PRESSURE: 78 MMHG | HEART RATE: 79 BPM

## 2023-10-17 DIAGNOSIS — M81.8 OTHER OSTEOPOROSIS WITHOUT CURRENT PATHOLOGICAL FRACTURE: ICD-10-CM

## 2023-10-17 DIAGNOSIS — C64.1 RENAL CELL CARCINOMA OF RIGHT KIDNEY (HCC): Primary | ICD-10-CM

## 2023-10-17 RX ORDER — SODIUM CHLORIDE 9 MG/ML
20 INJECTION, SOLUTION INTRAVENOUS ONCE
Status: DISCONTINUED | OUTPATIENT
Start: 2023-10-17 | End: 2023-10-20 | Stop reason: HOSPADM

## 2023-10-17 RX ORDER — SODIUM CHLORIDE 9 MG/ML
20 INJECTION, SOLUTION INTRAVENOUS ONCE
Status: CANCELLED | OUTPATIENT
Start: 2023-10-17

## 2023-10-17 RX ORDER — SODIUM CHLORIDE 9 MG/ML
20 INJECTION, SOLUTION INTRAVENOUS ONCE
Status: COMPLETED | OUTPATIENT
Start: 2023-10-17 | End: 2023-10-17

## 2023-10-17 RX ADMIN — ZOLEDRONIC ACID 3.3 MG: 4 INJECTION, SOLUTION, CONCENTRATE INTRAVENOUS at 13:32

## 2023-10-17 RX ADMIN — SODIUM CHLORIDE 200 MG: 9 INJECTION, SOLUTION INTRAVENOUS at 12:39

## 2023-10-17 RX ADMIN — SODIUM CHLORIDE 20 ML/HR: 0.9 INJECTION, SOLUTION INTRAVENOUS at 12:10

## 2023-10-17 NOTE — PLAN OF CARE
Problem: Potential for Falls  Goal: Patient will remain free of falls  Description: INTERVENTIONS:  - Educate patient/family on patient safety including physical limitations  - Instruct patient to call for assistance with activity   - Consult OT/PT to assist with strengthening/mobility   - Keep Call bell within reach  - Keep bed low and locked with side rails adjusted as appropriate  - Keep care items and personal belongings within reach  - Initiate and maintain comfort rounds  - Make Fall Risk Sign visible to staff  - Consider moving patient to room near nurses station  Outcome: Completed     Problem: Knowledge Deficit  Goal: Patient/family/caregiver demonstrates understanding of disease process, treatment plan, medications, and discharge instructions  Description: Complete learning assessment and assess knowledge base.   Interventions:  - Provide teaching at level of understanding  - Provide teaching via preferred learning methods  Outcome: Completed

## 2023-10-17 NOTE — PROGRESS NOTES
Pt to clinic for keytruda and zometa, pt tolerated without complications, aware of next appointment, declined avs

## 2023-10-18 LAB
ALBUMIN SERPL ELPH-MCNC: 3.54 G/DL (ref 3.2–5.1)
ALBUMIN SERPL ELPH-MCNC: 47.9 % (ref 48–70)
ALPHA1 GLOB SERPL ELPH-MCNC: 0.32 G/DL (ref 0.15–0.47)
ALPHA1 GLOB SERPL ELPH-MCNC: 4.3 % (ref 1.8–7)
ALPHA2 GLOB SERPL ELPH-MCNC: 0.75 G/DL (ref 0.42–1.04)
ALPHA2 GLOB SERPL ELPH-MCNC: 10.1 % (ref 5.9–14.9)
BETA GLOB ABNORMAL SERPL ELPH-MCNC: 0.41 G/DL (ref 0.31–0.57)
BETA1 GLOB SERPL ELPH-MCNC: 5.5 % (ref 4.7–7.7)
BETA2 GLOB SERPL ELPH-MCNC: 4.5 % (ref 3.1–7.9)
BETA2+GAMMA GLOB SERPL ELPH-MCNC: 0.33 G/DL (ref 0.2–0.58)
GAMMA GLOB ABNORMAL SERPL ELPH-MCNC: 2.05 G/DL (ref 0.4–1.66)
GAMMA GLOB SERPL ELPH-MCNC: 27.7 % (ref 6.9–22.3)
IGG/ALB SER: 0.92 {RATIO} (ref 1.1–1.8)
INTERPRETATION UR IFE-IMP: NORMAL
M PROTEIN 1 MFR SERPL ELPH: 21.9 %
M PROTEIN 1 SERPL ELPH-MCNC: 1.62 G/DL
PROT PATTERN SERPL ELPH-IMP: ABNORMAL
PROT SERPL-MCNC: 7.4 G/DL (ref 6.4–8.4)

## 2023-10-18 PROCEDURE — 86334 IMMUNOFIX E-PHORESIS SERUM: CPT | Performed by: PATHOLOGY

## 2023-10-18 PROCEDURE — 84165 PROTEIN E-PHORESIS SERUM: CPT | Performed by: PATHOLOGY

## 2023-10-24 ENCOUNTER — OFFICE VISIT (OUTPATIENT)
Dept: INTERNAL MEDICINE CLINIC | Facility: CLINIC | Age: 63
End: 2023-10-24
Payer: MEDICARE

## 2023-10-24 VITALS
RESPIRATION RATE: 15 BRPM | SYSTOLIC BLOOD PRESSURE: 124 MMHG | BODY MASS INDEX: 24.45 KG/M2 | TEMPERATURE: 98.1 F | OXYGEN SATURATION: 98 % | DIASTOLIC BLOOD PRESSURE: 73 MMHG | HEART RATE: 68 BPM | WEIGHT: 168 LBS

## 2023-10-24 DIAGNOSIS — K74.3 PRIMARY BILIARY CHOLANGITIS (HCC): Primary | ICD-10-CM

## 2023-10-24 DIAGNOSIS — R22.31 NODULE OF SKIN OF RIGHT UPPER EXTREMITY: ICD-10-CM

## 2023-10-24 DIAGNOSIS — E55.9 VITAMIN D DEFICIENCY: ICD-10-CM

## 2023-10-24 DIAGNOSIS — G90.521 COMPLEX REGIONAL PAIN SYNDROME I OF RIGHT LOWER LIMB: ICD-10-CM

## 2023-10-24 DIAGNOSIS — C64.1 RENAL CELL CARCINOMA OF RIGHT KIDNEY (HCC): ICD-10-CM

## 2023-10-24 DIAGNOSIS — I10 ESSENTIAL HYPERTENSION: Chronic | ICD-10-CM

## 2023-10-24 DIAGNOSIS — Z13.29 SCREENING FOR THYROID DISORDER: ICD-10-CM

## 2023-10-24 DIAGNOSIS — N17.9 AKI (ACUTE KIDNEY INJURY) (HCC): ICD-10-CM

## 2023-10-24 DIAGNOSIS — R74.01 TRANSAMINITIS: ICD-10-CM

## 2023-10-24 DIAGNOSIS — D47.2 SMOLDERING MULTIPLE MYELOMA (SMM): ICD-10-CM

## 2023-10-24 DIAGNOSIS — M54.16 LUMBAR RADICULOPATHY: Chronic | ICD-10-CM

## 2023-10-24 DIAGNOSIS — Z86.19 HISTORY OF ELISA POSITIVE FOR HSV: ICD-10-CM

## 2023-10-24 DIAGNOSIS — Z12.83 SKIN EXAM FOR MALIGNANT NEOPLASM: ICD-10-CM

## 2023-10-24 DIAGNOSIS — Z23 ENCOUNTER FOR IMMUNIZATION: ICD-10-CM

## 2023-10-24 DIAGNOSIS — R53.83 OTHER FATIGUE: ICD-10-CM

## 2023-10-24 DIAGNOSIS — E78.5 HYPERLIPIDEMIA, UNSPECIFIED HYPERLIPIDEMIA TYPE: Chronic | ICD-10-CM

## 2023-10-24 PROCEDURE — 90686 IIV4 VACC NO PRSV 0.5 ML IM: CPT

## 2023-10-24 PROCEDURE — 99215 OFFICE O/P EST HI 40 MIN: CPT | Performed by: INTERNAL MEDICINE

## 2023-10-24 PROCEDURE — G0008 ADMIN INFLUENZA VIRUS VAC: HCPCS

## 2023-10-24 RX ORDER — CHOLECALCIFEROL (VITAMIN D3) 125 MCG
2000 CAPSULE ORAL DAILY
Qty: 90 TABLET | Refills: 3 | Status: SHIPPED | OUTPATIENT
Start: 2023-10-24

## 2023-10-24 RX ORDER — VALACYCLOVIR HYDROCHLORIDE 500 MG/1
1000 TABLET, FILM COATED ORAL DAILY
Qty: 28 TABLET | Refills: 0 | Status: SHIPPED | OUTPATIENT
Start: 2023-10-24 | End: 2023-11-07
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
## 2023-10-31 RX ORDER — SODIUM CHLORIDE 9 MG/ML
20 INJECTION, SOLUTION INTRAVENOUS ONCE
Status: CANCELLED | OUTPATIENT
Start: 2023-11-07

## 2023-11-06 ENCOUNTER — APPOINTMENT (OUTPATIENT)
Dept: LAB | Facility: CLINIC | Age: 63
End: 2023-11-06
Payer: MEDICARE

## 2023-11-06 DIAGNOSIS — N17.9 AKI (ACUTE KIDNEY INJURY) (HCC): ICD-10-CM

## 2023-11-06 LAB
ALBUMIN SERPL BCP-MCNC: 4 G/DL (ref 3.5–5)
ALP SERPL-CCNC: 149 U/L (ref 34–104)
ALT SERPL W P-5'-P-CCNC: 48 U/L (ref 7–52)
ANION GAP SERPL CALCULATED.3IONS-SCNC: 7 MMOL/L
AST SERPL W P-5'-P-CCNC: 52 U/L (ref 13–39)
BASOPHILS # BLD AUTO: 0.04 THOUSANDS/ÂΜL (ref 0–0.1)
BASOPHILS NFR BLD AUTO: 1 % (ref 0–1)
BILIRUB SERPL-MCNC: 0.59 MG/DL (ref 0.2–1)
BUN SERPL-MCNC: 23 MG/DL (ref 5–25)
CALCIUM SERPL-MCNC: 9.4 MG/DL (ref 8.4–10.2)
CHLORIDE SERPL-SCNC: 99 MMOL/L (ref 96–108)
CO2 SERPL-SCNC: 29 MMOL/L (ref 21–32)
CREAT SERPL-MCNC: 1.17 MG/DL (ref 0.6–1.3)
EOSINOPHIL # BLD AUTO: 0.22 THOUSAND/ÂΜL (ref 0–0.61)
EOSINOPHIL NFR BLD AUTO: 4 % (ref 0–6)
ERYTHROCYTE [DISTWIDTH] IN BLOOD BY AUTOMATED COUNT: 13 % (ref 11.6–15.1)
GFR SERPL CREATININE-BSD FRML MDRD: 49 ML/MIN/1.73SQ M
GLUCOSE SERPL-MCNC: 83 MG/DL (ref 65–140)
HCT VFR BLD AUTO: 38.5 % (ref 34.8–46.1)
HGB BLD-MCNC: 12.5 G/DL (ref 11.5–15.4)
IMM GRANULOCYTES # BLD AUTO: 0.03 THOUSAND/UL (ref 0–0.2)
IMM GRANULOCYTES NFR BLD AUTO: 1 % (ref 0–2)
LYMPHOCYTES # BLD AUTO: 1.52 THOUSANDS/ÂΜL (ref 0.6–4.47)
LYMPHOCYTES NFR BLD AUTO: 28 % (ref 14–44)
MCH RBC QN AUTO: 30.2 PG (ref 26.8–34.3)
MCHC RBC AUTO-ENTMCNC: 32.5 G/DL (ref 31.4–37.4)
MCV RBC AUTO: 93 FL (ref 82–98)
MONOCYTES # BLD AUTO: 0.35 THOUSAND/ÂΜL (ref 0.17–1.22)
MONOCYTES NFR BLD AUTO: 6 % (ref 4–12)
NEUTROPHILS # BLD AUTO: 3.29 THOUSANDS/ÂΜL (ref 1.85–7.62)
NEUTS SEG NFR BLD AUTO: 60 % (ref 43–75)
NRBC BLD AUTO-RTO: 0 /100 WBCS
PLATELET # BLD AUTO: 202 THOUSANDS/UL (ref 149–390)
PMV BLD AUTO: 10.8 FL (ref 8.9–12.7)
POTASSIUM SERPL-SCNC: 4.9 MMOL/L (ref 3.5–5.3)
PROT SERPL-MCNC: 8.4 G/DL (ref 6.4–8.4)
RBC # BLD AUTO: 4.14 MILLION/UL (ref 3.81–5.12)
SODIUM SERPL-SCNC: 135 MMOL/L (ref 135–147)
T3FREE SERPL-MCNC: 2.58 PG/ML (ref 2.5–3.9)
TSH SERPL DL<=0.05 MIU/L-ACNC: 1.78 UIU/ML (ref 0.45–4.5)
WBC # BLD AUTO: 5.45 THOUSAND/UL (ref 4.31–10.16)

## 2023-11-06 PROCEDURE — 84443 ASSAY THYROID STIM HORMONE: CPT

## 2023-11-06 PROCEDURE — 84481 FREE ASSAY (FT-3): CPT

## 2023-11-06 PROCEDURE — 85025 COMPLETE CBC W/AUTO DIFF WBC: CPT

## 2023-11-06 PROCEDURE — 80053 COMPREHEN METABOLIC PANEL: CPT

## 2023-11-07 ENCOUNTER — HOSPITAL ENCOUNTER (OUTPATIENT)
Dept: INFUSION CENTER | Facility: CLINIC | Age: 63
Discharge: HOME | End: 2023-11-07
Payer: MEDICARE

## 2023-11-07 VITALS
DIASTOLIC BLOOD PRESSURE: 82 MMHG | TEMPERATURE: 97.1 F | RESPIRATION RATE: 18 BRPM | SYSTOLIC BLOOD PRESSURE: 137 MMHG | OXYGEN SATURATION: 98 % | HEART RATE: 72 BPM

## 2023-11-07 DIAGNOSIS — C64.1 RENAL CELL CARCINOMA OF RIGHT KIDNEY (HCC): Primary | ICD-10-CM

## 2023-11-07 PROCEDURE — 96413 CHEMO IV INFUSION 1 HR: CPT

## 2023-11-07 RX ORDER — SODIUM CHLORIDE 9 MG/ML
20 INJECTION, SOLUTION INTRAVENOUS ONCE
Status: COMPLETED | OUTPATIENT
Start: 2023-11-07 | End: 2023-11-07

## 2023-11-07 RX ADMIN — SODIUM CHLORIDE 200 MG: 9 INJECTION, SOLUTION INTRAVENOUS at 14:19

## 2023-11-07 RX ADMIN — SODIUM CHLORIDE 20 ML/HR: 0.9 INJECTION, SOLUTION INTRAVENOUS at 13:54

## 2023-11-07 NOTE — PROGRESS NOTES
Patient here for [place] and is doing ok, no changes to report. She is following up regarding rash she has had since starting [place] She saw infectious disease and now may need to see dermatology as well. She tolerated treatment today though treatment does trigger pruritus while patient is here as previous. No new symptoms reported. Patient aware she may take benadryl at home if needed. Next cycle 11/28/23.

## 2023-11-07 NOTE — PATIENT INSTRUCTIONS
November 2023 Sunday Monday Tuesday Wednesday Thursday Friday Saturday                  1     2     3     4                5     6    LAB WALK IN  10:10 AM   (5 min.)   WILBUR MENJIVAR PHLEB CHAIR   [facility] - [address]    INF ONCOLOGY TX-TREATMENT PLAN   2:00 PM   (120 min.)   AN INF CHAIR [address] [place] [address] 8     9     10     11         Cycle 5, Day 1  + Add'l treatments       12     13     14     15     16     17     18                19     20     21     22     23     24     25                26     27     28    INF ONCOLOGY TX-TREATMENT PLAN   1:00 PM   (120 min.)   AN INF CHAIR [address] [place] [address] 29     30                                 Treatment Details         11/7/2023 - Cycle 5, Day 1 + Additional treatments      Chemotherapy: ONCBCN PROVIDER COMMUNICATION, PEMBROLIZUMAB IVPB      Chemotherapy: ONCBCN PROVIDER COMMUNICATION, PEMBROLIZUMAB IVPB

## 2023-11-15 ENCOUNTER — OFFICE VISIT (OUTPATIENT)
Dept: DERMATOLOGY | Facility: CLINIC | Age: 63
End: 2023-11-15
Payer: MEDICARE

## 2023-11-15 VITALS — HEIGHT: 69 IN | TEMPERATURE: 97.7 F | WEIGHT: 165 LBS | BODY MASS INDEX: 24.44 KG/M2

## 2023-11-15 DIAGNOSIS — L85.3 XEROSIS OF SKIN: ICD-10-CM

## 2023-11-15 DIAGNOSIS — L82.1 SEBORRHEIC KERATOSIS: ICD-10-CM

## 2023-11-15 DIAGNOSIS — D22.9 MULTIPLE MELANOCYTIC NEVI: Primary | ICD-10-CM

## 2023-11-15 DIAGNOSIS — Z12.83 SKIN EXAM FOR MALIGNANT NEOPLASM: ICD-10-CM

## 2023-11-15 DIAGNOSIS — D18.01 CHERRY ANGIOMA: ICD-10-CM

## 2023-11-15 DIAGNOSIS — R21 RASH: ICD-10-CM

## 2023-11-15 DIAGNOSIS — L81.4 LENTIGO: ICD-10-CM

## 2023-11-15 DIAGNOSIS — D17.9 LIPOMA, UNSPECIFIED SITE: ICD-10-CM

## 2023-11-15 PROCEDURE — 99204 OFFICE O/P NEW MOD 45 MIN: CPT | Performed by: DERMATOLOGY

## 2023-11-15 RX ORDER — TRIAMCINOLONE ACETONIDE 1 MG/G
OINTMENT TOPICAL 2 TIMES DAILY
Qty: 454 G | Refills: 1 | Status: SHIPPED | OUTPATIENT
Start: 2023-11-15

## 2023-11-15 NOTE — PATIENT INSTRUCTIONS
MELANOCYTIC NEVI ("Moles")        Assessment and Plan:  Based on a thorough discussion of this condition and the management approach to it (including a comprehensive discussion of the known risks, side effects and potential benefits of treatment), the patient (family) agrees to implement the following specific plan:  When outside we recommend using a wide brim hat, sunglasses, long sleeve and pants, sunscreen with SPF 93+ with reapplication every 2 hours, or SPF specific clothing   Benign, reassured  Annual skin check     Melanocytic Nevi  Melanocytic nevi ("moles") are tan or brown, raised or flat areas of the skin which have an increased number of melanocytes. Melanocytes are the cells in our body which make pigment and account for skin color. Some moles are present at birth (I.e., "congenital nevi"), while others come up later in life (i.e., "acquired nevi"). The sun can stimulate the body to make more moles. Sunburns are not the only thing that triggers more moles. Chronic sun exposure can do it too. Clinically distinguishing a healthy mole from melanoma may be difficult, even for experienced dermatologists. The "ABCDE's" of moles have been suggested as a means of helping to alert a person to a suspicious mole and the possible increased risk of melanoma. The suggestions for raising alert are as follows:    Asymmetry: Healthy moles tend to be symmetric, while melanomas are often asymmetric. Asymmetry means if you draw a line through the mole, the two halves do not match in color, size, shape, or surface texture. Asymmetry can be a result of rapid enlargement of a mole, the development of a raised area on a previously flat lesion, scaling, ulceration, bleeding or scabbing within the mole. Any mole that starts to demonstrate "asymmetry" should be examined promptly by a board certified dermatologist.     Border: Healthy moles tend to have discrete, even borders.   The border of a melanoma often blends into the normal skin and does not sharply delineate the mole from normal skin. Any mole that starts to demonstrate "uneven borders" should be examined promptly by a board certified dermatologist.     Color: Healthy moles tend to be one color throughout. Melanomas tend to be made up of different colors ranging from dark black, blue, white, or red. Any mole that demonstrates a color change should be examined promptly by a board certified dermatologist.     Diameter: Healthy moles tend to be smaller than 0.6 cm in size; an exception are "congenital nevi" that can be larger. Melanomas tend to grow and can often be greater than 0.6 cm (1/4 of an inch, or the size of a pencil eraser). This is only a guideline, and many normal moles may be larger than 0.6 cm without being unhealthy. Any mole that starts to change in size (small to bigger or bigger to smaller) should be examined promptly by a board certified dermatologist.     Evolving: Healthy moles tend to "stay the same."  Melanomas may often show signs of change or evolution such as a change in size, shape, color, or elevation. Any mole that starts to itch, bleed, crust, burn, hurt, or ulcerate or demonstrate a change or evolution should be examined promptly by a board certified dermatologist.        William Perez and Plan:  Based on a thorough discussion of this condition and the management approach to it (including a comprehensive discussion of the known risks, side effects and potential benefits of treatment), the patient (family) agrees to implement the following specific plan:  When outside we recommend using a wide brim hat, sunglasses, long sleeve and pants, sunscreen with SPF 85+ with reapplication every 2 hours, or SPF specific clothing       What is a lentigo? A lentigo is a pigmented flat or slightly raised lesion with a clearly defined edge. Unlike an ephelis (freckle), it does not fade in the winter months.  There are several kinds of lentigo. The name lentigo originally referred to its appearance resembling a small lentil. The plural of lentigo is lentigines, although “lentigos” is also in common use. Who gets lentigines? Lentigines can affect males and females of all ages and races. Solar lentigines are especially prevalent in fair skinned adults. Lentigines associated with syndromes are present at birth or arise during childhood. What causes lentigines? Common forms of lentigo are due to exposure to ultraviolet radiation:  Sun damage including sunburn   Indoor tanning   Phototherapy, especially photochemotherapy (PUVA)    Ionizing radiation, eg radiation therapy, can also cause lentigines. Several familial syndromes associated with widespread lentigines originate from mutations in Frank-MAP kinase, mTOR signaling and PTEN pathways. What is the treatment for lentigines? Most lentigines are left alone. Attempts to lighten them may not be successful. The following approaches are used:  SPF 50+ broad-spectrum sunscreen   Hydroquinone bleaching cream   Alpha hydroxy acids   Vitamin C   Retinoids   Azelaic acid   Chemical peels  Individual lesions can be permanently removed using:  Cryotherapy   Intense pulsed light   Pigment lasers    How can lentigines be prevented? Lentigines associated with exposure ultraviolet radiation can be prevented by very careful sun protection. Clothing is more successful at preventing new lentigines than are sunscreens. What is the outlook for lentigines? Lentigines usually persist. They may increase in number with age and sun exposure. Some in sun-protected sites may fade and disappear.     SINGH ANGIOMAS        Assessment and Plan:  Based on a thorough discussion of this condition and the management approach to it (including a comprehensive discussion of the known risks, side effects and potential benefits of treatment), the patient (family) agrees to implement the following specific plan:  Monitor for changes  Benign, reassured      Assessment and Plan:    Cherry angioma, also known as [employer] spots, are benign vascular skin lesions. A "cherry angioma" is a firm red, blue or purple papule, 0.1-1 cm in diameter. When thrombosed, they can appear black in colour until evaluated with a dermatoscope when the red or purple colour is more easily seen. Cherry angioma may develop on any part of the body but most often appear on the scalp, face, lips and trunk. An angioma is due to proliferating endothelial cells; these are the cells that line the inside of a blood vessel. Angiomas can arise in early life or later in life; the most common type of angioma is a cherry angioma. Cherry angiomas are very common in males and females of any age or race. They are more noticeable in white skin than in skin of colour. They markedly increase in number from about the age of 36. There may be a family history of similar lesions. Eruptive cherry angiomas have been rarely reported to be associated with internal malignancy. The cause of angiomas is unknown. Genetic analysis of cherry angiomas has shown that they frequently carry specific somatic missense mutations in the GNAQ and GNA11 (Q209H) genes, which are involved in other vascular and melanocytic proliferations. SEBORRHEIC KERATOSIS; NON-INFLAMED        Assessment and Plan:  Based on a thorough discussion of this condition and the management approach to it (including a comprehensive discussion of the known risks, side effects and potential benefits of treatment), the patient (family) agrees to implement the following specific plan:  Monitor for changes  Benign, reassured      Seborrheic Keratosis  A seborrheic keratosis is a harmless warty spot that appears during adult life as a common sign of skin aging. Seborrheic keratoses can arise on any area of skin, covered or uncovered, with the usual exception of the palms and soles.  They do not arise from mucous membranes. Seborrheic keratoses can have highly variable appearance. Seborrheic keratoses are extremely common. It has been estimated that over 90% of adults over the age of 61 years have one or more of them. They occur in males and females of all races, typically beginning to erupt in the 35s or 45s. They are uncommon under the age of 21 years. The precise cause of seborrhoeic keratoses is not known. Seborrhoeic keratoses are considered degenerative in nature. As time goes by, seborrheic keratoses tend to become more numerous. Some people inherit a tendency to develop a very large number of them; some people may have hundreds of them. There is no easy way to remove multiple lesions on a single occasion. Unless a specific lesion is "inflamed" and is causing pain or stinging/burning or is bleeding, most insurance companies do not authorize treatment. XEROSIS ("DRY SKIN")        Assessment and Plan:  Based on a thorough discussion of this condition and the management approach to it (including a comprehensive discussion of the known risks, side effects and potential benefits of treatment), the patient (family) agrees to implement the following specific plan:  Use moisturizer like Eucerin,Cerave or Aveeno Cream 3 times a day for the dry skin            Dry skin refers to skin that feels dry to touch. Dry skin has a dull surface with a rough, scaly quality. The skin is less pliable and cracked. When dryness is severe, the skin may become inflamed and fissured. Although any body site can be dry, dry skin tends to affect the shins more than any other site. Dry skin is lacking moisture in the outer horny cell layer (stratum corneum) and this results in cracks in the skin surface. Dry skin is also called xerosis, xeroderma or asteatosis (lack of fat). It can affect males and females of all ages. There is some racial variability in water and lipid content of the skin.   Dry skin that starts in early childhood may be one of about 20 types of ichthyosis (fish-scale skin). There is often a family history of dry skin. Dry skin is commonly seen in people with atopic dermatitis. Nearly everyone > 60 years has dry skin. Dry skin that begins later may be seen in people with certain diseases and conditions. Postmenopausal women  Hypothyroidism  Chronic renal disease   Malnutrition and weight loss   Subclinical dermatitis   Treatment with certain drugs such as oral retinoids, diuretics and epidermal growth factor receptor inhibitors      What is the treatment for dry skin? The mainstay of treatment of dry skin and ichthyosis is moisturisers/emollients. They should be applied liberally and often enough to:  Reduce itch   Improve the barrier function   Prevent entry of irritants, bacteria   Reduce transepidermal water loss. How can dry skin be prevented? Eliminate aggravating factors:  Reduce the frequency of bathing. A humidifier in winter and air conditioner in summer   Compare having a short shower with a prolonged soak in a bath. Use lukewarm, not hot, water. Replace standard soap with a substitute such as a synthetic detergent cleanser, water-miscible emollient, bath oil, anti-pruritic tar oil, colloidal oatmeal etc.   Apply an emollient liberally and often, particularly shortly after bathing, and when itchy. The drier the skin, the thicker this should be, especially on the hands. What is the outlook for dry skin? A tendency to dry skin may persist life-long, or it may improve once contributing factors are controlled.    PILAR CYST        Assessment and Plan:  Based on a thorough discussion of this condition and the management approach to it (including a comprehensive discussion of the known risks, side effects and potential benefits of treatment), the patient (family) agrees to implement the following specific plan:  Discussed excision   Reassured benign     PILAR CYST  A trichilemmal cyst, also known as a pilar cyst, is a keratin-filled cyst that originates from the outer hair root sheath. Keratin is the protein that makes up hair and nails. Trichilemmal cysts are most commonly found on the scalp and are usually diagnosed in middle-aged females. They often run in the family, as they have an autosomal dominant pattern of inheritance (ie, the tendency to the cysts can be is passed on by a parent to their child of either sex, and the child has a 1 in 2 likelihood of inheriting it). What are the clinical features of trichilemmal cyst?  Trichilemmal cysts may look similar to epidermoid cyst and are often incorrectly termed sebaceous cysts. Trichilemmal cysts present as one or more firm, mobile, subcutaneous nodules measuring of various size. There is no central punctum, unlike an epidermoid cyst. A trichilemmal cyst can be painful if inflamed. What is the treatment for trichilemmal cysts? Pilar cysts can slowly enlarge, rupture with marked  inflammation, and can be associated with hairloss. It was discused that removal ideally includes escision of sac and contents often with sutures   RASH        Assessment and Plan:  Based on a thorough discussion of this condition and the management approach to it (including a comprehensive discussion of the known risks, side effects and potential benefits of treatment), the patient (family) agrees to implement the following specific plan:  Start Triamcinolone 0.1% ointment apply to areas twice a day after infusions   Try not to pick or scratch areas. Apply Vaseline and bandaid to left calf. - Recommend sensitive skin care regimen  - detergent free of dyes and perfumes (example, free and clear). Try washing clothes with extra rinse cycle. - Short lukewarm showers  - White dove bar soap  - Recommend moisturizing whole body with Creams multiple times a day (examples: Cetaphil, CeraVe.  and Eucerin)  - avoid aerosols and fragrances in the home (candles, plug ins, perfume, air freshener, etc)         LIPOMA        Assessment and Plan:  Based on a thorough discussion of this condition and the management approach to it (including a comprehensive discussion of the known risks, side effects and potential benefits of treatment), the patient (family) agrees to implement the following specific plan:  Reassured benign   Discussed excision if it becomes bothersome    Lipoma  A lipoma is a non-cancerous tumor that is made up of fat cells. It slowly grows under the skin in the subcutaneous tissue. A person may have a single lipoma or may have many lipomas. They are very common. Lipomas can occur in people of all ages, however, they tend to develop in adulthood and are most noticeable during middle age. They affect both sexes equally, although solitary lipomas are more common in women whilst multiple lipomas occur more frequently in men. The cause of lipomas is unknown. It is possible there may be genetic involvement as many patients with lipomas come from a family with a history of these tumors. Sometimes an injury such as a blunt blow to part of the body may trigger growth of a lipoma. People are often unaware of lipomas until they have grown large enough to become visible and palpable. This growth occurs slowly over several years. Some features of lipomas include:  A dome-shaped or egg-shaped lump about 2-10 cm in diameter (some may grow even larger)   It feels soft and smooth and is easily moved under the skin with the fingers   Some have a rubbery or doughy consistency   They are most common on the shoulders, neck, trunk and arms, but they can occur anywhere on the body where fat tissue is present. Most lipomas are symptomless, but some are painful on applying pressure. Lipomas that are tender or painful are usually angiolipomas. This means the lipoma has an increased number of small blood vessels.  Painful lipomas are also a feature of adiposis dolorosa or Dercum disease. Diagnosis of lipoma is usually made clinically by finding a soft lump under the skin. However, if there is any doubt, a deep skin biopsy can be performed which will show typical histopathological features of lipoma and its variants. Most lipomas require no treatment. Most lipomas eventually stop growing and remain indefinitely without causing any problems.  Occasionally, lipomas that interfere with the movement of adjacent muscles may require surgical removal. Several methods are available:  Simple surgical excision   Squeeze technique (a small incision is made over the lipoma and the fatty tissue is squeezed through the hole)   Liposuction

## 2023-11-15 NOTE — PROGRESS NOTES
[facility] Note     Patient Name: Quinn Lui  Encounter Date: 11/15/2023    Have you been cared for by a [facility] Dermatologist in the last 3 years and, if so, which description applies to you? NO. I am considered a "new" patient and must complete all patient intake questions. I am FEMALE/of child-bearing potential.    REVIEW OF SYSTEMS:  Have you recently had or currently have any of the following? Recent fever or chills? No  Any non-healing wound? YES, head   Are you pregnant or planning to become pregnant? No  Are you currently or planning to be nursing or breast feeding? No   PAST MEDICAL HISTORY:  Have you personally ever had or currently have any of the following? If "YES," then please provide more detail. Skin cancer (such as Melanoma, Basal Cell Carcinoma, Squamous Cell Carcinoma? No  Tuberculosis, HIV/AIDS, Hepatitis B or C: No  Radiation Treatment No   HISTORY OF IMMUNOSUPPRESSION:   Do you have a history of any of the following:  Systemic Immunosuppression such as Diabetes, Biologic or Immunotherapy, Chemotherapy, Organ Transplantation, Bone Marrow Transplantation? YES, immunotherapy     Answering "YES" requires the addition of the dotphrase "IMMUNOSUPPRESSED" as the first diagnosis of the patient's visit. FAMILY HISTORY:  Any "first degree relatives" (parent, brother, sister, or child) with the following? Skin Cancer, Pancreatic or Other Cancer? No   PATIENT EXPERIENCE:    Do you want the Dermatologist to perform a COMPLETE skin exam today including a clinical examination under the "bra and underwear" areas? Yes  If necessary, do we have your permission to call and leave a detailed message on your Preferred Phone number that includes your specific medical information? Yes      Allergies   Allergen Reactions    Codeine GI Intolerance and Nausea Only     Other reaction(s):  Other (See Comments)  Violently ill  Violently ill    Latex Rash     itching    Morphine And Related Nausea Only     GI intolerance nausea    Nortriptyline Shortness Of Breath    Ocaliva [Obeticholic Acid] Hives    Doxycycline GI Intolerance and Nausea Only    Sulfa Antibiotics GI Intolerance    Cymbalta [Duloxetine Hcl]     Penicillins Other (See Comments) and Rash     Childhood reaction      Current Outpatient Medications:     Cholecalciferol (Vitamin D3) 50 MCG (2000 UT) TABS, Take 1 tablet (2,000 Units total) by mouth daily, Disp: 90 tablet, Rfl: 3    fenofibrate (FENOGLIDE) 120 MG TABS, Take 120 mg by mouth daily, Disp: , Rfl:     FLUoxetine (PROzac) 40 MG capsule, Take 1 capsule (40 mg total) by mouth daily, Disp: 90 capsule, Rfl: 1    lidocaine (LIDODERM) 5 %, lidocaine 5 % topical patch  APPLY 1 PATCH TO AFFECTED AREA FOR 12 HOURS A DAY & REMOVE FOR 12 HOURS BEFORE APPLYING NEXT PATCH. (12 HOURS ON, 12 HOURS OFF), Disp: , Rfl:     lisinopril (ZESTRIL) 20 mg tablet, Take 1 tablet (20 mg total) by mouth daily, Disp: 90 tablet, Rfl: 1    promethazine-dextromethorphan (PHENERGAN-DM) 6.25-15 mg/5 mL oral syrup, Take 5 mL by mouth 4 (four) times a day as needed for cough, Disp: 240 mL, Rfl: 0    traMADol (Ultram) 50 mg tablet, Take 1 tablet (50 mg total) by mouth every 8 (eight) hours as needed for moderate pain, Disp: 30 tablet, Rfl: 0    traZODone (DESYREL) 50 mg tablet, Take 0.5-1 tablets (25-50 mg total) by mouth daily at bedtime as needed for sleep, Disp: 90 tablet, Rfl: 1    ursodiol (ACTIGALL) 300 mg capsule, TAKE 1 CAPSULE BY MOUTH THREE TIMES A DAY, Disp: 270 capsule, Rfl: 2    estradiol (VAGIFEM, YUVAFEM) 10 MCG TABS vaginal tablet, INSERT 1 TABLET INTO THE VAGINA 2 TIMES A WEEK. (Patient not taking: Reported on 8/24/2023), Disp: 24 tablet, Rfl: 1    valACYclovir (VALTREX) 500 mg tablet, Take 2 tablets (1,000 mg total) by mouth daily for 14 days, Disp: 28 tablet, Rfl: 0          Whom besides the patient is providing clinical information about today's encounter?    NO ADDITIONAL HISTORIAN (patient alone provided history)    Physical Exam and Assessment/Plan by Diagnosis:    Patient here for skin exam , patient has some concerns on scalp and legs. MELANOCYTIC NEVI ("Moles")    Physical Exam:  Anatomic Location Affected:   Mostly on sun-exposed areas of the trunk and extremities  Morphological Description:  Scattered, 1-4mm round to ovoid, symmetrical-appearing, even bordered, skin colored to dark brown macules/papules, mostly in sun-exposed areas  Pertinent Positives:  Pertinent Negatives: Additional History of Present Condition:      Assessment and Plan:  Based on a thorough discussion of this condition and the management approach to it (including a comprehensive discussion of the known risks, side effects and potential benefits of treatment), the patient (family) agrees to implement the following specific plan:  When outside we recommend using a wide brim hat, sunglasses, long sleeve and pants, sunscreen with SPF 92+ with reapplication every 2 hours, or SPF specific clothing   Benign, reassured  Annual skin check     Melanocytic Nevi  Melanocytic nevi ("moles") are tan or brown, raised or flat areas of the skin which have an increased number of melanocytes. Melanocytes are the cells in our body which make pigment and account for skin color. Some moles are present at birth (I.e., "congenital nevi"), while others come up later in life (i.e., "acquired nevi"). The sun can stimulate the body to make more moles. Sunburns are not the only thing that triggers more moles. Chronic sun exposure can do it too. Clinically distinguishing a healthy mole from melanoma may be difficult, even for experienced dermatologists. The "ABCDE's" of moles have been suggested as a means of helping to alert a person to a suspicious mole and the possible increased risk of melanoma. The suggestions for raising alert are as follows:    Asymmetry: Healthy moles tend to be symmetric, while melanomas are often asymmetric. Asymmetry means if you draw a line through the mole, the two halves do not match in color, size, shape, or surface texture. Asymmetry can be a result of rapid enlargement of a mole, the development of a raised area on a previously flat lesion, scaling, ulceration, bleeding or scabbing within the mole. Any mole that starts to demonstrate "asymmetry" should be examined promptly by a board certified dermatologist.     Border: Healthy moles tend to have discrete, even borders. The border of a melanoma often blends into the normal skin and does not sharply delineate the mole from normal skin. Any mole that starts to demonstrate "uneven borders" should be examined promptly by a board certified dermatologist.     Color: Healthy moles tend to be one color throughout. Melanomas tend to be made up of different colors ranging from dark black, blue, white, or red. Any mole that demonstrates a color change should be examined promptly by a board certified dermatologist.     Diameter: Healthy moles tend to be smaller than 0.6 cm in size; an exception are "congenital nevi" that can be larger. Melanomas tend to grow and can often be greater than 0.6 cm (1/4 of an inch, or the size of a pencil eraser). This is only a guideline, and many normal moles may be larger than 0.6 cm without being unhealthy. Any mole that starts to change in size (small to bigger or bigger to smaller) should be examined promptly by a board certified dermatologist.     Evolving: Healthy moles tend to "stay the same."  Melanomas may often show signs of change or evolution such as a change in size, shape, color, or elevation.   Any mole that starts to itch, bleed, crust, burn, hurt, or ulcerate or demonstrate a change or evolution should be examined promptly by a board certified dermatologist.        LENTIGO    Physical Exam:  Anatomic Location Affected:  trunk, arms  Morphological Description:  Light brown macules  Pertinent Positives:  Pertinent Negatives: Additional History of Present Condition:      Assessment and Plan:  Based on a thorough discussion of this condition and the management approach to it (including a comprehensive discussion of the known risks, side effects and potential benefits of treatment), the patient (family) agrees to implement the following specific plan:  When outside we recommend using a wide brim hat, sunglasses, long sleeve and pants, sunscreen with SPF 88+ with reapplication every 2 hours, or SPF specific clothing       What is a lentigo? A lentigo is a pigmented flat or slightly raised lesion with a clearly defined edge. Unlike an ephelis (freckle), it does not fade in the winter months. There are several kinds of lentigo. The name lentigo originally referred to its appearance resembling a small lentil. The plural of lentigo is lentigines, although “lentigos” is also in common use. Who gets lentigines? Lentigines can affect males and females of all ages and races. Solar lentigines are especially prevalent in fair skinned adults. Lentigines associated with syndromes are present at birth or arise during childhood. What causes lentigines? Common forms of lentigo are due to exposure to ultraviolet radiation:  Sun damage including sunburn   Indoor tanning   Phototherapy, especially photochemotherapy (PUVA)    Ionizing radiation, eg radiation therapy, can also cause lentigines. Several familial syndromes associated with widespread lentigines originate from mutations in Frank-MAP kinase, mTOR signaling and PTEN pathways. What is the treatment for lentigines? Most lentigines are left alone. Attempts to lighten them may not be successful.  The following approaches are used:  SPF 50+ broad-spectrum sunscreen   Hydroquinone bleaching cream   Alpha hydroxy acids   Vitamin C   Retinoids   Azelaic acid   Chemical peels  Individual lesions can be permanently removed using:  Cryotherapy   Intense pulsed light   Pigment lasers    How can lentigines be prevented? Lentigines associated with exposure ultraviolet radiation can be prevented by very careful sun protection. Clothing is more successful at preventing new lentigines than are sunscreens. What is the outlook for lentigines? Lentigines usually persist. They may increase in number with age and sun exposure. Some in sun-protected sites may fade and disappear. CHERRY ANGIOMAS    Physical Exam:  Anatomic Location Affected:  trunk  Morphological Description:  Scattered cherry red, 1-4 mm papules. Pertinent Positives:  Pertinent Negatives: Additional History of Present Condition:      Assessment and Plan:  Based on a thorough discussion of this condition and the management approach to it (including a comprehensive discussion of the known risks, side effects and potential benefits of treatment), the patient (family) agrees to implement the following specific plan:  Monitor for changes  Benign, reassured      Assessment and Plan:    Cherry angioma, also known as [place] spots, are benign vascular skin lesions. A "cherry angioma" is a firm red, blue or purple papule, 0.1-1 cm in diameter. When thrombosed, they can appear black in colour until evaluated with a dermatoscope when the red or purple colour is more easily seen. Cherry angioma may develop on any part of the body but most often appear on the scalp, face, lips and trunk. An angioma is due to proliferating endothelial cells; these are the cells that line the inside of a blood vessel. Angiomas can arise in early life or later in life; the most common type of angioma is a cherry angioma. Cherry angiomas are very common in males and females of any age or race. They are more noticeable in white skin than in skin of colour. They markedly increase in number from about the age of 36. There may be a family history of similar lesions.  Eruptive cherry angiomas have been rarely reported to be associated with internal malignancy. The cause of angiomas is unknown. Genetic analysis of cherry angiomas has shown that they frequently carry specific somatic missense mutations in the GNAQ and GNA11 (Q209H) genes, which are involved in other vascular and melanocytic proliferations. SEBORRHEIC KERATOSIS; NON-INFLAMED    Physical Exam:  Anatomic Location Affected:  trunk  Morphological Description:  Flat and raised, waxy, smooth to warty textured, yellow to brownish-grey to dark brown to blackish, discrete, "stuck-on" appearing papules. Pertinent Positives:  Pertinent Negatives: Additional History of Present Condition:      Assessment and Plan:  Based on a thorough discussion of this condition and the management approach to it (including a comprehensive discussion of the known risks, side effects and potential benefits of treatment), the patient (family) agrees to implement the following specific plan:  Monitor for changes  Benign, reassured      Seborrheic Keratosis  A seborrheic keratosis is a harmless warty spot that appears during adult life as a common sign of skin aging. Seborrheic keratoses can arise on any area of skin, covered or uncovered, with the usual exception of the palms and soles. They do not arise from mucous membranes. Seborrheic keratoses can have highly variable appearance. Seborrheic keratoses are extremely common. It has been estimated that over 90% of adults over the age of 61 years have one or more of them. They occur in males and females of all races, typically beginning to erupt in the 35s or 45s. They are uncommon under the age of 21 years. The precise cause of seborrhoeic keratoses is not known. Seborrhoeic keratoses are considered degenerative in nature. As time goes by, seborrheic keratoses tend to become more numerous. Some people inherit a tendency to develop a very large number of them; some people may have hundreds of them.       There is no easy way to remove multiple lesions on a single occasion. Unless a specific lesion is "inflamed" and is causing pain or stinging/burning or is bleeding, most insurance companies do not authorize treatment. XEROSIS ("DRY SKIN")    Physical Exam:  Anatomic Location Affected:  diffuse  Morphological Description:  xerosis  Pertinent Positives:  Pertinent Negatives: Additional History of Present Condition:      Assessment and Plan:  Based on a thorough discussion of this condition and the management approach to it (including a comprehensive discussion of the known risks, side effects and potential benefits of treatment), the patient (family) agrees to implement the following specific plan:  Use moisturizer like Eucerin,Cerave or Aveeno Cream 3 times a day for the dry skin            Dry skin refers to skin that feels dry to touch. Dry skin has a dull surface with a rough, scaly quality. The skin is less pliable and cracked. When dryness is severe, the skin may become inflamed and fissured. Although any body site can be dry, dry skin tends to affect the shins more than any other site. Dry skin is lacking moisture in the outer horny cell layer (stratum corneum) and this results in cracks in the skin surface. Dry skin is also called xerosis, xeroderma or asteatosis (lack of fat). It can affect males and females of all ages. There is some racial variability in water and lipid content of the skin. Dry skin that starts in early childhood may be one of about 20 types of ichthyosis (fish-scale skin). There is often a family history of dry skin. Dry skin is commonly seen in people with atopic dermatitis. Nearly everyone > 60 years has dry skin. Dry skin that begins later may be seen in people with certain diseases and conditions.   Postmenopausal women  Hypothyroidism  Chronic renal disease   Malnutrition and weight loss   Subclinical dermatitis   Treatment with certain drugs such as oral retinoids, diuretics and epidermal growth factor receptor inhibitors      What is the treatment for dry skin? The mainstay of treatment of dry skin and ichthyosis is moisturisers/emollients. They should be applied liberally and often enough to:  Reduce itch   Improve the barrier function   Prevent entry of irritants, bacteria   Reduce transepidermal water loss. How can dry skin be prevented? Eliminate aggravating factors:  Reduce the frequency of bathing. A humidifier in winter and air conditioner in summer   Compare having a short shower with a prolonged soak in a bath. Use lukewarm, not hot, water. Replace standard soap with a substitute such as a synthetic detergent cleanser, water-miscible emollient, bath oil, anti-pruritic tar oil, colloidal oatmeal etc.   Apply an emollient liberally and often, particularly shortly after bathing, and when itchy. The drier the skin, the thicker this should be, especially on the hands. What is the outlook for dry skin? A tendency to dry skin may persist life-long, or it may improve once contributing factors are controlled. PILAR CYST    Physical Exam:  Anatomic Location Affected:  scalp   Morphological Description:  small subcutaneous nodule   Pertinent Positives:  Pertinent Negatives: Additional History of Present Condition:  patient has spot of concern on scalp that she keeps picking at. Assessment and Plan:  Based on a thorough discussion of this condition and the management approach to it (including a comprehensive discussion of the known risks, side effects and potential benefits of treatment), the patient (family) agrees to implement the following specific plan:  Discussed excision   Reassured benign     PILAR CYST  A trichilemmal cyst, also known as a pilar cyst, is a keratin-filled cyst that originates from the outer hair root sheath. Keratin is the protein that makes up hair and nails. Trichilemmal cysts are most commonly found on the scalp and are usually diagnosed in middle-aged females.  They often run in the family, as they have an autosomal dominant pattern of inheritance (ie, the tendency to the cysts can be is passed on by a parent to their child of either sex, and the child has a 1 in 2 likelihood of inheriting it). What are the clinical features of trichilemmal cyst?  Trichilemmal cysts may look similar to epidermoid cyst and are often incorrectly termed sebaceous cysts. Trichilemmal cysts present as one or more firm, mobile, subcutaneous nodules measuring of various size. There is no central punctum, unlike an epidermoid cyst. A trichilemmal cyst can be painful if inflamed. What is the treatment for trichilemmal cysts? Pilar cysts can slowly enlarge, rupture with marked  inflammation, and can be associated with hairloss. It was discused that removal ideally includes escision of sac and contents often with sutures   RASH    Physical Exam:  (Anatomic Location); (Size and Morphological Description); (Differential Diagnosis):  Axilla, arms; skin colored and pink papules; likely reaction form Ketruda   Left calf with keratotic papule;   Pertinent Positives:  Pertinent Negatives: Additional History of Present Condition:  Patient concerned about some bumps she has on her skin since starting her infusion therapy States bumps are itchy,     Assessment and Plan:  Based on a thorough discussion of this condition and the management approach to it (including a comprehensive discussion of the known risks, side effects and potential benefits of treatment), the patient (family) agrees to implement the following specific plan:  Start Triamcinolone 0.1% ointment apply to areas twice a day after infusions   Try not to pick or scratch areas. Apply Vaseline and bandaid to left calf. - Recommend sensitive skin care regimen  - detergent free of dyes and perfumes (example, free and clear). Try washing clothes with extra rinse cycle.   - Short lukewarm showers  - White dove bar soap  - Recommend moisturizing whole body with Creams multiple times a day (examples: Duey Perfect. and Eucerin)  - avoid aerosols and fragrances in the home (candles, plug ins, perfume, air freshener, etc)     LIPOMA    Physical Exam:  Anatomic Location Affected:  Right upper arm; right posterior thigh   Morphological Description:  subcutameous nodule   Pertinent Positives:  Pertinent Negatives: Additional History of Present Condition:      Assessment and Plan:  Based on a thorough discussion of this condition and the management approach to it (including a comprehensive discussion of the known risks, side effects and potential benefits of treatment), the patient (family) agrees to implement the following specific plan:  Reassured benign   Discussed excision if it becomes bothersome    Lipoma  A lipoma is a non-cancerous tumor that is made up of fat cells. It slowly grows under the skin in the subcutaneous tissue. A person may have a single lipoma or may have many lipomas. They are very common. Lipomas can occur in people of all ages, however, they tend to develop in adulthood and are most noticeable during middle age. They affect both sexes equally, although solitary lipomas are more common in women whilst multiple lipomas occur more frequently in men. The cause of lipomas is unknown. It is possible there may be genetic involvement as many patients with lipomas come from a family with a history of these tumors. Sometimes an injury such as a blunt blow to part of the body may trigger growth of a lipoma. People are often unaware of lipomas until they have grown large enough to become visible and palpable. This growth occurs slowly over several years.  Some features of lipomas include:  A dome-shaped or egg-shaped lump about 2-10 cm in diameter (some may grow even larger)   It feels soft and smooth and is easily moved under the skin with the fingers   Some have a rubbery or doughy consistency   They are most common on the shoulders, neck, trunk and arms, but they can occur anywhere on the body where fat tissue is present. Most lipomas are symptomless, but some are painful on applying pressure. Lipomas that are tender or painful are usually angiolipomas. This means the lipoma has an increased number of small blood vessels. Painful lipomas are also a feature of adiposis dolorosa or Dercum disease. Diagnosis of lipoma is usually made clinically by finding a soft lump under the skin. However, if there is any doubt, a deep skin biopsy can be performed which will show typical histopathological features of lipoma and its variants. Most lipomas require no treatment. Most lipomas eventually stop growing and remain indefinitely without causing any problems.  Occasionally, lipomas that interfere with the movement of adjacent muscles may require surgical removal. Several methods are available:  Simple surgical excision   Squeeze technique (a small incision is made over the lipoma and the fatty tissue is squeezed through the hole)   Liposuction        Scribe Attestation      I,:  Jacqueline Lowe MA am acting as a scribe while in the presence of the attending physician.:       I,:  Navdeep Faust MD personally performed the services described in this documentation    as scribed in my presence.:

## 2023-11-20 ENCOUNTER — TELEPHONE (OUTPATIENT)
Dept: INTERNAL MEDICINE CLINIC | Facility: CLINIC | Age: 63
End: 2023-11-20

## 2023-11-20 DIAGNOSIS — J06.9 UPPER RESPIRATORY TRACT INFECTION, UNSPECIFIED TYPE: Primary | ICD-10-CM

## 2023-11-20 RX ORDER — AZITHROMYCIN 250 MG/1
TABLET, FILM COATED ORAL
Qty: 6 TABLET | Refills: 0 | Status: SHIPPED | OUTPATIENT
Start: 2023-11-20 | End: 2023-11-24

## 2023-11-20 NOTE — TELEPHONE ENCOUNTER
Hi, this is Rohit Patterson. [facility]. My birthday is December 6, 1960 I'm calling to see if Doctor Zaida Stout will call in a prescription. I guess there's a Z pack. I'm having a scratchy throat. I've been coughing a lot. I don't see any sleep without coughing. I've been taking the cough medicine He prescribed the Promethazine DM 6.25 Dash 15MG/5ML and that hasn't really been doing anything besides the coughing and the throat feeling itchy. My ears feel a little itchy and I've been also coughing up sputum. I guess it's kind of a yellowish, white, yellowish stuff at this point. So I'd like to get some medicine and try to knock this out on me quick. My number is [phone]. Thank you.

## 2023-11-22 DIAGNOSIS — C64.1 RENAL CELL CARCINOMA OF RIGHT KIDNEY (HCC): Primary | ICD-10-CM

## 2023-11-24 ENCOUNTER — TELEPHONE (OUTPATIENT)
Dept: INTERNAL MEDICINE CLINIC | Facility: CLINIC | Age: 63
End: 2023-11-24

## 2023-11-24 ENCOUNTER — OFFICE VISIT (OUTPATIENT)
Dept: INTERNAL MEDICINE CLINIC | Facility: CLINIC | Age: 63
End: 2023-11-24
Payer: MEDICARE

## 2023-11-24 ENCOUNTER — HOSPITAL ENCOUNTER (OUTPATIENT)
Dept: RADIOLOGY | Facility: HOSPITAL | Age: 63
Discharge: HOME | End: 2023-11-24
Payer: MEDICARE

## 2023-11-24 VITALS
BODY MASS INDEX: 24.81 KG/M2 | SYSTOLIC BLOOD PRESSURE: 132 MMHG | WEIGHT: 168 LBS | HEART RATE: 85 BPM | TEMPERATURE: 99.2 F | DIASTOLIC BLOOD PRESSURE: 82 MMHG | OXYGEN SATURATION: 96 %

## 2023-11-24 DIAGNOSIS — J06.9 VIRAL UPPER RESPIRATORY TRACT INFECTION: Primary | ICD-10-CM

## 2023-11-24 DIAGNOSIS — J06.9 UPPER RESPIRATORY TRACT INFECTION, UNSPECIFIED TYPE: Primary | ICD-10-CM

## 2023-11-24 DIAGNOSIS — R05.1 ACUTE COUGH: ICD-10-CM

## 2023-11-24 LAB
SARS-COV-2 AG UPPER RESP QL IA: NEGATIVE
VALID CONTROL: NORMAL

## 2023-11-24 PROCEDURE — 87811 SARS-COV-2 COVID19 W/OPTIC: CPT | Performed by: INTERNAL MEDICINE

## 2023-11-24 PROCEDURE — 71046 X-RAY EXAM CHEST 2 VIEWS: CPT

## 2023-11-24 PROCEDURE — 99213 OFFICE O/P EST LOW 20 MIN: CPT | Performed by: INTERNAL MEDICINE

## 2023-11-24 RX ORDER — AZITHROMYCIN 500 MG/1
500 TABLET, FILM COATED ORAL DAILY
Qty: 7 TABLET | Refills: 0 | Status: CANCELLED | OUTPATIENT
Start: 2023-11-24 | End: 2023-12-01

## 2023-11-24 RX ORDER — SODIUM CHLORIDE 9 MG/ML
20 INJECTION, SOLUTION INTRAVENOUS ONCE
Status: CANCELLED | OUTPATIENT
Start: 2023-11-28

## 2023-11-24 RX ORDER — CEPHALEXIN 500 MG/1
500 CAPSULE ORAL 2 TIMES DAILY
Qty: 14 CAPSULE | Refills: 0 | Status: SHIPPED | OUTPATIENT
Start: 2023-11-24 | End: 2023-11-24

## 2023-11-24 RX ORDER — ALBUTEROL SULFATE 90 UG/1
2 AEROSOL, METERED RESPIRATORY (INHALATION) EVERY 6 HOURS PRN
Qty: 6.7 G | Refills: 0 | Status: SHIPPED | OUTPATIENT
Start: 2023-11-24

## 2023-11-24 RX ORDER — CEFUROXIME AXETIL 500 MG/1
500 TABLET ORAL EVERY 12 HOURS SCHEDULED
Qty: 20 TABLET | Refills: 0 | Status: SHIPPED | OUTPATIENT
Start: 2023-11-24 | End: 2023-12-04

## 2023-11-24 RX ORDER — DEXTROMETHORPHAN HYDROBROMIDE AND PROMETHAZINE HYDROCHLORIDE 15; 6.25 MG/5ML; MG/5ML
5 SYRUP ORAL 4 TIMES DAILY PRN
Qty: 240 ML | Refills: 0 | Status: SHIPPED | OUTPATIENT
Start: 2023-11-24

## 2023-11-24 NOTE — TELEPHONE ENCOUNTER
Hi, this is Leanor Gadsden. My phone number is [phone]. I called last week and got a Z Pack. Today was my last day of the Z pack medicine. Still not feeling good. Have a lot of drainage from the right side, lot of coughing and when I cough it's more like a bark and I have to hold my right side ribs. They're either sore or broken or I don't know what from the coughing and last night I had the chills and was sweating so I don't know if I just need to write it out more or if the doctor wants to see me. My main concern is I don't want to end up with pneumonia again and breathing is a little challenging one talking Alright again Nick Lassiter [phone] Thank you.     Sent to both doctors

## 2023-11-24 NOTE — PATIENT INSTRUCTIONS
COVID test today is negative  Cough syrup ordered  Chest x-ray today/ASAP  We may have to do ceftin and azithromycin antibiotics for respiratory infection depending on chest x-ray results

## 2023-11-24 NOTE — TELEPHONE ENCOUNTER
Chief complaint:  Adenocarcinoma of the gallbladder-recurrent and metastatic.    Oncologic history:  September 6, 2017 patient presented with abdominal pain and fatigue and had abdominal ultrasound which revealed biliary dilatation  September 6, 2017 CT scan of abdomen and pelvis was performed which revealed gallstones  September 6, 2017 ERCP was performed-same day internal/external biliary drain was placed wire hepatic duct access  September 8, 2017 patient underwent cholecystectomy which revealed pathologic T2 adenocarcinoma of the gallbladder  Further surgery 10/31/2017 no malignancy was found  Chemoradiotherapy was offered and patient was referred to radiation oncology.  Patient declined chemotherapy she underwent radiotherapy completed in 3/2018    04/20/2021 patient complained of having abdominal pain and weight loss and CT abdomen was performed this revealed multiple liver metastases.  Biopsy revealed recurrent and metastatic adenocarcinoma of the gallbladder      INTERVAL HISTORY:  Patient returns in for follow-up.  She now complains of having abdominal and back pain.  She has been taking Norco without much relief but for 4 hours after she takes the medication she does get some relief.  She underwent CT scan of the chest and MRI of the liver and is here to discuss results.  She has no other new complaints.  Her performance score is ECOG 3.     PAST MEDICAL HISTORY, ALLERGIES, AND PHYSICAL EXAM:  Reviewed, documented and available in [facility].    REVIEW OF SYSTEMS:  Medical assistant notes were reviewed and accepted.    Oncology Encounter Vitals [05/27/21 1333]   ONC OP Encounter Vitals Group      /62      Heart Rate 78      Resp       Temp 97.9 °F (36.6 °C)      Temp src Oral      SpO2 96 %      Weight 261 lb 14.5 oz (118.8 kg)      Height       Pain Score  0      Pain Location       Pain Education?       BSA (Calculated - m2) - [employer]       BMI (Calculated)        PHYSICAL EXAM:  General:   Her symptoms sound viral which is why she is not getting better. Keflex is sent in, if she is developing a fever or discolored mucus then she should start it. Albuterol is sent in for her.  If her symptoms worsen, she should go to urgent care [facility], in no acute distress.  Skin:  Warm with normal turgor.  Head:  Atraumatic and normocephalic.  Eyes:  Right eye eyelid and conjunctiva appears normal, no epiphora or discharge.  Left eye eyelid and conjunctiva appears normal, no epiphora or discharge.  Nose:  No flaring, no discharge seen.  Throat:  Oropharynx appears normal.  Neck:  Supple with no significant adenopathy.  Lungs:  Normal respirations, clear to auscultation, no wheezing, rhonchi or rales heard.  Heart:  Regular rhythm, no murmur present.  Abdomen:  Soft, no guarding or masses, no organomegaly or CVA (costovertebral angle) tenderness.  Extremities:  Full ROM (range of motion), with normal-appearing joints.    MOST RECENT CBC:  Lab Results   Component Value Date/Time    WBC 6.1 04/22/2021 02:29 PM    WBC 6.0 10/24/2019 12:58 PM    RBC 4.43 04/22/2021 02:29 PM    RBC 4.29 10/24/2019 12:58 PM    HCT 39.9 04/22/2021 02:29 PM    HCT 39.8 10/24/2019 12:58 PM    HGB 13.6 04/22/2021 02:29 PM    HGB 13.0 10/24/2019 12:58 PM     04/22/2021 02:29 PM     10/24/2019 12:58 PM     09/05/2013 07:54 AM       MOST RECENT LFT:  Lab Results   Component Value Date/Time    AST 29 04/22/2021 02:29 PM    AST 25 10/24/2019 12:58 PM    GPT 39 04/22/2021 02:29 PM    GPT 29 10/24/2019 12:58 PM    ALKPT 98 04/22/2021 02:29 PM    ALKPT 55 10/24/2019 12:58 PM    BILIRUBIN 0.2 04/22/2021 02:29 PM    BILIRUBIN 0.4 10/24/2019 12:58 PM     CEA (ng/mL)   Date Value   04/22/2021 1.7     CT scan of abdomen was reviewed.  Path report was reviewed  MRI of the liver was reviewed  CT chest was reviewed.    ASSESSMENT:   Patient is a pleasant 74 year-old female who was seen here for diagnosis of metastatic adenocarcinoma of the gallbladder.    I discussed the following issues:  1. Disease is incurable and prognosis is very poor  2. She now has extensive disease in the liver and lungs.  3. 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  We  discussed following 2 options:  1. Palliative care and hospice  2. Systemic therapy    Other systemic therapies that 2 options:  1. Standard systemic therapy  Targeted therapy or immunotherapy.    Patient underwent Foundation One study and it in this did not revealed any targetable area however PDL1 was positive.  Her case will be discussed in molecular tumor board and a decision regarding whether or not targeted therapy will be appropriate.  If it is not felt a targeted therapy is appropriate I will recommend that she be treated with carboplatin and Gemzar chemotherapy.  Other treatment options including oral Xeloda with or without gemcitabine was discussed.  A final decision regarding this will be made in near future    Pain management was discussed.  She will continue take Norco.  I will increase the pain medication.  If necessary long-acting pain medication will also be started.      Patient was counseled in detail. She had numerous questions. All questions were answered.    PLAN/RECOMMENDATIONS:   Pain management will be continued  Patient's case will be discussed in molecular tumor board  Subsequently a decision regarding further management will be made.      I appreciate the opportunity to participate in her care. Please feel free to call me for questions or concerns.

## 2023-11-24 NOTE — PROGRESS NOTES
Name: Carrie Edward      : 1960      MRN: [id number]  Encounter Provider: Venancio Russell DO  Encounter Date: 2023   Encounter department: [address] [place] [address]     1. Upper respiratory tract infection, unspecified type  Comments:  Rapid COVID neg in office. suspect upper resp infection but need to r/o LRT involvement. CXR now/stat.  will determine abx therapy based on CXR  Orders:  -     promethazine-dextromethorphan (PHENERGAN-DM) 6.25-15 mg/5 mL oral syrup; Take 5 mL by mouth 4 (four) times a day as needed for cough  -     cefuroxime (CEFTIN) 500 mg tablet; Take 1 tablet (500 mg total) by mouth every 12 (twelve) hours for 10 days    2. Acute cough  Comments:  c/w plan as above and cough syrup& other OTC remedies. precautions advised  Orders:  -     XR chest pa & lateral; Future; Expected date: 2023  -     POCT Rapid Covid Ag         She was advised to notify her oncologist's office about her current respiratory illness as she is scheduled to receive [place] next week. During the visit pt's PCP ordered albuterol MDI via e-prescribe. Addendum(7:25pm) rec'd CXR results and called/spoke to [facility]. CXR shows clear lungs. Will treat with ceftin BID x10, rx ordered and d/w patient in detail. Subjective      HPI    New to me, here for same day appt as a covering provider for pt's PCP. She has had URI symptoms for 7 days. + sick contacts ad her BF's father tested positive for COVID today. She did a home COVID test a couple of days ago which was negative. She was prescribed z-pack by her PCP 5 days ago which did not help. She is having cough, congestion, runny nose and sinus pressure/pain. She has had sinus surgery in the past on right side. She has multiple antibiotic allergies but tolerates macrolides and cephalosporins.   She was treated for right sided pneumonia in  of this year in the hospital.  She is taking OTC remedies and rx for cough syrup which helps but only temporarily. She has chest congestion now and is worried she may have pneumonia again. ROS Otherwise negative, no other complaints. Review of Systems   Constitutional:  Positive for chills. Negative for fever. HENT:  Positive for congestion, postnasal drip, rhinorrhea, sinus pressure and sinus pain. Eyes:  Negative for visual disturbance. Respiratory:  Positive for cough and chest tightness. Cardiovascular:  Negative for chest pain. Gastrointestinal:  Negative for abdominal pain and diarrhea. Endocrine: Negative for polyuria. Genitourinary:  Negative for difficulty urinating. Musculoskeletal:  Negative for gait problem. Skin:  Negative for rash. Allergic/Immunologic: Negative for immunocompromised state. Neurological:  Negative for dizziness. Psychiatric/Behavioral:  Negative for dysphoric mood. Current Outpatient Medications on File Prior to Visit   Medication Sig    Cholecalciferol (Vitamin D3) 50 MCG (2000 UT) TABS Take 1 tablet (2,000 Units total) by mouth daily    estradiol (VAGIFEM, YUVAFEM) 10 MCG TABS vaginal tablet INSERT 1 TABLET INTO THE VAGINA 2 TIMES A WEEK. fenofibrate (FENOGLIDE) 120 MG TABS Take 120 mg by mouth daily    FLUoxetine (PROzac) 40 MG capsule Take 1 capsule (40 mg total) by mouth daily    lidocaine (LIDODERM) 5 % lidocaine 5 % topical patch   APPLY 1 PATCH TO AFFECTED AREA FOR 12 HOURS A DAY & REMOVE FOR 12 HOURS BEFORE APPLYING NEXT PATCH.  (12 HOURS ON, 12 HOURS OFF)    lisinopril (ZESTRIL) 20 mg tablet Take 1 tablet (20 mg total) by mouth daily    traMADol (Ultram) 50 mg tablet Take 1 tablet (50 mg total) by mouth every 8 (eight) hours as needed for moderate pain    traZODone (DESYREL) 50 mg tablet Take 0.5-1 tablets (25-50 mg total) by mouth daily at bedtime as needed for sleep    triamcinolone (KENALOG) 0.1 % ointment Apply topically 2 (two) times a day    ursodiol (ACTIGALL) 300 mg capsule TAKE 1 CAPSULE BY MOUTH THREE TIMES A DAY    [DISCONTINUED] azithromycin (ZITHROMAX) 250 mg tablet Take 2 tablets by mouth day 1 then 1 tablet by mouth days 2-5    [DISCONTINUED] promethazine-dextromethorphan (PHENERGAN-DM) 6.25-15 mg/5 mL oral syrup Take 5 mL by mouth 4 (four) times a day as needed for cough    valACYclovir (VALTREX) 500 mg tablet Take 2 tablets (1,000 mg total) by mouth daily for 14 days       Objective     /82 (BP Location: Left arm, Patient Position: Sitting, Cuff Size: Standard)   Pulse 85   Temp 99.2 °F (37.3 °C)   Wt 76.2 kg (168 lb)   LMP  (LMP Unknown)   SpO2 96%   BMI 24.81 kg/m²     Physical Exam  Vitals reviewed. Constitutional:       General: She is not in acute distress. Appearance: She is ill-appearing. Interventions: Face mask in place. HENT:      Head: Normocephalic and atraumatic. Eyes:      Conjunctiva/sclera: Conjunctivae normal.   Cardiovascular:      Rate and Rhythm: Normal rate and regular rhythm. Heart sounds: No murmur heard. Pulmonary:      Effort: Pulmonary effort is normal.      Breath sounds: No wheezing or rales. Abdominal:      General: Bowel sounds are normal.      Palpations: Abdomen is soft. Tenderness: There is no abdominal tenderness. Musculoskeletal:      Right lower leg: No edema. Left lower leg: No edema. Lymphadenopathy:      Cervical: No cervical adenopathy (but tenderness in anterior cervical chain b/l). Neurological:      Mental Status: She is alert. Mental status is at baseline. Psychiatric:         Mood and Affect: Mood normal.         Behavior: Behavior normal.       Results for orders placed or performed in visit on 11/24/23   POCT Rapid Covid Ag   Result Value Ref Range    POCT SARS-CoV-2 Ag Negative Negative    VALID CONTROL Valid      *Note: Due to a large number of results and/or encounters for the requested time period, some results have not been displayed. A complete set of results can be found in Results Review. Baldemra Chanel, DO

## 2023-11-27 ENCOUNTER — APPOINTMENT (OUTPATIENT)
Dept: LAB | Facility: CLINIC | Age: 63
End: 2023-11-27
Payer: MEDICARE

## 2023-11-27 DIAGNOSIS — C64.1 RENAL CELL CARCINOMA OF RIGHT KIDNEY (HCC): ICD-10-CM

## 2023-11-27 LAB
ALBUMIN SERPL BCP-MCNC: 3.7 G/DL (ref 3.5–5)
ALP SERPL-CCNC: 262 U/L (ref 34–104)
ALT SERPL W P-5'-P-CCNC: 59 U/L (ref 7–52)
ANION GAP SERPL CALCULATED.3IONS-SCNC: 6 MMOL/L
AST SERPL W P-5'-P-CCNC: 58 U/L (ref 13–39)
BASOPHILS # BLD AUTO: 0.03 THOUSANDS/ÂΜL (ref 0–0.1)
BASOPHILS NFR BLD AUTO: 1 % (ref 0–1)
BILIRUB SERPL-MCNC: 0.55 MG/DL (ref 0.2–1)
BUN SERPL-MCNC: 29 MG/DL (ref 5–25)
CALCIUM SERPL-MCNC: 9.1 MG/DL (ref 8.4–10.2)
CHLORIDE SERPL-SCNC: 104 MMOL/L (ref 96–108)
CO2 SERPL-SCNC: 29 MMOL/L (ref 21–32)
CREAT SERPL-MCNC: 1.09 MG/DL (ref 0.6–1.3)
EOSINOPHIL # BLD AUTO: 0.18 THOUSAND/ÂΜL (ref 0–0.61)
EOSINOPHIL NFR BLD AUTO: 4 % (ref 0–6)
ERYTHROCYTE [DISTWIDTH] IN BLOOD BY AUTOMATED COUNT: 13.1 % (ref 11.6–15.1)
GFR SERPL CREATININE-BSD FRML MDRD: 54 ML/MIN/1.73SQ M
GLUCOSE SERPL-MCNC: 77 MG/DL (ref 65–140)
HCT VFR BLD AUTO: 38 % (ref 34.8–46.1)
HGB BLD-MCNC: 12.1 G/DL (ref 11.5–15.4)
IMM GRANULOCYTES # BLD AUTO: 0.03 THOUSAND/UL (ref 0–0.2)
IMM GRANULOCYTES NFR BLD AUTO: 1 % (ref 0–2)
LYMPHOCYTES # BLD AUTO: 1.38 THOUSANDS/ÂΜL (ref 0.6–4.47)
LYMPHOCYTES NFR BLD AUTO: 30 % (ref 14–44)
MCH RBC QN AUTO: 30.6 PG (ref 26.8–34.3)
MCHC RBC AUTO-ENTMCNC: 31.8 G/DL (ref 31.4–37.4)
MCV RBC AUTO: 96 FL (ref 82–98)
MONOCYTES # BLD AUTO: 0.27 THOUSAND/ÂΜL (ref 0.17–1.22)
MONOCYTES NFR BLD AUTO: 6 % (ref 4–12)
NEUTROPHILS # BLD AUTO: 2.74 THOUSANDS/ÂΜL (ref 1.85–7.62)
NEUTS SEG NFR BLD AUTO: 58 % (ref 43–75)
NRBC BLD AUTO-RTO: 0 /100 WBCS
PLATELET # BLD AUTO: 196 THOUSANDS/UL (ref 149–390)
PMV BLD AUTO: 10.9 FL (ref 8.9–12.7)
POTASSIUM SERPL-SCNC: 5 MMOL/L (ref 3.5–5.3)
PROT SERPL-MCNC: 8 G/DL (ref 6.4–8.4)
RBC # BLD AUTO: 3.95 MILLION/UL (ref 3.81–5.12)
SODIUM SERPL-SCNC: 139 MMOL/L (ref 135–147)
WBC # BLD AUTO: 4.63 THOUSAND/UL (ref 4.31–10.16)

## 2023-11-27 PROCEDURE — 36415 COLL VENOUS BLD VENIPUNCTURE: CPT

## 2023-11-27 PROCEDURE — 80053 COMPREHEN METABOLIC PANEL: CPT

## 2023-11-27 PROCEDURE — 85025 COMPLETE CBC W/AUTO DIFF WBC: CPT

## 2023-11-28 ENCOUNTER — HOSPITAL ENCOUNTER (OUTPATIENT)
Dept: INFUSION CENTER | Facility: CLINIC | Age: 63
Discharge: HOME | End: 2023-11-28
Payer: MEDICARE

## 2023-11-28 VITALS
RESPIRATION RATE: 18 BRPM | TEMPERATURE: 98 F | SYSTOLIC BLOOD PRESSURE: 118 MMHG | DIASTOLIC BLOOD PRESSURE: 73 MMHG | HEART RATE: 84 BPM | OXYGEN SATURATION: 98 %

## 2023-11-28 DIAGNOSIS — C64.1 RENAL CELL CARCINOMA OF RIGHT KIDNEY (HCC): Primary | ICD-10-CM

## 2023-11-28 PROCEDURE — 96413 CHEMO IV INFUSION 1 HR: CPT

## 2023-11-28 RX ORDER — SODIUM CHLORIDE 9 MG/ML
20 INJECTION, SOLUTION INTRAVENOUS ONCE
Status: COMPLETED | OUTPATIENT
Start: 2023-11-28 | End: 2023-11-28

## 2023-11-28 RX ADMIN — SODIUM CHLORIDE 200 MG: 9 INJECTION, SOLUTION INTRAVENOUS at 13:24

## 2023-11-28 RX ADMIN — SODIUM CHLORIDE 20 ML/HR: 0.9 INJECTION, SOLUTION INTRAVENOUS at 13:09

## 2023-11-28 NOTE — PROGRESS NOTES
Patient to infusion for Keytruda. She states she has upper respiratory infection and spoke to Dr Braeden Nelson office yesterday, she states they told her ok to have treatment today as she has been on abx for over a week. Patient tolerated treatment today,  next appointment confirmed.  She declined AVS

## 2023-12-05 ENCOUNTER — TELEPHONE (OUTPATIENT)
Dept: HEMATOLOGY ONCOLOGY | Facility: CLINIC | Age: 63
End: 2023-12-05

## 2023-12-05 ENCOUNTER — TELEPHONE (OUTPATIENT)
Dept: PSYCHIATRY | Facility: CLINIC | Age: 63
End: 2023-12-05

## 2023-12-05 NOTE — TELEPHONE ENCOUNTER
Pt aware no labs needed for upcoming appointment with Dr. Gui Abarca. Financial Advocacy: pt is requesting any input on secondary insurance, she reports having a meeting with someone at 2:00 PM today and is asking for a call back prior to this.

## 2023-12-05 NOTE — TELEPHONE ENCOUNTER
Patient Call    Who are you speaking with? Patient    If it is not the patient, are they listed on an active communication consent form? Yes   What is the reason for this call? Are lab results ok to use for appmt? 2. Which medical insurance company he recommend for 2 cancers? Does this require a call back? Yes   If a call back is required, please list best call back number [phone]    If a call back is required, advise that a message will be forwarded to their care team and someone will return their call as soon as possible. Did you relay this information to the patient?  Yes

## 2023-12-06 NOTE — TELEPHONE ENCOUNTER
Spoke to [employer]. She had questions regarding medicare. She is attempting to choose a plan that our practice accepts. Instructed her to call the plan she wants to see if we are a participating provider.

## 2023-12-08 ENCOUNTER — OFFICE VISIT (OUTPATIENT)
Dept: HEMATOLOGY ONCOLOGY | Facility: CLINIC | Age: 63
End: 2023-12-08
Payer: MEDICARE

## 2023-12-08 ENCOUNTER — TELEPHONE (OUTPATIENT)
Dept: HEMATOLOGY ONCOLOGY | Facility: CLINIC | Age: 63
End: 2023-12-08

## 2023-12-08 VITALS
OXYGEN SATURATION: 97 % | HEIGHT: 69 IN | TEMPERATURE: 97.5 F | HEART RATE: 75 BPM | DIASTOLIC BLOOD PRESSURE: 82 MMHG | WEIGHT: 167 LBS | BODY MASS INDEX: 24.73 KG/M2 | RESPIRATION RATE: 17 BRPM | SYSTOLIC BLOOD PRESSURE: 134 MMHG

## 2023-12-08 DIAGNOSIS — C64.1 RENAL CELL CARCINOMA OF RIGHT KIDNEY (HCC): Primary | ICD-10-CM

## 2023-12-08 DIAGNOSIS — D47.2 SMOLDERING MULTIPLE MYELOMA (SMM): Chronic | ICD-10-CM

## 2023-12-08 PROCEDURE — 99214 OFFICE O/P EST MOD 30 MIN: CPT | Performed by: INTERNAL MEDICINE

## 2023-12-08 NOTE — PROGRESS NOTES
Hematology/Oncology Outpatient Follow- up Note  Quinn Lui 58 y.o. female MRN: @ Encounter: [id number]        Date:  12/7/2023        Assessment / Plan:    1. Renal cell carcinoma of the right side, stage III (P T3a, PN X, grade 1, M0)  s/p nephrectomy 6/21/2023 clear cell subtype, primary 5.1 cm, unifocal, grade 1, with renal vein involvement, all margins are negative    she decided on adjuvant Pembrolizumab 200 mg every 3 weeks for total of 1 year (C1 on 8/15/2023); However the patient has autoimmune primary biliary cirrhosis we have to watch very carefully for autoimmune side effects such as hepatitis, pancreatitis, dermatitis, pneumonitis, adrenal insufficiency, colitis etc., she signed the consent. We will check TSH, T3 every other treatment. - 12/8/23 visit , will continue with Pembrolizumab cycle 7 was given on 11/28/2023 ; uses triamcinolone 1% for reported occasional itchiness/rash; and will repeate CT scan prior to next visit        2. Smoldering multiple myeloma, IgG kappa:  bone marrow biopsy showed 15% plasma cells with normal cytogenetics and FISH panel for multiple myeloma diagnosed in May 2017 she had been on watchful observation no evidence of endorgan damage; patient needs to complete the repeate MM blood workup before next visit. 3.  Primary biliary cirrhosis of the liver with persistent elevation of alkaline phosphatase and intermittent pruritus; close watch while on immunotherapy, If more pruritus we might use prednisone. 4. Osteoprosis , on Zometa every 6 month. Patient is due for her repeated DEXA scan, last was in 8/2021, she is aware and will arrange. Labs and imaging studies are reviewed by ordering provider once results are available. If there are findings that need immediate attention, you will be contacted when results available. Discussing results and the implication on your healthcare is best discussed in person at your follow-up visit.           Diagnosis ICD-10-CM Associated Orders   1. Renal cell carcinoma of right kidney (HCC)  C64.1 CT abdomen pelvis w contrast      2.  Smoldering multiple myeloma (SMM)  D47.2 CT abdomen pelvis w contrast          HPI:    58-year-old  female with past medical history of primary biliary cirrhosis, fracture of the right tibia, hypertension, OCD,worsening of osteoporosis on DEXA scan Done in 2016, nephrolithiasis, chronic lower back pain and possible sacroiliitis was found to have elevated total protein 3-4 years ago, serum protein electrophoresis showed IgG kappa monoclonal protein of 1.7 g/dL however no evidence of anemia, hypercalcemia, or renal insufficiency, she had persistent elevation of alkaline phosphatase secondary to PBC  had a bone scan done last year which showed activity in the ribs area  serum protein electrophoresis in April 2017 showed IgG kappa monoclonal protein of 1.96 g/dL, total protein 8.5, albumin 3.9, IgG 2580, creatinine 0.8, calcium 8.9, alkaline phosphatase 294, AST 37, ALT 46, IgM 25 in July 2016 monoclonal protein of IgG kappa of 1.73  C-reactive protein has been normal however sedimentation rate was elevated in the range of 60  Bone marrow biopsy in May 2017 showed 15% plasma cells in diffuse and interstitial pattern, normal fish panel for multiple myeloma and cytogenetics  Status post right knee replacement in June 2018  Exacerbation of anxiety/depression followed by psychiatric medicine     Osteoporosis s/p Zometa in 2019, DEXA scan on 08/2021 showed recurrence of osteoporosis in the lumbar spine; she is on Zometa every 6 months     Primary biliary cirrhosis followed by GI       Colonoscopy on 06/2021 showed mild diverticulosis        Interval History:   - TSH 1.778 (11/6/23)   - 11/27/23 CBC unremarkable; Cr 1.09 (baseline), AST 58, ALT 59, and  (all ~ baseline) , Total Bilirubin 0.55   - Pembrolizumab cycle 7 was given on 11/28/2023 ; reports some itchiness/rash, occasional and migratory , described as tolerable and declines need for systemic steroid,; she uses Triamcinolone 1%     Cancer Staging:  Cancer Staging   No matching staging information was found for the patient. Molecular Testing:     Previous Hematologic/ Oncologic History:    Oncology History   Renal cell carcinoma of right kidney ([address])   6/21/2023 Initial Diagnosis    Renal cell carcinoma of right kidney ([address])     8/15/2023 -  Chemotherapy    alteplase (CATHFLO), 2 mg, Intracatheter, Every 1 Minute as needed, 7 of 9 cycles  pembrolizumab (KEYTRUDA) IVPB, 200 mg, Intravenous, Once, 7 of 9 cycles  Administration: 200 mg (8/15/2023), 200 mg (9/5/2023), 200 mg (9/26/2023), 200 mg (10/17/2023), 200 mg (11/7/2023), 200 mg (11/28/2023)         Current Hematologic/ Oncologic Treatment:       Cycle 1         Test Results:    Imaging: XR chest pa & lateral    Result Date: 11/24/2023  Narrative: CHEST INDICATION:   R05.1: Acute cough. COMPARISON: 6/23/2023 EXAM PERFORMED/VIEWS:  XR CHEST PA & LATERAL FINDINGS: Cardiomediastinal silhouette appears unremarkable. The lungs are clear. No pneumothorax or pleural effusion. Osseous structures appear within normal limits for patient age. Impression: No acute cardiopulmonary disease.  Workstation performed: [id number]             Labs:   Lab Results   Component Value Date    WBC 4.63 11/27/2023    HGB 12.1 11/27/2023    HCT 38.0 11/27/2023    MCV 96 11/27/2023     11/27/2023     Lab Results   Component Value Date     12/29/2015    K 5.0 11/27/2023     11/27/2023    CO2 29 11/27/2023    ANIONGAP 7 12/29/2015    BUN 29 (H) 11/27/2023    CREATININE 1.09 11/27/2023    GLUCOSE 134 12/29/2015    GLUF 72 09/01/2023    CALCIUM 9.1 11/27/2023    CORRECTEDCA 9.2 08/21/2023    AST 58 (H) 11/27/2023    ALT 59 (H) 11/27/2023    ALKPHOS 262 (H) 11/27/2023    PROT 8.4 (H) 12/29/2015    BILITOT 0.46 12/29/2015    EGFR 54 11/27/2023         Lab Results   Component Value Date    SPEP See Comment 10/16/2023    UPEP The serum total 05/09/2015       No results found for: "PSA"    No results found for: "CEA"    No results found for: ""    No results found for: "AFP"    Lab Results   Component Value Date    IRON 64 06/23/2023    TIBC 424 06/23/2023    FERRITIN 143 06/23/2023       Lab Results   Component Value Date    IMZIJSHC22 393 06/28/2022         ROS: Review of Systems  - GENERAL: Negative for any nausea, vomiting, fevers, chills, or weight loss. - HEENT: Negative for any head/Neck trauma, pain, double/blurry vision, sinusitis, rhinitis, nose bleeding.  - CARDIAC: Negative for any chest pain, palpitation, Dyspnea on exertion, peripheral edema. - PULMONARY: Negative for any SOB, cough, wheezing.   - GASTROINTESTINAL: Negative for any abdominal pain, N/V/D/C, blood in stool.   - GENITOURINARY: Negative for any dysuria, hematuria, incontinence.  - NEUROLOGIC: Negative for any muscle weakness, numbness/tingling, memory changes. - MUSCULOSKELETAL: Negative for any joint pains/swelling, limited ROM. - INTEGUMENTARY: pruritic rash/itchiness , occasional , and on different spots, see above   - HEMATOLOGIC: Negative for any abnormal bruising, frequent infections or bleeding. Current Medications: Reviewed  Allergies: Reviewed  PMH/FH/SH:  Reviewed      Physical Exam:    There is no height or weight on file to calculate BSA.     Wt Readings from Last 3 Encounters:   11/24/23 76.2 kg (168 lb)   11/15/23 74.8 kg (165 lb)   10/24/23 76.2 kg (168 lb)        Temp Readings from Last 3 Encounters:   11/28/23 98 °F (36.7 °C) (Temporal)   11/24/23 99.2 °F (37.3 °C)   11/15/23 97.7 °F (36.5 °C)        BP Readings from Last 3 Encounters:   11/28/23 118/73   11/24/23 132/82   11/07/23 137/82         Pulse Readings from Last 3 Encounters:   11/28/23 84   11/24/23 85   11/07/23 72     @LASTSAO2(3)@      Physical Exam  - GEN: Appears well, alert and oriented x 3, pleasant and cooperative, in no acute distress  - HEENT: Anicteric, mucous membranes moist, PERRL and EOMI   - NECK: No lymphadenopathy, JVD or carotid bruits   - HEART: RRR, normal S1 and S2, no murmurs, clicks, gallops or rubs   - LUNGS: Clear to auscultation bilaterally; no wheezes, rales, or rhonchi  - ABDOMEN: Normal bowel sounds, soft, no tenderness, no distention, no organomegaly or masses felt on exam.   - EXTREMITIES: Peripheral pulses normal; no clubbing, cyanosis, or edema  - NEURO: No focal findings, CN II-XII are grossly intact. - Musculoskeletal: 5/5 strength, normal ROM, no swollen or erythematous joints. - SKIN: Normal without suspicious lesions on exposed skin; no rash on visual exam today       Goals and Barriers:  Current Goal: Prolong Survival from Cancer. Barriers: None. Patient's Capacity to Self Care:  Patient is able to self care.     Code Status: [unfilled]  Advance Directive and Living Will:      Power of :

## 2023-12-12 RX ORDER — SODIUM CHLORIDE 9 MG/ML
20 INJECTION, SOLUTION INTRAVENOUS ONCE
Status: CANCELLED | OUTPATIENT
Start: 2023-12-19

## 2023-12-15 ENCOUNTER — TELEPHONE (OUTPATIENT)
Dept: DERMATOLOGY | Facility: CLINIC | Age: 63
End: 2023-12-15

## 2023-12-15 NOTE — TELEPHONE ENCOUNTER
Left patient a voicemail to reschedule appt on 3/12 for a appt in February     Please offer a appt in February and send me a message so I can schedule

## 2023-12-16 ENCOUNTER — APPOINTMENT (OUTPATIENT)
Dept: LAB | Age: 63
End: 2023-12-16
Payer: MEDICARE

## 2023-12-16 DIAGNOSIS — K74.3 PRIMARY BILIARY CHOLANGITIS (HCC): ICD-10-CM

## 2023-12-16 DIAGNOSIS — C64.1 RENAL CELL CARCINOMA OF RIGHT KIDNEY (HCC): ICD-10-CM

## 2023-12-16 DIAGNOSIS — D47.2 SMOLDERING MULTIPLE MYELOMA (SMM): Chronic | ICD-10-CM

## 2023-12-16 LAB
ALBUMIN SERPL BCP-MCNC: 3.9 G/DL (ref 3.5–5)
ALP SERPL-CCNC: 273 U/L (ref 34–104)
ALT SERPL W P-5'-P-CCNC: 50 U/L (ref 7–52)
ANION GAP SERPL CALCULATED.3IONS-SCNC: 4 MMOL/L
AST SERPL W P-5'-P-CCNC: 55 U/L (ref 13–39)
BASOPHILS # BLD AUTO: 0.05 THOUSANDS/ÂΜL (ref 0–0.1)
BASOPHILS NFR BLD AUTO: 1 % (ref 0–1)
BILIRUB SERPL-MCNC: 0.65 MG/DL (ref 0.2–1)
BUN SERPL-MCNC: 21 MG/DL (ref 5–25)
CALCIUM SERPL-MCNC: 9.4 MG/DL (ref 8.4–10.2)
CHLORIDE SERPL-SCNC: 102 MMOL/L (ref 96–108)
CO2 SERPL-SCNC: 30 MMOL/L (ref 21–32)
CREAT SERPL-MCNC: 1.13 MG/DL (ref 0.6–1.3)
EOSINOPHIL # BLD AUTO: 0.14 THOUSAND/ÂΜL (ref 0–0.61)
EOSINOPHIL NFR BLD AUTO: 3 % (ref 0–6)
ERYTHROCYTE [DISTWIDTH] IN BLOOD BY AUTOMATED COUNT: 13.5 % (ref 11.6–15.1)
GFR SERPL CREATININE-BSD FRML MDRD: 51 ML/MIN/1.73SQ M
GLUCOSE P FAST SERPL-MCNC: 100 MG/DL (ref 65–99)
HCT VFR BLD AUTO: 39.4 % (ref 34.8–46.1)
HGB BLD-MCNC: 12.5 G/DL (ref 11.5–15.4)
IGA SERPL-MCNC: 43 MG/DL (ref 66–433)
IGG SERPL-MCNC: 2407 MG/DL (ref 635–1741)
IGM SERPL-MCNC: 232 MG/DL (ref 45–281)
IMM GRANULOCYTES # BLD AUTO: 0.01 THOUSAND/UL (ref 0–0.2)
IMM GRANULOCYTES NFR BLD AUTO: 0 % (ref 0–2)
LYMPHOCYTES # BLD AUTO: 1.27 THOUSANDS/ÂΜL (ref 0.6–4.47)
LYMPHOCYTES NFR BLD AUTO: 31 % (ref 14–44)
MCH RBC QN AUTO: 30.3 PG (ref 26.8–34.3)
MCHC RBC AUTO-ENTMCNC: 31.7 G/DL (ref 31.4–37.4)
MCV RBC AUTO: 96 FL (ref 82–98)
MONOCYTES # BLD AUTO: 0.34 THOUSAND/ÂΜL (ref 0.17–1.22)
MONOCYTES NFR BLD AUTO: 8 % (ref 4–12)
NEUTROPHILS # BLD AUTO: 2.28 THOUSANDS/ÂΜL (ref 1.85–7.62)
NEUTS SEG NFR BLD AUTO: 57 % (ref 43–75)
NRBC BLD AUTO-RTO: 0 /100 WBCS
PLATELET # BLD AUTO: 201 THOUSANDS/UL (ref 149–390)
PMV BLD AUTO: 10.9 FL (ref 8.9–12.7)
POTASSIUM SERPL-SCNC: 4.9 MMOL/L (ref 3.5–5.3)
PROT SERPL-MCNC: 8.6 G/DL (ref 6.4–8.4)
RBC # BLD AUTO: 4.12 MILLION/UL (ref 3.81–5.12)
SODIUM SERPL-SCNC: 136 MMOL/L (ref 135–147)
WBC # BLD AUTO: 4.09 THOUSAND/UL (ref 4.31–10.16)

## 2023-12-16 PROCEDURE — 36415 COLL VENOUS BLD VENIPUNCTURE: CPT

## 2023-12-16 PROCEDURE — 85025 COMPLETE CBC W/AUTO DIFF WBC: CPT

## 2023-12-16 PROCEDURE — 82784 ASSAY IGA/IGD/IGG/IGM EACH: CPT

## 2023-12-16 PROCEDURE — 83521 IG LIGHT CHAINS FREE EACH: CPT

## 2023-12-16 PROCEDURE — 80053 COMPREHEN METABOLIC PANEL: CPT

## 2023-12-18 ENCOUNTER — TELEPHONE (OUTPATIENT)
Dept: HEMATOLOGY ONCOLOGY | Facility: CLINIC | Age: 63
End: 2023-12-18

## 2023-12-18 NOTE — TELEPHONE ENCOUNTER
Patient Call    Who are you speaking with? Patient    If it is not the patient, are they listed on an active communication consent form? Yes   What is the reason for this call? Discuss lab results and treatment plan   Does this require a call back? Yes   If a call back is required, please list [facility] call back number [phone]    If a call back is required, advise that a message will be forwarded to their care team and someone will return their call as soon as possible.   Did you relay this information to the patient? Yes

## 2023-12-19 ENCOUNTER — HOSPITAL ENCOUNTER (OUTPATIENT)
Dept: INFUSION CENTER | Facility: CLINIC | Age: 63
Discharge: HOME | End: 2023-12-19
Payer: MEDICARE

## 2023-12-19 ENCOUNTER — TELEPHONE (OUTPATIENT)
Dept: HEMATOLOGY ONCOLOGY | Facility: CLINIC | Age: 63
End: 2023-12-19

## 2023-12-19 VITALS
SYSTOLIC BLOOD PRESSURE: 152 MMHG | DIASTOLIC BLOOD PRESSURE: 89 MMHG | HEART RATE: 70 BPM | TEMPERATURE: 97.8 F | RESPIRATION RATE: 18 BRPM | OXYGEN SATURATION: 98 %

## 2023-12-19 DIAGNOSIS — C64.1 RENAL CELL CARCINOMA OF RIGHT KIDNEY (HCC): Primary | ICD-10-CM

## 2023-12-19 LAB
KAPPA LC FREE SER-MCNC: 58.3 MG/L (ref 3.3–19.4)
KAPPA LC FREE/LAMBDA FREE SER: 4.48 {RATIO} (ref 0.26–1.65)
LAMBDA LC FREE SERPL-MCNC: 13 MG/L (ref 5.7–26.3)

## 2023-12-19 RX ORDER — SODIUM CHLORIDE 9 MG/ML
20 INJECTION, SOLUTION INTRAVENOUS ONCE
Status: COMPLETED | OUTPATIENT
Start: 2023-12-19 | End: 2023-12-19

## 2023-12-19 RX ADMIN — SODIUM CHLORIDE 20 ML/HR: 0.9 INJECTION, SOLUTION INTRAVENOUS at 15:21

## 2023-12-19 RX ADMIN — SODIUM CHLORIDE 200 MG: 9 INJECTION, SOLUTION INTRAVENOUS at 15:40

## 2023-12-19 NOTE — TELEPHONE ENCOUNTER
Patient Call    Who are you speaking with? Patient    If it is not the patient, are they listed on an active communication consent form? N/A   What is the reason for this call? Patient calling to speak with Eileen or Sabrina in regards to her infusion appointment today at 3:00pm.  Patient would like a call back before she goes to her infusion.  Patient states that she had labs done and her WBC is out of range and wants to make sure she can get her infusion today.  Patient would like a call back to discuss.    Does this require a call back? Yes   If a call back is required, please list best call back number [phone]   If a call back is required, advise that a message will be forwarded to their care team and someone will return their call as soon as possible.   Did you relay this information to the patient? Yes

## 2023-12-19 NOTE — PROGRESS NOTES
Patient presents today for treatment with Keytruda. Patient offers no complaints. VSS. Labs from 12/16/2023 reviewed and within parameters to treat. Peripheral IV inserted without incident.

## 2024-01-02 RX ORDER — SODIUM CHLORIDE 9 MG/ML
20 INJECTION, SOLUTION INTRAVENOUS ONCE
OUTPATIENT
Start: 2024-01-09

## 2024-01-03 ENCOUNTER — OFFICE VISIT (OUTPATIENT)
Dept: URGENT CARE | Age: 64
End: 2024-01-03
Payer: COMMERCIAL

## 2024-01-03 ENCOUNTER — TELEPHONE (OUTPATIENT)
Dept: FAMILY MEDICINE CLINIC | Facility: CLINIC | Age: 64
End: 2024-01-03

## 2024-01-03 ENCOUNTER — OFFICE VISIT (OUTPATIENT)
Dept: FAMILY MEDICINE CLINIC | Facility: CLINIC | Age: 64
End: 2024-01-03
Payer: MEDICARE

## 2024-01-03 VITALS
HEART RATE: 97 BPM | SYSTOLIC BLOOD PRESSURE: 113 MMHG | RESPIRATION RATE: 16 BRPM | OXYGEN SATURATION: 98 % | TEMPERATURE: 98.1 F | DIASTOLIC BLOOD PRESSURE: 87 MMHG

## 2024-01-03 VITALS
OXYGEN SATURATION: 97 % | WEIGHT: 166 LBS | HEART RATE: 75 BPM | HEIGHT: 69 IN | TEMPERATURE: 98.1 F | DIASTOLIC BLOOD PRESSURE: 84 MMHG | RESPIRATION RATE: 16 BRPM | BODY MASS INDEX: 24.59 KG/M2 | SYSTOLIC BLOOD PRESSURE: 150 MMHG

## 2024-01-03 DIAGNOSIS — N18.31 STAGE 3A CHRONIC KIDNEY DISEASE (HCC): ICD-10-CM

## 2024-01-03 DIAGNOSIS — B34.9 ACUTE VIRAL SYNDROME: Primary | ICD-10-CM

## 2024-01-03 DIAGNOSIS — K74.60 HEPATIC CIRRHOSIS, UNSPECIFIED HEPATIC CIRRHOSIS TYPE, UNSPECIFIED WHETHER ASCITES PRESENT (HCC): Chronic | ICD-10-CM

## 2024-01-03 DIAGNOSIS — J06.9 VIRAL URI WITH COUGH: ICD-10-CM

## 2024-01-03 DIAGNOSIS — U07.1 COVID: Primary | ICD-10-CM

## 2024-01-03 DIAGNOSIS — R05.1 ACUTE COUGH: ICD-10-CM

## 2024-01-03 LAB
SARS-COV-2 AG UPPER RESP QL IA: POSITIVE
VALID CONTROL: ABNORMAL

## 2024-01-03 PROCEDURE — 99213 OFFICE O/P EST LOW 20 MIN: CPT | Performed by: FAMILY MEDICINE

## 2024-01-03 PROCEDURE — 87811 SARS-COV-2 COVID19 W/OPTIC: CPT

## 2024-01-03 PROCEDURE — 99213 OFFICE O/P EST LOW 20 MIN: CPT

## 2024-01-03 RX ORDER — NIRMATRELVIR AND RITONAVIR 150-100 MG
2 KIT ORAL 2 TIMES DAILY
Qty: 20 TABLET | Refills: 0 | Status: SHIPPED | OUTPATIENT
Start: 2024-01-03 | End: 2024-01-08

## 2024-01-03 RX ORDER — BENZONATATE 100 MG/1
100 CAPSULE ORAL 3 TIMES DAILY PRN
Qty: 20 CAPSULE | Refills: 0 | Status: SHIPPED | OUTPATIENT
Start: 2024-01-03

## 2024-01-03 NOTE — PROGRESS NOTES
[facility]        NAME: Courtney Davalos is a 63 y.o. female  : 1960    MRN: [id number]  DATE: January 3, 2024  TIME: 11:26 AM    Assessment and Plan   COVID [U07.1]  1. COVID  nirmatrelvir & ritonavir (Paxlovid, 150/100,) tablet therapy pack      2. Viral URI with cough  Poct Covid 19 Rapid Antigen Test        COVID antigen positive    Patient Instructions     Quarantine for total of 5 days from symptom onset.  After 5 full days of quarantine, please wear a mask for an additional 5 days.  Please take Paxlovid as ordered.  Ensure adequate fluid intake.  Follow-up with PCP if symptoms persist.  Present to ER with any new, worsening or concerning symptoms.    Chief Complaint     Chief Complaint   Patient presents with    Headache    Cough    Sore Throat     Chills,headache,body aches, bilateral ear pain, nasal congestion and sore throat x 2 days. Patient at new PCP today and Dx with viral syndrome          History of Present Illness       63-year-old female presenting for evaluation of viral symptoms.  Patient states that over the past 24 hours she has been experiencing chills, ear pain, postnasal drip, sore throat, cough, chest tightness, body aches and headaches.  She states that she was seen by her PCP and diagnosed with viral syndrome and given Tessalon.  The PCP recommended that she come to care now for reevaluation.  Patient denies any measured fevers, chest pain or shortness of breath.  She has a history of renal carcinoma and  multiple myeloma and is currently on Keytruda infusions.    Headache  Cough  Associated symptoms include chills, ear pain, headaches, myalgias, postnasal drip and a sore throat. Pertinent negatives include no chest pain, fever or shortness of breath.   Sore Throat   Associated symptoms include coughing, ear pain and headaches. Pertinent negatives include no diarrhea, shortness of breath or vomiting.       Review of Systems   Review of Systems   Constitutional:  Positive  for chills. Negative for fever.   HENT:  Positive for ear pain, postnasal drip and sore throat.    Respiratory:  Positive for cough and chest tightness. Negative for shortness of breath.    Cardiovascular:  Negative for chest pain.   Gastrointestinal:  Negative for diarrhea, nausea and vomiting.   Musculoskeletal:  Positive for arthralgias and myalgias.   Neurological:  Positive for headaches.   All other systems reviewed and are negative.        Current Medications       Current Outpatient Medications:     nirmatrelvir & ritonavir (Paxlovid, 150/100,) tablet therapy pack, Take 2 tablets by mouth 2 (two) times a day for 5 days Take 1 nirmatrelvir tablet + 1 ritonavir tablet together per dose, Disp: 20 tablet, Rfl: 0    albuterol (Proventil HFA) 90 mcg/act inhaler, Inhale 2 puffs every 6 (six) hours as needed for wheezing, Disp: 6.7 g, Rfl: 0    benzonatate (TESSALON PERLES) 100 mg capsule, Take 1 capsule (100 mg total) by mouth 3 (three) times a day as needed for cough, Disp: 20 capsule, Rfl: 0    Cholecalciferol (Vitamin D3) 50 MCG (2000 UT) TABS, Take 1 tablet (2,000 Units total) by mouth daily, Disp: 90 tablet, Rfl: 3    estradiol (VAGIFEM, YUVAFEM) 10 MCG TABS vaginal tablet, INSERT 1 TABLET INTO THE VAGINA 2 TIMES A WEEK., Disp: 24 tablet, Rfl: 1    fenofibrate (FENOGLIDE) 120 MG TABS, Take 120 mg by mouth daily, Disp: , Rfl:     FLUoxetine (PROzac) 40 MG capsule, Take 1 capsule (40 mg total) by mouth daily, Disp: 90 capsule, Rfl: 1    lidocaine (LIDODERM) 5 %, lidocaine 5 % topical patch  APPLY 1 PATCH TO AFFECTED AREA FOR 12 HOURS A DAY & REMOVE FOR 12 HOURS BEFORE APPLYING NEXT PATCH. (12 HOURS ON, 12 HOURS OFF), Disp: , Rfl:     lisinopril (ZESTRIL) 20 mg tablet, Take 1 tablet (20 mg total) by mouth daily, Disp: 90 tablet, Rfl: 1    promethazine-dextromethorphan (PHENERGAN-DM) 6.25-15 mg/5 mL oral syrup, Take 5 mL by mouth 4 (four) times a day as needed for cough, Disp: 240 mL, Rfl: 0    traMADol (Ultram)  50 mg tablet, Take 1 tablet (50 mg total) by mouth every 8 (eight) hours as needed for moderate pain, Disp: 30 tablet, Rfl: 0    traZODone (DESYREL) 50 mg tablet, Take 0.5-1 tablets (25-50 mg total) by mouth daily at bedtime as needed for sleep, Disp: 90 tablet, Rfl: 1    triamcinolone (KENALOG) 0.1 % ointment, Apply topically 2 (two) times a day, Disp: 454 g, Rfl: 1    ursodiol (ACTIGALL) 300 mg capsule, TAKE 1 CAPSULE BY MOUTH THREE TIMES A DAY, Disp: 270 capsule, Rfl: 2    valACYclovir (VALTREX) 500 mg tablet, Take 2 tablets (1,000 mg total) by mouth daily for 14 days, Disp: 28 tablet, Rfl: 0    Current Allergies     Allergies as of 01/03/2024 - Reviewed 01/03/2024   Allergen Reaction Noted    Codeine GI Intolerance and Nausea Only 04/29/2015    Latex Rash 08/29/2016    Morphine and related Nausea Only 01/13/2004    Nortriptyline Shortness Of Breath 05/25/2023    Ocaliva [obeticholic acid] Hives 08/10/2022    Doxycycline GI Intolerance and Nausea Only 08/29/2016    Sulfa antibiotics GI Intolerance 08/29/2016    Cymbalta [duloxetine hcl]  10/12/2018    Penicillins Other (See Comments) and Rash 01/13/2004            The following portions of the patient's history were reviewed and updated as appropriate: allergies, current medications, past family history, past medical history, past social history, past surgical history and problem list.     Past Medical History:   Diagnosis Date    Abnormal breast exam     Anemia     Anxiety     Arthritis     Asthma     childhood    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HCC)     primary per allscripts    Cancer (HCC)     IgG kappa smoldering multiple myeloma    Cardiac murmur     Chronic liver disease     resolved 12/04/2017    COVID     Depression     Endometriosis     Fracture of tibia     right tibial plateau fracture per allscripts    Heart murmur     Hepatic disease     Hypertension     last assessed 12/13/2017    Inflammatory bowel disease     Kidney stone     Multiple myeloma (HCC)      Neuropathy     R foot     Nosebleed     OCD (obsessive compulsive disorder)     Osteoporosis     Otitis media     Pneumonia     RSD (reflex sympathetic dystrophy) 12/11/2018    Scoliosis     Seasonal allergies     Urinary tract infection     Visual impairment     Wears eyeglasses     Whiplash injury to neck        Past Surgical History:   Procedure Laterality Date    BACK SURGERY      hemilaminectomy and discectomy, L5-S1, right (Dr. Rashid, [facility] at [facility]) onset 02/14/2005 per allscripts    COLONOSCOPY      EXPLORATION EXTREMITY Right 08/29/2016    Procedure: KNEE PERONEAL NERVE EXPLORATION AND RELEASE ;  Surgeon: Bry De Guzman MD;  Location: [facility];  Service:     FOOT SURGERY      FRACTURE SURGERY      HAND SURGERY      HERNIA REPAIR      IR BIOPSY KIDNEY MASS  05/25/2023    JOINT REPLACEMENT Right     RTK    KNEE SURGERY      MANDIBLE SURGERY      NEPHRECTOMY LAPAROSCOPIC Right 6/21/2023    Procedure: NEPHRECTOMY LAPAROSCOPIC ASSISTED;  Surgeon: Avinash Sterling MD;  Location: [facility];  Service: Urology    NEUROPLASTY / TRANSPOSITION MEDIAN NERVE AT CARPAL TUNNEL      ORIF TIBIAL PLATEAU Right     arthroplasty per allscripts    OTHER SURGICAL HISTORY      injection of trigger point(s) per allscripts    ND ARTHRP KNE CONDYLE&PLATU MEDIAL&LAT COMPARTMENTS Right 06/11/2018    Procedure: ARTHROPLASTY KNEE TOTAL;  Surgeon: Bry De Guzman MD;  Location: [facility];  Service: Orthopedics    ND TENDON SHEATH INCISION Right 12/02/2020    Procedure: RELEASE TRIGGER FINGER-right long;  Surgeon: oTdd Mcdonald MD;  Location: [facility];  Service: Orthopedics    SINUS SURGERY      SPINE SURGERY      TONSILLECTOMY         Family History   Problem Relation Age of Onset    Heart failure Mother     Anxiety disorder Mother         symptom per allscripts    Depression Mother     Vision loss Mother     Anxiety disorder Father         symptom per allscripts    Cirrhosis Father         hepatic per allscripts    Alcohol  abuse Father     Stomach cancer Maternal Grandmother     Cancer Maternal Grandmother     Stomach cancer Maternal Grandfather     Cancer Maternal Grandfather     Diabetes Paternal Grandmother     No Known Problems Paternal Grandfather     No Known Problems Paternal Aunt     No Known Problems Paternal Aunt     Anxiety disorder Other         symptom per allscripts    Coronary artery disease Other     Depression Other     Hyperlipidemia Other     Osteoarthritis Other     Other Other         back disorder per allscripts    Neuropathy Other          Medications have been verified.        Objective   /87 (BP Location: Left arm, Patient Position: Sitting, Cuff Size: Adult)   Pulse 97   Temp 98.1 °F (36.7 °C) (Tympanic)   Resp 16   LMP  (LMP Unknown)   SpO2 98%        Physical Exam     Physical Exam  Vitals and nursing note reviewed.   Constitutional:       General: She is not in acute distress.     Appearance: Normal appearance. She is normal weight. She is not ill-appearing, toxic-appearing or diaphoretic.   HENT:      Head: Normocephalic and atraumatic.      Nose: Congestion present. No rhinorrhea.      Mouth/Throat:      Mouth: Mucous membranes are moist.      Pharynx: Oropharynx is clear. Posterior oropharyngeal erythema present. No oropharyngeal exudate.   Eyes:      General:         Right eye: No discharge.         Left eye: No discharge.   Cardiovascular:      Rate and Rhythm: Normal rate and regular rhythm.      Pulses: Normal pulses.      Heart sounds: Normal heart sounds. No murmur heard.     No friction rub. No gallop.   Pulmonary:      Effort: Pulmonary effort is normal. No respiratory distress.      Breath sounds: Normal breath sounds. No stridor. No wheezing, rhonchi or rales.   Chest:      Chest wall: No tenderness.   Abdominal:      General: Bowel sounds are normal.      Palpations: Abdomen is soft.      Tenderness: There is no abdominal tenderness.   Skin:     General: Skin is warm and dry.    Neurological:      Mental Status: She is alert.   Psychiatric:         Mood and Affect: Mood normal.         Behavior: Behavior normal.

## 2024-01-03 NOTE — PATIENT INSTRUCTIONS
Quarantine for total of 5 days from symptom onset.  After 5 full days of quarantine, please wear a mask for an additional 5 days.  Please take Paxlovid as ordered.  Ensure adequate fluid intake.  Follow-up with PCP if symptoms persist.  Present to ER with any new, worsening or concerning symptoms.

## 2024-01-03 NOTE — PROGRESS NOTES
Name: Courtney Davalos      : 1960      MRN: [id number]  Encounter Provider: Lesly Gorman MD  Encounter Date: 1/3/2024   Encounter department: [facility]    Assessment & Plan     1. Acute viral syndrome  -     benzonatate (TESSALON PERLES) 100 mg capsule; Take 1 capsule (100 mg total) by mouth 3 (three) times a day as needed for cough    2. Acute cough  -     benzonatate (TESSALON PERLES) 100 mg capsule; Take 1 capsule (100 mg total) by mouth 3 (three) times a day as needed for cough    3. Hepatic cirrhosis, unspecified hepatic cirrhosis type, unspecified whether ascites present (HCC)    4. Stage 3a chronic kidney disease (HCC)      Regarding her acute viral syndrome and his symptoms plus her cough patient is advised to get tested for COVID-19, influenza and RSV.  For that I advised her to go to the urgent care after seeing me.  As per my own care patient is advised to stay well-hydrated, supportive care, vitamin C and zinc, salt water gargles etc.  Patient cannot take Tylenol as mentioned in the HPI as per patient and patient will take 1 Advil once or twice a day as needed if she decides to for her symptoms.  Patient is given Tessalon Perles for her cough to use be used as needed.  Patient is also advised that if her symptoms worsen and/or changes or she does not feel comfortable with them to go to the emergency room right away.  RTO as needed.         Subjective      63-year-old female here as a new patient to me complaining of aches and pains back of the neck and the lumbar spine for 1 to 2 days.  Patient does have a cough.  Is not bringing up any phlegm.  Temperature usually runs low as per patient currently is afebrile.  She is in no acute distress.  Of note patient does have biliary cirrhosis as per patient and hence cannot take Tylenol.  Patient also has CKD 3 AA upon review of her records.      Review of Systems   Constitutional:  Positive for chills and fatigue.  Negative for activity change, appetite change and fever.   HENT:  Positive for congestion. Negative for sore throat.    Respiratory:  Positive for cough. Negative for shortness of breath.    Cardiovascular:  Negative for chest pain and palpitations.   Gastrointestinal:  Negative for abdominal pain, nausea and vomiting.   Genitourinary:  Negative for dysuria.   Musculoskeletal:  Positive for back pain, myalgias and neck pain.   Skin:  Negative for rash.   Neurological:  Negative for dizziness and headaches.       Current Outpatient Medications on File Prior to Visit   Medication Sig   • albuterol (Proventil HFA) 90 mcg/act inhaler Inhale 2 puffs every 6 (six) hours as needed for wheezing   • Cholecalciferol (Vitamin D3) 50 MCG (2000 UT) TABS Take 1 tablet (2,000 Units total) by mouth daily   • estradiol (VAGIFEM, YUVAFEM) 10 MCG TABS vaginal tablet INSERT 1 TABLET INTO THE VAGINA 2 TIMES A WEEK.   • fenofibrate (FENOGLIDE) 120 MG TABS Take 120 mg by mouth daily   • FLUoxetine (PROzac) 40 MG capsule Take 1 capsule (40 mg total) by mouth daily   • lidocaine (LIDODERM) 5 % lidocaine 5 % topical patch   APPLY 1 PATCH TO AFFECTED AREA FOR 12 HOURS A DAY & REMOVE FOR 12 HOURS BEFORE APPLYING NEXT PATCH. (12 HOURS ON, 12 HOURS OFF)   • lisinopril (ZESTRIL) 20 mg tablet Take 1 tablet (20 mg total) by mouth daily   • promethazine-dextromethorphan (PHENERGAN-DM) 6.25-15 mg/5 mL oral syrup Take 5 mL by mouth 4 (four) times a day as needed for cough   • traMADol (Ultram) 50 mg tablet Take 1 tablet (50 mg total) by mouth every 8 (eight) hours as needed for moderate pain   • traZODone (DESYREL) 50 mg tablet Take 0.5-1 tablets (25-50 mg total) by mouth daily at bedtime as needed for sleep   • triamcinolone (KENALOG) 0.1 % ointment Apply topically 2 (two) times a day   • ursodiol (ACTIGALL) 300 mg capsule TAKE 1 CAPSULE BY MOUTH THREE TIMES A DAY   • valACYclovir (VALTREX) 500 mg tablet Take 2 tablets (1,000 mg total) by mouth daily  "for 14 days       Objective     /84 (BP Location: Left arm, Patient Position: Sitting, Cuff Size: Adult)   Pulse 75   Temp 98.1 °F (36.7 °C) (Temporal)   Resp 16   Ht 5' 9\" (1.753 m)   Wt 75.3 kg (166 lb)   LMP  (LMP Unknown)   SpO2 97%   BMI 24.51 kg/m²     Physical Exam  Vitals reviewed.   Constitutional:       General: She is not in acute distress.     Appearance: Normal appearance. She is not ill-appearing.   HENT:      Head: Normocephalic and atraumatic.      Right Ear: Tympanic membrane, ear canal and external ear normal.      Left Ear: Tympanic membrane, ear canal and external ear normal.      Mouth/Throat:      Mouth: Mucous membranes are moist.      Pharynx: Oropharynx is clear.   Eyes:      Extraocular Movements: Extraocular movements intact.      Conjunctiva/sclera: Conjunctivae normal.   Neck:      Vascular: No carotid bruit.      Comments: Small right mid submandibular lymph node.  Cardiovascular:      Rate and Rhythm: Normal rate and regular rhythm.   Pulmonary:      Effort: Pulmonary effort is normal. No respiratory distress.      Breath sounds: Normal breath sounds. No wheezing or rhonchi.   Musculoskeletal:      Cervical back: Neck supple.      Right lower leg: No edema.      Left lower leg: No edema.   Lymphadenopathy:      Cervical: Cervical adenopathy present.   Neurological:      General: No focal deficit present.      Mental Status: She is alert and oriented to person, place, and time.   Psychiatric:         Mood and Affect: Mood normal.         Behavior: Behavior normal.         Thought Content: Thought content normal.       Lesly Gorman MD    "

## 2024-01-05 ENCOUNTER — APPOINTMENT (OUTPATIENT)
Dept: LAB | Facility: CLINIC | Age: 64
End: 2024-01-05
Payer: COMMERCIAL

## 2024-01-05 DIAGNOSIS — D47.2 SMOLDERING MULTIPLE MYELOMA (SMM): Chronic | ICD-10-CM

## 2024-01-05 DIAGNOSIS — C64.1 RENAL CELL CARCINOMA OF RIGHT KIDNEY (HCC): ICD-10-CM

## 2024-01-05 DIAGNOSIS — K74.3 PRIMARY BILIARY CHOLANGITIS (HCC): ICD-10-CM

## 2024-01-05 LAB
ALBUMIN SERPL BCP-MCNC: 3.9 G/DL (ref 3.5–5)
ALP SERPL-CCNC: 191 U/L (ref 34–104)
ALT SERPL W P-5'-P-CCNC: 45 U/L (ref 7–52)
ANION GAP SERPL CALCULATED.3IONS-SCNC: 8 MMOL/L
AST SERPL W P-5'-P-CCNC: 54 U/L (ref 13–39)
BASOPHILS # BLD AUTO: 0.05 THOUSANDS/ÂΜL (ref 0–0.1)
BASOPHILS NFR BLD AUTO: 1 % (ref 0–1)
BILIRUB SERPL-MCNC: 0.45 MG/DL (ref 0.2–1)
BUN SERPL-MCNC: 24 MG/DL (ref 5–25)
CALCIUM SERPL-MCNC: 9.2 MG/DL (ref 8.4–10.2)
CHLORIDE SERPL-SCNC: 99 MMOL/L (ref 96–108)
CO2 SERPL-SCNC: 28 MMOL/L (ref 21–32)
CREAT SERPL-MCNC: 1.34 MG/DL (ref 0.6–1.3)
EOSINOPHIL # BLD AUTO: 0.17 THOUSAND/ÂΜL (ref 0–0.61)
EOSINOPHIL NFR BLD AUTO: 5 % (ref 0–6)
ERYTHROCYTE [DISTWIDTH] IN BLOOD BY AUTOMATED COUNT: 13.1 % (ref 11.6–15.1)
GFR SERPL CREATININE-BSD FRML MDRD: 41 ML/MIN/1.73SQ M
GLUCOSE P FAST SERPL-MCNC: 87 MG/DL (ref 65–99)
HCT VFR BLD AUTO: 41.7 % (ref 34.8–46.1)
HGB BLD-MCNC: 13 G/DL (ref 11.5–15.4)
IMM GRANULOCYTES # BLD AUTO: 0.06 THOUSAND/UL (ref 0–0.2)
IMM GRANULOCYTES NFR BLD AUTO: 2 % (ref 0–2)
LYMPHOCYTES # BLD AUTO: 1.54 THOUSANDS/ÂΜL (ref 0.6–4.47)
LYMPHOCYTES NFR BLD AUTO: 40 % (ref 14–44)
MCH RBC QN AUTO: 29.9 PG (ref 26.8–34.3)
MCHC RBC AUTO-ENTMCNC: 31.2 G/DL (ref 31.4–37.4)
MCV RBC AUTO: 96 FL (ref 82–98)
MONOCYTES # BLD AUTO: 0.43 THOUSAND/ÂΜL (ref 0.17–1.22)
MONOCYTES NFR BLD AUTO: 12 % (ref 4–12)
NEUTROPHILS # BLD AUTO: 1.5 THOUSANDS/ÂΜL (ref 1.85–7.62)
NEUTS SEG NFR BLD AUTO: 40 % (ref 43–75)
NRBC BLD AUTO-RTO: 0 /100 WBCS
PLATELET # BLD AUTO: 201 THOUSANDS/UL (ref 149–390)
PMV BLD AUTO: 11.1 FL (ref 8.9–12.7)
POTASSIUM SERPL-SCNC: 4.1 MMOL/L (ref 3.5–5.3)
PROT SERPL-MCNC: 8.5 G/DL (ref 6.4–8.4)
RBC # BLD AUTO: 4.35 MILLION/UL (ref 3.81–5.12)
SODIUM SERPL-SCNC: 135 MMOL/L (ref 135–147)
WBC # BLD AUTO: 3.75 THOUSAND/UL (ref 4.31–10.16)

## 2024-01-05 PROCEDURE — 36415 COLL VENOUS BLD VENIPUNCTURE: CPT

## 2024-01-05 PROCEDURE — 80053 COMPREHEN METABOLIC PANEL: CPT

## 2024-01-05 PROCEDURE — 85025 COMPLETE CBC W/AUTO DIFF WBC: CPT

## 2024-01-09 ENCOUNTER — HOSPITAL ENCOUNTER (OUTPATIENT)
Dept: INFUSION CENTER | Facility: CLINIC | Age: 64
Discharge: HOME | End: 2024-01-09
Payer: COMMERCIAL

## 2024-01-09 VITALS
HEART RATE: 69 BPM | OXYGEN SATURATION: 98 % | TEMPERATURE: 98.2 F | DIASTOLIC BLOOD PRESSURE: 80 MMHG | SYSTOLIC BLOOD PRESSURE: 131 MMHG | RESPIRATION RATE: 18 BRPM

## 2024-01-09 DIAGNOSIS — C64.1 RENAL CELL CARCINOMA OF RIGHT KIDNEY (HCC): Primary | ICD-10-CM

## 2024-01-09 RX ORDER — SODIUM CHLORIDE 9 MG/ML
20 INJECTION, SOLUTION INTRAVENOUS ONCE
Status: COMPLETED | OUTPATIENT
Start: 2024-01-09 | End: 2024-01-09

## 2024-01-09 RX ADMIN — SODIUM CHLORIDE 20 ML/HR: 0.9 INJECTION, SOLUTION INTRAVENOUS at 14:46

## 2024-01-09 RX ADMIN — SODIUM CHLORIDE 200 MG: 9 INJECTION, SOLUTION INTRAVENOUS at 14:50

## 2024-01-09 NOTE — PROGRESS NOTES
[facility] for Keytruda. Pt was diagnosed with Covid on 1/3 and prescribed Paxlovid. Per Eileen in Dr Quintero's office, pt okay to treat today as long as pt has finished Paxlovid script, which she has. IV started without issue; callbell within reach.

## 2024-01-22 ENCOUNTER — TELEPHONE (OUTPATIENT)
Dept: PSYCHIATRY | Facility: CLINIC | Age: 64
End: 2024-01-22

## 2024-01-22 NOTE — TELEPHONE ENCOUNTER
Patient has been added to the Talk Therapy wait list without a referral.    Insurance: [employer]  Insurance Type:    Commercial []   Medicaid []   County (if applicable)   Medicare [x]  Location Preference: virtual  Provider Preference: amanda wells  Virtual: Yes [x] No []  Were outside resources sent: Yes [] No [x]

## 2024-01-22 NOTE — TELEPHONE ENCOUNTER
Michael Valdez's call.  States that Dr Yeung wants her to see Ethel Christian, however she had a bad year and due to her health and insurance was not able to see her for about a year.  She is trying to get back in to see her but was placed on a wait list.  She wants to make Dr Yeung aware since he got her in the first time hoping that maybe he can do something to get her in sooner.      Will refer to Dr Yeung for review.

## 2024-01-23 RX ORDER — SODIUM CHLORIDE 9 MG/ML
20 INJECTION, SOLUTION INTRAVENOUS ONCE
Status: CANCELLED | OUTPATIENT
Start: 2024-01-30

## 2024-01-25 NOTE — TELEPHONE ENCOUNTER
Left message requesting call back to review schedule. Risks/benefits discussed with patient/surrogate

## 2024-01-29 ENCOUNTER — APPOINTMENT (OUTPATIENT)
Dept: LAB | Facility: CLINIC | Age: 64
End: 2024-01-29
Payer: COMMERCIAL

## 2024-01-29 DIAGNOSIS — R53.83 OTHER FATIGUE: ICD-10-CM

## 2024-01-29 DIAGNOSIS — D47.2 SMOLDERING MULTIPLE MYELOMA (SMM): ICD-10-CM

## 2024-01-29 DIAGNOSIS — M54.16 LUMBAR RADICULOPATHY: Chronic | ICD-10-CM

## 2024-01-29 DIAGNOSIS — E78.5 HYPERLIPIDEMIA, UNSPECIFIED HYPERLIPIDEMIA TYPE: Chronic | ICD-10-CM

## 2024-01-29 DIAGNOSIS — Z86.19 HISTORY OF ELISA POSITIVE FOR HSV: ICD-10-CM

## 2024-01-29 DIAGNOSIS — K74.3 PRIMARY BILIARY CHOLANGITIS (HCC): ICD-10-CM

## 2024-01-29 DIAGNOSIS — Z13.29 SCREENING FOR THYROID DISORDER: ICD-10-CM

## 2024-01-29 DIAGNOSIS — Z23 ENCOUNTER FOR IMMUNIZATION: ICD-10-CM

## 2024-01-29 DIAGNOSIS — E55.9 VITAMIN D DEFICIENCY: ICD-10-CM

## 2024-01-29 DIAGNOSIS — I10 ESSENTIAL HYPERTENSION: Chronic | ICD-10-CM

## 2024-01-29 DIAGNOSIS — C64.1 RENAL CELL CARCINOMA OF RIGHT KIDNEY (HCC): ICD-10-CM

## 2024-01-29 DIAGNOSIS — G90.521 COMPLEX REGIONAL PAIN SYNDROME I OF RIGHT LOWER LIMB: ICD-10-CM

## 2024-01-29 LAB
ALBUMIN SERPL BCP-MCNC: 3.9 G/DL (ref 3.5–5)
ALP SERPL-CCNC: 161 U/L (ref 34–104)
ALT SERPL W P-5'-P-CCNC: 38 U/L (ref 7–52)
ANION GAP SERPL CALCULATED.3IONS-SCNC: 3 MMOL/L
AST SERPL W P-5'-P-CCNC: 39 U/L (ref 13–39)
BASOPHILS # BLD AUTO: 0.04 THOUSANDS/ÂΜL (ref 0–0.1)
BASOPHILS NFR BLD AUTO: 1 % (ref 0–1)
BILIRUB SERPL-MCNC: 0.59 MG/DL (ref 0.2–1)
BUN SERPL-MCNC: 22 MG/DL (ref 5–25)
CALCIUM SERPL-MCNC: 9 MG/DL (ref 8.4–10.2)
CHLORIDE SERPL-SCNC: 101 MMOL/L (ref 96–108)
CO2 SERPL-SCNC: 32 MMOL/L (ref 21–32)
CREAT SERPL-MCNC: 1.08 MG/DL (ref 0.6–1.3)
EOSINOPHIL # BLD AUTO: 0.15 THOUSAND/ÂΜL (ref 0–0.61)
EOSINOPHIL NFR BLD AUTO: 3 % (ref 0–6)
ERYTHROCYTE [DISTWIDTH] IN BLOOD BY AUTOMATED COUNT: 13 % (ref 11.6–15.1)
GFR SERPL CREATININE-BSD FRML MDRD: 54 ML/MIN/1.73SQ M
GLUCOSE SERPL-MCNC: 78 MG/DL (ref 65–140)
HCT VFR BLD AUTO: 39.7 % (ref 34.8–46.1)
HGB BLD-MCNC: 12.8 G/DL (ref 11.5–15.4)
IMM GRANULOCYTES # BLD AUTO: 0.02 THOUSAND/UL (ref 0–0.2)
IMM GRANULOCYTES NFR BLD AUTO: 0 % (ref 0–2)
LYMPHOCYTES # BLD AUTO: 1.45 THOUSANDS/ÂΜL (ref 0.6–4.47)
LYMPHOCYTES NFR BLD AUTO: 32 % (ref 14–44)
MCH RBC QN AUTO: 30.3 PG (ref 26.8–34.3)
MCHC RBC AUTO-ENTMCNC: 32.2 G/DL (ref 31.4–37.4)
MCV RBC AUTO: 94 FL (ref 82–98)
MONOCYTES # BLD AUTO: 0.3 THOUSAND/ÂΜL (ref 0.17–1.22)
MONOCYTES NFR BLD AUTO: 7 % (ref 4–12)
NEUTROPHILS # BLD AUTO: 2.51 THOUSANDS/ÂΜL (ref 1.85–7.62)
NEUTS SEG NFR BLD AUTO: 57 % (ref 43–75)
NRBC BLD AUTO-RTO: 0 /100 WBCS
PLATELET # BLD AUTO: 190 THOUSANDS/UL (ref 149–390)
PMV BLD AUTO: 11.4 FL (ref 8.9–12.7)
POTASSIUM SERPL-SCNC: 4.4 MMOL/L (ref 3.5–5.3)
PROT SERPL-MCNC: 8.3 G/DL (ref 6.4–8.4)
RBC # BLD AUTO: 4.22 MILLION/UL (ref 3.81–5.12)
SODIUM SERPL-SCNC: 136 MMOL/L (ref 135–147)
WBC # BLD AUTO: 4.47 THOUSAND/UL (ref 4.31–10.16)

## 2024-01-29 PROCEDURE — 85025 COMPLETE CBC W/AUTO DIFF WBC: CPT

## 2024-01-29 PROCEDURE — 80053 COMPREHEN METABOLIC PANEL: CPT

## 2024-01-29 PROCEDURE — 36415 COLL VENOUS BLD VENIPUNCTURE: CPT

## 2024-01-30 ENCOUNTER — HOSPITAL ENCOUNTER (OUTPATIENT)
Dept: INFUSION CENTER | Facility: CLINIC | Age: 64
Discharge: HOME | End: 2024-01-30
Payer: COMMERCIAL

## 2024-01-30 VITALS
SYSTOLIC BLOOD PRESSURE: 135 MMHG | RESPIRATION RATE: 18 BRPM | HEART RATE: 80 BPM | OXYGEN SATURATION: 99 % | TEMPERATURE: 98.5 F | DIASTOLIC BLOOD PRESSURE: 93 MMHG

## 2024-01-30 DIAGNOSIS — C64.1 RENAL CELL CARCINOMA OF RIGHT KIDNEY (HCC): Primary | ICD-10-CM

## 2024-01-30 DIAGNOSIS — R53.0 NEOPLASTIC (MALIGNANT) RELATED FATIGUE: ICD-10-CM

## 2024-01-30 DIAGNOSIS — D47.2 SMOLDERING MULTIPLE MYELOMA (SMM): ICD-10-CM

## 2024-01-30 LAB
T3FREE SERPL-MCNC: 2.59 PG/ML (ref 2.5–3.9)
TSH SERPL DL<=0.05 MIU/L-ACNC: 1.59 UIU/ML (ref 0.45–4.5)

## 2024-01-30 PROCEDURE — 96413 CHEMO IV INFUSION 1 HR: CPT

## 2024-01-30 PROCEDURE — 84443 ASSAY THYROID STIM HORMONE: CPT | Performed by: INTERNAL MEDICINE

## 2024-01-30 PROCEDURE — 84481 FREE ASSAY (FT-3): CPT | Performed by: INTERNAL MEDICINE

## 2024-01-30 RX ORDER — SODIUM CHLORIDE 9 MG/ML
20 INJECTION, SOLUTION INTRAVENOUS ONCE
Status: COMPLETED | OUTPATIENT
Start: 2024-01-30 | End: 2024-01-30

## 2024-01-30 RX ADMIN — SODIUM CHLORIDE 200 MG: 9 INJECTION, SOLUTION INTRAVENOUS at 11:23

## 2024-01-30 RX ADMIN — SODIUM CHLORIDE 20 ML/HR: 0.9 INJECTION, SOLUTION INTRAVENOUS at 10:41

## 2024-01-30 NOTE — PROGRESS NOTES
Patient is here for keytruda. Labs reviewed and meet parameters. Patient complains of her ongoing rash that's on her arms, legs and chest area. She states it's dry and itches. She is using cream orders by

## 2024-01-30 NOTE — PROGRESS NOTES
Patient tolerated her treatment without any adverse reactions. Thyroid levels were drawn and no need to wait for results per orders. Next appointment confirmed 2/20/24 at 0800 at [facility]. Patient declined avs

## 2024-01-31 ENCOUNTER — TELEMEDICINE (OUTPATIENT)
Dept: BEHAVIORAL/MENTAL HEALTH CLINIC | Facility: CLINIC | Age: 64
End: 2024-01-31
Payer: COMMERCIAL

## 2024-01-31 DIAGNOSIS — F33.0 MDD (MAJOR DEPRESSIVE DISORDER), RECURRENT EPISODE, MILD (HCC): Primary | ICD-10-CM

## 2024-01-31 DIAGNOSIS — F41.1 GAD (GENERALIZED ANXIETY DISORDER): ICD-10-CM

## 2024-01-31 DIAGNOSIS — F43.10 PTSD (POST-TRAUMATIC STRESS DISORDER): ICD-10-CM

## 2024-01-31 DIAGNOSIS — F42.9 OBSESSIVE-COMPULSIVE DISORDER, UNSPECIFIED TYPE: ICD-10-CM

## 2024-01-31 PROCEDURE — 90834 PSYTX W PT 45 MINUTES: CPT | Performed by: COUNSELOR

## 2024-01-31 NOTE — BH TREATMENT PLAN
Courtney BERMUDEZ Susannah  1960         Date of Initial Treatment Plan: 5/11/21   Date of Current Treatment Plan: 01/31/24  Treatment plan expiration date: 7/30/24     Treatment Plan      Strengths/Personal Resources for Self Care: Courtney reports that [place] is a good support to her. She denies any close friends or family as support. Courtney is motivated and engaged in treatment and willing to work on mental health wellbeing.  Courtney has been working on healthy relationships and communication skills.      Diagnosis:   1. MDD (major depressive disorder), recurrent episode, mild (HCC)      2. JOSEFINA (generalized anxiety disorder)      3. Obsessive-compulsive disorder, unspecified type            Area of Needs: Processing past relationships and boundaries to increase communication in current relationship,  Decrease anxiety overall.         Long Term Goal 1: Process past relationship in order to clarify feelings and work on better boundaries     Target Date:6/30/24  Completion Date:7/30/24         Short Term Objectives for Goal 1: 1.  Process difference between healthy and unhealhty boundaries and 2  Process feelings about past relationships and work on healthy expresssion of emotions on needs in current relationship.     Long Term Goal 2: Decrease obsessive thoughts and compulsive actions     Target Date:6/30/24  Completion Date:7/30/24     Short Term Objectives for Goal 2: 1.   Utilize thought stopping techniques to reduce the frequency of obsessive thoughts. 2.    Teach and practice distractive coping skill to be used in times of need to decrease obsessive thoughts.  3.Develop and implement techniques to interrupt the pattern of compulsions     GOAL 1: Modality:Individual 2x per month   Completion Date 4/2/23, Medication Management and The person(s) responsible for carrying out the plan is  client, Courtney Susannah, therapist, Ethel Christian, and Dr Yeung.     GOAL 2: Modality: Individual 2x per month   Completion Date 4/2/23,  Medication Management and The person(s) responsible for carrying out the plan is  client, Courtney Davalos, therapist, Ethel Christian, and Dr Yeung.      Courtney Davalos, 1960, actively participated in the creation of this treatment plan during a virtual session, using the Epic Embedded platform.   Courtney Davalos  provided verbal consent on 1/31/2024 at 12:45 PM. The treatment plan was transcribed into the Electronic Health Record at a later time.

## 2024-01-31 NOTE — PSYCH
Virtual Regular Visit    Verification of patient location:    Patient is located at Home in the following state in which I hold an active license [place]      Assessment/Plan:    Problem List Items Addressed This Visit     MDD (major depressive disorder), recurrent episode, mild (HCC) - Primary    JOSEFINA (generalized anxiety disorder)    OCD (obsessive compulsive disorder)    PTSD (post-traumatic stress disorder)       Goals addressed in session: Goal 1 and Goal 2          Reason for visit is   Chief Complaint   Patient presents with   • Virtual Regular Visit          Encounter provider Ethel Christian    Provider located at [facility]  [facility]  [address] [id number] [phone]      Recent Visits  Date Type Provider Dept   01/31/24 Telemedicine Ethel Christian [facility] [place]   Showing recent visits within past 7 days and meeting all other requirements  Future Appointments  No visits were found meeting these conditions.  Showing future appointments within next 150 days and meeting all other requirements       The patient was identified by name and date of birth. Courtney Davalos was informed that this is a telemedicine visit and that the visit is being conducted throughthe Epic Embedded platform. She agrees to proceed..  My office door was closed. No one else was in the room.  She acknowledged consent and understanding of privacy and security of the video platform. The patient has agreed to participate and understands they can discontinue the visit at any time.    Patient is aware this is a billable service.     Subjective  Courtney Davalos is a 63 y.o. female  .      HPI     Past Medical History:   Diagnosis Date   • Abnormal breast exam    • Anemia    • Anxiety    • Arthritis    • Asthma     childhood   • 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HCC)     primary per allscripts   • Cancer (HCC)     IgG kappa smoldering multiple  myeloma   • Cardiac murmur    • Chronic liver disease     resolved 12/04/2017   • COVID    • Depression    • Endometriosis    • Fracture of tibia     right tibial plateau fracture per allscripts   • Heart murmur    • Hepatic disease    • Hypertension     last assessed 12/13/2017   • Inflammatory bowel disease    • Kidney stone    • Multiple myeloma (HCC)    • Neuropathy     R foot    • Nosebleed    • OCD (obsessive compulsive disorder)    • Osteoporosis    • Otitis media    • Pneumonia    • RSD (reflex sympathetic dystrophy) 12/11/2018   • Scoliosis    • Seasonal allergies    • Urinary tract infection    • Visual impairment    • Wears eyeglasses    • Whiplash injury to neck        Past Surgical History:   Procedure Laterality Date   • BACK SURGERY      hemilaminectomy and discectomy, L5-S1, right (Dr. Rashid, [facility] at [facility]) onset 02/14/2005 per allscripts   • COLONOSCOPY     • EXPLORATION EXTREMITY Right 08/29/2016    Procedure: KNEE PERONEAL NERVE EXPLORATION AND RELEASE ;  Surgeon: Bry De Guzman MD;  Location: [facility];  Service:    • FOOT SURGERY     • FRACTURE SURGERY     • HAND SURGERY     • HERNIA REPAIR     • IR BIOPSY KIDNEY MASS  05/25/2023   • JOINT REPLACEMENT Right     RTK   • KNEE SURGERY     • MANDIBLE SURGERY     • NEPHRECTOMY LAPAROSCOPIC Right 6/21/2023    Procedure: NEPHRECTOMY LAPAROSCOPIC ASSISTED;  Surgeon: Avinash Sterling MD;  Location: [facility];  Service: Urology   • NEUROPLASTY / TRANSPOSITION MEDIAN NERVE AT CARPAL TUNNEL     • ORIF TIBIAL PLATEAU Right     arthroplasty per allscripts   • OTHER SURGICAL HISTORY      injection of trigger point(s) per allscripts   • VA ARTHRP KNE CONDYLE&PLATU MEDIAL&LAT COMPARTMENTS Right 06/11/2018    Procedure: ARTHROPLASTY KNEE TOTAL;  Surgeon: Bry De Guzman MD;  Location: [facility];  Service: Orthopedics   • VA TENDON SHEATH INCISION Right 12/02/2020    Procedure: RELEASE TRIGGER FINGER-right long;  Surgeon: Todd Mcdonald MD;  Location:  [facility];  Service: Orthopedics   • SINUS SURGERY     • SPINE SURGERY     • TONSILLECTOMY         Current Outpatient Medications   Medication Sig Dispense Refill   • albuterol (Proventil HFA) 90 mcg/act inhaler Inhale 2 puffs every 6 (six) hours as needed for wheezing 6.7 g 0   • benzonatate (TESSALON PERLES) 100 mg capsule Take 1 capsule (100 mg total) by mouth 3 (three) times a day as needed for cough 20 capsule 0   • Cholecalciferol (Vitamin D3) 50 MCG (2000 UT) TABS Take 1 tablet (2,000 Units total) by mouth daily 90 tablet 3   • estradiol (VAGIFEM, YUVAFEM) 10 MCG TABS vaginal tablet INSERT 1 TABLET INTO THE VAGINA 2 TIMES A WEEK. 24 tablet 1   • fenofibrate (FENOGLIDE) 120 MG TABS Take 120 mg by mouth daily     • FLUoxetine (PROzac) 40 MG capsule Take 1 capsule (40 mg total) by mouth daily 90 capsule 1   • lidocaine (LIDODERM) 5 % lidocaine 5 % topical patch   APPLY 1 PATCH TO AFFECTED AREA FOR 12 HOURS A DAY & REMOVE FOR 12 HOURS BEFORE APPLYING NEXT PATCH. (12 HOURS ON, 12 HOURS OFF)     • lisinopril (ZESTRIL) 20 mg tablet Take 1 tablet (20 mg total) by mouth daily 90 tablet 1   • promethazine-dextromethorphan (PHENERGAN-DM) 6.25-15 mg/5 mL oral syrup Take 5 mL by mouth 4 (four) times a day as needed for cough 240 mL 0   • traMADol (Ultram) 50 mg tablet Take 1 tablet (50 mg total) by mouth every 8 (eight) hours as needed for moderate pain 30 tablet 0   • traZODone (DESYREL) 50 mg tablet Take 0.5-1 tablets (25-50 mg total) by mouth daily at bedtime as needed for sleep 90 tablet 1   • triamcinolone (KENALOG) 0.1 % ointment Apply topically 2 (two) times a day 454 g 1   • ursodiol (ACTIGALL) 300 mg capsule TAKE 1 CAPSULE BY MOUTH THREE TIMES A  capsule 2   • valACYclovir (VALTREX) 500 mg tablet Take 2 tablets (1,000 mg total) by mouth daily for 14 days 28 tablet 0     No current facility-administered medications for this visit.     Facility-Administered Medications Ordered in Other Visits   Medication  Dose Route Frequency Provider Last Rate Last Admin   • alteplase (CATHFLO) injection 2 mg  2 mg Intracatheter Q1MIN PRN Gagan Quintero MD            Allergies   Allergen Reactions   • Codeine GI Intolerance and Nausea Only     Other reaction(s): Other (See Comments)  Violently ill  Violently ill   • Latex Rash     itching   • Morphine And Related Nausea Only     GI intolerance nausea   • Nortriptyline Shortness Of Breath   • Ocaliva [Obeticholic Acid] Hives   • Doxycycline GI Intolerance and Nausea Only   • Sulfa Antibiotics GI Intolerance   • Cymbalta [Duloxetine Hcl]    • Penicillins Other (See Comments) and Rash     Childhood reaction       Review of Systems    Video Exam    There were no vitals filed for this visit.    Physical Exam     Behavioral Health Psychotherapy Progress Note    Psychotherapy Provided: Individual Psychotherapy     1. MDD (major depressive disorder), recurrent episode, mild (HCC)        2. JOSEFINA (generalized anxiety disorder)        3. Obsessive-compulsive disorder, unspecified type        4. PTSD (post-traumatic stress disorder)            Goals addressed in session: Goal 1 and Goal 2     DATA: Clinician met with Courtney via telehealth for individual therapy. Courtney has not been seen since 11/15/22 due to clinician's maternity leave and then Courtney reports she had insurance that was not covered provider.  Courtney reports desire to get back on regular schedule to work on therapeutic goals. Clinician discussed current concerns and issues and updated treatment plan goals.   During this session, this clinician used the following therapeutic modalities: Client-centered Therapy and Supportive Psychotherapy    Substance Abuse was not addressed during this session. If the client is diagnosed with a co-occurring substance use disorder, please indicate any changes in the frequency or amount of use: n/a. Stage of change for addressing substance use diagnoses: No substance use/Not  "applicable    ASSESSMENT:  Courtney Davalos presents with a Euthymic/ normal mood.     her affect is Normal range and intensity, which is congruent, with her mood and the content of the session. The client has made progress on their goals.    Courtney was open and engaged in the session. Courtney actively participated in treatment planning process. Courtney Davalos presents with a none risk of suicide, none risk of self-harm, and none risk of harm to others.    For any risk assessment that surpasses a \"low\" rating, a safety plan must be developed.    A safety plan was indicated: no  If yes, describe in detail n/a    PLAN: Between sessions, Courtney Davalos will utilize assertive communication skills. At the next session, the therapist will use Client-centered Therapy and Solution-Focused Therapy to address anxiety.    Behavioral Health Treatment Plan and Discharge Planning: Courtney Davalos is aware of and agrees to continue to work on their treatment plan. They have identified and are working toward their discharge goals. yes    Visit start and stop times:    01/31/24  Start Time: 1202  Stop Time: 1255  Total Visit Time: 53 minutes            "

## 2024-02-07 ENCOUNTER — OFFICE VISIT (OUTPATIENT)
Dept: INTERNAL MEDICINE CLINIC | Facility: CLINIC | Age: 64
End: 2024-02-07
Payer: COMMERCIAL

## 2024-02-07 VITALS
SYSTOLIC BLOOD PRESSURE: 128 MMHG | WEIGHT: 164.6 LBS | BODY MASS INDEX: 23.56 KG/M2 | OXYGEN SATURATION: 98 % | RESPIRATION RATE: 16 BRPM | DIASTOLIC BLOOD PRESSURE: 88 MMHG | HEART RATE: 72 BPM | HEIGHT: 70 IN

## 2024-02-07 DIAGNOSIS — M85.80 OSTEOPENIA, UNSPECIFIED LOCATION: ICD-10-CM

## 2024-02-07 DIAGNOSIS — K74.3 PRIMARY BILIARY CHOLANGITIS (HCC): ICD-10-CM

## 2024-02-07 DIAGNOSIS — M46.1 SACROILIITIS (HCC): ICD-10-CM

## 2024-02-07 DIAGNOSIS — Z13.6 SCREENING FOR CARDIOVASCULAR CONDITION: ICD-10-CM

## 2024-02-07 DIAGNOSIS — Z78.0 ASYMPTOMATIC MENOPAUSAL STATE: ICD-10-CM

## 2024-02-07 DIAGNOSIS — Z12.31 ENCOUNTER FOR SCREENING MAMMOGRAM FOR BREAST CANCER: Primary | ICD-10-CM

## 2024-02-07 DIAGNOSIS — F33.0 MDD (MAJOR DEPRESSIVE DISORDER), RECURRENT EPISODE, MILD (HCC): ICD-10-CM

## 2024-02-07 DIAGNOSIS — D47.2 SMOLDERING MULTIPLE MYELOMA (SMM): Chronic | ICD-10-CM

## 2024-02-07 DIAGNOSIS — K74.3 PRIMARY BILIARY CIRRHOSIS (HCC): ICD-10-CM

## 2024-02-07 DIAGNOSIS — M25.521 RIGHT ELBOW PAIN: ICD-10-CM

## 2024-02-07 DIAGNOSIS — C90.00 MULTIPLE MYELOMA NOT HAVING ACHIEVED REMISSION (HCC): ICD-10-CM

## 2024-02-07 DIAGNOSIS — I10 ESSENTIAL HYPERTENSION: Chronic | ICD-10-CM

## 2024-02-07 DIAGNOSIS — N18.31 STAGE 3A CHRONIC KIDNEY DISEASE (HCC): ICD-10-CM

## 2024-02-07 DIAGNOSIS — G90.521 COMPLEX REGIONAL PAIN SYNDROME I OF RIGHT LOWER LIMB: ICD-10-CM

## 2024-02-07 DIAGNOSIS — Z13.820 SCREENING FOR OSTEOPOROSIS: ICD-10-CM

## 2024-02-07 PROCEDURE — 99214 OFFICE O/P EST MOD 30 MIN: CPT | Performed by: INTERNAL MEDICINE

## 2024-02-07 NOTE — PROGRESS NOTES
Assessment/Plan:    Sacroiliitis (HCC)  Referral to pain management Dr. Sorto regarding chronic pain syndrome would like to see what other additional options might be available for the patient    Smoldering multiple myeloma (SMM)  Working with hematology oncology Dr. Quintero patient does receive infusions Keytruda and Zometa clinically she appears to be stable continue routine and usual follow-up with Dr. Quintero will continue to monitor    Primary biliary cholangitis (HCC)  Encouraged patient to continue to work on compliance of Actigall she is making good progress she is working with GI Dr. Schwartz would like to get the opinion of liver expert Dr. Esqueda I did review her laboratories which does show she is making progress in regards to her alkaline phosphatase levels we will continue to monitor her progress    Osteopenia  Currently on infusions of Zometa through the [facility] center will check surveillance DEXA scan for monitoring of osteoporosis    Complex regional pain syndrome i of right lower limb  Patient has ongoing symptoms she has been to multiple pain management providers, at this point in time I would like to get the opinion of pain management Dr. Sorto to see if there are any new options for the patient     Essential hypertension  Hypertension - controlled, I have counseled patient following healthy balance diet, I would like the patient reduce sodium, exercise routinely, I would like the patient continued the med current medical regiment and we will continue to monitor.  Blood pressure 128/88 continue lisinopril 20 mg once daily we will continue to monitor comprehensive metabolic panel    Stage 3a chronic kidney disease (HCC)  Lab Results   Component Value Date    EGFR 54 01/29/2024    EGFR 41 01/05/2024    EGFR 51 12/16/2023    CREATININE 1.08 01/29/2024    CREATININE 1.34 (H) 01/05/2024    CREATININE 1.13 12/16/2023   Drink adequate amount of water, avoid anti-inflammatories, reduce  sodium in the diet and will continue to monitor the kidney function    Right elbow pain  Patient does report to me a known degenerative spur chronic right elbow pain would like to meet with orthopedics consider removal of spur    MDD (major depressive disorder), recurrent episode, mild (HCC)  Clinically stable and doing well continue the current medical regiment will continue monitor.         Problem List Items Addressed This Visit        Digestive    Primary biliary cholangitis (HCC)     Encouraged patient to continue to work on compliance of Actigall she is making good progress she is working with GI Dr. Schwartz would like to get the opinion of liver expert Dr. Esqueda I did review her laboratories which does show she is making progress in regards to her alkaline phosphatase levels we will continue to monitor her progress            Cardiovascular and Mediastinum    Essential hypertension (Chronic)     Hypertension - controlled, I have counseled patient following healthy balance diet, I would like the patient reduce sodium, exercise routinely, I would like the patient continued the med current medical regiment and we will continue to monitor.  Blood pressure 128/88 continue lisinopril 20 mg once daily we will continue to monitor comprehensive metabolic panel            Nervous and Auditory    Complex regional pain syndrome i of right lower limb     Patient has ongoing symptoms she has been to multiple pain management providers, at this point in time I would like to get the opinion of pain management Dr. Sorto to see if there are any new options for the patient          Relevant Orders    Ambulatory referral to Spine & Pain Management       Musculoskeletal and Integument    Sacroiliitis (HCC) (Chronic)     Referral to pain management Dr. Sorto regarding chronic pain syndrome would like to see what other additional options might be available for the patient         Osteopenia     Currently on infusions of  Zometa through the [facility] center will check surveillance DEXA scan for monitoring of osteoporosis            Genitourinary    Stage 3a chronic kidney disease (HCC)     Lab Results   Component Value Date    EGFR 54 01/29/2024    EGFR 41 01/05/2024    EGFR 51 12/16/2023    CREATININE 1.08 01/29/2024    CREATININE 1.34 (H) 01/05/2024    CREATININE 1.13 12/16/2023   Drink adequate amount of water, avoid anti-inflammatories, reduce sodium in the diet and will continue to monitor the kidney function            Other    Smoldering multiple myeloma (SMM) (Chronic)     Working with hematology oncology Dr. Quintero patient does receive infusions Keytruda and Zometa clinically she appears to be stable continue routine and usual follow-up with Dr. Quintero will continue to monitor         Right elbow pain     Patient does report to me a known degenerative spur chronic right elbow pain would like to meet with orthopedics consider removal of spur         Relevant Orders    Ambulatory Referral to Orthopedic Surgery    MDD (major depressive disorder), recurrent episode, mild (HCC)     Clinically stable and doing well continue the current medical regiment will continue monitor.         Multiple myeloma not having achieved remission (HCC)   Other Visit Diagnoses     Encounter for screening mammogram for breast cancer    -  Primary    Relevant Orders    Mammo screening bilateral w 3d & cad    Screening for cardiovascular condition        Relevant Orders    Comprehensive metabolic panel    Lipid Panel with Direct LDL reflex    Screening for osteoporosis        Relevant Orders    DXA bone density spine hip and pelvis    Primary biliary cirrhosis (HCC)        Relevant Orders    Ambulatory Referral to Gastroenterology    Asymptomatic menopausal state        Relevant Orders    DXA bone density spine hip and pelvis          RTO in 4 to 6 months call if any problems  Subjective:      Patient ID: Courtney Davalos is a 63 y.o.  female.    HPI 63-year-old female new patient to the practice former patient of Dr. Antoine regarding multiple problems multiple myeloma, primary biliary cirrhosis, hypertension, osteopenia, chronic kidney disease stage IIIa, chronic right elbow pain complex regional pain syndrome right lower extremity; dec 13 2013 plate and screws , righ tibia , wound worked with Dr De Guzman got foot complex regional pain synd  worked with pain mgt lumbar symp block , patient does report to me chronic pain of the right lower extremity she has had multiple surgical interventions of the right lower extremity working with Dr. De Guzman she has been to several pain management doctors but unsuccessful treatment of her pain they have all gone to the point recommending a spinal cord stimulator patient would prefer not to have this she would like to see what other options are available to her.  She also reports me chronic pain of her elbow right side related to a spur formation.  She has been working with GI specialist Dr. Schwartz regarding primary biliary cirrhosis currently on Actigall she does report to me she has been working on improving her compliance with the Actigall.  Today we did review her testing laboratories she also does work with Dr. Quintero regarding smoldering multiple myeloma she receives infusions of Keytruda/Zometa and currently stable and doing well.      The following portions of the patient's history were reviewed and updated as appropriate: allergies, current medications, past family history, past medical history, past social history, past surgical history and problem list.    Review of Systems   Constitutional:  Negative for activity change, appetite change and unexpected weight change.   HENT:  Negative for congestion.    Eyes:  Negative for visual disturbance.   Respiratory:  Negative for cough and shortness of breath.    Cardiovascular:  Negative for chest pain.   Gastrointestinal:  Negative for abdominal pain,  diarrhea, nausea and vomiting.   Musculoskeletal:         Chronic right lower extremity and knee pain, right elbow pain   Neurological:  Negative for dizziness, light-headedness and headaches.         Objective:    Return in about 4 months (around 6/7/2024).    No results found.      Allergies   Allergen Reactions   • Codeine GI Intolerance and Nausea Only     Other reaction(s): Other (See Comments)  Violently ill  Violently ill   • Latex Rash     itching   • Morphine And Related Nausea Only     GI intolerance nausea   • Nortriptyline Shortness Of Breath   • Ocaliva [Obeticholic Acid] Hives   • Doxycycline GI Intolerance and Nausea Only   • Sulfa Antibiotics GI Intolerance   • Cymbalta [Duloxetine Hcl]    • Penicillins Other (See Comments) and Rash     Childhood reaction       Past Medical History:   Diagnosis Date   • Abnormal breast exam    • Anemia    • Anxiety    • Arthritis    • Asthma     childhood   • 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HCC)     primary per allscripts   • Cancer (HCC)     IgG kappa smoldering multiple myeloma   • Cardiac murmur    • Chronic liver disease     resolved 12/04/2017   • COVID    • Depression    • Endometriosis    • Fracture of tibia     right tibial plateau fracture per allscripts   • Heart murmur    • Hepatic disease    • Hypertension     last assessed 12/13/2017   • Inflammatory bowel disease    • Kidney stone    • Multiple myeloma (HCC)    • Neuropathy     R foot    • Nosebleed    • OCD (obsessive compulsive disorder)    • Osteoporosis    • Otitis media    • Pneumonia    • RSD (reflex sympathetic dystrophy) 12/11/2018   • Scoliosis    • Seasonal allergies    • Urinary tract infection    • Visual impairment    • Wears eyeglasses    • Whiplash injury to neck      Past Surgical History:   Procedure Laterality Date   • BACK SURGERY      hemilaminectomy and discectomy, L5-S1, right (Dr. Rashid, [facility] at [facility]) onset 02/14/2005 per allscripts   • COLONOSCOPY     • EXPLORATION EXTREMITY Right 08/29/2016     Procedure: KNEE PERONEAL NERVE EXPLORATION AND RELEASE ;  Surgeon: Bry De Guzman MD;  Location: [facility];  Service:    • FOOT SURGERY     • FRACTURE SURGERY     • HAND SURGERY     • HERNIA REPAIR     • IR BIOPSY KIDNEY MASS  05/25/2023   • JOINT REPLACEMENT Right     RTK   • KNEE SURGERY     • MANDIBLE SURGERY     • NEPHRECTOMY LAPAROSCOPIC Right 6/21/2023    Procedure: NEPHRECTOMY LAPAROSCOPIC ASSISTED;  Surgeon: Avinash Sterling MD;  Location: [facility];  Service: Urology   • NEUROPLASTY / TRANSPOSITION MEDIAN NERVE AT CARPAL TUNNEL     • ORIF TIBIAL PLATEAU Right     arthroplasty per allscripts   • OTHER SURGICAL HISTORY      injection of trigger point(s) per allscripts   • TX ARTHRP KNE CONDYLE&PLATU MEDIAL&LAT COMPARTMENTS Right 06/11/2018    Procedure: ARTHROPLASTY KNEE TOTAL;  Surgeon: Bry De Guzman MD;  Location: [facility];  Service: Orthopedics   • TX TENDON SHEATH INCISION Right 12/02/2020    Procedure: RELEASE TRIGGER FINGER-right long;  Surgeon: Todd Mcdonald MD;  Location: [facility];  Service: Orthopedics   • SINUS SURGERY     • SPINE SURGERY     • TONSILLECTOMY       Current Outpatient Medications on File Prior to Visit   Medication Sig Dispense Refill   • albuterol (Proventil HFA) 90 mcg/act inhaler Inhale 2 puffs every 6 (six) hours as needed for wheezing 6.7 g 0   • Cholecalciferol (Vitamin D3) 50 MCG (2000 UT) TABS Take 1 tablet (2,000 Units total) by mouth daily 90 tablet 3   • estradiol (VAGIFEM, YUVAFEM) 10 MCG TABS vaginal tablet INSERT 1 TABLET INTO THE VAGINA 2 TIMES A WEEK. 24 tablet 1   • fenofibrate (FENOGLIDE) 120 MG TABS Take 120 mg by mouth daily     • FLUoxetine (PROzac) 40 MG capsule Take 1 capsule (40 mg total) by mouth daily 90 capsule 1   • lidocaine (LIDODERM) 5 % lidocaine 5 % topical patch   APPLY 1 PATCH TO AFFECTED AREA FOR 12 HOURS A DAY & REMOVE FOR 12 HOURS BEFORE APPLYING NEXT PATCH. (12 HOURS ON, 12 HOURS OFF)     • lisinopril (ZESTRIL) 20 mg tablet  Take 1 tablet (20 mg total) by mouth daily 90 tablet 1   • promethazine-dextromethorphan (PHENERGAN-DM) 6.25-15 mg/5 mL oral syrup Take 5 mL by mouth 4 (four) times a day as needed for cough 240 mL 0   • traMADol (Ultram) 50 mg tablet Take 1 tablet (50 mg total) by mouth every 8 (eight) hours as needed for moderate pain 30 tablet 0   • traZODone (DESYREL) 50 mg tablet Take 0.5-1 tablets (25-50 mg total) by mouth daily at bedtime as needed for sleep 90 tablet 1   • triamcinolone (KENALOG) 0.1 % ointment Apply topically 2 (two) times a day 454 g 1   • ursodiol (ACTIGALL) 300 mg capsule TAKE 1 CAPSULE BY MOUTH THREE TIMES A  capsule 2   • valACYclovir (VALTREX) 500 mg tablet Take 2 tablets (1,000 mg total) by mouth daily for 14 days 28 tablet 0   • [DISCONTINUED] benzonatate (TESSALON PERLES) 100 mg capsule Take 1 capsule (100 mg total) by mouth 3 (three) times a day as needed for cough (Patient not taking: Reported on 2/7/2024) 20 capsule 0     No current facility-administered medications on file prior to visit.     Family History   Problem Relation Age of Onset   • Heart failure Mother    • Anxiety disorder Mother         symptom per allscripts   • Depression Mother    • Vision loss Mother    • Anxiety disorder Father         symptom per allscripts   • Cirrhosis Father         hepatic per allscripts   • Alcohol abuse Father    • Stomach cancer Maternal Grandmother    • Cancer Maternal Grandmother    • Stomach cancer Maternal Grandfather    • Cancer Maternal Grandfather    • Diabetes Paternal Grandmother    • No Known Problems Paternal Grandfather    • No Known Problems Paternal Aunt    • No Known Problems Paternal Aunt    • Anxiety disorder Other         symptom per allscripts   • Coronary artery disease Other    • Depression Other    • Hyperlipidemia Other    • Osteoarthritis Other    • Other Other         back disorder per allscripts   • Neuropathy Other      Social History     Socioeconomic History   •  "Marital status: Single     Spouse name: Not on file   • Number of children: Not on file   • Years of education: completed college   • Highest education level: Not on file   Occupational History   • Occupation:    • Occupation:    Tobacco Use   • Smoking status: Never   • Smokeless tobacco: Never   Vaping Use   • Vaping status: Never Used   Substance and Sexual Activity   • Alcohol use: Not Currently   • Drug use: No   • Sexual activity: Yes     Partners: Male     Birth control/protection: Post-menopausal   Other Topics Concern   • Not on file   Social History Narrative    Does not participate in activities outside of the home; participates in moderate activities inside the home    Lives with mother     Social Determinants of Health     Financial Resource Strain: Low Risk  (2/28/2023)    Overall Financial Resource Strain (CARDIA)    • Difficulty of Paying Living Expenses: Not hard at all   Food Insecurity: No Food Insecurity (6/22/2023)    Hunger Vital Sign    • Worried About Running Out of Food in the Last Year: Never true    • Ran Out of Food in the Last Year: Never true   Transportation Needs: No Transportation Needs (6/22/2023)    PRAPARE - Transportation    • Lack of Transportation (Medical): No    • Lack of Transportation (Non-Medical): No   Physical Activity: Not on file   Stress: Not on file   Social Connections: Not on file   Intimate Partner Violence: Not on file   Housing Stability: Low Risk  (6/22/2023)    Housing Stability Vital Sign    • Unable to Pay for Housing in the Last Year: No    • Number of Places Lived in the Last Year: 1    • Unstable Housing in the Last Year: No     Vitals:    02/07/24 1304   BP: 128/88   Pulse: 72   Resp: 16   SpO2: 98%   Weight: 74.7 kg (164 lb 9.6 oz)   Height: 5' 9.5\" (1.765 m)     Results for orders placed or performed during the hospital encounter of 01/30/24   TSH, 3rd generation with Free T4 reflex   Result Value Ref Range    TSH 3RD " "MELVA 1.588 0.450 - 4.500 uIU/mL   T3, free   Result Value Ref Range    T3, Free 2.59 2.50 - 3.90 pg/mL     *Note: Due to a large number of results and/or encounters for the requested time period, some results have not been displayed. A complete set of results can be found in Results Review.     Weight (last 2 days)     Date/Time Weight    02/07/24 1304 74.7 (164.6)        Body mass index is 23.96 kg/m².  BP      Temp      Pulse     Resp      SpO2        Vitals:    02/07/24 1304   Weight: 74.7 kg (164 lb 9.6 oz)     Vitals:    02/07/24 1304   Weight: 74.7 kg (164 lb 9.6 oz)       /88   Pulse 72   Resp 16   Ht 5' 9.5\" (1.765 m)   Wt 74.7 kg (164 lb 9.6 oz)   LMP  (LMP Unknown)   SpO2 98%   BMI 23.96 kg/m²     Several surgical scars right knee region no effusion noted     Physical Exam  Vitals and nursing note reviewed.   Constitutional:       General: She is not in acute distress.     Appearance: Normal appearance. She is well-developed. She is not ill-appearing, toxic-appearing or diaphoretic.   HENT:      Head: Normocephalic.   Eyes:      General: No scleral icterus.        Right eye: No discharge.         Left eye: No discharge.      Conjunctiva/sclera: Conjunctivae normal.      Pupils: Pupils are equal, round, and reactive to light.   Cardiovascular:      Rate and Rhythm: Normal rate and regular rhythm.      Heart sounds: Normal heart sounds. No murmur heard.     No friction rub. No gallop.   Pulmonary:      Effort: No respiratory distress.      Breath sounds: Normal breath sounds. No wheezing or rales.   Abdominal:      General: Bowel sounds are normal. There is no distension.      Palpations: Abdomen is soft. There is no mass.      Tenderness: There is no abdominal tenderness. There is no guarding or rebound.   Musculoskeletal:         General: No deformity.      Cervical back: Neck supple.   Lymphadenopathy:      Cervical: No cervical adenopathy.   Neurological:      Mental Status: She is " alert.      Coordination: Coordination normal.   Psychiatric:         Mood and Affect: Mood is not anxious or depressed.
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
## 2024-02-08 ENCOUNTER — TELEMEDICINE (OUTPATIENT)
Dept: PSYCHIATRY | Facility: CLINIC | Age: 64
End: 2024-02-08

## 2024-02-08 DIAGNOSIS — F43.10 PTSD (POST-TRAUMATIC STRESS DISORDER): ICD-10-CM

## 2024-02-08 DIAGNOSIS — F41.1 GAD (GENERALIZED ANXIETY DISORDER): ICD-10-CM

## 2024-02-08 DIAGNOSIS — F42.9 OBSESSIVE-COMPULSIVE DISORDER, UNSPECIFIED TYPE: ICD-10-CM

## 2024-02-08 DIAGNOSIS — F33.0 MDD (MAJOR DEPRESSIVE DISORDER), RECURRENT EPISODE, MILD (HCC): ICD-10-CM

## 2024-02-08 RX ORDER — FLUOXETINE HYDROCHLORIDE 40 MG/1
40 CAPSULE ORAL DAILY
Qty: 90 CAPSULE | Refills: 1 | Status: SHIPPED | OUTPATIENT
Start: 2024-02-08

## 2024-02-08 NOTE — ASSESSMENT & PLAN NOTE
Patient does report to me a known degenerative spur chronic right elbow pain would like to meet with orthopedics consider removal of spur

## 2024-02-08 NOTE — ASSESSMENT & PLAN NOTE
Referral to pain management Dr. Sorto regarding chronic pain syndrome would like to see what other additional options might be available for the patient

## 2024-02-08 NOTE — ASSESSMENT & PLAN NOTE
Encouraged patient to continue to work on compliance of [employer] she is making good progress she is working with GI Dr. Schwartz would like to get the opinion of liver expert Dr. Esqueda I did review her laboratories which does show she is making progress in regards to her alkaline phosphatase levels we will continue to monitor her progress

## 2024-02-08 NOTE — ASSESSMENT & PLAN NOTE
Currently on infusions of Zometa through the [facility] center will check surveillance DEXA scan for monitoring of osteoporosis

## 2024-02-08 NOTE — ASSESSMENT & PLAN NOTE
Working with hematology oncology Dr. Quintero patient does receive infusions Keytruda and Zometa clinically she appears to be stable continue routine and usual follow-up with Dr. Quintero will continue to monitor

## 2024-02-08 NOTE — ASSESSMENT & PLAN NOTE
Hypertension - controlled, I have counseled patient following healthy balance diet, I would like the patient reduce sodium, exercise routinely, I would like the patient continued the med current medical regiment and we will continue to monitor.  Blood pressure 128/88 continue lisinopril 20 mg once daily we will continue to monitor comprehensive metabolic panel

## 2024-02-08 NOTE — ASSESSMENT & PLAN NOTE
Lab Results   Component Value Date    EGFR 54 01/29/2024    EGFR 41 01/05/2024    EGFR 51 12/16/2023    CREATININE 1.08 01/29/2024    CREATININE 1.34 (H) 01/05/2024    CREATININE 1.13 12/16/2023   Drink adequate amount of water, avoid anti-inflammatories, reduce sodium in the diet and will continue to monitor the kidney function

## 2024-02-08 NOTE — ASSESSMENT & PLAN NOTE
Patient has ongoing symptoms she has been to multiple pain management providers, at this point in time I would like to get the opinion of pain management Dr. Sorto to see if there are any new options for the patient

## 2024-02-12 ENCOUNTER — TELEPHONE (OUTPATIENT)
Dept: OBGYN CLINIC | Facility: MEDICAL CENTER | Age: 64
End: 2024-02-12

## 2024-02-12 NOTE — TELEPHONE ENCOUNTER
Caller: Courtney     Doctor: Dr De Guzman     Reason for call: The patient is calling and had surgery with Dr De Guzman in 2018. She will be seeing a new dentist on 3/13 and they are asking for a letter to be faxed over to them if she will need antibiotics or not. Her last dentist told her it was 2 years and she has not been taking them prior to her appointments. Thank you     [place] Robbie Bryant    Fax - [phone] please fax note to    Call back#: [phone]

## 2024-02-13 RX ORDER — SODIUM CHLORIDE 9 MG/ML
20 INJECTION, SOLUTION INTRAVENOUS ONCE
Status: CANCELLED | OUTPATIENT
Start: 2024-02-20

## 2024-02-14 ENCOUNTER — TELEMEDICINE (OUTPATIENT)
Dept: BEHAVIORAL/MENTAL HEALTH CLINIC | Facility: CLINIC | Age: 64
End: 2024-02-14
Payer: COMMERCIAL

## 2024-02-14 DIAGNOSIS — F43.10 PTSD (POST-TRAUMATIC STRESS DISORDER): ICD-10-CM

## 2024-02-14 DIAGNOSIS — F33.0 MDD (MAJOR DEPRESSIVE DISORDER), RECURRENT EPISODE, MILD (HCC): Primary | ICD-10-CM

## 2024-02-14 PROCEDURE — 90834 PSYTX W PT 45 MINUTES: CPT | Performed by: COUNSELOR

## 2024-02-14 NOTE — PSYCH
Virtual Regular Visit    Verification of patient location:    Patient is located at Home in the following state in which I hold an active license [place]      Assessment/Plan:    Problem List Items Addressed This Visit     MDD (major depressive disorder), recurrent episode, mild (HCC) - Primary    PTSD (post-traumatic stress disorder)       Goals addressed in session: Goal 1 and Goal 2          Reason for visit is   Chief Complaint   Patient presents with   • Virtual Regular Visit        Encounter provider Ethel Christian    Provider located at [facility]  [facility] [place] [id number] [phone]      Recent Visits  Date Type Provider Dept   02/14/24 Telemedicine E[facility] [place]   Showing recent visits within past 7 days and meeting all other requirements  Future Appointments  No visits were found meeting these conditions.  Showing future appointments within next 150 days and meeting all other requirements       The patient was identified by name and date of birth. Courtney Davalos was informed that this is a telemedicine visit and that the visit is being conducted throughthe Epic Embedded platform. She agrees to proceed..  My office door was closed. No one else was in the room.  She acknowledged consent and understanding of privacy and security of the video platform. The patient has agreed to participate and understands they can discontinue the visit at any time.    Patient is aware this is a billable service.     Subjective  Courtney Davalos is a 63 y.o. female  .      HPI     Past Medical History:   Diagnosis Date   • Abnormal breast exam    • Anemia    • Anxiety    • Arthritis    • Asthma     childhood   • 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HCC)     primary per allscripts   • Cancer (HCC)     IgG kappa smoldering multiple myeloma   • Cardiac murmur    • Chronic liver disease     resolved 12/04/2017   •  COVID    • Depression    • Endometriosis    • Fracture of tibia     right tibial plateau fracture per allscripts   • Heart murmur    • Hepatic disease    • Hypertension     last assessed 12/13/2017   • Inflammatory bowel disease    • Kidney stone    • Multiple myeloma (HCC)    • Neuropathy     R foot    • Nosebleed    • OCD (obsessive compulsive disorder)    • Osteoporosis    • Otitis media    • Pneumonia    • RSD (reflex sympathetic dystrophy) 12/11/2018   • Scoliosis    • Seasonal allergies    • Urinary tract infection    • Visual impairment    • Wears eyeglasses    • Whiplash injury to neck        Past Surgical History:   Procedure Laterality Date   • BACK SURGERY      hemilaminectomy and discectomy, L5-S1, right (Dr. Rashid, [facility] at [facility]) onset 02/14/2005 per allscripts   • COLONOSCOPY     • EXPLORATION EXTREMITY Right 08/29/2016    Procedure: KNEE PERONEAL NERVE EXPLORATION AND RELEASE ;  Surgeon: Bry De Guzman MD;  [facility];  Service:    • FOOT SURGERY     • FRACTURE SURGERY     • HAND SURGERY     • HERNIA REPAIR     • IR BIOPSY KIDNEY MASS  05/25/2023   • JOINT REPLACEMENT Right     RTK   • KNEE SURGERY     • MANDIBLE SURGERY     • NEPHRECTOMY LAPAROSCOPIC Right 6/21/2023    Procedure: NEPHRECTOMY LAPAROSCOPIC ASSISTED;  Surgeon: Avinash Sterling MD;  [facility];  Service: Urology   • NEUROPLASTY / TRANSPOSITION MEDIAN NERVE AT CARPAL TUNNEL     • ORIF TIBIAL PLATEAU Right     arthroplasty per allscripts   • OTHER SURGICAL HISTORY      injection of trigger point(s) per allscripts   • IA ARTHRP KNE CONDYLE&PLATU MEDIAL&LAT COMPARTMENTS Right 06/11/2018    Procedure: ARTHROPLASTY KNEE TOTAL;  Surgeon: Bry De Guzman MD;  [facility];  Service: Orthopedics   • IA TENDON SHEATH INCISION Right 12/02/2020    Procedure: RELEASE TRIGGER FINGER-right long;  Surgeon: Todd Mcdonald MD;  [facility];  Service: Orthopedics   • SINUS SURGERY     • SPINE SURGERY     •  TONSILLECTOMY         Current Outpatient Medications   Medication Sig Dispense Refill   • albuterol (Proventil HFA) 90 mcg/act inhaler Inhale 2 puffs every 6 (six) hours as needed for wheezing 6.7 g 0   • Cholecalciferol (Vitamin D3) 50 MCG (2000 UT) TABS Take 1 tablet (2,000 Units total) by mouth daily 90 tablet 3   • estradiol (VAGIFEM, YUVAFEM) 10 MCG TABS vaginal tablet INSERT 1 TABLET INTO THE VAGINA 2 TIMES A WEEK. 24 tablet 1   • fenofibrate (FENOGLIDE) 120 MG TABS Take 120 mg by mouth daily     • FLUoxetine (PROzac) 40 MG capsule Take 1 capsule (40 mg total) by mouth daily 90 capsule 1   • lidocaine (LIDODERM) 5 % lidocaine 5 % topical patch   APPLY 1 PATCH TO AFFECTED AREA FOR 12 HOURS A DAY & REMOVE FOR 12 HOURS BEFORE APPLYING NEXT PATCH. (12 HOURS ON, 12 HOURS OFF)     • lisinopril (ZESTRIL) 20 mg tablet Take 1 tablet (20 mg total) by mouth daily 90 tablet 1   • promethazine-dextromethorphan (PHENERGAN-DM) 6.25-15 mg/5 mL oral syrup Take 5 mL by mouth 4 (four) times a day as needed for cough 240 mL 0   • traMADol (Ultram) 50 mg tablet Take 1 tablet (50 mg total) by mouth every 8 (eight) hours as needed for moderate pain 30 tablet 0   • traZODone (DESYREL) 50 mg tablet Take 0.5-1 tablets (25-50 mg total) by mouth daily at bedtime as needed for sleep 90 tablet 1   • triamcinolone (KENALOG) 0.1 % ointment Apply topically 2 (two) times a day 454 g 1   • ursodiol (ACTIGALL) 300 mg capsule TAKE 1 CAPSULE BY MOUTH THREE TIMES A  capsule 2   • valACYclovir (VALTREX) 500 mg tablet Take 2 tablets (1,000 mg total) by mouth daily for 14 days 28 tablet 0     No current facility-administered medications for this visit.        Allergies   Allergen Reactions   • Codeine GI Intolerance and Nausea Only     Other reaction(s): Other (See Comments)  Violently ill  Violently ill   • Latex Rash     itching   • Morphine And Related Nausea Only     GI intolerance nausea   • Nortriptyline Shortness Of Breath   • Ocaliva  "[Obeticholic Acid] Hives   • Doxycycline GI Intolerance and Nausea Only   • Sulfa Antibiotics GI Intolerance   • Cymbalta [Duloxetine Hcl]    • Penicillins Other (See Comments) and Rash     Childhood reaction       Review of Systems    Video Exam    There were no vitals filed for this visit.    Physical Exam     Behavioral Health Psychotherapy Progress Note    Psychotherapy Provided: Individual Psychotherapy     1. MDD (major depressive disorder), recurrent episode, mild (HCC)        2. PTSD (post-traumatic stress disorder)            Goals addressed in session: Goal 1 and Goal 2     DATA: Clinician met with Courtney via telehealth for individual therapy. Courtney processed recent interactions with paramour and working to explored concerns she has with her paramours drinking. Clinician discussed connection between fathers drinking and current paramour drinking.  Clinician discussed resources to assist in learning more about children of alcoholic parents and the lasting impact on attachment.   During this session, this clinician used the following therapeutic modalities: Client-centered Therapy and Supportive Psychotherapy    Substance Abuse was not addressed during this session. If the client is diagnosed with a co-occurring substance use disorder, please indicate any changes in the frequency or amount of use: n/a. Stage of change for addressing substance use diagnoses: No substance use/Not applicable    ASSESSMENT:  Courtney Davalos presents with a Euthymic/ normal and Anxious mood.     her affect is Normal range and intensity, which is congruent, with her mood and the content of the session. The client has made progress on their goals.    Courtney was open and engaged in the session.  Courtney Davalos presents with a none risk of suicide, none risk of self-harm, and none risk of harm to others.    For any risk assessment that surpasses a \"low\" rating, a safety plan must be developed.    A safety plan was indicated: no  If " yes, describe in detail n/a    PLAN: Between sessions, Courtney Davalos will utilize assertive communication to express needs. At the next session, the therapist will use Client-centered Therapy, Cognitive Processing Therapy, and Supportive Psychotherapy to address anxiety and depressive symptoms.    Behavioral Health Treatment Plan and Discharge Planning: Courtney Davalos is aware of and agrees to continue to work on their treatment plan. They have identified and are working toward their discharge goals. yes    Visit start and stop times:    02/14/24  Start Time: 0900  Stop Time: 0950  Total Visit Time: 50 minutes

## 2024-02-16 ENCOUNTER — APPOINTMENT (OUTPATIENT)
Dept: LAB | Facility: CLINIC | Age: 64
End: 2024-02-16
Payer: COMMERCIAL

## 2024-02-16 DIAGNOSIS — R53.0 NEOPLASTIC (MALIGNANT) RELATED FATIGUE: ICD-10-CM

## 2024-02-16 DIAGNOSIS — D47.2 SMOLDERING MULTIPLE MYELOMA (SMM): ICD-10-CM

## 2024-02-16 DIAGNOSIS — C64.1 RENAL CELL CARCINOMA OF RIGHT KIDNEY (HCC): ICD-10-CM

## 2024-02-16 LAB
ALBUMIN SERPL BCP-MCNC: 4.1 G/DL (ref 3.5–5)
ALP SERPL-CCNC: 146 U/L (ref 34–104)
ALT SERPL W P-5'-P-CCNC: 34 U/L (ref 7–52)
ANION GAP SERPL CALCULATED.3IONS-SCNC: 6 MMOL/L
AST SERPL W P-5'-P-CCNC: 38 U/L (ref 13–39)
BASOPHILS # BLD AUTO: 0.05 THOUSANDS/ÂΜL (ref 0–0.1)
BASOPHILS NFR BLD AUTO: 1 % (ref 0–1)
BILIRUB SERPL-MCNC: 0.54 MG/DL (ref 0.2–1)
BUN SERPL-MCNC: 26 MG/DL (ref 5–25)
CALCIUM SERPL-MCNC: 9.3 MG/DL (ref 8.4–10.2)
CHLORIDE SERPL-SCNC: 100 MMOL/L (ref 96–108)
CO2 SERPL-SCNC: 28 MMOL/L (ref 21–32)
CREAT SERPL-MCNC: 1.27 MG/DL (ref 0.6–1.3)
EOSINOPHIL # BLD AUTO: 0.13 THOUSAND/ÂΜL (ref 0–0.61)
EOSINOPHIL NFR BLD AUTO: 3 % (ref 0–6)
ERYTHROCYTE [DISTWIDTH] IN BLOOD BY AUTOMATED COUNT: 12.9 % (ref 11.6–15.1)
GFR SERPL CREATININE-BSD FRML MDRD: 44 ML/MIN/1.73SQ M
GLUCOSE P FAST SERPL-MCNC: 87 MG/DL (ref 65–99)
HCT VFR BLD AUTO: 40.6 % (ref 34.8–46.1)
HGB BLD-MCNC: 12.9 G/DL (ref 11.5–15.4)
IMM GRANULOCYTES # BLD AUTO: 0.02 THOUSAND/UL (ref 0–0.2)
IMM GRANULOCYTES NFR BLD AUTO: 0 % (ref 0–2)
LYMPHOCYTES # BLD AUTO: 1.49 THOUSANDS/ÂΜL (ref 0.6–4.47)
LYMPHOCYTES NFR BLD AUTO: 30 % (ref 14–44)
MCH RBC QN AUTO: 30.1 PG (ref 26.8–34.3)
MCHC RBC AUTO-ENTMCNC: 31.8 G/DL (ref 31.4–37.4)
MCV RBC AUTO: 95 FL (ref 82–98)
MONOCYTES # BLD AUTO: 0.39 THOUSAND/ÂΜL (ref 0.17–1.22)
MONOCYTES NFR BLD AUTO: 8 % (ref 4–12)
NEUTROPHILS # BLD AUTO: 2.97 THOUSANDS/ÂΜL (ref 1.85–7.62)
NEUTS SEG NFR BLD AUTO: 58 % (ref 43–75)
NRBC BLD AUTO-RTO: 0 /100 WBCS
PLATELET # BLD AUTO: 210 THOUSANDS/UL (ref 149–390)
PMV BLD AUTO: 11.5 FL (ref 8.9–12.7)
POTASSIUM SERPL-SCNC: 4.8 MMOL/L (ref 3.5–5.3)
PROT SERPL-MCNC: 8.6 G/DL (ref 6.4–8.4)
RBC # BLD AUTO: 4.28 MILLION/UL (ref 3.81–5.12)
SODIUM SERPL-SCNC: 134 MMOL/L (ref 135–147)
T3FREE SERPL-MCNC: 2.76 PG/ML (ref 2.5–3.9)
TSH SERPL DL<=0.05 MIU/L-ACNC: 2.46 UIU/ML (ref 0.45–4.5)
WBC # BLD AUTO: 5.05 THOUSAND/UL (ref 4.31–10.16)

## 2024-02-16 PROCEDURE — 84443 ASSAY THYROID STIM HORMONE: CPT

## 2024-02-16 PROCEDURE — 36415 COLL VENOUS BLD VENIPUNCTURE: CPT

## 2024-02-16 PROCEDURE — 80053 COMPREHEN METABOLIC PANEL: CPT

## 2024-02-16 PROCEDURE — 85025 COMPLETE CBC W/AUTO DIFF WBC: CPT

## 2024-02-16 PROCEDURE — 84481 FREE ASSAY (FT-3): CPT

## 2024-02-20 ENCOUNTER — HOSPITAL ENCOUNTER (OUTPATIENT)
Dept: INFUSION CENTER | Facility: CLINIC | Age: 64
Discharge: HOME | End: 2024-02-20
Payer: COMMERCIAL

## 2024-02-20 VITALS
DIASTOLIC BLOOD PRESSURE: 94 MMHG | TEMPERATURE: 98.4 F | RESPIRATION RATE: 18 BRPM | SYSTOLIC BLOOD PRESSURE: 169 MMHG | OXYGEN SATURATION: 96 % | HEART RATE: 86 BPM

## 2024-02-20 DIAGNOSIS — C64.1 RENAL CELL CARCINOMA OF RIGHT KIDNEY (HCC): Primary | ICD-10-CM

## 2024-02-20 PROCEDURE — 96413 CHEMO IV INFUSION 1 HR: CPT

## 2024-02-20 RX ORDER — SODIUM CHLORIDE 9 MG/ML
20 INJECTION, SOLUTION INTRAVENOUS ONCE
Status: COMPLETED | OUTPATIENT
Start: 2024-02-20 | End: 2024-02-20

## 2024-02-20 RX ADMIN — SODIUM CHLORIDE 200 MG: 9 INJECTION, SOLUTION INTRAVENOUS at 10:44

## 2024-02-20 RX ADMIN — SODIUM CHLORIDE 20 ML/HR: 0.9 INJECTION, SOLUTION INTRAVENOUS at 10:40

## 2024-02-20 NOTE — PROGRESS NOTES
Pt tolerated treatment without incident. Confirmed pts next appt for 3/12/24 @ 9:30 @ [facility]. Nathaniel EVANS.

## 2024-02-25 NOTE — PROGRESS NOTES
Two weeks following right total knee replacement, this patient describes pain in the knee that requires narcotic analgesia  Exam finds Kinesio tape along the medial aspect knee  The anterior right knee incision clean dry and healing  Extension is within 5° of full, flexion is 85°  There is no tenderness the right calf  X-rays from today two views right knee show total knee implant satisfactory position  Assessment/plan:  2 weeks following right total knee replacement, this patient was granted a necrotic refill  She will continue therapy to restore motion strengthening    I would welcome the opportunity see back in 4 weeks time for repeat physical exam
normal...

## 2024-02-26 ENCOUNTER — TELEPHONE (OUTPATIENT)
Dept: OTHER | Facility: OTHER | Age: 64
End: 2024-02-26

## 2024-02-26 NOTE — TELEPHONE ENCOUNTER
Patient not feeling well and recently exposed to covid. Needs to cancel apt for 2/27 at 9:50am. She will call to reschedule.

## 2024-02-27 ENCOUNTER — TELEMEDICINE (OUTPATIENT)
Dept: BEHAVIORAL/MENTAL HEALTH CLINIC | Facility: CLINIC | Age: 64
End: 2024-02-27
Payer: COMMERCIAL

## 2024-02-27 DIAGNOSIS — F33.0 MDD (MAJOR DEPRESSIVE DISORDER), RECURRENT EPISODE, MILD (HCC): Primary | ICD-10-CM

## 2024-02-27 DIAGNOSIS — F41.1 GAD (GENERALIZED ANXIETY DISORDER): ICD-10-CM

## 2024-02-27 DIAGNOSIS — F43.10 PTSD (POST-TRAUMATIC STRESS DISORDER): ICD-10-CM

## 2024-02-27 DIAGNOSIS — F42.9 OBSESSIVE-COMPULSIVE DISORDER, UNSPECIFIED TYPE: ICD-10-CM

## 2024-02-27 PROCEDURE — 90837 PSYTX W PT 60 MINUTES: CPT | Performed by: COUNSELOR

## 2024-02-27 NOTE — PSYCH
Virtual Regular Visit    Verification of patient location:    Patient is located at Home in the following state in which I hold an active license [place]      Assessment/Plan:    Problem List Items Addressed This Visit     MDD (major depressive disorder), recurrent episode, mild (HCC) - Primary    JOSEFINA (generalized anxiety disorder)    OCD (obsessive compulsive disorder)    PTSD (post-traumatic stress disorder)       Goals addressed in session: Goal 1 and Goal 2          Reason for visit is No chief complaint on file.       Encounter provider Ethel Christian    Provider located at [facility]  [facility] [place] [place] [id number] [phone]      Recent Visits  No visits were found meeting these conditions.  Showing recent visits within past 7 days and meeting all other requirements  Today's Visits  Date Type Provider Dept   02/27/24 Telemedicine [facility] [place]   Showing today's visits and meeting all other requirements  Future Appointments  No visits were found meeting these conditions.  Showing future appointments within next 150 days and meeting all other requirements       The patient was identified by name and date of birth. Courtney Davalos was informed that this is a telemedicine visit and that the visit is being conducted throughthe Epic Embedded platform. She agrees to proceed..  My office door was closed. No one else was in the room.  She acknowledged consent and understanding of privacy and security of the video platform. The patient has agreed to participate and understands they can discontinue the visit at any time.    Patient is aware this is a billable service.     Subjective  Courtney Davalos is a 63 y.o. female  .      HPI     Past Medical History:   Diagnosis Date   • Abnormal breast exam    • Anemia    • Anxiety    • Arthritis    • Asthma     childhood   • 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HCC)      primary per allscripts   • Cancer (HCC)     IgG kappa smoldering multiple myeloma   • Cardiac murmur    • Chronic liver disease     resolved 12/04/2017   • COVID    • Depression    • Endometriosis    • Fracture of tibia     right tibial plateau fracture per allscripts   • Heart murmur    • Hepatic disease    • Hypertension     last assessed 12/13/2017   • Inflammatory bowel disease    • Kidney stone    • Multiple myeloma (HCC)    • Neuropathy     R foot    • Nosebleed    • OCD (obsessive compulsive disorder)    • Osteoporosis    • Otitis media    • Pneumonia    • RSD (reflex sympathetic dystrophy) 12/11/2018   • Scoliosis    • Seasonal allergies    • Urinary tract infection    • Visual impairment    • Wears eyeglasses    • Whiplash injury to neck        Past Surgical History:   Procedure Laterality Date   • BACK SURGERY      hemilaminectomy and discectomy, L5-S1, right (Dr. Rashid, [facility] at [facility]) onset 02/14/2005 per allscripts   • COLONOSCOPY     • EXPLORATION EXTREMITY Right 08/29/2016    Procedure: KNEE PERONEAL NERVE EXPLORATION AND RELEASE ;  Surgeon: Bry De Guzman MD;  [facility];  Service:    • FOOT SURGERY     • FRACTURE SURGERY     • HAND SURGERY     • HERNIA REPAIR     • IR BIOPSY KIDNEY MASS  05/25/2023   • JOINT REPLACEMENT Right     RTK   • KNEE SURGERY     • MANDIBLE SURGERY     • NEPHRECTOMY LAPAROSCOPIC Right 6/21/2023    Procedure: NEPHRECTOMY LAPAROSCOPIC ASSISTED;  Surgeon: Avinash Sterling MD;  [facility];  Service: Urology   • NEUROPLASTY / TRANSPOSITION MEDIAN NERVE AT CARPAL TUNNEL     • ORIF TIBIAL PLATEAU Right     arthroplasty per allscripts   • OTHER SURGICAL HISTORY      injection of trigger point(s) per allscripts   • HI ARTHRP KNE CONDYLE&PLATU MEDIAL&LAT COMPARTMENTS Right 06/11/2018    Procedure: ARTHROPLASTY KNEE TOTAL;  Surgeon: Bry De Guzman MD;  [facility];  Service: Orthopedics   • HI TENDON SHEATH INCISION Right 12/02/2020    Procedure:  RELEASE TRIGGER FINGER-right long;  Surgeon: Todd Mcdonald MD;  [facility];  Service: Orthopedics   • SINUS SURGERY     • SPINE SURGERY     • TONSILLECTOMY         Current Outpatient Medications   Medication Sig Dispense Refill   • albuterol (Proventil HFA) 90 mcg/act inhaler Inhale 2 puffs every 6 (six) hours as needed for wheezing 6.7 g 0   • Cholecalciferol (Vitamin D3) 50 MCG (2000 UT) TABS Take 1 tablet (2,000 Units total) by mouth daily 90 tablet 3   • estradiol (VAGIFEM, YUVAFEM) 10 MCG TABS vaginal tablet INSERT 1 TABLET INTO THE VAGINA 2 TIMES A WEEK. 24 tablet 1   • fenofibrate (FENOGLIDE) 120 MG TABS Take 120 mg by mouth daily     • FLUoxetine (PROzac) 40 MG capsule Take 1 capsule (40 mg total) by mouth daily 90 capsule 1   • lidocaine (LIDODERM) 5 % lidocaine 5 % topical patch   APPLY 1 PATCH TO AFFECTED AREA FOR 12 HOURS A DAY & REMOVE FOR 12 HOURS BEFORE APPLYING NEXT PATCH. (12 HOURS ON, 12 HOURS OFF)     • lisinopril (ZESTRIL) 20 mg tablet Take 1 tablet (20 mg total) by mouth daily 90 tablet 1   • promethazine-dextromethorphan (PHENERGAN-DM) 6.25-15 mg/5 mL oral syrup Take 5 mL by mouth 4 (four) times a day as needed for cough 240 mL 0   • traMADol (Ultram) 50 mg tablet Take 1 tablet (50 mg total) by mouth every 8 (eight) hours as needed for moderate pain 30 tablet 0   • traZODone (DESYREL) 50 mg tablet Take 0.5-1 tablets (25-50 mg total) by mouth daily at bedtime as needed for sleep 90 tablet 1   • triamcinolone (KENALOG) 0.1 % ointment Apply topically 2 (two) times a day 454 g 1   • ursodiol (ACTIGALL) 300 mg capsule TAKE 1 CAPSULE BY MOUTH THREE TIMES A  capsule 2   • valACYclovir (VALTREX) 500 mg tablet Take 2 tablets (1,000 mg total) by mouth daily for 14 days 28 tablet 0     No current facility-administered medications for this visit.        Allergies   Allergen Reactions   • Codeine GI Intolerance and Nausea Only     Other reaction(s): Other (See Comments)  Violently  ill  Violently ill   • Latex Rash     itching   • Morphine And Related Nausea Only     GI intolerance nausea   • Nortriptyline Shortness Of Breath   • Ocaliva [Obeticholic Acid] Hives   • Doxycycline GI Intolerance and Nausea Only   • Sulfa Antibiotics GI Intolerance   • Cymbalta [Duloxetine Hcl]    • Penicillins Other (See Comments) and Rash     Childhood reaction       Review of Systems    Video Exam    There were no vitals filed for this visit.    Physical Exam     Behavioral Health Psychotherapy Progress Note    Psychotherapy Provided: Individual Psychotherapy     1. MDD (major depressive disorder), recurrent episode, mild (HCC)        2. JOSEFINA (generalized anxiety disorder)        3. Obsessive-compulsive disorder, unspecified type        4. PTSD (post-traumatic stress disorder)            Goals addressed in session: Goal 1 and Goal 2     DATA: Clinician met with Courtney via telehealth for individual therapy. Courtney processed recent conflict with paramour and explored feelings of hurt and anger. Clinician explored communication of emotions and the way paramours actions make her feel. Courtney reports lack of healthy communication in relationship and desire to increase this. Clinician provided feedback on ways to increase healthy communication and set and maintain boundaries.   During this session, this clinician used the following therapeutic modalities: Client-centered Therapy, Solution-Focused Therapy, and Supportive Psychotherapy    Substance Abuse was not addressed during this session. If the client is diagnosed with a co-occurring substance use disorder, please indicate any changes in the frequency or amount of use: n/a. Stage of change for addressing substance use diagnoses: No substance use/Not applicable    ASSESSMENT:  Courtney Davalos presents with a Euthymic/ normal and Anxious mood.     her affect is Normal range and intensity, which is congruent, with her mood and the content of the session. The client has  "made progress on their goals.    Courtney was open and engaged in the session. Courtney Davalos presents with a none risk of suicide, none risk of self-harm, and none risk of harm to others.    For any risk assessment that surpasses a \"low\" rating, a safety plan must be developed.    A safety plan was indicated: no  If yes, describe in detail n/a    PLAN: Between sessions, Courtney Davalos will utilize assertive communication to express needs. At the next session, the therapist will use Client-centered Therapy and Supportive Psychotherapy to address anxiety.    Behavioral Health Treatment Plan and Discharge Planning: Courtney Davalos is aware of and agrees to continue to work on their treatment plan. They have identified and are working toward their discharge goals. yes    Visit start and stop times:    02/27/24  Start Time: 1000  Stop Time: 1058  Total Visit Time: 58 minutes        "

## 2024-02-29 ENCOUNTER — APPOINTMENT (OUTPATIENT)
Dept: LAB | Age: 64
End: 2024-02-29

## 2024-03-01 ENCOUNTER — HOSPITAL ENCOUNTER (OUTPATIENT)
Dept: RADIOLOGY | Age: 64
Discharge: HOME | End: 2024-03-01
Payer: COMMERCIAL

## 2024-03-01 DIAGNOSIS — C64.1 RENAL CELL CARCINOMA OF RIGHT KIDNEY (HCC): ICD-10-CM

## 2024-03-01 DIAGNOSIS — D47.2 SMOLDERING MULTIPLE MYELOMA (SMM): Chronic | ICD-10-CM

## 2024-03-01 PROCEDURE — 74177 CT ABD & PELVIS W/CONTRAST: CPT

## 2024-03-01 PROCEDURE — G1004 CDSM NDSC: HCPCS

## 2024-03-01 RX ADMIN — IOHEXOL 100 ML: 350 INJECTION, SOLUTION INTRAVENOUS at 08:59

## 2024-03-05 ENCOUNTER — TELEPHONE (OUTPATIENT)
Dept: HEMATOLOGY ONCOLOGY | Facility: CLINIC | Age: 64
End: 2024-03-05

## 2024-03-05 DIAGNOSIS — C64.1 RENAL CELL CARCINOMA OF RIGHT KIDNEY (HCC): Primary | ICD-10-CM

## 2024-03-05 RX ORDER — SODIUM CHLORIDE 9 MG/ML
20 INJECTION, SOLUTION INTRAVENOUS ONCE
Status: CANCELLED | OUTPATIENT
Start: 2024-03-12

## 2024-03-05 NOTE — TELEPHONE ENCOUNTER
Lab Inquiry   Who are you speaking with? Patient     If it is not the patient, are they listed on an active communication consent form? N/A   Name of ordering provider Dr. Quintero   What is being requested? Requesting clarification - Are labs needed for upcoming appointment on 03/08/2024    Lab draw location [facility]   What is the best call back number? [phone]   If patient at the lab, Was a live attempts to contact the team made? N/A

## 2024-03-06 ENCOUNTER — APPOINTMENT (OUTPATIENT)
Dept: LAB | Facility: CLINIC | Age: 64
End: 2024-03-06
Payer: COMMERCIAL

## 2024-03-06 ENCOUNTER — TELEPHONE (OUTPATIENT)
Dept: HEMATOLOGY ONCOLOGY | Facility: CLINIC | Age: 64
End: 2024-03-06

## 2024-03-06 DIAGNOSIS — Z13.6 SCREENING FOR CARDIOVASCULAR CONDITION: ICD-10-CM

## 2024-03-06 DIAGNOSIS — R53.0 NEOPLASTIC (MALIGNANT) RELATED FATIGUE: ICD-10-CM

## 2024-03-06 DIAGNOSIS — D47.2 SMOLDERING MULTIPLE MYELOMA (SMM): ICD-10-CM

## 2024-03-06 DIAGNOSIS — C64.1 RENAL CELL CARCINOMA OF RIGHT KIDNEY (HCC): ICD-10-CM

## 2024-03-06 LAB
ALBUMIN SERPL BCP-MCNC: 3.8 G/DL (ref 3.5–5)
ALP SERPL-CCNC: 122 U/L (ref 34–104)
ALT SERPL W P-5'-P-CCNC: 33 U/L (ref 7–52)
ANION GAP SERPL CALCULATED.3IONS-SCNC: 11 MMOL/L
AST SERPL W P-5'-P-CCNC: 35 U/L (ref 13–39)
BASOPHILS # BLD AUTO: 0.03 THOUSANDS/ÂΜL (ref 0–0.1)
BASOPHILS NFR BLD AUTO: 1 % (ref 0–1)
BILIRUB SERPL-MCNC: 0.49 MG/DL (ref 0.2–1)
BUN SERPL-MCNC: 24 MG/DL (ref 5–25)
CALCIUM SERPL-MCNC: 9.1 MG/DL (ref 8.4–10.2)
CHLORIDE SERPL-SCNC: 99 MMOL/L (ref 96–108)
CO2 SERPL-SCNC: 26 MMOL/L (ref 21–32)
CREAT SERPL-MCNC: 1.16 MG/DL (ref 0.6–1.3)
EOSINOPHIL # BLD AUTO: 0.16 THOUSAND/ÂΜL (ref 0–0.61)
EOSINOPHIL NFR BLD AUTO: 3 % (ref 0–6)
ERYTHROCYTE [DISTWIDTH] IN BLOOD BY AUTOMATED COUNT: 12.9 % (ref 11.6–15.1)
GFR SERPL CREATININE-BSD FRML MDRD: 49 ML/MIN/1.73SQ M
GLUCOSE SERPL-MCNC: 75 MG/DL (ref 65–140)
HCT VFR BLD AUTO: 38.6 % (ref 34.8–46.1)
HGB BLD-MCNC: 12.5 G/DL (ref 11.5–15.4)
IMM GRANULOCYTES # BLD AUTO: 0.03 THOUSAND/UL (ref 0–0.2)
IMM GRANULOCYTES NFR BLD AUTO: 1 % (ref 0–2)
LYMPHOCYTES # BLD AUTO: 1.46 THOUSANDS/ÂΜL (ref 0.6–4.47)
LYMPHOCYTES NFR BLD AUTO: 28 % (ref 14–44)
MCH RBC QN AUTO: 29.3 PG (ref 26.8–34.3)
MCHC RBC AUTO-ENTMCNC: 32.4 G/DL (ref 31.4–37.4)
MCV RBC AUTO: 91 FL (ref 82–98)
MONOCYTES # BLD AUTO: 0.35 THOUSAND/ÂΜL (ref 0.17–1.22)
MONOCYTES NFR BLD AUTO: 7 % (ref 4–12)
NEUTROPHILS # BLD AUTO: 3.15 THOUSANDS/ÂΜL (ref 1.85–7.62)
NEUTS SEG NFR BLD AUTO: 60 % (ref 43–75)
NRBC BLD AUTO-RTO: 0 /100 WBCS
PLATELET # BLD AUTO: 207 THOUSANDS/UL (ref 149–390)
PMV BLD AUTO: 10.7 FL (ref 8.9–12.7)
POTASSIUM SERPL-SCNC: 4.1 MMOL/L (ref 3.5–5.3)
PROT SERPL-MCNC: 8.2 G/DL (ref 6.4–8.4)
RBC # BLD AUTO: 4.26 MILLION/UL (ref 3.81–5.12)
SODIUM SERPL-SCNC: 136 MMOL/L (ref 135–147)
T3FREE SERPL-MCNC: 2.67 PG/ML (ref 2.5–3.9)
TSH SERPL DL<=0.05 MIU/L-ACNC: 2.31 UIU/ML (ref 0.45–4.5)
WBC # BLD AUTO: 5.18 THOUSAND/UL (ref 4.31–10.16)

## 2024-03-06 PROCEDURE — 80053 COMPREHEN METABOLIC PANEL: CPT

## 2024-03-06 PROCEDURE — 84443 ASSAY THYROID STIM HORMONE: CPT

## 2024-03-06 PROCEDURE — 84481 FREE ASSAY (FT-3): CPT

## 2024-03-06 PROCEDURE — 36415 COLL VENOUS BLD VENIPUNCTURE: CPT

## 2024-03-06 PROCEDURE — 85025 COMPLETE CBC W/AUTO DIFF WBC: CPT

## 2024-03-08 ENCOUNTER — OFFICE VISIT (OUTPATIENT)
Dept: HEMATOLOGY ONCOLOGY | Facility: CLINIC | Age: 64
End: 2024-03-08
Payer: COMMERCIAL

## 2024-03-08 VITALS
RESPIRATION RATE: 17 BRPM | TEMPERATURE: 97.7 F | SYSTOLIC BLOOD PRESSURE: 130 MMHG | BODY MASS INDEX: 24.2 KG/M2 | WEIGHT: 169 LBS | OXYGEN SATURATION: 98 % | HEIGHT: 70 IN | DIASTOLIC BLOOD PRESSURE: 88 MMHG | HEART RATE: 80 BPM

## 2024-03-08 DIAGNOSIS — C64.1 RENAL CELL CARCINOMA OF RIGHT KIDNEY (HCC): Primary | ICD-10-CM

## 2024-03-08 DIAGNOSIS — D47.2 SMOLDERING MULTIPLE MYELOMA (SMM): Chronic | ICD-10-CM

## 2024-03-08 PROCEDURE — 99213 OFFICE O/P EST LOW 20 MIN: CPT | Performed by: INTERNAL MEDICINE

## 2024-03-08 NOTE — PROGRESS NOTES
Hematology Outpatient Follow - Up Note  Courtney Davalos 63 y.o. female MRN: @ Encounter: [id number]        Date:  3/8/2024        Assessment/ Plan:    1.  Renal cell carcinoma of the right side, stage III (P T3a, PN X, grade 1, M0)  s/p nephrectomy 6/21/2023 clear cell subtype, primary 5.1 cm, unifocal, grade 1, with renal vein involvement, all margins are negative     she decided on adjuvant Pembrolizumab 200 mg every 3 weeks for total of 1 year (C1 on 8/15/2023); However the patient has autoimmune primary biliary cirrhosis we have to watch very carefully for autoimmune side effects such as hepatitis, pancreatitis, dermatitis, pneumonitis, adrenal insufficiency, colitis etc., she signed the consent. We will check TSH, T3 every other treatment.    2.  Smoldering multiple myeloma, IgG kappa:  bone marrow biopsy showed 15% plasma cells with normal cytogenetics and FISH panel for multiple myeloma diagnosed in May 2017 she had been on watchful observation no evidence of endorgan damage; patient needs to complete the repeate MM blood workup before next visit.      3.  Primary biliary cirrhosis of the liver with persistent elevation of alkaline phosphatase and intermittent pruritus; close watch while on immunotherapy, If more pruritus we might use prednisone.      4. Osteoprosis , on Zometa every 6 month. Patient is due for her repeated DEXA scan, last was in 8/2021,        Labs and imaging studies are reviewed by ordering provider once results are available. If there are findings that need immediate attention, you will be contacted when results available.   Discussing results and the implication on your healthcare is best discussed in person at your follow-up visit.       HPI:    62-year-old  female with past medical history of primary biliary cirrhosis, fracture of the right tibia, hypertension, OCD,worsening of osteoporosis on DEXA scan Done in 2016, nephrolithiasis, chronic lower back pain and possible  sacroiliitis was found to have elevated total protein 3-4 years ago, serum protein electrophoresis showed IgG kappa monoclonal protein of 1.7 g/dL however no evidence of anemia, hypercalcemia, or renal insufficiency, she had persistent elevation of alkaline phosphatase secondary to PBC  had a bone scan done last year which showed activity in the ribs area  serum protein electrophoresis in April 2017 showed IgG kappa monoclonal protein of 1.96 g/dL, total protein 8.5, albumin 3.9, IgG 2580, creatinine 0.8, calcium 8.9, alkaline phosphatase 294, AST 37, ALT 46, IgM 25 in July 2016 monoclonal protein of IgG kappa of 1.73  C-reactive protein has been normal however sedimentation rate was elevated in the range of 60  Bone marrow biopsy in May 2017 showed 15% plasma cells in diffuse and interstitial pattern, normal fish panel for multiple myeloma and cytogenetics  Status post right knee replacement in June 2018  Exacerbation of anxiety/depression followed by psychiatric medicine     Osteoporosis s/p Zometa in 2019, DEXA scan on 08/2021 showed recurrence of osteoporosis in the lumbar spine; she is on Zometa every 6 months     Primary biliary cirrhosis followed by GI       Colonoscopy on 06/2021 showed mild diverticulosis         Interval History:   - TSH 1.778 (11/6/23)   - 11/27/23 CBC unremarkable; Cr 1.09 (baseline), AST 58, ALT 59, and  (all ~ baseline) , Total Bilirubin 0.55   - Pembrolizumab cycle 7 was given on 11/28/2023 ; reports some itchiness/rash, occasional and migratory , described as tolerable and declines need for systemic steroid,; she uses Triamcinolone 1%      Cancer Staging:  Cancer Staging   No matching staging information was found for the patient.     Molecular Testing:      Previous Hematologic/ Oncologic History:        Oncology History   Renal cell carcinoma of right kidney (HCC)   6/21/2023 Initial Diagnosis     Renal cell carcinoma of right kidney (HCC)      8/15/2023 -  Chemotherapy      alteplase (CATHFLO), 2 mg, Intracatheter, Every 1 Minute as needed, 7 of 9 cycles  pembrolizumab (KEYTRUDA) IVPB, 200 mg, Intravenous, Once, 7 of 9 cycles  Administration: 200 mg (8/15/2023), 200 mg (9/5/2023), 200 mg (9/26/2023), 200 mg (10/17/2023), 200 mg (11/7/2023), 200 mg (11/28/2023)        Interval History:        Previous Treatment:         Test Results:    Imaging: CT abdomen pelvis w contrast    Result Date: 3/6/2024  Narrative: CT ABDOMEN AND PELVIS WITH IV CONTRAST INDICATION: C64.1: Malignant neoplasm of right kidney, except renal pelvis D47.2: Monoclonal gammopathy. COMPARISON: Multiple prior studies, the most recent study for comparison is dated July 21, 2023. TECHNIQUE: CT examination of the abdomen and pelvis was performed. Multiplanar 2D reformatted images were created from the source data. This examination, like all CT scans performed in the [facility], was performed utilizing techniques to minimize radiation dose exposure, including the use of iterative reconstruction and automated exposure control. Radiation dose length product (DLP) for this visit: 1093 mGy-cm IV Contrast: 100 mL of iohexol (OMNIPAQUE) Enteric Contrast: Not administered. FINDINGS: ABDOMEN LOWER CHEST: No clinically significant abnormality in the visualized lower chest. LIVER/BILIARY TREE: Diffuse hepatic steatosis. 1 cm low-attenuation lesion in segment 2 likely represents a hemangioma and is unchanged in size. GALLBLADDER: No calcified gallstones. No pericholecystic inflammatory change. SPLEEN: Splenomegaly. No focal lesion. PANCREAS: Unremarkable. ADRENAL GLANDS: Unremarkable. KIDNEYS/URETERS: Status post right nephrectomy with no evidence of recurrent or residual tumor. The left kidney is normal. STOMACH AND BOWEL: Unremarkable. APPENDIX: No findings to suggest appendicitis. ABDOMINOPELVIC CAVITY: No ascites. No pneumoperitoneum. No lymphadenopathy. VESSELS: Unremarkable for patient's age. PELVIS  REPRODUCTIVE ORGANS: 2 cm right adnexal lesion likely represents a pedunculated fibroid. URINARY BLADDER: Unremarkable. ABDOMINAL WALL/INGUINAL REGIONS: Unremarkable. BONES: No acute fracture or suspicious osseous lesion. Spinal degenerative changes.Sclerosis of the left lateral sixth rib is unchanged.     Impression: Status post right nephrectomy. No evidence of recurrent or residual tumor. No metastatic disease. Workstation performed: [id number]       Labs:   Lab Results   Component Value Date    WBC 5.18 03/06/2024    HGB 12.5 03/06/2024    HCT 38.6 03/06/2024    MCV 91 03/06/2024     03/06/2024     Lab Results   Component Value Date     12/29/2015    K 4.1 03/06/2024    CL 99 03/06/2024    CO2 26 03/06/2024    ANIONGAP 7 12/29/2015    BUN 24 03/06/2024    CREATININE 1.16 03/06/2024    GLUCOSE 134 12/29/2015    GLUF 87 02/16/2024    CALCIUM 9.1 03/06/2024    CORRECTEDCA 9.2 08/21/2023    AST 35 03/06/2024    ALT 33 03/06/2024    ALKPHOS 122 (H) 03/06/2024    PROT 8.4 (H) 12/29/2015    BILITOT 0.46 12/29/2015    EGFR 49 03/06/2024       Lab Results   Component Value Date    IRON 64 06/23/2023    TIBC 424 06/23/2023    FERRITIN 143 06/23/2023       Lab Results   Component Value Date    [id number] 393 06/28/2022         ROS: Review of Systems - Oncology       Current Medications: Reviewed  Allergies: Reviewed  PMH/FH/SH:  Reviewed      Physical Exam:    Body surface area is 1.93 meters squared.    Wt Readings from Last 3 Encounters:   03/08/24 76.7 kg (169 lb)   02/07/24 74.7 kg (164 lb 9.6 oz)   01/03/24 75.3 kg (166 lb)        Temp Readings from Last 3 Encounters:   03/08/24 97.7 °F (36.5 °C) (Temporal)   02/20/24 98.4 °F (36.9 °C) (Temporal)   01/30/24 98.5 °F (36.9 °C) (Temporal)        BP Readings from Last 3 Encounters:   03/08/24 130/88   02/20/24 169/94   02/07/24 128/88         Pulse Readings from Last 3 Encounters:   03/08/24 80   02/20/24 86   02/07/24 72        Physical Exam    ECOG:    Goals  and Barriers:  Current Goal: Minimize effects of disease.   Barriers: None.      Patient's Capacity to Self Care:  Patient is able to self care.    Code Status: @[facility]@

## 2024-03-12 ENCOUNTER — HOSPITAL ENCOUNTER (OUTPATIENT)
Dept: INFUSION CENTER | Facility: CLINIC | Age: 64
Discharge: HOME | End: 2024-03-12
Payer: COMMERCIAL

## 2024-03-12 VITALS
HEART RATE: 72 BPM | OXYGEN SATURATION: 97 % | DIASTOLIC BLOOD PRESSURE: 83 MMHG | TEMPERATURE: 97.2 F | SYSTOLIC BLOOD PRESSURE: 151 MMHG | RESPIRATION RATE: 18 BRPM

## 2024-03-12 DIAGNOSIS — C64.1 RENAL CELL CARCINOMA OF RIGHT KIDNEY (HCC): Primary | ICD-10-CM

## 2024-03-12 PROCEDURE — 96413 CHEMO IV INFUSION 1 HR: CPT

## 2024-03-12 RX ORDER — SODIUM CHLORIDE 9 MG/ML
20 INJECTION, SOLUTION INTRAVENOUS ONCE
Status: COMPLETED | OUTPATIENT
Start: 2024-03-12 | End: 2024-03-12

## 2024-03-12 RX ADMIN — SODIUM CHLORIDE 20 ML/HR: 0.9 INJECTION, SOLUTION INTRAVENOUS at 09:25

## 2024-03-12 RX ADMIN — SODIUM CHLORIDE 200 MG: 9 INJECTION, SOLUTION INTRAVENOUS at 09:55

## 2024-03-12 NOTE — PROGRESS NOTES
Patient to Infusion Center for Keytruda: Offers no complaints at present time: Lab work ( 03/06/24 ) reviewed: Within parameters to treat: Left FA PIV initiated without incident

## 2024-03-12 NOTE — PROGRESS NOTES
Tolerated infusion without incident: No adverse reactions noted: Verified follow up appt with patient ( 04/03/23 ): AVS given per request

## 2024-03-13 ENCOUNTER — TELEMEDICINE (OUTPATIENT)
Dept: BEHAVIORAL/MENTAL HEALTH CLINIC | Facility: CLINIC | Age: 64
End: 2024-03-13

## 2024-03-13 DIAGNOSIS — F33.0 MDD (MAJOR DEPRESSIVE DISORDER), RECURRENT EPISODE, MILD (HCC): Primary | ICD-10-CM

## 2024-03-13 DIAGNOSIS — F41.1 GAD (GENERALIZED ANXIETY DISORDER): ICD-10-CM

## 2024-03-13 DIAGNOSIS — F43.10 PTSD (POST-TRAUMATIC STRESS DISORDER): ICD-10-CM

## 2024-03-13 DIAGNOSIS — F42.9 OBSESSIVE-COMPULSIVE DISORDER, UNSPECIFIED TYPE: ICD-10-CM

## 2024-03-13 NOTE — PSYCH
Virtual Regular Visit    Verification of patient location:    Patient is located at Home in the following state in which I hold an active license [place]      Assessment/Plan:    Problem List Items Addressed This Visit     MDD (major depressive disorder), recurrent episode, mild (HCC) - Primary    JOSEFINA (generalized anxiety disorder)    OCD (obsessive compulsive disorder)    PTSD (post-traumatic stress disorder)       Goals addressed in session: Goal 1 and Goal 2          Reason for visit is   Chief Complaint   Patient presents with   • Virtual Regular Visit          Encounter provider Ethel Christian    Provider located at [facility] [place]  [facility] [place]  [address]  [place] [address] [phone]      Recent Visits  No visits were found meeting these conditions.  Showing recent visits within past 7 days and meeting all other requirements  Today's Visits  Date Type Provider Dept   03/13/24 Telemedicine Ethel Christian  [facility] [place]   Showing today's visits and meeting all other requirements  Future Appointments  No visits were found meeting these conditions.  Showing future appointments within next 150 days and meeting all other requirements       The patient was identified by name and date of birth. Courtney Davalos was informed that this is a telemedicine visit and that the visit is being conducted throughthe Epic Embedded platform. She agrees to proceed..  My office door was closed. No one else was in the room.  She acknowledged consent and understanding of privacy and security of the video platform. The patient has agreed to participate and understands they can discontinue the visit at any time.    Patient is aware this is a billable service.     Subjective  Courtney Davalos is a 63 y.o. female  .      HPI     Past Medical History:   Diagnosis Date   • Abnormal breast exam    • Anemia    • Anxiety    • Arthritis    • Asthma      childhood   • 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HCC)     primary per allscripts   • Cancer (HCC)     IgG kappa smoldering multiple myeloma   • Cardiac murmur    • Chronic liver disease     resolved 12/04/2017   • COVID    • Depression    • Endometriosis    • Fracture of tibia     right tibial plateau fracture per allscripts   • Heart murmur    • Hepatic disease    • Hypertension     last assessed 12/13/2017   • Inflammatory bowel disease    • Kidney stone    • Multiple myeloma (HCC)    • Neuropathy     R foot    • Nosebleed    • OCD (obsessive compulsive disorder)    • Osteoporosis    • Otitis media    • Pneumonia    • RSD (reflex sympathetic dystrophy) 12/11/2018   • Scoliosis    • Seasonal allergies    • Urinary tract infection    • Visual impairment    • Wears eyeglasses    • Whiplash injury to neck        Past Surgical History:   Procedure Laterality Date   • BACK SURGERY      hemilaminectomy and discectomy, L5-S1, right (Dr. Rashid, [facility] at [facility]) onset 02/14/2005 per allscripts   • COLONOSCOPY     • EXPLORATION EXTREMITY Right 08/29/2016    Procedure: KNEE PERONEAL NERVE EXPLORATION AND RELEASE ;  Surgeon: Bry De Guzman MD;  Location: [facility];  Service:    • FOOT SURGERY     • FRACTURE SURGERY     • HAND SURGERY     • HERNIA REPAIR     • IR BIOPSY KIDNEY MASS  05/25/2023   • JOINT REPLACEMENT Right     RTK   • KNEE SURGERY     • MANDIBLE SURGERY     • NEPHRECTOMY LAPAROSCOPIC Right 6/21/2023    Procedure: NEPHRECTOMY LAPAROSCOPIC ASSISTED;  Surgeon: Avinash Sterling MD;  Location: [facility];  Service: Urology   • NEUROPLASTY / TRANSPOSITION MEDIAN NERVE AT CARPAL TUNNEL     • ORIF TIBIAL PLATEAU Right     arthroplasty per allscripts   • OTHER SURGICAL HISTORY      injection of trigger point(s) per allscripts   • AL ARTHRP KNE CONDYLE&PLATU MEDIAL&LAT COMPARTMENTS Right 06/11/2018    Procedure: ARTHROPLASTY KNEE TOTAL;  Surgeon: Bry De Guzman MD;  Location: [facility];  Service: Orthopedics   • AL TENDON SHEATH  INCISION Right 12/02/2020    Procedure: RELEASE TRIGGER FINGER-right long;  Surgeon: Todd Mcdonald MD;  Location: [facility];  Service: Orthopedics   • SINUS SURGERY     • SPINE SURGERY     • TONSILLECTOMY         Current Outpatient Medications   Medication Sig Dispense Refill   • albuterol (Proventil HFA) 90 mcg/act inhaler Inhale 2 puffs every 6 (six) hours as needed for wheezing 6.7 g 0   • Cholecalciferol (Vitamin D3) 50 MCG (2000 UT) TABS Take 1 tablet (2,000 Units total) by mouth daily 90 tablet 3   • estradiol (VAGIFEM, YUVAFEM) 10 MCG TABS vaginal tablet INSERT 1 TABLET INTO THE VAGINA 2 TIMES A WEEK. 24 tablet 1   • fenofibrate (FENOGLIDE) 120 MG TABS Take 120 mg by mouth daily     • FLUoxetine (PROzac) 40 MG capsule Take 1 capsule (40 mg total) by mouth daily 90 capsule 1   • lidocaine (LIDODERM) 5 % lidocaine 5 % topical patch   APPLY 1 PATCH TO AFFECTED AREA FOR 12 HOURS A DAY & REMOVE FOR 12 HOURS BEFORE APPLYING NEXT PATCH. (12 HOURS ON, 12 HOURS OFF)     • lisinopril (ZESTRIL) 20 mg tablet Take 1 tablet (20 mg total) by mouth daily 90 tablet 1   • promethazine-dextromethorphan (PHENERGAN-DM) 6.25-15 mg/5 mL oral syrup Take 5 mL by mouth 4 (four) times a day as needed for cough 240 mL 0   • traMADol (Ultram) 50 mg tablet Take 1 tablet (50 mg total) by mouth every 8 (eight) hours as needed for moderate pain 30 tablet 0   • traZODone (DESYREL) 50 mg tablet Take 0.5-1 tablets (25-50 mg total) by mouth daily at bedtime as needed for sleep 90 tablet 1   • triamcinolone (KENALOG) 0.1 % ointment Apply topically 2 (two) times a day 454 g 1   • ursodiol (ACTIGALL) 300 mg capsule TAKE 1 CAPSULE BY MOUTH THREE TIMES A  capsule 2   • valACYclovir (VALTREX) 500 mg tablet Take 2 tablets (1,000 mg total) by mouth daily for 14 days 28 tablet 0     No current facility-administered medications for this visit.     Facility-Administered Medications Ordered in Other Visits   Medication Dose Route Frequency  Provider Last Rate Last Admin   • alteplase (CATHFLO) injection 2 mg  2 mg Intracatheter Q1MIN PRN Gagan Quintero MD            Allergies   Allergen Reactions   • Codeine GI Intolerance and Nausea Only     Other reaction(s): Other (See Comments)  Violently ill  Violently ill   • Latex Rash     itching   • Morphine And Related Nausea Only     GI intolerance nausea   • Nortriptyline Shortness Of Breath   • Ocaliva [Obeticholic Acid] Hives   • Doxycycline GI Intolerance and Nausea Only   • Sulfa Antibiotics GI Intolerance   • Cymbalta [Duloxetine Hcl]    • Penicillins Other (See Comments) and Rash     Childhood reaction       Review of Systems    Video Exam    There were no vitals filed for this visit.    Physical Exam     Behavioral Health Psychotherapy Progress Note    Psychotherapy Provided: Individual Psychotherapy     1. MDD (major depressive disorder), recurrent episode, mild (HCC)        2. JOSEFINA (generalized anxiety disorder)        3. Obsessive-compulsive disorder, unspecified type        4. PTSD (post-traumatic stress disorder)            Goals addressed in session: Goal 1 and Goal 2     DATA: Clinician met with Courtney via telehealth for individual therapy.  Courtney processed attempts at communication with paramour about a conflict that they had that was not resolved.  Clinician explored what went well and what did not and feedback on ways to improve communication and express needs. Clinician checked in on book she has been reading and insight she has gotten on family dynamics.  During this session, this clinician used the following therapeutic modalities: Client-centered Therapy and Supportive Psychotherapy    Substance Abuse was not addressed during this session. If the client is diagnosed with a co-occurring substance use disorder, please indicate any changes in the frequency or amount of use: n/a. Stage of change for addressing substance use diagnoses: No substance use/Not applicable    ASSESSMENT:  Courtney BERMUDEZ  "Susannah presents with a Euthymic/ normal mood.     her affect is Normal range and intensity, which is congruent, with her mood and the content of the session. The client has made progress on their goals.    Courtney was open and engaged in the session. Courtney Davalos presents with a none risk of suicide, none risk of self-harm, and none risk of harm to others.    For any risk assessment that surpasses a \"low\" rating, a safety plan must be developed.    A safety plan was indicated: no  If yes, describe in detail n/a    PLAN: Between sessions, Courtney Davalos will utilize assertive communication. At the next session, the therapist will use Client-centered Therapy and Supportive Psychotherapy to address anxiety.    Behavioral Health Treatment Plan and Discharge Planning: Courtney Davalos is aware of and agrees to continue to work on their treatment plan. They have identified and are working toward their discharge goals. yes    Visit start and stop times:    03/13/24  Start Time: 0901  Stop Time: 0957  Total Visit Time: 56 minutes        "

## 2024-03-15 ENCOUNTER — APPOINTMENT (OUTPATIENT)
Dept: RADIOLOGY | Facility: MEDICAL CENTER | Age: 64
End: 2024-03-15
Payer: COMMERCIAL

## 2024-03-15 ENCOUNTER — OFFICE VISIT (OUTPATIENT)
Dept: OBGYN CLINIC | Facility: MEDICAL CENTER | Age: 64
End: 2024-03-15
Payer: COMMERCIAL

## 2024-03-15 VITALS
WEIGHT: 169 LBS | BODY MASS INDEX: 25.03 KG/M2 | HEIGHT: 69 IN | SYSTOLIC BLOOD PRESSURE: 133 MMHG | HEART RATE: 80 BPM | DIASTOLIC BLOOD PRESSURE: 81 MMHG

## 2024-03-15 DIAGNOSIS — M25.561 RIGHT KNEE PAIN, UNSPECIFIED CHRONICITY: ICD-10-CM

## 2024-03-15 DIAGNOSIS — M25.561 RIGHT KNEE PAIN, UNSPECIFIED CHRONICITY: Primary | ICD-10-CM

## 2024-03-15 PROCEDURE — 73560 X-RAY EXAM OF KNEE 1 OR 2: CPT

## 2024-03-15 PROCEDURE — 99203 OFFICE O/P NEW LOW 30 MIN: CPT | Performed by: ORTHOPAEDIC SURGERY

## 2024-03-15 NOTE — PROGRESS NOTES
Assessment:  1. Right knee pain, unspecified chronicity  XR knee 1 or 2 vw right          Plan:  History of right total knee arthroplasty in 2017  Current symptoms of generalized right knee pain with instances of instability  Chronic right LE hypersensitivity  Radiograph displays a well positioned right total knee arthroplasty with no acute changes or worrisome issues  Exam demonstrates a stable knee with good ROM  Patient encouraged to remain active  Patient declines formal physical therapy yet will do her HEP learned in past  Patient can look up right knee and hip exercises online or youtube  Piriformis and gluteus stretch demonstrated in office  The patient can follow up as needed and is welcome to return at any point with any new or old issue.      To do next visit:  Return if symptoms worsen or fail to improve.    The above stated was discussed in layman's terms and the patient expressed understanding.  All questions were answered to the patient's satisfaction.       Scribe Attestation      I,:  Ceferino Murray am acting as a scribe while in the presence of the attending physician.:       I,:  Bry De Guzman MD personally performed the services described in this documentation    as scribed in my presence.:               Subjective:   Courtney Davalos is a 63 y.o. female who presents for follow up of right knee and hip with history of right total knee arthroplasty in 2017.  Today she complains of right generalized knee pain with instances of instability and right lateral hip pain.  Prolonged walking aggravates while rest alleviates.        Review of systems negative unless otherwise specified in HPI    Past Medical History:   Diagnosis Date    Abnormal breast exam     Anemia     Anxiety     Arthritis     Asthma     childhood    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HCC)     primary per allscripts    Cancer (HCC)     IgG kappa smoldering multiple myeloma    Cardiac murmur     Chronic liver disease     resolved 12/04/2017     COVID     Depression     Endometriosis     Fracture of tibia     right tibial plateau fracture per allscripts    Heart murmur     Hepatic disease     Hypertension     last assessed 12/13/2017    Inflammatory bowel disease     Kidney stone     Multiple myeloma (HCC)     Neuropathy     R foot     Nosebleed     OCD (obsessive compulsive disorder)     Osteoporosis     Otitis media     Pneumonia     RSD (reflex sympathetic dystrophy) 12/11/2018    Scoliosis     Seasonal allergies     Urinary tract infection     Visual impairment     Wears eyeglasses     Whiplash injury to neck        Past Surgical History:   Procedure Laterality Date    BACK SURGERY      hemilaminectomy and discectomy, L5-S1, right (Dr. Rashid, [facility] at [facility]) onset 02/14/2005 per allscripts    COLONOSCOPY      EXPLORATION EXTREMITY Right 08/29/2016    Procedure: KNEE PERONEAL NERVE EXPLORATION AND RELEASE ;  Surgeon: Bry De Guzman MD;  [facility];  Service:     FOOT SURGERY      FRACTURE SURGERY      HAND SURGERY      HERNIA REPAIR      IR BIOPSY KIDNEY MASS  05/25/2023    JOINT REPLACEMENT Right     RTK    KNEE SURGERY      MANDIBLE SURGERY      NEPHRECTOMY LAPAROSCOPIC Right 6/21/2023    Procedure: NEPHRECTOMY LAPAROSCOPIC ASSISTED;  Surgeon: Avinash Sterling MD;  [facility];  Service: Urology    NEUROPLASTY / TRANSPOSITION MEDIAN NERVE AT CARPAL TUNNEL      ORIF TIBIAL PLATEAU Right     arthroplasty per allscripts    OTHER SURGICAL HISTORY      injection of trigger point(s) per allscripts    MD ARTHRP KNE CONDYLE&PLATU MEDIAL&LAT COMPARTMENTS Right 06/11/2018    Procedure: ARTHROPLASTY KNEE TOTAL;  Surgeon: Bry De Guzman MD;  [facility];  Service: Orthopedics    MD TENDON SHEATH INCISION Right 12/02/2020    Procedure: RELEASE TRIGGER FINGER-right long;  Surgeon: Todd Mcdonald MD;  [facility];  Service: Orthopedics    SINUS SURGERY      SPINE SURGERY      TONSILLECTOMY         Family History   Problem  Relation Age of Onset    Heart failure Mother     Anxiety disorder Mother         symptom per allscripts    Depression Mother     Vision loss Mother     Anxiety disorder Father         symptom per allscripts    Cirrhosis Father         hepatic per allscripts    Alcohol abuse Father     Stomach cancer Maternal Grandmother     Cancer Maternal Grandmother     Stomach cancer Maternal Grandfather     Cancer Maternal Grandfather     Diabetes Paternal Grandmother     No Known Problems Paternal Grandfather     No Known Problems Paternal Aunt     No Known Problems Paternal Aunt     Anxiety disorder Other         symptom per allscripts    Coronary artery disease Other     Depression Other     Hyperlipidemia Other     Osteoarthritis Other     Other Other         back disorder per allscripts    Neuropathy Other        Social History     Occupational History    Occupation:     Occupation: [employer]   Tobacco Use    Smoking status: Never    Smokeless tobacco: Never   Vaping Use    Vaping status: Never Used   Substance and Sexual Activity    Alcohol use: Not Currently    Drug use: No    Sexual activity: Yes     Partners: Male     Birth control/protection: Post-menopausal         Current Outpatient Medications:     albuterol (Proventil HFA) 90 mcg/act inhaler, Inhale 2 puffs every 6 (six) hours as needed for wheezing, Disp: 6.7 g, Rfl: 0    Cholecalciferol (Vitamin D3) 50 MCG (2000 UT) TABS, Take 1 tablet (2,000 Units total) by mouth daily, Disp: 90 tablet, Rfl: 3    estradiol (VAGIFEM, YUVAFEM) 10 MCG TABS vaginal tablet, INSERT 1 TABLET INTO THE VAGINA 2 TIMES A WEEK., Disp: 24 tablet, Rfl: 1    fenofibrate (FENOGLIDE) 120 MG TABS, Take 120 mg by mouth daily, Disp: , Rfl:     FLUoxetine (PROzac) 40 MG capsule, Take 1 capsule (40 mg total) by mouth daily, Disp: 90 capsule, Rfl: 1    lidocaine (LIDODERM) 5 %, lidocaine 5 % topical patch  APPLY 1 PATCH TO AFFECTED AREA FOR 12 HOURS A DAY & REMOVE FOR 12 HOURS  BEFORE APPLYING NEXT PATCH. (12 HOURS ON, 12 HOURS OFF), Disp: , Rfl:     lisinopril (ZESTRIL) 20 mg tablet, Take 1 tablet (20 mg total) by mouth daily, Disp: 90 tablet, Rfl: 1    promethazine-dextromethorphan (PHENERGAN-DM) 6.25-15 mg/5 mL oral syrup, Take 5 mL by mouth 4 (four) times a day as needed for cough, Disp: 240 mL, Rfl: 0    traMADol (Ultram) 50 mg tablet, Take 1 tablet (50 mg total) by mouth every 8 (eight) hours as needed for moderate pain, Disp: 30 tablet, Rfl: 0    traZODone (DESYREL) 50 mg tablet, Take 0.5-1 tablets (25-50 mg total) by mouth daily at bedtime as needed for sleep, Disp: 90 tablet, Rfl: 1    triamcinolone (KENALOG) 0.1 % ointment, Apply topically 2 (two) times a day, Disp: 454 g, Rfl: 1    ursodiol (ACTIGALL) 300 mg capsule, TAKE 1 CAPSULE BY MOUTH THREE TIMES A DAY, Disp: 270 capsule, Rfl: 2    valACYclovir (VALTREX) 500 mg tablet, Take 2 tablets (1,000 mg total) by mouth daily for 14 days, Disp: 28 tablet, Rfl: 0  No current facility-administered medications for this visit.    Allergies   Allergen Reactions    Codeine GI Intolerance and Nausea Only     Other reaction(s): Other (See Comments)  Violently ill  Violently ill    Latex Rash     itching    Morphine And Related Nausea Only     GI intolerance nausea    Nortriptyline Shortness Of Breath    Ocaliva [Obeticholic Acid] Hives    Doxycycline GI Intolerance and Nausea Only    Sulfa Antibiotics GI Intolerance    Cymbalta [Duloxetine Hcl]     Penicillins Other (See Comments) and Rash     Childhood reaction            Vitals:    03/15/24 1036   BP: 133/81   Pulse: 80       Objective:  Physical exam  General: Awake, Alert, Oriented  Eyes: Pupils equal, round and reactive to light  Heart: regular rate and rhythm  Lungs: No audible wheezing  Abdomen: soft                    Ortho Exam  Right knee:  Hypersensitivity over right LE  Well healed anterior incisions  No erythema and no ecchymosis  Stable to varus and valgus stress  Extensor  "mechanism intact  Extension 0  Flexion 120  Calf compartments soft and supple  Sensation intact  Toes are warm sensate and mobile      Diagnostics, reviewed and taken today if performed as documented:    The attending physician has personally reviewed the pertinent films in PACS and interpretation is as follows:  Right knee x-ray:  Well positioned prosthesis with no acute changes.     Procedures, if performed today:    Procedures    None performed      Portions of the record may have been created with voice recognition software.  Occasional wrong word or \"sound a like\" substitutions may have occurred due to the inherent limitations of voice recognition software.  Read the chart carefully and recognize, using context, where substitutions have occurred.    "

## 2024-03-18 ENCOUNTER — OFFICE VISIT (OUTPATIENT)
Dept: UROLOGY | Facility: AMBULATORY SURGERY CENTER | Age: 64
End: 2024-03-18
Payer: COMMERCIAL

## 2024-03-18 VITALS
BODY MASS INDEX: 24.66 KG/M2 | OXYGEN SATURATION: 96 % | DIASTOLIC BLOOD PRESSURE: 82 MMHG | WEIGHT: 167 LBS | HEART RATE: 81 BPM | SYSTOLIC BLOOD PRESSURE: 128 MMHG

## 2024-03-18 DIAGNOSIS — C64.1 MALIGNANT NEOPLASM OF RIGHT KIDNEY (HCC): Primary | ICD-10-CM

## 2024-03-18 PROCEDURE — 99213 OFFICE O/P EST LOW 20 MIN: CPT | Performed by: UROLOGY

## 2024-03-18 NOTE — PROGRESS NOTES
3/18/2024    Courtney Davalos  1960  [id number]        Assessment  High-grade, stage III renal cell carcinoma with invasion of renal sinus and vessels,  S/p laparoscopic right nephrectomy on 6/21/2023     Plan  Reviewed her history and progress.  Discussed her adjuvant Keytruda course and CT surveillance.  Will check another scan chest/abdomen/pelvis in 6 months due to high risk.  If normal, annually thereafter for total 5 years.          History of Present Illness  Corutney is a 63 y.o. female with above history. She is currently receiving 1 year adjuvant Keytruda therapy. She developed a rash but continues with the treatment.  She has no other complaints.  Renal function stable, GFR around 60.  CT abdomen/pelvis does not show evidence of disease.            Review of Systems  Review of Systems   Constitutional: Negative.    HENT: Negative.     Respiratory: Negative.     Cardiovascular: Negative.    Gastrointestinal: Negative.    Genitourinary:         As per HPI   Musculoskeletal: Negative.    Skin: Negative.    Neurological: Negative.    Hematological: Negative.          Past Medical History  Past Medical History:   Diagnosis Date    Abnormal breast exam     Anemia     Anxiety     Arthritis     Asthma     childhood    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HCC)     primary per allscripts    Cancer (HCC)     IgG kappa smoldering multiple myeloma    Cardiac murmur     Chronic liver disease     resolved 12/04/2017    COVID     Depression     Endometriosis     Fracture of tibia     right tibial plateau fracture per allscripts    Heart murmur     Hepatic disease     Hypertension     last assessed 12/13/2017    Inflammatory bowel disease     Kidney stone     Multiple myeloma (HCC)     Neuropathy     R foot     Nosebleed     OCD (obsessive compulsive disorder)     Osteoporosis     Otitis media     Pneumonia     RSD (reflex sympathetic dystrophy) 12/11/2018    Scoliosis     Seasonal allergies     Urinary tract infection     Visual  impairment     Wears eyeglasses     Whiplash injury to neck        Past Social History  Past Surgical History:   Procedure Laterality Date    BACK SURGERY      hemilaminectomy and discectomy, L5-S1, right (Dr. Rashid, [facility] at [facility]) onset 02/14/2005 per allscripts    COLONOSCOPY      EXPLORATION EXTREMITY Right 08/29/2016    Procedure: KNEE PERONEAL NERVE EXPLORATION AND RELEASE ;  Surgeon: Bry De Guzman MD;  [facility];  Service:     FOOT SURGERY      FRACTURE SURGERY      HAND SURGERY      HERNIA REPAIR      IR BIOPSY KIDNEY MASS  05/25/2023    JOINT REPLACEMENT Right     RTK    KNEE SURGERY      MANDIBLE SURGERY      NEPHRECTOMY LAPAROSCOPIC Right 6/21/2023    Procedure: NEPHRECTOMY LAPAROSCOPIC ASSISTED;  Surgeon: Avinash Sterling MD;  [facility];  Service: Urology    NEUROPLASTY / TRANSPOSITION MEDIAN NERVE AT CARPAL TUNNEL      ORIF TIBIAL PLATEAU Right     arthroplasty per allscripts    OTHER SURGICAL HISTORY      injection of trigger point(s) per allscripts    IA ARTHRP KNE CONDYLE&PLATU MEDIAL&LAT COMPARTMENTS Right 06/11/2018    Procedure: ARTHROPLASTY KNEE TOTAL;  Surgeon: Bry De Guzman MD;  [facility];  Service: Orthopedics    IA TENDON SHEATH INCISION Right 12/02/2020    Procedure: RELEASE TRIGGER FINGER-right long;  Surgeon: Todd Mcdonald MD;  [facility];  Service: Orthopedics    SINUS SURGERY      SPINE SURGERY      TONSILLECTOMY         Past Family History  Family History   Problem Relation Age of Onset    Heart failure Mother     Anxiety disorder Mother         symptom per allscripts    Depression Mother     Vision loss Mother     Anxiety disorder Father         symptom per allscripts    Cirrhosis Father         hepatic per allscripts    Alcohol abuse Father     Stomach cancer Maternal Grandmother     Cancer Maternal Grandmother     Stomach cancer Maternal Grandfather     Cancer Maternal Grandfather     Diabetes Paternal Grandmother     No Known  Problems Paternal Grandfather     No Known Problems Paternal Aunt     No Known Problems Paternal Aunt     Anxiety disorder Other         symptom per allscripts    Coronary artery disease Other     Depression Other     Hyperlipidemia Other     Osteoarthritis Other     Other Other         back disorder per allscripts    Neuropathy Other        Past Social history  Social History     Socioeconomic History    Marital status: Single     Spouse name: Not on file    Number of children: Not on file    Years of education: completed college    Highest education level: Not on file   Occupational History    Occupation:     Occupation:    Tobacco Use    Smoking status: Never    Smokeless tobacco: Never   Vaping Use    Vaping status: Never Used   Substance and Sexual Activity    Alcohol use: Not Currently    Drug use: No    Sexual activity: Yes     Partners: Male     Birth control/protection: Post-menopausal   Other Topics Concern    Not on file   Social History Narrative    Does not participate in activities outside of the home; participates in moderate activities inside the home    Lives with mother     Social Determinants of Health     Financial Resource Strain: Low Risk  (2/28/2023)    Overall Financial Resource Strain (CARDIA)     Difficulty of Paying Living Expenses: Not hard at all   Food Insecurity: No Food Insecurity (6/22/2023)    Hunger Vital Sign     Worried About Running Out of Food in the Last Year: Never true     Ran Out of Food in the Last Year: Never true   Transportation Needs: No Transportation Needs (6/22/2023)    PRAPARE - Transportation     Lack of Transportation (Medical): No     Lack of Transportation (Non-Medical): No   Physical Activity: Not on file   Stress: Not on file   Social Connections: Not on file   Intimate Partner Violence: Not on file   Housing Stability: Low Risk  (6/22/2023)    Housing Stability Vital Sign     Unable to Pay for Housing in the Last Year: No      Number of Places Lived in the Last Year: 1     Unstable Housing in the Last Year: No     Social History     Tobacco Use   Smoking Status Never   Smokeless Tobacco Never       Current Medications  Current Outpatient Medications   Medication Sig Dispense Refill    albuterol (Proventil HFA) 90 mcg/act inhaler Inhale 2 puffs every 6 (six) hours as needed for wheezing 6.7 g 0    Cholecalciferol (Vitamin D3) 50 MCG (2000 UT) TABS Take 1 tablet (2,000 Units total) by mouth daily 90 tablet 3    estradiol (VAGIFEM, YUVAFEM) 10 MCG TABS vaginal tablet INSERT 1 TABLET INTO THE VAGINA 2 TIMES A WEEK. 24 tablet 1    fenofibrate (FENOGLIDE) 120 MG TABS Take 120 mg by mouth daily      FLUoxetine (PROzac) 40 MG capsule Take 1 capsule (40 mg total) by mouth daily 90 capsule 1    lidocaine (LIDODERM) 5 % lidocaine 5 % topical patch   APPLY 1 PATCH TO AFFECTED AREA FOR 12 HOURS A DAY & REMOVE FOR 12 HOURS BEFORE APPLYING NEXT PATCH. (12 HOURS ON, 12 HOURS OFF)      lisinopril (ZESTRIL) 20 mg tablet Take 1 tablet (20 mg total) by mouth daily 90 tablet 1    traMADol (Ultram) 50 mg tablet Take 1 tablet (50 mg total) by mouth every 8 (eight) hours as needed for moderate pain 30 tablet 0    traZODone (DESYREL) 50 mg tablet Take 0.5-1 tablets (25-50 mg total) by mouth daily at bedtime as needed for sleep 90 tablet 1    triamcinolone (KENALOG) 0.1 % ointment Apply topically 2 (two) times a day 454 g 1    ursodiol (ACTIGALL) 300 mg capsule TAKE 1 CAPSULE BY MOUTH THREE TIMES A  capsule 2    promethazine-dextromethorphan (PHENERGAN-DM) 6.25-15 mg/5 mL oral syrup Take 5 mL by mouth 4 (four) times a day as needed for cough (Patient not taking: Reported on 3/18/2024) 240 mL 0    valACYclovir (VALTREX) 500 mg tablet Take 2 tablets (1,000 mg total) by mouth daily for 14 days 28 tablet 0     No current facility-administered medications for this visit.       Allergies  Allergies   Allergen Reactions    Codeine GI Intolerance and Nausea Only     " Other reaction(s): Other (See Comments)  Violently ill  Violently ill    Latex Rash     itching    Morphine And Related Nausea Only     GI intolerance nausea    Nortriptyline Shortness Of Breath    Ocaliva [Obeticholic Acid] Hives    Doxycycline GI Intolerance and Nausea Only    Sulfa Antibiotics GI Intolerance    Cymbalta [Duloxetine Hcl]     Penicillins Other (See Comments) and Rash     Childhood reaction       Past Medical History, Social History, Family History, medications and allergies were reviewed.    Vitals  Vitals:    03/18/24 1321   BP: 128/82   BP Location: Left arm   Patient Position: Sitting   Cuff Size: Adult   Pulse: 81   SpO2: 96%   Weight: 75.8 kg (167 lb)       Physical Exam  Physical Exam  Vitals reviewed.   Constitutional:       Appearance: She is well-developed.   HENT:      Head: Normocephalic and atraumatic.   Eyes:      Conjunctiva/sclera: Conjunctivae normal.   Cardiovascular:      Rate and Rhythm: Normal rate.   Pulmonary:      Effort: Pulmonary effort is normal.   Abdominal:      Palpations: Abdomen is soft.   Musculoskeletal:         General: Normal range of motion.   Skin:     General: Skin is warm and dry.   Neurological:      Mental Status: She is alert and oriented to person, place, and time.   Psychiatric:         Mood and Affect: Mood normal.           Results  No results found for: \"PSA\"  Lab Results   Component Value Date    GLUCOSE 134 12/29/2015    CALCIUM 9.1 03/06/2024     12/29/2015    K 4.1 03/06/2024    CO2 26 03/06/2024    CL 99 03/06/2024    BUN 24 03/06/2024    CREATININE 1.16 03/06/2024     Lab Results   Component Value Date    WBC 5.18 03/06/2024    HGB 12.5 03/06/2024    HCT 38.6 03/06/2024    MCV 91 03/06/2024     03/06/2024           "

## 2024-03-22 ENCOUNTER — OFFICE VISIT (OUTPATIENT)
Dept: INTERNAL MEDICINE CLINIC | Facility: CLINIC | Age: 64
End: 2024-03-22
Payer: COMMERCIAL

## 2024-03-22 VITALS
HEART RATE: 75 BPM | WEIGHT: 168.6 LBS | OXYGEN SATURATION: 98 % | SYSTOLIC BLOOD PRESSURE: 134 MMHG | DIASTOLIC BLOOD PRESSURE: 72 MMHG | BODY MASS INDEX: 24.9 KG/M2

## 2024-03-22 DIAGNOSIS — J06.9 ACUTE URI: Primary | ICD-10-CM

## 2024-03-22 PROCEDURE — 99213 OFFICE O/P EST LOW 20 MIN: CPT | Performed by: INTERNAL MEDICINE

## 2024-03-22 PROCEDURE — G2211 COMPLEX E/M VISIT ADD ON: HCPCS | Performed by: INTERNAL MEDICINE

## 2024-03-22 RX ORDER — AZITHROMYCIN 250 MG/1
TABLET, FILM COATED ORAL DAILY
Qty: 6 TABLET | Refills: 0 | Status: SHIPPED | OUTPATIENT
Start: 2024-03-22 | End: 2024-03-27

## 2024-03-22 NOTE — PROGRESS NOTES
Name: Courtney Davalos      : 1960      MRN: [id number]  Encounter Provider: Liborio Gallagher MD  Encounter Date: 3/22/2024   Enc[facility]: [facility]    Assessment & Plan     1. Acute URI  -     azithromycin (Zithromax) 250 mg tablet; Take 2 tablets (500 mg total) by mouth daily for 1 day, THEN 1 tablet (250 mg total) daily for 4 days.    -Patient's current symptoms likely viral in etiology however given her multiple immunocompromising conditions I will provide her with a Z-Isaca.  If her symptoms fail to improve and/or get worse over the weekend she may start antibiotic therapy.  Of also encouraged her to take Mucinex for congestion as well as a saline nasal spray as needed.       Subjective      HPI  Patient presents today as an acute visit.  She complains of cough and cold symptoms over the past 3 days.  She has a history of liver cirrhosis, primary biliary cholangitis and smoldering multiple myeloma.  She reports she has developed pneumonia several times in the past the most recent of which she was hospitalized over the summer 2023.  She currently denies any fevers, chills, shortness of breath or wheezing.    All other systems negative except for pertinent findings noted in HPI.       Current Outpatient Medications on File Prior to Visit   Medication Sig   • albuterol (Proventil HFA) 90 mcg/act inhaler Inhale 2 puffs every 6 (six) hours as needed for wheezing   • Cholecalciferol (Vitamin D3) 50 MCG (2000 UT) TABS Take 1 tablet (2,000 Units total) by mouth daily   • estradiol (VAGIFEM, YUVAFEM) 10 MCG TABS vaginal tablet INSERT 1 TABLET INTO THE VAGINA 2 TIMES A WEEK.   • fenofibrate (FENOGLIDE) 120 MG TABS Take 120 mg by mouth daily   • FLUoxetine (PROzac) 40 MG capsule Take 1 capsule (40 mg total) by mouth daily   • lidocaine (LIDODERM) 5 % lidocaine 5 % topical patch   APPLY 1 PATCH TO AFFECTED AREA FOR 12 HOURS A DAY & REMOVE FOR 12 HOURS BEFORE APPLYING NEXT PATCH.  (12 HOURS ON, 12 HOURS OFF)   • lisinopril (ZESTRIL) 20 mg tablet Take 1 tablet (20 mg total) by mouth daily   • traMADol (Ultram) 50 mg tablet Take 1 tablet (50 mg total) by mouth every 8 (eight) hours as needed for moderate pain   • traZODone (DESYREL) 50 mg tablet Take 0.5-1 tablets (25-50 mg total) by mouth daily at bedtime as needed for sleep   • triamcinolone (KENALOG) 0.1 % ointment Apply topically 2 (two) times a day   • ursodiol (ACTIGALL) 300 mg capsule TAKE 1 CAPSULE BY MOUTH THREE TIMES A DAY   • promethazine-dextromethorphan (PHENERGAN-DM) 6.25-15 mg/5 mL oral syrup Take 5 mL by mouth 4 (four) times a day as needed for cough (Patient not taking: Reported on 3/18/2024)   • valACYclovir (VALTREX) 500 mg tablet Take 2 tablets (1,000 mg total) by mouth daily for 14 days       Objective     /72 (BP Location: Left arm, Patient Position: Sitting, Cuff Size: Large)   Pulse 75   Wt 76.5 kg (168 lb 9.6 oz)   LMP  (LMP Unknown)   SpO2 98%   BMI 24.90 kg/m²     BP Readings from Last 3 Encounters:   03/22/24 134/72   03/18/24 128/82   03/15/24 133/81        Wt Readings from Last 3 Encounters:   03/22/24 76.5 kg (168 lb 9.6 oz)   03/18/24 75.8 kg (167 lb)   03/15/24 76.7 kg (169 lb)       Physical Exam    General: NAD, Alert and oriented x3   HEENT: NCAT, EOMI, normal conjunctiva  Cardiovascular: RRR, normal S1 and S2, no m/r/g  Pulmonary: Normal respiratory effort, no wheezes, rales or rhonchi  GI: Soft, nontender, nondistended, normoactive bowel sounds  MSK: Normal bulk and tone  Neuro: Non-focal, ambulating without difficulty, non-antalgic gait  Extremities: No lower extremity edema  Skin: Normal skin color, no rashes     Liborio Gallagher MD

## 2024-03-22 NOTE — PATIENT INSTRUCTIONS
-You have been given a script for a Z-ben  -Use Mucinex for congestion   -Use a a saline nasal spray for sinus congestion

## 2024-03-27 ENCOUNTER — TELEMEDICINE (OUTPATIENT)
Dept: BEHAVIORAL/MENTAL HEALTH CLINIC | Facility: CLINIC | Age: 64
End: 2024-03-27

## 2024-03-27 DIAGNOSIS — F33.0 MDD (MAJOR DEPRESSIVE DISORDER), RECURRENT EPISODE, MILD (HCC): Primary | ICD-10-CM

## 2024-03-27 DIAGNOSIS — F43.10 PTSD (POST-TRAUMATIC STRESS DISORDER): ICD-10-CM

## 2024-03-27 DIAGNOSIS — F42.9 OBSESSIVE-COMPULSIVE DISORDER, UNSPECIFIED TYPE: ICD-10-CM

## 2024-03-27 DIAGNOSIS — F41.1 GAD (GENERALIZED ANXIETY DISORDER): ICD-10-CM

## 2024-03-27 RX ORDER — SODIUM CHLORIDE 9 MG/ML
20 INJECTION, SOLUTION INTRAVENOUS ONCE
OUTPATIENT
Start: 2024-04-03

## 2024-03-27 RX ORDER — SODIUM CHLORIDE 9 MG/ML
20 INJECTION, SOLUTION INTRAVENOUS ONCE
OUTPATIENT
Start: 2024-05-14

## 2024-03-27 RX ORDER — SODIUM CHLORIDE 9 MG/ML
20 INJECTION, SOLUTION INTRAVENOUS ONCE
OUTPATIENT
Start: 2024-04-23

## 2024-03-27 NOTE — PSYCH
Virtual Regular Visit    Verification of patient location:    Patient is located at Home in the following state in which I hold an active license [place]      Assessment/Plan:    Problem List Items Addressed This Visit     MDD (major depressive disorder), recurrent episode, mild (HCC) - Primary    JOSEFINA (generalized anxiety disorder)    OCD (obsessive compulsive disorder)    PTSD (post-traumatic stress disorder)       Goals addressed in session: Goal 1          Reason for visit is   Chief Complaint   Patient presents with   • Virtual Regular Visit          Encounter provider Ethel Christian    Provider located at [facility] [place]  [facility] [place]  [address]  [place] [id number] [phone]      Recent Visits  No visits were found meeting these conditions.  Showing recent visits within past 7 days and meeting all other requirements  Today's Visits  Date Type Provider Dept   03/27/24 Telemedicine [facility] [place]   Showing today's visits and meeting all other requirements  Future Appointments  No visits were found meeting these conditions.  Showing future appointments within next 150 days and meeting all other requirements       The patient was identified by name and date of birth. Courtney Davalos was informed that this is a telemedicine visit and that the visit is being conducted throughthe Epic Embedded platform. She agrees to proceed..  My office door was closed. No one else was in the room.  She acknowledged consent and understanding of privacy and security of the video platform. The patient has agreed to participate and understands they can discontinue the visit at any time.    Patient is aware this is a billable service.     Subjective  Courtney Davalos is a 63 y.o. female  .      HPI     Past Medical History:   Diagnosis Date   • Abnormal breast exam    • Anemia    • Anxiety    • Arthritis    • Asthma      childhood   • 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HCC)     primary per allscripts   • Cancer (HCC)     IgG kappa smoldering multiple myeloma   • Cardiac murmur    • Chronic liver disease     resolved 12/04/2017   • COVID    • Depression    • Endometriosis    • Fracture of tibia     right tibial plateau fracture per allscripts   • Heart murmur    • Hepatic disease    • Hypertension     last assessed 12/13/2017   • Inflammatory bowel disease    • Kidney stone    • Multiple myeloma (HCC)    • Neuropathy     R foot    • Nosebleed    • OCD (obsessive compulsive disorder)    • Osteoporosis    • Otitis media    • Pneumonia    • RSD (reflex sympathetic dystrophy) 12/11/2018   • Scoliosis    • Seasonal allergies    • Urinary tract infection    • Visual impairment    • Wears eyeglasses    • Whiplash injury to neck        Past Surgical History:   Procedure Laterality Date   • BACK SURGERY      hemilaminectomy and discectomy, L5-S1, right (Dr. Rashid, [facility] at [facility]) onset 02/14/2005 per allscripts   • COLONOSCOPY     • EXPLORATION EXTREMITY Right 08/29/2016    Procedure: KNEE PERONEAL NERVE EXPLORATION AND RELEASE ;  Surgeon: Bry De Guzman MD;  [facility];  Service:    • FOOT SURGERY     • FRACTURE SURGERY     • HAND SURGERY     • HERNIA REPAIR     • IR BIOPSY KIDNEY MASS  05/25/2023   • JOINT REPLACEMENT Right     RTK   • KNEE SURGERY     • MANDIBLE SURGERY     • NEPHRECTOMY LAPAROSCOPIC Right 6/21/2023    Procedure: NEPHRECTOMY LAPAROSCOPIC ASSISTED;  Surgeon: Avinash Sterling MD;  [facility];  Service: Urology   • NEUROPLASTY / TRANSPOSITION MEDIAN NERVE AT CARPAL TUNNEL     • ORIF TIBIAL PLATEAU Right     arthroplasty per allscripts   • OTHER SURGICAL HISTORY      injection of trigger point(s) per allscripts   • PA ARTHRP KNE CONDYLE&PLATU MEDIAL&LAT COMPARTMENTS Right 06/11/2018    Procedure: ARTHROPLASTY KNEE TOTAL;  Surgeon: Bry De Guzman MD;  [facility];  Service: Orthopedics   • PA TENDON SHEATH  INCISION Right 12/02/2020    Procedure: RELEASE TRIGGER FINGER-right long;  Surgeon: Todd Mcdonald MD;  [facility];  Service: Orthopedics   • SINUS SURGERY     • SPINE SURGERY     • TONSILLECTOMY         Current Outpatient Medications   Medication Sig Dispense Refill   • albuterol (Proventil HFA) 90 mcg/act inhaler Inhale 2 puffs every 6 (six) hours as needed for wheezing 6.7 g 0   • azithromycin (Zithromax) 250 mg tablet Take 2 tablets (500 mg total) by mouth daily for 1 day, THEN 1 tablet (250 mg total) daily for 4 days. 6 tablet 0   • Cholecalciferol (Vitamin D3) 50 MCG (2000 UT) TABS Take 1 tablet (2,000 Units total) by mouth daily 90 tablet 3   • estradiol (VAGIFEM, YUVAFEM) 10 MCG TABS vaginal tablet INSERT 1 TABLET INTO THE VAGINA 2 TIMES A WEEK. 24 tablet 1   • fenofibrate (FENOGLIDE) 120 MG TABS Take 120 mg by mouth daily     • FLUoxetine (PROzac) 40 MG capsule Take 1 capsule (40 mg total) by mouth daily 90 capsule 1   • lidocaine (LIDODERM) 5 % lidocaine 5 % topical patch   APPLY 1 PATCH TO AFFECTED AREA FOR 12 HOURS A DAY & REMOVE FOR 12 HOURS BEFORE APPLYING NEXT PATCH. (12 HOURS ON, 12 HOURS OFF)     • lisinopril (ZESTRIL) 20 mg tablet Take 1 tablet (20 mg total) by mouth daily 90 tablet 1   • promethazine-dextromethorphan (PHENERGAN-DM) 6.25-15 mg/5 mL oral syrup Take 5 mL by mouth 4 (four) times a day as needed for cough (Patient not taking: Reported on 3/18/2024) 240 mL 0   • traMADol (Ultram) 50 mg tablet Take 1 tablet (50 mg total) by mouth every 8 (eight) hours as needed for moderate pain 30 tablet 0   • traZODone (DESYREL) 50 mg tablet Take 0.5-1 tablets (25-50 mg total) by mouth daily at bedtime as needed for sleep 90 tablet 1   • triamcinolone (KENALOG) 0.1 % ointment Apply topically 2 (two) times a day 454 g 1   • ursodiol (ACTIGALL) 300 mg capsule TAKE 1 CAPSULE BY MOUTH THREE TIMES A  capsule 2   • valACYclovir (VALTREX) 500 mg tablet Take 2 tablets (1,000 mg total) by  mouth daily for 14 days 28 tablet 0     No current facility-administered medications for this visit.        Allergies   Allergen Reactions   • Codeine GI Intolerance and Nausea Only     Other reaction(s): Other (See Comments)  Violently ill  Violently ill   • Latex Rash     itching   • Morphine And Related Nausea Only     GI intolerance nausea   • Nortriptyline Shortness Of Breath   • Ocaliva [Obeticholic Acid] Hives   • Doxycycline GI Intolerance and Nausea Only   • Sulfa Antibiotics GI Intolerance   • Cymbalta [Duloxetine Hcl]    • Penicillins Other (See Comments) and Rash     Childhood reaction       Review of Systems    Video Exam    There were no vitals filed for this visit.    Physical Exam     Behavioral Health Psychotherapy Progress Note    Psychotherapy Provided: Individual Psychotherapy     1. MDD (major depressive disorder), recurrent episode, mild (HCC)        2. JOSEFINA (generalized anxiety disorder)        3. Obsessive-compulsive disorder, unspecified type        4. PTSD (post-traumatic stress disorder)            Goals addressed in session: Goal 1     DATA: Clinician met with Courtney via telehealth for individual therapy. Courtney  processed recent interactions with paramour and working increased communication and understanding. Clinician explored situations and reinforced success. Clinician discussed future communication and ways she can express her needs in healthy ways and check for understanding.  During this session, this clinician used the following therapeutic modalities: Client-centered Therapy and Solution-Focused Therapy    Substance Abuse was not addressed during this session. If the client is diagnosed with a co-occurring substance use disorder, please indicate any changes in the frequency or amount of use: n/a. Stage of change for addressing substance use diagnoses: No substance use/Not applicable    ASSESSMENT:  Courtney Davalos presents with a Euthymic/ normal mood.     her affect is Normal  "range and intensity, which is congruent, with her mood and the content of the session. The client has made progress on their goals.    Courtney was open and engaged in the session. Courtney Davalos presents with a none risk of suicide, none risk of self-harm, and none risk of harm to others.    For any risk assessment that surpasses a \"low\" rating, a safety plan must be developed.    A safety plan was indicated: no  If yes, describe in detail n/a    PLAN: Between sessions, Courtney Davalos will to work on increasing open communication with paramour. At the next session, the therapist will use Client-centered Therapy and Supportive Psychotherapy to address anxiety.    Behavioral Health Treatment Plan and Discharge Planning: Courtney Davalos is aware of and agrees to continue to work on their treatment plan. They have identified and are working toward their discharge goals. yes    Visit start and stop times:    03/27/24  Start Time: 0900  Stop Time: 0955  Total Visit Time: 55 minutes        "

## 2024-03-29 ENCOUNTER — APPOINTMENT (OUTPATIENT)
Dept: LAB | Facility: CLINIC | Age: 64
End: 2024-03-29
Payer: COMMERCIAL

## 2024-03-29 DIAGNOSIS — Z13.6 SCREENING FOR CARDIOVASCULAR CONDITION: ICD-10-CM

## 2024-03-29 DIAGNOSIS — D47.2 SMOLDERING MULTIPLE MYELOMA (SMM): ICD-10-CM

## 2024-03-29 DIAGNOSIS — R53.0 NEOPLASTIC (MALIGNANT) RELATED FATIGUE: ICD-10-CM

## 2024-03-29 DIAGNOSIS — C64.1 RENAL CELL CARCINOMA OF RIGHT KIDNEY (HCC): ICD-10-CM

## 2024-03-29 LAB
ALBUMIN SERPL BCP-MCNC: 3.9 G/DL (ref 3.5–5)
ALP SERPL-CCNC: 203 U/L (ref 34–104)
ALT SERPL W P-5'-P-CCNC: 63 U/L (ref 7–52)
ANION GAP SERPL CALCULATED.3IONS-SCNC: 6 MMOL/L (ref 4–13)
AST SERPL W P-5'-P-CCNC: 62 U/L (ref 13–39)
BASOPHILS # BLD AUTO: 0.05 THOUSANDS/ÂΜL (ref 0–0.1)
BASOPHILS NFR BLD AUTO: 1 % (ref 0–1)
BILIRUB SERPL-MCNC: 0.59 MG/DL (ref 0.2–1)
BUN SERPL-MCNC: 20 MG/DL (ref 5–25)
CALCIUM SERPL-MCNC: 9.1 MG/DL (ref 8.4–10.2)
CHLORIDE SERPL-SCNC: 99 MMOL/L (ref 96–108)
CO2 SERPL-SCNC: 30 MMOL/L (ref 21–32)
CREAT SERPL-MCNC: 1.01 MG/DL (ref 0.6–1.3)
EOSINOPHIL # BLD AUTO: 0.16 THOUSAND/ÂΜL (ref 0–0.61)
EOSINOPHIL NFR BLD AUTO: 3 % (ref 0–6)
ERYTHROCYTE [DISTWIDTH] IN BLOOD BY AUTOMATED COUNT: 12.8 % (ref 11.6–15.1)
GFR SERPL CREATININE-BSD FRML MDRD: 58 ML/MIN/1.73SQ M
GLUCOSE P FAST SERPL-MCNC: 89 MG/DL (ref 65–99)
HCT VFR BLD AUTO: 39.3 % (ref 34.8–46.1)
HGB BLD-MCNC: 12.9 G/DL (ref 11.5–15.4)
IMM GRANULOCYTES # BLD AUTO: 0.03 THOUSAND/UL (ref 0–0.2)
IMM GRANULOCYTES NFR BLD AUTO: 1 % (ref 0–2)
LYMPHOCYTES # BLD AUTO: 1.65 THOUSANDS/ÂΜL (ref 0.6–4.47)
LYMPHOCYTES NFR BLD AUTO: 32 % (ref 14–44)
MCH RBC QN AUTO: 30 PG (ref 26.8–34.3)
MCHC RBC AUTO-ENTMCNC: 32.8 G/DL (ref 31.4–37.4)
MCV RBC AUTO: 91 FL (ref 82–98)
MONOCYTES # BLD AUTO: 0.42 THOUSAND/ÂΜL (ref 0.17–1.22)
MONOCYTES NFR BLD AUTO: 8 % (ref 4–12)
NEUTROPHILS # BLD AUTO: 2.89 THOUSANDS/ÂΜL (ref 1.85–7.62)
NEUTS SEG NFR BLD AUTO: 55 % (ref 43–75)
NRBC BLD AUTO-RTO: 0 /100 WBCS
PLATELET # BLD AUTO: 217 THOUSANDS/UL (ref 149–390)
PMV BLD AUTO: 10.5 FL (ref 8.9–12.7)
POTASSIUM SERPL-SCNC: 3.9 MMOL/L (ref 3.5–5.3)
PROT SERPL-MCNC: 8.4 G/DL (ref 6.4–8.4)
RBC # BLD AUTO: 4.3 MILLION/UL (ref 3.81–5.12)
SODIUM SERPL-SCNC: 135 MMOL/L (ref 135–147)
T3FREE SERPL-MCNC: 3.16 PG/ML (ref 2.5–3.9)
TSH SERPL DL<=0.05 MIU/L-ACNC: 1.86 UIU/ML (ref 0.45–4.5)
WBC # BLD AUTO: 5.2 THOUSAND/UL (ref 4.31–10.16)

## 2024-03-29 PROCEDURE — 85025 COMPLETE CBC W/AUTO DIFF WBC: CPT

## 2024-03-29 PROCEDURE — 84443 ASSAY THYROID STIM HORMONE: CPT

## 2024-03-29 PROCEDURE — 84481 FREE ASSAY (FT-3): CPT

## 2024-03-29 PROCEDURE — 80053 COMPREHEN METABOLIC PANEL: CPT

## 2024-04-03 ENCOUNTER — HOSPITAL ENCOUNTER (OUTPATIENT)
Dept: INFUSION CENTER | Facility: CLINIC | Age: 64
Discharge: HOME | End: 2024-04-03
Payer: COMMERCIAL

## 2024-04-03 VITALS
RESPIRATION RATE: 18 BRPM | SYSTOLIC BLOOD PRESSURE: 148 MMHG | TEMPERATURE: 97.7 F | DIASTOLIC BLOOD PRESSURE: 98 MMHG | OXYGEN SATURATION: 98 % | HEART RATE: 69 BPM

## 2024-04-03 DIAGNOSIS — C64.1 RENAL CELL CARCINOMA OF RIGHT KIDNEY (HCC): Primary | ICD-10-CM

## 2024-04-03 PROCEDURE — 96413 CHEMO IV INFUSION 1 HR: CPT

## 2024-04-03 RX ORDER — SODIUM CHLORIDE 9 MG/ML
20 INJECTION, SOLUTION INTRAVENOUS ONCE
Status: COMPLETED | OUTPATIENT
Start: 2024-04-03 | End: 2024-04-03

## 2024-04-03 RX ADMIN — SODIUM CHLORIDE 200 MG: 9 INJECTION, SOLUTION INTRAVENOUS at 15:57

## 2024-04-03 RX ADMIN — SODIUM CHLORIDE 20 ML/HR: 0.9 INJECTION, SOLUTION INTRAVENOUS at 15:55

## 2024-04-03 NOTE — PROGRESS NOTES
Patient to infusion for Keytruda. States she still has rash but improved.  Labs reviewed from 3/29/24.  She tolerated treatment today.  Next appointment 4/23 8am, she declined AVS

## 2024-04-09 ENCOUNTER — TELEPHONE (OUTPATIENT)
Dept: PSYCHIATRY | Facility: CLINIC | Age: 64
End: 2024-04-09

## 2024-04-09 NOTE — TELEPHONE ENCOUNTER
Patient left message in My Chart requesting to cancel appointment with Ethel Christian on 4/10/24. No reason was given.

## 2024-04-18 ENCOUNTER — APPOINTMENT (OUTPATIENT)
Dept: LAB | Facility: CLINIC | Age: 64
End: 2024-04-18
Payer: COMMERCIAL

## 2024-04-18 DIAGNOSIS — C64.1 RENAL CELL CARCINOMA OF RIGHT KIDNEY (HCC): ICD-10-CM

## 2024-04-18 DIAGNOSIS — D47.2 SMOLDERING MULTIPLE MYELOMA (SMM): ICD-10-CM

## 2024-04-18 DIAGNOSIS — R53.0 NEOPLASTIC (MALIGNANT) RELATED FATIGUE: ICD-10-CM

## 2024-04-18 LAB
ALBUMIN SERPL BCP-MCNC: 3.9 G/DL (ref 3.5–5)
ALP SERPL-CCNC: 170 U/L (ref 34–104)
ALT SERPL W P-5'-P-CCNC: 35 U/L (ref 7–52)
ANION GAP SERPL CALCULATED.3IONS-SCNC: 7 MMOL/L (ref 4–13)
AST SERPL W P-5'-P-CCNC: 39 U/L (ref 13–39)
BASOPHILS # BLD AUTO: 0.04 THOUSANDS/ÂΜL (ref 0–0.1)
BASOPHILS NFR BLD AUTO: 1 % (ref 0–1)
BILIRUB SERPL-MCNC: 0.62 MG/DL (ref 0.2–1)
BUN SERPL-MCNC: 22 MG/DL (ref 5–25)
CALCIUM SERPL-MCNC: 9.2 MG/DL (ref 8.4–10.2)
CHLORIDE SERPL-SCNC: 100 MMOL/L (ref 96–108)
CO2 SERPL-SCNC: 28 MMOL/L (ref 21–32)
CREAT SERPL-MCNC: 1.14 MG/DL (ref 0.6–1.3)
EOSINOPHIL # BLD AUTO: 0.2 THOUSAND/ÂΜL (ref 0–0.61)
EOSINOPHIL NFR BLD AUTO: 4 % (ref 0–6)
ERYTHROCYTE [DISTWIDTH] IN BLOOD BY AUTOMATED COUNT: 13.2 % (ref 11.6–15.1)
GFR SERPL CREATININE-BSD FRML MDRD: 50 ML/MIN/1.73SQ M
GLUCOSE P FAST SERPL-MCNC: 84 MG/DL (ref 65–99)
HCT VFR BLD AUTO: 39.3 % (ref 34.8–46.1)
HGB BLD-MCNC: 12.6 G/DL (ref 11.5–15.4)
IMM GRANULOCYTES # BLD AUTO: 0.03 THOUSAND/UL (ref 0–0.2)
IMM GRANULOCYTES NFR BLD AUTO: 1 % (ref 0–2)
LYMPHOCYTES # BLD AUTO: 1.5 THOUSANDS/ÂΜL (ref 0.6–4.47)
LYMPHOCYTES NFR BLD AUTO: 31 % (ref 14–44)
MCH RBC QN AUTO: 29.9 PG (ref 26.8–34.3)
MCHC RBC AUTO-ENTMCNC: 32.1 G/DL (ref 31.4–37.4)
MCV RBC AUTO: 93 FL (ref 82–98)
MONOCYTES # BLD AUTO: 0.33 THOUSAND/ÂΜL (ref 0.17–1.22)
MONOCYTES NFR BLD AUTO: 7 % (ref 4–12)
NEUTROPHILS # BLD AUTO: 2.68 THOUSANDS/ÂΜL (ref 1.85–7.62)
NEUTS SEG NFR BLD AUTO: 56 % (ref 43–75)
NRBC BLD AUTO-RTO: 0 /100 WBCS
PLATELET # BLD AUTO: 226 THOUSANDS/UL (ref 149–390)
PMV BLD AUTO: 10.9 FL (ref 8.9–12.7)
POTASSIUM SERPL-SCNC: 4.3 MMOL/L (ref 3.5–5.3)
PROT SERPL-MCNC: 8.5 G/DL (ref 6.4–8.4)
RBC # BLD AUTO: 4.21 MILLION/UL (ref 3.81–5.12)
SODIUM SERPL-SCNC: 135 MMOL/L (ref 135–147)
T3FREE SERPL-MCNC: 2.68 PG/ML (ref 2.5–3.9)
TSH SERPL DL<=0.05 MIU/L-ACNC: 1.97 UIU/ML (ref 0.45–4.5)
WBC # BLD AUTO: 4.78 THOUSAND/UL (ref 4.31–10.16)

## 2024-04-18 PROCEDURE — 85025 COMPLETE CBC W/AUTO DIFF WBC: CPT

## 2024-04-18 PROCEDURE — 80053 COMPREHEN METABOLIC PANEL: CPT

## 2024-04-18 PROCEDURE — 36415 COLL VENOUS BLD VENIPUNCTURE: CPT

## 2024-04-18 PROCEDURE — 84443 ASSAY THYROID STIM HORMONE: CPT

## 2024-04-18 PROCEDURE — 84481 FREE ASSAY (FT-3): CPT

## 2024-04-23 ENCOUNTER — HOSPITAL ENCOUNTER (OUTPATIENT)
Dept: INFUSION CENTER | Facility: CLINIC | Age: 64
Discharge: HOME | End: 2024-04-23
Payer: COMMERCIAL

## 2024-04-23 VITALS
SYSTOLIC BLOOD PRESSURE: 133 MMHG | DIASTOLIC BLOOD PRESSURE: 80 MMHG | OXYGEN SATURATION: 98 % | TEMPERATURE: 98.4 F | RESPIRATION RATE: 18 BRPM | HEART RATE: 75 BPM

## 2024-04-23 DIAGNOSIS — C64.1 RENAL CELL CARCINOMA OF RIGHT KIDNEY (HCC): Primary | ICD-10-CM

## 2024-04-23 PROCEDURE — 96413 CHEMO IV INFUSION 1 HR: CPT

## 2024-04-23 RX ORDER — SODIUM CHLORIDE 9 MG/ML
20 INJECTION, SOLUTION INTRAVENOUS ONCE
Status: COMPLETED | OUTPATIENT
Start: 2024-04-23 | End: 2024-04-23

## 2024-04-23 RX ADMIN — SODIUM CHLORIDE 20 ML/HR: 0.9 INJECTION, SOLUTION INTRAVENOUS at 08:20

## 2024-04-23 RX ADMIN — SODIUM CHLORIDE 200 MG: 9 INJECTION, SOLUTION INTRAVENOUS at 08:39

## 2024-04-23 NOTE — PROGRESS NOTES
Patient to infusion for keytruda.  She offers no complaints.  Labs reviewed from 4/18.  Call bell within reach.

## 2024-04-24 ENCOUNTER — TELEMEDICINE (OUTPATIENT)
Dept: BEHAVIORAL/MENTAL HEALTH CLINIC | Facility: CLINIC | Age: 64
End: 2024-04-24
Payer: COMMERCIAL

## 2024-04-24 DIAGNOSIS — F41.1 GAD (GENERALIZED ANXIETY DISORDER): ICD-10-CM

## 2024-04-24 DIAGNOSIS — F33.0 MDD (MAJOR DEPRESSIVE DISORDER), RECURRENT EPISODE, MILD (HCC): Primary | ICD-10-CM

## 2024-04-24 DIAGNOSIS — F43.10 PTSD (POST-TRAUMATIC STRESS DISORDER): ICD-10-CM

## 2024-04-24 DIAGNOSIS — F42.9 OBSESSIVE-COMPULSIVE DISORDER, UNSPECIFIED TYPE: ICD-10-CM

## 2024-04-24 PROCEDURE — 90837 PSYTX W PT 60 MINUTES: CPT | Performed by: COUNSELOR

## 2024-04-24 NOTE — PSYCH
Virtual Regular Visit    Verification of patient location:    Patient is located at Home in the following state in which I hold an active license [place]      Assessment/Plan:    Problem List Items Addressed This Visit     MDD (major depressive disorder), recurrent episode, mild (HCC) - Primary    JOSEFINA (generalized anxiety disorder)    OCD (obsessive compulsive disorder)    PTSD (post-traumatic stress disorder)       Goals addressed in session: Goal 1 and Goal 2          Reason for visit is   Chief Complaint   Patient presents with   • Virtual Regular Visit        Encounter provider Ethel Christian    Provider located at [facility] [place]  [facility] [place]  [address]  [place] [address] [phone]      Recent Visits  No visits were found meeting these conditions.  Showing recent visits within past 7 days and meeting all other requirements  Today's Visits  Date Type Provider Dept   04/24/24 Telemedicine Ethel Christian  [facility] [place]   Showing today's visits and meeting all other requirements  Future Appointments  No visits were found meeting these conditions.  Showing future appointments within next 150 days and meeting all other requirements       The patient was identified by name and date of birth. Courtney Davalos was informed that this is a telemedicine visit and that the visit is being conducted throughthe Epic Embedded platform. She agrees to proceed..  My office door was closed. No one else was in the room.  She acknowledged consent and understanding of privacy and security of the video platform. The patient has agreed to participate and understands they can discontinue the visit at any time.    Patient is aware this is a billable service.     Subjective  Courtney Davalos is a 63 y.o. female  .      HPI     Past Medical History:   Diagnosis Date   • Abnormal breast exam    • Anemia    • Anxiety    • Arthritis    • Asthma      childhood   • 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HCC)     primary per allscripts   • Cancer (HCC)     IgG kappa smoldering multiple myeloma   • Cardiac murmur    • Chronic liver disease     resolved 12/04/2017   • COVID    • Depression    • Endometriosis    • Fracture of tibia     right tibial plateau fracture per allscripts   • Heart murmur    • Hepatic disease    • Hypertension     last assessed 12/13/2017   • Inflammatory bowel disease    • Kidney stone    • Multiple myeloma (HCC)    • Neuropathy     R foot    • Nosebleed    • OCD (obsessive compulsive disorder)    • Osteoporosis    • Otitis media    • Pneumonia    • RSD (reflex sympathetic dystrophy) 12/11/2018   • Scoliosis    • Seasonal allergies    • Urinary tract infection    • Visual impairment    • Wears eyeglasses    • Whiplash injury to neck        Past Surgical History:   Procedure Laterality Date   • BACK SURGERY      hemilaminectomy and discectomy, L5-S1, right (Dr. Rashid, [facility] at [facility]) onset 02/14/2005 per allscripts   • COLONOSCOPY     • EXPLORATION EXTREMITY Right 08/29/2016    Procedure: KNEE PERONEAL NERVE EXPLORATION AND RELEASE ;  Surgeon: Bry De Guzman MD;  [facility];  Service:    • FOOT SURGERY     • FRACTURE SURGERY     • HAND SURGERY     • HERNIA REPAIR     • IR BIOPSY KIDNEY MASS  05/25/2023   • JOINT REPLACEMENT Right     RTK   • KNEE SURGERY     • MANDIBLE SURGERY     • NEPHRECTOMY LAPAROSCOPIC Right 6/21/2023    Procedure: NEPHRECTOMY LAPAROSCOPIC ASSISTED;  Surgeon: Avinash Sterling MD;  [facility];  Service: Urology   • NEUROPLASTY / TRANSPOSITION MEDIAN NERVE AT CARPAL TUNNEL     • ORIF TIBIAL PLATEAU Right     arthroplasty per allscripts   • OTHER SURGICAL HISTORY      injection of trigger point(s) per allscripts   • IA ARTHRP KNE CONDYLE&PLATU MEDIAL&LAT COMPARTMENTS Right 06/11/2018    Procedure: ARTHROPLASTY KNEE TOTAL;  Surgeon: Bry De Guzman MD;  [facility];  Service: Orthopedics   • IA TENDON SHEATH  INCISION Right 12/02/2020    Procedure: RELEASE TRIGGER FINGER-right long;  Surgeon: Todd Mcdonald MD;  [facility];  Service: Orthopedics   • SINUS SURGERY     • SPINE SURGERY     • TONSILLECTOMY         Current Outpatient Medications   Medication Sig Dispense Refill   • albuterol (Proventil HFA) 90 mcg/act inhaler Inhale 2 puffs every 6 (six) hours as needed for wheezing 6.7 g 0   • Cholecalciferol (Vitamin D3) 50 MCG (2000 UT) TABS Take 1 tablet (2,000 Units total) by mouth daily 90 tablet 3   • estradiol (VAGIFEM, YUVAFEM) 10 MCG TABS vaginal tablet INSERT 1 TABLET INTO THE VAGINA 2 TIMES A WEEK. 24 tablet 1   • fenofibrate (FENOGLIDE) 120 MG TABS Take 120 mg by mouth daily     • FLUoxetine (PROzac) 40 MG capsule Take 1 capsule (40 mg total) by mouth daily 90 capsule 1   • lidocaine (LIDODERM) 5 % lidocaine 5 % topical patch   APPLY 1 PATCH TO AFFECTED AREA FOR 12 HOURS A DAY & REMOVE FOR 12 HOURS BEFORE APPLYING NEXT PATCH. (12 HOURS ON, 12 HOURS OFF)     • lisinopril (ZESTRIL) 20 mg tablet Take 1 tablet (20 mg total) by mouth daily 90 tablet 1   • promethazine-dextromethorphan (PHENERGAN-DM) 6.25-15 mg/5 mL oral syrup Take 5 mL by mouth 4 (four) times a day as needed for cough (Patient not taking: Reported on 3/18/2024) 240 mL 0   • traMADol (Ultram) 50 mg tablet Take 1 tablet (50 mg total) by mouth every 8 (eight) hours as needed for moderate pain 30 tablet 0   • traZODone (DESYREL) 50 mg tablet Take 0.5-1 tablets (25-50 mg total) by mouth daily at bedtime as needed for sleep 90 tablet 1   • triamcinolone (KENALOG) 0.1 % ointment Apply topically 2 (two) times a day 454 g 1   • ursodiol (ACTIGALL) 300 mg capsule TAKE 1 CAPSULE BY MOUTH THREE TIMES A  capsule 2   • valACYclovir (VALTREX) 500 mg tablet Take 2 tablets (1,000 mg total) by mouth daily for 14 days 28 tablet 0     No current facility-administered medications for this visit.     Facility-Administered Medications Ordered in Other  Visits   Medication Dose Route Frequency Provider Last Rate Last Admin   • alteplase (CATHFLO) injection 2 mg  2 mg Intracatheter Q1MIN PRN Gagan Quintero MD            Allergies   Allergen Reactions   • Codeine GI Intolerance and Nausea Only     Other reaction(s): Other (See Comments)  Violently ill  Violently ill   • Latex Rash     itching   • Morphine And Related Nausea Only     GI intolerance nausea   • Nortriptyline Shortness Of Breath   • Ocaliva [Obeticholic Acid] Hives   • Doxycycline GI Intolerance and Nausea Only   • Sulfa Antibiotics GI Intolerance   • Cymbalta [Duloxetine Hcl]    • Penicillins Other (See Comments) and Rash     Childhood reaction       Review of Systems    Video Exam    There were no vitals filed for this visit.    Physical Exam     Behavioral Health Psychotherapy Progress Note    Psychotherapy Provided: Individual Psychotherapy     1. MDD (major depressive disorder), recurrent episode, mild (HCC)        2. JOSEFINA (generalized anxiety disorder)        3. Obsessive-compulsive disorder, unspecified type        4. PTSD (post-traumatic stress disorder)            Goals addressed in session: Goal 1     DATA: Clinician met with Courtney via telehealth for individual therapy. Courtney processed communication issues with neighbor and struggling to maintain healthy relationship and boundaries. She reports neighbor has been inconsiderate of the impact her actions have on her and other neighbors. Clinician explored possible solutions to concerns and past attempts at communication. Clinician explored differences between assertive communication and aggressive and ways to utilize skills to better express her emotions and needs in relationships. Clinician suggested gratitude journaling to explored positive.  During this session, this clinician used the following therapeutic modalities: Client-centered Therapy, Solution-Focused Therapy, and Supportive Psychotherapy    Substance Abuse was not addressed during  "this session. If the client is diagnosed with a co-occurring substance use disorder, please indicate any changes in the frequency or amount of use: n/a. Stage of change for addressing substance use diagnoses: No substance use/Not applicable    ASSESSMENT:  Courtney Davalos presents with a Euthymic/ normal mood.     her affect is Normal range and intensity, which is congruent, with her mood and the content of the session. The client has made progress on their goals.    Courtney was open and engaged in the session.  Courteny Davalos presents with a none risk of suicide, none risk of self-harm, and none risk of harm to others.    For any risk assessment that surpasses a \"low\" rating, a safety plan must be developed.    A safety plan was indicated: no  If yes, describe in detail n/a    PLAN: Between sessions, Courtney Davalos will utilize assertive communication skills. At the next session, the therapist will use Client-centered Therapy, Solution-Focused Therapy, and Supportive Psychotherapy to address anxiety.    Behavioral Health Treatment Plan and Discharge Planning: Courtney Davalos is aware of and agrees to continue to work on their treatment plan. They have identified and are working toward their discharge goals. yes    Visit start and stop times:    04/24/24  Start Time: 0900  Stop Time: 0959  Total Visit Time: 59 minutes            "

## 2024-05-13 ENCOUNTER — APPOINTMENT (OUTPATIENT)
Dept: LAB | Facility: CLINIC | Age: 64
End: 2024-05-13
Payer: COMMERCIAL

## 2024-05-13 DIAGNOSIS — D47.2 SMOLDERING MULTIPLE MYELOMA (SMM): ICD-10-CM

## 2024-05-13 DIAGNOSIS — R53.0 NEOPLASTIC (MALIGNANT) RELATED FATIGUE: ICD-10-CM

## 2024-05-13 DIAGNOSIS — C64.1 RENAL CELL CARCINOMA OF RIGHT KIDNEY (HCC): ICD-10-CM

## 2024-05-13 LAB
ALBUMIN SERPL BCP-MCNC: 3.9 G/DL (ref 3.5–5)
ALP SERPL-CCNC: 208 U/L (ref 34–104)
ALT SERPL W P-5'-P-CCNC: 35 U/L (ref 7–52)
ANION GAP SERPL CALCULATED.3IONS-SCNC: 6 MMOL/L (ref 4–13)
AST SERPL W P-5'-P-CCNC: 40 U/L (ref 13–39)
BASOPHILS # BLD AUTO: 0.03 THOUSANDS/ÂΜL (ref 0–0.1)
BASOPHILS NFR BLD AUTO: 1 % (ref 0–1)
BILIRUB SERPL-MCNC: 0.58 MG/DL (ref 0.2–1)
BUN SERPL-MCNC: 23 MG/DL (ref 5–25)
CALCIUM SERPL-MCNC: 8.7 MG/DL (ref 8.4–10.2)
CHLORIDE SERPL-SCNC: 100 MMOL/L (ref 96–108)
CHOLEST SERPL-MCNC: 215 MG/DL
CO2 SERPL-SCNC: 28 MMOL/L (ref 21–32)
CREAT SERPL-MCNC: 1.02 MG/DL (ref 0.6–1.3)
EOSINOPHIL # BLD AUTO: 0.13 THOUSAND/ÂΜL (ref 0–0.61)
EOSINOPHIL NFR BLD AUTO: 3 % (ref 0–6)
ERYTHROCYTE [DISTWIDTH] IN BLOOD BY AUTOMATED COUNT: 13 % (ref 11.6–15.1)
GFR SERPL CREATININE-BSD FRML MDRD: 58 ML/MIN/1.73SQ M
GLUCOSE P FAST SERPL-MCNC: 91 MG/DL (ref 65–99)
HCT VFR BLD AUTO: 39.4 % (ref 34.8–46.1)
HDLC SERPL-MCNC: 83 MG/DL
HGB BLD-MCNC: 12.6 G/DL (ref 11.5–15.4)
IGA SERPL-MCNC: 52 MG/DL (ref 66–433)
IGG SERPL-MCNC: 2367 MG/DL (ref 635–1741)
IGM SERPL-MCNC: 245 MG/DL (ref 45–281)
IMM GRANULOCYTES # BLD AUTO: 0.03 THOUSAND/UL (ref 0–0.2)
IMM GRANULOCYTES NFR BLD AUTO: 1 % (ref 0–2)
LDLC SERPL CALC-MCNC: 118 MG/DL (ref 0–100)
LYMPHOCYTES # BLD AUTO: 1.4 THOUSANDS/ÂΜL (ref 0.6–4.47)
LYMPHOCYTES NFR BLD AUTO: 32 % (ref 14–44)
MCH RBC QN AUTO: 29.9 PG (ref 26.8–34.3)
MCHC RBC AUTO-ENTMCNC: 32 G/DL (ref 31.4–37.4)
MCV RBC AUTO: 94 FL (ref 82–98)
MONOCYTES # BLD AUTO: 0.3 THOUSAND/ÂΜL (ref 0.17–1.22)
MONOCYTES NFR BLD AUTO: 7 % (ref 4–12)
NEUTROPHILS # BLD AUTO: 2.49 THOUSANDS/ÂΜL (ref 1.85–7.62)
NEUTS SEG NFR BLD AUTO: 56 % (ref 43–75)
NRBC BLD AUTO-RTO: 0 /100 WBCS
PLATELET # BLD AUTO: 203 THOUSANDS/UL (ref 149–390)
PMV BLD AUTO: 10.6 FL (ref 8.9–12.7)
POTASSIUM SERPL-SCNC: 4.1 MMOL/L (ref 3.5–5.3)
PROT SERPL-MCNC: 8.2 G/DL (ref 6.4–8.4)
RBC # BLD AUTO: 4.21 MILLION/UL (ref 3.81–5.12)
SODIUM SERPL-SCNC: 134 MMOL/L (ref 135–147)
T3FREE SERPL-MCNC: 2.57 PG/ML (ref 2.5–3.9)
TRIGL SERPL-MCNC: 69 MG/DL
TSH SERPL DL<=0.05 MIU/L-ACNC: 1.5 UIU/ML (ref 0.45–4.5)
WBC # BLD AUTO: 4.38 THOUSAND/UL (ref 4.31–10.16)

## 2024-05-13 PROCEDURE — 83521 IG LIGHT CHAINS FREE EACH: CPT

## 2024-05-13 PROCEDURE — 82784 ASSAY IGA/IGD/IGG/IGM EACH: CPT

## 2024-05-13 PROCEDURE — 85025 COMPLETE CBC W/AUTO DIFF WBC: CPT

## 2024-05-13 PROCEDURE — 84481 FREE ASSAY (FT-3): CPT

## 2024-05-13 PROCEDURE — 84165 PROTEIN E-PHORESIS SERUM: CPT

## 2024-05-13 PROCEDURE — 84443 ASSAY THYROID STIM HORMONE: CPT

## 2024-05-13 PROCEDURE — 36415 COLL VENOUS BLD VENIPUNCTURE: CPT

## 2024-05-14 ENCOUNTER — HOSPITAL ENCOUNTER (OUTPATIENT)
Dept: INFUSION CENTER | Facility: CLINIC | Age: 64
Discharge: HOME | End: 2024-05-14
Payer: COMMERCIAL

## 2024-05-14 VITALS
HEART RATE: 76 BPM | TEMPERATURE: 97.8 F | DIASTOLIC BLOOD PRESSURE: 85 MMHG | SYSTOLIC BLOOD PRESSURE: 136 MMHG | RESPIRATION RATE: 18 BRPM

## 2024-05-14 DIAGNOSIS — C64.1 RENAL CELL CARCINOMA OF RIGHT KIDNEY (HCC): Primary | ICD-10-CM

## 2024-05-14 LAB
KAPPA LC FREE SER-MCNC: 56.3 MG/L (ref 3.3–19.4)
KAPPA LC FREE/LAMBDA FREE SER: 4.08 {RATIO} (ref 0.26–1.65)
LAMBDA LC FREE SERPL-MCNC: 13.8 MG/L (ref 5.7–26.3)

## 2024-05-14 RX ORDER — SODIUM CHLORIDE 9 MG/ML
20 INJECTION, SOLUTION INTRAVENOUS ONCE
Status: COMPLETED | OUTPATIENT
Start: 2024-05-14 | End: 2024-05-14

## 2024-05-14 RX ADMIN — SODIUM CHLORIDE 20 ML/HR: 0.9 INJECTION, SOLUTION INTRAVENOUS at 08:20

## 2024-05-14 RX ADMIN — SODIUM CHLORIDE 200 MG: 9 INJECTION, SOLUTION INTRAVENOUS at 08:52

## 2024-05-14 NOTE — PROGRESS NOTES
Patient tolerated treatment today without complications. Patient confirmed next appointment for 6/4 at 1130. Print out provided. Patient also reminded that she needs to schedule next DEXA scan so she can then be scheduled for [facility]. Patient verbalized understanding.

## 2024-05-15 LAB
ALBUMIN SERPL ELPH-MCNC: 3.73 G/DL (ref 3.2–5.1)
ALBUMIN SERPL ELPH-MCNC: 47.8 % (ref 48–70)
ALPHA1 GLOB SERPL ELPH-MCNC: 0.28 G/DL (ref 0.15–0.47)
ALPHA1 GLOB SERPL ELPH-MCNC: 3.6 % (ref 1.8–7)
ALPHA2 GLOB SERPL ELPH-MCNC: 0.77 G/DL (ref 0.42–1.04)
ALPHA2 GLOB SERPL ELPH-MCNC: 9.9 % (ref 5.9–14.9)
BETA GLOB ABNORMAL SERPL ELPH-MCNC: 0.44 G/DL (ref 0.31–0.57)
BETA1 GLOB SERPL ELPH-MCNC: 5.6 % (ref 4.7–7.7)
BETA2 GLOB SERPL ELPH-MCNC: 4 % (ref 3.1–7.9)
BETA2+GAMMA GLOB SERPL ELPH-MCNC: 0.31 G/DL (ref 0.2–0.58)
GAMMA GLOB ABNORMAL SERPL ELPH-MCNC: 2.27 G/DL (ref 0.4–1.66)
GAMMA GLOB SERPL ELPH-MCNC: 29.1 % (ref 6.9–22.3)
IGG/ALB SER: 0.92 {RATIO} (ref 1.1–1.8)
M PROTEIN 1 MFR SERPL ELPH: 22.4 %
M PROTEIN 1 SERPL ELPH-MCNC: 1.75 G/DL
PROT PATTERN SERPL ELPH-IMP: ABNORMAL
PROT SERPL-MCNC: 7.8 G/DL (ref 6.4–8.2)

## 2024-05-15 PROCEDURE — 84165 PROTEIN E-PHORESIS SERUM: CPT | Performed by: STUDENT IN AN ORGANIZED HEALTH CARE EDUCATION/TRAINING PROGRAM

## 2024-05-16 ENCOUNTER — RA CDI HCC (OUTPATIENT)
Dept: OTHER | Facility: HOSPITAL | Age: 64
End: 2024-05-16

## 2024-05-22 ENCOUNTER — TELEMEDICINE (OUTPATIENT)
Dept: BEHAVIORAL/MENTAL HEALTH CLINIC | Facility: CLINIC | Age: 64
End: 2024-05-22

## 2024-05-22 ENCOUNTER — TELEPHONE (OUTPATIENT)
Age: 64
End: 2024-05-22

## 2024-05-22 ENCOUNTER — OFFICE VISIT (OUTPATIENT)
Dept: INTERNAL MEDICINE CLINIC | Facility: CLINIC | Age: 64
End: 2024-05-22
Payer: COMMERCIAL

## 2024-05-22 ENCOUNTER — TELEPHONE (OUTPATIENT)
Dept: INTERNAL MEDICINE CLINIC | Facility: CLINIC | Age: 64
End: 2024-05-22

## 2024-05-22 VITALS
WEIGHT: 161 LBS | HEART RATE: 72 BPM | SYSTOLIC BLOOD PRESSURE: 138 MMHG | HEIGHT: 69 IN | OXYGEN SATURATION: 97 % | DIASTOLIC BLOOD PRESSURE: 88 MMHG | BODY MASS INDEX: 23.85 KG/M2

## 2024-05-22 DIAGNOSIS — E78.5 HYPERLIPIDEMIA, UNSPECIFIED HYPERLIPIDEMIA TYPE: ICD-10-CM

## 2024-05-22 DIAGNOSIS — I10 ESSENTIAL HYPERTENSION: Chronic | ICD-10-CM

## 2024-05-22 DIAGNOSIS — G89.29 CHRONIC LEFT-SIDED LOW BACK PAIN WITHOUT SCIATICA: ICD-10-CM

## 2024-05-22 DIAGNOSIS — M77.9 TENDONITIS: ICD-10-CM

## 2024-05-22 DIAGNOSIS — F41.1 GAD (GENERALIZED ANXIETY DISORDER): Primary | ICD-10-CM

## 2024-05-22 DIAGNOSIS — F43.10 PTSD (POST-TRAUMATIC STRESS DISORDER): ICD-10-CM

## 2024-05-22 DIAGNOSIS — F33.0 MDD (MAJOR DEPRESSIVE DISORDER), RECURRENT EPISODE, MILD (HCC): ICD-10-CM

## 2024-05-22 DIAGNOSIS — G90.521 COMPLEX REGIONAL PAIN SYNDROME I OF RIGHT LOWER LIMB: ICD-10-CM

## 2024-05-22 DIAGNOSIS — F42.9 OBSESSIVE-COMPULSIVE DISORDER, UNSPECIFIED TYPE: ICD-10-CM

## 2024-05-22 DIAGNOSIS — C90.00 MULTIPLE MYELOMA NOT HAVING ACHIEVED REMISSION (HCC): ICD-10-CM

## 2024-05-22 DIAGNOSIS — M54.50 CHRONIC LEFT-SIDED LOW BACK PAIN WITHOUT SCIATICA: ICD-10-CM

## 2024-05-22 DIAGNOSIS — N18.31 STAGE 3A CHRONIC KIDNEY DISEASE (HCC): ICD-10-CM

## 2024-05-22 DIAGNOSIS — Z00.00 WELCOME TO MEDICARE PREVENTIVE VISIT: Primary | ICD-10-CM

## 2024-05-22 DIAGNOSIS — L85.8 KERATOSIS PILARIS: ICD-10-CM

## 2024-05-22 DIAGNOSIS — B35.9 TINEA: ICD-10-CM

## 2024-05-22 PROCEDURE — 99214 OFFICE O/P EST MOD 30 MIN: CPT | Performed by: INTERNAL MEDICINE

## 2024-05-22 PROCEDURE — G0438 PPPS, INITIAL VISIT: HCPCS | Performed by: INTERNAL MEDICINE

## 2024-05-22 RX ORDER — AMMONIUM LACTATE 12 G/100G
LOTION TOPICAL 2 TIMES DAILY PRN
Qty: 222 ML | Refills: 1 | Status: SHIPPED | OUTPATIENT
Start: 2024-05-22

## 2024-05-22 RX ORDER — KETOCONAZOLE 20 MG/G
CREAM TOPICAL DAILY
Qty: 30 G | Refills: 0 | Status: SHIPPED | OUTPATIENT
Start: 2024-05-22

## 2024-05-22 NOTE — TELEPHONE ENCOUNTER
Patient called and stated during today's visit, Dr. Harmon discussed giving her exercises to try for her right leg.  She forgot to request them before she left and would like to know if the exercises can be mailed to her.  Patient verified address in chart.

## 2024-05-22 NOTE — PROGRESS NOTES
Ambulatory Visit  Name: Courtney Davalos      : 1960      MRN: [id number]  Encounter Provider: Jose Daniel Harmon DO  Encounter Date: 2024   Encounter department: [facility]    Assessment & Plan   1. Welcome to Medicare preventive visit  Assessment & Plan:  Assessment and plan 1.  Medicare wellness examination completed for the patient today overall the patient is clinically stable doing very well, we encouraged the patient to follow a healthy and balance diet.  We encouraged the patient exercise routinely.  I have reviewed the Medicare wellness form with the patient.  The patient is is up-to-date with screening test   .  I will be ordering screening  .  Annual wellness examination overall the patient is clinically stable and doing well, we encouraged the patient to follow a healthy and balanced diet.  We recommend that the patient exercise routinely approximately 30 minutes 5 times per week .  We have reviewed the patient's vaccines and have made recommendations for updates if necessary    annual flu shot.  We will be ordering screening laboratories which are age appropriate.  Return to the office in    4 to 6 months call if any problems.  2. Keratosis pilaris  Assessment & Plan:  Rx Lac-Hydrin twice daily as needed    Orders:  -     ammonium lactate (LAC-HYDRIN) 12 % lotion; Apply topically 2 (two) times a day as needed for dry skin  3. Tinea  Assessment & Plan:  Perirectal rash consistent with tinea Rx for ketoconazole 2% cream apply to affected area once a day for 7 to 10 days if his symptoms do not resolve please notify me, use A and D ointment to prevent rash  Orders:  -     ketoconazole (NIZORAL) 2 % cream; Apply topically daily  4. Hyperlipidemia, unspecified hyperlipidemia type  Assessment & Plan:  Hyperlipidemia low-cholesterol diet check lipid profile  Orders:  -     Comprehensive metabolic panel; Future; Expected date: 2024  -     Lipid Panel with Direct LDL  reflex; Future  5. Tendonitis  -     Elastic Bandages & Supports (Thumb Splint/Right Small) MISC; Use daily  6. Chronic left-sided low back pain without sciatica  Assessment & Plan:  Patient reports me difficulty getting an appointment with pain management she is requesting our office to contact pain management to help get her an appointment with the physician Dr. Sorto only offering nurse practitioner I have instructed the medical assistants to contact pain management with her request  7. Complex regional pain syndrome i of right lower limb  Assessment & Plan:  Would like opinion from pain management  8. Essential hypertension  Assessment & Plan:  Hypertension - controlled, I have counseled patient following healthy balance diet, I would like the patient reduce sodium, exercise routinely, I would like the patient continued the med current medical regiment and we will continue to monitor.  Blood pressure today 138/88 continue lisinopril 20 mg once daily  9. Multiple myeloma not having achieved remission (HCC)  Assessment & Plan:  Reviewed recent laboratories in detail patient has follow-up with hematology oncology  10. Stage 3a chronic kidney disease (HCC)  Assessment & Plan:  Lab Results   Component Value Date    EGFR 58 05/13/2024    EGFR 50 04/18/2024    EGFR 58 03/29/2024    CREATININE 1.02 05/13/2024    CREATININE 1.14 04/18/2024    CREATININE 1.01 03/29/2024   Drink adequate amount of water, avoid anti-inflammatories, reduce sodium in the diet and will continue to monitor the kidney function     Preventive health issues were discussed with patient, and age appropriate screening tests were ordered as noted in patient's After Visit Summary. Personalized health advice and appropriate referrals for health education or preventive services given if needed, as noted in patient's After Visit Summary.  Sacroiliitis (HCC)  Referral to pain management Dr. Sorto regarding chronic pain syndrome would like to see  what other additional options might be available for the patient     Smoldering multiple myeloma (SMM)  Working with hematology oncology Dr. Quintero patient does receive infusions Keytruda and Zometa clinically she appears to be stable continue routine and usual follow-up with Dr. Quintero will continue to monitor     Primary biliary cholangitis (HCC)  Encouraged patient to continue to work on compliance of Actigall she is making good progress she is working with GI Dr. Schwartz would like to get the opinion of liver expert Dr. Esqueda I did review her laboratories which does show she is making progress in regards to her alkaline phosphatase levels we will continue to monitor her progress     Osteopenia  Currently on infusions of Zometa through the [facility] center will check surveillance DEXA scan for monitoring of osteoporosis     Complex regional pain syndrome i of right lower limb  Patient has ongoing symptoms she has been to multiple pain management providers, at this point in time I would like to get the opinion of pain management Dr. Sorto to see if there are any new options for the patient      Essential hypertension  Hypertension - controlled, I have counseled patient following healthy balance diet, I would like the patient reduce sodium, exercise routinely, I would like the patient continued the med current medical regiment and we will continue to monitor.  Blood pressure 128/88 continue lisinopril 20 mg once daily we will continue to monitor comprehensive metabolic panel     Stage 3a chronic kidney disease (HCC)        Lab Result     History of Present Illness     HPI 63-year old female coming in for a follow up office visit regarding keratosis pilaris, perirectal rash, hyperlipidemia, tendinitis, chronic lower back pain complex regional pain syndrome, hypertension, multiple myeloma stage III chronic kidney disease; the patient reports me compliant taking medications without untoward side effects  the.  The patient is here to review his medical condition, update me on the medical condition and the patient reports me no hospitalizations and no ER visits.  Patient does report to me difficulties getting in with pain management and would like our office to contact to help acquire an appointment.  Reports a rash around her rectum itchy in nature.  No blood in stool.  There is reviewed laboratories in detail.  Also has noticed a rash posterior arms bilaterally patient reports that she is having more falls related to her dropfoot. Reports peronial neuropathy working with Mario Houston has cane   Hit in the left cheek  hit with picture sore and bruising   Lumps under the right arm and left leg  for months   Patient does report to me a irritated area in the sacral region in the area where she had a previous coccyx fracture.  Has been using a wipe and also a diaper rash cream to the area.  Uncertain to the etiology for the past 1 to 2 months  Patient Care Team:  Jose Daniel Harmon DO as PCP - General (Internal Medicine)  Oracio Antoine MD as PCP - PCP-[facility]  Mauricio Dias DO as PCP - PCP-[facility] ([facility])  Mauricio Dias DO as PCP - PCP-[employer] ([facility])  MD Oracio Bartholomew MD Jarrod Evan Rosenthal, MD Yacoub Faroun, MD Patrick Joseph Brogle, MD Farooq Qureshi, MD Steven Acosta as  (Oncology)    Review of Systems   Constitutional:  Negative for activity change, appetite change and unexpected weight change.   HENT:  Negative for congestion and postnasal drip.    Eyes:  Negative for visual disturbance.   Respiratory:  Negative for cough and shortness of breath.    Cardiovascular:  Negative for chest pain.   Gastrointestinal:  Negative for abdominal pain, diarrhea, nausea and vomiting.   Neurological:  Negative for dizziness, light-headedness and headaches.   Hematological:  Negative for adenopathy.     Medical History Reviewed by  provider this encounter:  Tobacco  Allergies  Meds  Problems  Med Hx  Surg Hx  Fam Hx       Annual Wellness Visit Questionnaire   Courtney is here for her Initial Wellness visit.     Health Risk Assessment:   Patient rates overall health as fair. Patient feels that their physical health rating is slightly worse. Patient is satisfied with their life. Eyesight was rated as slightly worse. Hearing was rated as same. Patient feels that their emotional and mental health rating is same. Patients states they are sometimes angry. Patient states they are always unusually tired/fatigued. Pain experienced in the last 7 days has been a lot. Patient's pain rating has been 9/10. Patient states that she has experienced no weight loss or gain in last 6 months.     Depression Screening:   PHQ-9 Score: 10      Fall Risk Screening:   In the past year, patient has experienced: history of falling in past year    Number of falls: 2 or more  Injured during fall?: Yes    Feels unsteady when standing or walking?: Yes    Worried about falling?: Yes      Urinary Incontinence Screening:   Patient has leaked urine accidently in the last six months.     Home Safety:  Patient has trouble with stairs inside or outside of their home. Patient has working smoke alarms and has no working carbon monoxide detector. Home safety hazards include: loose rugs on the floor.     Nutrition:   Current diet is Regular and Frequent junk food.     Medications:   Patient is not currently taking any over-the-counter supplements. Patient is able to manage medications.     Activities of Daily Living (ADLs)/Instrumental Activities of Daily Living (IADLs):   Walk and transfer into and out of bed and chair?: Yes  Dress and groom yourself?: Yes    Bathe or shower yourself?: Yes    Feed yourself? Yes  Do your laundry/housekeeping?: Yes  Manage your money, pay your bills and track your expenses?: Yes  Make your own meals?: Yes    Do your own shopping?: Yes    Previous  Hospitalizations:   Any hospitalizations or ED visits within the last 12 months?: No      Advance Care Planning:   Living will: No    Durable POA for healthcare: No    Advanced directive: No      Cognitive Screening:   Provider or family/friend/caregiver concerned regarding cognition?: No    PREVENTIVE SCREENINGS      Cardiovascular Screening:    General: Screening Not Indicated and History Lipid Disorder      Diabetes Screening:     General: Screening Current      Colorectal Cancer Screening:     General: Screening Current      Cervical Cancer Screening:    General: Screening Current      Osteoporosis Screening:    General: Screening Not Indicated and History Osteoporosis      Lung Cancer Screening:     General: Screening Not Indicated      Hepatitis C Screening:    General: Screening Current    Screening, Brief Intervention, and Referral to Treatment (SBIRT)    Screening  Typical number of drinks in a day: 0  Typical number of drinks in a week: 0  Interpretation: Low risk drinking behavior.    AUDIT-C Screenin) How often did you have a drink containing alcohol in the past year? never  2) How many drinks did you have on a typical day when you were drinking in the past year? 0  3) How often did you have 6 or more drinks on one occasion in the past year? never    AUDIT-C Score: 0  Interpretation: Score 0-2 (female): Negative screen for alcohol misuse    Single Item Drug Screening:  How often have you used an illegal drug (including marijuana) or a prescription medication for non-medical reasons in the past year? never    Single Item Drug Screen Score: 0  Interpretation: Negative screen for possible drug use disorder    Social Determinants of Health     Financial Resource Strain: Low Risk  (2023)    Overall Financial Resource Strain (CARDIA)    • Difficulty of Paying Living Expenses: Not hard at all   Food Insecurity: No Food Insecurity (2024)    Hunger Vital Sign    • Worried About Running Out of Food  "in the Last Year: Never true    • Ran Out of Food in the Last Year: Never true   Transportation Needs: No Transportation Needs (5/22/2024)    PRAPARE - Transportation    • Lack of Transportation (Medical): No    • Lack of Transportation (Non-Medical): No   Housing Stability: Low Risk  (5/22/2024)    Housing Stability Vital Sign    • Unable to Pay for Housing in the Last Year: No    • Number of Times Moved in the Last Year: 0    • Homeless in the Last Year: No   Utilities: Not At Risk (5/22/2024)    [facility] Utilities    • Threatened with loss of utilities: No     No results found.    Objective     /88   Pulse 72   Ht 5' 9\" (1.753 m)   Wt 73 kg (161 lb)   LMP  (LMP Unknown)   SpO2 97%   BMI 23.78 kg/m²   Keratosis pilaris   Physical Exam  Vitals and nursing note reviewed.   Constitutional:       General: She is not in acute distress.     Appearance: Normal appearance. She is well-developed. She is not ill-appearing, toxic-appearing or diaphoretic.   HENT:      Head: Normocephalic and atraumatic.      Right Ear: External ear normal.      Left Ear: External ear normal.      Nose: Nose normal.      Mouth/Throat:      Mouth: Oropharynx is clear and moist.   Eyes:      Pupils: Pupils are equal, round, and reactive to light.   Cardiovascular:      Rate and Rhythm: Normal rate and regular rhythm.      Heart sounds: Normal heart sounds. No murmur heard.  Pulmonary:      Effort: Pulmonary effort is normal.      Breath sounds: Normal breath sounds.   Abdominal:      General: There is no distension.      Palpations: Abdomen is soft.      Tenderness: There is no abdominal tenderness. There is no guarding.   Musculoskeletal:         General: No edema.   Psychiatric:         Mood and Affect: Mood and affect normal.     Medical assistant Kylah in the room during the exam perirectal rash minimally erythematous consistent with tinea  Administrative Statements   I have spent a total time of 60 minutes on 05/27/24 In caring for " this patient including Diagnostic results, Risks and benefits of tx options, Instructions for management, Impressions, Counseling / Coordination of care, Documenting in the medical record, Reviewing / ordering tests, medicine, procedures  , and Obtaining or reviewing history  .    Keratosis pilaris pinpoint papules posterior arms bilateral proximal  Depression Screening Follow-up Plan: Patient's depression screening was positive with a PHQ-2 score of . Their PHQ-9 score was 10. Clinically patient does not have depression. No treatment is required.

## 2024-05-22 NOTE — PATIENT INSTRUCTIONS
Medicare Preventive Visit Patient Instructions  Thank you for completing your Welcome to Medicare Visit or Medicare Annual Wellness Visit today. Your next wellness visit will be due in one year (5/23/2025).  The screening/preventive services that you may require over the next 5-10 years are detailed below. Some tests may not apply to you based off risk factors and/or age. Screening tests ordered at today's visit but not completed yet may show as past due. Also, please note that scanned in results may not display below.  Preventive Screenings:  Service Recommendations Previous Testing/Comments   Colorectal Cancer Screening  * Colonoscopy    * Fecal Occult Blood Test (FOBT)/Fecal Immunochemical Test (FIT)  * Fecal DNA/Cologuard Test  * Flexible Sigmoidoscopy Age: 45-75 years old   Colonoscopy: every 10 years (may be performed more frequently if at higher risk)  OR  FOBT/FIT: every 1 year  OR  Cologuard: every 3 years  OR  Sigmoidoscopy: every 5 years  Screening may be recommended earlier than age 45 if at higher risk for colorectal cancer. Also, an individualized decision between you and your healthcare provider will decide whether screening between the ages of 76-85 would be appropriate. Colonoscopy: 06/15/2021  FOBT/FIT: Not on file  Cologuard: Not on file  Sigmoidoscopy: Not on file          Breast Cancer Screening Age: 40+ years old  Frequency: every 1-2 years  Not required if history of left and right mastectomy Mammogram: 06/26/2020        Cervical Cancer Screening Between the ages of 21-29, pap smear recommended once every 3 years.   Between the ages of 30-65, can perform pap smear with HPV co-testing every 5 years.   Recommendations may differ for women with a history of total hysterectomy, cervical cancer, or abnormal pap smears in past. Pap Smear: 08/24/2023        Hepatitis C Screening Once for adults born between 1945 and 1965  More frequently in patients at high risk for Hepatitis C Hep C Antibody:  07/31/2014        Diabetes Screening 1-2 times per year if you're at risk for diabetes or have pre-diabetes Fasting glucose: 91 mg/dL (5/13/2024)  A1C: 5.3 % (5/13/2023)      Cholesterol Screening Once every 5 years if you don't have a lipid disorder. May order more often based on risk factors. Lipid panel: 05/13/2024          Other Preventive Screenings Covered by Medicare:  Abdominal Aortic Aneurysm (AAA) Screening: covered once if your at risk. You're considered to be at risk if you have a family history of AAA.  Lung Cancer Screening: covers low dose CT scan once per year if you meet all of the following conditions: (1) Age 55-77; (2) No signs or symptoms of lung cancer; (3) Current smoker or have quit smoking within the last 15 years; (4) You have a tobacco smoking history of at least 20 pack years (packs per day multiplied by number of years you smoked); (5) You get a written order from a healthcare provider.  Glaucoma Screening: covered annually if you're considered high risk: (1) You have diabetes OR (2) Family history of glaucoma OR (3)  aged 50 and older OR (4)  American aged 65 and older  Osteoporosis Screening: covered every 2 years if you meet one of the following conditions: (1) You're estrogen deficient and at risk for osteoporosis based off medical history and other findings; (2) Have a vertebral abnormality; (3) On glucocorticoid therapy for more than 3 months; (4) Have primary hyperparathyroidism; (5) On osteoporosis medications and need to assess response to drug therapy.   Last bone density test (DXA Scan): 08/10/2021.  HIV Screening: covered annually if you're between the age of 15-65. Also covered annually if you are younger than 15 and older than 65 with risk factors for HIV infection. For pregnant patients, it is covered up to 3 times per pregnancy.    Immunizations:  Immunization Recommendations   Influenza Vaccine Annual influenza vaccination during flu season is  recommended for all persons aged >= 6 months who do not have contraindications   Pneumococcal Vaccine   * Pneumococcal conjugate vaccine = PCV13 (Prevnar 13), PCV15 (Vaxneuvance), PCV20 (Prevnar 20)  * Pneumococcal polysaccharide vaccine = PPSV23 (Pneumovax) Adults 19-65 yo with certain risk factors or if 65+ yo  If never received any pneumonia vaccine: recommend Prevnar 20 (PCV20)  Give PCV20 if previously received 1 dose of PCV13 or PPSV23   Hepatitis B Vaccine 3 dose series if at intermediate or high risk (ex: diabetes, end stage renal disease, liver disease)   Respiratory syncytial virus (RSV) Vaccine - COVERED BY MEDICARE PART D  * RSVPreF3 (Arexvy) CDC recommends that adults 60 years of age and older may receive a single dose of RSV vaccine using shared clinical decision-making (SCDM)   Tetanus (Td) Vaccine - COST NOT COVERED BY MEDICARE PART B Following completion of primary series, a booster dose should be given every 10 years to maintain immunity against tetanus. Td may also be given as tetanus wound prophylaxis.   Tdap Vaccine - COST NOT COVERED BY MEDICARE PART B Recommended at least once for all adults. For pregnant patients, recommended with each pregnancy.   Shingles Vaccine (Shingrix) - COST NOT COVERED BY MEDICARE PART B  2 shot series recommended in those 19 years and older who have or will have weakened immune systems or those 50 years and older     Health Maintenance Due:      Topic Date Due   • Breast Cancer Screening: Mammogram  06/26/2021   • DXA SCAN  08/10/2023   • Cervical Cancer Screening  08/24/2024   • Colorectal Cancer Screening  06/15/2031   • HIV Screening  Completed   • Hepatitis C Screening  Completed     Immunizations Due:      Topic Date Due   • Hepatitis A Vaccine (2 of 2 - Risk 2-dose series) 10/29/2015   • Hepatitis B Vaccine (1 of 3 - Risk 3-dose series) Never done   • COVID-19 Vaccine (4 - 2023-24 season) 09/01/2023     Advance Directives   What are advance directives?   Advance directives are legal documents that state your wishes and plans for medical care. These plans are made ahead of time in case you lose your ability to make decisions for yourself. Advance directives can apply to any medical decision, such as the treatments you want, and if you want to donate organs.   What are the types of advance directives?  There are many types of advance directives, and each state has rules about how to use them. You may choose a combination of any of the following:  Living will:  This is a written record of the treatment you want. You can also choose which treatments you do not want, which to limit, and which to stop at a certain time. This includes surgery, medicine, IV fluid, and tube feedings.   Durable power of  for healthcare (DPAHC):  This is a written record that states who you want to make healthcare choices for you when you are unable to make them for yourself. This person, called a proxy, is usually a family member or a friend. You may choose more than 1 proxy.  Do not resuscitate (DNR) order:  A DNR order is used in case your heart stops beating or you stop breathing. It is a request not to have certain forms of treatment, such as CPR. A DNR order may be included in other types of advance directives.  Medical directive:  This covers the care that you want if you are in a coma, near death, or unable to make decisions for yourself. You can list the treatments you want for each condition. Treatment may include pain medicine, surgery, blood transfusions, dialysis, IV or tube feedings, and a ventilator (breathing machine).  Values history:  This document has questions about your views, beliefs, and how you feel and think about life. This information can help others choose the care that you would choose.  Why are advance directives important?  An advance directive helps you control your care. Although spoken wishes may be used, it is better to have your wishes written down.  Spoken wishes can be misunderstood, or not followed. Treatments may be given even if you do not want them. An advance directive may make it easier for your family to make difficult choices about your care.       © Copyright [employer] 2018 Information is for End User's use only and may not be sold, redistributed or otherwise used for commercial purposes. All illustrations and images included in CareNotes® are the copyrighted property of [employer].A[employer]., Inc. or [employer]

## 2024-05-22 NOTE — TELEPHONE ENCOUNTER
----- Message from Agnes VALERIO sent at 5/22/2024  5:19 PM EDT -----  LM on  for office.  ----- Message -----  From: Jose Daniel Harmon DO  Sent: 5/22/2024   5:14 PM EDT  To: Agnes Schwartz    Please let pain management Dr. Garcia office know the patient only wants to be seen by the physician she wanted us to contact them  ----- Message -----  From: Agnes Schwartz  Sent: 5/22/2024   2:09 PM EDT  To: Jose Daniel Harmon, DO    Please clarify your message.  ----- Message -----  From: Jose Daniel Harmon DO  Sent: 5/22/2024  11:34 AM EDT  To: [facility]    Please contact  Dr Garcia - please let the know pt only wants Dr Garcia

## 2024-05-22 NOTE — PSYCH
Virtual Regular Visit    Verification of patient location:    Patient is located at Home in the following state in which I hold an active license [place]      Assessment/Plan:    Problem List Items Addressed This Visit     MDD (major depressive disorder), recurrent episode, mild (HCC)    JOSEFINA (generalized anxiety disorder) - Primary    OCD (obsessive compulsive disorder)    PTSD (post-traumatic stress disorder)       Goals addressed in session: Goal 1          Reason for visit is No chief complaint on file.       Encounter provider Ethel Christian      Recent Visits  No visits were found meeting these conditions.  Showing recent visits within past 7 days and meeting all other requirements  Today's Visits  Date Type Provider Dept   05/22/24 Telemedicine Ethel Christian [facility] Therapist [place]   05/22/24 Office Visit Jose Daniel Harmon DO [facility]   Showing today's visits and meeting all other requirements  Future Appointments  No visits were found meeting these conditions.  Showing future appointments within next 150 days and meeting all other requirements       The patient was identified by name and date of birth. Courtney Davalos was informed that this is a telemedicine visit and that the visit is being conducted throughthe Epic Embedded platform. She agrees to proceed..  My office door was closed. No one else was in the room.  She acknowledged consent and understanding of privacy and security of the video platform. The patient has agreed to participate and understands they can discontinue the visit at any time.    Patient is aware this is a billable service.     Subjective  Courtney Davalos is a 63 y.o. female  .      HPI     Past Medical History:   Diagnosis Date   • Abnormal breast exam    • Anemia    • Anxiety    • Arthritis    • Asthma     childhood   • 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HCC)     primary per allscripts   • Cancer (HCC)     IgG kappa smoldering multiple myeloma   • Cardiac murmur    •  Chronic liver disease     resolved 12/04/2017   • COVID    • Depression    • Endometriosis    • Fracture of tibia     right tibial plateau fracture per allscripts   • Heart murmur    • Hepatic disease    • Hypertension     last assessed 12/13/2017   • Inflammatory bowel disease    • Kidney stone    • Multiple myeloma (HCC)    • Neuropathy     R foot    • Nosebleed    • OCD (obsessive compulsive disorder)    • Osteoporosis    • Otitis media    • Pneumonia    • RSD (reflex sympathetic dystrophy) 12/11/2018   • Scoliosis    • Seasonal allergies    • Urinary tract infection    • Visual impairment    • Wears eyeglasses    • Whiplash injury to neck        Past Surgical History:   Procedure Laterality Date   • BACK SURGERY      hemilaminectomy and discectomy, L5-S1, right (Dr. Rashid, [facility] at [facility]) onset 02/14/2005 per allscripts   • COLONOSCOPY     • EXPLORATION EXTREMITY Right 08/29/2016    Procedure: KNEE PERONEAL NERVE EXPLORATION AND RELEASE ;  Surgeon: Bry De Guzman MD;  Location: [facility];  Service:    • FOOT SURGERY     • FRACTURE SURGERY     • HAND SURGERY     • HERNIA REPAIR     • IR BIOPSY KIDNEY MASS  05/25/2023   • JOINT REPLACEMENT Right     RTK   • KNEE SURGERY     • MANDIBLE SURGERY     • NEPHRECTOMY LAPAROSCOPIC Right 6/21/2023    Procedure: NEPHRECTOMY LAPAROSCOPIC ASSISTED;  Surgeon: Avinash Sterling MD;  Location: [facility];  Service: Urology   • NEUROPLASTY / TRANSPOSITION MEDIAN NERVE AT CARPAL TUNNEL     • ORIF TIBIAL PLATEAU Right     arthroplasty per allscripts   • OTHER SURGICAL HISTORY      injection of trigger point(s) per allscripts   • MO ARTHRP KNE CONDYLE&PLATU MEDIAL&LAT COMPARTMENTS Right 06/11/2018    Procedure: ARTHROPLASTY KNEE TOTAL;  Surgeon: Bry De Guzman MD;  Location: [facility];  Service: Orthopedics   • MO TENDON SHEATH INCISION Right 12/02/2020    Procedure: RELEASE TRIGGER FINGER-right long;  Surgeon: Todd Mcdonald MD;  Location: [facility];  Service:  Orthopedics   • SINUS SURGERY     • SPINE SURGERY     • TONSILLECTOMY         Current Outpatient Medications   Medication Sig Dispense Refill   • albuterol (Proventil HFA) 90 mcg/act inhaler Inhale 2 puffs every 6 (six) hours as needed for wheezing 6.7 g 0   • ammonium lactate (LAC-HYDRIN) 12 % lotion Apply topically 2 (two) times a day as needed for dry skin 222 mL 1   • Cholecalciferol (Vitamin D3) 50 MCG (2000 UT) TABS Take 1 tablet (2,000 Units total) by mouth daily 90 tablet 3   • Elastic Bandages & Supports (Thumb Splint/Right Small) MISC Use daily 1 each 0   • estradiol (VAGIFEM, YUVAFEM) 10 MCG TABS vaginal tablet INSERT 1 TABLET INTO THE VAGINA 2 TIMES A WEEK. 24 tablet 1   • fenofibrate (FENOGLIDE) 120 MG TABS Take 120 mg by mouth daily     • FLUoxetine (PROzac) 40 MG capsule Take 1 capsule (40 mg total) by mouth daily 90 capsule 1   • ketoconazole (NIZORAL) 2 % cream Apply topically daily 30 g 0   • lidocaine (LIDODERM) 5 % lidocaine 5 % topical patch   APPLY 1 PATCH TO AFFECTED AREA FOR 12 HOURS A DAY & REMOVE FOR 12 HOURS BEFORE APPLYING NEXT PATCH. (12 HOURS ON, 12 HOURS OFF)     • lisinopril (ZESTRIL) 20 mg tablet Take 1 tablet (20 mg total) by mouth daily 90 tablet 1   • promethazine-dextromethorphan (PHENERGAN-DM) 6.25-15 mg/5 mL oral syrup Take 5 mL by mouth 4 (four) times a day as needed for cough 240 mL 0   • traMADol (Ultram) 50 mg tablet Take 1 tablet (50 mg total) by mouth every 8 (eight) hours as needed for moderate pain 30 tablet 0   • traZODone (DESYREL) 50 mg tablet Take 0.5-1 tablets (25-50 mg total) by mouth daily at bedtime as needed for sleep 90 tablet 1   • triamcinolone (KENALOG) 0.1 % ointment Apply topically 2 (two) times a day 454 g 1   • ursodiol (ACTIGALL) 300 mg capsule TAKE 1 CAPSULE BY MOUTH THREE TIMES A  capsule 2   • valACYclovir (VALTREX) 500 mg tablet Take 2 tablets (1,000 mg total) by mouth daily for 14 days 28 tablet 0     No current facility-administered  medications for this visit.        Allergies   Allergen Reactions   • Codeine GI Intolerance and Nausea Only     Other reaction(s): Other (See Comments)  Violently ill  Violently ill   • Latex Rash     itching   • Morphine And Related Nausea Only     GI intolerance nausea   • Nortriptyline Shortness Of Breath   • Ocaliva [Obeticholic Acid] Hives   • Doxycycline GI Intolerance and Nausea Only   • Sulfa Antibiotics GI Intolerance   • Cymbalta [Duloxetine Hcl]    • Penicillins Other (See Comments) and Rash     Childhood reaction       Review of Systems    Video Exam    There were no vitals filed for this visit.    Physical Exam     Behavioral Health Psychotherapy Progress Note    Psychotherapy Provided: Individual Psychotherapy     1. JOSEFINA (generalized anxiety disorder)        2. Obsessive-compulsive disorder, unspecified type        3. MDD (major depressive disorder), recurrent episode, mild (HCC)        4. PTSD (post-traumatic stress disorder)            Goals addressed in session: Goal 1 and Goal 2     DATA: Clinician met with Courtney via telehealth for individual therapy. Courtney discussed plans to discuss love languages in order to work on getter her needs met and communicate needs to paramour. Clinician role played with Courtney to work on ways to bring up needs and communicate them in a healthy assertive way. Clinician explored progress made towards increased journaling positive events in daily life as well as working on being more mindful of paramours attempts to make her loved and appreciated.   During this session, this clinician used the following therapeutic modalities: Client-centered Therapy and Supportive Psychotherapy    Substance Abuse was not addressed during this session. If the client is diagnosed with a co-occurring substance use disorder, please indicate any changes in the frequency or amount of use: n/a. Stage of change for addressing substance use diagnoses: No substance use/Not  "applicable    ASSESSMENT:  Courtney Davalos presents with a Euthymic/ normal mood.     her affect is Normal range and intensity, which is congruent, with her mood and the content of the session. The client has made progress on their goals.    Courtney was open and engaged in the session.  Courtney Davalos presents with a none risk of suicide, none risk of self-harm, and none risk of harm to others.    For any risk assessment that surpasses a \"low\" rating, a safety plan must be developed.    A safety plan was indicated: no  If yes, describe in detail n/a    PLAN: Between sessions, Courtney Davalos will utilize assertive communication. At the next session, the therapist will use Client-centered Therapy, Solution-Focused Therapy, and Supportive Psychotherapy to address anxiety.    Behavioral Health Treatment Plan and Discharge Planning: Courtney Davalos is aware of and agrees to continue to work on their treatment plan. They have identified and are working toward their discharge goals. yes    Visit start and stop times:    05/22/24  Start Time: 0900  Stop Time: 1000  Total Visit Time: 60 minutes        "

## 2024-05-23 ENCOUNTER — TELEPHONE (OUTPATIENT)
Dept: INTERNAL MEDICINE CLINIC | Facility: CLINIC | Age: 64
End: 2024-05-23

## 2024-05-23 NOTE — TELEPHONE ENCOUNTER
Patient returned call, needed clarification on where to put creams. Gave instructions and no further questions

## 2024-05-23 NOTE — TELEPHONE ENCOUNTER
Pt called in in regards to the two medications that were prescribed yesterday. They are both topical creams and she would like to know specifically which one goes where. Please advise.

## 2024-05-27 PROBLEM — L85.8 KERATOSIS PILARIS: Status: ACTIVE | Noted: 2024-05-27

## 2024-05-27 PROBLEM — Z00.00 WELCOME TO MEDICARE PREVENTIVE VISIT: Status: ACTIVE | Noted: 2024-05-27

## 2024-05-27 PROBLEM — M77.9 TENDONITIS: Status: ACTIVE | Noted: 2024-05-27

## 2024-05-27 PROBLEM — B35.9 TINEA: Status: ACTIVE | Noted: 2024-05-27

## 2024-05-27 NOTE — ASSESSMENT & PLAN NOTE
Perirectal rash consistent with tinea Rx for ketoconazole 2% cream apply to affected area once a day for 7 to 10 days if his symptoms do not resolve please notify me, use A and D ointment to prevent rash

## 2024-05-27 NOTE — ASSESSMENT & PLAN NOTE
Hypertension - controlled, I have counseled patient following healthy balance diet, I would like the patient reduce sodium, exercise routinely, I would like the patient continued the med current medical regiment and we will continue to monitor.  Blood pressure today 138/88 continue lisinopril 20 mg once daily

## 2024-05-27 NOTE — ASSESSMENT & PLAN NOTE
Patient reports me difficulty getting an appointment with pain management she is requesting our office to contact pain management to help get her an appointment with the physician Dr. Sorto only offering nurse practitioner I have instructed the medical assistants to contact pain management with her request

## 2024-05-27 NOTE — ASSESSMENT & PLAN NOTE
Assessment and plan 1.  Medicare wellness examination completed for the patient today overall the patient is clinically stable doing very well, we encouraged the patient to follow a healthy and balance diet.  We encouraged the patient exercise routinely.  I have reviewed the Medicare wellness form with the patient.  The patient is is up-to-date with screening test   .  I will be ordering screening  .  Annual wellness examination overall the patient is clinically stable and doing well, we encouraged the patient to follow a healthy and balanced diet.  We recommend that the patient exercise routinely approximately 30 minutes 5 times per week .  We have reviewed the patient's vaccines and have made recommendations for updates if necessary    annual flu shot.  We will be ordering screening laboratories which are age appropriate.  Return to the office in    4 to 6 months call if any problems.

## 2024-05-27 NOTE — ASSESSMENT & PLAN NOTE
Lab Results   Component Value Date    EGFR 58 05/13/2024    EGFR 50 04/18/2024    EGFR 58 03/29/2024    CREATININE 1.02 05/13/2024    CREATININE 1.14 04/18/2024    CREATININE 1.01 03/29/2024   Drink adequate amount of water, avoid anti-inflammatories, reduce sodium in the diet and will continue to monitor the kidney function

## 2024-05-28 RX ORDER — SODIUM CHLORIDE 9 MG/ML
20 INJECTION, SOLUTION INTRAVENOUS ONCE
Status: CANCELLED | OUTPATIENT
Start: 2024-06-04

## 2024-05-31 ENCOUNTER — TELEPHONE (OUTPATIENT)
Dept: INFUSION CENTER | Facility: CLINIC | Age: 64
End: 2024-05-31

## 2024-05-31 ENCOUNTER — APPOINTMENT (OUTPATIENT)
Dept: LAB | Facility: CLINIC | Age: 64
End: 2024-05-31
Payer: COMMERCIAL

## 2024-05-31 DIAGNOSIS — E78.5 HYPERLIPIDEMIA, UNSPECIFIED HYPERLIPIDEMIA TYPE: ICD-10-CM

## 2024-05-31 DIAGNOSIS — D47.2 SMOLDERING MULTIPLE MYELOMA (SMM): ICD-10-CM

## 2024-05-31 DIAGNOSIS — C64.1 RENAL CELL CARCINOMA OF RIGHT KIDNEY (HCC): ICD-10-CM

## 2024-05-31 DIAGNOSIS — R53.0 NEOPLASTIC (MALIGNANT) RELATED FATIGUE: ICD-10-CM

## 2024-05-31 LAB
ALBUMIN SERPL BCP-MCNC: 3.7 G/DL (ref 3.5–5)
ALP SERPL-CCNC: 293 U/L (ref 34–104)
ALT SERPL W P-5'-P-CCNC: 61 U/L (ref 7–52)
ANION GAP SERPL CALCULATED.3IONS-SCNC: 4 MMOL/L (ref 4–13)
AST SERPL W P-5'-P-CCNC: 69 U/L (ref 13–39)
BASOPHILS # BLD AUTO: 0.03 THOUSANDS/ÂΜL (ref 0–0.1)
BASOPHILS NFR BLD AUTO: 1 % (ref 0–1)
BILIRUB SERPL-MCNC: 0.63 MG/DL (ref 0.2–1)
BUN SERPL-MCNC: 21 MG/DL (ref 5–25)
CALCIUM SERPL-MCNC: 9.1 MG/DL (ref 8.4–10.2)
CHLORIDE SERPL-SCNC: 103 MMOL/L (ref 96–108)
CO2 SERPL-SCNC: 29 MMOL/L (ref 21–32)
CREAT SERPL-MCNC: 1.08 MG/DL (ref 0.6–1.3)
EOSINOPHIL # BLD AUTO: 0.12 THOUSAND/ÂΜL (ref 0–0.61)
EOSINOPHIL NFR BLD AUTO: 3 % (ref 0–6)
ERYTHROCYTE [DISTWIDTH] IN BLOOD BY AUTOMATED COUNT: 13.3 % (ref 11.6–15.1)
GFR SERPL CREATININE-BSD FRML MDRD: 54 ML/MIN/1.73SQ M
GLUCOSE P FAST SERPL-MCNC: 72 MG/DL (ref 65–99)
HCT VFR BLD AUTO: 36 % (ref 34.8–46.1)
HGB BLD-MCNC: 11.6 G/DL (ref 11.5–15.4)
IMM GRANULOCYTES # BLD AUTO: 0.02 THOUSAND/UL (ref 0–0.2)
IMM GRANULOCYTES NFR BLD AUTO: 1 % (ref 0–2)
LYMPHOCYTES # BLD AUTO: 1.03 THOUSANDS/ÂΜL (ref 0.6–4.47)
LYMPHOCYTES NFR BLD AUTO: 28 % (ref 14–44)
MCH RBC QN AUTO: 30.3 PG (ref 26.8–34.3)
MCHC RBC AUTO-ENTMCNC: 32.2 G/DL (ref 31.4–37.4)
MCV RBC AUTO: 94 FL (ref 82–98)
MONOCYTES # BLD AUTO: 0.32 THOUSAND/ÂΜL (ref 0.17–1.22)
MONOCYTES NFR BLD AUTO: 9 % (ref 4–12)
NEUTROPHILS # BLD AUTO: 2.19 THOUSANDS/ÂΜL (ref 1.85–7.62)
NEUTS SEG NFR BLD AUTO: 58 % (ref 43–75)
NRBC BLD AUTO-RTO: 0 /100 WBCS
PLATELET # BLD AUTO: 185 THOUSANDS/UL (ref 149–390)
PMV BLD AUTO: 11.5 FL (ref 8.9–12.7)
POTASSIUM SERPL-SCNC: 4.5 MMOL/L (ref 3.5–5.3)
PROT SERPL-MCNC: 7.7 G/DL (ref 6.4–8.4)
RBC # BLD AUTO: 3.83 MILLION/UL (ref 3.81–5.12)
SODIUM SERPL-SCNC: 136 MMOL/L (ref 135–147)
T3FREE SERPL-MCNC: 2.82 PG/ML (ref 2.5–3.9)
TSH SERPL DL<=0.05 MIU/L-ACNC: 1.39 UIU/ML (ref 0.45–4.5)
WBC # BLD AUTO: 3.71 THOUSAND/UL (ref 4.31–10.16)

## 2024-05-31 PROCEDURE — 36415 COLL VENOUS BLD VENIPUNCTURE: CPT

## 2024-05-31 PROCEDURE — 85025 COMPLETE CBC W/AUTO DIFF WBC: CPT

## 2024-05-31 PROCEDURE — 80053 COMPREHEN METABOLIC PANEL: CPT

## 2024-05-31 PROCEDURE — 84481 FREE ASSAY (FT-3): CPT

## 2024-05-31 PROCEDURE — 84443 ASSAY THYROID STIM HORMONE: CPT

## 2024-05-31 NOTE — TELEPHONE ENCOUNTER
Patient called to confirm her appt with [facility] on 6/4, reported she will get her labs done over the weekend prior.

## 2024-06-04 ENCOUNTER — HOSPITAL ENCOUNTER (OUTPATIENT)
Dept: INFUSION CENTER | Facility: CLINIC | Age: 64
Discharge: HOME | End: 2024-06-04
Payer: COMMERCIAL

## 2024-06-04 ENCOUNTER — TELEPHONE (OUTPATIENT)
Dept: INFUSION CENTER | Facility: CLINIC | Age: 64
End: 2024-06-04

## 2024-06-04 VITALS
DIASTOLIC BLOOD PRESSURE: 87 MMHG | OXYGEN SATURATION: 98 % | SYSTOLIC BLOOD PRESSURE: 142 MMHG | HEART RATE: 74 BPM | RESPIRATION RATE: 18 BRPM | TEMPERATURE: 97 F

## 2024-06-04 DIAGNOSIS — C64.1 RENAL CELL CARCINOMA OF RIGHT KIDNEY (HCC): Primary | ICD-10-CM

## 2024-06-04 RX ORDER — SODIUM CHLORIDE 9 MG/ML
20 INJECTION, SOLUTION INTRAVENOUS ONCE
Status: COMPLETED | OUTPATIENT
Start: 2024-06-04 | End: 2024-06-04

## 2024-06-04 RX ADMIN — SODIUM CHLORIDE 200 MG: 9 INJECTION, SOLUTION INTRAVENOUS at 12:07

## 2024-06-04 RX ADMIN — SODIUM CHLORIDE 20 ML/HR: 0.9 INJECTION, SOLUTION INTRAVENOUS at 11:33

## 2024-06-04 NOTE — PROGRESS NOTES
Pt tolerated treatment without incident. Confirmed pts next apt for 6/25/24 @ 10am @ [facility].

## 2024-06-04 NOTE — TELEPHONE ENCOUNTER
Hello!  Patient was at infusion today for her treatment and was wondering if she was able to get more appointments scheduled, although her current treatment ends after the next. Will she continue to get treatment after what's scheduled? If so, could more be made available?  Thank you!

## 2024-06-04 NOTE — PROGRESS NOTES
Patient is here for [facility]. Labs reviewed and meet parameters

## 2024-06-10 ENCOUNTER — TELEPHONE (OUTPATIENT)
Dept: HEMATOLOGY ONCOLOGY | Facility: CLINIC | Age: 64
End: 2024-06-10

## 2024-06-10 ENCOUNTER — APPOINTMENT (OUTPATIENT)
Dept: LAB | Facility: CLINIC | Age: 64
End: 2024-06-10
Payer: COMMERCIAL

## 2024-06-10 DIAGNOSIS — C64.1 RENAL CELL CARCINOMA OF RIGHT KIDNEY (HCC): ICD-10-CM

## 2024-06-10 LAB
ALBUMIN SERPL BCP-MCNC: 3.7 G/DL (ref 3.5–5)
ALP SERPL-CCNC: 202 U/L (ref 34–104)
ALT SERPL W P-5'-P-CCNC: 37 U/L (ref 7–52)
ANION GAP SERPL CALCULATED.3IONS-SCNC: 9 MMOL/L (ref 4–13)
AST SERPL W P-5'-P-CCNC: 36 U/L (ref 13–39)
BASOPHILS # BLD AUTO: 0.04 THOUSANDS/ÂΜL (ref 0–0.1)
BASOPHILS NFR BLD AUTO: 1 % (ref 0–1)
BILIRUB SERPL-MCNC: 0.52 MG/DL (ref 0.2–1)
BUN SERPL-MCNC: 28 MG/DL (ref 5–25)
CALCIUM SERPL-MCNC: 8.8 MG/DL (ref 8.4–10.2)
CHLORIDE SERPL-SCNC: 101 MMOL/L (ref 96–108)
CO2 SERPL-SCNC: 27 MMOL/L (ref 21–32)
CREAT SERPL-MCNC: 1.06 MG/DL (ref 0.6–1.3)
EOSINOPHIL # BLD AUTO: 0.14 THOUSAND/ÂΜL (ref 0–0.61)
EOSINOPHIL NFR BLD AUTO: 3 % (ref 0–6)
ERYTHROCYTE [DISTWIDTH] IN BLOOD BY AUTOMATED COUNT: 13.3 % (ref 11.6–15.1)
GFR SERPL CREATININE-BSD FRML MDRD: 55 ML/MIN/1.73SQ M
GLUCOSE P FAST SERPL-MCNC: 111 MG/DL (ref 65–99)
HCT VFR BLD AUTO: 38.2 % (ref 34.8–46.1)
HGB BLD-MCNC: 12.1 G/DL (ref 11.5–15.4)
IMM GRANULOCYTES # BLD AUTO: 0.04 THOUSAND/UL (ref 0–0.2)
IMM GRANULOCYTES NFR BLD AUTO: 1 % (ref 0–2)
LYMPHOCYTES # BLD AUTO: 1.35 THOUSANDS/ÂΜL (ref 0.6–4.47)
LYMPHOCYTES NFR BLD AUTO: 27 % (ref 14–44)
MCH RBC QN AUTO: 30.3 PG (ref 26.8–34.3)
MCHC RBC AUTO-ENTMCNC: 31.7 G/DL (ref 31.4–37.4)
MCV RBC AUTO: 96 FL (ref 82–98)
MONOCYTES # BLD AUTO: 0.34 THOUSAND/ÂΜL (ref 0.17–1.22)
MONOCYTES NFR BLD AUTO: 7 % (ref 4–12)
NEUTROPHILS # BLD AUTO: 3.01 THOUSANDS/ÂΜL (ref 1.85–7.62)
NEUTS SEG NFR BLD AUTO: 61 % (ref 43–75)
NRBC BLD AUTO-RTO: 0 /100 WBCS
PLATELET # BLD AUTO: 182 THOUSANDS/UL (ref 149–390)
PMV BLD AUTO: 11.5 FL (ref 8.9–12.7)
POTASSIUM SERPL-SCNC: 4 MMOL/L (ref 3.5–5.3)
PROT SERPL-MCNC: 7.7 G/DL (ref 6.4–8.4)
RBC # BLD AUTO: 3.99 MILLION/UL (ref 3.81–5.12)
SODIUM SERPL-SCNC: 137 MMOL/L (ref 135–147)
WBC # BLD AUTO: 4.92 THOUSAND/UL (ref 4.31–10.16)

## 2024-06-10 PROCEDURE — 80053 COMPREHEN METABOLIC PANEL: CPT

## 2024-06-10 PROCEDURE — 36415 COLL VENOUS BLD VENIPUNCTURE: CPT

## 2024-06-10 PROCEDURE — 85025 COMPLETE CBC W/AUTO DIFF WBC: CPT

## 2024-06-10 NOTE — TELEPHONE ENCOUNTER
Lab Inquiry   Who are you speaking with? Patient     If it is not the patient, are they listed on an active communication consent form? Yes   Name of ordering provider Dr. Quintero   What is being requested? Lab orders need to be entered   Lab draw location [facility]   What is the best call back number? [phone]   If patient at the lab, Was a live attempts to contact the team made? yes

## 2024-06-10 NOTE — TELEPHONE ENCOUNTER
Patient Call    Who are you speaking with? Patient    If it is not the patient, are they listed on an active communication consent form? N/A   What is the reason for this call? Patient is calling to speak with Dr. Quintero.  She has a scheduled appt 6/12/24 10am and her [facility]  Bone appt is 6/12 @ 6:30pm.  she wants to know if she should keep his appointment   Does this require a call back? yes   If a call back is required, please list best call back number [phone]   If a call back is required, advise that a message will be forwarded to their care team and someone will return their call as soon as possible.   Did you relay this information to the patient? yes

## 2024-06-11 ENCOUNTER — HOSPITAL ENCOUNTER (OUTPATIENT)
Dept: RADIOLOGY | Facility: MEDICAL CENTER | Age: 64
Discharge: HOME | End: 2024-06-11
Payer: COMMERCIAL

## 2024-06-11 ENCOUNTER — OFFICE VISIT (OUTPATIENT)
Dept: GASTROENTEROLOGY | Facility: CLINIC | Age: 64
End: 2024-06-11
Payer: COMMERCIAL

## 2024-06-11 VITALS
HEART RATE: 73 BPM | OXYGEN SATURATION: 97 % | BODY MASS INDEX: 24.14 KG/M2 | HEIGHT: 69 IN | WEIGHT: 163 LBS | TEMPERATURE: 97.3 F | SYSTOLIC BLOOD PRESSURE: 144 MMHG | DIASTOLIC BLOOD PRESSURE: 90 MMHG

## 2024-06-11 VITALS — BODY MASS INDEX: 24.14 KG/M2 | WEIGHT: 163 LBS | HEIGHT: 69 IN

## 2024-06-11 DIAGNOSIS — Z12.31 ENCOUNTER FOR SCREENING MAMMOGRAM FOR BREAST CANCER: ICD-10-CM

## 2024-06-11 DIAGNOSIS — K76.0 METABOLIC DYSFUNCTION-ASSOCIATED STEATOTIC LIVER DISEASE (MASLD): Primary | ICD-10-CM

## 2024-06-11 DIAGNOSIS — K74.3 PRIMARY BILIARY CIRRHOSIS (HCC): ICD-10-CM

## 2024-06-11 DIAGNOSIS — I10 ESSENTIAL HYPERTENSION: ICD-10-CM

## 2024-06-11 PROCEDURE — 77063 BREAST TOMOSYNTHESIS BI: CPT

## 2024-06-11 PROCEDURE — 99204 OFFICE O/P NEW MOD 45 MIN: CPT | Performed by: INTERNAL MEDICINE

## 2024-06-11 PROCEDURE — 77067 SCR MAMMO BI INCL CAD: CPT

## 2024-06-11 NOTE — PROGRESS NOTES
[facility] - Outpatient Consultation  Courtney Davalos 63 y.o. female MRN: [id number]  Encounter: [id number]    PCP:  Jose Daniel Harmon DO, [phone]  Referring Provider:  Jose Daniel Harmon DO, [phone]        ASSESSMENT AND PLAN:      #1 PBC:  Patient states she had a biopsy done here at [facility] at the onset of her diagnosis.  She denies recollection of the diagnosis of cirrhosis.  Fortunately, she has no physical, laboratory or radiologic evidence of cirrhosis or portal hypertension.  She is on the ideal dose of ursodiol, near 13 mg/kg.  Unfortunately she is not taking this dose daily and I have urged her to take all 3 tablets daily with her aspirin to 2 times per day or 3 times per day.  She will continue her fenofibrate dose at 220 mg daily.  Her alkaline phosphatase is currently not at goal and slightly over 200.  I hope with her taking this medication regularly that it will improve.  She will have repeat blood work now and again in 3 months.  If alkaline phosphatase does not trend towards goal, we will consider alternate medications.  There will be 1 or 2 new medications approved this year as second line treatments.  She is currently taking vitamin D.  However I will check fat-soluble vitamin levels and recommend dose adjustment or addition of new medications if needed.  She is due for DEXA scan for osteopenia/osteoporosis screening.  It is already scheduled for this month.  She will have updated labs in 3 months time (CBC, CMP, PT/INR).    #2 fatty liver:  She is slightly overweight and has hypertension and hyperlipidemia.  Her hemoglobin A1c was 6.5 back in 2018 but most recently 1 year ago was 5.3.  Given that she has some components of the metabolic syndrome and no history of alcohol use, her fatty liver may be related to metabolic dysfunction associated steatotic liver disease.  I discussed with Ms. Davalos the natural history of fatty liver disease, including risks  for development, and risk for progression to cirrhosis and its complications.    I counseled Ms. Davalos on the importance of weight loss through lifestyle modification, primary diet and exercise, and the benefits of seeking input of a dietician/nutritionist as well as consultation with a medical weight loss specialist if needed.  We will obtain ultrasound elastography to non-invasively quantify hepatic fat and stage hepatic fibrosis.      CRC Screening:  Colonoscopy in 2021 with no adenomatous polyps.  Repeat in 2031.    FOLLOW-UP:  Return in about 3 months (around 9/11/2024).    VISIT DIAGNOSES AND ORDERS:      1. Metabolic dysfunction-associated steatotic liver disease (MASLD)    2. Primary biliary cirrhosis (HCC)      Orders Placed This Encounter   Procedures    US elastography/UGAP    Alpha 1 Antitrypsin Phenotype    Vitamin D 25 hydroxy    Vitamin K    Vitamin A    Vitamin E    CBC    Comprehensive metabolic panel    Protime-INR     ______________________________________________________________________    HPI:  Ms. Courtney Davalos is a 63 y.o. female with medical history as outlined below, including HTN, CRPS of RLE, smouldering , who comes in today for initial consultation for management of PBC.    Patient says she found to have biopsy-proven  PBC over 20 years ago and has been on treatment with ursodiol and more recently with the addition of fenofibrate.  She was not tolerant of obeticholic acid.  She is prescribed to take 300 mg of ursodiol 3 times daily and fenofibrate 120 mg daily.  Unfortunately, she often only takes her morning dose of ursodiol.    She does not recall ever being told she has cirrhosis.  Her last abdominal imaging was a CAT scan of the abdomen pelvis in March of this year in follow-up of her nephrectomy.  The liver appeared fatty but there was no signs of cirrhosis or portal hypertension.  Ms. Davalos denies recent or history of yellow eyes/skin, dark urine, GI bleeding, abdominal distention  with fluid, lower extremity swelling, easy bruising, excessive bleeding, or confusion.  She does complain of pruritus, worse after starting Keytruda with the development of a rash, and some fatigue.  Generally her fatigue is not limiting.  The pruritus also does not keep her awake at night.    She does complain of symptoms related to her IBS, described mostly as sensitivity to certain types of food that causes abdominal discomfort diaphoresis and bowel movement with symptoms resolving after bowel movement.    She last had a colonoscopy in 2021 which was reportedly without any polyps.  Repeat due in 2031.      REVIEW OF SYSTEMS:    Review of Systems   Musculoskeletal:  Positive for arthralgias (Right knee pain) and back pain.   All other systems reviewed and are negative.   per HPI      Historical Information   Patient Active Problem List   Diagnosis    Peripheral neuropathy    Right elbow pain    Lateral epicondylitis of right elbow    Medial epicondylitis, left elbow    Chronic pain of right knee    Post-traumatic osteoarthritis of right knee    Medial epicondylitis of left elbow    Abnormal mammogram    Arthritis    Chronic pain disorder    Chronic right hip pain    Cirrhosis (HCC)    Dense breasts    Depression with anxiety    Hydronephrosis    Hypergammaglobulinemia    Hyperlipidemia    Lumbar degenerative disc disease    Lumbar radiculopathy    Nephrolithiasis    Occipital neuralgia    Primary biliary cholangitis (HCC)    Pruritus    Sacroiliitis (HCC)    Smoldering multiple myeloma (SMM)    Vitamin D deficiency    Status post total right knee replacement    Essential hypertension    Herpes simplex    MDD (major depressive disorder), recurrent episode, mild (HCC)    JOSEFINA (generalized anxiety disorder)    OCD (obsessive compulsive disorder)    Syncope    PTSD (post-traumatic stress disorder)    Complex regional pain syndrome i of right lower limb    Quadriceps weakness    Chronic left-sided low back pain without  sciatica    Left leg pain    Complex regional pain syndrome type 2 of right lower extremity    Trigger finger, right middle finger    Chest pain    Osteopenia    Closed fracture of one rib of left side with routine healing    Epistaxis    Other osteoporosis without current pathological fracture    Iron deficiency anemia due to chronic blood loss    Vitamin B12 deficiency    Multiple myeloma not having achieved remission (HCC)    Renal mass    Renal cell carcinoma of right kidney (HCC)    Stage 3a chronic kidney disease (HCC)    Tendonitis    Keratosis pilaris    Welcome to Medicare preventive visit    Tinea     Past Medical History:   Diagnosis Date    Abnormal breast exam     Anemia     Anxiety     Arthritis     Asthma     childhood    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HCC)     primary per allscripts    Cancer (HCC)     IgG kappa smoldering multiple myeloma    Cardiac murmur     Chronic liver disease     resolved 12/04/2017    COVID     Depression     Endometriosis     Fracture of tibia     right tibial plateau fracture per allscripts    Heart murmur     Hepatic disease     Hypertension     last assessed 12/13/2017    Inflammatory bowel disease     Kidney stone     Multiple myeloma (HCC)     Neuropathy     R foot     Nosebleed     OCD (obsessive compulsive disorder)     Osteoporosis     Otitis media     Pneumonia     RSD (reflex sympathetic dystrophy) 12/11/2018    Scoliosis     Seasonal allergies     Urinary tract infection     Visual impairment     Wears eyeglasses     Whiplash injury to neck      Past Surgical History:   Procedure Laterality Date    BACK SURGERY      hemilaminectomy and discectomy, L5-S1, right (Dr. Rashid, [facility] at [facility]) onset 02/14/2005 per allscripts    COLONOSCOPY      EXPLORATION EXTREMITY Right 08/29/2016    Procedure: KNEE PERONEAL NERVE EXPLORATION AND RELEASE ;  Surgeon: Bry De Guzman MD;  Location: [facility];  Service:     FOOT SURGERY      FRACTURE SURGERY      HAND SURGERY      HERNIA REPAIR       IR BIOPSY KIDNEY MASS  05/25/2023    JOINT REPLACEMENT Right     RTK    KNEE SURGERY      MANDIBLE SURGERY      NEPHRECTOMY LAPAROSCOPIC Right 6/21/2023    Procedure: NEPHRECTOMY LAPAROSCOPIC ASSISTED;  Surgeon: Avinash Sterling MD;  Location: [facility];  Service: Urology    NEUROPLASTY / TRANSPOSITION MEDIAN NERVE AT CARPAL TUNNEL      ORIF TIBIAL PLATEAU Right     arthroplasty per allscripts    OTHER SURGICAL HISTORY      injection of trigger point(s) per allscripts    UT ARTHRP KNE CONDYLE&PLATU MEDIAL&LAT COMPARTMENTS Right 06/11/2018    Procedure: ARTHROPLASTY KNEE TOTAL;  Surgeon: Bry De Guzman MD;  Location: [facility];  Service: Orthopedics    UT TENDON SHEATH INCISION Right 12/02/2020    Procedure: RELEASE TRIGGER FINGER-right long;  Surgeon: Todd Mcdonald MD;  Location: [facility];  Service: Orthopedics    SINUS SURGERY      SPINE SURGERY      TONSILLECTOMY       Social History   Social History     Substance and Sexual Activity   Alcohol Use Not Currently     Social History     Substance and Sexual Activity   Drug Use No     Social History     Tobacco Use   Smoking Status Never    Passive exposure: Never   Smokeless Tobacco Never     Family History   Problem Relation Age of Onset    Heart failure Mother     Anxiety disorder Mother         symptom per allscripts    Depression Mother     Vision loss Mother     Anxiety disorder Father         symptom per allscripts    Cirrhosis Father         hepatic per allscripts    Alcohol abuse Father     Stomach cancer Maternal Grandmother     Cancer Maternal Grandmother     Stomach cancer Maternal Grandfather     Cancer Maternal Grandfather     Diabetes Paternal Grandmother     No Known Problems Paternal Grandfather     No Known Problems Paternal Aunt     No Known Problems Paternal Aunt     Anxiety disorder Other         symptom per allscripts    Coronary artery disease Other     Depression Other     Hyperlipidemia Other     Osteoarthritis Other     Other  "Other         back disorder per allscripts    Neuropathy Other        Meds/Allergies       Current Outpatient Medications:     albuterol (Proventil HFA) 90 mcg/act inhaler    ammonium lactate (LAC-HYDRIN) 12 % lotion    Cholecalciferol (Vitamin D3) 50 MCG (2000 UT) TABS    Elastic Bandages & Supports (Thumb Splint/Right Small) MISC    estradiol (VAGIFEM, YUVAFEM) 10 MCG TABS vaginal tablet    fenofibrate (FENOGLIDE) 120 MG TABS    FLUoxetine (PROzac) 40 MG capsule    ketoconazole (NIZORAL) 2 % cream    lidocaine (LIDODERM) 5 %    lisinopril (ZESTRIL) 20 mg tablet    promethazine-dextromethorphan (PHENERGAN-DM) 6.25-15 mg/5 mL oral syrup    traMADol (Ultram) 50 mg tablet    traZODone (DESYREL) 50 mg tablet    triamcinolone (KENALOG) 0.1 % ointment    ursodiol (ACTIGALL) 300 mg capsule    valACYclovir (VALTREX) 500 mg tablet    Allergies   Allergen Reactions    Codeine GI Intolerance and Nausea Only     Other reaction(s): Other (See Comments)  Violently ill  Violently ill    Latex Rash     itching    Morphine And Codeine Nausea Only     GI intolerance nausea    Nortriptyline Shortness Of Breath    Ocaliva [Obeticholic Acid] Hives    Doxycycline GI Intolerance and Nausea Only    Sulfa Antibiotics GI Intolerance    Cymbalta [Duloxetine Hcl]     Penicillins Other (See Comments) and Rash     Childhood reaction           Objective     Blood pressure 144/90, pulse 73, temperature (!) 97.3 °F (36.3 °C), temperature source Tympanic, height 5' 9\" (1.753 m), weight 73.9 kg (163 lb), SpO2 97%. Body mass index is 24.07 kg/m².        PHYSICAL EXAM:           Physical Exam         Lab Results:   Lab Results   Component Value Date    SODIUM 137 06/10/2024    K 4.0 06/10/2024    BUN 28 (H) 06/10/2024    CREATININE 1.06 06/10/2024    MG 2.1 06/25/2023    TBILI 0.52 06/10/2024    ALKPHOS 202 (H) 06/10/2024    ALB 3.7 06/10/2024    AST 36 06/10/2024    ALT 37 06/10/2024    WBC 4.92 06/10/2024     06/10/2024    HGB 12.1 06/10/2024 " "   INR 0.91 06/07/2023     No results found for: \"AFP\"  Lab Results   Component Value Date    IRON 64 06/23/2023    FERRITIN 143 06/23/2023    CONCFE 15 06/23/2023    BHUMI Negative 06/28/2022    SMOOTHMUSCAB 6 05/09/2015    IGG 2,367 (H) 05/13/2024     05/13/2024    HAV Non-Reactive (q 07/31/2014    HEPBSAG Non-Reactive (q 07/31/2014    HEPBSAB 29.54 07/31/2014    HEPCAB Non-Reactive (q 07/31/2014     Lab Results   Component Value Date    HGBA1C 5.3 05/13/2023     Computed MELD 3.0 unavailable. One or more values for this score either were not found within the given timeframe or did not fit some other criterion.  Computed MELD-Na unavailable. One or more values for this score either were not found within the given timeframe or did not fit some other criterion.      Radiology Results:   I have reviewed the results of any radiology studies performed within the last 90 days. This includes:      No results found.      Ebenezer Esqueda MD  Hepatology/Gastroenterology  "

## 2024-06-11 NOTE — PATIENT INSTRUCTIONS
Please have the blood work done at your earliest convenience, and then every 3 months.  Please check the insurance coverage ultrasound elastography and then schedule it.  Please, please, please, take your ursodiol as prescribed.  Continue vitamin D.        Pt states she will schedule US Elastography/UGAP as recommended by Dr. Ebenezer Esqueda on 6/11/24.  Pt given phone number to Central Scheduling [phone] to schedule said imaging.

## 2024-06-11 NOTE — TELEPHONE ENCOUNTER
Reason for call:   [x] Refill   [] Prior Auth  [] Other:     Office:   [x] PCP/Provider -   [] Specialty/Provider -     Medication: Lisinopril 20 mg, take 1 tablet by mouth daily       Pharmacy: [facility] [address] [place]     Does the patient have enough for 3 days?   [x] Yes   [] No - Send as HP to POD

## 2024-06-12 ENCOUNTER — HOSPITAL ENCOUNTER (OUTPATIENT)
Dept: RADIOLOGY | Age: 64
Discharge: HOME | End: 2024-06-12
Payer: COMMERCIAL

## 2024-06-12 ENCOUNTER — OFFICE VISIT (OUTPATIENT)
Dept: HEMATOLOGY ONCOLOGY | Facility: CLINIC | Age: 64
End: 2024-06-12
Payer: COMMERCIAL

## 2024-06-12 VITALS
DIASTOLIC BLOOD PRESSURE: 80 MMHG | OXYGEN SATURATION: 99 % | RESPIRATION RATE: 18 BRPM | BODY MASS INDEX: 24.44 KG/M2 | HEIGHT: 69 IN | HEART RATE: 73 BPM | TEMPERATURE: 98 F | SYSTOLIC BLOOD PRESSURE: 140 MMHG | WEIGHT: 165 LBS

## 2024-06-12 DIAGNOSIS — D47.2 SMOLDERING MULTIPLE MYELOMA (SMM): Chronic | ICD-10-CM

## 2024-06-12 DIAGNOSIS — C64.1 RENAL CELL CARCINOMA OF RIGHT KIDNEY (HCC): Primary | ICD-10-CM

## 2024-06-12 DIAGNOSIS — Z78.0 ASYMPTOMATIC MENOPAUSAL STATE: ICD-10-CM

## 2024-06-12 DIAGNOSIS — Z13.820 SCREENING FOR OSTEOPOROSIS: ICD-10-CM

## 2024-06-12 PROCEDURE — 99214 OFFICE O/P EST MOD 30 MIN: CPT | Performed by: INTERNAL MEDICINE

## 2024-06-12 PROCEDURE — 77080 DXA BONE DENSITY AXIAL: CPT

## 2024-06-12 RX ORDER — SODIUM CHLORIDE 9 MG/ML
20 INJECTION, SOLUTION INTRAVENOUS ONCE
OUTPATIENT
Start: 2024-06-25

## 2024-06-12 RX ORDER — LISINOPRIL 20 MG/1
20 TABLET ORAL DAILY
Qty: 90 TABLET | Refills: 1 | Status: SHIPPED | OUTPATIENT
Start: 2024-06-12

## 2024-06-12 NOTE — PROGRESS NOTES
Hematology Outpatient Follow - Up Note  Courtney Davalos 63 y.o. female MRN: @ Encounter: [id number]        Date:  6/12/2024        Assessment/ Plan:    1.  Renal cell carcinoma of the right side, stage III (P T3a, PN X, grade 1, M0)  s/p nephrectomy 6/21/2023 clear cell subtype, primary 5.1 cm, unifocal, grade 1, with renal vein involvement, all margins are negative     she decided on adjuvant Pembrolizumab 200 mg every 3 weeks for total of 1 year (C1 on 8/15/2023); However the patient has autoimmune primary biliary cirrhosis we have to watch very carefully for autoimmune side effects such as hepatitis, pancreatitis, dermatitis, pneumonitis, adrenal insufficiency, colitis etc.,    Patient to finish dose #17 on 6/25/2024 and this is the last dose after 3 months we will do CAT scan of the abdomen without contrast  2.  Smoldering multiple myeloma, IgG kappa:  bone marrow biopsy showed 15% plasma cells with normal cytogenetics and FISH panel for multiple myeloma diagnosed in May 2017 she had been on watchful observation no evidence of endorgan damage  3.  Primary biliary cirrhosis of the liver with persistent elevation of alkaline phosphatase and intermittent pruritus followed by hepatologist    In 4 months we will follow-up    Osteoporosis she was on Zometa every 6-month DEXA scan scheduled on 6/12/2024      Labs and imaging studies are reviewed by ordering provider once results are available. If there are findings that need immediate attention, you will be contacted when results available.   Discussing results and the implication on your healthcare is best discussed in person at your follow-up visit.       HPI:  63-year-old  female with past medical history of primary biliary cirrhosis, fracture of the right tibia, hypertension, OCD,worsening of osteoporosis on DEXA scan Done in 2016, nephrolithiasis, chronic lower back pain and possible sacroiliitis was found to have elevated total protein 3-4 years ago,  serum protein electrophoresis showed IgG kappa monoclonal protein of 1.7 g/dL however no evidence of anemia, hypercalcemia, or renal insufficiency, she had persistent elevation of alkaline phosphatase secondary to PBC  had a bone scan done last year which showed activity in the ribs area  serum protein electrophoresis in April 2017 showed IgG kappa monoclonal protein of 1.96 g/dL, total protein 8.5, albumin 3.9, IgG 2580, creatinine 0.8, calcium 8.9, alkaline phosphatase 294, AST 37, ALT 46, IgM 25 in July 2016 monoclonal protein of IgG kappa of 1.73  C-reactive protein has been normal however sedimentation rate was elevated in the range of 60  Bone marrow biopsy in May 2017 showed 15% plasma cells in diffuse and interstitial pattern, normal fish panel for multiple myeloma and cytogenetics  Status post right knee replacement in June 2018  Exacerbation of anxiety/depression followed by psychiatric medicine     Osteoporosis s/p Zometa in 2019, DEXA scan on 08/2021 showed recurrence of osteoporosis in the lumbar spine; she is on Zometa every 6 months     Primary biliary cirrhosis followed by GI       Colonoscopy on 06/2021 showed mild diverticulosis         Interval History:   - TSH 1.778 (11/6/23)   - 11/27/23 CBC unremarkable; Cr 1.09 (baseline), AST 58, ALT 59, and  (all ~ baseline) , Total Bilirubin 0.55   - Pembrolizumab cycle 7 was given on 11/28/2023 ; reports some itchiness/rash, occasional and migratory , described as tolerable and declines need for systemic steroid,; she uses Triamcinolone 1%      Cancer Staging:  Cancer Staging   No matching staging information was found for the patient.     Molecular Testing:      Previous Hematologic/ Oncologic History:          Oncology History   Renal cell carcinoma of right kidney (HCC)   6/21/2023 Initial Diagnosis     Renal cell carcinoma of right kidney (HCC)      8/15/2023 -  Chemotherapy     alteplase (CATHFLO), 2 mg, Intracatheter, Every 1 Minute as needed,  7 of 9 cycles  pembrolizumab (KEYTRUDA) IVPB, 200 mg, Intravenous, Once, 7 of 9 cycles  Administration: 200 mg (8/15/2023), 200 mg (9/5/2023), 200 mg (9/26/2023), 200 mg (10/17/2023), 200 mg (11/7/2023), 200 mg (11/28/2023)          Interval History:        Previous Treatment:         Test Results:    Imaging: No results found.    Labs:   Lab Results   Component Value Date    WBC 4.92 06/10/2024    HGB 12.1 06/10/2024    HCT 38.2 06/10/2024    MCV 96 06/10/2024     06/10/2024     Lab Results   Component Value Date     12/29/2015    K 4.0 06/10/2024     06/10/2024    CO2 27 06/10/2024    ANIONGAP 7 12/29/2015    BUN 28 (H) 06/10/2024    CREATININE 1.06 06/10/2024    GLUCOSE 134 12/29/2015    GLUF 111 (H) 06/10/2024    CALCIUM 8.8 06/10/2024    CORRECTEDCA 9.2 08/21/2023    AST 36 06/10/2024    ALT 37 06/10/2024    ALKPHOS 202 (H) 06/10/2024    PROT 8.4 (H) 12/29/2015    BILITOT 0.46 12/29/2015    EGFR 55 06/10/2024       Lab Results   Component Value Date    IRON 64 06/23/2023    TIBC 424 06/23/2023    FERRITIN 143 06/23/2023       Lab Results   Component Value Date    UCGQEBHP70 393 06/28/2022         ROS: Review of Systems   Constitutional:  Negative for appetite change, chills, diaphoresis, fatigue and unexpected weight change.   HENT:   Negative for mouth sores, nosebleeds, sore throat, trouble swallowing and voice change.    Eyes:  Negative for eye problems and icterus.   Respiratory:  Negative for chest tightness, cough, hemoptysis and wheezing.    Cardiovascular:  Negative for chest pain, leg swelling and palpitations.   Gastrointestinal:  Negative for abdominal distention, abdominal pain, blood in stool, constipation, diarrhea, nausea and vomiting.   Endocrine: Negative for hot flashes.   Genitourinary:  Negative for bladder incontinence, difficulty urinating, dyspareunia, dysuria and frequency.    Musculoskeletal:  Negative for arthralgias, back pain, gait problem, neck pain and neck  stiffness.   Skin:  Negative for itching and rash.   Neurological:  Negative for dizziness, gait problem, headaches, numbness, seizures and speech difficulty.   Hematological:  Negative for adenopathy. Does not bruise/bleed easily.   Psychiatric/Behavioral:  Negative for decreased concentration, depression, sleep disturbance and suicidal ideas. The patient is not nervous/anxious.           Current Medications: Reviewed  Allergies: Reviewed  PMH/FH/SH:  Reviewed      Physical Exam:    Body surface area is 1.9 meters squared.    Wt Readings from Last 3 Encounters:   06/12/24 74.8 kg (165 lb)   06/11/24 73.9 kg (163 lb)   06/11/24 73.9 kg (163 lb)        Temp Readings from Last 3 Encounters:   06/12/24 98 °F (36.7 °C)   06/11/24 (!) 97.3 °F (36.3 °C) (Tympanic)   06/04/24 (!) 97 °F (36.1 °C) (Temporal)        BP Readings from Last 3 Encounters:   06/12/24 140/80   06/11/24 144/90   06/04/24 142/87         Pulse Readings from Last 3 Encounters:   06/12/24 73   06/11/24 73   06/04/24 74        Physical Exam  Vitals reviewed.   Constitutional:       General: She is not in acute distress.     Appearance: She is well-developed. She is not diaphoretic.   HENT:      Head: Normocephalic and atraumatic.   Eyes:      Conjunctiva/sclera: Conjunctivae normal.   Neck:      Trachea: No tracheal deviation.   Cardiovascular:      Rate and Rhythm: Normal rate and regular rhythm.      Heart sounds: No murmur heard.     No friction rub. No gallop.   Pulmonary:      Effort: Pulmonary effort is normal. No respiratory distress.      Breath sounds: Normal breath sounds. No wheezing or rales.   Chest:      Chest wall: No tenderness.   Abdominal:      General: There is no distension.      Palpations: Abdomen is soft.      Tenderness: There is no abdominal tenderness.   Musculoskeletal:      Cervical back: Normal range of motion and neck supple.      Right lower leg: No edema.      Left lower leg: No edema.   Lymphadenopathy:      Cervical: No  cervical adenopathy.   Skin:     General: Skin is warm and dry.      Coloration: Skin is not pale.      Findings: No erythema.   Neurological:      Mental Status: She is alert. Mental status is at baseline.   Psychiatric:         Behavior: Behavior normal.         Thought Content: Thought content normal.       ECOG PS:0    Goals and Barriers:  Current Goal: Minimize effects of disease.   Barriers: None.      Patient's Capacity to Self Care:  Patient is able to self care.    Code Status: @[facility]@

## 2024-06-18 ENCOUNTER — TELEPHONE (OUTPATIENT)
Dept: INFUSION CENTER | Facility: CLINIC | Age: 64
End: 2024-06-18

## 2024-06-18 ENCOUNTER — TELEPHONE (OUTPATIENT)
Dept: HEMATOLOGY ONCOLOGY | Facility: CLINIC | Age: 64
End: 2024-06-18

## 2024-06-18 DIAGNOSIS — M81.8 OTHER OSTEOPOROSIS WITHOUT CURRENT PATHOLOGICAL FRACTURE: Primary | ICD-10-CM

## 2024-06-18 RX ORDER — ZOLEDRONIC ACID 0.04 MG/ML
4 INJECTION, SOLUTION INTRAVENOUS ONCE
Status: CANCELLED | OUTPATIENT
Start: 2024-06-20 | End: 2024-06-20

## 2024-06-18 RX ORDER — SODIUM CHLORIDE 9 MG/ML
20 INJECTION, SOLUTION INTRAVENOUS ONCE
Status: CANCELLED | OUTPATIENT
Start: 2024-06-25

## 2024-06-18 RX ORDER — SODIUM CHLORIDE 9 MG/ML
20 INJECTION, SOLUTION INTRAVENOUS ONCE
Status: CANCELLED | OUTPATIENT
Start: 2024-06-20

## 2024-06-18 RX ORDER — ZOLEDRONIC ACID 0.04 MG/ML
4 INJECTION, SOLUTION INTRAVENOUS ONCE
Status: CANCELLED | OUTPATIENT
Start: 2024-06-25 | End: 2024-06-25

## 2024-06-18 NOTE — TELEPHONE ENCOUNTER
Patient called inquiring about her Zometa infusions and wanted more details. Could she be reached out to regarding this? Thank you!

## 2024-06-21 ENCOUNTER — APPOINTMENT (OUTPATIENT)
Dept: LAB | Facility: CLINIC | Age: 64
End: 2024-06-21
Payer: COMMERCIAL

## 2024-06-21 DIAGNOSIS — K74.3 PRIMARY BILIARY CIRRHOSIS (HCC): ICD-10-CM

## 2024-06-21 DIAGNOSIS — M81.8 OTHER OSTEOPOROSIS WITHOUT CURRENT PATHOLOGICAL FRACTURE: ICD-10-CM

## 2024-06-21 DIAGNOSIS — K76.0 METABOLIC DYSFUNCTION-ASSOCIATED STEATOTIC LIVER DISEASE (MASLD): ICD-10-CM

## 2024-06-21 LAB
ALBUMIN SERPL BCG-MCNC: 3.8 G/DL (ref 3.5–5)
ALP SERPL-CCNC: 195 U/L (ref 34–104)
ALT SERPL W P-5'-P-CCNC: 39 U/L (ref 7–52)
ANION GAP SERPL CALCULATED.3IONS-SCNC: 10 MMOL/L (ref 4–13)
AST SERPL W P-5'-P-CCNC: 48 U/L (ref 13–39)
BILIRUB SERPL-MCNC: 0.59 MG/DL (ref 0.2–1)
BUN SERPL-MCNC: 22 MG/DL (ref 5–25)
CALCIUM SERPL-MCNC: 9.1 MG/DL (ref 8.4–10.2)
CHLORIDE SERPL-SCNC: 101 MMOL/L (ref 96–108)
CO2 SERPL-SCNC: 25 MMOL/L (ref 21–32)
CREAT SERPL-MCNC: 1.11 MG/DL (ref 0.6–1.3)
GFR SERPL CREATININE-BSD FRML MDRD: 52 ML/MIN/1.73SQ M
GLUCOSE SERPL-MCNC: 89 MG/DL (ref 65–140)
POTASSIUM SERPL-SCNC: 4.6 MMOL/L (ref 3.5–5.3)
PROT SERPL-MCNC: 8.3 G/DL (ref 6.4–8.4)
SODIUM SERPL-SCNC: 136 MMOL/L (ref 135–147)

## 2024-06-21 PROCEDURE — 80053 COMPREHEN METABOLIC PANEL: CPT

## 2024-06-21 PROCEDURE — 36415 COLL VENOUS BLD VENIPUNCTURE: CPT

## 2024-06-25 ENCOUNTER — HOSPITAL ENCOUNTER (OUTPATIENT)
Dept: INFUSION CENTER | Facility: CLINIC | Age: 64
Discharge: HOME | End: 2024-06-25
Payer: COMMERCIAL

## 2024-06-25 ENCOUNTER — TELEPHONE (OUTPATIENT)
Dept: INFUSION CENTER | Facility: CLINIC | Age: 64
End: 2024-06-25

## 2024-06-25 ENCOUNTER — DOCUMENTATION (OUTPATIENT)
Dept: HEMATOLOGY ONCOLOGY | Facility: CLINIC | Age: 64
End: 2024-06-25

## 2024-06-25 VITALS
HEART RATE: 79 BPM | RESPIRATION RATE: 18 BRPM | DIASTOLIC BLOOD PRESSURE: 88 MMHG | TEMPERATURE: 98.4 F | OXYGEN SATURATION: 99 % | SYSTOLIC BLOOD PRESSURE: 143 MMHG | BODY MASS INDEX: 23.85 KG/M2 | WEIGHT: 161 LBS | HEIGHT: 69 IN

## 2024-06-25 DIAGNOSIS — C64.1 RENAL CELL CARCINOMA OF RIGHT KIDNEY (HCC): Primary | ICD-10-CM

## 2024-06-25 DIAGNOSIS — M81.8 OTHER OSTEOPOROSIS WITHOUT CURRENT PATHOLOGICAL FRACTURE: ICD-10-CM

## 2024-06-25 RX ORDER — SODIUM CHLORIDE 9 MG/ML
20 INJECTION, SOLUTION INTRAVENOUS ONCE
OUTPATIENT
Start: 2024-12-18

## 2024-06-25 RX ORDER — SODIUM CHLORIDE 9 MG/ML
20 INJECTION, SOLUTION INTRAVENOUS ONCE
Status: DISCONTINUED | OUTPATIENT
Start: 2024-06-25 | End: 2024-06-28 | Stop reason: HOSPADM

## 2024-06-25 RX ORDER — SODIUM CHLORIDE 9 MG/ML
20 INJECTION, SOLUTION INTRAVENOUS ONCE
Status: COMPLETED | OUTPATIENT
Start: 2024-06-25 | End: 2024-06-25

## 2024-06-25 RX ORDER — ZOLEDRONIC ACID 0.04 MG/ML
4 INJECTION, SOLUTION INTRAVENOUS ONCE
OUTPATIENT
Start: 2024-12-18 | End: 2024-12-18

## 2024-06-25 RX ADMIN — ZOLEDRONIC ACID 3.3 MG: 4 INJECTION, SOLUTION, CONCENTRATE INTRAVENOUS at 10:34

## 2024-06-25 RX ADMIN — SODIUM CHLORIDE 20 ML/HR: 0.9 INJECTION, SOLUTION INTRAVENOUS at 10:34

## 2024-06-25 RX ADMIN — SODIUM CHLORIDE 200 MG: 9 INJECTION, SOLUTION INTRAVENOUS at 11:14

## 2024-06-25 NOTE — PROGRESS NOTES
Patient arrives for LAST Keytruda and Zometa today. Labs reviewed from 6/21/24 - noted only CMP was drawn. No thyroid studies on orders. Per Eileen Rns noted, OK to use CBC from 6/10. CrCl 47.5 and Calcium 9.1 within parameters for treatment. Patient reports upcoming dental cleaning - reports she may need caps, patient aware to let her dentist know about the Zometa as cap application may have to be delayed based on their discretion. Patient aware any invasive dental work is typically delayed at least 3 months post Zometa infusion, patient expresses understanding. PIV placed by AL RN without issue, patient tolerated well. Patient resting on recliner chair, call bell within reach.

## 2024-06-25 NOTE — TELEPHONE ENCOUNTER
Patient called to set up following Zometa infusion for December. Scheduled within tolerance on 12/17/24.

## 2024-06-25 NOTE — PROGRESS NOTES
Patient tolerated treatment without incident. Her Keytruda is complete and she is aware to schedule Zometa in 6 months, declined AVS.

## 2024-06-26 ENCOUNTER — TELEMEDICINE (OUTPATIENT)
Dept: BEHAVIORAL/MENTAL HEALTH CLINIC | Facility: CLINIC | Age: 64
End: 2024-06-26
Payer: COMMERCIAL

## 2024-06-26 DIAGNOSIS — F43.10 PTSD (POST-TRAUMATIC STRESS DISORDER): ICD-10-CM

## 2024-06-26 DIAGNOSIS — F41.1 GAD (GENERALIZED ANXIETY DISORDER): ICD-10-CM

## 2024-06-26 DIAGNOSIS — F42.9 OBSESSIVE-COMPULSIVE DISORDER, UNSPECIFIED TYPE: ICD-10-CM

## 2024-06-26 DIAGNOSIS — F33.0 MDD (MAJOR DEPRESSIVE DISORDER), RECURRENT EPISODE, MILD (HCC): Primary | ICD-10-CM

## 2024-06-26 PROBLEM — Z00.00 WELCOME TO MEDICARE PREVENTIVE VISIT: Status: RESOLVED | Noted: 2024-05-27 | Resolved: 2024-06-26

## 2024-06-26 PROCEDURE — 90837 PSYTX W PT 60 MINUTES: CPT | Performed by: COUNSELOR

## 2024-06-26 NOTE — PSYCH
Virtual Regular Visit    Verification of patient location:    Patient is located at Home in the following state in which I hold an active license [place]      Assessment/Plan:    Problem List Items Addressed This Visit     MDD (major depressive disorder), recurrent episode, mild (HCC) - Primary    JOSEFINA (generalized anxiety disorder)    OCD (obsessive compulsive disorder)    PTSD (post-traumatic stress disorder)       Goals addressed in session: Goal 1 and Goal 2          Reason for visit is   Chief Complaint   Patient presents with   • Virtual Regular Visit          Encounter provider Ethel Christian      Recent Visits  No visits were found meeting these conditions.  Showing recent visits within past 7 days and meeting all other requirements  Today's Visits  Date Type Provider Dept   06/26/24 Telemedicine Ethel Christian [facility] Therapist [place]   Showing today's visits and meeting all other requirements  Future Appointments  No visits were found meeting these conditions.  Showing future appointments within next 150 days and meeting all other requirements       The patient was identified by name and date of birth. Courtney Davalos was informed that this is a telemedicine visit and that the visit is being conducted throughthe Epic Embedded platform. She agrees to proceed..  My office door was closed. No one else was in the room.  She acknowledged consent and understanding of privacy and security of the video platform. The patient has agreed to participate and understands they can discontinue the visit at any time.    Patient is aware this is a billable service.     Subjective  Courtney Davalos is a 63 y.o. female  .      HPI     Past Medical History:   Diagnosis Date   • Abnormal breast exam    • Anemia    • Anxiety    • Arthritis    • Asthma     childhood   • 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HCC)     primary per allscripts   • Cancer (HCC)     IgG kappa smoldering multiple myeloma   • Cardiac murmur    • Chronic liver  disease     resolved 12/04/2017   • COVID    • Depression    • Endometriosis    • Fracture of tibia     right tibial plateau fracture per allscripts   • Heart murmur    • Hepatic disease    • Hypertension     last assessed 12/13/2017   • Inflammatory bowel disease    • Kidney stone    • Multiple myeloma (HCC)    • Neuropathy     R foot    • Nosebleed    • OCD (obsessive compulsive disorder)    • Osteoporosis    • Otitis media    • Pneumonia    • RSD (reflex sympathetic dystrophy) 12/11/2018   • Scoliosis    • Seasonal allergies    • Urinary tract infection    • Visual impairment    • Wears eyeglasses    • Whiplash injury to neck        Past Surgical History:   Procedure Laterality Date   • BACK SURGERY      hemilaminectomy and discectomy, L5-S1, right (Dr. Rashid, [facility]) onset 02/14/2005 per allscripts   • COLONOSCOPY     • EXPLORATION EXTREMITY Right 08/29/2016    Procedure: KNEE PERONEAL NERVE EXPLORATION AND RELEASE ;  Surgeon: Bry De Guzman MD;  Location: [facility];  Service:    • FOOT SURGERY     • FRACTURE SURGERY     • HAND SURGERY     • HERNIA REPAIR     • IR BIOPSY KIDNEY MASS  05/25/2023   • JOINT REPLACEMENT Right     RTK   • KNEE SURGERY     • MANDIBLE SURGERY     • NEPHRECTOMY LAPAROSCOPIC Right 6/21/2023    Procedure: NEPHRECTOMY LAPAROSCOPIC ASSISTED;  Surgeon: Avinash Sterling MD;  Location: [facility];  Service: Urology   • NEUROPLASTY / TRANSPOSITION MEDIAN NERVE AT CARPAL TUNNEL     • ORIF TIBIAL PLATEAU Right     arthroplasty per allscripts   • OTHER SURGICAL HISTORY      injection of trigger point(s) per allscripts   • KS ARTHRP KNE CONDYLE&PLATU MEDIAL&LAT COMPARTMENTS Right 06/11/2018    Procedure: ARTHROPLASTY KNEE TOTAL;  Surgeon: Bry De Guzman MD;  Location: [facility];  Service: Orthopedics   • KS TENDON SHEATH INCISION Right 12/02/2020    Procedure: RELEASE TRIGGER FINGER-right long;  Surgeon: Todd Mcdonald MD;  Location: [facility];  Service: Orthopedics   • SINUS  SURGERY     • SPINE SURGERY     • TONSILLECTOMY         Current Outpatient Medications   Medication Sig Dispense Refill   • albuterol (Proventil HFA) 90 mcg/act inhaler Inhale 2 puffs every 6 (six) hours as needed for wheezing 6.7 g 0   • ammonium lactate (LAC-HYDRIN) 12 % lotion Apply topically 2 (two) times a day as needed for dry skin 222 mL 1   • Cholecalciferol (Vitamin D3) 50 MCG (2000 UT) TABS Take 1 tablet (2,000 Units total) by mouth daily 90 tablet 3   • Elastic Bandages & Supports (Thumb Splint/Right Small) MISC Use daily 1 each 0   • estradiol (VAGIFEM, YUVAFEM) 10 MCG TABS vaginal tablet INSERT 1 TABLET INTO THE VAGINA 2 TIMES A WEEK. 24 tablet 1   • fenofibrate (FENOGLIDE) 120 MG TABS Take 120 mg by mouth daily     • FLUoxetine (PROzac) 40 MG capsule Take 1 capsule (40 mg total) by mouth daily 90 capsule 1   • ketoconazole (NIZORAL) 2 % cream Apply topically daily 30 g 0   • lidocaine (LIDODERM) 5 % lidocaine 5 % topical patch   APPLY 1 PATCH TO AFFECTED AREA FOR 12 HOURS A DAY & REMOVE FOR 12 HOURS BEFORE APPLYING NEXT PATCH. (12 HOURS ON, 12 HOURS OFF)     • lisinopril (ZESTRIL) 20 mg tablet Take 1 tablet (20 mg total) by mouth daily 90 tablet 1   • promethazine-dextromethorphan (PHENERGAN-DM) 6.25-15 mg/5 mL oral syrup Take 5 mL by mouth 4 (four) times a day as needed for cough 240 mL 0   • traMADol (Ultram) 50 mg tablet Take 1 tablet (50 mg total) by mouth every 8 (eight) hours as needed for moderate pain 30 tablet 0   • traZODone (DESYREL) 50 mg tablet Take 0.5-1 tablets (25-50 mg total) by mouth daily at bedtime as needed for sleep 90 tablet 1   • triamcinolone (KENALOG) 0.1 % ointment Apply topically 2 (two) times a day 454 g 1   • ursodiol (ACTIGALL) 300 mg capsule TAKE 1 CAPSULE BY MOUTH THREE TIMES A  capsule 2   • valACYclovir (VALTREX) 500 mg tablet Take 2 tablets (1,000 mg total) by mouth daily for 14 days 28 tablet 0     No current facility-administered medications for this visit.      Facility-Administered Medications Ordered in Other Visits   Medication Dose Route Frequency Provider Last Rate Last Admin   • alteplase (CATHFLO) injection 2 mg  2 mg Intracatheter Q1MIN PRN Gagan Quintero MD       • alteplase (CATHFLO) injection 2 mg  2 mg Intracatheter Q1MIN PRN Gagan Quintero MD       • sodium chloride 0.9 % infusion  20 mL/hr Intravenous Once Gagan Quintero MD            Allergies   Allergen Reactions   • Codeine GI Intolerance and Nausea Only     Other reaction(s): Other (See Comments)  Violently ill  Violently ill   • Latex Rash     itching   • Morphine And Codeine Nausea Only     GI intolerance nausea   • Nortriptyline Shortness Of Breath   • Ocaliva [Obeticholic Acid] Hives   • Doxycycline GI Intolerance and Nausea Only   • Sulfa Antibiotics GI Intolerance   • Cymbalta [Duloxetine Hcl]    • Penicillins Other (See Comments) and Rash     Childhood reaction       Review of Systems    Video Exam    There were no vitals filed for this visit.    Physical Exam     Behavioral Health Psychotherapy Progress Note    Psychotherapy Provided: Individual Psychotherapy     1. MDD (major depressive disorder), recurrent episode, mild (HCC)        2. JOSEFINA (generalized anxiety disorder)        3. Obsessive-compulsive disorder, unspecified type        4. PTSD (post-traumatic stress disorder)            Goals addressed in session: Goal 1 and Goal 2     DATA: Clinician met with Courtney via telehealth for individual therapy. Courtney processed resentment she is feeling towards paramour and attempts to communicate her feelings. Clinician explored communication techniques and strategies to communicate assertively. Clinician discussed timing of when she brings up her feeling in order to get a better ability to calming discuss her feelings as well as utilizing 'I message' statements. She reports considering ending the relationship due to lack of effort on her paramours part to communicate more effectively and make  "positive change in the relationship.  During this session, this clinician used the following therapeutic modalities: Client-centered Therapy, Cognitive Processing Therapy, and Supportive Psychotherapy    Substance Abuse was not addressed during this session. If the client is diagnosed with a co-occurring substance use disorder, please indicate any changes in the frequency or amount of use: n/a. Stage of change for addressing substance use diagnoses: No substance use/Not applicable    ASSESSMENT:  Courtney Davalos presents with a Euthymic/ normal mood.     her affect is Normal range and intensity, which is congruent, with her mood and the content of the session. The client has made progress on their goals.    Courtney was open and engaged in the session.  Courtney Davalos presents with a none risk of suicide, none risk of self-harm, and none risk of harm to others.    For any risk assessment that surpasses a \"low\" rating, a safety plan must be developed.    A safety plan was indicated: no  If yes, describe in detail n/a    PLAN: Between sessions, Courtney Davalos will utilize healthy communication skills. At the next session, the therapist will use Client-centered Therapy, Solution-Focused Therapy, and Supportive Psychotherapy to address conflict resolution and anxiety.    Behavioral Health Treatment Plan and Discharge Planning: Courtney Davalos is aware of and agrees to continue to work on their treatment plan. They have identified and are working toward their discharge goals. yes    Visit start and stop times:    06/26/24  Start Time: 0900  Stop Time: 0958  Total Visit Time: 58 minutes              "

## 2024-07-01 ENCOUNTER — OFFICE VISIT (OUTPATIENT)
Dept: INTERNAL MEDICINE CLINIC | Facility: CLINIC | Age: 64
End: 2024-07-01
Payer: COMMERCIAL

## 2024-07-01 VITALS
WEIGHT: 160.6 LBS | HEART RATE: 76 BPM | OXYGEN SATURATION: 97 % | SYSTOLIC BLOOD PRESSURE: 130 MMHG | BODY MASS INDEX: 23.72 KG/M2 | DIASTOLIC BLOOD PRESSURE: 72 MMHG

## 2024-07-01 DIAGNOSIS — G57.01 NEUROPATHY OF RIGHT SCIATIC NERVE: ICD-10-CM

## 2024-07-01 DIAGNOSIS — L25.5 RHUS DERMATITIS: Primary | ICD-10-CM

## 2024-07-01 PROCEDURE — G2211 COMPLEX E/M VISIT ADD ON: HCPCS | Performed by: INTERNAL MEDICINE

## 2024-07-01 PROCEDURE — 99213 OFFICE O/P EST LOW 20 MIN: CPT | Performed by: INTERNAL MEDICINE

## 2024-07-01 RX ORDER — METHYLPREDNISOLONE 4 MG/1
TABLET ORAL
Qty: 21 EACH | Refills: 0 | Status: SHIPPED | OUTPATIENT
Start: 2024-07-01

## 2024-07-01 NOTE — PROGRESS NOTES
"  Assessment/Plan:    Rhus dermatitis  Rx Medrol Dosepak No. 1 instructed cool compresses Alveeno call if any change, worse or symptoms not ziyad    Neuropathy of right sciatic nerve  Recommend physical therapy Rx for Medrol Dosepak and plan instructed patient will call if symptoms change, worsen or do not ziyad         Problem List Items Addressed This Visit        Nervous and Auditory    Neuropathy of right sciatic nerve     Recommend physical therapy Rx for Medrol Dosepak and plan instructed patient will call if symptoms change, worsen or do not ziyad         Relevant Medications    methylPREDNISolone 4 MG tablet therapy pack       Musculoskeletal and Integument    Rhus dermatitis - Primary     Rx Medrol Dosepak No. 1 instructed cool compresses Alveeno call if any change, worse or symptoms not ziyad         Relevant Medications    methylPREDNISolone 4 MG tablet therapy pack           Subjective:      Patient ID: Courtney Davalos is a 63 y.o. female.    HPI 63-year old female coming in for a follow up office visit regarding recent dermatitis/sciatica; patient does report to me approximately toward the end of last week she had developed a rash which was itchy \"poison\" she does report to me that she was outside she had seen the poison plant she had gloves on at the time.  He is very susceptible to poison.  She now has it diffusely across her body bilateral arms upper abdomen and chest wall bilateral legs around her fingers very itchy in nature she has been trying Benadryl cream without relief of her current symptoms.    The following portions of the patient's history were reviewed and updated as appropriate: allergies, current medications, past family history, past medical history, past social history, past surgical history and problem list.    Review of Systems   Constitutional:  Negative for activity change and appetite change.   Eyes:  Negative for visual disturbance.   Respiratory:  Negative for cough and " shortness of breath.    Cardiovascular:  Negative for chest pain.   Musculoskeletal:  Positive for back pain.        Sciatica   Skin:  Positive for rash.         Objective:    Return if symptoms worsen or fail to improve, for Next scheduled follow up.    Procedure: DXA bone density spine hip and pelvis    Result Date: 6/16/2024  Narrative: DXA SCAN CLINICAL HISTORY: 63-year-old postmenopausal female. OTHER RISK FACTORS: Prior fracture as a result of minor injury, cirrhosis of liver, multiple myeloma, SSRI therapy. PHARMACOLOGIC THERAPY FOR OSTEOPOROSIS: Zometa. TECHNIQUE: Bone densitometry was performed using a [employer] Horizon A bone densitometer.  Regions of interest appear properly placed. COMPARISON: 8/10/2021. RESULTS: LUMBAR SPINE Level: L1-L3  (L4 vertebra excluded from analysis due to local structural abnormalities or artifact): BMD: 0.763 gm/cm2 T-score: -2.3 LEFT TOTAL HIP: BMD: 0.817 gm/cm2 T-score: -1.0 LEFT FEMORAL NECK: BMD: 0.673 gm/cm2 T score: -1.6     Impression: 1. Low bone mass (osteopenia). 2.  Since a DXA study from 8/10/2021, there has been: No statistically significant change in bone mineral density at any of the evaluated skeletal sites. 3.  The 10 year risk of hip fracture is 1.6% with the 10 year risk of major osteoporotic fracture being 15% as calculated by the [employer] fracture risk assessment tool (FRAX, which is based on data generated by the WHO Collaborating [employer]  for Metabolic Bone Diseases). 4.  The current NOF guidelines recommend treating patients with a T-score of -2.5 or less in the lumbar spine or hips, or in post-menopausal women and men over the age of 50 with low bone mass (osteopenia) and a FRAX 10 year risk score of >3% for hip fracture and/or >20% for major osteoporotic fracture. 5.  The NOF recommends follow-up DXA in 1-2 years after initiating therapy for osteoporosis and every 2 years thereafter. More frequent evaluation is appropriate for patients  with conditions associated with rapid bone loss, such as glucocorticoid therapy. The interval between DXA screenings may be longer for individuals without major risk factors and initial T-score in the normal or upper low bone mass range. The FRAX algorithm has certain limitations: -FRAX has not been validated in patients currently or previously treated with pharmacotherapy for osteoporosis.  In such patients, clinical judgment must be exercised in interpreting FRAX scores. -Prior hip, vertebral and humeral fragility fractures appear to confer greater risk of subsequent fracture than fractures at other sites (this is especially true for individuals with severe vertebral fractures), but quantification of this incremental risk is not possible with FRAX. -FRAX underestimates fracture risk in patients with history of multiple fragility fractures. -FRAX may underestimate fracture risk in patients with history of frequent falls. -It is not appropriate to use FRAX to monitor treatment response. WHO CLASSIFICATION: Normal (a T-score of -1.0 or higher) Low bone mineral density (a T-score of less than -1.0 but higher than -2.5) Osteoporosis (a T-score of -2.5 or less) Severe osteoporosis (a T-score of -2.5 or less with a fragility fracture) LEAST SIGNIFICANT CHANGE (AT 95% C.I): Lumbar spine 0.036 g/cm2; 2.2% Total hip: 0.022 g/cm2; 2.6% Forearm: 0.017 g/cm2; 3.0%. Workstation performed: [id number]     Procedure: Mammo screening bilateral w 3d & cad    Result Date: 6/12/2024  Narrative: DIAGNOSIS: Encounter for screening mammogram for breast cancer TECHNIQUE: Digital screening mammography was performed. Computer Aided Detection (CAD) analyzed all applicable images. COMPARISONS: Multiple prior exams dated between 3/11/2005 and 6/26/2020. RELEVANT HISTORY: Family Breast Cancer History: No known family history of breast cancer. Family Medical History: No known relevant family medical history. Personal History: Hormone history  includes birth control. No known relevant surgical history. No known relevant medical history. The patient is scheduled in a reminder system for screening mammography. 8-10% of cancers will be missed on mammography. Management of a palpable abnormality must be based on clinical grounds.  Patients will be notified of their results via letter from our facility. Accredited by American College of Radiology and FDA. RISK ASSESSMENT: 5 Year Tyrer-Cuzick: 3.52% 10 Year Tyrer-Cuzick: 6.43% Lifetime Tyrer-Cuzick: 13.68% TISSUE DENSITY: The breasts are extremely dense, which lowers the sensitivity of mammography. INDICATION: Courtney Davalos is a 63 y.o. female presenting for screening mammography. FINDINGS: Bilateral There are no suspicious masses, grouped microcalcifications or areas of unexplained architectural distortion. The skin and nipple areolar complex are unremarkable.  Benign-appearing calcifications are noted in each breast.     Impression: No mammographic evidence of malignancy. ASSESSMENT/BI-RADS CATEGORY: Left: 2 - Benign Right: 2 - Benign Overall: 2 - Benign RECOMMENDATION:      - Routine screening mammogram in 1 year for both breasts. Workstation ID: [id number]        Allergies   Allergen Reactions   • Codeine GI Intolerance and Nausea Only     Other reaction(s): Other (See Comments)  Violently ill  Violently ill   • Latex Rash     itching   • Morphine And Codeine Nausea Only     GI intolerance nausea   • Nortriptyline Shortness Of Breath   • Ocaliva [Obeticholic Acid] Hives   • Doxycycline GI Intolerance and Nausea Only   • Sulfa Antibiotics GI Intolerance   • Cymbalta [Duloxetine Hcl]    • Penicillins Other (See Comments) and Rash     Childhood reaction       Past Medical History:   Diagnosis Date   • Abnormal breast exam    • Anemia    • Anxiety    • Arthritis    • Asthma     childhood   • 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HCC)     primary per allscripts   • Cancer (HCC)     IgG kappa smoldering multiple myeloma   •  Cardiac murmur    • Chronic liver disease     resolved 12/04/2017   • COVID    • Depression    • Endometriosis    • Fracture of tibia     right tibial plateau fracture per allscripts   • Heart murmur    • Hepatic disease    • Hypertension     last assessed 12/13/2017   • Inflammatory bowel disease    • Kidney stone    • Multiple myeloma (HCC)    • Neuropathy     R foot    • Nosebleed    • OCD (obsessive compulsive disorder)    • Osteoporosis    • Otitis media    • Pneumonia    • RSD (reflex sympathetic dystrophy) 12/11/2018   • Scoliosis    • Seasonal allergies    • Urinary tract infection    • Visual impairment    • Wears eyeglasses    • Whiplash injury to neck      Past Surgical History:   Procedure Laterality Date   • BACK SURGERY      hemilaminectomy and discectomy, L5-S1, right (Dr. Rashid, [facility]) onset 02/14/2005 per allscripts   • COLONOSCOPY     • EXPLORATION EXTREMITY Right 08/29/2016    Procedure: KNEE PERONEAL NERVE EXPLORATION AND RELEASE ;  Surgeon: Bry De Guzman MD;  Location: [facility];  Service:    • FOOT SURGERY     • FRACTURE SURGERY     • HAND SURGERY     • HERNIA REPAIR     • IR BIOPSY KIDNEY MASS  05/25/2023   • JOINT REPLACEMENT Right     RTK   • KNEE SURGERY     • MANDIBLE SURGERY     • NEPHRECTOMY LAPAROSCOPIC Right 6/21/2023    Procedure: NEPHRECTOMY LAPAROSCOPIC ASSISTED;  Surgeon: Avinash Sterling MD;  Location: [facility];  Service: Urology   • NEUROPLASTY / TRANSPOSITION MEDIAN NERVE AT CARPAL TUNNEL     • ORIF TIBIAL PLATEAU Right     arthroplasty per allscripts   • OTHER SURGICAL HISTORY      injection of trigger point(s) per allscripts   • IL ARTHRP KNE CONDYLE&PLATU MEDIAL&LAT COMPARTMENTS Right 06/11/2018    Procedure: ARTHROPLASTY KNEE TOTAL;  Surgeon: Bry De Guzman MD;  Location[facility];  Service: Orthopedics   • IL TENDON SHEATH INCISION Right 12/02/2020    Procedure: RELEASE TRIGGER FINGER-right long;  Surgeon: Todd Mcdonald MD;  Location[facility];   Service: Orthopedics   • SINUS SURGERY     • SPINE SURGERY     • TONSILLECTOMY       Current Outpatient Medications on File Prior to Visit   Medication Sig Dispense Refill   • albuterol (Proventil HFA) 90 mcg/act inhaler Inhale 2 puffs every 6 (six) hours as needed for wheezing 6.7 g 0   • ammonium lactate (LAC-HYDRIN) 12 % lotion Apply topically 2 (two) times a day as needed for dry skin 222 mL 1   • Cholecalciferol (Vitamin D3) 50 MCG (2000 UT) TABS Take 1 tablet (2,000 Units total) by mouth daily 90 tablet 3   • Elastic Bandages & Supports (Thumb Splint/Right Small) MISC Use daily 1 each 0   • estradiol (VAGIFEM, YUVAFEM) 10 MCG TABS vaginal tablet INSERT 1 TABLET INTO THE VAGINA 2 TIMES A WEEK. 24 tablet 1   • fenofibrate (FENOGLIDE) 120 MG TABS Take 120 mg by mouth daily     • FLUoxetine (PROzac) 40 MG capsule Take 1 capsule (40 mg total) by mouth daily 90 capsule 1   • ketoconazole (NIZORAL) 2 % cream Apply topically daily 30 g 0   • lidocaine (LIDODERM) 5 % lidocaine 5 % topical patch   APPLY 1 PATCH TO AFFECTED AREA FOR 12 HOURS A DAY & REMOVE FOR 12 HOURS BEFORE APPLYING NEXT PATCH. (12 HOURS ON, 12 HOURS OFF)     • lisinopril (ZESTRIL) 20 mg tablet Take 1 tablet (20 mg total) by mouth daily 90 tablet 1   • promethazine-dextromethorphan (PHENERGAN-DM) 6.25-15 mg/5 mL oral syrup Take 5 mL by mouth 4 (four) times a day as needed for cough 240 mL 0   • traMADol (Ultram) 50 mg tablet Take 1 tablet (50 mg total) by mouth every 8 (eight) hours as needed for moderate pain 30 tablet 0   • traZODone (DESYREL) 50 mg tablet Take 0.5-1 tablets (25-50 mg total) by mouth daily at bedtime as needed for sleep 90 tablet 1   • triamcinolone (KENALOG) 0.1 % ointment Apply topically 2 (two) times a day 454 g 1   • ursodiol (ACTIGALL) 300 mg capsule TAKE 1 CAPSULE BY MOUTH THREE TIMES A  capsule 2   • valACYclovir (VALTREX) 500 mg tablet Take 2 tablets (1,000 mg total) by mouth daily for 14 days 28 tablet 0     No  current facility-administered medications on file prior to visit.     Family History   Problem Relation Age of Onset   • Heart failure Mother    • Anxiety disorder Mother         symptom per allscripts   • Depression Mother    • Vision loss Mother    • Anxiety disorder Father         symptom per allscripts   • Cirrhosis Father         hepatic per allscripts   • Alcohol abuse Father    • Stomach cancer Maternal Grandmother    • Cancer Maternal Grandmother    • Stomach cancer Maternal Grandfather    • Cancer Maternal Grandfather    • Diabetes Paternal Grandmother    • No Known Problems Paternal Grandfather    • No Known Problems Paternal Aunt    • No Known Problems Paternal Aunt    • Anxiety disorder Other         symptom per allscripts   • Coronary artery disease Other    • Depression Other    • Hyperlipidemia Other    • Osteoarthritis Other    • Other Other         back disorder per allscripts   • Neuropathy Other      Social History     Socioeconomic History   • Marital status: Single     Spouse name: Not on file   • Number of children: Not on file   • Years of education: completed college   • Highest education level: Not on file   Occupational History   • Occupation:    • Occupation:    Tobacco Use   • Smoking status: Never     Passive exposure: Never   • Smokeless tobacco: Never   Vaping Use   • Vaping status: Never Used   Substance and Sexual Activity   • Alcohol use: Not Currently   • Drug use: No   • Sexual activity: Yes     Partners: Male     Birth control/protection: Post-menopausal   Other Topics Concern   • Not on file   Social History Narrative    Does not participate in activities outside of the home; participates in moderate activities inside the home    Lives with mother     Social Determinants of Health     Financial Resource Strain: Low Risk  (2/28/2023)    Overall Financial Resource Strain (CARDIA)    • Difficulty of Paying Living Expenses: Not hard at all   Food  Insecurity: No Food Insecurity (5/22/2024)    Hunger Vital Sign    • Worried About Running Out of Food in the Last Year: Never true    • Ran Out of Food in the Last Year: Never true   Transportation Needs: No Transportation Needs (5/22/2024)    PRAPARE - Transportation    • Lack of Transportation (Medical): No    • Lack of Transportation (Non-Medical): No   Physical Activity: Not on file   Stress: Not on file   Social Connections: Not on file   Intimate Partner Violence: Not on file   Housing Stability: Low Risk  (5/22/2024)    Housing Stability Vital Sign    • Unable to Pay for Housing in the Last Year: No    • Number of Times Moved in the Last Year: 0    • Homeless in the Last Year: No     Vitals:    07/01/24 1003   BP: 130/72   BP Location: Left arm   Patient Position: Sitting   Cuff Size: Standard   Pulse: 76   SpO2: 97%   Weight: 72.8 kg (160 lb 9.6 oz)     Results for orders placed or performed in visit on 06/21/24   Comprehensive metabolic panel   Result Value Ref Range    Sodium 136 135 - 147 mmol/L    Potassium 4.6 3.5 - 5.3 mmol/L    Chloride 101 96 - 108 mmol/L    CO2 25 21 - 32 mmol/L    ANION GAP 10 4 - 13 mmol/L    BUN 22 5 - 25 mg/dL    Creatinine 1.11 0.60 - 1.30 mg/dL    Glucose 89 65 - 140 mg/dL    Calcium 9.1 8.4 - 10.2 mg/dL    AST 48 (H) 13 - 39 U/L    ALT 39 7 - 52 U/L    Alkaline Phosphatase 195 (H) 34 - 104 U/L    Total Protein 8.3 6.4 - 8.4 g/dL    Albumin 3.8 3.5 - 5.0 g/dL    Total Bilirubin 0.59 0.20 - 1.00 mg/dL    eGFR 52 ml/min/1.73sq m     *Note: Due to a large number of results and/or encounters for the requested time period, some results have not been displayed. A complete set of results can be found in Results Review.     Weight (last 2 days)     Date/Time Weight    07/01/24 1003 72.8 (160.6)        Body mass index is 23.72 kg/m².  BP      Temp      Pulse     Resp      SpO2        Vitals:    07/01/24 1003   Weight: 72.8 kg (160 lb 9.6 oz)     Vitals:    07/01/24 1003   Weight: 72.8  kg (160 lb 9.6 oz)       /72 (BP Location: Left arm, Patient Position: Sitting, Cuff Size: Standard)   Pulse 76   Wt 72.8 kg (160 lb 9.6 oz)   LMP  (LMP Unknown)   SpO2 97%   BMI 23.72 kg/m²       Vesicular rash linear consistent with Sandy dermatitis abdominal wall, arms mild   Physical Exam  Constitutional:       Appearance: She is well-developed.   HENT:      Head: Normocephalic.      Mouth/Throat:      Mouth: Oropharynx is clear and moist.   Eyes:      General: No scleral icterus.        Right eye: No discharge.         Left eye: No discharge.      Conjunctiva/sclera: Conjunctivae normal.      Pupils: Pupils are equal, round, and reactive to light.   Cardiovascular:      Rate and Rhythm: Normal rate and regular rhythm.      Heart sounds: Normal heart sounds. No murmur heard.     No friction rub. No gallop.   Pulmonary:      Effort: No respiratory distress.      Breath sounds: Normal breath sounds. No wheezing or rales.   Abdominal:      Palpations: Abdomen is soft.   Musculoskeletal:         General: No deformity or edema.      Cervical back: Neck supple.   Lymphadenopathy:      Cervical: No cervical adenopathy.   Neurological:      Mental Status: She is alert.      Coordination: Coordination normal.

## 2024-07-02 ENCOUNTER — HOSPITAL ENCOUNTER (OUTPATIENT)
Dept: RADIOLOGY | Facility: MEDICAL CENTER | Age: 64
Discharge: HOME | End: 2024-07-02
Payer: COMMERCIAL

## 2024-07-02 DIAGNOSIS — K76.0 METABOLIC DYSFUNCTION-ASSOCIATED STEATOTIC LIVER DISEASE (MASLD): ICD-10-CM

## 2024-07-02 DIAGNOSIS — K74.3 PRIMARY BILIARY CIRRHOSIS (HCC): ICD-10-CM

## 2024-07-02 PROCEDURE — 76981 USE PARENCHYMA: CPT

## 2024-07-02 NOTE — ASSESSMENT & PLAN NOTE
Rx Medrol Dosepak No. 1 instructed cool compresses Alveeno call if any change, worse or symptoms not ziyad

## 2024-07-02 NOTE — ASSESSMENT & PLAN NOTE
Recommend physical therapy Rx for Medrol Dosepak and plan instructed patient will call if symptoms change, worsen or do not ziyad

## 2024-07-17 ENCOUNTER — TELEMEDICINE (OUTPATIENT)
Dept: BEHAVIORAL/MENTAL HEALTH CLINIC | Facility: CLINIC | Age: 64
End: 2024-07-17
Payer: COMMERCIAL

## 2024-07-17 DIAGNOSIS — F43.10 PTSD (POST-TRAUMATIC STRESS DISORDER): ICD-10-CM

## 2024-07-17 DIAGNOSIS — F33.0 MDD (MAJOR DEPRESSIVE DISORDER), RECURRENT EPISODE, MILD (HCC): Primary | ICD-10-CM

## 2024-07-17 DIAGNOSIS — F42.9 OBSESSIVE-COMPULSIVE DISORDER, UNSPECIFIED TYPE: ICD-10-CM

## 2024-07-17 DIAGNOSIS — F41.1 GAD (GENERALIZED ANXIETY DISORDER): ICD-10-CM

## 2024-07-17 PROCEDURE — 90837 PSYTX W PT 60 MINUTES: CPT | Performed by: COUNSELOR

## 2024-07-17 NOTE — BH TREATMENT PLAN
Courtney LARA Davalos  1960         Date of Initial Treatment Plan: 5/11/21   Date of Current Treatment Plan: 7/17/24  Treatment plan expiration date: 1/13/25     Treatment Plan      Strengths/Personal Resources for Self Care: Courtney reports that [place] is a good support to her. She denies any close friends or family as support. Courtney is motivated and engaged in treatment and willing to work on mental health wellbeing.  Courtney has been working on healthy relationships and communication skills.      Diagnosis:   1. MDD (major depressive disorder), recurrent episode, mild (HCC)      2. JOSEFINA (generalized anxiety disorder)      3. Obsessive-compulsive disorder, unspecified type            Area of Needs: Processing past relationships and boundaries to increase communication in current relationship,  Decrease anxiety overall.         Long Term Goal 1: Process past relationship in order to clarify feelings and work on better boundaries     Target Date:12/13/24  Completion Date:1/13/25         Short Term Objectives for Goal 1: 1.  Process difference between healthy and unhealhty boundaries and 2  Process feelings about past relationships and work on healthy expresssion of emotions on needs in current relationship.     Long Term Goal 2: Decrease obsessive thoughts and compulsive actions     Target Date:12/13/24  Completion Date:1/13/25     Short Term Objectives for Goal 2: 1.   Utilize thought stopping techniques to reduce the frequency of obsessive thoughts. 2.    Teach and practice distractive coping skill to be used in times of need to decrease obsessive thoughts.  3.Develop and implement techniques to interrupt the pattern of compulsions      GOAL 1: Modality:Individual 2x per month   Completion Date 1/13/25, Medication Management and The person(s) responsible for carrying out the plan is  client, Courtney Susannah, therapist, Ethel Christian, and Dr Yeung.     GOAL 2: Modality: Individual 2x per month   Completion Date  1/13/25, Medication Management and The person(s) responsible for carrying out the plan is  client, Courtney Davalos, therapist, Ethel Christian, and Dr Yeung.     Courtney Davalos, 1960, actively participated in the review and update of this treatment plan during a virtual session, using the Epic Embedded platform.   Courtney Davalos  provided verbal consent on 7/17/2024 at 9:35 AM. The treatment plan was transcribed into the Electronic Health Record at a later time.

## 2024-07-17 NOTE — PSYCH
Virtual Regular Visit    Verification of patient location:    Patient is located at Home in the following state in which I hold an active license [place]      Assessment/Plan:    Problem List Items Addressed This Visit     MDD (major depressive disorder), recurrent episode, mild (HCC) - Primary    JOSEFINA (generalized anxiety disorder)    OCD (obsessive compulsive disorder)    PTSD (post-traumatic stress disorder)       Goals addressed in session: Goal 1 and Goal 2          Reason for visit is   Chief Complaint   Patient presents with   • Virtual Regular Visit          Encounter provider Ethel Christian      Recent Visits  No visits were found meeting these conditions.  Showing recent visits within past 7 days and meeting all other requirements  Today's Visits  Date Type Provider Dept   07/17/24 Telemedicine Ethel Christian [facility] Therapist [place]   Showing today's visits and meeting all other requirements  Future Appointments  No visits were found meeting these conditions.  Showing future appointments within next 150 days and meeting all other requirements       The patient was identified by name and date of birth. Courtney Davalos was informed that this is a telemedicine visit and that the visit is being conducted throughthe Epic Embedded platform. She agrees to proceed..  My office door was closed. No one else was in the room.  She acknowledged consent and understanding of privacy and security of the video platform. The patient has agreed to participate and understands they can discontinue the visit at any time.    Patient is aware this is a billable service.     Subjective  Courtney Davalos is a 63 y.o. female  .      HPI     Past Medical History:   Diagnosis Date   • Abnormal breast exam    • Anemia    • Anxiety    • Arthritis    • Asthma     childhood   • 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HCC)     primary per allscripts   • Cancer (HCC)     IgG kappa smoldering multiple myeloma   • Cardiac murmur    • Chronic liver  disease     resolved 12/04/2017   • COVID    • Depression    • Endometriosis    • Fracture of tibia     right tibial plateau fracture per allscripts   • Heart murmur    • Hepatic disease    • Hypertension     last assessed 12/13/2017   • Inflammatory bowel disease    • Kidney stone    • Multiple myeloma (HCC)    • Neuropathy     R foot    • Nosebleed    • OCD (obsessive compulsive disorder)    • Osteoporosis    • Otitis media    • Pneumonia    • RSD (reflex sympathetic dystrophy) 12/11/2018   • Scoliosis    • Seasonal allergies    • Urinary tract infection    • Visual impairment    • Wears eyeglasses    • Whiplash injury to neck        Past Surgical History:   Procedure Laterality Date   • BACK SURGERY      hemilaminectomy and discectomy, L5-S1, right (Dr. Rashid, [facility] at [facility]) onset 02/14/2005 per allscripts   • COLONOSCOPY     • EXPLORATION EXTREMITY Right 08/29/2016    Procedure: KNEE PERONEAL NERVE EXPLORATION AND RELEASE ;  Surgeon: Bry De Guzman MD;  Location: [facility];  Service:    • FOOT SURGERY     • FRACTURE SURGERY     • HAND SURGERY     • HERNIA REPAIR     • IR BIOPSY KIDNEY MASS  05/25/2023   • JOINT REPLACEMENT Right     RTK   • KNEE SURGERY     • MANDIBLE SURGERY     • NEPHRECTOMY LAPAROSCOPIC Right 6/21/2023    Procedure: NEPHRECTOMY LAPAROSCOPIC ASSISTED;  Surgeon: Avinash Sterling MD;  Location: [facility];  Service: Urology   • NEUROPLASTY / TRANSPOSITION MEDIAN NERVE AT CARPAL TUNNEL     • ORIF TIBIAL PLATEAU Right     arthroplasty per allscripts   • OTHER SURGICAL HISTORY      injection of trigger point(s) per allscripts   • UT ARTHRP KNE CONDYLE&PLATU MEDIAL&LAT COMPARTMENTS Right 06/11/2018    Procedure: ARTHROPLASTY KNEE TOTAL;  Surgeon: Bry De Guzman MD;  Location: [facility];  Service: Orthopedics   • UT TENDON SHEATH INCISION Right 12/02/2020    Procedure: RELEASE TRIGGER FINGER-right long;  Surgeon: Todd Mcdonald MD;  Location: [facility];  Service: Orthopedics   • SINUS  SURGERY     • SPINE SURGERY     • TONSILLECTOMY         Current Outpatient Medications   Medication Sig Dispense Refill   • albuterol (Proventil HFA) 90 mcg/act inhaler Inhale 2 puffs every 6 (six) hours as needed for wheezing 6.7 g 0   • ammonium lactate (LAC-HYDRIN) 12 % lotion Apply topically 2 (two) times a day as needed for dry skin 222 mL 1   • Cholecalciferol (Vitamin D3) 50 MCG (2000 UT) TABS Take 1 tablet (2,000 Units total) by mouth daily 90 tablet 3   • Elastic Bandages & Supports (Thumb Splint/Right Small) MISC Use daily 1 each 0   • estradiol (VAGIFEM, YUVAFEM) 10 MCG TABS vaginal tablet INSERT 1 TABLET INTO THE VAGINA 2 TIMES A WEEK. 24 tablet 1   • fenofibrate (FENOGLIDE) 120 MG TABS Take 120 mg by mouth daily     • FLUoxetine (PROzac) 40 MG capsule Take 1 capsule (40 mg total) by mouth daily 90 capsule 1   • ketoconazole (NIZORAL) 2 % cream Apply topically daily 30 g 0   • lidocaine (LIDODERM) 5 % lidocaine 5 % topical patch   APPLY 1 PATCH TO AFFECTED AREA FOR 12 HOURS A DAY & REMOVE FOR 12 HOURS BEFORE APPLYING NEXT PATCH. (12 HOURS ON, 12 HOURS OFF)     • lisinopril (ZESTRIL) 20 mg tablet Take 1 tablet (20 mg total) by mouth daily 90 tablet 1   • methylPREDNISolone 4 MG tablet therapy pack Use as directed on package 21 each 0   • promethazine-dextromethorphan (PHENERGAN-DM) 6.25-15 mg/5 mL oral syrup Take 5 mL by mouth 4 (four) times a day as needed for cough 240 mL 0   • traMADol (Ultram) 50 mg tablet Take 1 tablet (50 mg total) by mouth every 8 (eight) hours as needed for moderate pain 30 tablet 0   • traZODone (DESYREL) 50 mg tablet Take 0.5-1 tablets (25-50 mg total) by mouth daily at bedtime as needed for sleep 90 tablet 1   • triamcinolone (KENALOG) 0.1 % ointment Apply topically 2 (two) times a day 454 g 1   • ursodiol (ACTIGALL) 300 mg capsule TAKE 1 CAPSULE BY MOUTH THREE TIMES A  capsule 2   • valACYclovir (VALTREX) 500 mg tablet Take 2 tablets (1,000 mg total) by mouth daily for  14 days 28 tablet 0     No current facility-administered medications for this visit.        Allergies   Allergen Reactions   • Codeine GI Intolerance and Nausea Only     Other reaction(s): Other (See Comments)  Violently ill  Violently ill   • Latex Rash     itching   • Morphine And Codeine Nausea Only     GI intolerance nausea   • Nortriptyline Shortness Of Breath   • Ocaliva [Obeticholic Acid] Hives   • Doxycycline GI Intolerance and Nausea Only   • Sulfa Antibiotics GI Intolerance   • Cymbalta [Duloxetine Hcl]    • Penicillins Other (See Comments) and Rash     Childhood reaction       Review of Systems    Video Exam    There were no vitals filed for this visit.    Physical Exam     Behavioral Health Psychotherapy Progress Note    Psychotherapy Provided: Individual Psychotherapy     1. MDD (major depressive disorder), recurrent episode, mild (HCC)        2. JOSEFINA (generalized anxiety disorder)        3. Obsessive-compulsive disorder, unspecified type        4. PTSD (post-traumatic stress disorder)            Goals addressed in session: Goal 1     DATA: Clinician met with Courtney via telehealth for individual therapy. Courtney discussed communication between her and nas and feeling unsure about their future as a couple. She reports desire for increased communication about future and his feelings. She reports that when questioned about his feelings he is dismissive or refused to engage. Clinician provided feedback on alternative ways to communicate her needs and increase healthy communication. Clinician explored positive aspects of the relationship and needs nas currently meets as well as discussing ways to get emotional needs outside of relationship and her social supports.  During this session, this clinician used the following therapeutic modalities: Client-centered Therapy, Solution-Focused Therapy, and Supportive Psychotherapy    Substance Abuse was not addressed during this session. If the client is  "diagnosed with a co-occurring substance use disorder, please indicate any changes in the frequency or amount of use: n/a. Stage of change for addressing substance use diagnoses: No substance use/Not applicable    ASSESSMENT:  Courtney Davalos presents with a Euthymic/ normal mood.     her affect is Normal range and intensity, which is congruent, with her mood and the content of the session. The client has made progress on their goals.    Courtney was open and engaged in the session.  Courtney Davalos presents with a none risk of suicide, none risk of self-harm, and none risk of harm to others.    For any risk assessment that surpasses a \"low\" rating, a safety plan must be developed.    A safety plan was indicated: no  If yes, describe in detail n/a    PLAN: Between sessions, Courtney Davalos will utilize assertive communication to share needs. At the next session, the therapist will use Client-centered Therapy and Supportive Psychotherapy to address anxiety.    Behavioral Health Treatment Plan and Discharge Planning: Courtney Davalos is aware of and agrees to continue to work on their treatment plan. They have identified and are working toward their discharge goals. yes    Visit start and stop times:    07/17/24  Start Time: 0900  Stop Time: 0958  Total Visit Time: 58 minutes              "
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
## 2024-07-18 ENCOUNTER — TELEMEDICINE (OUTPATIENT)
Dept: PSYCHIATRY | Facility: CLINIC | Age: 64
End: 2024-07-18
Payer: COMMERCIAL

## 2024-07-18 DIAGNOSIS — F42.9 OBSESSIVE-COMPULSIVE DISORDER, UNSPECIFIED TYPE: ICD-10-CM

## 2024-07-18 DIAGNOSIS — F33.0 MDD (MAJOR DEPRESSIVE DISORDER), RECURRENT EPISODE, MILD (HCC): ICD-10-CM

## 2024-07-18 DIAGNOSIS — F41.1 GAD (GENERALIZED ANXIETY DISORDER): ICD-10-CM

## 2024-07-18 DIAGNOSIS — F43.10 PTSD (POST-TRAUMATIC STRESS DISORDER): ICD-10-CM

## 2024-07-18 PROCEDURE — 99214 OFFICE O/P EST MOD 30 MIN: CPT | Performed by: PSYCHIATRY & NEUROLOGY

## 2024-07-18 PROCEDURE — G2211 COMPLEX E/M VISIT ADD ON: HCPCS | Performed by: PSYCHIATRY & NEUROLOGY

## 2024-07-18 RX ORDER — FLUOXETINE HYDROCHLORIDE 40 MG/1
40 CAPSULE ORAL DAILY
Qty: 90 CAPSULE | Refills: 1 | Status: SHIPPED | OUTPATIENT
Start: 2024-07-18

## 2024-07-26 ENCOUNTER — TELEPHONE (OUTPATIENT)
Age: 64
End: 2024-07-26

## 2024-07-26 NOTE — TELEPHONE ENCOUNTER
Patient called asking to be scheduled with Dr Chaidez in [facility] for a follow up from last visit. Patient was prescribed Triamcinolone and didn't help.     Patient was going under cancer treatment and thought she was getting a reaction to the treatment, PCP prescribed Ammonium Lactate lotion and that didn't help either. Patient still has same concern from last visit and now seeing lumps under armpit.    No available apt with Dr Chaidez and offered different providers or locations/patient declined and asked to send a message to Dr Chaidez.    Patient only wants Dr Chaidez.    Please advise.

## 2024-07-29 NOTE — TELEPHONE ENCOUNTER
Ayesha to Courtney to schedule an appt, she can be offered Dr. Chaidez's urgent spot on 8/14 at the [facility]. An email will need to be sent to management to add this pt in. Included cb number in message.

## 2024-07-29 NOTE — TELEPHONE ENCOUNTER
Pt called back and will take the Urgent spot on 8/14 at 4:20 pm with Dr Chaidez    (I am not able to schedule due to restrictions)    Please schedule pt and she would like to be notified if there are any cancellations that are sooner.

## 2024-08-08 ENCOUNTER — OFFICE VISIT (OUTPATIENT)
Dept: DERMATOLOGY | Facility: CLINIC | Age: 64
End: 2024-08-08
Payer: COMMERCIAL

## 2024-08-08 VITALS — BODY MASS INDEX: 23.4 KG/M2 | HEIGHT: 69 IN | TEMPERATURE: 98.4 F | WEIGHT: 158 LBS

## 2024-08-08 DIAGNOSIS — L28.1 PRURIGO NODULARIS: ICD-10-CM

## 2024-08-08 DIAGNOSIS — L25.9 CONTACT DERMATITIS, UNSPECIFIED CONTACT DERMATITIS TYPE, UNSPECIFIED TRIGGER: ICD-10-CM

## 2024-08-08 DIAGNOSIS — L28.1 PRURIGO NODULARIS: Primary | ICD-10-CM

## 2024-08-08 DIAGNOSIS — D17.23 LIPOMA OF RIGHT LOWER EXTREMITY: ICD-10-CM

## 2024-08-08 DIAGNOSIS — L73.9 FOLLICULITIS: ICD-10-CM

## 2024-08-08 PROCEDURE — 99214 OFFICE O/P EST MOD 30 MIN: CPT | Performed by: DERMATOLOGY

## 2024-08-08 RX ORDER — CLOBETASOL PROPIONATE 0.5 MG/G
OINTMENT TOPICAL 2 TIMES DAILY
Qty: 45 G | Refills: 0 | Status: SHIPPED | OUTPATIENT
Start: 2024-08-08 | End: 2024-08-08

## 2024-08-08 NOTE — PROGRESS NOTES
"[facility] Note     Patient Name: Courtney Davalos  Encounter Date: 8/8/24     Have you been cared for by a [facility] Dermatologist in the last 3 years and, if so, which description applies to you?    Yes.  I have been here within the last 3 years, and my medical history has NOT changed since that time.  I am FEMALE/of child-bearing potential.    REVIEW OF SYSTEMS:  Have you recently had or currently have any of the following? No changes in my recent health.   PAST MEDICAL HISTORY:  Have you personally ever had or currently have any of the following?  If \"YES,\" then please provide more detail. No changes in my medical history.   HISTORY OF IMMUNOSUPPRESSION: Do you have a history of any of the following:  Systemic Immunosuppression such as Diabetes, Biologic or Immunotherapy, Chemotherapy, Organ Transplantation, Bone Marrow Transplantation?  No     Answering \"YES\" requires the addition of the dotphrase \"IMMUNOSUPPRESSED\" as the first diagnosis of the patient's visit.   FAMILY HISTORY:  Any \"first degree relatives\" (parent, brother, sister, or child) with the following?    No changes in my family's known health.   PATIENT EXPERIENCE:    Do you want the Dermatologist to perform a COMPLETE skin exam today including a clinical examination under the \"bra and underwear\" areas?  NO  If necessary, do we have your permission to call and leave a detailed message on your Preferred Phone number that includes your specific medical information?  Yes      Allergies   Allergen Reactions    Codeine GI Intolerance and Nausea Only     Other reaction(s): Other (See Comments)  Violently ill  Violently ill    Latex Rash     itching    Morphine And Codeine Nausea Only     GI intolerance nausea    Nortriptyline Shortness Of Breath    Ocaliva [Obeticholic Acid] Hives    Doxycycline GI Intolerance and Nausea Only    Sulfa Antibiotics GI Intolerance    Cymbalta [Duloxetine Hcl]     Penicillins Other (See Comments) and Rash "     Childhood reaction      Current Outpatient Medications:     albuterol (Proventil HFA) 90 mcg/act inhaler, Inhale 2 puffs every 6 (six) hours as needed for wheezing, Disp: 6.7 g, Rfl: 0    ammonium lactate (LAC-HYDRIN) 12 % lotion, Apply topically 2 (two) times a day as needed for dry skin, Disp: 222 mL, Rfl: 1    Cholecalciferol (Vitamin D3) 50 MCG (2000 UT) TABS, Take 1 tablet (2,000 Units total) by mouth daily, Disp: 90 tablet, Rfl: 3    Elastic Bandages & Supports (Thumb Splint/Right Small) MISC, Use daily, Disp: 1 each, Rfl: 0    estradiol (VAGIFEM, YUVAFEM) 10 MCG TABS vaginal tablet, INSERT 1 TABLET INTO THE VAGINA 2 TIMES A WEEK., Disp: 24 tablet, Rfl: 1    fenofibrate (FENOGLIDE) 120 MG TABS, Take 120 mg by mouth daily, Disp: , Rfl:     FLUoxetine (PROzac) 40 MG capsule, Take 1 capsule (40 mg total) by mouth daily, Disp: 90 capsule, Rfl: 1    ketoconazole (NIZORAL) 2 % cream, Apply topically daily, Disp: 30 g, Rfl: 0    lidocaine (LIDODERM) 5 %, lidocaine 5 % topical patch  APPLY 1 PATCH TO AFFECTED AREA FOR 12 HOURS A DAY & REMOVE FOR 12 HOURS BEFORE APPLYING NEXT PATCH. (12 HOURS ON, 12 HOURS OFF), Disp: , Rfl:     lisinopril (ZESTRIL) 20 mg tablet, Take 1 tablet (20 mg total) by mouth daily, Disp: 90 tablet, Rfl: 1    promethazine-dextromethorphan (PHENERGAN-DM) 6.25-15 mg/5 mL oral syrup, Take 5 mL by mouth 4 (four) times a day as needed for cough, Disp: 240 mL, Rfl: 0    traMADol (Ultram) 50 mg tablet, Take 1 tablet (50 mg total) by mouth every 8 (eight) hours as needed for moderate pain, Disp: 30 tablet, Rfl: 0    traZODone (DESYREL) 50 mg tablet, Take 0.5-1 tablets (25-50 mg total) by mouth daily at bedtime as needed for sleep, Disp: 90 tablet, Rfl: 1    triamcinolone (KENALOG) 0.1 % ointment, Apply topically 2 (two) times a day, Disp: 454 g, Rfl: 1    ursodiol (ACTIGALL) 300 mg capsule, TAKE 1 CAPSULE BY MOUTH THREE TIMES A DAY, Disp: 270 capsule, Rfl: 2    valACYclovir (VALTREX) 500 mg tablet,  Take 2 tablets (1,000 mg total) by mouth daily for 14 days, Disp: 28 tablet, Rfl: 0          Whom besides the patient is providing clinical information about today's encounter?   NO ADDITIONAL HISTORIAN (patient alone provided history)    Physical Exam and Assessment/Plan by Diagnosis:  CHIEF COMPLAINT    63 year old male or female patient presents today for 6 Month Check Up.  Patient has a No history of skin cancer     CONTACT DERMATITIS    Physical Exam:  Anatomic Location Affected:  gluteal cleft   Morphological Description:  moist pink plaque   Pertinent Positives:  Pertinent Negatives:    Additional History of Present Condition:  Currently using Ketoconazole 2% cream but patient states wasn't working     Assessment and Plan:  Diagnosis:  Contact dermatitis  Based on a thorough discussion of this condition and the management approach to it (including a comprehensive discussion of the known risks, side effects and potential benefits of treatment), the patient (family) agrees to implement the following specific plan:  Continue using dove bar soap   STOP using ketoconazole 2% cream   Can use mupirocin ointment to area  STOP using flush-able wipes. Rub area gently     What is contact dermatitis?  Contact dermatitis is a type of eczema that results from something coming in contact with the skin.  There are 2 types:  irritant and allergic.  The majority of cases are from irritation.  Usually that is from contact with strong soaps, repeated exposure to water, contact with cleaning agents or food, or friction.  The minority are an actual allergy.  In these cases something is coming in contact with the skin and causing an allergic reaction, similar to what happens with poison ivy.  This usually occurs unexpectedly after using something for many years.  Even very tiny amounts of the substance on the skin can cause a reaction.  Some common causes are fragrances, preservatives and metals.  Sometimes this can occur when an  allergic substance is eaten, as well, but most often it is from direct contact with the skin.  To determine the cause of allergy a patch test is often done.    Some general rules to follow for both types of contact dermatitis are:   Wear gloves when using strong cleansers or before prolonged contact with water (like washing dishes)   Use gentle cleansers and avoid strong soaps   Apply moisturizer to entire body after bathing    Avoid products with fragrance    Most often contact dermatitis is treated with topical medicines like topical steroids, eucrisa, pimecrolimus or tacrolimus..  Some times oral steroids, ie prednisone, methylprednisone and prednisolone, are needed.  In chronic cases, treatment options include:  light therapy, methotrexate, cyclosporin.     LIPOMA    Physical Exam:  Anatomic Location Affected:  right posterior thigh   Morphological Description:  subcutaneous nodule   Pertinent Positives:  Pertinent Negatives:    Additional History of Present Condition:  Been there for awhile, does not bother patient     Assessment and Plan:  Based on a thorough discussion of this condition and the management approach to it (including a comprehensive discussion of the known risks, side effects and potential benefits of treatment), the patient (family) agrees to implement the following specific plan:  Discussed excision for removal-if becomes bothersome inform office if interested in scheduling a procedure for removal  Monitor for any changes     Lipoma  A lipoma is a non-cancerous tumor that is made up of fat cells. It slowly grows under the skin in the subcutaneous tissue. A person may have a single lipoma or may have many lipomas. They are very common.  Lipomas can occur in people of all ages, however, they tend to develop in adulthood and are most noticeable during middle age. They affect both sexes equally, although solitary lipomas are more common in women whilst multiple lipomas occur more frequently in  men.    The cause of lipomas is unknown. It is possible there may be genetic involvement as many patients with lipomas come from a family with a history of these tumors. Sometimes an injury such as a blunt blow to part of the body may trigger growth of a lipoma.  People are often unaware of lipomas until they have grown large enough to become visible and palpable. This growth occurs slowly over several years. Some features of lipomas include:  A dome-shaped or egg-shaped lump about 2-10 cm in diameter (some may grow even larger)   It feels soft and smooth and is easily moved under the skin with the fingers   Some have a rubbery or doughy consistency   They are most common on the shoulders, neck, trunk and arms, but they can occur anywhere on the body where fat tissue is present.    Most lipomas are symptomless, but some are painful on applying pressure. Lipomas that are tender or painful are usually angiolipomas. This means the lipoma has an increased number of small blood vessels. Painful lipomas are also a feature of adiposis dolorosa or Dercum disease.  Diagnosis of lipoma is usually made clinically by finding a soft lump under the skin. However, if there is any doubt, a deep skin biopsy can be performed which will show typical histopathological features of lipoma and its variants.    Most lipomas require no treatment. Most lipomas eventually stop growing and remain indefinitely without causing any problems. Occasionally, lipomas that interfere with the movement of adjacent muscles may require surgical removal. Several methods are available:  Simple surgical excision   Squeeze technique (a small incision is made over the lipoma and the fatty tissue is squeezed through the hole)   Liposuction     FOLLICULITIS    Physical Exam:  Anatomic Location Affected:  axillas   Morphological Description:  skin colored follicular papules   Pertinent Positives:  Pertinent Negatives:    Additional History of Present Condition:   recently started. Possibly due to one of medications prescribed to patient     Assessment and Plan:  Based on a thorough discussion of this condition and the management approach to it (including a comprehensive discussion of the known risks, side effects and potential benefits of treatment), the patient (family) agrees to implement the following specific plan:  Monitor for any changes     What is folliculitis?  Folliculitis is the name given to a group of skin conditions in which there are inflamed hair follicles. The result is a tender red spot, often with a surface pustule.  Folliculitis may be superficial or deep. It can affect anywhere there are hairs, including chest, back, buttocks, arms and legs. Acne and its variants are also types of folliculitis.    What causes folliculitis?  Folliculitis can be due to infection, occlusion (blockage), irritation and various skin diseases.    Folliculitis due to infection  To determine if folliculitis is due to an infection, swabs should be taken from the pustules for cytology and culture in the laboratory.    Bacteria  Bacterial folliculitis is commonly due to Staphylococcus aureus. If the infection involves the deep part of the follicle, it results in a painful boil. Recommended treatment includes careful hygiene, antiseptic cleanser or cream, antibiotic ointment, and/or oral antibiotics.    Spa pool folliculitis is due to infection with Pseudomonas aeruginosa, which thrives in inadequately chlorinated warm water. Gram negative folliculitis is a pustular facial eruption also due to infection with Pseudomonas aeruginosa or other similar organisms. When it appears, it usually follows tetracycline treatment of acne, but is quite rare.    Yeasts  The most common yeast to cause a folliculitis is Pityrosporum ovale, also known as Malassezia. Malassezia folliculitis (Pityrosporum folliculitis) is an itchy acne-like condition usually affecting the upper trunk of a young adult.  Treatment includes avoiding moisturisers, stopping any antibiotics and topical antifungal or oral antifungal medication for several weeks.    Candida albicans can also provoke a folliculitis in skin folds (intertrigo) or in the beard area. It is treated with topical or oral antifungal agents.    Fungi  Ringworm of the scalp (tinea capitis) usually results in scaling and hair loss, but sometimes results in folliculitis. In [place], cat ringworm (Microsporum canis) is the commonest organism causing scalp fungal infection. Other fungi such as Trichophyton tonsurans are increasingly reported. Treatment is with oral antifungal agents for several months.    Viral infections  Folliculitis may caused by herpes simplex virus. This tends to be tender, and resolves without treatment in around 10 days. Severe recurrent attacks may be treated with acyclovir and other antiviral agents.    Herpes zoster (the cause of shingles) may also present as folliculitis with painful pustules and crusted spots within a dermatome (an area of skin supplied by a single nerve). It is treated with hihg-dose acyclovir.  Molluscum contagiosum, common in young children, may also cause follicular umbilicated papules, usually clustered in and around a body fold. Molluscum may provoke dermatitis.    Parasitic infection  Folliculitis on the face or scalp of older or immunosuppressed adults may be due to colonisation by hair follicle mites (demodex). This is known as demodicosis.  The human infestation, scabies, often provokes folliculitis, as well as non-follicular papules, vesicles and pustules.    Folliculitis due to irritation from regrowing hairs  Folliculitis may arise as hairs regrow after shaving, waxing, electrolysis or plucking. Swabs taken from the pustules are sterile ie there is no growth of bacteria or other organisms. In the beard area irritant folliculitis is known as pseudofolliculitis barbae.  Irritant folliculitis is also common on  the lower legs of women (shaving rash). It is frequently very itchy. Treatment is by stopping hair removal, and not beginning again for about three months after the folliculitis has settled. To prevent reoccurring irritant folliculitis, use a gentle hair removal method, such as a lady's electric razor. Avoid soap and apply plenty of shaving gel, if using a blade shaver.    Folliculitis due to contact reactions  Occlusion  Paraffin-based ointments, moisturisers, and adhesive plasters may all result in a sterile folliculitis. If a moisturiser is needed, choose an oil-free product, as it is less likely to cause occlusion.    Chemicals  Coal tar, cutting oils and other chemicals may cause an irritant folliculitis. Avoid contact with the causative product.    Topical steroids  Overuse of topical steroids may produce a folliculitis. Perioral dermatitis is a facial folliculitis provoked by moisturisers and topical steroids. Perioral dermatitis is treated with tetracycline antibiotics for six weeks or so.    Folliculitis due to immunosuppression  Eosinophilic folliculitis is a specific type of folliculitis that may arise in some immune suppressed individuals such as those infected by human immunodeficiency virus (HIV) or those who have cancer.    Folliculitis due to drugs  Folliculitis may be due to drugs, particularly corticosteroids (steroid acne), androgens (male hormones), ACTH, lithium, isoniazid (INH), phenytoin and B-complex vitamins. Protein kinase inhibitors (epidermal growth factor receptor inhibitors) and targeted therapy for metastatic melanoma (vemurafenib, dabrafenib) nearly always result in folliculitis.    Folliculitis due to inflammatory skin diseases  Certain uncommon inflammatory skin diseases may cause permanent hair loss and scarring because of deep seated sterile folliculitis. These include:  Lichen planus  Discoid lupus erythematosus  Folliculitis decalvans  Folliculitis keloidalis     Treatment  depends on the underlying condition and its severity. A skin biopsy is often necessary to establish the diagnosis.    Acne variants   Acne and acne-like or acneform disorders are also forms of folliculitis. These include:  Acne vulgaris  Nodulocystic acne  Rosacea  Scalp folliculitis  Chloracne    The follicular occlusion syndrome refers to:  Hidradenitis suppurativa (acne inversa)  Acne conglobata (a severe form of nodulocystic acne)  Dissecting cellulitis (perifolliculitis capitis abscedens et suffodiens)  Pilonidal sinus.    Treatment of the acne variants may include topical therapy as well as long courses of tetracycline antibiotics, isotretinoin (vitamin-A derivative) and in women, antiandrogenic therapy.    Buttock folliculitis  Folliculitis affecting the buttocks is quite common and is often nonspecific, ie no specific cause is found. Buttock folliculitis is equally common in males and females.  Acute buttock folliculitis is usually bacterial in origin (like boils), resulting in red painful papules and pustules. It clears with antibiotics.  Chronic buttock folliculitis does not often cause significant symptoms but it can be very persistent. Although antiseptics, topical acne treatments, peeling agents such as alphahydroxy acids, long courses of oral antibiotics and isotretinoin can help buttock folliculitis, they are not always effective. Hair removal might be worth trying if the affected area is hairy. As regrowth of hair can make it worse, permanent hair reduction by laser or intense pulsed light (IPL) is best.     PRURIGO NODULARIS    Physical Exam:  Anatomic Location Affected:  front of scalp  Morphological Description:  crusted nodules   Pertinent Positives:  Pertinent Negatives:    Additional History of Present Condition:  has had for a couple years     Assessment and Plan:  Based on a thorough discussion of this condition and the management approach to it (including a comprehensive discussion of the  known risks, side effects and potential benefits of treatment), the patient (family) agrees to implement the following specific plan:  Try your best to not pick at area   Recommend supplement  N-acetylcysteine 600 mg twice a day   Prescribed Clobetasol 0.05% ointment and Mupirocin 2% ointment to be taken twice a day as needed to affected area. Mix both medications together when applying   Monitor for any changes     What is nodular prurigo?  Nodular prurigo is a skin condition characterised by very itchy firm lumps. It is the most severe form of prurigo. It is not known why these lumps appear. It is also called ‘prurigo nodularis’.  Nodular prurigo is very difficult to treat effectively.    Who gets nodular prurigo?  Nodular prurigo can occur at all ages but mainly in adults aged 20-60 years. Both sexes are equally affected.  Up to 80% of patients have a personal or family history of atopic dermatitis, asthma or hay fever (compared to about 25% of the normal population). It has been associated with internal disease including iron deficiency anaemia, chronic renal failure, gluten enteropathy, HIV infection and many other diverse conditions.    What is the cause of nodular prurigo?  The cause of nodular prurigo is unknown. It is uncertain whether scratching leads to the nodules or if the nodules appear before they are scratched. The reason for the nodules, the inflammation and the increased activity and size of nerves in the skin is under investigation but remains unknown.  Nodular prurigo may commence as an insect bite reaction or another form of dermatitis.     In some cases, nodular prurigo has been associated with brachioradial pruritus, which is due to compression or traction of spinal nerves. This theory may explain why local treatment is not always successful. It has been speculated that widespread nodular prurigo may also follow sensitisation of the spinal nerves and that the nodules appear because of  scratching.    What are the clinical features of nodular prurigo?  The individual prurigo nodule is a firm lump, 1-3 cm in diameter, often with a raised warty surface. The early lesion may start as a smaller red itchy bump. Crusting and scaling may cover recently scratched lesions. Older lesions may be darker or paler than surrounding skin. The skin in between the nodules is often dry. The itch is often very intense, often for hours on end, leading to vigorous scratching and sometimes secondary infection.    Nodular prurigo lesions are usually grouped and numerous but may vary in number from 2-200. Nodular prurigo tends to be symmetrically distributed. They usually start on the lower arms and legs and are worse on the outer aspects. The trunk, face and even palms can also be affected. Sometimes the prurigo nodules are most obvious on the cape area (neck, shoulders and upper arms).    New nodules appear from time to time, but existing nodules may regress spontaneously to leave scars. Nodular prurigo often runs a long course and can lead to significant stress and depression.    How is nodular prurigo diagnosed?  In most cases, the diagnosis is made clinically due to the degree of itch and the typical appearance of the nodules.  A skin biopsy may be useful to confirm the diagnosis of nodular prurigo. Under the microscope, the skin is enormously thickened and may appear quite abnormal, sometimes resembling squamous cell skin cancer. The nerve fibres and nerve endings in the skin are markedly increased in size. The skin is inflamed and there is an increased number of neural mediators known to cause itching and nerve growth.    Direct immunofluorescence looking for antibody deposition in the skin is usually negative. Rarely, the blistering disease bullous pemphigoid can present as nodular prurigo (pemphigoid nodularis). In this case, immunofluorescence reveals immunoglobulins in the basement membrane zone below the  epidermis. The prurigo nodules can be present for weeks or months before any blisters appear.    It is important to identify underlying diseases that are associated with nodular prurigo; blood tests may include full blood count, liver, kidney and thyroid function tests. Patch testing may be worthwhile if contact allergy is considered possible.    What is the treatment for nodular prurigo?  Unfortunately, nodular prurigo is one of the more resistant conditions skin specialists are called upon to treat. Local treatments that may be tried include:  Emollients applied liberally and frequently to cool and soothe itchy skin - menthol or phenol may be added.   Oral antihistamines at night to reduce itch and allow sleep.   Ultrapotent topical steroid creams. To enhance their effect, apply under occlusion; cover with a plastic or hydrocolloid adhesive dressing and leave this in place for several days.   Corticosteroid injections (triamcinolone acetonide 10-40 mg/mL) into thicker nodules.   Coal tar ointment as a steroid alternative.   Calcipotriol ointment (topical vitamin D3), usually used for psoriasis, can be applied twice daily.   Capsaicin cream, which induces itching and burning until eventually, the itch stops completely -- it requires repeated applications four to six times daily.   Cryotherapy, which may shrink the nodules and reduce their itch.   Treatments with pulsed dye laser, which may reduce the vascularity of individual lesions.    Antiseptic or antibiotic ointment may be used on infected nodules, and oral antibiotics (usually flucloxacillin) are indicated for significant secondary bacterial infection (cellulitis).    Systemic treatments that may be helpful for more severe disease are listed below, in no particular order. Combination treatment is frequently recommended.  Phototherapy (UVB and PUVA)   Tricyclic antidepressants such as amitriptyline or doxepin   Anticonvulsants used for neuropathic pain and  itch, such as gabapentin or pregabalin   Naltrexone, an opiate antagonist (this counteracts the narcotic effect of morphine, heroin and similar drugs), has been reported to reduce itching in some subjects.   Oral steroids   Methotrexate   Thalidomide, which is reserved for very severe cases and may be difficult to access.   Ciclosporin, which may reduce the lumps and the itching but its use is limited by side effects.   Systemic retinoids such as acitretin or isotretinoin, which may shrink the nodules and reduce the severity of the itch.           Scribe Attestation      I,:  Willa Alfaro MA am acting as a scribe while in the presence of the attending physician.:       I,:  Maria Elena Chaidez MD personally performed the services described in this documentation    as scribed in my presence.:

## 2024-08-08 NOTE — PATIENT INSTRUCTIONS
CONTACT DERMATITIS      Assessment and Plan:  Diagnosis:  Contact dermatitis  Based on a thorough discussion of this condition and the management approach to it (including a comprehensive discussion of the known risks, side effects and potential benefits of treatment), the patient (family) agrees to implement the following specific plan:  Continue using dove bar soap   STOP using ketoconazole 2% cream   Can use mupirocin ointment to area  STOP using flush-able wipes. Rub area gently     What is contact dermatitis?  Contact dermatitis is a type of eczema that results from something coming in contact with the skin.  There are 2 types:  irritant and allergic.  The majority of cases are from irritation.  Usually that is from contact with strong soaps, repeated exposure to water, contact with cleaning agents or food, or friction.  The minority are an actual allergy.  In these cases something is coming in contact with the skin and causing an allergic reaction, similar to what happens with poison ivy.  This usually occurs unexpectedly after using something for many years.  Even very tiny amounts of the substance on the skin can cause a reaction.  Some common causes are fragrances, preservatives and metals.  Sometimes this can occur when an allergic substance is eaten, as well, but most often it is from direct contact with the skin.  To determine the cause of allergy a patch test is often done.    Some general rules to follow for both types of contact dermatitis are:   Wear gloves when using strong cleansers or before prolonged contact with water (like washing dishes)   Use gentle cleansers and avoid strong soaps   Apply moisturizer to entire body after bathing    Avoid products with fragrance    Most often contact dermatitis is treated with topical medicines like topical steroids, eucrisa, pimecrolimus or tacrolimus..  Some times oral steroids, ie prednisone, methylprednisone and prednisolone, are needed.  In chronic  cases, treatment options include:  light therapy, methotrexate, cyclosporin.     LIPOMA      Assessment and Plan:  Based on a thorough discussion of this condition and the management approach to it (including a comprehensive discussion of the known risks, side effects and potential benefits of treatment), the patient (family) agrees to implement the following specific plan:  Discussed excision for removal-if becomes bothersome inform office if interested in scheduling a procedure for removal  Monitor for any changes     Lipoma  A lipoma is a non-cancerous tumor that is made up of fat cells. It slowly grows under the skin in the subcutaneous tissue. A person may have a single lipoma or may have many lipomas. They are very common.  Lipomas can occur in people of all ages, however, they tend to develop in adulthood and are most noticeable during middle age. They affect both sexes equally, although solitary lipomas are more common in women whilst multiple lipomas occur more frequently in men.    The cause of lipomas is unknown. It is possible there may be genetic involvement as many patients with lipomas come from a family with a history of these tumors. Sometimes an injury such as a blunt blow to part of the body may trigger growth of a lipoma.  People are often unaware of lipomas until they have grown large enough to become visible and palpable. This growth occurs slowly over several years. Some features of lipomas include:  A dome-shaped or egg-shaped lump about 2-10 cm in diameter (some may grow even larger)   It feels soft and smooth and is easily moved under the skin with the fingers   Some have a rubbery or doughy consistency   They are most common on the shoulders, neck, trunk and arms, but they can occur anywhere on the body where fat tissue is present.    Most lipomas are symptomless, but some are painful on applying pressure. Lipomas that are tender or painful are usually angiolipomas. This means the lipoma  has an increased number of small blood vessels. Painful lipomas are also a feature of adiposis dolorosa or Dercum disease.  Diagnosis of lipoma is usually made clinically by finding a soft lump under the skin. However, if there is any doubt, a deep skin biopsy can be performed which will show typical histopathological features of lipoma and its variants.    Most lipomas require no treatment. Most lipomas eventually stop growing and remain indefinitely without causing any problems. Occasionally, lipomas that interfere with the movement of adjacent muscles may require surgical removal. Several methods are available:  Simple surgical excision   Squeeze technique (a small incision is made over the lipoma and the fatty tissue is squeezed through the hole)   Liposuction     FOLLICULITIS      Assessment and Plan:  Based on a thorough discussion of this condition and the management approach to it (including a comprehensive discussion of the known risks, side effects and potential benefits of treatment), the patient (family) agrees to implement the following specific plan:  Monitor for any changes     What is folliculitis?  Folliculitis is the name given to a group of skin conditions in which there are inflamed hair follicles. The result is a tender red spot, often with a surface pustule.  Folliculitis may be superficial or deep. It can affect anywhere there are hairs, including chest, back, buttocks, arms and legs. Acne and its variants are also types of folliculitis.    What causes folliculitis?  Folliculitis can be due to infection, occlusion (blockage), irritation and various skin diseases.    Folliculitis due to infection  To determine if folliculitis is due to an infection, swabs should be taken from the pustules for cytology and culture in the laboratory.    Bacteria  Bacterial folliculitis is commonly due to Staphylococcus aureus. If the infection involves the deep part of the follicle, it results in a painful boil.  Recommended treatment includes careful hygiene, antiseptic cleanser or cream, antibiotic ointment, and/or oral antibiotics.    Spa pool folliculitis is due to infection with Pseudomonas aeruginosa, which thrives in inadequately chlorinated warm water. Gram negative folliculitis is a pustular facial eruption also due to infection with Pseudomonas aeruginosa or other similar organisms. When it appears, it usually follows tetracycline treatment of acne, but is quite rare.    Yeasts  The most common yeast to cause a folliculitis is Pityrosporum ovale, also known as Malassezia. Malassezia folliculitis (Pityrosporum folliculitis) is an itchy acne-like condition usually affecting the upper trunk of a young adult. Treatment includes avoiding moisturisers, stopping any antibiotics and topical antifungal or oral antifungal medication for several weeks.    Candida albicans can also provoke a folliculitis in skin folds (intertrigo) or in the beard area. It is treated with topical or oral antifungal agents.    Fungi  Ringworm of the scalp (tinea capitis) usually results in scaling and hair loss, but sometimes results in folliculitis. In [place], cat ringworm (Microsporum canis) is the commonest organism causing scalp fungal infection. Other fungi such as Trichophyton tonsurans are increasingly reported. Treatment is with oral antifungal agents for several months.    Viral infections  Folliculitis may caused by herpes simplex virus. This tends to be tender, and resolves without treatment in around 10 days. Severe recurrent attacks may be treated with acyclovir and other antiviral agents.    Herpes zoster (the cause of shingles) may also present as folliculitis with painful pustules and crusted spots within a dermatome (an area of skin supplied by a single nerve). It is treated with hihg-dose acyclovir.  Molluscum contagiosum, common in young children, may also cause follicular umbilicated papules, usually clustered in and  around a body fold. Molluscum may provoke dermatitis.    Parasitic infection  Folliculitis on the face or scalp of older or immunosuppressed adults may be due to colonisation by hair follicle mites (demodex). This is known as demodicosis.  The human infestation, scabies, often provokes folliculitis, as well as non-follicular papules, vesicles and pustules.    Folliculitis due to irritation from regrowing hairs  Folliculitis may arise as hairs regrow after shaving, waxing, electrolysis or plucking. Swabs taken from the pustules are sterile ie there is no growth of bacteria or other organisms. In the beard area irritant folliculitis is known as pseudofolliculitis barbae.  Irritant folliculitis is also common on the lower legs of women (shaving rash). It is frequently very itchy. Treatment is by stopping hair removal, and not beginning again for about three months after the folliculitis has settled. To prevent reoccurring irritant folliculitis, use a gentle hair removal method, such as a lady's electric razor. Avoid soap and apply plenty of shaving gel, if using a blade shaver.    Folliculitis due to contact reactions  Occlusion  Paraffin-based ointments, moisturisers, and adhesive plasters may all result in a sterile folliculitis. If a moisturiser is needed, choose an oil-free product, as it is less likely to cause occlusion.    Chemicals  Coal tar, cutting oils and other chemicals may cause an irritant folliculitis. Avoid contact with the causative product.    Topical steroids  Overuse of topical steroids may produce a folliculitis. Perioral dermatitis is a facial folliculitis provoked by moisturisers and topical steroids. Perioral dermatitis is treated with tetracycline antibiotics for six weeks or so.    Folliculitis due to immunosuppression  Eosinophilic folliculitis is a specific type of folliculitis that may arise in some immune suppressed individuals such as those infected by human immunodeficiency virus (HIV)  or those who have cancer.    Folliculitis due to drugs  Folliculitis may be due to drugs, particularly corticosteroids (steroid acne), androgens (male hormones), ACTH, lithium, isoniazid (INH), phenytoin and B-complex vitamins. Protein kinase inhibitors (epidermal growth factor receptor inhibitors) and targeted therapy for metastatic melanoma (vemurafenib, dabrafenib) nearly always result in folliculitis.    Folliculitis due to inflammatory skin diseases  Certain uncommon inflammatory skin diseases may cause permanent hair loss and scarring because of deep seated sterile folliculitis. These include:  Lichen planus  Discoid lupus erythematosus  Folliculitis decalvans  Folliculitis keloidalis     Treatment depends on the underlying condition and its severity. A skin biopsy is often necessary to establish the diagnosis.    Acne variants   Acne and acne-like or acneform disorders are also forms of folliculitis. These include:  Acne vulgaris  Nodulocystic acne  Rosacea  Scalp folliculitis  Chloracne    The follicular occlusion syndrome refers to:  Hidradenitis suppurativa (acne inversa)  Acne conglobata (a severe form of nodulocystic acne)  Dissecting cellulitis (perifolliculitis capitis abscedens et suffodiens)  Pilonidal sinus.    Treatment of the acne variants may include topical therapy as well as long courses of tetracycline antibiotics, isotretinoin (vitamin-A derivative) and in women, antiandrogenic therapy.    Buttock folliculitis  Folliculitis affecting the buttocks is quite common and is often nonspecific, ie no specific cause is found. Buttock folliculitis is equally common in males and females.  Acute buttock folliculitis is usually bacterial in origin (like boils), resulting in red painful papules and pustules. It clears with antibiotics.  Chronic buttock folliculitis does not often cause significant symptoms but it can be very persistent. Although antiseptics, topical acne treatments, peeling agents such as  alphahydroxy acids, long courses of oral antibiotics and isotretinoin can help buttock folliculitis, they are not always effective. Hair removal might be worth trying if the affected area is hairy. As regrowth of hair can make it worse, permanent hair reduction by laser or intense pulsed light (IPL) is best.     PRURIGO NODULARIS    Assessment and Plan:  Based on a thorough discussion of this condition and the management approach to it (including a comprehensive discussion of the known risks, side effects and potential benefits of treatment), the patient (family) agrees to implement the following specific plan:  Try your best to not pick at area   Recommend supplement  N-acetylcysteine 600 mg twice a day   Prescribed Clobetasol 0.05% ointment and Mupirocin 2% ointment to be taken twice a day as needed to affected area. Mix both medications together when applying   Monitor for any changes     What is nodular prurigo?  Nodular prurigo is a skin condition characterised by very itchy firm lumps. It is the most severe form of prurigo. It is not known why these lumps appear. It is also called ‘prurigo nodularis’.  Nodular prurigo is very difficult to treat effectively.    Who gets nodular prurigo?  Nodular prurigo can occur at all ages but mainly in adults aged 20-60 years. Both sexes are equally affected.  Up to 80% of patients have a personal or family history of atopic dermatitis, asthma or hay fever (compared to about 25% of the normal population). It has been associated with internal disease including iron deficiency anaemia, chronic renal failure, gluten enteropathy, HIV infection and many other diverse conditions.    What is the cause of nodular prurigo?  The cause of nodular prurigo is unknown. It is uncertain whether scratching leads to the nodules or if the nodules appear before they are scratched. The reason for the nodules, the inflammation and the increased activity and size of nerves in the skin is under  investigation but remains unknown.  Nodular prurigo may commence as an insect bite reaction or another form of dermatitis.     In some cases, nodular prurigo has been associated with brachioradial pruritus, which is due to compression or traction of spinal nerves. This theory may explain why local treatment is not always successful. It has been speculated that widespread nodular prurigo may also follow sensitisation of the spinal nerves and that the nodules appear because of scratching.    What are the clinical features of nodular prurigo?  The individual prurigo nodule is a firm lump, 1-3 cm in diameter, often with a raised warty surface. The early lesion may start as a smaller red itchy bump. Crusting and scaling may cover recently scratched lesions. Older lesions may be darker or paler than surrounding skin. The skin in between the nodules is often dry. The itch is often very intense, often for hours on end, leading to vigorous scratching and sometimes secondary infection.    Nodular prurigo lesions are usually grouped and numerous but may vary in number from 2-200. Nodular prurigo tends to be symmetrically distributed. They usually start on the lower arms and legs and are worse on the outer aspects. The trunk, face and even palms can also be affected. Sometimes the prurigo nodules are most obvious on the cape area (neck, shoulders and upper arms).    New nodules appear from time to time, but existing nodules may regress spontaneously to leave scars. Nodular prurigo often runs a long course and can lead to significant stress and depression.    How is nodular prurigo diagnosed?  In most cases, the diagnosis is made clinically due to the degree of itch and the typical appearance of the nodules.  A skin biopsy may be useful to confirm the diagnosis of nodular prurigo. Under the microscope, the skin is enormously thickened and may appear quite abnormal, sometimes resembling squamous cell skin cancer. The nerve fibres  and nerve endings in the skin are markedly increased in size. The skin is inflamed and there is an increased number of neural mediators known to cause itching and nerve growth.    Direct immunofluorescence looking for antibody deposition in the skin is usually negative. Rarely, the blistering disease bullous pemphigoid can present as nodular prurigo (pemphigoid nodularis). In this case, immunofluorescence reveals immunoglobulins in the basement membrane zone below the epidermis. The prurigo nodules can be present for weeks or months before any blisters appear.    It is important to identify underlying diseases that are associated with nodular prurigo; blood tests may include full blood count, liver, kidney and thyroid function tests. Patch testing may be worthwhile if contact allergy is considered possible.    What is the treatment for nodular prurigo?  Unfortunately, nodular prurigo is one of the more resistant conditions skin specialists are called upon to treat. Local treatments that may be tried include:  Emollients applied liberally and frequently to cool and soothe itchy skin - menthol or phenol may be added.   Oral antihistamines at night to reduce itch and allow sleep.   Ultrapotent topical steroid creams. To enhance their effect, apply under occlusion; cover with a plastic or hydrocolloid adhesive dressing and leave this in place for several days.   Corticosteroid injections (triamcinolone acetonide 10-40 mg/mL) into thicker nodules.   Coal tar ointment as a steroid alternative.   Calcipotriol ointment (topical vitamin D3), usually used for psoriasis, can be applied twice daily.   Capsaicin cream, which induces itching and burning until eventually, the itch stops completely -- it requires repeated applications four to six times daily.   Cryotherapy, which may shrink the nodules and reduce their itch.   Treatments with pulsed dye laser, which may reduce the vascularity of individual lesions.    Antiseptic or  antibiotic ointment may be used on infected nodules, and oral antibiotics (usually flucloxacillin) are indicated for significant secondary bacterial infection (cellulitis).    Systemic treatments that may be helpful for more severe disease are listed below, in no particular order. Combination treatment is frequently recommended.  Phototherapy (UVB and PUVA)   Tricyclic antidepressants such as amitriptyline or doxepin   Anticonvulsants used for neuropathic pain and itch, such as gabapentin or pregabalin   Naltrexone, an opiate antagonist (this counteracts the narcotic effect of morphine, heroin and similar drugs), has been reported to reduce itching in some subjects.   Oral steroids   Methotrexate   Thalidomide, which is reserved for very severe cases and may be difficult to access.   Ciclosporin, which may reduce the lumps and the itching but its use is limited by side effects.   Systemic retinoids such as acitretin or isotretinoin, which may shrink the nodules and reduce the severity of the itch.

## 2024-08-10 ENCOUNTER — HOSPITAL ENCOUNTER (EMERGENCY)
Facility: HOSPITAL | Age: 64
Discharge: HOME | End: 2024-08-10
Attending: EMERGENCY MEDICINE
Payer: COMMERCIAL

## 2024-08-10 ENCOUNTER — APPOINTMENT (EMERGENCY)
Dept: RADIOLOGY | Facility: HOSPITAL | Age: 64
End: 2024-08-10
Payer: COMMERCIAL

## 2024-08-10 VITALS
DIASTOLIC BLOOD PRESSURE: 97 MMHG | OXYGEN SATURATION: 98 % | TEMPERATURE: 97.5 F | RESPIRATION RATE: 18 BRPM | SYSTOLIC BLOOD PRESSURE: 144 MMHG | HEART RATE: 87 BPM

## 2024-08-10 DIAGNOSIS — M70.50 PES ANSERINE BURSITIS: Primary | ICD-10-CM

## 2024-08-10 DIAGNOSIS — M25.469 KNEE EFFUSION: ICD-10-CM

## 2024-08-10 DIAGNOSIS — M25.569 KNEE PAIN: ICD-10-CM

## 2024-08-10 PROCEDURE — 73564 X-RAY EXAM KNEE 4 OR MORE: CPT

## 2024-08-10 PROCEDURE — 99284 EMERGENCY DEPT VISIT MOD MDM: CPT | Performed by: EMERGENCY MEDICINE

## 2024-08-10 PROCEDURE — 20610 DRAIN/INJ JOINT/BURSA W/O US: CPT | Performed by: EMERGENCY MEDICINE

## 2024-08-10 PROCEDURE — 96372 THER/PROPH/DIAG INJ SC/IM: CPT

## 2024-08-10 PROCEDURE — 99283 EMERGENCY DEPT VISIT LOW MDM: CPT

## 2024-08-10 RX ORDER — TRIAMCINOLONE ACETONIDE 40 MG/ML
40 INJECTION, SUSPENSION INTRA-ARTICULAR; INTRAMUSCULAR ONCE
Status: COMPLETED | OUTPATIENT
Start: 2024-08-10 | End: 2024-08-10

## 2024-08-10 RX ORDER — ROPIVACAINE HYDROCHLORIDE 5 MG/ML
15 INJECTION, SOLUTION EPIDURAL; INFILTRATION; PERINEURAL ONCE
Status: COMPLETED | OUTPATIENT
Start: 2024-08-10 | End: 2024-08-10

## 2024-08-10 RX ADMIN — ROPIVACAINE HYDROCHLORIDE 15 ML: 5 INJECTION, SOLUTION EPIDURAL; INFILTRATION; PERINEURAL at 23:06

## 2024-08-10 RX ADMIN — TRIAMCINOLONE ACETONIDE 40 MG: 40 INJECTION, SUSPENSION INTRA-ARTICULAR; INTRAMUSCULAR at 23:06

## 2024-08-11 NOTE — DISCHARGE INSTRUCTIONS
Please return or emergency department if you have worsening pain, swelling, fever, inability to move the joint.

## 2024-08-11 NOTE — ED ATTENDING ATTESTATION
8/10/2024  I, Cliff Zhu DO, saw and evaluated the patient. I have discussed the patient with the resident/non-physician practitioner and agree with the resident's/non-physician practitioner's findings, Plan of Care, and MDM as documented in the resident's/non-physician practitioner's note, except where noted. All available labs and Radiology studies were reviewed.  I was present for key portions of any procedure(s) performed by the resident/non-physician practitioner and I was immediately available to provide assistance.       At this point I agree with the current assessment done in the Emergency Department.  I have conducted an independent evaluation of this patient a history and physical is as follows:    64 yo woman c/o L knee pain mostly over the pes anserine bursa, but also that her entire knee feels tight after she was mowing the lawn on uneven turf. Had a twisting injury to her knee. No focal weakness/numbness/tingling. No other c/o at this time.    LLE:  Skin intact  SILT sap/shemar/tib/dpn/spn  EHL 5/5  Small suprapatellar effusion  Exquisite TTP over pes anserine bursa  No erythema    Imp: L knee pain, small effusion, pes anserine bursitis plan: pes bursa injection using ropivacaine 0.5% 4mL and 1mL 40mg triamcinolone. Offer knee joint aspiration. ACE wrap.      ED Course         Critical Care Time  Procedures

## 2024-08-11 NOTE — ED PROVIDER NOTES
History  Chief Complaint   Patient presents with    Leg Injury     Shooting pain in R leg. Swelling below the kneecap. No known cause of injury. Took tramadol at 2000.      Patient is a 63-year-old female with past medical history of biliary cirrhosis, cancer, chronic liver disease, 1 kidney who presents today with left knee pain.  She states that she was mowing the lawn with a push mower earlier today on uneven ground and she turned and felt some pain in her knee.  She states that she was able to finish mowing the lawn and had gradual onset pain that has she has been getting worse.  She describes the pain as pinpoint over the pes anserinus with some general achiness over the knee itself.  She states that she took some tramadol but this has not improved her pain.  Denies any pops or cracks during the turning event.  Patient is able to bear weight and bend the knee however it is limited by pain.  She drove herself here.  Denies any fever.          Prior to Admission Medications   Prescriptions Last Dose Informant Patient Reported? Taking?   Cholecalciferol (Vitamin D3) 50 MCG (2000 UT) TABS  Self No No   Sig: Take 1 tablet (2,000 Units total) by mouth daily   Elastic Bandages & Supports (Thumb Splint/Right Small) MISC  Self No No   Sig: Use daily   FLUoxetine (PROzac) 40 MG capsule   No No   Sig: Take 1 capsule (40 mg total) by mouth daily   albuterol (Proventil HFA) 90 mcg/act inhaler  Self No No   Sig: Inhale 2 puffs every 6 (six) hours as needed for wheezing   ammonium lactate (LAC-HYDRIN) 12 % lotion  Self No No   Sig: Apply topically 2 (two) times a day as needed for dry skin   betamethasone valerate (VALISONE) 0.1 % ointment   No No   Sig: Apply topically 2 (two) times a day As needed to affected area. Mix with Mupirocin ointment when applying   estradiol (VAGIFEM, YUVAFEM) 10 MCG TABS vaginal tablet  Self No No   Sig: INSERT 1 TABLET INTO THE VAGINA 2 TIMES A WEEK.   fenofibrate (FENOGLIDE) 120 MG TABS  Self  Yes No   Sig: Take 120 mg by mouth daily   ketoconazole (NIZORAL) 2 % cream  Self No No   Sig: Apply topically daily   lidocaine (LIDODERM) 5 %  Self Yes No   Sig: lidocaine 5 % topical patch   APPLY 1 PATCH TO AFFECTED AREA FOR 12 HOURS A DAY & REMOVE FOR 12 HOURS BEFORE APPLYING NEXT PATCH. (12 HOURS ON, 12 HOURS OFF)   lisinopril (ZESTRIL) 20 mg tablet   No No   Sig: Take 1 tablet (20 mg total) by mouth daily   mupirocin (BACTROBAN) 2 % ointment   No No   Sig: Apply topically 3 (three) times a day As needed to affected area. Mix with Clobetasol ointment when applying to scalp. Use by itself to buttock area   promethazine-dextromethorphan (PHENERGAN-DM) 6.25-15 mg/5 mL oral syrup  Self No No   Sig: Take 5 mL by mouth 4 (four) times a day as needed for cough   traMADol (Ultram) 50 mg tablet  Self No No   Sig: Take 1 tablet (50 mg total) by mouth every 8 (eight) hours as needed for moderate pain   traZODone (DESYREL) 50 mg tablet  Self No No   Sig: Take 0.5-1 tablets (25-50 mg total) by mouth daily at bedtime as needed for sleep   triamcinolone (KENALOG) 0.1 % ointment  Self No No   Sig: Apply topically 2 (two) times a day   ursodiol (ACTIGALL) 300 mg capsule  Self No No   Sig: TAKE 1 CAPSULE BY MOUTH THREE TIMES A DAY   valACYclovir (VALTREX) 500 mg tablet   No No   Sig: Take 2 tablets (1,000 mg total) by mouth daily for 14 days      Facility-Administered Medications: None       Past Medical History:   Diagnosis Date    Abnormal breast exam     Anemia     Anxiety     Arthritis     Asthma     childhood    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HCC)     primary per allscripts    Cancer (HCC)     IgG kappa smoldering multiple myeloma    Cardiac murmur     Chronic liver disease     resolved 12/04/2017    COVID     Depression     Endometriosis     Fracture of tibia     right tibial plateau fracture per allscripts    Heart murmur     Hepatic disease     Hypertension     last assessed 12/13/2017    Inflammatory bowel disease     Kidney stone      Multiple myeloma (HCC)     Neuropathy     R foot     Nosebleed     OCD (obsessive compulsive disorder)     Osteoporosis     Otitis media     Pneumonia     RSD (reflex sympathetic dystrophy) 12/11/2018    Scoliosis     Seasonal allergies     Urinary tract infection     Visual impairment     Wears eyeglasses     Whiplash injury to neck        Past Surgical History:   Procedure Laterality Date    BACK SURGERY      hemilaminectomy and discectomy, L5-S1, right (Dr. Rashid, [facility] at [facility]) onset 02/14/2005 per allscripts    COLONOSCOPY      EXPLORATION EXTREMITY Right 08/29/2016    Procedure: KNEE PERONEAL NERVE EXPLORATION AND RELEASE ;  Surgeon: Bry De Guzman MD;  Location: [facility];  Service:     FOOT SURGERY      FRACTURE SURGERY      HAND SURGERY      HERNIA REPAIR      IR BIOPSY KIDNEY MASS  05/25/2023    JOINT REPLACEMENT Right     RTK    KNEE SURGERY      MANDIBLE SURGERY      NEPHRECTOMY LAPAROSCOPIC Right 6/21/2023    Procedure: NEPHRECTOMY LAPAROSCOPIC ASSISTED;  Surgeon: Avinash Sterling MD;  Location: [facility];  Service: Urology    NEUROPLASTY / TRANSPOSITION MEDIAN NERVE AT CARPAL TUNNEL      ORIF TIBIAL PLATEAU Right     arthroplasty per allscripts    OTHER SURGICAL HISTORY      injection of trigger point(s) per allscripts    OH ARTHRP KNE CONDYLE&PLATU MEDIAL&LAT COMPARTMENTS Right 06/11/2018    Procedure: ARTHROPLASTY KNEE TOTAL;  Surgeon: Bry De Guzman MD;  Location[facility];  Service: Orthopedics    OH TENDON SHEATH INCISION Right 12/02/2020    Procedure: RELEASE TRIGGER FINGER-right long;  Surgeon: Todd Mcdonald MD;  Location: [facility];  Service: Orthopedics    SINUS SURGERY      SPINE SURGERY      TONSILLECTOMY         Family History   Problem Relation Age of Onset    Heart failure Mother     Anxiety disorder Mother         symptom per allscripts    Depression Mother     Vision loss Mother     Anxiety disorder Father         symptom per allscripts    Cirrhosis Father          hepatic per allscripts    Alcohol abuse Father     Stomach cancer Maternal Grandmother     Cancer Maternal Grandmother     Stomach cancer Maternal Grandfather     Cancer Maternal Grandfather     Diabetes Paternal Grandmother     No Known Problems Paternal Grandfather     No Known Problems Paternal Aunt     No Known Problems Paternal Aunt     Anxiety disorder Other         symptom per allscripts    Coronary artery disease Other     Depression Other     Hyperlipidemia Other     Osteoarthritis Other     Other Other         back disorder per allscripts    Neuropathy Other      I have reviewed and agree with the history as documented.    E-Cigarette/Vaping    E-Cigarette Use Never User      E-Cigarette/Vaping Substances    Nicotine No     THC No     CBD No     Flavoring No     Other No     Unknown No      Social History     Tobacco Use    Smoking status: Never     Passive exposure: Never    Smokeless tobacco: Never   Vaping Use    Vaping status: Never Used   Substance Use Topics    Alcohol use: Not Currently    Drug use: No        Review of Systems   Constitutional:  Negative for fever.   Respiratory:  Negative for cough and shortness of breath.    Cardiovascular:  Positive for leg swelling (Swelling around the left knee.). Negative for chest pain and palpitations.   Gastrointestinal:  Negative for abdominal pain, nausea and vomiting.   Genitourinary:  Negative for dysuria and hematuria.   Skin:  Negative for rash.   Neurological:  Negative for syncope.   All other systems reviewed and are negative.      Physical Exam  ED Triage Vitals [08/10/24 2144]   Temperature Pulse Respirations Blood Pressure SpO2   97.5 °F (36.4 °C) 87 18 144/97 98 %      Temp Source Heart Rate Source Patient Position - Orthostatic VS BP Location FiO2 (%)   Oral Monitor Lying Left arm --      Pain Score       --             Orthostatic Vital Signs  Vitals:    08/10/24 2144   BP: 144/97   Pulse: 87   Patient Position - Orthostatic VS: Lying        Physical Exam  Vitals and nursing note reviewed.   Constitutional:       General: She is not in acute distress.     Appearance: She is well-developed.   HENT:      Head: Normocephalic and atraumatic.   Eyes:      Conjunctiva/sclera: Conjunctivae normal.   Cardiovascular:      Rate and Rhythm: Normal rate and regular rhythm.      Heart sounds: No murmur heard.  Pulmonary:      Effort: Pulmonary effort is normal. No respiratory distress.      Breath sounds: Normal breath sounds.   Abdominal:      Palpations: Abdomen is soft.      Tenderness: There is no abdominal tenderness.   Musculoskeletal:         General: Tenderness present. No swelling or deformity. Normal range of motion.      Cervical back: Neck supple.      Right lower leg: No edema.      Left lower leg: No edema.      Comments: Tenderness over the pes anserine as well as suprapatellar joint effusion.   Skin:     General: Skin is warm and dry.      Capillary Refill: Capillary refill takes less than 2 seconds.   Neurological:      Mental Status: She is alert.   Psychiatric:         Mood and Affect: Mood normal.         ED Medications  Medications   ropivacaine (NAROPIN) 0.5 % injection 15 mL (15 mL Infiltration Given 8/10/24 2306)   triamcinolone acetonide (KENALOG-40) 40 mg/mL injection 40 mg (40 mg Intramuscular Given 8/10/24 2306)       Diagnostic Studies  Results Reviewed       None                   XR knee 4+ views left injury   ED Interpretation by Andry Arredondo DO (08/10 2248)   No acute osseous abnormality visualized            Procedures  Orthopedic injury treatment    Date/Time: 8/10/2024 11:47 PM    Performed by: Andry Arredondo DO  Authorized by: Andry Arredondo DO    Patient Location:  ED    Injury location:  Knee  Location details:  Left knee  Injury type:  Soft tissue  Neurovascular status: Neurovascularly intact    Distal perfusion: normal    Neurological function: normal    Range of motion: normal    Local anesthesia used?: Yes     Local anesthetic:  Ropivacaine 0.5%  Anesthetic total (ml):  5  Skeletal traction used?: No     Aspiration of about 10 mL of suprapatellar joint fluid.  Pez anserine bursitis injection with 4 mL ropivacaine and 1 mL of 40 mg/kg Kenalog.  Wrapped with an ace wrap.  Chlorhexidine was used to clean the skin prior to any injection.  Patient tolerated the procedure well.  States that pain on the pes bursa and improved.  States that knee effusion decreased and this decrease was visualized on ultrasound.  Swelling of the knee visually decreased.  Fluid extracted was nonconcerning for infection no blood.        ED Course                             SBIRT 20yo+      Flowsheet Row Most Recent Value   Initial Alcohol Screen: US AUDIT-C     1. How often do you have a drink containing alcohol? 0 Filed at: 08/10/2024 2143   2. How many drinks containing alcohol do you have on a typical day you are drinking?  0 Filed at: 08/10/2024 2143   3b. FEMALE Any Age, or MALE 65+: How often do you have 4 or more drinks on one occassion? 0 Filed at: 08/10/2024 2143   Audit-C Score 0 Filed at: 08/10/2024 2143   KEVAN: How many times in the past year have you...    Used an illegal drug or used a prescription medication for non-medical reasons? Never Filed at: 08/10/2024 2143                  Medical Decision Making  Pes bursitis injection was done for pain management.  Patient was not a good candidate for NSAIDs or further opioid medication.  Pez anserine bursitis injection done.  Suprapatellar joint effusion aspirated.  Patient is able to ambulate and knee was wrapped in Ace bandage.  Good return precautions noted.  Patient was agreeable with plan and discharge.  Good follow-up precautions stated.  Patient is stable for discharge.    Amount and/or Complexity of Data Reviewed  Radiology: ordered and independent interpretation performed.    Risk  Prescription drug management.          Disposition  Final diagnoses:   Pes anserine bursitis    Knee effusion   Knee pain     Time reflects when diagnosis was documented in both MDM as applicable and the Disposition within this note       Time User Action Codes Description Comment    8/10/2024 11:42 PM Andry Arredondo Add [M70.50] Pes anserine bursitis     8/10/2024 11:42 PM Andry Arredondo Add [M25.469] Knee effusion     8/10/2024 11:42 PM Andry Arredondo Add [M25.569] Knee pain           ED Disposition       ED Disposition   Discharge    Condition   Stable    Date/Time   Sat Aug 10, 2024 1971    Comment   Courtney Davalos discharge to home/self care.                   Follow-up Information       Follow up With Specialties Details Why Contact Info    Jose Daniel Harmon, DO Internal Medicine Call  As needed [address] [place] [address]  [phone]              Discharge Medication List as of 8/10/2024 11:43 PM        CONTINUE these medications which have NOT CHANGED    Details   albuterol (Proventil HFA) 90 mcg/act inhaler Inhale 2 puffs every 6 (six) hours as needed for wheezing, Starting Fri 11/24/2023, Normal      ammonium lactate (LAC-HYDRIN) 12 % lotion Apply topically 2 (two) times a day as needed for dry skin, Starting Wed 5/22/2024, Normal      betamethasone valerate (VALISONE) 0.1 % ointment Apply topically 2 (two) times a day As needed to affected area. Mix with Mupirocin ointment when applying, Normal      Cholecalciferol (Vitamin D3) 50 MCG (2000 UT) TABS Take 1 tablet (2,000 Units total) by mouth daily, Starting Tue 10/24/2023, Normal      Elastic Bandages & Supports (Thumb Splint/Right Small) MISC Use daily, Starting Wed 5/22/2024, Normal      estradiol (VAGIFEM, YUVAFEM) 10 MCG TABS vaginal tablet INSERT 1 TABLET INTO THE VAGINA 2 TIMES A WEEK., Normal      fenofibrate (FENOGLIDE) 120 MG TABS Take 120 mg by mouth daily, Starting Thu 2/9/2023, Historical Med      FLUoxetine (PROzac) 40 MG capsule Take 1 capsule (40 mg total) by mouth daily, Starting Thu 7/18/2024, Normal       ketoconazole (NIZORAL) 2 % cream Apply topically daily, Starting Wed 5/22/2024, Normal      lidocaine (LIDODERM) 5 % lidocaine 5 % topical patch   APPLY 1 PATCH TO AFFECTED AREA FOR 12 HOURS A DAY & REMOVE FOR 12 HOURS BEFORE APPLYING NEXT PATCH. (12 HOURS ON, 12 HOURS OFF), Historical Med      lisinopril (ZESTRIL) 20 mg tablet Take 1 tablet (20 mg total) by mouth daily, Starting Wed 6/12/2024, Normal      mupirocin (BACTROBAN) 2 % ointment Apply topically 3 (three) times a day As needed to affected area. Mix with Clobetasol ointment when applying to scalp. Use by itself to buttock area, Starting Thu 8/8/2024, Normal      promethazine-dextromethorphan (PHENERGAN-DM) 6.25-15 mg/5 mL oral syrup Take 5 mL by mouth 4 (four) times a day as needed for cough, Starting Fri 11/24/2023, Normal      traMADol (Ultram) 50 mg tablet Take 1 tablet (50 mg total) by mouth every 8 (eight) hours as needed for moderate pain, Starting Mon 5/4/2020, Normal      traZODone (DESYREL) 50 mg tablet Take 0.5-1 tablets (25-50 mg total) by mouth daily at bedtime as needed for sleep, Starting Fri 6/9/2023, Normal      triamcinolone (KENALOG) 0.1 % ointment Apply topically 2 (two) times a day, Starting Wed 11/15/2023, Normal      ursodiol (ACTIGALL) 300 mg capsule TAKE 1 CAPSULE BY MOUTH THREE TIMES A DAY, Normal      valACYclovir (VALTREX) 500 mg tablet Take 2 tablets (1,000 mg total) by mouth daily for 14 days, Starting Tue 10/24/2023, Until Tue 11/7/2023, Normal           No discharge procedures on file.    PDMP Review         Value Time User    PDMP Reviewed  Yes 5/27/2024  8:39 AM Jose Daniel Harmon DO             ED Provider  Attending physically available and evaluated Courtney Davalos. I managed the patient along with the ED Attending.    Electronically Signed by           Andry Arredondo DO  08/10/24 [id number]

## 2024-08-12 ENCOUNTER — VBI (OUTPATIENT)
Dept: INTERNAL MEDICINE CLINIC | Facility: CLINIC | Age: 64
End: 2024-08-12

## 2024-08-12 NOTE — TELEPHONE ENCOUNTER
08/12/24 2:10 PM    Patient contacted post ED visit, [facility] department spoke with patient/caregiver and outreach was successful.    Thank you.  Itzel Dc MA  [facility]

## 2024-08-21 ENCOUNTER — TELEMEDICINE (OUTPATIENT)
Dept: BEHAVIORAL/MENTAL HEALTH CLINIC | Facility: CLINIC | Age: 64
End: 2024-08-21
Payer: COMMERCIAL

## 2024-08-21 DIAGNOSIS — F41.1 GAD (GENERALIZED ANXIETY DISORDER): Primary | ICD-10-CM

## 2024-08-21 DIAGNOSIS — F43.10 PTSD (POST-TRAUMATIC STRESS DISORDER): ICD-10-CM

## 2024-08-21 DIAGNOSIS — F42.9 OBSESSIVE-COMPULSIVE DISORDER, UNSPECIFIED TYPE: ICD-10-CM

## 2024-08-21 DIAGNOSIS — F33.0 MDD (MAJOR DEPRESSIVE DISORDER), RECURRENT EPISODE, MILD (HCC): ICD-10-CM

## 2024-08-21 PROCEDURE — 90837 PSYTX W PT 60 MINUTES: CPT | Performed by: COUNSELOR

## 2024-08-21 NOTE — PSYCH
Virtual Regular Visit    Verification of patient location:    Patient is located at Home in the following state in which I hold an active license [place]      Assessment/Plan:    Problem List Items Addressed This Visit     MDD (major depressive disorder), recurrent episode, mild (HCC)    JOSEFINA (generalized anxiety disorder) - Primary    OCD (obsessive compulsive disorder)    PTSD (post-traumatic stress disorder)       Goals addressed in session: Goal 1 and Goal 2          Reason for visit is   Chief Complaint   Patient presents with   • Virtual Regular Visit          Encounter provider Ethel Christian      Recent Visits  No visits were found meeting these conditions.  Showing recent visits within past 7 days and meeting all other requirements  Today's Visits  Date Type Provider Dept   08/21/24 Telemedicine Ethel Christian [facility] Therapist [place]   Showing today's visits and meeting all other requirements  Future Appointments  No visits were found meeting these conditions.  Showing future appointments within next 150 days and meeting all other requirements       The patient was identified by name and date of birth. Courtney Davalos was informed that this is a telemedicine visit and that the visit is being conducted throughthe Epic Embedded platform. She agrees to proceed..  My office door was closed. No one else was in the room.  She acknowledged consent and understanding of privacy and security of the video platform. The patient has agreed to participate and understands they can discontinue the visit at any time.    Patient is aware this is a billable service.     Subjective  Courtney Davalos is a 63 y.o. female  .      HPI     Past Medical History:   Diagnosis Date   • Abnormal breast exam    • Anemia    • Anxiety    • Arthritis    • Asthma     childhood   • 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HCC)     primary per allscripts   • Cancer (HCC)     IgG kappa smoldering multiple myeloma   • Cardiac murmur    • Chronic liver  disease     resolved 12/04/2017   • COVID    • Depression    • Endometriosis    • Fracture of tibia     right tibial plateau fracture per allscripts   • Heart murmur    • Hepatic disease    • Hypertension     last assessed 12/13/2017   • Inflammatory bowel disease    • Kidney stone    • Multiple myeloma (HCC)    • Neuropathy     R foot    • Nosebleed    • OCD (obsessive compulsive disorder)    • Osteoporosis    • Otitis media    • Pneumonia    • RSD (reflex sympathetic dystrophy) 12/11/2018   • Scoliosis    • Seasonal allergies    • Urinary tract infection    • Visual impairment    • Wears eyeglasses    • Whiplash injury to neck        Past Surgical History:   Procedure Laterality Date   • BACK SURGERY      hemilaminectomy and discectomy, L5-S1, right (Dr. Rashid, [facility] at [facility]) onset 02/14/2005 per allscripts   • COLONOSCOPY     • EXPLORATION EXTREMITY Right 08/29/2016    Procedure: KNEE PERONEAL NERVE EXPLORATION AND RELEASE ;  Surgeon: Bry De Guzman MD;  Location: [facility];  Service:    • FOOT SURGERY     • FRACTURE SURGERY     • HAND SURGERY     • HERNIA REPAIR     • IR BIOPSY KIDNEY MASS  05/25/2023   • JOINT REPLACEMENT Right     RTK   • KNEE SURGERY     • MANDIBLE SURGERY     • NEPHRECTOMY LAPAROSCOPIC Right 6/21/2023    Procedure: NEPHRECTOMY LAPAROSCOPIC ASSISTED;  Surgeon: Avinash Sterling MD;  Location: [facility];  Service: Urology   • NEUROPLASTY / TRANSPOSITION MEDIAN NERVE AT CARPAL TUNNEL     • ORIF TIBIAL PLATEAU Right     arthroplasty per allscripts   • OTHER SURGICAL HISTORY      injection of trigger point(s) per allscripts   • MA ARTHRP KNE CONDYLE&PLATU MEDIAL&LAT COMPARTMENTS Right 06/11/2018    Procedure: ARTHROPLASTY KNEE TOTAL;  Surgeon: Bry De Guzman MD;  Location: [facility];  Service: Orthopedics   • MA TENDON SHEATH INCISION Right 12/02/2020    Procedure: RELEASE TRIGGER FINGER-right long;  Surgeon: Todd Mcdonald MD;  Location: [facility];  Service: Orthopedics   • SINUS  SURGERY     • SPINE SURGERY     • TONSILLECTOMY         Current Outpatient Medications   Medication Sig Dispense Refill   • albuterol (Proventil HFA) 90 mcg/act inhaler Inhale 2 puffs every 6 (six) hours as needed for wheezing 6.7 g 0   • ammonium lactate (LAC-HYDRIN) 12 % lotion Apply topically 2 (two) times a day as needed for dry skin 222 mL 1   • betamethasone valerate (VALISONE) 0.1 % ointment Apply topically 2 (two) times a day As needed to affected area. Mix with Mupirocin ointment when applying 45 g 0   • Cholecalciferol (Vitamin D3) 50 MCG (2000 UT) TABS Take 1 tablet (2,000 Units total) by mouth daily 90 tablet 3   • Elastic Bandages & Supports (Thumb Splint/Right Small) MISC Use daily 1 each 0   • estradiol (VAGIFEM, YUVAFEM) 10 MCG TABS vaginal tablet INSERT 1 TABLET INTO THE VAGINA 2 TIMES A WEEK. 24 tablet 1   • fenofibrate (FENOGLIDE) 120 MG TABS Take 120 mg by mouth daily     • FLUoxetine (PROzac) 40 MG capsule Take 1 capsule (40 mg total) by mouth daily 90 capsule 1   • ketoconazole (NIZORAL) 2 % cream Apply topically daily 30 g 0   • lidocaine (LIDODERM) 5 % lidocaine 5 % topical patch   APPLY 1 PATCH TO AFFECTED AREA FOR 12 HOURS A DAY & REMOVE FOR 12 HOURS BEFORE APPLYING NEXT PATCH. (12 HOURS ON, 12 HOURS OFF)     • lisinopril (ZESTRIL) 20 mg tablet Take 1 tablet (20 mg total) by mouth daily 90 tablet 1   • mupirocin (BACTROBAN) 2 % ointment Apply topically 3 (three) times a day As needed to affected area. Mix with Clobetasol ointment when applying to scalp. Use by itself to buttock area 30 g 3   • promethazine-dextromethorphan (PHENERGAN-DM) 6.25-15 mg/5 mL oral syrup Take 5 mL by mouth 4 (four) times a day as needed for cough 240 mL 0   • traMADol (Ultram) 50 mg tablet Take 1 tablet (50 mg total) by mouth every 8 (eight) hours as needed for moderate pain 30 tablet 0   • traZODone (DESYREL) 50 mg tablet Take 0.5-1 tablets (25-50 mg total) by mouth daily at bedtime as needed for sleep 90 tablet  1   • triamcinolone (KENALOG) 0.1 % ointment Apply topically 2 (two) times a day 454 g 1   • ursodiol (ACTIGALL) 300 mg capsule TAKE 1 CAPSULE BY MOUTH THREE TIMES A  capsule 2   • valACYclovir (VALTREX) 500 mg tablet Take 2 tablets (1,000 mg total) by mouth daily for 14 days 28 tablet 0     No current facility-administered medications for this visit.        Allergies   Allergen Reactions   • Codeine GI Intolerance and Nausea Only     Other reaction(s): Other (See Comments)  Violently ill  Violently ill   • Latex Rash     itching   • Morphine And Codeine Nausea Only     GI intolerance nausea   • Nortriptyline Shortness Of Breath   • Ocaliva [Obeticholic Acid] Hives   • Doxycycline GI Intolerance and Nausea Only   • Sulfa Antibiotics GI Intolerance   • Cymbalta [Duloxetine Hcl]    • Penicillins Other (See Comments) and Rash     Childhood reaction       Review of Systems    Video Exam    There were no vitals filed for this visit.    Physical Exam     Behavioral Health Psychotherapy Progress Note    Psychotherapy Provided: Individual Psychotherapy     1. JOSEFINA (generalized anxiety disorder)        2. MDD (major depressive disorder), recurrent episode, mild (HCC)        3. Obsessive-compulsive disorder, unspecified type        4. PTSD (post-traumatic stress disorder)            Goals addressed in session: Goal 1 and Goal 2     DATA: Clinician met with Courtney via telehealth for individual therapy.  Courtney processed recent email she sent her paramour to discussed communication issues and working on meeting her needs. Clinician discussed email and follow up since then. She states that paramour did not acknowledge the email or what was said. Clinician discussed ways to address these concerns and process through her feelings. She reports desire to work on better communication and frustration with lack of acknowledgement of her feelings and plans for future with relationship.   During this session, this clinician used  "the following therapeutic modalities: Client-centered Therapy, Cognitive Processing Therapy, and Supportive Psychotherapy    Substance Abuse was not addressed during this session. If the client is diagnosed with a co-occurring substance use disorder, please indicate any changes in the frequency or amount of use: n/a. Stage of change for addressing substance use diagnoses: No substance use/Not applicable    ASSESSMENT:  Courtney Davalos presents with a Euthymic/ normal mood.     her affect is Normal range and intensity, which is congruent, with her mood and the content of the session. The client has made progress on their goals.    Courtney was open and engaged in the session.  Courtney Davalos presents with a none risk of suicide, none risk of self-harm, and none risk of harm to others.    For any risk assessment that surpasses a \"low\" rating, a safety plan must be developed.    A safety plan was indicated: no  If yes, describe in detail n/a    PLAN: Between sessions, Courtney Davalos will utilize assertive communication skill. At the next session, the therapist will use Client-centered Therapy and Supportive Psychotherapy to address anxiety.    Behavioral Health Treatment Plan and Discharge Planning: Courtney Davalos is aware of and agrees to continue to work on their treatment plan. They have identified and are working toward their discharge goals. yes    Visit start and stop times:    08/21/24  Start Time: 1201  Stop Time: 1255  Total Visit Time: 54 minutes            "

## 2024-09-03 ENCOUNTER — APPOINTMENT (OUTPATIENT)
Dept: LAB | Facility: CLINIC | Age: 64
End: 2024-09-03
Payer: COMMERCIAL

## 2024-09-03 DIAGNOSIS — M81.8 OTHER OSTEOPOROSIS WITHOUT CURRENT PATHOLOGICAL FRACTURE: ICD-10-CM

## 2024-09-03 DIAGNOSIS — E78.5 HYPERLIPIDEMIA, UNSPECIFIED HYPERLIPIDEMIA TYPE: ICD-10-CM

## 2024-09-03 LAB
ALBUMIN SERPL BCG-MCNC: 3.7 G/DL (ref 3.5–5)
ALP SERPL-CCNC: 250 U/L (ref 34–104)
ALT SERPL W P-5'-P-CCNC: 53 U/L (ref 7–52)
ANION GAP SERPL CALCULATED.3IONS-SCNC: 9 MMOL/L (ref 4–13)
AST SERPL W P-5'-P-CCNC: 46 U/L (ref 13–39)
BILIRUB SERPL-MCNC: 0.62 MG/DL (ref 0.2–1)
BUN SERPL-MCNC: 26 MG/DL (ref 5–25)
CALCIUM SERPL-MCNC: 8.6 MG/DL (ref 8.4–10.2)
CHLORIDE SERPL-SCNC: 100 MMOL/L (ref 96–108)
CHOLEST SERPL-MCNC: 225 MG/DL
CO2 SERPL-SCNC: 27 MMOL/L (ref 21–32)
CREAT SERPL-MCNC: 1.04 MG/DL (ref 0.6–1.3)
GFR SERPL CREATININE-BSD FRML MDRD: 56 ML/MIN/1.73SQ M
GLUCOSE P FAST SERPL-MCNC: 81 MG/DL (ref 65–99)
HDLC SERPL-MCNC: 78 MG/DL
LDLC SERPL CALC-MCNC: 138 MG/DL (ref 0–100)
POTASSIUM SERPL-SCNC: 4.7 MMOL/L (ref 3.5–5.3)
PROT SERPL-MCNC: 7.8 G/DL (ref 6.4–8.4)
SODIUM SERPL-SCNC: 136 MMOL/L (ref 135–147)
TRIGL SERPL-MCNC: 47 MG/DL

## 2024-09-03 PROCEDURE — 80053 COMPREHEN METABOLIC PANEL: CPT

## 2024-09-03 PROCEDURE — 36415 COLL VENOUS BLD VENIPUNCTURE: CPT

## 2024-09-05 ENCOUNTER — APPOINTMENT (EMERGENCY)
Dept: RADIOLOGY | Facility: HOSPITAL | Age: 64
End: 2024-09-05
Payer: COMMERCIAL

## 2024-09-05 ENCOUNTER — HOSPITAL ENCOUNTER (EMERGENCY)
Facility: HOSPITAL | Age: 64
Discharge: HOME | End: 2024-09-06
Attending: EMERGENCY MEDICINE
Payer: COMMERCIAL

## 2024-09-05 ENCOUNTER — OFFICE VISIT (OUTPATIENT)
Dept: INTERNAL MEDICINE CLINIC | Facility: CLINIC | Age: 64
End: 2024-09-05
Payer: COMMERCIAL

## 2024-09-05 VITALS
OXYGEN SATURATION: 98 % | WEIGHT: 158.2 LBS | HEART RATE: 88 BPM | SYSTOLIC BLOOD PRESSURE: 130 MMHG | BODY MASS INDEX: 23.36 KG/M2 | DIASTOLIC BLOOD PRESSURE: 74 MMHG

## 2024-09-05 VITALS
SYSTOLIC BLOOD PRESSURE: 148 MMHG | DIASTOLIC BLOOD PRESSURE: 90 MMHG | HEART RATE: 84 BPM | TEMPERATURE: 96.9 F | RESPIRATION RATE: 18 BRPM | OXYGEN SATURATION: 96 %

## 2024-09-05 DIAGNOSIS — N30.00 ACUTE CYSTITIS WITHOUT HEMATURIA: Primary | ICD-10-CM

## 2024-09-05 DIAGNOSIS — M54.30 SCIATICA, UNSPECIFIED LATERALITY: ICD-10-CM

## 2024-09-05 DIAGNOSIS — M54.9 BACK PAIN: ICD-10-CM

## 2024-09-05 DIAGNOSIS — K62.5 RECTAL BLEED: ICD-10-CM

## 2024-09-05 DIAGNOSIS — M79.604 RIGHT LEG PAIN: Primary | ICD-10-CM

## 2024-09-05 DIAGNOSIS — G57.01 PIRIFORMIS SYNDROME OF RIGHT SIDE: ICD-10-CM

## 2024-09-05 PROBLEM — N39.0 UTI (URINARY TRACT INFECTION): Status: ACTIVE | Noted: 2024-09-05

## 2024-09-05 PROBLEM — R39.9 URINARY SYMPTOM OR SIGN: Status: ACTIVE | Noted: 2024-09-05

## 2024-09-05 LAB
BACTERIA UR QL AUTO: ABNORMAL /HPF
BILIRUB UR QL STRIP: NEGATIVE
CLARITY UR: ABNORMAL
COLOR UR: YELLOW
GLUCOSE UR STRIP-MCNC: NEGATIVE MG/DL
HGB UR QL STRIP.AUTO: ABNORMAL
KETONES UR STRIP-MCNC: NEGATIVE MG/DL
LEUKOCYTE ESTERASE UR QL STRIP: ABNORMAL
NITRITE UR QL STRIP: POSITIVE
NON-SQ EPI CELLS URNS QL MICRO: ABNORMAL /HPF
PH UR STRIP.AUTO: 6 [PH]
PROT UR STRIP-MCNC: ABNORMAL MG/DL
RBC #/AREA URNS AUTO: ABNORMAL /HPF
SP GR UR STRIP.AUTO: 1.02 (ref 1–1.03)
UROBILINOGEN UR STRIP-ACNC: 2 MG/DL
WBC #/AREA URNS AUTO: ABNORMAL /HPF
WBC CLUMPS # UR AUTO: PRESENT /UL

## 2024-09-05 PROCEDURE — 87086 URINE CULTURE/COLONY COUNT: CPT

## 2024-09-05 PROCEDURE — G2211 COMPLEX E/M VISIT ADD ON: HCPCS

## 2024-09-05 PROCEDURE — 3075F SYST BP GE 130 - 139MM HG: CPT

## 2024-09-05 PROCEDURE — 87186 SC STD MICRODIL/AGAR DIL: CPT

## 2024-09-05 PROCEDURE — 72128 CT CHEST SPINE W/O DYE: CPT

## 2024-09-05 PROCEDURE — 99284 EMERGENCY DEPT VISIT MOD MDM: CPT | Performed by: EMERGENCY MEDICINE

## 2024-09-05 PROCEDURE — 72131 CT LUMBAR SPINE W/O DYE: CPT

## 2024-09-05 PROCEDURE — 99283 EMERGENCY DEPT VISIT LOW MDM: CPT

## 2024-09-05 PROCEDURE — 73502 X-RAY EXAM HIP UNI 2-3 VIEWS: CPT

## 2024-09-05 PROCEDURE — 81001 URINALYSIS AUTO W/SCOPE: CPT

## 2024-09-05 PROCEDURE — 3078F DIAST BP <80 MM HG: CPT

## 2024-09-05 PROCEDURE — 87077 CULTURE AEROBIC IDENTIFY: CPT

## 2024-09-05 PROCEDURE — 99214 OFFICE O/P EST MOD 30 MIN: CPT

## 2024-09-05 RX ORDER — CIPROFLOXACIN 500 MG/1
500 TABLET, FILM COATED ORAL EVERY 12 HOURS SCHEDULED
Qty: 14 TABLET | Refills: 0 | Status: SHIPPED | OUTPATIENT
Start: 2024-09-05 | End: 2024-09-05

## 2024-09-05 RX ORDER — LIDOCAINE 50 MG/G
2 PATCH TOPICAL ONCE
Status: DISCONTINUED | OUTPATIENT
Start: 2024-09-05 | End: 2024-09-06 | Stop reason: HOSPADM

## 2024-09-05 RX ORDER — OXYCODONE HYDROCHLORIDE 5 MG/1
5 TABLET ORAL ONCE
Status: COMPLETED | OUTPATIENT
Start: 2024-09-05 | End: 2024-09-05

## 2024-09-05 RX ORDER — METHOCARBAMOL 500 MG/1
1000 TABLET, FILM COATED ORAL 3 TIMES DAILY
Qty: 42 TABLET | Refills: 0 | Status: SHIPPED | OUTPATIENT
Start: 2024-09-05 | End: 2024-09-12

## 2024-09-05 RX ORDER — CIPROFLOXACIN 250 MG/1
500 TABLET, FILM COATED ORAL EVERY 12 HOURS SCHEDULED
Qty: 20 TABLET | Refills: 0 | Status: SHIPPED | OUTPATIENT
Start: 2024-09-05 | End: 2024-09-10

## 2024-09-05 RX ADMIN — OXYCODONE HYDROCHLORIDE 5 MG: 5 TABLET ORAL at 20:00

## 2024-09-05 RX ADMIN — LIDOCAINE 2 PATCH: 50 PATCH CUTANEOUS at 20:07

## 2024-09-05 NOTE — ASSESSMENT & PLAN NOTE
Patient recently seen for sciatica, reports no improvement since last visit  Sharp pain originates above knee radiates to lower back.  Patient also endorses pulling like sensation  Suspect sciatica vs piriformis syndrome with the muscle is compressing the sciatic nerve leading to pain in the buttock down to the legs  With patient history of cancer, may need imaging to rule out any metastatic disease if symptoms does not improve  Dexa scan: Low bone mass (osteopenia).       Plan:  Ambulatory referral for physical therapy

## 2024-09-05 NOTE — ASSESSMENT & PLAN NOTE
Reports onset of urinary symptoms such as burning, darker urine, and pressure after intercourse.  Denies fever, chill, nausea, and vomiting  Endorsed  multiple urinary tract infection within a year  Prior urine culture positive for Pseudomonas  Has a history of penicillin allergy and GI discomfort from Bactrim  UTI treated with ciprofloxacin in the past    Plan:   Plan for treatment of presumable urinary tract infection for 5 days.  May discontinue antibiotic if urine culture is negative

## 2024-09-05 NOTE — PROGRESS NOTES
Ambulatory Visit  Name: Courtney Davalos      : 1960      MRN: [id number]  Encounter Provider: Mary Zhou MD  Encounter Date: 2024   [facility]    Assessment & Plan   1. Acute cystitis without hematuria  Assessment & Plan:  Reports onset of urinary symptoms such as burning, darker urine, and pressure after intercourse.  Denies fever, chill, nausea, and vomiting  Endorsed  multiple urinary tract infection within a year  Prior urine culture positive for Pseudomonas  Has a history of penicillin allergy and GI discomfort from Bactrim  UTI treated with ciprofloxacin in the past    Plan:   Plan for treatment of presumable urinary tract infection for 5 days.  May discontinue antibiotic if urine culture is negative    Orders:  -     ciprofloxacin (CIPRO) 250 mg tablet; Take 2 tablets (500 mg total) by mouth every 12 (twelve) hours for 5 days  -     UA w Reflex to Microscopic w Reflex to Culture  2. Rectal bleed  Assessment & Plan:  Reports bright red blood per rectum for over 2 months  Notices blood on tissue after wiping but no blood mixed with stool or urine  Reports recent weight loss but denies fatigue  Pain when rubbing with tissue and has alternative diarrhea and constipation due to IBS  Feels bloated after eating dairy products  Colonoscopy  evident for diverticula with normal colon.10-year follow-up recommended  Suspect bright red blood per rectum may be related to fissure versus hemorrhoid    Plan:  Ambulatory referral for colorectal  Increase fiber in diet                Orders:  -     Ambulatory Referral to Colorectal Surgery; Future  3. Sciatica, unspecified laterality  Assessment & Plan:  Patient recently seen for sciatica, reports no improvement since last visit  Sharp pain originates above knee radiates to lower back.  Patient also endorses pulling like sensation  Suspect sciatica vs piriformis syndrome with the muscle is compressing the sciatic  nerve leading to pain in the buttock down to the legs  With patient history of cancer, may need imaging to rule out any metastatic disease if symptoms does not improve  Dexa scan: Low bone mass (osteopenia).       Plan:  Ambulatory referral for physical therapy  Orders:  -     Ambulatory Referral to Physical Therapy; Future       History of Present Illness     Ms. Davalos is a 63 year old female with PMH of hypertension, PBC, and osteopenia who presents to the office for urinary symptoms such as burning, and lower abdominal discomfort.  Patient endorsed that symptoms started after intercourse and has had multiple recurrent urinary tract infections .  She is also notes  bright blood per rectum when she wipes with tissue but denies blood in urine, or mixed with stool.  Patient reports no improvement in her sciatica symptoms since last visit.  She is concerned about her plan with ongoing sciatica pain.  She denies fever, chill, nausea, vomiting, or upper respiratory infection        Review of Systems   Constitutional:  Negative for chills and fever.   HENT:  Negative for ear pain and sore throat.    Eyes:  Negative for pain and visual disturbance.   Respiratory:  Negative for cough and shortness of breath.    Cardiovascular:  Negative for chest pain and palpitations.   Gastrointestinal:  Positive for anal bleeding and rectal pain. Negative for abdominal pain and vomiting.   Genitourinary:  Positive for dyspareunia. Negative for dysuria and hematuria.        Burning with urination   Musculoskeletal:  Positive for back pain and myalgias. Negative for arthralgias.   Skin:  Negative for color change and rash.   Neurological:  Negative for seizures and syncope.   All other systems reviewed and are negative.      Objective     /74 (BP Location: Right arm, Patient Position: Sitting, Cuff Size: Standard)   Pulse 88   Wt 71.8 kg (158 lb 3.2 oz)   LMP  (LMP Unknown)   SpO2 98%   BMI 23.36 kg/m²     Physical Exam  Vitals  and nursing note reviewed.   Constitutional:       General: She is not in acute distress.     Appearance: She is well-developed.   HENT:      Head: Normocephalic and atraumatic.      Mouth/Throat:      Mouth: Mucous membranes are moist.   Eyes:      Conjunctiva/sclera: Conjunctivae normal.   Cardiovascular:      Rate and Rhythm: Normal rate and regular rhythm.      Heart sounds: No murmur heard.  Pulmonary:      Effort: Pulmonary effort is normal. No respiratory distress.      Breath sounds: Normal breath sounds.   Abdominal:      General: Abdomen is flat. Bowel sounds are normal.      Palpations: Abdomen is soft.      Tenderness: There is no abdominal tenderness.   Musculoskeletal:         General: Tenderness present. No swelling.      Cervical back: Neck supple.   Skin:     General: Skin is warm and dry.      Capillary Refill: Capillary refill takes less than 2 seconds.   Neurological:      Mental Status: She is alert and oriented to person, place, and time.   Psychiatric:         Mood and Affect: Mood normal.

## 2024-09-05 NOTE — ASSESSMENT & PLAN NOTE
Reports bright red blood per rectum for over 2 months  Notices blood on tissue after wiping but no blood mixed with stool or urine  Reports recent weight loss but denies fatigue  Pain when rubbing with tissue and has alternative diarrhea and constipation due to IBS  Feels bloated after eating dairy products  Colonoscopy 2021 evident for diverticula with normal colon.10-year follow-up recommended  Suspect bright red blood per rectum may be related to fissure versus hemorrhoid    Plan:  Ambulatory referral for colorectal  Increase fiber in diet
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
## 2024-09-06 ENCOUNTER — HOSPITAL ENCOUNTER (OUTPATIENT)
Dept: RADIOLOGY | Facility: MEDICAL CENTER | Age: 64
Discharge: HOME | End: 2024-09-06
Payer: COMMERCIAL

## 2024-09-06 ENCOUNTER — TELEPHONE (OUTPATIENT)
Age: 64
End: 2024-09-06

## 2024-09-06 DIAGNOSIS — D47.2 SMOLDERING MULTIPLE MYELOMA (SMM): Chronic | ICD-10-CM

## 2024-09-06 DIAGNOSIS — C64.1 RENAL CELL CARCINOMA OF RIGHT KIDNEY (HCC): ICD-10-CM

## 2024-09-06 PROCEDURE — 74176 CT ABD & PELVIS W/O CONTRAST: CPT

## 2024-09-06 NOTE — TELEPHONE ENCOUNTER
Pt stated she needs more tablets for the ciprofloxacin (CIPRO) 250 mg tablet   as the amount of pills seem to be incorrect.     Office staff was not available at this time. Please contact pt if possible before end of day.       Thank you for your help. It is greatly appreciated.

## 2024-09-06 NOTE — DISCHARGE INSTRUCTIONS
You were seen in the emergency department today for evaluation of low back pain, right buttock pain, and posterior right leg pain.  We think that your pain is caused by a condition called piriformis syndrome where the muscle in your buttock compresses the sciatic nerve causing radiating pain.  Your CT scans do not show any acute fractures or worsening lytic lesion seen with multiple myeloma.  Please return to the ED if you experience any chest pain, shortness of breath, if you fall, experience sudden severe back pain, weakness, numbness, tingling, or if you pass out.

## 2024-09-06 NOTE — ED ATTENDING ATTESTATION
9/5/2024  I, Keegan Conn DO, saw and evaluated the patient. I have discussed the patient with the resident/non-physician practitioner and agree with the resident's/non-physician practitioner's findings, Plan of Care, and MDM as documented in the resident's/non-physician practitioner's note, except where noted. All available labs and Radiology studies were reviewed.  I was present for key portions of any procedure(s) performed by the resident/non-physician practitioner and I was immediately available to provide assistance.       At this point I agree with the current assessment done in the Emergency Department.  I have conducted an independent evaluation of this patient a history and physical is as follows:    63-year-old female presents for right buttocks pain right lower back pain history of complex regional pain syndrome and multiple myeloma.  Ongoing for a few months.  Saw her family physician today recommended possible physical therapy she is no caudal symptoms she is tender over the piriformis region.  Plan CT lumbar spine x-ray hip given history of multiple myeloma rule out lytic lesions likely musculoskeletal refer to physical therapy Robaxin    ED Course         Critical Care Time  Procedures

## 2024-09-07 LAB — BACTERIA UR CULT: ABNORMAL

## 2024-09-09 ENCOUNTER — VBI (OUTPATIENT)
Dept: INTERNAL MEDICINE CLINIC | Facility: CLINIC | Age: 64
End: 2024-09-09

## 2024-09-09 NOTE — TELEPHONE ENCOUNTER
09/09/24 8:37 AM    Patient contacted post ED visit, [facility] department spoke with patient/caregiver and outreach was successful.    Thank you.  KAVEH CAMPBELL MA  [facility]

## 2024-09-16 ENCOUNTER — EVALUATION (OUTPATIENT)
Dept: PHYSICAL THERAPY | Facility: CLINIC | Age: 64
End: 2024-09-16
Payer: COMMERCIAL

## 2024-09-16 DIAGNOSIS — M54.41 CHRONIC RIGHT-SIDED LOW BACK PAIN WITH RIGHT-SIDED SCIATICA: Primary | ICD-10-CM

## 2024-09-16 DIAGNOSIS — M54.30 SCIATICA, UNSPECIFIED LATERALITY: ICD-10-CM

## 2024-09-16 DIAGNOSIS — M79.604 RIGHT LEG PAIN: ICD-10-CM

## 2024-09-16 DIAGNOSIS — G89.29 CHRONIC RIGHT-SIDED LOW BACK PAIN WITH RIGHT-SIDED SCIATICA: Primary | ICD-10-CM

## 2024-09-16 PROCEDURE — 97162 PT EVAL MOD COMPLEX 30 MIN: CPT | Performed by: PHYSICAL THERAPIST

## 2024-09-16 PROCEDURE — 97140 MANUAL THERAPY 1/> REGIONS: CPT | Performed by: PHYSICAL THERAPIST

## 2024-09-16 PROCEDURE — 97110 THERAPEUTIC EXERCISES: CPT | Performed by: PHYSICAL THERAPIST
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
## 2024-09-18 ENCOUNTER — TELEPHONE (OUTPATIENT)
Age: 64
End: 2024-09-18

## 2024-09-18 ENCOUNTER — TELEMEDICINE (OUTPATIENT)
Dept: BEHAVIORAL/MENTAL HEALTH CLINIC | Facility: CLINIC | Age: 64
End: 2024-09-18
Payer: COMMERCIAL

## 2024-09-18 DIAGNOSIS — F43.10 PTSD (POST-TRAUMATIC STRESS DISORDER): ICD-10-CM

## 2024-09-18 DIAGNOSIS — F41.1 GAD (GENERALIZED ANXIETY DISORDER): ICD-10-CM

## 2024-09-18 DIAGNOSIS — F33.0 MDD (MAJOR DEPRESSIVE DISORDER), RECURRENT EPISODE, MILD (HCC): Primary | ICD-10-CM

## 2024-09-18 DIAGNOSIS — F42.9 OBSESSIVE-COMPULSIVE DISORDER, UNSPECIFIED TYPE: ICD-10-CM

## 2024-09-18 PROCEDURE — 90834 PSYTX W PT 45 MINUTES: CPT | Performed by: COUNSELOR

## 2024-09-18 NOTE — TELEPHONE ENCOUNTER
She asked if any additional labs are needed prior to her appt with Dr. Quintero. She said if she does she would need to go today before 1pm

## 2024-09-18 NOTE — TELEPHONE ENCOUNTER
Patient returned call. Relayed message from Rosaura CHATMAN to patient. She verbalized an understanding.

## 2024-09-18 NOTE — PSYCH
Virtual Regular Visit    Verification of patient location:    Patient is located at Home in the following state in which I hold an active license [place]      Assessment/Plan:    Problem List Items Addressed This Visit       MDD (major depressive disorder), recurrent episode, mild (HCC) - Primary    JOSEFINA (generalized anxiety disorder)    OCD (obsessive compulsive disorder)    PTSD (post-traumatic stress disorder)       Goals addressed in session: Goal 1 and Goal 2          Reason for visit is   Chief Complaint   Patient presents with    Virtual Regular Visit        Encounter provider Ethel Christian      Recent Visits  No visits were found meeting these conditions.  Showing recent visits within past 7 days and meeting all other requirements  Today's Visits  Date Type Provider Dept   09/18/24 Telemedicine Ethel Christian [facility] Therapist [place]   Showing today's visits and meeting all other requirements  Future Appointments  No visits were found meeting these conditions.  Showing future appointments within next 150 days and meeting all other requirements       The patient was identified by name and date of birth. Courtney Davalos was informed that this is a telemedicine visit and that the visit is being conducted throughthe Epic Embedded platform. She agrees to proceed..  My office door was closed. No one else was in the room.  She acknowledged consent and understanding of privacy and security of the video platform. The patient has agreed to participate and understands they can discontinue the visit at any time.    Patient is aware this is a billable service.     Subjective  Courtney Davalos is a 63 y.o. female  .      HPI     Past Medical History:   Diagnosis Date    Abnormal breast exam     Anemia     Anxiety     Arthritis     Asthma     childhood    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HCC)     primary per allscripts    Cancer (HCC)     IgG kappa smoldering multiple myeloma    Cardiac murmur     Chronic liver disease      resolved 12/04/2017    COVID     Depression     Endometriosis     Fracture of tibia     right tibial plateau fracture per allscripts    Heart murmur     Hepatic disease     Hypertension     last assessed 12/13/2017    Inflammatory bowel disease     Kidney stone     Multiple myeloma (HCC)     Neuropathy     R foot     Nosebleed     OCD (obsessive compulsive disorder)     Osteoporosis     Otitis media     Pneumonia     RSD (reflex sympathetic dystrophy) 12/11/2018    Scoliosis     Seasonal allergies     Urinary tract infection     Visual impairment     Wears eyeglasses     Whiplash injury to neck        Past Surgical History:   Procedure Laterality Date    BACK SURGERY      hemilaminectomy and discectomy, L5-S1, right (Dr. Rashid, [facility] at [facility]") onset 02/14/2005 per allscripts    COLONOSCOPY      EXPLORATION EXTREMITY Right 08/29/2016    Procedure: KNEE PERONEAL NERVE EXPLORATION AND RELEASE ;  Surgeon: Bry De Guzman MD;  Location: [facility];  Service:     FOOT SURGERY      FRACTURE SURGERY      HAND SURGERY      HERNIA REPAIR      IR BIOPSY KIDNEY MASS  05/25/2023    JOINT REPLACEMENT Right     RTK    KNEE SURGERY      MANDIBLE SURGERY      NEPHRECTOMY LAPAROSCOPIC Right 6/21/2023    Procedure: NEPHRECTOMY LAPAROSCOPIC ASSISTED;  Surgeon: Avinash Sterling MD;  Location: [facility];  Service: Urology    NEUROPLASTY / TRANSPOSITION MEDIAN NERVE AT CARPAL TUNNEL      ORIF TIBIAL PLATEAU Right     arthroplasty per allscripts    OTHER SURGICAL HISTORY      injection of trigger point(s) per allscripts    HI ARTHRP KNE CONDYLE&PLATU MEDIAL&LAT COMPARTMENTS Right 06/11/2018    Procedure: ARTHROPLASTY KNEE TOTAL;  Surgeon: Bry De Guzman MD;  Location: [facility];  Service: Orthopedics    HI TENDON SHEATH INCISION Right 12/02/2020    Procedure: RELEASE TRIGGER FINGER-right long;  Surgeon: Todd Mcdonald MD;  Location: [facility];  Service: Orthopedics    SINUS SURGERY      SPINE SURGERY      TONSILLECTOMY          Current Outpatient Medications   Medication Sig Dispense Refill    ammonium lactate (LAC-HYDRIN) 12 % lotion Apply topically 2 (two) times a day as needed for dry skin 222 mL 1    betamethasone valerate (VALISONE) 0.1 % ointment Apply topically 2 (two) times a day As needed to affected area. Mix with Mupirocin ointment when applying 45 g 0    Cholecalciferol (Vitamin D3) 50 MCG (2000 UT) TABS Take 1 tablet (2,000 Units total) by mouth daily 90 tablet 3    estradiol (VAGIFEM, YUVAFEM) 10 MCG TABS vaginal tablet INSERT 1 TABLET INTO THE VAGINA 2 TIMES A WEEK. 24 tablet 1    fenofibrate (FENOGLIDE) 120 MG TABS Take 120 mg by mouth daily      FLUoxetine (PROzac) 40 MG capsule Take 1 capsule (40 mg total) by mouth daily 90 capsule 1    ketoconazole (NIZORAL) 2 % cream Apply topically daily 30 g 0    lidocaine (LIDODERM) 5 % lidocaine 5 % topical patch   APPLY 1 PATCH TO AFFECTED AREA FOR 12 HOURS A DAY & REMOVE FOR 12 HOURS BEFORE APPLYING NEXT PATCH. (12 HOURS ON, 12 HOURS OFF)      lisinopril (ZESTRIL) 20 mg tablet Take 1 tablet (20 mg total) by mouth daily 90 tablet 1    methocarbamol (ROBAXIN) 500 mg tablet Take 2 tablets (1,000 mg total) by mouth 3 (three) times a day for 7 days 42 tablet 0    mupirocin (BACTROBAN) 2 % ointment Apply topically 3 (three) times a day As needed to affected area. Mix with Clobetasol ointment when applying to scalp. Use by itself to buttock area 30 g 3    traMADol (Ultram) 50 mg tablet Take 1 tablet (50 mg total) by mouth every 8 (eight) hours as needed for moderate pain 30 tablet 0    traZODone (DESYREL) 50 mg tablet Take 0.5-1 tablets (25-50 mg total) by mouth daily at bedtime as needed for sleep 90 tablet 1    triamcinolone (KENALOG) 0.1 % ointment Apply topically 2 (two) times a day 454 g 1    ursodiol (ACTIGALL) 300 mg capsule TAKE 1 CAPSULE BY MOUTH THREE TIMES A  capsule 2    valACYclovir (VALTREX) 500 mg tablet Take 2 tablets (1,000 mg total) by mouth daily for 14  days 28 tablet 0     No current facility-administered medications for this visit.        Allergies   Allergen Reactions    Codeine GI Intolerance and Nausea Only     Other reaction(s): Other (See Comments)  Violently ill  Violently ill    Latex Rash     itching    Morphine And Codeine Nausea Only     GI intolerance nausea    Nortriptyline Shortness Of Breath    Ocaliva [Obeticholic Acid] Hives    Doxycycline GI Intolerance and Nausea Only    Sulfa Antibiotics GI Intolerance    Cymbalta [Duloxetine Hcl]     Penicillins Other (See Comments) and Rash     Childhood reaction       Review of Systems    Video Exam    There were no vitals filed for this visit.    Physical Exam     Behavioral Health Psychotherapy Progress Note    Psychotherapy Provided: Individual Psychotherapy     1. MDD (major depressive disorder), recurrent episode, mild (HCC)        2. JOSEFINA (generalized anxiety disorder)        3. Obsessive-compulsive disorder, unspecified type        4. PTSD (post-traumatic stress disorder)            Goals addressed in session: Goal 1 and Goal 2     DATA: Clinician met with Courtney via telehealth for individual therapy. Courtney processed her recent attempts to address lack of communication from paramour about future of relationship and needs. She states that she had attempted on two different occasions and was dismissed. Clinician explored feelings and validated frustration. Clinician provided feedback on alternative ways to communicate her concerns and address importance of communication within the relationship. Clinician explored current symptoms and use of coping skills and self care.   During this session, this clinician used the following therapeutic modalities: Client-centered Therapy and Supportive Psychotherapy    Substance Abuse was not addressed during this session. If the client is diagnosed with a co-occurring substance use disorder, please indicate any changes in the frequency or amount of use: n/a. Stage of  "change for addressing substance use diagnoses: No substance use/Not applicable    ASSESSMENT:  Courtney Davalos presents with a Euthymic/ normal mood.     her affect is Normal range and intensity, which is congruent, with her mood and the content of the session. The client has made progress on their goals.    Courtney was open and engaged in the session.  Courtney Davalos presents with a none risk of suicide, none risk of self-harm, and none risk of harm to others.    For any risk assessment that surpasses a \"low\" rating, a safety plan must be developed.    A safety plan was indicated: no  If yes, describe in detail n/a    PLAN: Between sessions, Courtney Davalos will utilize assertive communication. At the next session, the therapist will use Client-centered Therapy, Solution-Focused Therapy, and Supportive Psychotherapy to address anxiety and depressive symptoms.    Behavioral Health Treatment Plan and Discharge Planning: Courtney Davalos is aware of and agrees to continue to work on their treatment plan. They have identified and are working toward their discharge goals. yes    Visit start and stop times:    09/18/24  Start Time: 0900  Stop Time: 0958  Total Visit Time: 58 minutes            "

## 2024-09-19 ENCOUNTER — OFFICE VISIT (OUTPATIENT)
Dept: HEMATOLOGY ONCOLOGY | Facility: CLINIC | Age: 64
End: 2024-09-19
Payer: COMMERCIAL

## 2024-09-19 ENCOUNTER — OFFICE VISIT (OUTPATIENT)
Dept: PHYSICAL THERAPY | Facility: CLINIC | Age: 64
End: 2024-09-19
Payer: COMMERCIAL

## 2024-09-19 VITALS
HEIGHT: 69 IN | BODY MASS INDEX: 23.99 KG/M2 | DIASTOLIC BLOOD PRESSURE: 78 MMHG | WEIGHT: 162 LBS | OXYGEN SATURATION: 97 % | HEART RATE: 73 BPM | RESPIRATION RATE: 17 BRPM | SYSTOLIC BLOOD PRESSURE: 124 MMHG | TEMPERATURE: 97.7 F

## 2024-09-19 DIAGNOSIS — C64.1 RENAL CELL CARCINOMA OF RIGHT KIDNEY (HCC): Primary | ICD-10-CM

## 2024-09-19 DIAGNOSIS — M54.41 CHRONIC RIGHT-SIDED LOW BACK PAIN WITH RIGHT-SIDED SCIATICA: ICD-10-CM

## 2024-09-19 DIAGNOSIS — M79.604 RIGHT LEG PAIN: Primary | ICD-10-CM

## 2024-09-19 DIAGNOSIS — C90.00 MULTIPLE MYELOMA NOT HAVING ACHIEVED REMISSION (HCC): ICD-10-CM

## 2024-09-19 DIAGNOSIS — M54.30 SCIATICA, UNSPECIFIED LATERALITY: ICD-10-CM

## 2024-09-19 DIAGNOSIS — G89.29 CHRONIC RIGHT-SIDED LOW BACK PAIN WITH RIGHT-SIDED SCIATICA: ICD-10-CM

## 2024-09-19 PROCEDURE — 97110 THERAPEUTIC EXERCISES: CPT | Performed by: PHYSICAL THERAPIST

## 2024-09-19 PROCEDURE — 97140 MANUAL THERAPY 1/> REGIONS: CPT | Performed by: PHYSICAL THERAPIST

## 2024-09-19 PROCEDURE — 97112 NEUROMUSCULAR REEDUCATION: CPT | Performed by: PHYSICAL THERAPIST

## 2024-09-19 PROCEDURE — 99214 OFFICE O/P EST MOD 30 MIN: CPT | Performed by: INTERNAL MEDICINE

## 2024-09-19 NOTE — PROGRESS NOTES
Daily Note     Today's date: 2024  Patient name: Courtney Davalos  : 1960  MRN: [id number]  Referring provider: Keegan Conn DO  Dx:   Encounter Diagnosis     ICD-10-CM    1. Right leg pain  M79.604       2. Chronic right-sided low back pain with right-sided sciatica  M54.41     G89.29       3. Sciatica, unspecified laterality  M54.30                      Subjective: notes continued pain in the right buttock, no change overall.  The home exercises did not seem to make it worse.       Objective: See treatment diary below      Assessment: Tolerated treatment well. Patient exhibited good technique with therapeutic exercises and would benefit from continued PT.  Pain in the buttock and low back did not change with prone extension - consistently painful.       Plan: Progress treatment as tolerated.       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 <---avoid contact      Manuals            Piriformis DTM (high tolerance) SY SY, sidelying and prone, elbow at end           SIJ shotgun mobilizaitons nv SY                                     Neuro Re-Ed                                                                                                        Ther Ex             clamshells 15 3-way 15ea           Piriformis tretch :15x5            Hamstirng stretch,  No stretch felt            bridge P!            Trunk ext ins tanding P!            Left alteral trunk shifts in standing P! Increasing down leg            Glut stretch             ER iso  :05x10           IR iso  :05x10           Resisted supine march             Trunk flexion seated Rolling stool 10x            PPT  10                                                               Ther Activity                                       Gait Training                                       Modalities

## 2024-09-19 NOTE — PROGRESS NOTES
Hematology Outpatient Follow - Up Note  Courtney Davalos 63 y.o. female MRN: @ Encounter: [id number]        Date:  9/19/2024        Assessment/ Plan:      1.  Renal cell carcinoma of the right side, stage III (P T3a, PN X, grade 1, M0)  s/p nephrectomy 6/21/2023 clear cell subtype, primary 5.1 cm, unifocal, grade 1, with renal vein involvement, all margins are negative     she decided on adjuvant Pembrolizumab 200 mg every 3 weeks for total of 1 year (C1 on 8/15/2023); However the patient has autoimmune primary biliary cirrhosis we have to watch very carefully for autoimmune side effects such as hepatitis, pancreatitis, dermatitis, pneumonitis, adrenal insufficiency, colitis etc.,     Patient to finish dose #17 on 6/25/2024 and this is the last dose after 3 months we will do CAT scan of the abdomen without contrast  2.  Smoldering multiple myeloma, IgG kappa:  bone marrow biopsy showed 15% plasma cells with normal cytogenetics and FISH panel for multiple myeloma diagnosed in May 2017 she had been on watchful observation no evidence of endorgan damage  3.  Primary biliary cirrhosis of the liver with persistent elevation of alkaline phosphatase and intermittent pruritus followed by hepatologist    Follow-up in 6-month with CBC, CMP, SPEP, free light chain, quantitative immunoglobulin and CAT scan    Osteoporosis she was on Zometa every 6 months DEXA scan showed osteopenia on 6/2024, hold Zometa at this time      Labs and imaging studies are reviewed by ordering provider once results are available. If there are findings that need immediate attention, you will be contacted when results available.   Discussing results and the implication on your healthcare is best discussed in person at your follow-up visit.       HPI:  63-year-old  female with past medical history of primary biliary cirrhosis, fracture of the right tibia, hypertension, OCD,worsening of osteoporosis on DEXA scan Done in 2016, nephrolithiasis,  chronic lower back pain and possible sacroiliitis was found to have elevated total protein 3-4 years ago, serum protein electrophoresis showed IgG kappa monoclonal protein of 1.7 g/dL however no evidence of anemia, hypercalcemia, or renal insufficiency, she had persistent elevation of alkaline phosphatase secondary to PBC  had a bone scan done last year which showed activity in the ribs area  serum protein electrophoresis in April 2017 showed IgG kappa monoclonal protein of 1.96 g/dL, total protein 8.5, albumin 3.9, IgG 2580, creatinine 0.8, calcium 8.9, alkaline phosphatase 294, AST 37, ALT 46, IgM 25 in July 2016 monoclonal protein of IgG kappa of 1.73  C-reactive protein has been normal however sedimentation rate was elevated in the range of 60  Bone marrow biopsy in May 2017 showed 15% plasma cells in diffuse and interstitial pattern, normal fish panel for multiple myeloma and cytogenetics  Status post right knee replacement in June 2018  Exacerbation of anxiety/depression followed by psychiatric medicine     Osteoporosis s/p Zometa in 2019, DEXA scan on 08/2021 showed recurrence of osteoporosis in the lumbar spine; she is on Zometa every 6 months     Primary biliary cirrhosis followed by GI       Colonoscopy on 06/2021 showed mild diverticulosis         Interval History:   - TSH 1.778 (11/6/23)   - 11/27/23 CBC unremarkable; Cr 1.09 (baseline), AST 58, ALT 59, and  (all ~ baseline) , Total Bilirubin 0.55   - Pembrolizumab cycle 7 was given on 11/28/2023 ; reports some itchiness/rash, occasional and migratory , described as tolerable and declines need for systemic steroid,; she uses Triamcinolone 1%    Interval History:        Previous Treatment:         Test Results:    Imaging: CT abdomen pelvis wo contrast    Result Date: 9/8/2024  Narrative: CT ABDOMEN AND PELVIS WITHOUT IV CONTRAST INDICATION: C64.1: Malignant neoplasm of right kidney, except renal pelvis D47.2: Monoclonal gammopathy. COMPARISON: CT  abdomen pelvis 3/1/2024 and 7/21/2023. TECHNIQUE: CT examination of the abdomen and pelvis was performed without intravenous contrast. Multiplanar 2D reformatted images were created from the source data. This examination, like all CT scans performed in the [facility], was performed utilizing techniques to minimize radiation dose exposure, including the use of iterative reconstruction and automated exposure control. Radiation dose length product (DLP) for this visit: 351 mGy-cm Enteric Contrast: Not administered. FINDINGS: Absence of intravenous contrast enhancement limits evaluation of the abdominal viscera. ABDOMEN LOWER CHEST: No clinically significant abnormality in the visualized lower chest. LIVER/BILIARY TREE: Unremarkable. GALLBLADDER: No calcified gallstones. No pericholecystic inflammatory change. SPLEEN: Unremarkable. PANCREAS: Unremarkable. ADRENAL GLANDS: Unremarkable. KIDNEYS/URETERS: Status post right nephrectomy, with no abnormalities in the nephrectomy bed. Punctate calculus in the lower pole of the left kidney. No hydronephrosis. STOMACH AND BOWEL: Colonic diverticulosis without findings of acute diverticulitis. APPENDIX: No findings to suggest appendicitis. ABDOMINOPELVIC CAVITY: No ascites. No pneumoperitoneum. No lymphadenopathy. VESSELS: Unremarkable for patient's age. PELVIS REPRODUCTIVE ORGANS: Right-sided uterine fibroid(s) unchanged. URINARY BLADDER: Unremarkable. ABDOMINAL WALL/INGUINAL REGIONS: Unremarkable. BONES: No acute fracture or suspicious osseous lesion. Spinal degenerative changes.     Impression: No acute findings in the abdomen or pelvis within the limits of unenhanced technique. Status post right nephrectomy. Tiny left nephrolithiasis. Colonic diverticulosis. Workstation performed: [id number]     XR hip/pelv 2-3 vws right if performed    Result Date: 9/5/2024  Narrative: XR HIP/PELV 2-3 VWS RIGHT W PELVIS IF PERFORMED INDICATION: Right hip tenderness.  COMPARISON: 6/28/2022 FINDINGS: No acute fracture or dislocation. No significant hip degenerative changes. No lytic or blastic osseous lesion. Unremarkable soft tissues. Degenerative changes in the visualized lower lumbar spine.     Impression: No acute osseous abnormality. Computerized Assisted Algorithm (CAA) may have been used to analyze all applicable images. Workstation performed: [id number]     CT spine thoracic and lumbar wo contrast    Result Date: 9/5/2024  Narrative: CT THORACIC AND LUMBAR SPINE INDICATION:   Point tenderness, history of multiple myeloma. COMPARISON:  None. TECHNIQUE:  Contiguous axial images were obtained. Sagittal and coronal reconstructions were performed. Radiation dose length product (DLP) for this visit:  490.7 mGy-cm .  This examination, like all CT scans performed in the [facility], was performed utilizing techniques to minimize radiation dose exposure, including the use of iterative reconstruction and automated exposure control. IMAGE QUALITY:  Diagnostic. FINDINGS: ALIGNMENT:  Normal alignment of the thoracolumbar spine. No subluxation. VERTEBRAE:  No fracture. DEGENERATIVE CHANGES:  Mild multilevel degenerative disc disease most severe at L4-L5 and L5-S1 with bilateral facet arthropathy and prominent disc protrusion resulting in at least moderate canal stenosis at L4-L5.. PARASPINAL SOFT TISSUES:  Unremarkable. OTHER: Unremarkable     Impression: Normal computed tomography of the thoracic and lumbar spine. Workstation performed: [id number]       Labs:   Lab Results   Component Value Date    WBC 4.92 06/10/2024    HGB 12.1 06/10/2024    HCT 38.2 06/10/2024    MCV 96 06/10/2024     06/10/2024     Lab Results   Component Value Date     12/29/2015    K 4.7 09/03/2024     09/03/2024    CO2 27 09/03/2024    ANIONGAP 7 12/29/2015    BUN 26 (H) 09/03/2024    CREATININE 1.04 09/03/2024    GLUCOSE 134 12/29/2015    GLUF 81 09/03/2024    CALCIUM 8.6  09/03/2024    CORRECTEDCA 9.2 08/21/2023    AST 46 (H) 09/03/2024    ALT 53 (H) 09/03/2024    ALKPHOS 250 (H) 09/03/2024    PROT 8.4 (H) 12/29/2015    BILITOT 0.46 12/29/2015    EGFR 56 09/03/2024       Lab Results   Component Value Date    IRON 64 06/23/2023    TIBC 424 06/23/2023    FERRITIN 143 06/23/2023       Lab Results   Component Value Date    [id number] 393 06/28/2022         ROS: Review of Systems   Constitutional:  Positive for fatigue. Negative for appetite change, chills, diaphoresis and unexpected weight change.   HENT:   Negative for mouth sores, nosebleeds, sore throat, trouble swallowing and voice change.    Eyes:  Negative for eye problems and icterus.   Respiratory:  Negative for chest tightness, cough, hemoptysis and wheezing.    Cardiovascular:  Negative for chest pain, leg swelling and palpitations.   Gastrointestinal:  Negative for abdominal distention, abdominal pain, blood in stool, constipation, diarrhea, nausea and vomiting.   Endocrine: Negative for hot flashes.   Genitourinary:  Negative for bladder incontinence, difficulty urinating, dyspareunia, dysuria and frequency.    Musculoskeletal:  Negative for arthralgias, back pain, gait problem, neck pain and neck stiffness.   Skin:  Negative for itching and rash.   Neurological:  Negative for dizziness, gait problem, headaches, numbness, seizures and speech difficulty.   Hematological:  Negative for adenopathy. Does not bruise/bleed easily.   Psychiatric/Behavioral:  Negative for decreased concentration, depression, sleep disturbance and suicidal ideas. The patient is not nervous/anxious.           Current Medications: Reviewed  Allergies: Reviewed  PMH/FH/SH:  Reviewed      Physical Exam:    Body surface area is 1.89 meters squared.    Wt Readings from Last 3 Encounters:   09/19/24 73.5 kg (162 lb)   09/05/24 71.8 kg (158 lb 3.2 oz)   08/08/24 71.7 kg (158 lb)        Temp Readings from Last 3 Encounters:   09/19/24 97.7 °F (36.5 °C)  (Temporal)   09/05/24 (!) 96.9 °F (36.1 °C) (Temporal)   08/10/24 97.5 °F (36.4 °C) (Oral)        BP Readings from Last 3 Encounters:   09/19/24 124/78   09/05/24 148/90   09/05/24 130/74         Pulse Readings from Last 3 Encounters:   09/19/24 73   09/05/24 84   09/05/24 88        Physical Exam  Vitals reviewed.   Constitutional:       General: She is not in acute distress.     Appearance: She is well-developed. She is not diaphoretic.   HENT:      Head: Normocephalic and atraumatic.   Eyes:      Conjunctiva/sclera: Conjunctivae normal.   Neck:      Trachea: No tracheal deviation.   Cardiovascular:      Rate and Rhythm: Normal rate and regular rhythm.      Heart sounds: No murmur heard.     No friction rub. No gallop.   Pulmonary:      Effort: Pulmonary effort is normal. No respiratory distress.      Breath sounds: Normal breath sounds. No wheezing or rales.   Chest:      Chest wall: No tenderness.   Abdominal:      General: There is no distension.      Palpations: Abdomen is soft.      Tenderness: There is no abdominal tenderness.   Musculoskeletal:      Cervical back: Normal range of motion and neck supple.   Lymphadenopathy:      Cervical: No cervical adenopathy.   Skin:     General: Skin is warm and dry.      Coloration: Skin is not pale.      Findings: No erythema.   Neurological:      Mental Status: She is alert. Mental status is at baseline.   Psychiatric:         Behavior: Behavior normal.         Thought Content: Thought content normal.         ECOG PS:0    Goals and Barriers:  Current Goal: Minimize effects of disease.   Barriers: None.      Patient's Capacity to Self Care:  Patient is able to self care.    Code Status: [unfilled]

## 2024-09-23 ENCOUNTER — OFFICE VISIT (OUTPATIENT)
Dept: PHYSICAL THERAPY | Facility: CLINIC | Age: 64
End: 2024-09-23
Payer: COMMERCIAL

## 2024-09-23 DIAGNOSIS — M79.604 RIGHT LEG PAIN: Primary | ICD-10-CM

## 2024-09-23 PROCEDURE — 97112 NEUROMUSCULAR REEDUCATION: CPT | Performed by: PHYSICAL THERAPIST

## 2024-09-23 PROCEDURE — 97140 MANUAL THERAPY 1/> REGIONS: CPT | Performed by: PHYSICAL THERAPIST

## 2024-09-23 PROCEDURE — 97110 THERAPEUTIC EXERCISES: CPT | Performed by: PHYSICAL THERAPIST

## 2024-09-23 NOTE — PROGRESS NOTES
Daily Note     Today's date: 2024  Patient name: Courtney Davalos  : 1960  MRN: [id number]  Referring provider: Keegan Conn DO  Dx:   Encounter Diagnosis     ICD-10-CM    1. Right leg pain  M79.604                       Subjective: notes slight improvement in symptoms but continues to have a lot of pain when initially lying down      Objective: See treatment diary below      Assessment: Tolerated treatment well. Patient exhibited good technique with therapeutic exercises and would benefit from continued PT.  Hip extension reproduce symptoms down the leg but no progressive LE symptom prone on elbows.  She was able to perform small bridge which is an improvement       Plan: Progress treatment as tolerated.       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            Trunk ext ins tanding P!            Left alteral trunk shifts in standing P! Increasing down leg            Glut stretch             ER iso  :05x10 :05x15          IR iso  :05x10 :05x15          Resisted supine march             Trunk flexion seated Rolling stool 10x            PPT  10 10          Standing hip fleixon to ext  P! With ext and increasing radiating pain            Seated piriformis stretch   Knee pain                                    Ther Activity                                       Gait Training                                       Modalities

## 2024-09-25 ENCOUNTER — HOSPITAL ENCOUNTER (OUTPATIENT)
Dept: RADIOLOGY | Age: 64
Discharge: HOME | End: 2024-09-25
Payer: COMMERCIAL

## 2024-09-25 DIAGNOSIS — C64.1 MALIGNANT NEOPLASM OF RIGHT KIDNEY (HCC): ICD-10-CM

## 2024-09-25 DIAGNOSIS — K74.5 BILIARY CIRRHOSIS (HCC): ICD-10-CM

## 2024-09-25 PROCEDURE — G1004 CDSM NDSC: HCPCS

## 2024-09-25 PROCEDURE — 71260 CT THORAX DX C+: CPT

## 2024-09-25 PROCEDURE — 74178 CT ABD&PLV WO CNTR FLWD CNTR: CPT

## 2024-09-25 RX ORDER — FENOFIBRATE 120 MG/1
120 TABLET ORAL DAILY
Qty: 30 TABLET | Refills: 11 | Status: SHIPPED | OUTPATIENT
Start: 2024-09-25 | End: 2025-09-25

## 2024-09-25 RX ORDER — URSODIOL 300 MG/1
300 CAPSULE ORAL 3 TIMES DAILY
Qty: 270 CAPSULE | Refills: 2 | Status: SHIPPED | OUTPATIENT
Start: 2024-09-25

## 2024-09-25 RX ADMIN — IOHEXOL 80 ML: 350 INJECTION, SOLUTION INTRAVENOUS at 10:23

## 2024-09-26 ENCOUNTER — OFFICE VISIT (OUTPATIENT)
Dept: PHYSICAL THERAPY | Facility: CLINIC | Age: 64
End: 2024-09-26
Payer: COMMERCIAL

## 2024-09-26 DIAGNOSIS — M79.604 RIGHT LEG PAIN: Primary | ICD-10-CM

## 2024-09-26 PROCEDURE — 97112 NEUROMUSCULAR REEDUCATION: CPT | Performed by: PHYSICAL THERAPIST

## 2024-09-26 PROCEDURE — 97140 MANUAL THERAPY 1/> REGIONS: CPT | Performed by: PHYSICAL THERAPIST

## 2024-09-26 PROCEDURE — 97110 THERAPEUTIC EXERCISES: CPT | Performed by: PHYSICAL THERAPIST

## 2024-09-26 NOTE — PROGRESS NOTES
Daily Note     Today's date: 2024  Patient name: Courtney Davalos  : 1960  MRN: [id number]  Referring provider: Keegan Conn DO  Dx:   Encounter Diagnosis     ICD-10-CM    1. Right leg pain  M79.604                      Subjective: notes less tightness/fullness in the right lower leg and toes      Objective: See treatment diary below      Assessment: Tolerated treatment well. Patient exhibited good technique with therapeutic exercises and would benefit from continued PT.  Initial set of warm up exercises (hip IR/ER isometrics and clamshells) were challenging today.      Plan: Progress treatment as tolerated.       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            Trunk ext ins tanding P!            Left alteral trunk shifts in standing P! Increasing down leg            Glut stretch             ER iso with glut set  :05x10 :05x15 :05x02         IR iso with PPT  :05x10 :05x15 :05x20         Resisted supine march    With bracing 10ea         Trunk flexion seated Rolling stool 10x            PPT  10 10 :05x5         Standing hip fleixon to ext  P! With ext and increasing radiating pain            Seated piriformis stretch   Knee pain          Prone on elbows    2'         Bracing with march and ER    10         Bracing   with march    10         Heel touches    10                                   Ther Activity                                        Gait Training                                       Modalities

## 2024-09-30 ENCOUNTER — OFFICE VISIT (OUTPATIENT)
Dept: PHYSICAL THERAPY | Facility: CLINIC | Age: 64
End: 2024-09-30
Payer: COMMERCIAL

## 2024-09-30 DIAGNOSIS — G89.29 CHRONIC RIGHT-SIDED LOW BACK PAIN WITH RIGHT-SIDED SCIATICA: ICD-10-CM

## 2024-09-30 DIAGNOSIS — M54.41 CHRONIC RIGHT-SIDED LOW BACK PAIN WITH RIGHT-SIDED SCIATICA: ICD-10-CM

## 2024-09-30 DIAGNOSIS — M54.30 SCIATICA, UNSPECIFIED LATERALITY: ICD-10-CM

## 2024-09-30 DIAGNOSIS — M79.604 RIGHT LEG PAIN: Primary | ICD-10-CM

## 2024-09-30 PROCEDURE — 97112 NEUROMUSCULAR REEDUCATION: CPT | Performed by: PHYSICAL THERAPIST

## 2024-09-30 PROCEDURE — 97140 MANUAL THERAPY 1/> REGIONS: CPT | Performed by: PHYSICAL THERAPIST

## 2024-09-30 PROCEDURE — 97110 THERAPEUTIC EXERCISES: CPT | Performed by: PHYSICAL THERAPIST

## 2024-09-30 NOTE — PROGRESS NOTES
Daily Note     Today's date: 2024  Patient name: Courtney Davalos  : 1960  MRN: [id number]  Referring provider: Keegan Conn DO  Dx:   Encounter Diagnosis     ICD-10-CM    1. Right leg pain  M79.604       2. Chronic right-sided low back pain with right-sided sciatica  M54.41     G89.29       3. Sciatica, unspecified laterality  M54.30                      Subjective: patient reports she had a rough weekend with increased pain in the buttock and thigh; she has a lot of pain getting on and off the toilet and getting into bed, especially at [facility].      Objective: See treatment diary below      Assessment: Tolerated treatment well. Patient exhibited good technique with therapeutic exercises and would benefit from continued PT.  Pain when  turning after gapping and trunk rotation stretching in side lying;  pain in release from inferior glides and from return on SK.  Switched to all extension exercise for HEP      Plan: Progress treatment as tolerated.       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            Trunk ext ins tanding P!            Left alteral trunk shifts in standing P! Increasing down leg            Glut stretch             ER iso with glut set  :05x10 :05x15 :05x02         IR iso with PPT  :05x10 :05x15 :05x20         Resisted supine march    With bracing 10ea         Trunk flexion seated Rolling stool 10x             PPT  10 10 :05x5         Standing hip fleixon to ext  P! With ext and increasing radiating pain            Seated piriformis stretch   Knee pain          Prone on elbows    2' 2'        Bracing with march and ER    10         Bracing   with march    10         Heel touches    10                                                Prone press ups     3x10        Prone hip extenion     10 right, left p!                     Ther Activity                                       Gait Training                                       Modalities

## 2024-10-01 ENCOUNTER — TELEPHONE (OUTPATIENT)
Age: 64
End: 2024-10-01

## 2024-10-01 DIAGNOSIS — J02.9 PHARYNGITIS, UNSPECIFIED ETIOLOGY: Primary | ICD-10-CM

## 2024-10-01 RX ORDER — AZITHROMYCIN 250 MG/1
TABLET, FILM COATED ORAL
Qty: 6 TABLET | Refills: 0 | Status: SHIPPED | OUTPATIENT
Start: 2024-10-01 | End: 2024-10-05

## 2024-10-01 NOTE — TELEPHONE ENCOUNTER
Patient called stating she has a sore throat. She is asking if something can be prescribed.   Pt agreed that if she does not feel better after taking medication prescribed she will follow up for an appt.    Pt requested it to go to [facility] off of [address]

## 2024-10-01 NOTE — TELEPHONE ENCOUNTER
Patient called in upset stating that she went to [facility] to pick her   Z pack and was told that no prescription has been sent.   Patient was made aware that a message was sent to Provider and it's pending.  Patient started yelling and complained of a bad customer service and terminated the call. Please review and send prescription to [facility] [address].

## 2024-10-04 ENCOUNTER — APPOINTMENT (OUTPATIENT)
Dept: PHYSICAL THERAPY | Facility: CLINIC | Age: 64
End: 2024-10-04
Payer: COMMERCIAL

## 2024-10-05 PROBLEM — N30.00 ACUTE CYSTITIS: Status: RESOLVED | Noted: 2024-09-05 | Resolved: 2024-10-05

## 2024-10-05 PROBLEM — N39.0 UTI (URINARY TRACT INFECTION): Status: RESOLVED | Noted: 2024-09-05 | Resolved: 2024-10-05

## 2024-10-08 ENCOUNTER — OFFICE VISIT (OUTPATIENT)
Dept: PHYSICAL THERAPY | Facility: CLINIC | Age: 64
End: 2024-10-08
Payer: COMMERCIAL

## 2024-10-08 DIAGNOSIS — M54.30 SCIATICA, UNSPECIFIED LATERALITY: ICD-10-CM

## 2024-10-08 DIAGNOSIS — G89.29 CHRONIC RIGHT-SIDED LOW BACK PAIN WITH RIGHT-SIDED SCIATICA: ICD-10-CM

## 2024-10-08 DIAGNOSIS — M79.604 RIGHT LEG PAIN: Primary | ICD-10-CM

## 2024-10-08 DIAGNOSIS — M54.41 CHRONIC RIGHT-SIDED LOW BACK PAIN WITH RIGHT-SIDED SCIATICA: ICD-10-CM

## 2024-10-08 PROCEDURE — 97110 THERAPEUTIC EXERCISES: CPT | Performed by: PHYSICAL THERAPIST

## 2024-10-08 PROCEDURE — 97140 MANUAL THERAPY 1/> REGIONS: CPT | Performed by: PHYSICAL THERAPIST

## 2024-10-08 NOTE — PROGRESS NOTES
Daily Note     Today's date: 10/8/2024  Patient name: Courtney Davalos  : 1960  MRN: [id number]  Referring provider: Keegan Conn DO  Dx:   Encounter Diagnosis     ICD-10-CM    1. Right leg pain  M79.604       2. Chronic right-sided low back pain with right-sided sciatica  M54.41     G89.29       3. Sciatica, unspecified laterality  M54.30                      Subjective: notes she is feeling better with walking and stairs but still struggles when lying down      Objective: See treatment diary below      Assessment: Tolerated treatment well. Patient exhibited good technique with therapeutic exercises and would benefit from continued PT. Progressed prone strengthening activities; progress core strengthening nv      Plan: Progress treament per protocol.      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            Trunk ext ins tanding P!            Left alteral trunk shifts in standing P! Increasing down leg            Glut stretch             ER iso with glut set  :05x10 :05x15 :05x02         IR iso with PPT  :05x10 :05x15 :05x20         Resisted supine march    With bracing 10ea         Trunk flexion seated Rolling stool 10x            PPT  10 10 :05x5         Standing hip fleixon to ext  P! With ext and increasing radiating pain            Seated piriformis stretch   Knee pain          Prone on elbows    2' 2' 2'       Bracing  with march and ER    10         Bracing   with march    10         Heel touches    10         Prone bracing      :05x10       Prone UE lift      :05x10       Prone glut set      :05x10       Prone ER resisted      UA       Prone IR resisted      UA knees       Prone press ups     3x10 10, 10, 5 with 1 and 2' minutes prone rest       Prone hip extenion     10 right, left p!                                                                                      Ther Activity                                       Gait Training                                       Modalities

## 2024-10-10 ENCOUNTER — TELEPHONE (OUTPATIENT)
Age: 64
End: 2024-10-10

## 2024-10-10 NOTE — TELEPHONE ENCOUNTER
Pt stated she finished the Zpack that was prescribed  by PCP. However, still has a runny nose not getting completely better. Please contact pt and advise what to do next. Thank you.

## 2024-10-11 ENCOUNTER — OFFICE VISIT (OUTPATIENT)
Dept: PHYSICAL THERAPY | Facility: CLINIC | Age: 64
End: 2024-10-11
Payer: COMMERCIAL

## 2024-10-11 DIAGNOSIS — G89.29 CHRONIC RIGHT-SIDED LOW BACK PAIN WITH RIGHT-SIDED SCIATICA: ICD-10-CM

## 2024-10-11 DIAGNOSIS — M54.41 CHRONIC RIGHT-SIDED LOW BACK PAIN WITH RIGHT-SIDED SCIATICA: ICD-10-CM

## 2024-10-11 DIAGNOSIS — M54.30 SCIATICA, UNSPECIFIED LATERALITY: ICD-10-CM

## 2024-10-11 DIAGNOSIS — M79.604 RIGHT LEG PAIN: Primary | ICD-10-CM

## 2024-10-11 PROCEDURE — 97110 THERAPEUTIC EXERCISES: CPT | Performed by: PHYSICAL THERAPIST

## 2024-10-11 PROCEDURE — 97140 MANUAL THERAPY 1/> REGIONS: CPT | Performed by: PHYSICAL THERAPIST

## 2024-10-11 PROCEDURE — 97112 NEUROMUSCULAR REEDUCATION: CPT | Performed by: PHYSICAL THERAPIST

## 2024-10-11 NOTE — PROGRESS NOTES
Daily Note     Today's date: 10/11/2024  Patient name: Courtney Davalos  : 1960  MRN: [id number]  Referring provider: Keegan Conn DO  Dx:   Encounter Diagnosis     ICD-10-CM    1. Right leg pain  M79.604       2. Chronic right-sided low back pain with right-sided sciatica  M54.41     G89.29       3. Sciatica, unspecified laterality  M54.30                      Subjective: less episodes of grabbing pain; continues to have a lot of pain when lying down; starting using new mattress last night which is firmer      Objective: See treatment diary below      Assessment: Tolerated treatment well. Patient exhibited good technique with therapeutic exercises and would benefit from continued PT.  Increase in calf pain with prone press ups; pain with press ups did not get easier over time.  Tightness in calf was 5/10 at the end of treatment, from 10/10 as the normal.      Plan: Progress treatment as tolerated.       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 [place]             [facility]                                       Ther Ex             clamshells 15 3-way 15ea 3-way 15ea 3-way 15ea         Piriformis tretch :15x5  :15x5 :15x5         Hamstirng stretch,  No stretch felt            bridge P!            Trunk ext ins tanding P!            Left alteral trunk shifts in standing P! Increasing down leg            Glut stretch             ER iso with glut set  :05x10 :05x15 :05x02   :05x20      ER AROM with glut squeeze, log roll       :05x15      IR iso with PPT  :05x10 :05x15 :05x20         Resisted supine march    With bracing 10ea         Trunk flexion seated Rolling  stool 10x            PPT  10 10 :05x5         Standing hip fleixon to ext  P! With ext and increasing radiating pain            Seated piriformis stretch   Knee pain    Supine pulling right leg up :10x10      Prone on elbows    2' 2' 2' 2'      Bracing with march and ER    10         Bracing   with march    10         Heel touches    10         Prone bracing      :05x10       Prone UE lift      :05x10       Prone glut set      :05x10 :05x10      Prone ER resisted      UA       Prone IR resisted      UA knees       Prone press ups     3x10 10, 10, 5 with 1 and 2' minutes prone rest 10, 10, 5 with 1 and 2' minutes prone rest      Prone hip extenion     10 right, left p!        LTR       2'                                                                       Ther Activity                                       Gait Training                                       Modalities

## 2024-10-14 ENCOUNTER — OFFICE VISIT (OUTPATIENT)
Dept: PHYSICAL THERAPY | Facility: CLINIC | Age: 64
End: 2024-10-14
Payer: COMMERCIAL

## 2024-10-14 ENCOUNTER — OFFICE VISIT (OUTPATIENT)
Dept: INTERNAL MEDICINE CLINIC | Facility: CLINIC | Age: 64
End: 2024-10-14
Payer: COMMERCIAL

## 2024-10-14 VITALS
SYSTOLIC BLOOD PRESSURE: 112 MMHG | OXYGEN SATURATION: 97 % | WEIGHT: 163.4 LBS | HEIGHT: 69 IN | BODY MASS INDEX: 24.2 KG/M2 | DIASTOLIC BLOOD PRESSURE: 74 MMHG | HEART RATE: 71 BPM

## 2024-10-14 DIAGNOSIS — J01.00 ACUTE MAXILLARY SINUSITIS, RECURRENCE NOT SPECIFIED: Primary | ICD-10-CM

## 2024-10-14 DIAGNOSIS — M54.41 CHRONIC RIGHT-SIDED LOW BACK PAIN WITH RIGHT-SIDED SCIATICA: ICD-10-CM

## 2024-10-14 DIAGNOSIS — M54.30 SCIATICA, UNSPECIFIED LATERALITY: ICD-10-CM

## 2024-10-14 DIAGNOSIS — G89.29 CHRONIC RIGHT-SIDED LOW BACK PAIN WITH RIGHT-SIDED SCIATICA: ICD-10-CM

## 2024-10-14 DIAGNOSIS — M79.604 RIGHT LEG PAIN: Primary | ICD-10-CM

## 2024-10-14 PROCEDURE — G2211 COMPLEX E/M VISIT ADD ON: HCPCS | Performed by: INTERNAL MEDICINE

## 2024-10-14 PROCEDURE — 99213 OFFICE O/P EST LOW 20 MIN: CPT | Performed by: INTERNAL MEDICINE

## 2024-10-14 PROCEDURE — 97140 MANUAL THERAPY 1/> REGIONS: CPT | Performed by: PHYSICAL THERAPIST

## 2024-10-14 PROCEDURE — 97112 NEUROMUSCULAR REEDUCATION: CPT | Performed by: PHYSICAL THERAPIST

## 2024-10-14 PROCEDURE — 97110 THERAPEUTIC EXERCISES: CPT | Performed by: PHYSICAL THERAPIST

## 2024-10-14 RX ORDER — BENZONATATE 100 MG/1
100 CAPSULE ORAL
Qty: 20 CAPSULE | Refills: 0 | Status: SHIPPED | OUTPATIENT
Start: 2024-10-14

## 2024-10-14 RX ORDER — MOMETASONE FUROATE MONOHYDRATE 50 UG/1
2 SPRAY, METERED NASAL DAILY
Qty: 1 ACT | Refills: 0 | Status: SHIPPED | OUTPATIENT
Start: 2024-10-14 | End: 2024-10-24

## 2024-10-14 RX ORDER — CEFUROXIME AXETIL 250 MG/1
250 TABLET ORAL EVERY 12 HOURS SCHEDULED
Qty: 20 TABLET | Refills: 0 | Status: SHIPPED | OUTPATIENT
Start: 2024-10-14 | End: 2024-10-24

## 2024-10-14 NOTE — PROGRESS NOTES
Daily Note     Today's date: 10/14/2024  Patient name: Courtney Davalos  : 1960  MRN: [id number]  Referring provider: Keegan Conn DO  Dx:   Encounter Diagnosis     ICD-10-CM    1. Right leg pain  M79.604       2. Chronic right-sided low back pain with right-sided sciatica  M54.41     G89.29       3. Sciatica, unspecified laterality  M54.30                      Subjective: had follow-up with Dr. Harmon who suggested injection as possible next step; overall not much progress recently      Objective: See treatment diary below      Assessment: Tolerated treatment well. Patient exhibited good technique with therapeutic exercises and would benefit from continued PT.  Continued modification of treatment to find effective combination of movements/stretches.  Right buttock pain when trying to lift he foot up doing hip flexor stretch supine with left leg off table.  Also had pain when right leg was forward during stair hip flexor stretching.  Right side prefers extension strongly over flexion biased movements      Plan: Progress treatment as tolerated.       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       After hip flexor stretch :10x10     Trunk ext ins tanding P!            Left alteral trunk shifts in standing P! Increasing down leg            Glut stretch             ER iso with glut set  :05x10 :05x15 :05x02   :05x20       ER AROM with glut squeeze, log roll       :05x15      IR iso with PPT  :05x10 :05x15 :05x20         Resisted supine march    With bracing 10ea         Trunk flexion seated Rolling stool 10x            PPT  10 10 :05x5         Standing hip fleixon to ext  P! With ext and increasing radiating pain            Seated piriformis stretch   Knee pain    Supine pulling right leg up :10x10      Prone on elbows    2' 2' 2' 2'      Bracing with march and ER    10         Bracing   with march    10         Heel touches    10         Prone bracing      :05x10       Prone UE lift      :05x10       Prone glut set      :05x10 :05x10      Prone ER resisted      UA       Prone IR resisted      UA knees       Prone press ups     3x10 10, 10, 5 with 1 and 2' minutes prone rest 10, 10, 5 with 1 and 2' minutes prone rest Prone 5'     Prone hip extenion     10 right, left p!        LTR       2'      Hip flexor stretch, right leg off table        :20x5     Lumbar ext in standing over table        :10x10                                            Ther Activity                                       Gait Training                                       Modalities

## 2024-10-14 NOTE — ASSESSMENT & PLAN NOTE
Working with physical therapy she reports me that she is not making noticeable progress reports me that her therapist is very good I would like her to see pain management for steroid injection orders have been provided  Orders:    Ambulatory referral to Spine & Pain Management; Future  RTO as scheduled call if any problems

## 2024-10-14 NOTE — PROGRESS NOTES
Ambulatory Visit  Name: Courtney Davalos      : 1960      MRN: [id number]  Encounter Provider: Jose Daniel Harmon DO  Encounter Date: 10/14/2024   [facility]    Assessment & Plan  Acute maxillary sinusitis, recurrence not specified  Symptoms are compatible with developing right maxillary sinusitis patient does have radiation of pain into her to also reports a postnasal drip sinus pressure and cough secondary to postnasal drip she has completed Z-Isaac with a partial improvement.  No fevers no chills.  She reports me a cough at nighttime that is bothersome.  Orders:    cefuroxime (CEFTIN) 250 mg tablet; Take 1 tablet (250 mg total) by mouth every 12 (twelve) hours for 10 days    mometasone (NASONEX) 50 mcg/act nasal spray; 2 sprays into each nostril daily for 10 days    benzonatate (TESSALON PERLES) 100 mg capsule; Take 1 capsule (100 mg total) by mouth daily at bedtime as needed for cough  Also continue with albuterol 2 puffs every 4-6 hours as needed recommend Mucinex as directed as needed  Patient reports me she has had Ceftin in the past and tolerates well without any side effects  Sciatica, unspecified laterality  Working with physical therapy she reports me that she is not making noticeable progress reports me that her therapist is very good I would like her to see pain management for steroid injection orders have been provided  Orders:    Ambulatory referral to Spine & Pain Management; Future  RTO as scheduled call if any problems     History of Present Illness     HPI 63-year old female coming in for a follow up office visit regarding maxillary sinusitis, sciatica; the patient reports me compliant taking medications without untoward side effects the.  The patient is here to review his medical condition, update me on the medical condition and the patient reports me no hospitalizations and no ER visits.  Patient had called in last week for cold symptoms was  Line busy. Pt was in ED today with positive urine pregnancy. Will follow up   "prescribed Z-Isaac she has had a partial improvement of her symptoms but has noticed ongoing right maxillary sinus pain and pressure with radiation into the upper teeth along with nasal congestion postnasal drip intermittent wheezing for which she has needed to use her rescue inhaler 2 times cough at night using albuterol,  drysensation with breath,  yellow  no f/c  right max , right maxillary sinus no shortness of breath    History obtained from : patient  Review of Systems   Constitutional:  Negative for appetite change, chills and fever.   HENT:  Positive for congestion, postnasal drip and sinus pain. Negative for ear pain, rhinorrhea, sinus pressure, sneezing and sore throat.    Eyes:  Negative for itching.   Respiratory:  Positive for cough and wheezing. Negative for shortness of breath.    Gastrointestinal:  Negative for diarrhea, nausea and vomiting.   Musculoskeletal:  Positive for back pain.   Neurological:  Negative for headaches.     Social History     Tobacco Use    Smoking status: Never     Passive exposure: Never    Smokeless tobacco: Never   Vaping Use    Vaping status: Never Used   Substance and Sexual Activity    Alcohol use: Not Currently    Drug use: No    Sexual activity: Yes     Partners: Male     Birth control/protection: Post-menopausal         Objective     /74 (BP Location: Left arm, Patient Position: Sitting, Cuff Size: Standard)   Pulse 71   Ht 5' 9\" (1.753 m)   Wt 74.1 kg (163 lb 6.4 oz)   LMP  (LMP Unknown)   SpO2 97%   BMI 24.13 kg/m²   Postnasal drip, nasal congestion right maxillary sinus tenderness  Physical Exam  Vitals and nursing note reviewed.   Constitutional:       General: She is not in acute distress.     Appearance: Normal appearance. She is well-developed. She is not ill-appearing, toxic-appearing or diaphoretic.   HENT:      Head: Normocephalic and atraumatic.      Right Ear: External ear normal.      Left Ear: External ear normal.      Nose: Congestion present. "      Mouth/Throat:      Pharynx: No oropharyngeal exudate or posterior oropharyngeal erythema.   Eyes:      General: No scleral icterus.        Right eye: No discharge.         Left eye: No discharge.      Conjunctiva/sclera: Conjunctivae normal.      Pupils: Pupils are equal, round, and reactive to light.   Cardiovascular:      Heart sounds: Normal heart sounds. No murmur heard.  Pulmonary:      Effort: No respiratory distress.      Breath sounds: No wheezing or rales.   Lymphadenopathy:      Cervical: No cervical adenopathy.   Neurological:      Mental Status: She is alert.

## 2024-10-15 ENCOUNTER — TELEPHONE (OUTPATIENT)
Age: 64
End: 2024-10-15

## 2024-10-15 NOTE — TELEPHONE ENCOUNTER
Caller: Patient     Doctor:      Reason for call: Please mail NP packet to patient for upcoming appt     Confirmed address     Call back#: [phone]

## 2024-10-17 ENCOUNTER — OFFICE VISIT (OUTPATIENT)
Dept: PHYSICAL THERAPY | Facility: CLINIC | Age: 64
End: 2024-10-17
Payer: COMMERCIAL

## 2024-10-17 DIAGNOSIS — M54.41 CHRONIC RIGHT-SIDED LOW BACK PAIN WITH RIGHT-SIDED SCIATICA: ICD-10-CM

## 2024-10-17 DIAGNOSIS — M54.30 SCIATICA, UNSPECIFIED LATERALITY: ICD-10-CM

## 2024-10-17 DIAGNOSIS — M79.604 RIGHT LEG PAIN: Primary | ICD-10-CM

## 2024-10-17 DIAGNOSIS — G89.29 CHRONIC RIGHT-SIDED LOW BACK PAIN WITH RIGHT-SIDED SCIATICA: ICD-10-CM

## 2024-10-17 PROCEDURE — 97112 NEUROMUSCULAR REEDUCATION: CPT | Performed by: PHYSICAL THERAPIST

## 2024-10-17 PROCEDURE — 97110 THERAPEUTIC EXERCISES: CPT | Performed by: PHYSICAL THERAPIST

## 2024-10-17 PROCEDURE — 97140 MANUAL THERAPY 1/> REGIONS: CPT | Performed by: PHYSICAL THERAPIST

## 2024-10-17 NOTE — PROGRESS NOTES
Daily Note     Today's date: 10/17/2024  Patient name: Courtney Davalos  : 1960  MRN: [id number]  Referring provider: Keegan Conn DO  Dx:   Encounter Diagnosis     ICD-10-CM    1. Right leg pain  M79.604       2. Chronic right-sided low back pain with right-sided sciatica  M54.41     G89.29       3. Sciatica, unspecified laterality  M54.30                      Subjective: notes her foot was feeling better, almost normal, this morning but she continues to have a lot of pain with bed transfers and sit to stands from the toilet.   Sit to stand transfers feel better with bladder empty       Objective: See treatment diary below      Assessment: Tolerated treatment well. Patient exhibited good technique with therapeutic exercises and would benefit from continued PT.  Continued hip flexor stretch on the right with leg off the table; difficulty sliding sideways on the table.      Plan: Progress treatment as tolerated.       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 <---avoid contact      Manuals 9/16 9/19 9/23 9/26 9/30 10/8 10/11 10/14 10/17    Piriformis DTM (high tolerance) SY SY, sidelying and prone, elbow at end SY SY SY SY SY SY SY    SIJ shotgun mobilizaitons nv SY Held more pain with bridges after                                    Neuro Re-Ed                                                    [facility] [place]             D[facility]                                       Ther Ex             clamshells 15 3-way 15ea 3-way 15ea 3-way 15ea     20 after DTM    Piriformis tretch :15x5  :15x5 :15x5         Hamstirng stretch,  No stretch felt            bridge P!       After hip flexor stretch :10x10 After hip flexor stretch :10x10    Trunk ext ins tanding P!            Left alteral trunk shifts in standing P! Increasing down leg            Glut stretch             ER iso with glut set  :05x10 :05x15 :05x02   :05x20  :10x10    ER AROM with glut squeeze, log roll       :05x15      IR iso with PPT   :05x10 :05x15 :05x20     :10x10 no PPT but with Kegel    Resisted supine march    With bracing 10ea         Trunk flexion seated Rolling stool 10x            PPT  10 10 :05x5         Standing hip fleixon to ext  P! With ext and increasing radiating pain            Seated piriformis stretch   Knee pain    Supine pulling right leg up :10x10      Prone on elbows    2' 2' 2' 2'  2'x3    Bracing with march and ER    10         Bracing   with march    10         Heel touches    10         Prone bracing      :05x10       Prone UE lift      :05x10       Prone glut set      :05x10 :05x10      Prone ER resisted      UA       Prone IR resisted      UA knees       Prone press ups     3x10 10, 10, 5 with 1 and 2' minutes prone rest 10, 10, 5 with 1 and 2' minutes prone rest Prone 5' 3x5    Prone hip extenion     10 right, left p!        LTR       2'      Hip flexor stretch, right leg off table        :20x5 :20x5    Lumbar ext in standing over table        :10x10 :10x10                                           Ther Activity                                       Gait Training                                       Modalities

## 2024-10-18 ENCOUNTER — APPOINTMENT (OUTPATIENT)
Dept: PHYSICAL THERAPY | Facility: CLINIC | Age: 64
End: 2024-10-18
Payer: COMMERCIAL

## 2024-10-21 ENCOUNTER — OFFICE VISIT (OUTPATIENT)
Dept: UROLOGY | Facility: AMBULATORY SURGERY CENTER | Age: 64
End: 2024-10-21
Payer: COMMERCIAL

## 2024-10-21 ENCOUNTER — OFFICE VISIT (OUTPATIENT)
Dept: PHYSICAL THERAPY | Facility: CLINIC | Age: 64
End: 2024-10-21
Payer: COMMERCIAL

## 2024-10-21 VITALS
BODY MASS INDEX: 24.07 KG/M2 | SYSTOLIC BLOOD PRESSURE: 130 MMHG | OXYGEN SATURATION: 96 % | WEIGHT: 163 LBS | DIASTOLIC BLOOD PRESSURE: 90 MMHG | HEART RATE: 77 BPM

## 2024-10-21 DIAGNOSIS — G89.29 CHRONIC RIGHT-SIDED LOW BACK PAIN WITH RIGHT-SIDED SCIATICA: ICD-10-CM

## 2024-10-21 DIAGNOSIS — M79.604 RIGHT LEG PAIN: Primary | ICD-10-CM

## 2024-10-21 DIAGNOSIS — M54.30 SCIATICA, UNSPECIFIED LATERALITY: ICD-10-CM

## 2024-10-21 DIAGNOSIS — M54.41 CHRONIC RIGHT-SIDED LOW BACK PAIN WITH RIGHT-SIDED SCIATICA: ICD-10-CM

## 2024-10-21 DIAGNOSIS — C64.1 MALIGNANT NEOPLASM OF RIGHT KIDNEY (HCC): Primary | ICD-10-CM

## 2024-10-21 PROCEDURE — 97140 MANUAL THERAPY 1/> REGIONS: CPT | Performed by: PHYSICAL THERAPIST

## 2024-10-21 PROCEDURE — 97112 NEUROMUSCULAR REEDUCATION: CPT | Performed by: PHYSICAL THERAPIST

## 2024-10-21 PROCEDURE — 97110 THERAPEUTIC EXERCISES: CPT | Performed by: PHYSICAL THERAPIST

## 2024-10-21 PROCEDURE — 99213 OFFICE O/P EST LOW 20 MIN: CPT | Performed by: UROLOGY

## 2024-10-21 NOTE — PROGRESS NOTES
Daily Note     Today's date: 10/21/2024  Patient name: Courtney Davalos  : 1960  MRN: [id number]  Referring provider: Keegan Conn DO  Dx:   Encounter Diagnosis     ICD-10-CM    1. Right leg pain  M79.604       2. Chronic right-sided low back pain with right-sided sciatica  M54.41     G89.29       3. Sciatica, unspecified laterality  M54.30                      Subjective:  flare up of symptoms over the weekend when moving in bed; it has decreased since then      Objective: See treatment diary below      Assessment: Tolerated treatment well. Patient exhibited good technique with therapeutic exercises and would benefit from continued PT.  Continued extension biased stretching and muscle activation of the core/pelvis/hips      Plan: Progress treatment as tolerated.       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 <---avoid contact      Manuals 9/16 9/19 9/23 9/26 9/30 10/8 10/11 10/14 10/17 10/21   Piriformis DTM (high tolerance) SY SY, sidelying and prone, elbow at end SY SY SY SY SY SY SY SY   SIJ shotgun mobilizaitons nv SY Held more pain with bridges after                                    Neuro Re-Ed                                                    [facility] [place]             DKC                                       Ther Ex             clamshells 15 3-way 15ea 3-way 15ea 3-way 15ea     20 after DTM 20   Piriformis tretch :15x5  :15x5 :15x5         Hamstirng stretch,  No stretch felt            bridge P!       After hip flexor stretch :10x10 After hip flexor stretch :10x10 5p!   Trunk ext ins tanding P!            Left lateral trunk shifts in standing P! Increasing down leg            Glut stretch             ER iso with glut set  :05x10 :05x15 :05x02   :05x20  :10x10 :1010   ER AROM with glut squeeze, log roll       :05x15      IR iso with PPT  :05x10 :05x15 :05x20     :10x10 no PPT but with Kegel :10x10 no PPT but with Kegel   Resisted supine march    With bracing 10ea       Kick out 10ea   Trunk flexion seated Rolling stool 10x            PPT  10 10 :05x5         Standing hip fleixon to ext  P! With ext and increasing radiating pain            Seated piriformis stretch   Knee pain    Supine pulling right leg up :10x10      Prone on elbows    2' 2' 2' 2'  2'x3 2   Bracing with march and ER    10         Bracing   with march    10         Heel touches    10         Prone bracing      :05x10       Prone LE lift          10   Prone UE lift      :05x10    10   Prone glut set      :05x10 :05x10      Prone ER resisted      UA       Prone IR resisted      UA knees       Prone press ups     3x10 10, 10, 5 with 1 and 2' minutes prone rest 10, 10, 5 with 1 and 2' minutes prone rest Prone 5' 3x5 2x5   Prone hip extenion     10 right, left p!        LTR       2'      Hip flexor stretch, right leg off table        :20x5 :20x5 :20x5   Lumbar ext in standing over table        :10x10 :10x10 :05x10                                          Ther Activity                                       Gait Training                                       Modalities

## 2024-10-21 NOTE — PROGRESS NOTES
"Ambulatory Visit  Name: Courtney Davalos      : 1960      MRN: [id number]  Encounter Provider: Avinash Sterling MD  Encounter Date: 10/21/2024   [facility]    Assessment & Plan  Malignant neoplasm of right kidney (HCC)  We discussed findings.  Recommend another scan in 6 months and if stable annual scans for total 5 years.  Discussed the importance of adequate hydration and renal protective maneuvers.  She would like to drink green tea and we discussed moderation for this.  Orders:    Basic metabolic panel; Future    CT chest abdomen pelvis w wo contrast; Future      History of Present Illness     Courtney Davalos is a 63 y.o. female with history of stage III renal cell carcinoma status post laparoscopic right nephrectomy on 2023.  She is found to have high-grade disease with invasion of the renal sinus and vessels.  She had a 1 year course of Keytruda adjuvant therapy.  Renal function has been around to GFR 60.    CT chest, abdomen, pelvis does not show any evidence of disease.      Review of Systems          Objective     /90 (BP Location: Left arm, Patient Position: Sitting, Cuff Size: Adult)   Pulse 77   Wt 73.9 kg (163 lb)   LMP  (LMP Unknown)   SpO2 96%   BMI 24.07 kg/m²   Physical Exam  Results  No results found for: \"PSA\"  Lab Results   Component Value Date    GLUCOSE 134 2015    CALCIUM 8.6 2024     2015    K 4.7 2024    CO2 27 2024     2024    BUN 26 (H) 2024    CREATININE 1.04 2024     Lab Results   Component Value Date    WBC 4.92 06/10/2024    HGB 12.1 06/10/2024    HCT 38.2 06/10/2024    MCV 96 06/10/2024     06/10/2024       Office Urine Dip  No results found for this or any previous visit (from the past 1 hour(s)).]    Administrative Statements         "

## 2024-10-22 ENCOUNTER — APPOINTMENT (OUTPATIENT)
Dept: PHYSICAL THERAPY | Facility: CLINIC | Age: 64
End: 2024-10-22
Payer: COMMERCIAL

## 2024-10-24 ENCOUNTER — OFFICE VISIT (OUTPATIENT)
Dept: PHYSICAL THERAPY | Facility: CLINIC | Age: 64
End: 2024-10-24
Payer: COMMERCIAL

## 2024-10-24 DIAGNOSIS — M54.30 SCIATICA, UNSPECIFIED LATERALITY: ICD-10-CM

## 2024-10-24 DIAGNOSIS — G89.29 CHRONIC RIGHT-SIDED LOW BACK PAIN WITH RIGHT-SIDED SCIATICA: ICD-10-CM

## 2024-10-24 DIAGNOSIS — M54.41 CHRONIC RIGHT-SIDED LOW BACK PAIN WITH RIGHT-SIDED SCIATICA: ICD-10-CM

## 2024-10-24 DIAGNOSIS — M79.604 RIGHT LEG PAIN: Primary | ICD-10-CM

## 2024-10-24 PROCEDURE — 97110 THERAPEUTIC EXERCISES: CPT | Performed by: PHYSICAL THERAPIST

## 2024-10-24 PROCEDURE — 97112 NEUROMUSCULAR REEDUCATION: CPT | Performed by: PHYSICAL THERAPIST

## 2024-10-24 PROCEDURE — 97140 MANUAL THERAPY 1/> REGIONS: CPT | Performed by: PHYSICAL THERAPIST

## 2024-10-24 NOTE — PROGRESS NOTES
Daily Note     Today's date: 10/24/2024  Patient name: Courtney Davalos  : 1960  MRN: [id number]  Referring provider: Keegan Conn DO  Dx:   Encounter Diagnosis     ICD-10-CM    1. Right leg pain  M79.604       2. Chronic right-sided low back pain with right-sided sciatica  M54.41     G89.29       3. Sciatica, unspecified laterality  M54.30                    Subjective: she has to stay on her left side for a while before being able to transition to supine      Objective: See treatment diary below      Assessment: Tolerated treatment well. Patient exhibited good technique with therapeutic exercises and would benefit from continued PT.  Continued extension biased exercises; no tenderness to low back, right L5-S1 region      Plan: Progress treatment as tolerated.       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 [place]             DKC                          LTR 2'            Ther Ex             clamshells 15 3-way 15ea 3-way 15ea 3-way 15ea     20 after DTM 20   Piriformis tretch   :15x5 :15x5         Hamstirng stretch,              bridge 3p!       After hip flexor stretch :10x10 After hip flexor stretch :10x10 5p!                Left lateral trunk shifts in standing             Glut stretch             ER iso with glut set :10x10 :05x10 :05x15 :05x02   :05x20  :10x10 :1010   ER AROM with glut squeeze, log roll       :05x15      IR iso with PPT :10x10 no PPT but with Kegel :05x10 :05x15 :05x20     :10x10 no PPT but with Kegel :10x10 no PPT but with Kegel   Resisted supine march    With bracing 10ea      Kick out 10ea   Trunk flexion seated             PPT  10 10 :05x5          Standing hip fleixon to ext  P! With ext and increasing radiating pain            Seated piriformis stretch   Knee pain    Supine pulling right leg up :10x10      Prone on elbows 3'   2' 2' 2' 2'  2'x3 2   Bracing with march and ER    10         Bracing   with march    10         Heel touches    10         Prone bracing      :05x10       Prone LE lift 10         10   Prone UE lift 10     :05x10    10   Prone glut set      :05x10 :05x10      Prone ER resisted      UA       Prone IR resisted      UA knees       Prone press ups 10    3x10 10, 10, 5 with 1 and 2' minutes prone rest 10, 10, 5 with 1 and 2' minutes prone rest Prone 5' 3x5 2x5   Prone hip extenion     10 right, left p!        LTR       2'      Hip flexor stretch, right leg off table        :20x5 :20x5 :20x5   Lumbar ext in standing over table :01x10       :10x10 :10x10 :05x10                                          Ther Activity                                       Gait Training                                       Modalities

## 2024-10-25 ENCOUNTER — APPOINTMENT (OUTPATIENT)
Dept: PHYSICAL THERAPY | Facility: CLINIC | Age: 64
End: 2024-10-25
Payer: COMMERCIAL

## 2024-10-25 ENCOUNTER — OFFICE VISIT (OUTPATIENT)
Age: 64
End: 2024-10-25
Payer: COMMERCIAL

## 2024-10-25 VITALS
WEIGHT: 163 LBS | TEMPERATURE: 97.9 F | BODY MASS INDEX: 24.14 KG/M2 | HEIGHT: 69 IN | DIASTOLIC BLOOD PRESSURE: 80 MMHG | HEART RATE: 83 BPM | SYSTOLIC BLOOD PRESSURE: 120 MMHG | OXYGEN SATURATION: 97 %

## 2024-10-25 DIAGNOSIS — K74.3 PRIMARY BILIARY CIRRHOSIS (HCC): Primary | ICD-10-CM

## 2024-10-25 DIAGNOSIS — K76.0 METABOLIC DYSFUNCTION-ASSOCIATED STEATOTIC LIVER DISEASE (MASLD): ICD-10-CM

## 2024-10-25 DIAGNOSIS — D18.03 HEPATIC HEMANGIOMA: ICD-10-CM

## 2024-10-25 PROCEDURE — 99214 OFFICE O/P EST MOD 30 MIN: CPT | Performed by: INTERNAL MEDICINE

## 2024-10-25 RX ORDER — SELADELPAR LYSINE 10 MG/1
10 CAPSULE ORAL DAILY
Qty: 30 CAPSULE | Refills: 11 | Status: SHIPPED | OUTPATIENT
Start: 2024-10-25 | End: 2024-10-25

## 2024-10-25 RX ORDER — URSODIOL 500 MG/1
500 TABLET, FILM COATED ORAL 3 TIMES DAILY
Qty: 270 TABLET | Refills: 3 | Status: SHIPPED | OUTPATIENT
Start: 2024-10-25 | End: 2024-11-04

## 2024-10-25 RX ORDER — SELADELPAR LYSINE 10 MG/1
10 CAPSULE ORAL DAILY
Qty: 30 CAPSULE | Refills: 11 | Status: SHIPPED | OUTPATIENT
Start: 2024-10-25 | End: 2025-10-25

## 2024-10-25 NOTE — PATIENT INSTRUCTIONS
Stop taking your fenofibrate once the Saldelpar is approved.  Continue taking hever, but increase dose to 600 mg twice daily until you get the 500 mg dose, and then take 500 mg twice daily.  Get blood work done every 3 months.

## 2024-10-25 NOTE — PROGRESS NOTES
Ambulatory Visit  Name: Courtney Davalos      : 1960      MRN: [id number]  Encounter Provider: Ebenezer Esqueda MD  Encounter Date: 10/25/2024   Encounter department: [facility] [address]    Assessment & Plan  Primary biliary cirrhosis (HCC)  Diagnosed over 20 years ago.  Treated with hever and more recently with addition of fenofibrate.  No physical, laboratory or radiologic evidence of cirrhosis or portal HTN.  Last abdominal imaging was CT on 23.  Stable left hepatic lobe hemangioma.  Given persistent alkaline phosphatase above goal, will start a newly FDA approved second line medication, called Saldelpar, a PPARD agnoist.  Order placed.  Once approved and delivered, can stop taking fenofibrate.  Will change dose of ursodiol to 1000 mg (500 mg twice daily).  However, until new strength is picked/up delivered, can usee 300 mg tablets (2 tablets twice daily).  Continue with blood work every 3 month.  Up to date with osteopenia/osteoporosis screening.  Will check Vit D level.  Check Vit A level.    Orders:    ursodiol (ACTIGALL) 500 MG tablet; Take 1 tablet (500 mg total) by mouth 3 (three) times a day    Vitamin D 25 hydroxy; Future    Vitamin A; Future    Comprehensive metabolic panel; Standing    Seladelpar Lysine (Livdelzi) 10 MG CAPS; Take 10 mg by mouth daily    Metabolic dysfunction-associated steatotic liver disease (MASLD)  Reminded patient about the risk factors for concomitant liver disease increasing the rate of fibrosis.  Advised her to continue a healthy lifestyle for effective weight loss/maintenance.  Ultrasound elastography done in early July showed minimal hepatic steatosis and only S1 steatosis.  No need to repeat elastography at this time.       Hepatic hemangioma  Stable, small in L hepatic lobe.  No need to monitor with serial imaging.         History of Present Illness     Courtney Davalos is a 63 y.o. female who presents for follow-up of her PBC and metabolic  dysfunction associated fatty liver disease.    She continues to follow-up with hematology oncology and urology for management of her history of renal carcinoma.  She had abdominal imaging done a month ago with no evidence of recurrence.  Physically, she states she feels well without any significant liver related complaints at this time.  She continues to see physical therapy for management of her chronic back pain and leg pain.        History obtained from : patient  Review of Systems  Medical History Reviewed by provider this encounter:       Current Outpatient Medications on File Prior to Visit   Medication Sig Dispense Refill    ammonium lactate (LAC-HYDRIN) 12 % lotion Apply topically 2 (two) times a day as needed for dry skin 222 mL 1    betamethasone valerate (VALISONE) 0.1 % ointment Apply topically 2 (two) times a day As needed to affected area. Mix with Mupirocin ointment when applying 45 g 0    Cholecalciferol (Vitamin D3) 50 MCG (2000 UT) TABS Take 1 tablet (2,000 Units total) by mouth daily 90 tablet 3    estradiol (VAGIFEM, YUVAFEM) 10 MCG TABS vaginal tablet INSERT 1 TABLET INTO THE VAGINA 2 TIMES A WEEK. 24 tablet 1    fenofibrate (FENOGLIDE) 120 MG TABS Take 1 tablet (120 mg total) by mouth daily 30 tablet 11    FLUoxetine (PROzac) 40 MG capsule Take 1 capsule (40 mg total) by mouth daily 90 capsule 1    ketoconazole (NIZORAL) 2 % cream Apply topically daily 30 g 0    lidocaine (LIDODERM) 5 % lidocaine 5 % topical patch   APPLY 1 PATCH TO AFFECTED AREA FOR 12 HOURS A DAY & REMOVE FOR 12 HOURS BEFORE APPLYING NEXT PATCH. (12 HOURS ON, 12 HOURS OFF)      lisinopril (ZESTRIL) 20 mg tablet Take 1 tablet (20 mg total) by mouth daily 90 tablet 1    mupirocin (BACTROBAN) 2 % ointment Apply topically 3 (three) times a day As needed to affected area. Mix with Clobetasol ointment when applying to scalp. Use by itself to buttock area 30 g 3    traMADol (Ultram) 50 mg tablet Take 1 tablet (50 mg total) by mouth  "every 8 (eight) hours as needed for moderate pain 30 tablet 0    traZODone (DESYREL) 50 mg tablet Take 0.5-1 tablets (25-50 mg total) by mouth daily at bedtime as needed for sleep 90 tablet 1    triamcinolone (KENALOG) 0.1 % ointment Apply topically 2 (two) times a day 454 g 1    benzonatate (TESSALON PERLES) 100 mg capsule Take 1 capsule (100 mg total) by mouth daily at bedtime as needed for cough (Patient not taking: Reported on 10/21/2024) 20 capsule 0    methocarbamol (ROBAXIN) 500 mg tablet Take 2 tablets (1,000 mg total) by mouth 3 (three) times a day for 7 days 42 tablet 0    mometasone (NASONEX) 50 mcg/act nasal spray 2 sprays into each nostril daily for 10 days 1 Act 0    valACYclovir (VALTREX) 500 mg tablet Take 2 tablets (1,000 mg total) by mouth daily for 14 days 28 tablet 0     No current facility-administered medications on file prior to visit.      Social History     Tobacco Use    Smoking status: Never     Passive exposure: Never    Smokeless tobacco: Never   Vaping Use    Vaping status: Never Used   Substance and Sexual Activity    Alcohol use: Not Currently    Drug use: No    Sexual activity: Yes     Partners: Male     Birth control/protection: Post-menopausal         Objective     /80 (BP Location: Right arm, Patient Position: Sitting, Cuff Size: Adult)   Pulse 83   Temp 97.9 °F (36.6 °C) (Tympanic)   Ht 5' 9\" (1.753 m)   Wt 73.9 kg (163 lb)   LMP  (LMP Unknown)   SpO2 97%   BMI 24.07 kg/m²     Physical Exam  Vitals and nursing note reviewed.   Constitutional:       General: She is not in acute distress.     Appearance: She is well-developed.   HENT:      Head: Normocephalic and atraumatic.   Eyes:      Conjunctiva/sclera: Conjunctivae normal.   Cardiovascular:      Rate and Rhythm: Normal rate and regular rhythm.      Heart sounds: No murmur heard.  Pulmonary:      Effort: Pulmonary effort is normal. No respiratory distress.      Breath sounds: Normal breath sounds.   Abdominal:      " Palpations: Abdomen is soft.      Tenderness: There is no abdominal tenderness.   Musculoskeletal:         General: No swelling.      Cervical back: Neck supple.   Skin:     General: Skin is warm and dry.      Capillary Refill: Capillary refill takes less than 2 seconds.   Neurological:      Mental Status: She is alert.   Psychiatric:         Mood and Affect: Mood normal.       Administrative Statements   I have spent a total time of 35 minutes in caring for this patient on the day of the visit/encounter including Diagnostic results, Prognosis, Risks and benefits of tx options, Instructions for management, Patient and family education, Impressions, Documenting in the medical record, and Reviewing / ordering tests, medicine, procedures  .

## 2024-10-28 ENCOUNTER — OFFICE VISIT (OUTPATIENT)
Dept: PHYSICAL THERAPY | Facility: CLINIC | Age: 64
End: 2024-10-28
Payer: COMMERCIAL

## 2024-10-28 DIAGNOSIS — M79.604 RIGHT LEG PAIN: Primary | ICD-10-CM

## 2024-10-28 DIAGNOSIS — M54.30 SCIATICA, UNSPECIFIED LATERALITY: ICD-10-CM

## 2024-10-28 DIAGNOSIS — M54.41 CHRONIC RIGHT-SIDED LOW BACK PAIN WITH RIGHT-SIDED SCIATICA: ICD-10-CM

## 2024-10-28 DIAGNOSIS — G89.29 CHRONIC RIGHT-SIDED LOW BACK PAIN WITH RIGHT-SIDED SCIATICA: ICD-10-CM

## 2024-10-28 PROCEDURE — 97112 NEUROMUSCULAR REEDUCATION: CPT | Performed by: PHYSICAL THERAPIST

## 2024-10-28 PROCEDURE — 97140 MANUAL THERAPY 1/> REGIONS: CPT | Performed by: PHYSICAL THERAPIST

## 2024-10-28 PROCEDURE — 97110 THERAPEUTIC EXERCISES: CPT | Performed by: PHYSICAL THERAPIST

## 2024-10-28 NOTE — PROGRESS NOTES
Daily Note     Today's date: 10/28/2024  Patient name: Courtney Davalos  : 1960  MRN: [id number]  Referring provider: Keegan Conn DO  Dx:   Encounter Diagnosis     ICD-10-CM    1. Right leg pain  M79.604       2. Chronic right-sided low back pain with right-sided sciatica  M54.41     G89.29       3. Sciatica, unspecified laterality  M54.30         SUMMARY  Most reported tenderness just lateral to left inferior SIJ and superior to left ischial tuberosity  Has pain/tightness in the calf and sometimes proximal hamstrings  No significant improvements with physical therapy that started on 24             Subjective: she had a tough day after last treatment and continues to have limited tolerance to lying down.  Transfers in bed are very challenging (bridging/turning)      Objective: See treatment diary below      Assessment: Tolerated treatment well. Patient exhibited good technique with therapeutic exercises and would benefit from continued PT.   Pain with active left hip flexion, bridging, rolling on the table.        Plan: Progress treatment as tolerated.       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 10      After hip flexor stretch :10x10 After hip flexor stretch :10x10 5p!                Left lateral trunk shifts in standing             Glut stretch             ER iso with glut set :10x10 :05x10 :05x15 :05x02   :05x20  :10x10 :1010   ER AROM with glut  squeeze, log roll       :05x15      IR iso with PPT :10x10 no PPT but with Kegel :05x10 :05x15 :05x20     :10x10 no PPT but with Kegel :10x10 no PPT but with Kegel   Resisted supine march    With bracing 10ea      Kick out 10ea   Trunk flexion seated             PPT  10 10 :05x5         Standing hip fleixon to ext  P! With ext and increasing radiating pain            Seated piriformis stretch   Knee pain    Supine pulling right leg up :10x10      Prone on elbows 3' 3'  2' 2' 2' 2'  2'x3 2   Bracing with march and ER    10         Bracing   with march    10         Heel touches    10         Prone bracing      :05x10       Prone LE lift 10 10        10   Prone UE lift 10 10    :05x10    10   Prone glut set      :05x10 :05x10      Prone ER resisted      UA       Prone IR resisted      UA knees       Prone press ups 10 10   3x10 10, 10, 5 with 1 and 2' minutes prone rest 10, 10, 5 with 1 and 2' minutes prone rest Prone 5' 3x5 2x5   Prone hip extenion     10 right, left p!        LTR       2'      Hip flexor stretch, right leg off table        :20x5 :20x5 :20x5   Lumbar ext in standing over table :01x10 :10x10      :10x10 :10x10 :05x10                                          Ther Activity                                       Gait Training                                       Modalities

## 2024-10-29 ENCOUNTER — TELEPHONE (OUTPATIENT)
Age: 64
End: 2024-10-29

## 2024-10-29 ENCOUNTER — CONSULT (OUTPATIENT)
Dept: PAIN MEDICINE | Facility: CLINIC | Age: 64
End: 2024-10-29
Payer: COMMERCIAL

## 2024-10-29 VITALS
HEART RATE: 78 BPM | WEIGHT: 163 LBS | DIASTOLIC BLOOD PRESSURE: 106 MMHG | BODY MASS INDEX: 24.14 KG/M2 | SYSTOLIC BLOOD PRESSURE: 166 MMHG | HEIGHT: 69 IN | RESPIRATION RATE: 18 BRPM

## 2024-10-29 DIAGNOSIS — G57.01 NEUROPATHY OF RIGHT SCIATIC NERVE: ICD-10-CM

## 2024-10-29 DIAGNOSIS — G57.71 COMPLEX REGIONAL PAIN SYNDROME TYPE 2 OF RIGHT LOWER EXTREMITY: ICD-10-CM

## 2024-10-29 DIAGNOSIS — M79.18 MYOFASCIAL PAIN SYNDROME: ICD-10-CM

## 2024-10-29 DIAGNOSIS — M46.1 SACROILIITIS (HCC): ICD-10-CM

## 2024-10-29 DIAGNOSIS — M54.16 LUMBAR RADICULOPATHY: Primary | Chronic | ICD-10-CM

## 2024-10-29 DIAGNOSIS — Z98.890 HISTORY OF LUMBAR SURGERY: ICD-10-CM

## 2024-10-29 DIAGNOSIS — M54.30 SCIATICA, UNSPECIFIED LATERALITY: ICD-10-CM

## 2024-10-29 PROCEDURE — 99204 OFFICE O/P NEW MOD 45 MIN: CPT | Performed by: PHYSICAL MEDICINE & REHABILITATION
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
## 2024-10-29 NOTE — LETTER
[facility] [address]  [address]  [place] [address] [phone]  Dept: [phone]    November 5, 2024     Patient: Courtney Davalos   YOB: 1960   Date of Visit:    Member ID # [id number]   PA Case ID #: [id number]       To Whom it May Concern:    Courtney Davalos is under my professional care. Please consider this as an appeal for the medication Seladelpar Lysine (Livdelzi) 10 MG CAPS.  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  Seladelpar    is FDA approved for treatment of Primary Biliary Cholangitis.  She was treated with hever and more recently with the addition of fenofibrate.  Given her persistent alkaline phosphatase above goal, it is clinically acceptable to start the newly FDA approved second line medication of Seladelpar.  Please fax new determination to [phone] urgently.   Thank you    If you have any questions or concerns, please don't hesitate to call.         Sincerely,          Ebenezer Esqueda MD  [address] [address]  Phone: [phone]   Fax: [phone]  SHELLIE #: [id number]   NPI: [id number]

## 2024-10-29 NOTE — TELEPHONE ENCOUNTER
PA for livdelzi 10 mgSUBMITTED     via    [x]CMM-KEY: [id number]   []Surescripts-Case ID #   []Availity-Auth ID # NDC #   []Faxed to plan   []Other website   []Phone call Case ID #     Office notes sent, clinical questions answered. Awaiting determination    Turnaround time for your insurance to make a decision on your Prior Authorization can take 7-21 business days.

## 2024-10-30 NOTE — TELEPHONE ENCOUNTER
[facility] needs to be completed within 72 hours.      Fax: [phone]

## 2024-10-30 NOTE — TELEPHONE ENCOUNTER
Patient called and stated medication was denied because they are requiring more info on why patient needs this medication patient provided me with Denial # [id number] please review thank you
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
## 2024-10-30 NOTE — TELEPHONE ENCOUNTER
PA for livdelzi  DENIED    Reason:(Screenshot if applicable)      NOTE TO OFFICE: THE PA REQUEST WAS FOR [employer] : not sure why denied.  Will resubmit      Message sent to office clinical pool Yes    Denial letter scanned into Media Yes      **Please follow up with your patient regarding denial and next steps**    PA for Livdelzi 10MG capsules  SUBMITTED     via    [x]CMM-KEY: (Key: [id number])  PA Case ID #: [id number]  []Surescripts-Case ID #   []Availity-Auth ID # NDC #   []Faxed to plan   []Other   []Phone call Case ID #     Office notes sent, clinical questions answered. Awaiting determination    Turnaround time for your insurance to make a decision on your Prior Authorization can take 7-21 business days.

## 2024-10-31 ENCOUNTER — APPOINTMENT (OUTPATIENT)
Dept: PHYSICAL THERAPY | Facility: CLINIC | Age: 64
End: 2024-10-31
Payer: COMMERCIAL

## 2024-11-01 ENCOUNTER — CONSULT (OUTPATIENT)
Age: 64
End: 2024-11-01
Payer: COMMERCIAL

## 2024-11-01 ENCOUNTER — APPOINTMENT (OUTPATIENT)
Dept: PHYSICAL THERAPY | Facility: CLINIC | Age: 64
End: 2024-11-01
Payer: COMMERCIAL

## 2024-11-01 VITALS — WEIGHT: 164 LBS | HEIGHT: 69 IN | BODY MASS INDEX: 24.29 KG/M2

## 2024-11-01 DIAGNOSIS — K62.5 RECTAL BLEED: ICD-10-CM

## 2024-11-01 DIAGNOSIS — L29.9 PRURITUS: Primary | Chronic | ICD-10-CM

## 2024-11-01 PROCEDURE — 46600 DIAGNOSTIC ANOSCOPY SPX: CPT | Performed by: COLON & RECTAL SURGERY

## 2024-11-01 PROCEDURE — 99203 OFFICE O/P NEW LOW 30 MIN: CPT | Performed by: COLON & RECTAL SURGERY

## 2024-11-01 NOTE — ASSESSMENT & PLAN NOTE
Pruritus janine Valdez is a 62yo female, history of renal carcinoma by her report as well as myeloma.  Presents with rectal irritation/bleeding today, on examination she has anal pruritus excoriated, extending to the gluteal cleft, mild.    We discussed dietary/lifestyle changes, she is current on colonoscopy 2021 with 10-year recall.    -High fiber diet 20-30g/day with increased fruits/vegetables/psyllium(metamucil or konsyl), increased hydration noncaffeinated beverages(handouts/samples provided)  -Warm soapy water, pat dry, desitin or calmoseptine to perianal skin as barrier  -Discontinue any wet wipes or hydrocortisone  -PRN followup    Orders:    Anoscopy

## 2024-11-04 ENCOUNTER — HOSPITAL ENCOUNTER (OUTPATIENT)
Dept: RADIOLOGY | Facility: CLINIC | Age: 64
Discharge: HOME | End: 2024-11-04
Payer: COMMERCIAL

## 2024-11-04 ENCOUNTER — APPOINTMENT (OUTPATIENT)
Dept: PHYSICAL THERAPY | Facility: CLINIC | Age: 64
End: 2024-11-04
Payer: COMMERCIAL

## 2024-11-04 VITALS
SYSTOLIC BLOOD PRESSURE: 156 MMHG | HEART RATE: 67 BPM | TEMPERATURE: 97.5 F | DIASTOLIC BLOOD PRESSURE: 79 MMHG | RESPIRATION RATE: 18 BRPM | OXYGEN SATURATION: 97 %

## 2024-11-04 DIAGNOSIS — K74.3 PRIMARY BILIARY CIRRHOSIS (HCC): ICD-10-CM

## 2024-11-04 DIAGNOSIS — M54.16 LUMBAR RADICULOPATHY: Chronic | ICD-10-CM

## 2024-11-04 DIAGNOSIS — M54.30 SCIATICA, UNSPECIFIED LATERALITY: ICD-10-CM

## 2024-11-04 DIAGNOSIS — Z98.890 HISTORY OF LUMBAR SURGERY: ICD-10-CM

## 2024-11-04 DIAGNOSIS — M46.1 SACROILIITIS (HCC): ICD-10-CM

## 2024-11-04 DIAGNOSIS — G57.71 COMPLEX REGIONAL PAIN SYNDROME TYPE 2 OF RIGHT LOWER EXTREMITY: ICD-10-CM

## 2024-11-04 DIAGNOSIS — M79.18 MYOFASCIAL PAIN SYNDROME: ICD-10-CM

## 2024-11-04 DIAGNOSIS — G57.01 NEUROPATHY OF RIGHT SCIATIC NERVE: ICD-10-CM

## 2024-11-04 PROCEDURE — 27096 INJECT SACROILIAC JOINT: CPT | Performed by: PHYSICAL MEDICINE & REHABILITATION

## 2024-11-04 RX ORDER — METHYLPREDNISOLONE ACETATE 80 MG/ML
80 INJECTION, SUSPENSION INTRA-ARTICULAR; INTRALESIONAL; INTRAMUSCULAR; PARENTERAL; SOFT TISSUE ONCE
Status: COMPLETED | OUTPATIENT
Start: 2024-11-04 | End: 2024-11-04

## 2024-11-04 RX ORDER — URSODIOL 500 MG/1
500 TABLET, FILM COATED ORAL 2 TIMES DAILY
Qty: 180 TABLET | Refills: 3 | Status: SHIPPED | OUTPATIENT
Start: 2024-11-04 | End: 2025-11-04

## 2024-11-04 RX ORDER — LIDOCAINE HYDROCHLORIDE 10 MG/ML
2 INJECTION, SOLUTION EPIDURAL; INFILTRATION; INTRACAUDAL; PERINEURAL ONCE
Status: COMPLETED | OUTPATIENT
Start: 2024-11-04 | End: 2024-11-04

## 2024-11-04 RX ORDER — ROPIVACAINE HYDROCHLORIDE 2 MG/ML
3 INJECTION, SOLUTION EPIDURAL; INFILTRATION; PERINEURAL ONCE
Status: COMPLETED | OUTPATIENT
Start: 2024-11-04 | End: 2024-11-04

## 2024-11-04 RX ADMIN — LIDOCAINE HYDROCHLORIDE 2 ML: 10 INJECTION, SOLUTION EPIDURAL; INFILTRATION; INTRACAUDAL; PERINEURAL at 15:48

## 2024-11-04 RX ADMIN — IOHEXOL 1 ML: 300 INJECTION, SOLUTION INTRAVENOUS at 15:49

## 2024-11-04 RX ADMIN — ROPIVACAINE HYDROCHLORIDE 3 ML: 2 INJECTION, SOLUTION EPIDURAL; INFILTRATION at 15:49

## 2024-11-04 RX ADMIN — METHYLPREDNISOLONE ACETATE 80 MG: 80 INJECTION, SUSPENSION INTRA-ARTICULAR; INTRALESIONAL; INTRAMUSCULAR; SOFT TISSUE at 15:49

## 2024-11-04 NOTE — DISCHARGE INSTR - LAB
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
## 2024-11-05 ENCOUNTER — APPOINTMENT (OUTPATIENT)
Dept: PHYSICAL THERAPY | Facility: CLINIC | Age: 64
End: 2024-11-05
Payer: COMMERCIAL

## 2024-11-05 NOTE — TELEPHONE ENCOUNTER
PA for Seladelpar Lysine (Livdelzi) 10 MG CAPS APPEALED via     []CMM  []SS  [x]Letter sent to insurance via fax   []Other site or means     All necessary records sent. Will await response from insurance company    Turnaround time for a decision to be made on an appeal could take up to 30 business days

## 2024-11-07 ENCOUNTER — EVALUATION (OUTPATIENT)
Dept: PHYSICAL THERAPY | Facility: CLINIC | Age: 64
End: 2024-11-07
Payer: COMMERCIAL

## 2024-11-07 DIAGNOSIS — M79.604 RIGHT LEG PAIN: Primary | ICD-10-CM

## 2024-11-07 DIAGNOSIS — M54.30 SCIATICA, UNSPECIFIED LATERALITY: ICD-10-CM

## 2024-11-07 DIAGNOSIS — M54.41 CHRONIC RIGHT-SIDED LOW BACK PAIN WITH RIGHT-SIDED SCIATICA: ICD-10-CM

## 2024-11-07 DIAGNOSIS — G89.29 CHRONIC RIGHT-SIDED LOW BACK PAIN WITH RIGHT-SIDED SCIATICA: ICD-10-CM

## 2024-11-07 PROCEDURE — 97110 THERAPEUTIC EXERCISES: CPT | Performed by: PHYSICAL THERAPIST

## 2024-11-07 PROCEDURE — 97140 MANUAL THERAPY 1/> REGIONS: CPT | Performed by: PHYSICAL THERAPIST

## 2024-11-07 PROCEDURE — 97112 NEUROMUSCULAR REEDUCATION: CPT | Performed by: PHYSICAL THERAPIST
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
## 2024-11-08 ENCOUNTER — TRANSCRIBE ORDERS (OUTPATIENT)
Age: 64
End: 2024-11-08

## 2024-11-11 ENCOUNTER — APPOINTMENT (OUTPATIENT)
Dept: PHYSICAL THERAPY | Facility: CLINIC | Age: 64
End: 2024-11-11
Payer: COMMERCIAL

## 2024-11-12 ENCOUNTER — OFFICE VISIT (OUTPATIENT)
Dept: PHYSICAL THERAPY | Facility: CLINIC | Age: 64
End: 2024-11-12
Payer: COMMERCIAL

## 2024-11-12 ENCOUNTER — APPOINTMENT (OUTPATIENT)
Dept: PHYSICAL THERAPY | Facility: CLINIC | Age: 64
End: 2024-11-12
Payer: COMMERCIAL

## 2024-11-12 DIAGNOSIS — M54.41 CHRONIC RIGHT-SIDED LOW BACK PAIN WITH RIGHT-SIDED SCIATICA: ICD-10-CM

## 2024-11-12 DIAGNOSIS — G89.29 CHRONIC RIGHT-SIDED LOW BACK PAIN WITH RIGHT-SIDED SCIATICA: ICD-10-CM

## 2024-11-12 DIAGNOSIS — M79.604 RIGHT LEG PAIN: Primary | ICD-10-CM

## 2024-11-12 DIAGNOSIS — M54.30 SCIATICA, UNSPECIFIED LATERALITY: ICD-10-CM

## 2024-11-12 PROCEDURE — 97140 MANUAL THERAPY 1/> REGIONS: CPT | Performed by: PHYSICAL THERAPIST

## 2024-11-12 PROCEDURE — 97112 NEUROMUSCULAR REEDUCATION: CPT | Performed by: PHYSICAL THERAPIST

## 2024-11-12 PROCEDURE — 97110 THERAPEUTIC EXERCISES: CPT | Performed by: PHYSICAL THERAPIST

## 2024-11-12 NOTE — PROGRESS NOTES
Daily Note     Today's date: 2024  Patient name: Courtney Davalos  : 1960  MRN: [id number]  Referring provider: Keegan Conn DO  Dx:   Encounter Diagnosis     ICD-10-CM    1. Right leg pain  M79.604       2. Sciatica, unspecified laterality  M54.30       3. Chronic right-sided low back pain with right-sided sciatica  M54.41     G89.29                      Subjective: notes symptoms are slowly increasing since the injection; pain has about 1/2 way returned      Objective: See treatment diary below      Assessment: Tolerated treatment well. Patient exhibited good technique with therapeutic exercises and would benefit from continued PT.   +TTP superior gluteals close to superior SIJ more than piriformis today      Plan: Progress treatment as tolerated.       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 <---avoid contact   piriformis injection    Manuals 10/24 10/28 11/7 11/12 9/30 10/8 10/11 10/14 10/17 10/21   Piriformis DTM (high tolerance) SY SY SY SY SY SY SY SY SY SY   SIJ shotgun mobilizaitons                                       Neuro Re-Ed                                                    SKC             DKC                          LTR 2' 2'           Ther Ex             clamshells 15 3-way 15ea 3-way 5-15ea 3-way 15ea     20 after DTM 20   Piriformis tretch   :15x5 :15x5         Hamstirng stretch,              bridge 3p! 10      After hip flexor stretch :10x10 After hip flexor stretch :10x10 5p!                Book openers    :05x10         Glut stretch             ER iso with glut set :10x10 :05x10 :05x15 :05x02   :05x20  :10x10 :1010   ER AROM with glut squeeze, log roll       :05x15      IR iso with PPT :10x10 no PPT but with Kegel :05x10 :05x15 :05x20     :10x10 no PPT but with Kegel :10x10 no PPT but with Kegel   Resisted supine march    With bracing 10ea      Kick out 10ea   Trunk flexion seated             PPT  10 10 :05x5         Standing hip  fleixon to ext  P! With ext and increasing radiating pain            Seated piriformis stretch   Knee pain    Supine pulling right leg up :10x10      Prone on elbows 3' 3'  2' 2' 2' 2'  2'x3 2   Bracing with march and ER    10         Bracing   with march    10         Heel touches    10         Prone bracing      :05x10       Prone LE lift 10 10        10   Prone UE lift 10 10    :05x10    10   Prone glut set      :05x10 :05x10      Prone ER resisted      UA       Prone IR resisted      UA knees       Prone press ups 10 10  15 3x10 10, 10, 5 with 1 and 2' minutes prone rest 10, 10, 5 with 1 and 2' minutes prone rest Prone 5' 3x5 2x5   Prone hip extenion    15ea 10 right, left p!        LTR       2'      Hip flexor stretch, right leg off table    2x1'    :20x5 :20x5 :20x5   Lumbar ext in standing over table :01x10 :10x10  :05x10    :10x10 :10x10 :05x10   Hip ext isometrics, leg into table, supine, left leg bent   20 :10x10         Curl up, and oblique curl up   15ea nv                                                                          Ther Activity                                       Gait Training                                       Modalities

## 2024-11-13 ENCOUNTER — TELEMEDICINE (OUTPATIENT)
Dept: BEHAVIORAL/MENTAL HEALTH CLINIC | Facility: CLINIC | Age: 64
End: 2024-11-13
Payer: COMMERCIAL

## 2024-11-13 DIAGNOSIS — F41.1 GAD (GENERALIZED ANXIETY DISORDER): ICD-10-CM

## 2024-11-13 DIAGNOSIS — F42.9 OBSESSIVE-COMPULSIVE DISORDER, UNSPECIFIED TYPE: ICD-10-CM

## 2024-11-13 DIAGNOSIS — F33.0 MDD (MAJOR DEPRESSIVE DISORDER), RECURRENT EPISODE, MILD (HCC): Primary | ICD-10-CM

## 2024-11-13 DIAGNOSIS — F43.10 PTSD (POST-TRAUMATIC STRESS DISORDER): ICD-10-CM

## 2024-11-13 PROCEDURE — 90837 PSYTX W PT 60 MINUTES: CPT | Performed by: COUNSELOR
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
## 2024-11-14 ENCOUNTER — OFFICE VISIT (OUTPATIENT)
Dept: PHYSICAL THERAPY | Facility: CLINIC | Age: 64
End: 2024-11-14
Payer: COMMERCIAL

## 2024-11-14 DIAGNOSIS — M79.604 RIGHT LEG PAIN: Primary | ICD-10-CM

## 2024-11-14 DIAGNOSIS — M54.41 CHRONIC RIGHT-SIDED LOW BACK PAIN WITH RIGHT-SIDED SCIATICA: ICD-10-CM

## 2024-11-14 DIAGNOSIS — G89.29 CHRONIC RIGHT-SIDED LOW BACK PAIN WITH RIGHT-SIDED SCIATICA: ICD-10-CM

## 2024-11-14 DIAGNOSIS — M54.30 SCIATICA, UNSPECIFIED LATERALITY: ICD-10-CM

## 2024-11-14 PROCEDURE — 97110 THERAPEUTIC EXERCISES: CPT | Performed by: PHYSICAL THERAPIST

## 2024-11-14 PROCEDURE — 97140 MANUAL THERAPY 1/> REGIONS: CPT | Performed by: PHYSICAL THERAPIST

## 2024-11-14 PROCEDURE — 97112 NEUROMUSCULAR REEDUCATION: CPT | Performed by: PHYSICAL THERAPIST

## 2024-11-14 NOTE — PROGRESS NOTES
Daily Note     Today's date: 2024  Patient name: Courtney Davalos  : 1960  MRN: [id number]  Referring provider: Keegan Conn DO  Dx:   Encounter Diagnosis     ICD-10-CM    1. Right leg pain  M79.604       2. Sciatica, unspecified laterality  M54.30       3. Chronic right-sided low back pain with right-sided sciatica  M54.41     G89.29                      Subjective: note soreness in the upper right SIJ area by gluteal attachments      Objective: See treatment diary below      Assessment: Tolerated treatment well. Patient exhibited good technique with therapeutic exercises and would benefit from continued PT.  +TTP superior gluteals close to  SIJ; added percussion massager to area;improved tolerance to prone hip extension      Plan: Progress treatment as tolerated.       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 <---avoid contact   piriformis injection    Manuals 10/24 10/28 11/7 11/12 11/14 10/8 10/11 10/14 10/17 10/21   Piriformis DTM (high tolerance) SY SY SY SY SY SY SY SY SY SY   SIJ shotgun mobilizaitons                                       Neuro Re-Ed                                                    SKC             DKC                          LTR 2' 2'           Ther Ex             clamshells 15 3-way 15ea 3-way 5-15ea 3-way 15ea     20 after DTM 20   Piriformis tretch   :15x5 :15x5         Hamstirng stretch,              bridge 3p! 10      After hip flexor stretch :10x10 After hip flexor stretch :10x10 5p!                Book openers    :05x10 :15x5        Glut stretch             ER iso with glut set :10x10 :05x10 :05x15 :05x02 :10x10  :05x20  :10x10 :1010   ER AROM with glut squeeze, log roll       :05x15      IR iso  :10x10 no PPT but with Kegel :05x10 :05x15 :05x20 :10x10    :10x10 no PPT but with Kegel :10x10 no PPT but with Kegel   Resisted supine march    With bracing 10ea      Kick out 10ea   Trunk flexion seated             PPT  10 10 :05x5          Standing hip fleixon to ext  P! With ext and increasing radiating pain            Seated piriformis stretch   Knee pain    Supine pulling right leg up :10x10      Prone on elbows 3' 3'  2' 3' 2' 2'  2'x3 2   Bracing with march and ER    10         Bracing   with march    10         Heel touches    10         Prone bracing      :05x10       Prone LE lift 10 10  15 15ea     10   Prone UE lift 10 10    :05x10    10   Prone glut set      :05x10 :05x10      Prone ER resisted      UA       Prone IR resisted      UA knees       Prone press ups 10 10  15 2x10 10, 10, 5 with 1 and 2' minutes prone rest 10, 10, 5 with 1 and 2' minutes prone rest Prone 5' 3x5 2x5   Prone hip extenion    15ea         LTR       2'      Hip flexor stretch, right leg off table    2x1' 3x1'   :20x5 :20x5 :20x5   Lumbar ext in standing over table :01x10 :10x10  :05x10 :05x10   :10x10 :10x10 :05x10   Hip ext isometrics, leg into table, supine, left leg bent   20 :10x10 :10x10        Curl up, and oblique curl up   15ea nv                                                                          Ther Activity                                       Gait Training                                       Modalities

## 2024-11-18 ENCOUNTER — TELEPHONE (OUTPATIENT)
Dept: PAIN MEDICINE | Facility: CLINIC | Age: 64
End: 2024-11-18

## 2024-11-18 ENCOUNTER — OFFICE VISIT (OUTPATIENT)
Dept: PHYSICAL THERAPY | Facility: CLINIC | Age: 64
End: 2024-11-18
Payer: COMMERCIAL

## 2024-11-18 DIAGNOSIS — M54.30 SCIATICA, UNSPECIFIED LATERALITY: ICD-10-CM

## 2024-11-18 DIAGNOSIS — M54.41 CHRONIC RIGHT-SIDED LOW BACK PAIN WITH RIGHT-SIDED SCIATICA: ICD-10-CM

## 2024-11-18 DIAGNOSIS — M79.604 RIGHT LEG PAIN: Primary | ICD-10-CM

## 2024-11-18 DIAGNOSIS — G89.29 CHRONIC RIGHT-SIDED LOW BACK PAIN WITH RIGHT-SIDED SCIATICA: ICD-10-CM

## 2024-11-18 PROCEDURE — 97110 THERAPEUTIC EXERCISES: CPT | Performed by: PHYSICAL THERAPIST

## 2024-11-18 PROCEDURE — 97112 NEUROMUSCULAR REEDUCATION: CPT | Performed by: PHYSICAL THERAPIST

## 2024-11-18 PROCEDURE — 97140 MANUAL THERAPY 1/> REGIONS: CPT | Performed by: PHYSICAL THERAPIST

## 2024-11-18 NOTE — PROGRESS NOTES
Daily Note     Today's date: 2024  Patient name: Courtney Davalos  : 1960  MRN: [id number]  Referring provider: Keegan Conn DO  Dx:   Encounter Diagnosis     ICD-10-CM    1. Right leg pain  M79.604       2. Sciatica, unspecified laterality  M54.30       3. Chronic right-sided low back pain with right-sided sciatica  M54.41     G89.29                      Subjective: notes she is still about 50% better following the injection      Objective: See treatment diary below      Assessment: Tolerated treatment well. Patient exhibited good technique with therapeutic exercises and would benefit from continued PT.  Continued progression of core exercises and extension stretching      Plan: Progress treatment as tolerated.       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 <---avoid contact   piriformis injection    Manuals 10/24 10/28 11/7 11/12 11/14 11/18 10/11 10/14 10/17 10/21   Piriformis DTM (high tolerance) SY SY SY SY SY SY SY SY SY SY   SIJ shotgun mobilizaitons                                       Neuro Re-Ed                          Curl up cross over      10ea, watch neck position       Curl up      10       SKC             DKC                          LTR 2' 2'           Ther Ex             clamshells 15 3-way 15ea 3-way 5-15ea 3-way 15ea     20 after DTM 20   Piriformis tretch   :15x5 :15x5         Hamstirng stretch,              bridge 3p! 10      After hip flexor stretch :10x10 After hip flexor stretch :10x10 5p!                Book openers    :05x10 :15x5 :15x5       Glut stretch             ER iso with glut set :10x10 :05x10 :05x15 :05x02 :10x10  :05x20  :10x10 :1010   ER AROM with glut squeeze, log roll       :05x15      IR iso  :10x10 no PPT but with Kegel :05x10 :05x15 :05x20 :10x10 :10x10   :10x10 no PPT but with Kegel :10x10 no PPT but with Kegel   Resisted supine march    With bracing 10ea      Kick out 10ea   Trunk flexion seated             PPT  10  10 :05x5         Standing hip fleixon to ext  P! With ext and increasing radiating pain            Seated piriformis stretch   Knee pain    Supine pulling right leg up :10x10      Prone on elbows 3' 3'  2' 3' 2' 2'  2'x3 2   Bracing with march and ER    10         Bracing   with march    10         Heel touches    10         Prone bracing             Prone LE lift 10 10  15 15ea     10   Prone UE lift 10 10        10   Prone glut set      :05x10 :05x10      Prone ER resisted             Prone IR resisted             Prone press ups 10 10  15 2x10 2' prone, 2x10 10, 10, 5 with 1 and 2' minutes prone rest Prone 5' 3x5 2x5   Prone hip extenion    15ea  15ea       LTR       2'      Hip flexor stretch, right leg off table    2x1' 3x1' 3x1'  :20x5 :20x5 :20x5   Lumbar ext in standing over table :01x10 :10x10  :05x10 :05x10 :05x10  :10x10 :10x10 :05x10   Hip ext isometrics, leg into table, supine, left leg bent   20 :10x10 :10x10        Curl up, and oblique curl up   15ea nv         Hip flexor stretch stairs      :10x5                                                           Ther Activity                                       Gait Training                                       Modalities

## 2024-11-19 ENCOUNTER — APPOINTMENT (OUTPATIENT)
Dept: PHYSICAL THERAPY | Facility: CLINIC | Age: 64
End: 2024-11-19
Payer: COMMERCIAL

## 2024-11-21 ENCOUNTER — OFFICE VISIT (OUTPATIENT)
Dept: PHYSICAL THERAPY | Facility: CLINIC | Age: 64
End: 2024-11-21
Payer: COMMERCIAL

## 2024-11-21 DIAGNOSIS — M79.604 RIGHT LEG PAIN: Primary | ICD-10-CM

## 2024-11-21 DIAGNOSIS — M54.30 SCIATICA, UNSPECIFIED LATERALITY: ICD-10-CM

## 2024-11-21 DIAGNOSIS — M54.41 CHRONIC RIGHT-SIDED LOW BACK PAIN WITH RIGHT-SIDED SCIATICA: ICD-10-CM

## 2024-11-21 DIAGNOSIS — G89.29 CHRONIC RIGHT-SIDED LOW BACK PAIN WITH RIGHT-SIDED SCIATICA: ICD-10-CM

## 2024-11-21 PROCEDURE — 97110 THERAPEUTIC EXERCISES: CPT | Performed by: PHYSICAL THERAPIST

## 2024-11-21 PROCEDURE — 97112 NEUROMUSCULAR REEDUCATION: CPT | Performed by: PHYSICAL THERAPIST

## 2024-11-21 PROCEDURE — 97140 MANUAL THERAPY 1/> REGIONS: CPT | Performed by: PHYSICAL THERAPIST

## 2024-11-21 NOTE — PROGRESS NOTES
Daily Note     Today's date: 2024  Patient name: Courtney Davalos  : 1960  MRN: [id number]  Referring provider: Keegan Conn DO  Dx:   Encounter Diagnosis     ICD-10-CM    1. Right leg pain  M79.604       2. Sciatica, unspecified laterality  M54.30       3. Chronic right-sided low back pain with right-sided sciatica  M54.41     G89.29                  Subjective:  still at about 50% change from the injection but she is noting more tightness in the right leg      Objective: See treatment diary below      Assessment: Tolerated treatment well. Patient exhibited good technique with therapeutic exercises and would benefit from continued PT.  Percussion massager used on gluteals near SIJ; Trial of recumbent bike at end - she has one at home, bike was not painful       Plan: Progress treatment as tolerated.       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 <---avoid contact   piriformis injection    Manuals 10/24 10/28 11/7 11/12 11/14 11/18 11/21 10/14 10/17 10/21   Piriformis DTM (high tolerance) SY SY SY SY SY SY SY SY SY SY   SIJ shotgun mobilizaitons             Percussion massage       SY, just off SIJ      Recumbent bike at end (has at home)       Trial, 5' lvl 1      Neuro Re-Ed                          Curl up cross over      10ea, watch neck position       Curl up      10       SKC             DKC                          LTR 2' 2'           Ther Ex             clamshells 15 3-way 15ea 3-way 5-15ea 3-way 15ea     20 after DTM 20   Piriformis tretch   :15x5 :15x5         Hamstirng stretch,              bridge 3p! 10      After hip flexor stretch :10x10 After hip flexor stretch :10x10 5p!                Book openers    :05x10 :15x5 :15x5       Glut stretch             ER iso with glut set :10x10 :05x10 :05x15 :05x02 :10x10  :05x20  :10x10 :1010   ER AROM with glut squeeze, log roll       :05x15      IR iso  :10x10 no PPT but with Kegel :05x10 :05x15 :05x20 :10x10  :10x10   :10x10 no PPT but with Kegel :10x10 no PPT but with Kegel   Resisted supine march    With bracing 10ea      Kick out 10ea   Trunk flexion seated             PPT  10 10 :05x5         Standing hip fleixon to ext  P! With ext and increasing radiating pain            Seated piriformis stretch   Knee pain    Supine pulling right leg up :10x10      Prone on elbows 3' 3'  2' 3' 2' 2'  2'x3 2   Bracing with march and ER    10         Bracing   with march    10         Heel touches    10         Prone bracing             Prone LE lift 10 10  15 15ea     10   Prone UE lift 10 10        10   Prone glut set      :05x10 :05x10      Prone ER resisted             Prone IR resisted             Prone press ups 10 10  15 2x10 2' prone, 2x10 10, 10, 5 with 1 and 2' minutes prone rest Prone 5' 3x5 2x5   Prone hip extenion    15ea  15ea       LTR       2'      Hip flexor stretch, right leg off table    2x1' 3x1' 3x1' 3x1' :20x5 :20x5 :20x5   Lumbar ext in standing over table :01x10 :10x10  :05x10 :05x10 :05x10 :05x10 :10x10 :10x10 :05x10   Hip ext isometrics, leg into table, supine, left leg bent   20 :10x10 :10x10        Curl up, and oblique curl up   15ea nv         Hip flexor stretch stairs      :10x5 :15x5 right foot back only                                                          Ther Activity                                       Gait Training                                       Modalities

## 2024-11-22 ENCOUNTER — HOSPITAL ENCOUNTER (OUTPATIENT)
Dept: RADIOLOGY | Facility: HOSPITAL | Age: 64
Discharge: HOME | End: 2024-11-22
Attending: PHYSICAL MEDICINE & REHABILITATION
Payer: COMMERCIAL

## 2024-11-22 VITALS
RESPIRATION RATE: 17 BRPM | HEART RATE: 66 BPM | SYSTOLIC BLOOD PRESSURE: 142 MMHG | OXYGEN SATURATION: 96 % | DIASTOLIC BLOOD PRESSURE: 80 MMHG

## 2024-11-22 DIAGNOSIS — M79.18 MYOFASCIAL PAIN SYNDROME: ICD-10-CM

## 2024-11-22 PROCEDURE — 20552 NJX 1/MLT TRIGGER POINT 1/2: CPT | Performed by: PHYSICAL MEDICINE & REHABILITATION

## 2024-11-22 PROCEDURE — 77002 NEEDLE LOCALIZATION BY XRAY: CPT | Performed by: PHYSICAL MEDICINE & REHABILITATION

## 2024-11-22 RX ORDER — ROPIVACAINE HYDROCHLORIDE 2 MG/ML
3 INJECTION, SOLUTION EPIDURAL; INFILTRATION; PERINEURAL ONCE
Status: COMPLETED | OUTPATIENT
Start: 2024-11-22 | End: 2024-11-22

## 2024-11-22 RX ORDER — METHYLPREDNISOLONE ACETATE 80 MG/ML
80 INJECTION, SUSPENSION INTRA-ARTICULAR; INTRALESIONAL; INTRAMUSCULAR; PARENTERAL; SOFT TISSUE ONCE
Status: COMPLETED | OUTPATIENT
Start: 2024-11-22 | End: 2024-11-22

## 2024-11-22 RX ADMIN — IOHEXOL 1 ML: 300 INJECTION, SOLUTION INTRAVENOUS at 10:13

## 2024-11-22 RX ADMIN — ROPIVACAINE HYDROCHLORIDE 3 ML: 2 INJECTION, SOLUTION EPIDURAL; INFILTRATION at 10:14

## 2024-11-22 RX ADMIN — METHYLPREDNISOLONE ACETATE 80 MG: 80 INJECTION, SUSPENSION INTRA-ARTICULAR; INTRALESIONAL; INTRAMUSCULAR; SOFT TISSUE at 10:14

## 2024-11-22 NOTE — DISCHARGE INSTR - LAB
Do not apply heat to any area that is numb. If you have discomfort or soreness at the injection site, you may apply ice today, 20 minutes on and 20 minutes off. Tomorrow you may use ice or warm, moist heat. Do not apply ice or heat directly to the skin.  If you experience severe shortness of breath, go to the Emergency Room.  You may have numbness for several hours from the local anesthetic. Please use caution and common sense, especially with weight-bearing activities.  You may have an increase or change in the discomfort for 36-48 hours after your treatment. Apply ice and continue with any pain medicine you have been prescribed.  Do not do anything strenuous today. You may shower, but no tub baths or hot tubs today. You may resume your normal activities tomorrow, but do not “overdo it”. Resume normal activities slowly when you are feeling better.  If you experience redness, drainage or swelling at the injection site, or if you develop a fever above 100 degrees, please call [facility] at [phone] or go to the Emergency Room.  Continue to take all routine medicines prescribed by your primary care physician unless otherwise instructed by our staff. Most blood thinners should be started again according to your regularly scheduled dosing. If you have any questions, please give our office a call.    As no general anesthesia was used in today's procedure, you should not experience any side effects related to anesthesia.       If you have a problem specifically related to your procedure, please call our office at [phone].  Problems not related to your procedure should be directed to your primary care physician.
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
## 2024-11-26 ENCOUNTER — EVALUATION (OUTPATIENT)
Dept: PHYSICAL THERAPY | Facility: CLINIC | Age: 64
End: 2024-11-26
Payer: COMMERCIAL

## 2024-11-26 ENCOUNTER — APPOINTMENT (OUTPATIENT)
Dept: PHYSICAL THERAPY | Facility: CLINIC | Age: 64
End: 2024-11-26
Payer: COMMERCIAL

## 2024-11-26 DIAGNOSIS — M54.30 SCIATICA, UNSPECIFIED LATERALITY: ICD-10-CM

## 2024-11-26 DIAGNOSIS — G89.29 CHRONIC RIGHT-SIDED LOW BACK PAIN WITH RIGHT-SIDED SCIATICA: ICD-10-CM

## 2024-11-26 DIAGNOSIS — M54.41 CHRONIC RIGHT-SIDED LOW BACK PAIN WITH RIGHT-SIDED SCIATICA: ICD-10-CM

## 2024-11-26 DIAGNOSIS — M79.604 RIGHT LEG PAIN: Primary | ICD-10-CM

## 2024-11-26 PROCEDURE — 97112 NEUROMUSCULAR REEDUCATION: CPT | Performed by: PHYSICAL THERAPIST

## 2024-11-26 PROCEDURE — 97110 THERAPEUTIC EXERCISES: CPT | Performed by: PHYSICAL THERAPIST

## 2024-11-26 PROCEDURE — 97140 MANUAL THERAPY 1/> REGIONS: CPT | Performed by: PHYSICAL THERAPIST
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
## 2024-12-02 ENCOUNTER — TELEPHONE (OUTPATIENT)
Age: 64
End: 2024-12-02

## 2024-12-02 NOTE — TELEPHONE ENCOUNTER
Caller: Courtney pt    Doctor: Nava    Reason for call: pt looking for an injection for sacrotuberous ligament insertion (iscial tuberosity)   After speaking with her PT, he recommended this may help her pain.    Call back#: [phone]     Did the injection help relieve any pain? NO,   Did the pain change at all? GETTING WORSE, Where is the pain currently located? LEG AND ISCIAL TUBEROSITY, What is your current pain level in that area 0/10? 8-9/10, Is the pain worse with certain movements or is it a constant pain? CONSTANT PAIN AND GETS WORSE WITH CERTAIN MOVEMENTS (the steps, getting out of bed are the worse, getting up, sitting down)

## 2024-12-02 NOTE — TELEPHONE ENCOUNTER
There are two ongoing messages.  Pt messaged through [employer], Pt also called.  This is now combined:    December 2, 2024  Courtney Davalos to [facility] (supporting Carrington Vick, DO)         12/2/24 10:04 AM  I was just on the phone with the office while you were messaging me.  She responded for me.  Although,  I don't see the responses to your questions.   Hopefully, you received them.  Attached is the picture from Derian Villeda,  head PT at [facility].    Attachments   [id number]  Me to Courtney Davalos         12/2/24  9:54 AM  Gaston Valdez,  Steroid injections can take up to two weeks for full benefit.  Did the injection help relieve any pain?  Did the pain change at all?  Where is the pain currently located? What is your current pain level in that area 0/10?  Is the pain worse with certain movements or is it a constant pain?     Last read by Courtney Davalos at 10:01 AM on 12/2/2024.  Courtney Davalos to [facility] (supporting Carrington Vick, DO)         12/2/24  9:17 AM  The injection in the piriformis didn't alleviate the pain.  Derian Reyes, the head at [facility], pinpointed it to the area of the sacrotuberous ligament insertion (iscial tuberosity).  Would I be able to get an injection in that site before I have insurance issues at the end of the year, PLEASE.  This encounter is not signed. The conversation may still be ongoing.

## 2024-12-02 NOTE — TELEPHONE ENCOUNTER
Pt messaged through my chart this morning that pain has not been relieved from recent piriformis injection on 11/22/2024.  Pt recently had Physical therapy and was advised pain coming from another area.  Pt inquiring another injection.  Pt also placed a call starting another  messaging thread.  Please advise

## 2024-12-02 NOTE — TELEPHONE ENCOUNTER
Okay to proceed with right ischial bursa steroid injection before the end of the year  Okay to double book if needed  Would hold off on further interventional approaches if she does not have relief from this injection  Please advise  Thank you

## 2024-12-03 ENCOUNTER — TELEPHONE (OUTPATIENT)
Age: 64
End: 2024-12-03

## 2024-12-03 ENCOUNTER — TELEMEDICINE (OUTPATIENT)
Dept: BEHAVIORAL/MENTAL HEALTH CLINIC | Facility: CLINIC | Age: 64
End: 2024-12-03
Payer: COMMERCIAL

## 2024-12-03 DIAGNOSIS — F33.0 MDD (MAJOR DEPRESSIVE DISORDER), RECURRENT EPISODE, MILD (HCC): Primary | ICD-10-CM

## 2024-12-03 DIAGNOSIS — F43.10 PTSD (POST-TRAUMATIC STRESS DISORDER): ICD-10-CM

## 2024-12-03 DIAGNOSIS — F42.9 OBSESSIVE-COMPULSIVE DISORDER, UNSPECIFIED TYPE: ICD-10-CM

## 2024-12-03 DIAGNOSIS — F41.1 GAD (GENERALIZED ANXIETY DISORDER): ICD-10-CM

## 2024-12-03 PROCEDURE — 90837 PSYTX W PT 60 MINUTES: CPT | Performed by: COUNSELOR

## 2024-12-03 NOTE — TELEPHONE ENCOUNTER
Patient has been scheduled for their procedure, please see [employer] message for additional details.

## 2024-12-03 NOTE — TELEPHONE ENCOUNTER
Called and spoke with Courtney. Gave her the message from  Dr. Quintero that it is OK for her to have the zometa and the depomedrol injection back to back. She had no other questions at this time.

## 2024-12-03 NOTE — TELEPHONE ENCOUNTER
Call from Courtney. She is to be scheduled for a depomedrol injection on 11/18 into the sacrotuberous ligament and is scheduled for a zometa infusion o 11/17. Her physician wanted her to reach out to Dr. Quintero to make sure she was able to have the steroid injection on that day.

## 2024-12-03 NOTE — TELEPHONE ENCOUNTER
To my understanding Zometa infusion for osteoporosis drug drug drug interactions may occur with loop diuretics nephrotoxic agents and aminoglycosides but we should not have significant issues with an ischial bursa steroid injection  Would still have patient discuss with Dr. Quintero but I believe from our perspective we should be fine  Thank you

## 2024-12-03 NOTE — PSYCH
Virtual Regular Visit    Verification of patient location:    Patient is located at Home in the following state in which I hold an active license [place]      Assessment/Plan:    Problem List Items Addressed This Visit       MDD (major depressive disorder), recurrent episode, mild (HCC) - Primary    JOSEFINA (generalized anxiety disorder)    OCD (obsessive compulsive disorder)    PTSD (post-traumatic stress disorder)       Goals addressed in session: Goal 1 and Goal 2          Reason for visit is No chief complaint on file.       Encounter provider Ethel Christian      Recent Visits  No visits were found meeting these conditions.  Showing recent visits within past 7 days and meeting all other requirements  Today's Visits  Date Type Provider Dept   12/03/24 Telemedicine Ethel Christian [facility] Therapist [place]   Showing today's visits and meeting all other requirements  Future Appointments  No visits were found meeting these conditions.  Showing future appointments within next 150 days and meeting all other requirements       The patient was identified by name and date of birth. Courtney Davalos was informed that this is a telemedicine visit and that the visit is being conducted throughthe Epic Embedded platform. She agrees to proceed..  My office door was closed. No one else was in the room.  She acknowledged consent and understanding of privacy and security of the video platform. The patient has agreed to participate and understands they can discontinue the visit at any time.    Patient is aware this is a billable service.     Subjective  Courtney Davalos is a 63 y.o. female  .      HPI     Past Medical History:   Diagnosis Date    Abnormal breast exam     Anemia     Anxiety     Arthritis     Asthma     childhood    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HCC)     primary per allscripts    Cancer (HCC)     IgG kappa smoldering multiple myeloma    Cardiac murmur     Chronic liver disease     resolved 12/04/2017    COVID     Depression      Endometriosis     Fracture of tibia     right tibial plateau fracture per allscripts    Heart murmur     Hepatic disease     Hypertension     last assessed 12/13/2017    Inflammatory bowel disease     Kidney stone     Multiple myeloma (HCC)     Neuropathy     R foot     Nosebleed     OCD (obsessive compulsive disorder)     Osteoporosis     Otitis media     Pneumonia     RSD (reflex sympathetic dystrophy) 12/11/2018    Scoliosis     Seasonal allergies     Urinary tract infection     Visual impairment     Wears eyeglasses     Whiplash injury to neck        Past Surgical History:   Procedure Laterality Date    BACK SURGERY      hemilaminectomy and discectomy, L5-S1, right (Dr. Rashid, [facility] at [facility]) onset 02/14/2005 per allscripts    COLONOSCOPY      EXPLORATION EXTREMITY Right 08/29/2016    Procedure: KNEE PERONEAL NERVE EXPLORATION AND RELEASE ;  Surgeon: Bry De Guzman MD;  Location: [facility];  Service:     FOOT SURGERY      FRACTURE SURGERY      HAND SURGERY      HERNIA REPAIR      IR BIOPSY KIDNEY MASS  05/25/2023    JOINT REPLACEMENT Right     RTK    KNEE SURGERY      MANDIBLE SURGERY      NEPHRECTOMY LAPAROSCOPIC Right 6/21/2023    Procedure: NEPHRECTOMY LAPAROSCOPIC ASSISTED;  Surgeon: Avinash Sterling MD;  Location: [facility];  Service: Urology    NEUROPLASTY / TRANSPOSITION MEDIAN NERVE AT CARPAL TUNNEL      ORIF TIBIAL PLATEAU Right     arthroplasty per allscripts    OTHER SURGICAL HISTORY      injection of trigger point(s) per allscripts    NJ ARTHRP KNE CONDYLE&PLATU MEDIAL&LAT COMPARTMENTS Right 06/11/2018    Procedure: ARTHROPLASTY KNEE TOTAL;  Surgeon: Bry De Guzman MD;  Location: [facility];  Service: Orthopedics    NJ TENDON SHEATH INCISION Right 12/02/2020    Procedure: RELEASE TRIGGER FINGER-right long;  Surgeon: Todd Mcdonald MD;  Location: [facility];  Service: Orthopedics    SINUS SURGERY      SPINE SURGERY      TONSILLECTOMY         Current Outpatient Medications   Medication  Sig Dispense Refill    ammonium lactate (LAC-HYDRIN) 12 % lotion Apply topically 2 (two) times a day as needed for dry skin 222 mL 1    benzonatate (TESSALON PERLES) 100 mg capsule Take 1 capsule (100 mg total) by mouth daily at bedtime as needed for cough (Patient not taking: Reported on 10/29/2024) 20 capsule 0    betamethasone valerate (VALISONE) 0.1 % ointment Apply topically 2 (two) times a day As needed to affected area. Mix with Mupirocin ointment when applying 45 g 0    Cholecalciferol (Vitamin D3) 50 MCG (2000 UT) TABS Take 1 tablet (2,000 Units total) by mouth daily 90 tablet 3    estradiol (VAGIFEM, YUVAFEM) 10 MCG TABS vaginal tablet INSERT 1 TABLET INTO THE VAGINA 2 TIMES A WEEK. 24 tablet 1    fenofibrate (FENOGLIDE) 120 MG TABS Take 1 tablet (120 mg total) by mouth daily 30 tablet 11    FLUoxetine (PROzac) 40 MG capsule Take 1 capsule (40 mg total) by mouth daily 90 capsule 1    ketoconazole (NIZORAL) 2 % cream Apply topically daily 30 g 0    lidocaine (LIDODERM) 5 % lidocaine 5 % topical patch   APPLY 1 PATCH TO AFFECTED AREA FOR 12 HOURS A DAY & REMOVE FOR 12 HOURS BEFORE APPLYING NEXT PATCH. (12 HOURS ON, 12 HOURS OFF)      lisinopril (ZESTRIL) 20 mg tablet Take 1 tablet (20 mg total) by mouth daily 90 tablet 1    methocarbamol (ROBAXIN) 500 mg tablet Take 2 tablets (1,000 mg total) by mouth 3 (three) times a day for 7 days 42 tablet 0    mometasone (NASONEX) 50 mcg/act nasal spray 2 sprays into each nostril daily for 10 days 1 Act 0    mupirocin (BACTROBAN) 2 % ointment Apply topically 3 (three) times a day As needed to affected area. Mix with Clobetasol ointment when applying to scalp. Use by itself to buttock area 30 g 3    Seladelpar Lysine (Livdelzi) 10 MG CAPS Take 10 mg by mouth daily 30 capsule 11    traMADol (Ultram) 50 mg tablet Take 1 tablet (50 mg total) by mouth every 8 (eight) hours as needed for moderate pain 30 tablet 0    traZODone (DESYREL) 50 mg tablet Take 0.5-1 tablets (25-50  mg total) by mouth daily at bedtime as needed for sleep 90 tablet 1    triamcinolone (KENALOG) 0.1 % ointment Apply topically 2 (two) times a day 454 g 1    ursodiol (ACTIGALL) 500 MG tablet Take 1 tablet (500 mg total) by mouth 2 (two) times a day 180 tablet 3    valACYclovir (VALTREX) 500 mg tablet Take 2 tablets (1,000 mg total) by mouth daily for 14 days 28 tablet 0     No current facility-administered medications for this visit.        Allergies   Allergen Reactions    Codeine GI Intolerance and Nausea Only     Other reaction(s): Other (See Comments)  Violently ill  Violently ill    Latex Rash     itching    Morphine And Codeine Nausea Only     GI intolerance nausea    Nortriptyline Shortness Of Breath    Ocaliva [Obeticholic Acid] Hives    Doxycycline GI Intolerance and Nausea Only    Sulfa Antibiotics GI Intolerance    Cymbalta [Duloxetine Hcl]     Penicillins Other (See Comments) and Rash     Childhood reaction       Review of Systems    Video Exam    There were no vitals filed for this visit.    Physical Exam     Behavioral Health Psychotherapy Progress Note    Psychotherapy Provided: Individual Psychotherapy     1. MDD (major depressive disorder), recurrent episode, mild (HCC)        2. JOSEFINA (generalized anxiety disorder)        3. Obsessive-compulsive disorder, unspecified type        4. PTSD (post-traumatic stress disorder)            Goals addressed in session: Goal 1 and Goal 2     DATA: Clinician met with Courtney via telehealth for individual therapy. Courtney discussed attempts to have a conversation with paramour about needs and future of their relationship. She reports she had planning a time to go out to dinner to do so and he arrived at her home intoxicated. Clinician explored how interaction went from there and importance of direct communication of feelings. She reports that since then her paramours step mother has become ill and with holidays she is concerned about timing of conversation.   "Clinician explored needs and future planning and working towards future goals.   During this session, this clinician used the following therapeutic modalities: Client-centered Therapy, Solution-Focused Therapy, and Supportive Psychotherapy    Substance Abuse was not addressed during this session. If the client is diagnosed with a co-occurring substance use disorder, please indicate any changes in the frequency or amount of use: n/a. Stage of change for addressing substance use diagnoses: No substance use/Not applicable    ASSESSMENT:  Courtney Davalos presents with a Euthymic/ normal and Anxious mood.     her affect is Normal range and intensity, which is congruent, with her mood and the content of the session. The client has made progress on their goals.    Courtney was open and engaged in the session.  Courtney Davalos presents with a none risk of suicide, none risk of self-harm, and none risk of harm to others.    For any risk assessment that surpasses a \"low\" rating, a safety plan must be developed.    A safety plan was indicated: no  If yes, describe in detail n/a    PLAN: Between sessions, Courtney Davalos will utilizing known coping skills and communication skills. At the next session, the therapist will use Client-centered Therapy and Supportive Psychotherapy to address anxiety and depression.    Behavioral Health Treatment Plan and Discharge Planning: Courtney Davalos is aware of and agrees to continue to work on their treatment plan. They have identified and are working toward their discharge goals. yes    Visit start and stop times:    12/03/24  Start Time: 1000  Stop Time: 1059  Total Visit Time: 59 minutes      "

## 2024-12-03 NOTE — TELEPHONE ENCOUNTER
S/w Pt, Pt has upcoming Zometa infusion for osteoporosis, ordered by Dr. Quintero on 12/17. Pt is scheduled for Rt ischial bursa injection on 12/18. Pt wanted to clarify if these injections are safe to have that close together. RN did advise Pt to reach out to Dr. Quintero's office as well but would also ask KW for clarification.

## 2024-12-03 NOTE — TELEPHONE ENCOUNTER
Caller: patient    Doctor: PATRICIA    Reason for call: patient will be getting a vometa infusion   Transfer to RN     Call back#:

## 2024-12-05 ENCOUNTER — OFFICE VISIT (OUTPATIENT)
Dept: PHYSICAL THERAPY | Facility: CLINIC | Age: 64
End: 2024-12-05
Payer: COMMERCIAL

## 2024-12-05 DIAGNOSIS — I10 ESSENTIAL HYPERTENSION: ICD-10-CM

## 2024-12-05 DIAGNOSIS — M54.41 CHRONIC RIGHT-SIDED LOW BACK PAIN WITH RIGHT-SIDED SCIATICA: ICD-10-CM

## 2024-12-05 DIAGNOSIS — G89.29 CHRONIC RIGHT-SIDED LOW BACK PAIN WITH RIGHT-SIDED SCIATICA: ICD-10-CM

## 2024-12-05 DIAGNOSIS — M54.30 SCIATICA, UNSPECIFIED LATERALITY: ICD-10-CM

## 2024-12-05 DIAGNOSIS — M79.604 RIGHT LEG PAIN: Primary | ICD-10-CM

## 2024-12-05 PROCEDURE — 97110 THERAPEUTIC EXERCISES: CPT | Performed by: PHYSICAL THERAPIST

## 2024-12-05 PROCEDURE — 97140 MANUAL THERAPY 1/> REGIONS: CPT | Performed by: PHYSICAL THERAPIST

## 2024-12-05 RX ORDER — LISINOPRIL 20 MG/1
20 TABLET ORAL DAILY
Qty: 90 TABLET | Refills: 1 | Status: SHIPPED | OUTPATIENT
Start: 2024-12-05

## 2024-12-05 NOTE — PROGRESS NOTES
Daily Note     Today's date: 2024  Patient name: Courtney Davalos  : 1960  MRN: [id number]  Referring provider: Keegan Conn DO  Dx:   Encounter Diagnosis     ICD-10-CM    1. Right leg pain  M79.604       2. Sciatica, unspecified laterality  M54.30       3. Chronic right-sided low back pain with right-sided sciatica  M54.41     G89.29                      Subjective: notes symptoms are increasing, now more than 50% but still not as severe as onset      Objective: See treatment diary below      Assessment: Tolerated treatment fair. Patient exhibited good technique with therapeutic exercises and would benefit from continued PT.  Increased pain at end of treatment after hip flexion stretch, decreased after brief sitting      Plan: Progress treatment as tolerated.       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 <---avoid contact   piriformis injection    Manuals 10/24 10/28 11/7 11/12 11/14 11/18 11/21 11/26 12/5 10/21   Piriformis DTM (high tolerance) SY SY SY SY SY SY SY SY SY SY   SIJ shotgun mobilizaitons             Percussion massage       SY, just off SIJ  SY    Recumbent bike at end (has at home)       Trial, 5' lvl 1      Neuro Re-Ed                          Curl up cross over      10ea, watch neck position       Curl up      10       SKC             DK                          LTR 2' 2'           Ther Ex             clamshells 15 3-way 15ea 3-way 5-15ea 3-way 15ea      20   Piriformis tretch   :15x5 :15x5         Hamstirng stretch,              bridge 3p! 10        5p!                Book openers    :05x10 :15x5 :15x5       Glut stretch             ER iso with glut set :10x10 :05x10 :05x15 :05x02 :10x10  :05x20 :05x20 :10x10 :1010   ER AROM with glut squeeze, log roll       :05x15 :05x90s     IR iso  :10x10 no PPT but with Kegel :05x10 :05x15 :05x20 :10x10 :10x10  :05x90s :10x10 no PPT but with Kegel :10x10 no PPT but with Kegel   Resisted supine march     With bracing 10ea      Kick out 10ea   Trunk flexion seated             PPT  10 10 :05x5         Standing hip fleixon to ext  P! With ext and increasing radiating pain            Seated piriformis stretch   Knee pain    Supine pulling right leg up :10x10      Prone on elbows 3' 3'  2' 3' 2' 2' 2' 2'x3 2   Bracing with march and ER    10    15     Bracing   with march    10         Heel touches    10         Prone bracing             Prone LE lift 10 10  15 15ea    10ea 10   Prone UE lift 10 10        10   Prone glut set      :05x10 :05x10      Prone ER resisted             Prone IR resisted             Prone press ups 10 10  15 2x10 2' prone, 2x10 10, 10, 5 with 1 and 2' minutes prone rest 15, 10 3x5 2x5   Prone hip extenion    15ea  15ea       LTR       2'      Hip flexor stretch, right leg off table    2x1' 3x1' 3x1' 3x1' 3x1' :20x5 :20x5   Lumbar ext in standing over table :01x10 :10x10  :05x10 :05x10 :05x10 :05x10 :10x10 :10x10 :05x10   Hip ext isometrics, leg into table, supine, left leg bent   20 :10x10 :10x10        Curl up, and oblique curl up   15ea nv         Hip flexor stretch stairs      :10x5 :15x5 right foot back only  :15x5 right foot back only                                                        Ther Activity                                       Gait Training                                       Modalities

## 2024-12-06 ENCOUNTER — TELEPHONE (OUTPATIENT)
Dept: PAIN MEDICINE | Facility: CLINIC | Age: 64
End: 2024-12-06

## 2024-12-09 ENCOUNTER — OFFICE VISIT (OUTPATIENT)
Dept: PHYSICAL THERAPY | Facility: CLINIC | Age: 64
End: 2024-12-09
Payer: COMMERCIAL

## 2024-12-09 DIAGNOSIS — M54.30 SCIATICA, UNSPECIFIED LATERALITY: ICD-10-CM

## 2024-12-09 DIAGNOSIS — G89.29 CHRONIC RIGHT-SIDED LOW BACK PAIN WITH RIGHT-SIDED SCIATICA: ICD-10-CM

## 2024-12-09 DIAGNOSIS — M79.604 RIGHT LEG PAIN: Primary | ICD-10-CM

## 2024-12-09 DIAGNOSIS — M54.41 CHRONIC RIGHT-SIDED LOW BACK PAIN WITH RIGHT-SIDED SCIATICA: ICD-10-CM

## 2024-12-09 PROCEDURE — 97140 MANUAL THERAPY 1/> REGIONS: CPT | Performed by: PHYSICAL THERAPIST

## 2024-12-09 PROCEDURE — 97110 THERAPEUTIC EXERCISES: CPT | Performed by: PHYSICAL THERAPIST

## 2024-12-09 NOTE — PROGRESS NOTES
Daily Note     Today's date: 2024  Patient name: Courtney Davalos  : 1960  MRN: [id number]  Referring provider: Keegan Conn DO  Dx:   Encounter Diagnosis     ICD-10-CM    1. Right leg pain  M79.604       2. Sciatica, unspecified laterality  M54.30       3. Chronic right-sided low back pain with right-sided sciatica  M54.41     G89.29                      Subjective: notes pain is about the same as the end of last week; back of the head headache on Saturday that was severe      Objective: See treatment diary below      Assessment: Tolerated treatment well. Patient exhibited good technique with therapeutic exercises and would benefit from continued PT.  Pain with passive SLR and significant difficulty/pain when lowering; also pain with SKC      Plan: Progress treatment as tolerated.       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 <---avoid contact   piriformis injection    Manuals 10/24 10/28 11/7 11/12 11/14 11/18 11/21 11/26 12/5 12/9   Piriformis DTM (high tolerance) SY SY SY SY SY SY SY SY SY SY   SIJ shotgun mobilizaitons             Percussion massage       SY, just off SIJ  SY    Recumbent bike at end (has at home)       Trial, 5' lvl 1      Neuro Re-Ed                          Curl up cross over      10ea, watch neck position       Curl up      10    10ea (less pain not using pball)   [facility] [place]             DKC                          LTR 2' 2'           Ther Ex             clamshells 15 3-way 15ea 3-way 5-15ea 3-way 15ea      15   Piriformis tretch   :15x5 :15x5         bridge 3p! 10                        Book openers    :05x10 :15x5 :15x5       Glut stretch             ER iso with glut set :10x10 :05x10 :05x15 :05x02 :10x10  :05x20 :05x20 :10x10 15   ER AROM with glut squeeze, log roll       :05x15 :05x90s     IR iso  :10x10 no PPT but with Kegel :05x10 :05x15 :05x20 :10x10 :10x10  :05x90s :10x10 no PPT but with Kegel    Resisted supine march    With bracing  10ea      Kick out 10ea   Trunk flexion seated             PPT  10 10 :05x5         Standing hip fleixon to ext  P! With ext and increasing radiating pain            Seated piriformis stretch   Knee pain    Supine pulling right leg up :10x10      Prone on elbows 3' 3'  2' 3' 2' 2' 2' 2'x3 2   Bracing with march and ER    10    15     Bracing   with march    10         Heel touches    10         Prone bracing             Prone LE lift 10 10  15 15ea    10ea 10   Prone UE lift 10 10        10   Prone glut set      :05x10 :05x10      Prone ER resisted             Prone IR resisted             Prone press ups 10 10  15 2x10 2' prone, 2x10 10, 10, 5 with 1 and 2' minutes prone rest 15, 10 3x5 2x5   Prone hip extenion    15ea  15ea       LTR       2'      Hip flexor stretch, right leg off table    2x1' 3x1' 3x1' 3x1' 3x1' :20x5    Lumbar ext in standing over table :01x10 :10x10  :05x10 :05x10 :05x10 :05x10 :10x10 :10x10 :05x10   Hip ext isometrics, leg into table, supine, left leg bent   20 :10x10 :10x10        Curl up, and oblique curl up   15ea nv         Hip flexor stretch stairs      :10x5 :15x5 right foot back only  :15x5 right foot back only held                                                       Ther Activity                                       Gait Training                                       Modalities

## 2024-12-11 ENCOUNTER — RA CDI HCC (OUTPATIENT)
Dept: OTHER | Facility: HOSPITAL | Age: 64
End: 2024-12-11

## 2024-12-12 ENCOUNTER — OFFICE VISIT (OUTPATIENT)
Dept: PHYSICAL THERAPY | Facility: CLINIC | Age: 64
End: 2024-12-12
Payer: COMMERCIAL

## 2024-12-12 DIAGNOSIS — M79.604 RIGHT LEG PAIN: Primary | ICD-10-CM

## 2024-12-12 DIAGNOSIS — M54.30 SCIATICA, UNSPECIFIED LATERALITY: ICD-10-CM

## 2024-12-12 DIAGNOSIS — G89.29 CHRONIC RIGHT-SIDED LOW BACK PAIN WITH RIGHT-SIDED SCIATICA: ICD-10-CM

## 2024-12-12 DIAGNOSIS — M54.41 CHRONIC RIGHT-SIDED LOW BACK PAIN WITH RIGHT-SIDED SCIATICA: ICD-10-CM

## 2024-12-12 PROCEDURE — 97140 MANUAL THERAPY 1/> REGIONS: CPT | Performed by: PHYSICAL THERAPIST

## 2024-12-12 PROCEDURE — 97110 THERAPEUTIC EXERCISES: CPT | Performed by: PHYSICAL THERAPIST

## 2024-12-12 NOTE — PROGRESS NOTES
Daily Note     Today's date: 2024  Patient name: Courtney Davalos  : 1960  MRN: [id number]  Referring provider: Keegan Conn DO  Dx: No diagnosis found.               Subjective: she was unable to wash her leg fully this morning due to pain when bending forward with the right leg up      Objective: See treatment diary below      Assessment: Tolerated treatment well. Patient exhibited good technique with therapeutic exercises and would benefit from continued PT.  Continued extension biased stretching, any attempts at flexion has increased pain      Plan: Progress treatment as tolerated.       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 <---avoid contact   piriformis injection    Manuals 12/12 10/28 11/7 11/12 11/14 11/18 11/21 11/26 12/5 12/9   Piriformis DTM (high tolerance) SY SY SY SY SY SY SY SY SY SY   SIJ shotgun mobilizaitons             Percussion massage       SY, just off SIJ  SY    Recumbent bike at end (has at home)       Trial, 5' lvl 1      Neuro Re-Ed                          Curl up cross over      10ea, watch neck position       Curl up      10    10ea (less pain not using pball)   [facility] [place]             DKC                          LTR 2' 2'           Ther Ex             clamshells 20 3-way 15ea 3-way 5-15ea 3-way 15ea      15   Piriformis tretch   :15x5 :15x5         bridge  10                        Book openers    :05x10 :15x5 :15x5       Glut stretch             ER iso with glut set  :05x10 :05x15 :05x02 :10x10  :05x20 :05x20 :10x10 15   ER AROM with glut squeeze, log roll       :05x15 :05x90s     IR iso   :05x10 :05x15 :05x20 :10x10 :10x10  :05x90s :10x10 no PPT but with Kegel    Resisted supine march    With bracing 10ea      Kick out 10ea   Trunk flexion seated             PPT  10 10 :05x5         Standing hip fleixon to ext  P! With ext and increasing radiating pain            Seated piriformis stretch   Knee pain    Supine pulling right leg up  :10x10      Prone on elbows 3' 3'  2' 3' 2' 2' 2' 2'x3 2   Bracing with march and ER    10    15     Bracing   with march    10         Heel touches    10         Prone bracing             Prone LE lift  10  15 15ea    10ea 10   Prone UE lift  10        10   Prone glut set      :05x10 :05x10      Prone ER resisted             Prone IR resisted             Prone press ups 10 10  15 2x10 2' prone, 2x10 10, 10, 5 with 1 and 2' minutes prone rest 15, 10 3x5 2x5   Prone hip extenion    15ea  15ea       LTR       2'      Hip flexor stretch, right leg off table 3x2'   2x1' 3x1' 3x1' 3x1' 3x1' :20x5    Lumbar ext in standing over table :01x10 :10x10  :05x10 :05x10 :05x10 :05x10 :10x10 :10x10 :05x10   Hip ext isometrics, leg into table, supine, left leg bent   20 :10x10 :10x10        Curl up, and oblique curl up 20ea  15ea nv         Hip flexor stretch stairs D/c p!     :10x5 :15x5 right foot back only  :15x5 right foot back only held                                                       Ther Activity                                       Gait Training                                       Modalities

## 2024-12-16 ENCOUNTER — APPOINTMENT (OUTPATIENT)
Dept: PHYSICAL THERAPY | Facility: CLINIC | Age: 64
End: 2024-12-16
Payer: COMMERCIAL

## 2024-12-16 ENCOUNTER — TELEMEDICINE (OUTPATIENT)
Dept: BEHAVIORAL/MENTAL HEALTH CLINIC | Facility: CLINIC | Age: 64
End: 2024-12-16
Payer: COMMERCIAL

## 2024-12-16 DIAGNOSIS — F33.0 MDD (MAJOR DEPRESSIVE DISORDER), RECURRENT EPISODE, MILD (HCC): Primary | ICD-10-CM

## 2024-12-16 DIAGNOSIS — F42.9 OBSESSIVE-COMPULSIVE DISORDER, UNSPECIFIED TYPE: ICD-10-CM

## 2024-12-16 DIAGNOSIS — F43.10 PTSD (POST-TRAUMATIC STRESS DISORDER): ICD-10-CM

## 2024-12-16 PROCEDURE — 90837 PSYTX W PT 60 MINUTES: CPT | Performed by: COUNSELOR

## 2024-12-16 NOTE — PSYCH
Virtual Regular Visit    Verification of patient location:    Patient is located at Home in the following state in which I hold an active license [place]      Assessment/Plan:    Problem List Items Addressed This Visit     MDD (major depressive disorder), recurrent episode, mild (HCC) - Primary    OCD (obsessive compulsive disorder)    PTSD (post-traumatic stress disorder)       Goals addressed in session: Goal 1 and Goal 2     Depression Follow-up Plan Completed: Not applicable    Reason for visit is   Chief Complaint   Patient presents with   • Virtual Regular Visit          Encounter provider Ethel Christian      Recent Visits  No visits were found meeting these conditions.  Showing recent visits within past 7 days and meeting all other requirements  Today's Visits  Date Type Provider Dept   12/16/24 Telemedicine Ethel Christian [facility] Therapist [place]   Showing today's visits and meeting all other requirements  Future Appointments  No visits were found meeting these conditions.  Showing future appointments within next 150 days and meeting all other requirements       The patient was identified by name and date of birth. Courtney Davalos was informed that this is a telemedicine visit and that the visit is being conducted throughthe Epic Embedded platform. She agrees to proceed..  My office door was closed. No one else was in the room.  She acknowledged consent and understanding of privacy and security of the video platform. The patient has agreed to participate and understands they can discontinue the visit at any time.    Patient is aware this is a billable service.     Subjective  Courtney Davalos is a 64 y.o. female  .      HPI     Past Medical History:   Diagnosis Date   • Abnormal breast exam    • Anemia    • Anxiety    • Arthritis    • Asthma     childhood   • 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HCC)     primary per allscripts   • Cancer (HCC)     IgG kappa smoldering multiple myeloma   • Cardiac murmur    • Chronic  liver disease     resolved 12/04/2017   • COVID    • Depression    • Endometriosis    • Fracture of tibia     right tibial plateau fracture per allscripts   • Heart murmur    • Hepatic disease    • Hypertension     last assessed 12/13/2017   • Inflammatory bowel disease    • Kidney stone    • Multiple myeloma (HCC)    • Neuropathy     R foot    • Nosebleed    • OCD (obsessive compulsive disorder)    • Osteoporosis    • Otitis media    • Pneumonia    • RSD (reflex sympathetic dystrophy) 12/11/2018   • Scoliosis    • Seasonal allergies    • Urinary tract infection    • Visual impairment    • Wears eyeglasses    • Whiplash injury to neck        Past Surgical History:   Procedure Laterality Date   • BACK SURGERY      hemilaminectomy and discectomy, L5-S1, right (Dr. Rashid, [facility] at [facility]) onset 02/14/2005 per allscripts   • COLONOSCOPY     • EXPLORATION EXTREMITY Right 08/29/2016    Procedure: KNEE PERONEAL NERVE EXPLORATION AND RELEASE ;  Surgeon: Bry De Guzman MD;  Location: [facility];  Service:    • FOOT SURGERY     • FRACTURE SURGERY     • HAND SURGERY     • HERNIA REPAIR     • IR BIOPSY KIDNEY MASS  05/25/2023   • JOINT REPLACEMENT Right     RTK   • KNEE SURGERY     • MANDIBLE SURGERY     • NEPHRECTOMY LAPAROSCOPIC Right 6/21/2023    Procedure: NEPHRECTOMY LAPAROSCOPIC ASSISTED;  Surgeon: Avinash Sterling MD;  Location: [facility];  Service: Urology   • NEUROPLASTY / TRANSPOSITION MEDIAN NERVE AT CARPAL TUNNEL     • ORIF TIBIAL PLATEAU Right     arthroplasty per allscripts   • OTHER SURGICAL HISTORY      injection of trigger point(s) per allscripts   • [place] ARTHRP KNE CONDYLE&PLATU MEDIAL&LAT COMPARTMENTS Right 06/11/2018    Procedure: ARTHROPLASTY KNEE TOTAL;  Surgeon: Bry De Guzman MD;  Location: [facility];  Service: Orthopedics   • [place] TENDON SHEATH INCISION Right 12/02/2020    Procedure: RELEASE TRIGGER FINGER-right long;  Surgeon: Todd Mcdonald MD;  Location: [facility];  Service: Orthopedics   •  SINUS SURGERY     • SPINE SURGERY     • TONSILLECTOMY         Current Outpatient Medications   Medication Sig Dispense Refill   • ammonium lactate (LAC-HYDRIN) 12 % lotion Apply topically 2 (two) times a day as needed for dry skin 222 mL 1   • benzonatate (TESSALON PERLES) 100 mg capsule Take 1 capsule (100 mg total) by mouth daily at bedtime as needed for cough (Patient not taking: Reported on 10/29/2024) 20 capsule 0   • betamethasone valerate (VALISONE) 0.1 % ointment Apply topically 2 (two) times a day As needed to affected area. Mix with Mupirocin ointment when applying 45 g 0   • Cholecalciferol (Vitamin D3) 50 MCG (2000 UT) TABS Take 1 tablet (2,000 Units total) by mouth daily 90 tablet 3   • estradiol (VAGIFEM, YUVAFEM) 10 MCG TABS vaginal tablet INSERT 1 TABLET INTO THE VAGINA 2 TIMES A WEEK. 24 tablet 1   • FLUoxetine (PROzac) 40 MG capsule Take 1 capsule (40 mg total) by mouth daily 90 capsule 1   • ketoconazole (NIZORAL) 2 % cream Apply topically daily 30 g 0   • lidocaine (LIDODERM) 5 % lidocaine 5 % topical patch   APPLY 1 PATCH TO AFFECTED AREA FOR 12 HOURS A DAY & REMOVE FOR 12 HOURS BEFORE APPLYING NEXT PATCH. (12 HOURS ON, 12 HOURS OFF)     • lisinopril (ZESTRIL) 20 mg tablet TAKE 1 TABLET BY MOUTH EVERY DAY 90 tablet 1   • methocarbamol (ROBAXIN) 500 mg tablet Take 2 tablets (1,000 mg total) by mouth 3 (three) times a day for 7 days 42 tablet 0   • mometasone (NASONEX) 50 mcg/act nasal spray 2 sprays into each nostril daily for 10 days 1 Act 0   • mupirocin (BACTROBAN) 2 % ointment Apply topically 3 (three) times a day As needed to affected area. Mix with Clobetasol ointment when applying to scalp. Use by itself to buttock area 30 g 3   • Seladelpar Lysine (Livdelzi) 10 MG CAPS Take 10 mg by mouth daily 30 capsule 11   • traMADol (Ultram) 50 mg tablet Take 1 tablet (50 mg total) by mouth every 8 (eight) hours as needed for moderate pain 30 tablet 0   • traZODone (DESYREL) 50 mg tablet Take 0.5-1  tablets (25-50 mg total) by mouth daily at bedtime as needed for sleep 90 tablet 1   • triamcinolone (KENALOG) 0.1 % ointment Apply topically 2 (two) times a day 454 g 1   • ursodiol (ACTIGALL) 500 MG tablet Take 1 tablet (500 mg total) by mouth 2 (two) times a day 180 tablet 3   • valACYclovir (VALTREX) 500 mg tablet Take 2 tablets (1,000 mg total) by mouth daily for 14 days 28 tablet 0     No current facility-administered medications for this visit.        Allergies   Allergen Reactions   • Codeine GI Intolerance and Nausea Only     Other reaction(s): Other (See Comments)  Violently ill  Violently ill   • Latex Rash     itching   • Morphine And Codeine Nausea Only     GI intolerance nausea   • Nortriptyline Shortness Of Breath   • Ocaliva [Obeticholic Acid] Hives   • Doxycycline GI Intolerance and Nausea Only   • Sulfa Antibiotics GI Intolerance   • Cymbalta [Duloxetine Hcl]    • Penicillins Other (See Comments) and Rash     Childhood reaction       Review of Systems    Video Exam    There were no vitals filed for this visit.    Physical Exam     Behavioral Health Psychotherapy Progress Note    Psychotherapy Provided: Individual Psychotherapy     1. MDD (major depressive disorder), recurrent episode, mild (HCC)        2. Obsessive-compulsive disorder, unspecified type        3. PTSD (post-traumatic stress disorder)            Goals addressed in session: Goal 1 and Goal 2     DATA: Clinician met with Courtney via telehealth for individual therapy. Courtney processed recent interaction with paramour after he forgot her birthday. She reports communicating her disappointment in this.  Clinician explored what she would like to see happen in the future and explored frustration in lack of effort made by paramour to communicate or reconcile her hurt feelings. She reports feeling like she needs to attempt to communicate with him again to see if the relationship can be salvaged. She discussed other resources and social supports  "she has been able to utilize during this time and clinician encouraged follow through.   During this session, this clinician used the following therapeutic modalities: Client-centered Therapy and Solution-Focused Therapy    Substance Abuse was not addressed during this session. If the client is diagnosed with a co-occurring substance use disorder, please indicate any changes in the frequency or amount of use: n/a. Stage of change for addressing substance use diagnoses: No substance use/Not applicable    ASSESSMENT:  Courtney Davalos presents with a Anxious and Depressed mood.     her affect is Normal range and intensity, which is congruent, with her mood and the content of the session. The client has made progress on their goals.    Courtney was open and engaged in the session.  Courtney Davalos presents with a none risk of suicide, none risk of self-harm, and none risk of harm to others.    For any risk assessment that surpasses a \"low\" rating, a safety plan must be developed.    A safety plan was indicated: no  If yes, describe in detail n/a    PLAN: Between sessions, Courtney Davalos will utilize known supports. . At the next session, the therapist will use Client-centered Therapy, Cognitive Processing Therapy, and Supportive Psychotherapy to address depressive symptoms.    Behavioral Health Treatment Plan and Discharge Planning: Courtney Davalos is aware of and agrees to continue to work on their treatment plan. They have identified and are working toward their discharge goals. yes    Depression Follow-up Plan Completed: Not applicable    Visit start and stop times:    12/16/24  Start Time: 1402  Stop Time: 1455  Total Visit Time: 53 minutes            "

## 2024-12-17 ENCOUNTER — HOSPITAL ENCOUNTER (OUTPATIENT)
Dept: INFUSION CENTER | Facility: CLINIC | Age: 64
Discharge: HOME | End: 2024-12-17
Payer: COMMERCIAL

## 2024-12-17 ENCOUNTER — APPOINTMENT (OUTPATIENT)
Dept: LAB | Facility: CLINIC | Age: 64
End: 2024-12-17
Payer: COMMERCIAL

## 2024-12-17 ENCOUNTER — TELEPHONE (OUTPATIENT)
Dept: PAIN MEDICINE | Facility: CLINIC | Age: 64
End: 2024-12-17

## 2024-12-17 VITALS
SYSTOLIC BLOOD PRESSURE: 139 MMHG | TEMPERATURE: 97.9 F | HEART RATE: 76 BPM | BODY MASS INDEX: 24.51 KG/M2 | DIASTOLIC BLOOD PRESSURE: 84 MMHG | OXYGEN SATURATION: 95 % | WEIGHT: 166 LBS

## 2024-12-17 DIAGNOSIS — C64.1 RENAL CELL CARCINOMA OF RIGHT KIDNEY (HCC): ICD-10-CM

## 2024-12-17 DIAGNOSIS — M81.8 OTHER OSTEOPOROSIS WITHOUT CURRENT PATHOLOGICAL FRACTURE: Primary | ICD-10-CM

## 2024-12-17 DIAGNOSIS — K74.3 PRIMARY BILIARY CIRRHOSIS (HCC): ICD-10-CM

## 2024-12-17 DIAGNOSIS — K76.0 METABOLIC DYSFUNCTION-ASSOCIATED STEATOTIC LIVER DISEASE (MASLD): ICD-10-CM

## 2024-12-17 DIAGNOSIS — C90.00 MULTIPLE MYELOMA NOT HAVING ACHIEVED REMISSION (HCC): ICD-10-CM

## 2024-12-17 LAB
25(OH)D3 SERPL-MCNC: 42.7 NG/ML (ref 30–100)
ALBUMIN SERPL BCG-MCNC: 3.9 G/DL (ref 3.5–5)
ALP SERPL-CCNC: 136 U/L (ref 34–104)
ALT SERPL W P-5'-P-CCNC: 39 U/L (ref 7–52)
ANION GAP SERPL CALCULATED.3IONS-SCNC: 3 MMOL/L (ref 4–13)
AST SERPL W P-5'-P-CCNC: 34 U/L (ref 13–39)
BILIRUB SERPL-MCNC: 0.44 MG/DL (ref 0.2–1)
BUN SERPL-MCNC: 20 MG/DL (ref 5–25)
CALCIUM SERPL-MCNC: 9.2 MG/DL (ref 8.4–10.2)
CHLORIDE SERPL-SCNC: 103 MMOL/L (ref 96–108)
CO2 SERPL-SCNC: 31 MMOL/L (ref 21–32)
CREAT SERPL-MCNC: 1.03 MG/DL (ref 0.6–1.3)
ERYTHROCYTE [DISTWIDTH] IN BLOOD BY AUTOMATED COUNT: 13.2 % (ref 11.6–15.1)
GFR SERPL CREATININE-BSD FRML MDRD: 57 ML/MIN/1.73SQ M
GLUCOSE P FAST SERPL-MCNC: 131 MG/DL (ref 65–99)
HCT VFR BLD AUTO: 38.7 % (ref 34.8–46.1)
HGB BLD-MCNC: 12.3 G/DL (ref 11.5–15.4)
INR PPP: 0.89 (ref 0.85–1.19)
MCH RBC QN AUTO: 29.9 PG (ref 26.8–34.3)
MCHC RBC AUTO-ENTMCNC: 31.8 G/DL (ref 31.4–37.4)
MCV RBC AUTO: 94 FL (ref 82–98)
PLATELET # BLD AUTO: 162 THOUSANDS/UL (ref 149–390)
PMV BLD AUTO: 9.7 FL (ref 8.9–12.7)
POTASSIUM SERPL-SCNC: 4.2 MMOL/L (ref 3.5–5.3)
PROT SERPL-MCNC: 8.1 G/DL (ref 6.4–8.4)
PROTHROMBIN TIME: 12.7 SECONDS (ref 12.3–15)
RBC # BLD AUTO: 4.12 MILLION/UL (ref 3.81–5.12)
SODIUM SERPL-SCNC: 137 MMOL/L (ref 135–147)
WBC # BLD AUTO: 5.41 THOUSAND/UL (ref 4.31–10.16)

## 2024-12-17 PROCEDURE — 85027 COMPLETE CBC AUTOMATED: CPT

## 2024-12-17 PROCEDURE — 80053 COMPREHEN METABOLIC PANEL: CPT

## 2024-12-17 PROCEDURE — 85610 PROTHROMBIN TIME: CPT

## 2024-12-17 RX ORDER — SODIUM CHLORIDE 9 MG/ML
20 INJECTION, SOLUTION INTRAVENOUS ONCE
Status: CANCELLED | OUTPATIENT
Start: 2024-12-22

## 2024-12-17 RX ORDER — SODIUM CHLORIDE 9 MG/ML
20 INJECTION, SOLUTION INTRAVENOUS ONCE
Status: DISCONTINUED | OUTPATIENT
Start: 2024-12-17 | End: 2024-12-20 | Stop reason: HOSPADM

## 2024-12-17 RX ORDER — ZOLEDRONIC ACID 0.04 MG/ML
4 INJECTION, SOLUTION INTRAVENOUS ONCE
Status: DISCONTINUED | OUTPATIENT
Start: 2024-12-17 | End: 2024-12-20 | Stop reason: HOSPADM

## 2024-12-17 RX ORDER — ZOLEDRONIC ACID 0.04 MG/ML
4 INJECTION, SOLUTION INTRAVENOUS ONCE
Status: CANCELLED | OUTPATIENT
Start: 2024-12-22 | End: 2024-12-22

## 2024-12-17 NOTE — PROGRESS NOTES
Per note from Dr. Quintero on 9/19/24, Zometa is to be held due to osteopenia. Confirmed with Eileen CHATMAN. Zometa plan discontinued. Patient has no further appointments at this time, declined AVS.

## 2024-12-18 ENCOUNTER — HOSPITAL ENCOUNTER (OUTPATIENT)
Dept: RADIOLOGY | Facility: HOSPITAL | Age: 64
Discharge: HOME | End: 2024-12-18
Attending: PHYSICAL MEDICINE & REHABILITATION
Payer: COMMERCIAL

## 2024-12-18 ENCOUNTER — OFFICE VISIT (OUTPATIENT)
Dept: INTERNAL MEDICINE CLINIC | Facility: CLINIC | Age: 64
End: 2024-12-18
Payer: COMMERCIAL

## 2024-12-18 ENCOUNTER — RESULTS FOLLOW-UP (OUTPATIENT)
Age: 64
End: 2024-12-18

## 2024-12-18 VITALS
SYSTOLIC BLOOD PRESSURE: 132 MMHG | WEIGHT: 164 LBS | HEART RATE: 82 BPM | BODY MASS INDEX: 24.22 KG/M2 | DIASTOLIC BLOOD PRESSURE: 78 MMHG | OXYGEN SATURATION: 99 %

## 2024-12-18 VITALS
DIASTOLIC BLOOD PRESSURE: 79 MMHG | SYSTOLIC BLOOD PRESSURE: 146 MMHG | HEART RATE: 78 BPM | OXYGEN SATURATION: 100 % | RESPIRATION RATE: 17 BRPM

## 2024-12-18 DIAGNOSIS — G90.521 COMPLEX REGIONAL PAIN SYNDROME I OF RIGHT LOWER LIMB: ICD-10-CM

## 2024-12-18 DIAGNOSIS — R00.2 PALPITATIONS: ICD-10-CM

## 2024-12-18 DIAGNOSIS — M70.71 ISCHIAL BURSITIS, RIGHT: ICD-10-CM

## 2024-12-18 DIAGNOSIS — N18.31 STAGE 3A CHRONIC KIDNEY DISEASE (HCC): Primary | ICD-10-CM

## 2024-12-18 DIAGNOSIS — K74.3 PRIMARY BILIARY CHOLANGITIS (HCC): ICD-10-CM

## 2024-12-18 DIAGNOSIS — E78.5 HYPERLIPIDEMIA, UNSPECIFIED HYPERLIPIDEMIA TYPE: Chronic | ICD-10-CM

## 2024-12-18 DIAGNOSIS — I10 ESSENTIAL HYPERTENSION: Chronic | ICD-10-CM

## 2024-12-18 DIAGNOSIS — Z13.6 SCREENING FOR CARDIOVASCULAR CONDITION: ICD-10-CM

## 2024-12-18 DIAGNOSIS — C64.1 RENAL CELL CARCINOMA OF RIGHT KIDNEY (HCC): ICD-10-CM

## 2024-12-18 DIAGNOSIS — C90.00 MULTIPLE MYELOMA NOT HAVING ACHIEVED REMISSION (HCC): ICD-10-CM

## 2024-12-18 PROCEDURE — 20610 DRAIN/INJ JOINT/BURSA W/O US: CPT | Performed by: PHYSICAL MEDICINE & REHABILITATION

## 2024-12-18 PROCEDURE — 77002 NEEDLE LOCALIZATION BY XRAY: CPT

## 2024-12-18 PROCEDURE — G2211 COMPLEX E/M VISIT ADD ON: HCPCS | Performed by: INTERNAL MEDICINE

## 2024-12-18 PROCEDURE — 77002 NEEDLE LOCALIZATION BY XRAY: CPT | Performed by: PHYSICAL MEDICINE & REHABILITATION

## 2024-12-18 PROCEDURE — 99214 OFFICE O/P EST MOD 30 MIN: CPT | Performed by: INTERNAL MEDICINE

## 2024-12-18 RX ORDER — ROPIVACAINE HYDROCHLORIDE 2 MG/ML
3 INJECTION, SOLUTION EPIDURAL; INFILTRATION; PERINEURAL ONCE
Status: COMPLETED | OUTPATIENT
Start: 2024-12-18 | End: 2024-12-18

## 2024-12-18 RX ORDER — METHYLPREDNISOLONE ACETATE 40 MG/ML
40 INJECTION, SUSPENSION INTRA-ARTICULAR; INTRALESIONAL; INTRAMUSCULAR; PARENTERAL; SOFT TISSUE ONCE
Status: COMPLETED | OUTPATIENT
Start: 2024-12-18 | End: 2024-12-18

## 2024-12-18 RX ADMIN — ROPIVACAINE HYDROCHLORIDE 3 ML: 2 INJECTION, SOLUTION EPIDURAL; INFILTRATION at 12:16

## 2024-12-18 RX ADMIN — METHYLPREDNISOLONE ACETATE 40 MG: 40 INJECTION, SUSPENSION INTRA-ARTICULAR; INTRALESIONAL; INTRAMUSCULAR; SOFT TISSUE at 12:16

## 2024-12-18 RX ADMIN — IOHEXOL 1 ML: 300 INJECTION, SOLUTION INTRAVENOUS at 12:15

## 2024-12-18 NOTE — ASSESSMENT & PLAN NOTE
Continue usual follow-up with pain management patient did notice some improvement with myofascial release and physical therapy we will consult chiropractor for additional treatment  Orders:  •  Ambulatory Referral to Chiropractic; Future

## 2024-12-18 NOTE — DISCHARGE INSTR - LAB
Do not apply heat to any area that is numb. If you have discomfort or soreness at the injection site, you may apply ice today, 20 minutes on and 20 minutes off. Tomorrow you may use ice or warm, moist heat. Do not apply ice or heat directly to the skin.  If you experience severe shortness of breath, go to the Emergency Room.  You may have numbness for several hours from the local anesthetic. Please use caution and common sense, especially with weight-bearing activities.  You may have an increase or change in the discomfort for 36-48 hours after your treatment. Apply ice and continue with any pain medicine you have been prescribed.  Do not do anything strenuous today. You may shower, but no tub baths or hot tubs today. You may resume your normal activities tomorrow, but do not “overdo it”. Resume normal activities slowly when you are feeling better.  If you experience redness, drainage or swelling at the injection site, or if you develop a fever above 100 degrees, please call [facility] at [phone] or go to the Emergency Room.  Continue to take all routine medicines prescribed by your primary care physician unless otherwise instructed by our staff. Most blood thinners should be started again according to your regularly scheduled dosing. If you have any questions, please give our office a call.    As no general anesthesia was used in today's procedure, you should not experience any side effects related to anesthesia.       If you have a problem specifically related to your procedure, please call our office at [phone].  Problems not related to your procedure should be directed to your primary care physician.

## 2024-12-18 NOTE — ASSESSMENT & PLAN NOTE
Hyperlipidemia controlled continue with low-cholesterol diet recommend a low-cholesterol diet and recommend routine exercise we will continue to monitor the progress.

## 2024-12-18 NOTE — PROGRESS NOTES
Name: Courtney Davalos      : 1960      MRN: [id number]  Encounter Provider: Jose Daniel Harmon DO  Encounter Date: 2024   [facility]  :  Assessment & Plan  Stage 3a chronic kidney disease (HCC)  Lab Results   Component Value Date    EGFR 57 2024    EGFR 56 2024    EGFR 52 2024    CREATININE 1.03 2024    CREATININE 1.04 2024    CREATININE 1.11 2024        Drink adequate amount of water, avoid anti-inflammatories, reduce sodium in the diet and will continue to monitor the kidney function  Multiple myeloma not having achieved remission (HCC)  clinically stable continue follow-up with hematology oncology Dr. Quintero       Hyperlipidemia, unspecified hyperlipidemia type  Hyperlipidemia controlled continue with low-cholesterol diet recommend a low-cholesterol diet and recommend routine exercise we will continue to monitor the progress.       Complex regional pain syndrome i of right lower limb  Continue usual follow-up with pain management patient did notice some improvement with myofascial release and physical therapy we will consult chiropractor for additional treatment  Orders:  •  Ambulatory Referral to Chiropractic; Future    Palpitations  Patient is reported to me intermittent palpitations will check ambulatory Holter monitor  Orders:  •  AMB extended holter monitor; Future    Screening for cardiovascular condition    Orders:  •  Lipid Panel with Direct LDL reflex; Future    Renal cell carcinoma of right kidney (HCC)  Clinically stable doing well continue follow-up with hematology oncology       Primary biliary cholangitis (HCC)  Patient is making good improvement with seladelpar working with GI hepatologist Dr. Esqueda does notate some cervicogenic headache started since this medication I recommend GI if this is a normal side effect and what the next step would be       Essential hypertension  Hypertension - controlled, I  have counseled patient following healthy balance diet, I would like the patient reduce sodium, exercise routinely, I would like the patient continued the med current medical regiment and we will continue to monitor.       I have spent a total time of 30 minutes in caring for this patient on the day of the visit/encounter including Diagnostic results, Risks and benefits of tx options, Instructions for management, Patient and family education, Importance of tx compliance, Risk factor reductions, Impressions, Counseling / Coordination of care, Documenting in the medical record, Reviewing / ordering tests, medicine, procedures  , and Obtaining or reviewing history  .        History of Present Illness     HPI 64-year old female coming in for a follow up office visit regarding stage IIIa chronic kidney disease, multiple myeloma, hyperlipidemia, complex regional pain syndrome right lower extremity, intermittent palpitations, renal cell carcinoma of the right kidney, primary biliary cholangitis and essential hypertension; the patient reports me compliant taking medications without untoward side effects the.  The patient is here to review his medical condition, update me on the medical condition and the patient reports me no hospitalizations and no ER visits.  No injuries no illnesses trying to follow healthy balanced diet remains active has been working with therapy limited improvements although she has noted some improvements in the complex regional pain disorder with the myofascial release surgery laboratories in detail has noticed a cervicogenic headache since starting a new medicine for primary biliary cholangitis.  Also reports me will be seeing hematology for follow-up multiple myeloma and renal cell carcinoma in the future.  Patient does report to me intermittent palpitations syncopesyncope chest pain or patient currently is going for ischial tuberosity steroid injection via pain management Dr. Vick working with  physical therapy but not much improvements at this point in time limited reports me a recent new medication via GI Dr. Esqeuda about a month ago recently has noticed posterior cervical neck pain and headache with nauseousness but not vomiting.  Started 2 days ago   With pt less tingling ,   Review of Systems   Constitutional:  Negative for activity change, appetite change and unexpected weight change.   HENT:  Negative for congestion and postnasal drip.    Eyes:  Negative for visual disturbance.   Respiratory:  Negative for cough and shortness of breath.    Cardiovascular:  Positive for palpitations. Negative for chest pain.   Gastrointestinal:  Negative for abdominal pain, diarrhea, nausea and vomiting.   Musculoskeletal:         Chronic limb pain   Neurological:  Negative for dizziness, light-headedness and headaches.   Hematological:  Negative for adenopathy.       Objective   /78 (BP Location: Right arm, Patient Position: Sitting, Cuff Size: Standard)   Pulse 82   Wt 74.4 kg (164 lb)   LMP  (LMP Unknown)   SpO2 99%   BMI 24.22 kg/m²   Hypertonicity of the paravertebral musculature of the cervical spine  Physical Exam  Vitals and nursing note reviewed.   Constitutional:       General: She is not in acute distress.     Appearance: Normal appearance. She is well-developed and normal weight. She is not ill-appearing, toxic-appearing or diaphoretic.   HENT:      Head: Normocephalic and atraumatic.      Right Ear: External ear normal.      Left Ear: External ear normal.      Nose: Nose normal.   Eyes:      Pupils: Pupils are equal, round, and reactive to light.   Cardiovascular:      Rate and Rhythm: Normal rate and regular rhythm.      Heart sounds: Normal heart sounds. No murmur heard.  Pulmonary:      Effort: Pulmonary effort is normal.      Breath sounds: Normal breath sounds.   Abdominal:      General: There is no distension.      Palpations: Abdomen is soft.      Tenderness: There is no abdominal  tenderness. There is no guarding.   Neurological:      Mental Status: She is alert.

## 2024-12-18 NOTE — ASSESSMENT & PLAN NOTE
Lab Results   Component Value Date    EGFR 57 12/17/2024    EGFR 56 09/03/2024    EGFR 52 06/21/2024    CREATININE 1.03 12/17/2024    CREATININE 1.04 09/03/2024    CREATININE 1.11 06/21/2024        Drink adequate amount of water, avoid anti-inflammatories, reduce sodium in the diet and will continue to monitor the kidney function
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
## 2024-12-19 ENCOUNTER — TELEPHONE (OUTPATIENT)
Age: 64
End: 2024-12-19

## 2024-12-19 ENCOUNTER — OFFICE VISIT (OUTPATIENT)
Dept: PHYSICAL THERAPY | Facility: CLINIC | Age: 64
End: 2024-12-19
Payer: COMMERCIAL

## 2024-12-19 DIAGNOSIS — G89.29 CHRONIC RIGHT-SIDED LOW BACK PAIN WITH RIGHT-SIDED SCIATICA: ICD-10-CM

## 2024-12-19 DIAGNOSIS — M54.41 CHRONIC RIGHT-SIDED LOW BACK PAIN WITH RIGHT-SIDED SCIATICA: ICD-10-CM

## 2024-12-19 DIAGNOSIS — M54.30 SCIATICA, UNSPECIFIED LATERALITY: ICD-10-CM

## 2024-12-19 DIAGNOSIS — M79.604 RIGHT LEG PAIN: Primary | ICD-10-CM

## 2024-12-19 PROCEDURE — 97110 THERAPEUTIC EXERCISES: CPT | Performed by: PHYSICAL THERAPIST

## 2024-12-19 PROCEDURE — 97140 MANUAL THERAPY 1/> REGIONS: CPT | Performed by: PHYSICAL THERAPIST

## 2024-12-19 NOTE — ASSESSMENT & PLAN NOTE
Hypertension - controlled, I have counseled patient following healthy balance diet, I would like the patient reduce sodium, exercise routinely, I would like the patient continued the med current medical regiment and we will continue to monitor.

## 2024-12-19 NOTE — TELEPHONE ENCOUNTER
Dr. Esqueda spoke with patient to discuss symptoms that began after starting Livdelzi. Patient is aware Dr. Esqueda will follow up with her after discussing with [facility].

## 2024-12-19 NOTE — ASSESSMENT & PLAN NOTE
Patient is making good improvement with seladelpar working with GI hepatologist Dr. Esqueda does notate some cervicogenic headache started since this medication I recommend GI if this is a normal side effect and what the next step would be

## 2024-12-19 NOTE — PROGRESS NOTES
Daily Note     Today's date: 2024  Patient name: Courtney Davalos  : 1960  MRN: [id number]  Referring provider: Keegan Conn DO  Dx:   Encounter Diagnosis     ICD-10-CM    1. Right leg pain  M79.604       2. Sciatica, unspecified laterality  M54.30       3. Chronic right-sided low back pain with right-sided sciatica  M54.41     G89.29                      Subjective: had an injection yesterday - noted pain when trying to sleep last night and when sitting this morning      Objective: See treatment diary below      Assessment: Tolerated treatment well. Patient exhibited good technique with therapeutic exercises and would benefit from continued PT.  Reduced exercise intensity due to recent injection - focused on ROM activities      Plan: Progress treatment as tolerated.       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 <---avoid contact   piriformis injection    Manuals    Piriformis DTM (high tolerance) SY SY SY SY SY SY SY SY SY SY   SIJ shotgun mobilizaitons             Percussion massage       SY, just off SIJ  SY    Recumbent bike at end (has at home)       Trial, 5' lvl 1      Neuro Re-Ed                          Curl up cross over      10ea, watch neck position       Curl up      10    10ea (less pain not using pball)   [facility]                          LTR 2' 2'           Ther Ex             clamshells 20 3-way 15ea 3-way 5-15ea 3-way 15ea      15   Piriformis tretch   :15x5 :15x5         bridge  10                        Book openers    :05x10 :15x5 :15x5       Glut stretch             ER iso with glut set  :05x10 :05x15 :05x02 :10x10  :05x20 :05x20 :10x10 15   ER AROM with glut squeeze, log roll       :05x15 :05x90s     IR iso   :05x10 :05x15 :05x20 :10x10 :10x10  :05x90s :10x10 no PPT but with Kegel    Resisted supine march    With bracing 10ea      Kick out 10ea   Trunk flexion seated              PPT  10 10 :05x5         Standing hip fleixon to ext  P! With ext and increasing radiating pain            Seated piriformis stretch   Knee pain    Supine pulling right leg up :10x10      Prone on elbows 3' 3'  2' 3' 2' 2' 2' 2'x3 2   Bracing with march and ER    10    15     Bracing   with march    10         Heel touches    10         Prone bracing             Prone LE lift  10  15 15ea    10ea 10   Prone UE lift  10        10   Prone glut set      :05x10 :05x10      Prone ER resisted             Prone IR resisted             Prone press ups 10 10  15 2x10 2' prone, 2x10 10, 10, 5 with 1 and 2' minutes prone rest 15, 10 3x5 2x5   Prone hip extenion    15ea  15ea       LTR       2'      Hip flexor stretch, right leg off table 3x2'   2x1' 3x1' 3x1' 3x1' 3x1' :20x5    Lumbar ext in standing over table :01x10 :10x10  :05x10 :05x10 :05x10 :05x10 :10x10 :10x10 :05x10   Hip ext isometrics, leg into table, supine, left leg bent   20 :10x10 :10x10        Curl up, and oblique curl up 20ea  15ea nv         Hip flexor stretch stairs D/c p!     :10x5 :15x5 right foot back only  :15x5 right foot back only held                                                       Ther Activity                                       Gait Training                                       Modalities

## 2024-12-19 NOTE — TELEPHONE ENCOUNTER
Left message on patient's voice mail to contact our office regarding symptoms discussed with pcp at 12/18/24 office visit.

## 2024-12-20 LAB
A-TOCOPHEROL VIT E SERPL-MCNC: 8.3 MG/L (ref 9–29)
GAMMA TOCOPHEROL SERPL-MCNC: 1.3 MG/L (ref 0.5–4.9)
VIT A SERPL-MCNC: 24.6 UG/DL (ref 22–69.5)

## 2024-12-21 LAB — PHYTONADIONE SERPL-MCNC: 0.49 NG/ML (ref 0.1–2.2)

## 2024-12-23 ENCOUNTER — OFFICE VISIT (OUTPATIENT)
Dept: PHYSICAL THERAPY | Facility: CLINIC | Age: 64
End: 2024-12-23
Payer: COMMERCIAL

## 2024-12-23 DIAGNOSIS — M54.30 SCIATICA, UNSPECIFIED LATERALITY: ICD-10-CM

## 2024-12-23 DIAGNOSIS — G89.29 CHRONIC RIGHT-SIDED LOW BACK PAIN WITH RIGHT-SIDED SCIATICA: ICD-10-CM

## 2024-12-23 DIAGNOSIS — M79.604 RIGHT LEG PAIN: Primary | ICD-10-CM

## 2024-12-23 DIAGNOSIS — M54.41 CHRONIC RIGHT-SIDED LOW BACK PAIN WITH RIGHT-SIDED SCIATICA: ICD-10-CM

## 2024-12-23 PROCEDURE — 97140 MANUAL THERAPY 1/> REGIONS: CPT | Performed by: PHYSICAL THERAPIST

## 2024-12-23 PROCEDURE — 97110 THERAPEUTIC EXERCISES: CPT | Performed by: PHYSICAL THERAPIST

## 2024-12-23 NOTE — PROGRESS NOTES
Daily Note     Today's date: 2024  Patient name: Courtney Davalos  : 1960  MRN: [id number]  Referring provider: Keegan Conn DO  Dx:   Encounter Diagnosis     ICD-10-CM    1. Right leg pain  M79.604       2. Sciatica, unspecified laterality  M54.30       3. Chronic right-sided low back pain with right-sided sciatica  M54.41     G89.29                      Subjective: notes pain has been increasing and she is having difficulty sleeping due to symptoms - regardless of position      Objective: See treatment diary below      Assessment: Tolerated treatment well. Patient exhibited good technique with therapeutic exercises and would benefit from continued PT. Pain with lying on either side, on the part of the low back against the table      Plan: Progress treatment as tolerated.       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 <---avoid contact   piriformis injection    Manuals    Piriformis DTM (high tolerance) SY SY SY SY SY SY SY SY SY SY   gapping   bilat          Percussion massage       SY, just off SIJ  SY    Recumbent bike at end (has at home)       Trial, 5' lvl 1      Neuro Re-Ed                          Curl up cross over      10ea, watch neck position       Curl up   60s   10    10ea (less pain not using pball)   [facility]                          LTR 2' 2'           Ther Ex             clamshells 20 3-way 15ea 3-way 5-15ea 3-way 15ea      15   Piriformis tretch    :15x5         bridge  10 60s                       Book openers    :05x10 :15x5 :15x5       Glut stretch             ER iso with glut set  :05x10 :05x15 :05x02 :10x10  :05x20 :05x20 :10x10 15   ER AROM with glut squeeze, log roll       :05x15 :05x90s     IR iso   :05x10 :05x60s :05x20 :10x10 :10x10  :05x90s :10x10 no PPT but with Kegel    Resisted supine march    With bracing 10ea      Kick out 10ea   Trunk flexion seated              PPT    :05x5         Standing hip fleixon to ext             Seated piriformis stretch   Knee pain    Supine pulling right leg up :10x10      Prone on elbows 3' 3' 3' 2' 3' 2' 2' 2' 2'x3 2   Bracing with march and ER    10    15     Bracing   with march    10         Heel touches    10         Prone bracing             Prone LE lift  10 10ea 15 15ea    10ea 10   Prone UE lift  10        10   Prone glut set      :05x10 :05x10      Prone ER resisted             Prone IR resisted             Prone press ups 10 10  15 2x10 2' prone, 2x10 10, 10, 5 with 1 and 2' minutes prone rest 15, 10 3x5 2x5   Prone hip extenion    15ea  15ea       LTR       2'      Hip flexor stretch, right leg off table 3x2'   2x1' 3x1' 3x1' 3x1' 3x1' :20x5    Lumbar ext in standing over table :01x10 :10x10  :05x10 :05x10 :05x10 :05x10 :10x10 :10x10 :05x10   Hip ext isometrics, leg into table, supine, left leg bent   20 :10x10 :10x10        Curl up, and oblique curl up 20ea  15ea nv         Hip flexor stretch stairs D/c p!     :10x5 :15x5 right foot back only  :15x5 right foot back only held                                                       Ther Activity                                       Gait Training                                       Modalities

## 2024-12-26 ENCOUNTER — APPOINTMENT (OUTPATIENT)
Dept: PHYSICAL THERAPY | Facility: CLINIC | Age: 64
End: 2024-12-26
Payer: COMMERCIAL

## 2024-12-27 ENCOUNTER — TELEMEDICINE (OUTPATIENT)
Dept: BEHAVIORAL/MENTAL HEALTH CLINIC | Facility: CLINIC | Age: 64
End: 2024-12-27
Payer: COMMERCIAL

## 2024-12-27 DIAGNOSIS — F33.0 MDD (MAJOR DEPRESSIVE DISORDER), RECURRENT EPISODE, MILD (HCC): Primary | ICD-10-CM

## 2024-12-27 DIAGNOSIS — F42.9 OBSESSIVE-COMPULSIVE DISORDER, UNSPECIFIED TYPE: ICD-10-CM

## 2024-12-27 DIAGNOSIS — F43.10 PTSD (POST-TRAUMATIC STRESS DISORDER): ICD-10-CM

## 2024-12-27 DIAGNOSIS — F41.1 GAD (GENERALIZED ANXIETY DISORDER): ICD-10-CM

## 2024-12-27 PROCEDURE — 90837 PSYTX W PT 60 MINUTES: CPT | Performed by: COUNSELOR
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
## 2024-12-27 NOTE — BH TREATMENT PLAN
Outpatient Behavioral Health Psychotherapy Treatment Plan    Courtney Davalos  1960     Date of Initial Psychotherapy Assessment:  5/11/21  Date of Current Treatment Plan: 12/27/24  Treatment Plan Target Date: 5/25/25  Treatment Plan Expiration Date: 6/25/25    Diagnosis:   1. MDD (major depressive disorder), recurrent episode, mild (HCC)        2. JOSEFINA (generalized anxiety disorder)        3. Obsessive-compulsive disorder, unspecified type        4. PTSD (post-traumatic stress disorder)            Area(s) of Need: Processing past relationships and boundaries to increase communication in current relationship,  Decrease anxiety overall.    Long Term Goal 1 (in the client's own words): Process past relationship in order to clarify feelings and work on better boundaries    Stage of Change: Action    Target Date for completion: 6/25/25     Anticipated therapeutic modalities: Client Centered, CBT, solution focused     People identified to complete this goal: Jaclyn      Objective 1: (identify the means of measuring success in meeting the objective): Process difference between healthy and unhealhty boundaries       Objective 2: (identify the means of measuring success in meeting the objective): Process feelings about past relationships and work on healthy expresssion of emotions on needs in current relationship.      Long Term Goal 2 (in the client's own words): Decrease obsessive thoughts and compulsive actions    Stage of Change: Action    Target Date for completion: 6/25/25     Anticipated therapeutic modalities: Client centered, supportive psychotherapy.     People identified to complete this goal: Jaclyn      Objective 1: (identify the means of measuring success in meeting the objective): Utilize thought stopping techniques to reduce the frequency of obsessive thoughts      Objective 2: (identify the means of measuring success in meeting the objective):  Teach and practice distractive coping  skill to be used in times of need to decrease obsessive thoughts.  3.Develop and implement techniques to interrupt the pattern of compulsions        I am currently under the care of a [facility] psychiatric provider: yes    My [facility] psychiatric provider is: Dr Gamal Yeung    I am currently taking psychiatric medications: Yes, as prescribed    I feel that I will be ready for discharge from mental health care when I reach the following (measurable goal/objective): Utilize known coping skills and social supports to communicate needs and manage symptoms.    For children and adults who have a legal guardian:   Has there been any change to custody orders and/or guardianship status? NA. If yes, attach updated documentation.    I have created my Crisis Plan and have been offered a copy of this plan    Behavioral Health Treatment Plan [facility]: Diagnosis and Treatment Plan explained to Courtney Davalos acknowledges an understanding of their diagnosis. Courtney Davalos agrees to this treatment plan.    I have been offered a copy of this Treatment Plan. Yes      Courtney Davalos, 1960, actively participated in the review and update of this treatment plan during a virtual session, using the Epic Embedded platform.   Courtney Davalos  provided verbal consent on 12/27/2024 at 9:30 AM. The treatment plan was transcribed into the Electronic Health Record at a later time.

## 2024-12-29 LAB
A1AT PHENOTYP SERPL IFE: NORMAL
A1AT SERPL-MCNC: 139 MG/DL (ref 101–187)

## 2024-12-31 ENCOUNTER — OFFICE VISIT (OUTPATIENT)
Dept: PHYSICAL THERAPY | Facility: CLINIC | Age: 64
End: 2024-12-31
Payer: COMMERCIAL

## 2024-12-31 ENCOUNTER — TELEPHONE (OUTPATIENT)
Dept: PAIN MEDICINE | Facility: CLINIC | Age: 64
End: 2024-12-31

## 2024-12-31 DIAGNOSIS — G89.29 CHRONIC RIGHT-SIDED LOW BACK PAIN WITH RIGHT-SIDED SCIATICA: ICD-10-CM

## 2024-12-31 DIAGNOSIS — M79.604 RIGHT LEG PAIN: Primary | ICD-10-CM

## 2024-12-31 DIAGNOSIS — M54.30 SCIATICA, UNSPECIFIED LATERALITY: ICD-10-CM

## 2024-12-31 DIAGNOSIS — M54.41 CHRONIC RIGHT-SIDED LOW BACK PAIN WITH RIGHT-SIDED SCIATICA: ICD-10-CM

## 2024-12-31 PROCEDURE — 97140 MANUAL THERAPY 1/> REGIONS: CPT | Performed by: PHYSICAL THERAPIST

## 2024-12-31 PROCEDURE — 97110 THERAPEUTIC EXERCISES: CPT | Performed by: PHYSICAL THERAPIST

## 2024-12-31 NOTE — PROGRESS NOTES
Daily Note     Today's date: 2024  Patient name: Courtney Davalos  : 1960  MRN: [id number]  Referring provider: Keegan Conn DO  Dx:   Encounter Diagnosis     ICD-10-CM    1. Right leg pain  M79.604       2. Sciatica, unspecified laterality  M54.30       3. Chronic right-sided low back pain with right-sided sciatica  M54.41     G89.29                      Subjective: notes increased pain after a day of lying in bed due to illness; she had one day of reduced symptoms after sitting on a heating pad all day.       Objective: See treatment diary below      Assessment: Tolerated treatment well. Patient exhibited good technique with therapeutic exercises and would benefit from continued PT.  Updated HEP - she will be following up with physician and continuing with exercises at home.  Symptoms have not significantly improved and continue to limit daily activities.  She continues  to have difficulty with bed transfers and has increased pain with both flexion of the right hip and trunk > extension.  Area of tenderness superior to ischial tuberosity.      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 <---avoid contact   piriformis injection    Manuals    Piriformis DTM (high tolerance) SY SY SY SY SY SY SY SY SY SY   gapping   bilat          Percussion massage       SY, just off SIJ  SY    Recumbent bike at end (has at home)       Trial, 5' lvl 1      Neuro Re-Ed                          Curl up cross over      10ea, watch neck position       Curl up   60s   10    10ea (less pain not using pball)   [facility] [place]             [facility]                          LTR 2' 2'           Ther Ex             clamshells 20 3-way 15ea 3-way 5-15ea 3-way 15ea      15   Piriformis tretch    :15x5         bridge  10 60s                       Book openers    :05x10 :15x5 :15x5       Glut stretch              ER iso with glut set  :05x10 :05x15 :05x02 :10x10  :05x20 :05x20 :10x10 15   ER AROM with glut squeeze, log roll       :05x15 :05x90s     IR iso   :05x10 :05x60s :05x20 :10x10 :10x10  :05x90s :10x10 no PPT but with Kegel    Resisted supine march    With bracing 10ea      Kick out 10ea   Trunk flexion seated             PPT    :05x5         Standing hip fleixon to ext             Seated piriformis stretch   Knee pain    Supine pulling right leg up :10x10      Prone on elbows 3' 3' 3' 2' 3' 2' 2' 2' 2'x3 2   Bracing with march and ER    10    15     Bracing   with march    10         Heel touches    10         Prone bracing             Prone LE lift  10 10ea 15 15ea    10ea 10   Prone UE lift  10        10   Prone glut set      :05x10 :05x10      Prone ER resisted             Prone IR resisted             Prone press ups 10 10  15 2x10 2' prone, 2x10 10, 10, 5 with 1 and 2' minutes prone rest 15, 10 3x5 2x5   Prone hip extenion    15ea  15ea       LTR       2'      Hip flexor stretch, right leg off table 3x2'   2x1' 3x1' 3x1' 3x1' 3x1' :20x5    Lumbar ext in standing over table :01x10 :10x10  :05x10 :05x10 :05x10 :05x10 :10x10 :10x10 :05x10   Hip ext isometrics, leg into table, supine, left leg bent   20 :10x10 :10x10        Curl up, and oblique curl up 20ea  15ea nv         Hip flexor stretch stairs D/c p!     :10x5 :15x5 right foot back only  :15x5 right foot back only held                                                       Ther Activity                                       Gait Training                                       Modalities

## 2024-12-31 NOTE — TELEPHONE ENCOUNTER
Caller: Courtney  Doctor/office: Nava RODRIGUEZ#: [phone]    % of improvement: 0%  Pain Scale (1-10): 7/10    What is next step?

## 2025-01-03 DIAGNOSIS — G90.521 COMPLEX REGIONAL PAIN SYNDROME I OF RIGHT LOWER LIMB: ICD-10-CM

## 2025-01-03 DIAGNOSIS — M54.16 LUMBAR RADICULOPATHY: Primary | Chronic | ICD-10-CM

## 2025-01-03 DIAGNOSIS — G57.01 NEUROPATHY OF RIGHT SCIATIC NERVE: ICD-10-CM

## 2025-01-03 NOTE — TELEPHONE ENCOUNTER
We have now trialed several different interventional approaches for both diagnostic and therapeutic purposes including SI joint, piriformis and ischial bursa injections to address her acute on chronic back, buttock and leg pain.  It is challenging given the fact that she has underlying CRPS of the right lower extremity and has already exhausted multiple different approaches with the previous pain specialist  At this point would not proceed with further interventional approaches and would consider repeat imaging with an MRI lumbar spine if interested we will place the order  We did discuss extensively regarding neuromodulation versus repeat lumbar sympathetic blocks but the patient was denying considering this direction  Please advise regarding the MRI and I will place the order if she wishes to proceed  Thank you

## 2025-01-03 NOTE — TELEPHONE ENCOUNTER
S/w Pt, advised of the same. Pt verbalized understanding. Pt agreeable to proceed with repeat MRI lumbar spine, please place order.

## 2025-01-07 NOTE — TELEPHONE ENCOUNTER
Caller: javier Valdez     Doctor: Nava    Reason for call: pt called stating she has a referral for a lumbar MRI.  She is also wondering if her hips and pelvis are should be included in the MRI?    Call back#: [phone]

## 2025-01-16 ENCOUNTER — TELEMEDICINE (OUTPATIENT)
Dept: PSYCHIATRY | Facility: CLINIC | Age: 65
End: 2025-01-16
Payer: COMMERCIAL

## 2025-01-16 DIAGNOSIS — F43.10 PTSD (POST-TRAUMATIC STRESS DISORDER): ICD-10-CM

## 2025-01-16 DIAGNOSIS — F41.1 GAD (GENERALIZED ANXIETY DISORDER): ICD-10-CM

## 2025-01-16 DIAGNOSIS — F33.0 MDD (MAJOR DEPRESSIVE DISORDER), RECURRENT EPISODE, MILD (HCC): ICD-10-CM

## 2025-01-16 DIAGNOSIS — F42.9 OBSESSIVE-COMPULSIVE DISORDER, UNSPECIFIED TYPE: ICD-10-CM

## 2025-01-16 PROCEDURE — 90833 PSYTX W PT W E/M 30 MIN: CPT | Performed by: PSYCHIATRY & NEUROLOGY

## 2025-01-16 PROCEDURE — 99214 OFFICE O/P EST MOD 30 MIN: CPT | Performed by: PSYCHIATRY & NEUROLOGY

## 2025-01-16 RX ORDER — FLUOXETINE 40 MG/1
40 CAPSULE ORAL DAILY
Qty: 90 CAPSULE | Refills: 1 | Status: SHIPPED | OUTPATIENT
Start: 2025-01-16 | End: 2025-01-16 | Stop reason: SDUPTHER

## 2025-01-16 RX ORDER — TRAZODONE HYDROCHLORIDE 50 MG/1
25-50 TABLET, FILM COATED ORAL
Qty: 90 TABLET | Refills: 1 | Status: SHIPPED | OUTPATIENT
Start: 2025-01-16 | End: 2025-01-16 | Stop reason: SDUPTHER

## 2025-01-16 RX ORDER — FLUOXETINE 40 MG/1
40 CAPSULE ORAL DAILY
Qty: 90 CAPSULE | Refills: 3 | Status: SHIPPED | OUTPATIENT
Start: 2025-01-16

## 2025-01-16 RX ORDER — TRAZODONE HYDROCHLORIDE 50 MG/1
25-50 TABLET, FILM COATED ORAL
Qty: 90 TABLET | Refills: 3 | Status: SHIPPED | OUTPATIENT
Start: 2025-01-16

## 2025-01-16 NOTE — ASSESSMENT & PLAN NOTE
Manageable, not at goal. Will be considering NAC  Orders:    FLUoxetine (PROzac) 40 MG capsule; Take 1 capsule (40 mg total) by mouth daily    traZODone (DESYREL) 50 mg tablet; Take 0.5-1 tablets (25-50 mg total) by mouth daily at bedtime as needed for sleep

## 2025-01-16 NOTE — ASSESSMENT & PLAN NOTE
Manageable, situational stressors, continue current treatment and therapy  Orders:    FLUoxetine (PROzac) 40 MG capsule; Take 1 capsule (40 mg total) by mouth daily    traZODone (DESYREL) 50 mg tablet; Take 0.5-1 tablets (25-50 mg total) by mouth daily at bedtime as needed for sleep

## 2025-01-16 NOTE — ASSESSMENT & PLAN NOTE
Not a focal issue today  Orders:    FLUoxetine (PROzac) 40 MG capsule; Take 1 capsule (40 mg total) by mouth daily    traZODone (DESYREL) 50 mg tablet; Take 0.5-1 tablets (25-50 mg total) by mouth daily at bedtime as needed for sleep

## 2025-01-19 ENCOUNTER — HOSPITAL ENCOUNTER (OUTPATIENT)
Dept: MRI IMAGING | Facility: HOSPITAL | Age: 65
Discharge: HOME | End: 2025-01-19
Attending: PHYSICAL MEDICINE & REHABILITATION
Payer: COMMERCIAL

## 2025-01-19 DIAGNOSIS — M54.16 LUMBAR RADICULOPATHY: Chronic | ICD-10-CM

## 2025-01-19 DIAGNOSIS — G90.521 COMPLEX REGIONAL PAIN SYNDROME I OF RIGHT LOWER LIMB: ICD-10-CM

## 2025-01-19 DIAGNOSIS — G57.01 NEUROPATHY OF RIGHT SCIATIC NERVE: ICD-10-CM

## 2025-01-19 PROCEDURE — 72148 MRI LUMBAR SPINE W/O DYE: CPT

## 2025-01-20 ENCOUNTER — RESULTS FOLLOW-UP (OUTPATIENT)
Dept: PAIN MEDICINE | Facility: CLINIC | Age: 65
End: 2025-01-20

## 2025-01-20 NOTE — TELEPHONE ENCOUNTER
S/w pt and advised of same. Pt verbalized understanding and appreciation.    Pt wanted KW to be aware that she now has B/L lower back pain R>L, but pt is interested in injection.    Pt did question if this would be possible given the scarring that she has in that area from a previous surgery? Pt also questioned if this injection would in any way affect her CRPS.  Pt does not wish to proceed at any time with SCS.     Pt denied anticoags or diabetes.

## 2025-01-20 NOTE — TELEPHONE ENCOUNTER
----- Message from Carrington Vick DO sent at 1/20/2025  3:30 PM EST -----  Please notify patient MRI lumbar spine does demonstrate moderate central canal stenosis and mild foraminal stenosis notable at L4-L5  Given amount of narrowing would attempt an epidural in the left below the L5-S1 LESI right paramedian approach under fluoroscopy guidance if patient is interested and amenable please schedule  If she does not obtain significant relief from this epidural would hold off on any further interventional considerations and only other option would be spinal cord stimulator  Thank you  ----- Message -----  From: Interface, Radiology Results In  Sent: 1/20/2025   3:03 PM EST  To: Carrington Vick DO

## 2025-01-21 NOTE — TELEPHONE ENCOUNTER
Patient scheduled for LESI 2/5/25.  She also states she was supposed to call the nurse back so please reach out to patient.

## 2025-01-22 NOTE — RESULT ENCOUNTER NOTE
S/w Pt, reviewed MRI results per KW previous notation. Discussed procedure instructions, Pt verbalized understanding.

## 2025-01-23 ENCOUNTER — TELEMEDICINE (OUTPATIENT)
Dept: BEHAVIORAL/MENTAL HEALTH CLINIC | Facility: CLINIC | Age: 65
End: 2025-01-23
Payer: COMMERCIAL

## 2025-01-23 DIAGNOSIS — F43.10 PTSD (POST-TRAUMATIC STRESS DISORDER): ICD-10-CM

## 2025-01-23 DIAGNOSIS — F42.9 OBSESSIVE-COMPULSIVE DISORDER, UNSPECIFIED TYPE: ICD-10-CM

## 2025-01-23 DIAGNOSIS — F41.1 GAD (GENERALIZED ANXIETY DISORDER): ICD-10-CM

## 2025-01-23 DIAGNOSIS — F33.0 MDD (MAJOR DEPRESSIVE DISORDER), RECURRENT EPISODE, MILD (HCC): Primary | ICD-10-CM

## 2025-01-23 PROCEDURE — 90834 PSYTX W PT 45 MINUTES: CPT | Performed by: COUNSELOR

## 2025-01-23 NOTE — PSYCH
Virtual Regular Visit    Verification of patient location:    Patient is located at Home in the following state in which I hold an active license [place]      Assessment/Plan:    Problem List Items Addressed This Visit     MDD (major depressive disorder), recurrent episode, mild (HCC) - Primary    JOSEFINA (generalized anxiety disorder)    OCD (obsessive compulsive disorder)    PTSD (post-traumatic stress disorder)       Goals addressed in session: Goal 1 and Goal 2     Depression Follow-up Plan Completed: Not applicable    Reason for visit is   Chief Complaint   Patient presents with   • Virtual Regular Visit          Encounter provider Ethel Christian      Recent Visits  No visits were found meeting these conditions.  Showing recent visits within past 7 days and meeting all other requirements  Today's Visits  Date Type Provider Dept   01/23/25 Telemedicine Ethel Christian [facility] Therapist [place]   Showing today's visits and meeting all other requirements  Future Appointments  No visits were found meeting these conditions.  Showing future appointments within next 150 days and meeting all other requirements       The patient was identified by name and date of birth. Courtney Davalos was informed that this is a telemedicine visit and that the visit is being conducted throughthe Epic Embedded platform. She agrees to proceed..  My office door was closed. No one else was in the room.  She acknowledged consent and understanding of privacy and security of the video platform. The patient has agreed to participate and understands they can discontinue the visit at any time.    Patient is aware this is a billable service.     Subjective  Courtney Davalos is a 64 y.o. female  .      HPI     Past Medical History:   Diagnosis Date   • Abnormal breast exam    • Anemia    • Anxiety    • Arthritis    • Asthma     childhood   • 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HCC)     primary per allscripts   • Cancer (HCC)     IgG kappa smoldering multiple  myeloma   • Cardiac murmur    • Chronic liver disease     resolved 12/04/2017   • COVID    • Depression    • Endometriosis    • Fracture of tibia     right tibial plateau fracture per allscripts   • Heart murmur    • Hepatic disease    • Hypertension     last assessed 12/13/2017   • Inflammatory bowel disease    • Kidney stone    • Multiple myeloma (HCC)    • Neuropathy     R foot    • Nosebleed    • OCD (obsessive compulsive disorder)    • Osteoporosis    • Otitis media    • Pneumonia    • RSD (reflex sympathetic dystrophy) 12/11/2018   • Scoliosis    • Seasonal allergies    • Urinary tract infection    • Visual impairment    • Wears eyeglasses    • Whiplash injury to neck        Past Surgical History:   Procedure Laterality Date   • BACK SURGERY      hemilaminectomy and discectomy, L5-S1, right (Dr. Rashid, [facility] at [facility]) onset 02/14/2005 per allscripts   • COLONOSCOPY     • EXPLORATION EXTREMITY Right 08/29/2016    Procedure: KNEE PERONEAL NERVE EXPLORATION AND RELEASE ;  Surgeon: Bry De Guzman MD;  Location: [facility];  Service:    • FOOT SURGERY     • FRACTURE SURGERY     • HAND SURGERY     • HERNIA REPAIR     • IR BIOPSY KIDNEY MASS  05/25/2023   • JOINT REPLACEMENT Right     RTK   • KNEE SURGERY     • MANDIBLE SURGERY     • NEPHRECTOMY LAPAROSCOPIC Right 6/21/2023    Procedure: NEPHRECTOMY LAPAROSCOPIC ASSISTED;  Surgeon: Avinash Sterling MD;  Location: [facility];  Service: Urology   • NEUROPLASTY / TRANSPOSITION MEDIAN NERVE AT CARPAL TUNNEL     • ORIF TIBIAL PLATEAU Right     arthroplasty per allscripts   • OTHER SURGICAL HISTORY      injection of trigger point(s) per allscripts   • TN ARTHRP KNE CONDYLE&PLATU MEDIAL&LAT COMPARTMENTS Right 06/11/2018    Procedure: ARTHROPLASTY KNEE TOTAL;  Surgeon: Bry De Guzman MD;  Location: [facility];  Service: Orthopedics   • TN TENDON SHEATH INCISION Right 12/02/2020    Procedure: RELEASE TRIGGER FINGER-right long;  Surgeon: Todd Mcdonald MD;  Location:  [facility];  Service: Orthopedics   • SINUS SURGERY     • SPINE SURGERY     • TONSILLECTOMY         Current Outpatient Medications   Medication Sig Dispense Refill   • ammonium lactate (LAC-HYDRIN) 12 % lotion Apply topically 2 (two) times a day as needed for dry skin 222 mL 1   • betamethasone valerate (VALISONE) 0.1 % ointment Apply topically 2 (two) times a day As needed to affected area. Mix with Mupirocin ointment when applying 45 g 0   • Cholecalciferol (Vitamin D3) 50 MCG (2000 UT) TABS Take 1 tablet (2,000 Units total) by mouth daily 90 tablet 3   • estradiol (VAGIFEM, YUVAFEM) 10 MCG TABS vaginal tablet INSERT 1 TABLET INTO THE VAGINA 2 TIMES A WEEK. 24 tablet 1   • FLUoxetine (PROzac) 40 MG capsule Take 1 capsule (40 mg total) by mouth daily 90 capsule 3   • ketoconazole (NIZORAL) 2 % cream Apply topically daily 30 g 0   • lidocaine (LIDODERM) 5 % lidocaine 5 % topical patch   APPLY 1 PATCH TO AFFECTED AREA FOR 12 HOURS A DAY & REMOVE FOR 12 HOURS BEFORE APPLYING NEXT PATCH. (12 HOURS ON, 12 HOURS OFF)     • lisinopril (ZESTRIL) 20 mg tablet TAKE 1 TABLET BY MOUTH EVERY DAY 90 tablet 1   • methocarbamol (ROBAXIN) 500 mg tablet Take 2 tablets (1,000 mg total) by mouth 3 (three) times a day for 7 days 42 tablet 0   • mometasone (NASONEX) 50 mcg/act nasal spray 2 sprays into each nostril daily for 10 days 1 Act 0   • mupirocin (BACTROBAN) 2 % ointment Apply topically 3 (three) times a day As needed to affected area. Mix with Clobetasol ointment when applying to scalp. Use by itself to buttock area 30 g 3   • Seladelpar Lysine (Livdelzi) 10 MG CAPS Take 10 mg by mouth daily 30 capsule 11   • traMADol (Ultram) 50 mg tablet Take 1 tablet (50 mg total) by mouth every 8 (eight) hours as needed for moderate pain 30 tablet 0   • traZODone (DESYREL) 50 mg tablet Take 0.5-1 tablets (25-50 mg total) by mouth daily at bedtime as needed for sleep 90 tablet 3   • triamcinolone (KENALOG) 0.1 % ointment Apply topically 2  (two) times a day 454 g 1   • ursodiol (ACTIGALL) 500 MG tablet Take 1 tablet (500 mg total) by mouth 2 (two) times a day 180 tablet 3   • valACYclovir (VALTREX) 500 mg tablet Take 2 tablets (1,000 mg total) by mouth daily for 14 days 28 tablet 0     No current facility-administered medications for this visit.        Allergies   Allergen Reactions   • Codeine GI Intolerance and Nausea Only     Other reaction(s): Other (See Comments)  Violently ill  Violently ill   • Latex Rash     itching   • Morphine And Codeine Nausea Only     GI intolerance nausea   • Nortriptyline Shortness Of Breath   • Ocaliva [Obeticholic Acid] Hives   • Doxycycline GI Intolerance and Nausea Only   • Sulfa Antibiotics GI Intolerance   • Cymbalta [Duloxetine Hcl]    • Penicillins Other (See Comments) and Rash     Childhood reaction       Review of Systems    Video Exam    There were no vitals filed for this visit.    Physical Exam     Behavioral Health Psychotherapy Progress Note    Psychotherapy Provided: Individual Psychotherapy     1. MDD (major depressive disorder), recurrent episode, mild (HCC)        2. JOSEFINA (generalized anxiety disorder)        3. Obsessive-compulsive disorder, unspecified type        4. PTSD (post-traumatic stress disorder)            Goals addressed in session: Goal 1 and Goal 2     DATA: Clinician met with Courtney via telehealth for individual therapy. Courtney requested follow up session same day and was able to be accommodated. Courtney reports conflict with paramour and assertively asking for him to have a conversation about future and his feelings. She reports that he has ignored message and attempted to continued conversation without addressing her needs. Clinician explored possible next steps and respecting her own time and needs. Clinician discussed benefits of communicating boundaries and need for change behavior and then upholding those boundaries as a form of self preservation.   During this session, this  "clinician used the following therapeutic modalities: Client-centered Therapy, Cognitive Processing Therapy, and Supportive Psychotherapy    Substance Abuse was not addressed during this session. If the client is diagnosed with a co-occurring substance use disorder, please indicate any changes in the frequency or amount of use: n/a. Stage of change for addressing substance use diagnoses: No substance use/Not applicable    ASSESSMENT:  Courtney Davalos presents with a Anxious and Depressed mood.     her affect is Normal range and intensity, which is congruent, with her mood and the content of the session. The client has made progress on their goals.    Courtney was open and engaged in the session.  Courtney Davalos presents with a none risk of suicide, none risk of self-harm, and none risk of harm to others.    For any risk assessment that surpasses a \"low\" rating, a safety plan must be developed.    A safety plan was indicated: no  If yes, describe in detail n/a    PLAN: Between sessions, Courtney Davalos will utilize healthy communication and boundaries. At the next session, the therapist will use Client-centered Therapy and Solution-Focused Therapy to address anxiety and depressive symptoms.    Behavioral Health Treatment Plan and Discharge Planning: Courtney Davalos is aware of and agrees to continue to work on their treatment plan. They have identified and are working toward their discharge goals. yes    Depression Follow-up Plan Completed: Not applicable    Visit start and stop times:    01/23/25  Start Time: 1112  Stop Time: 1153  Total Visit Time: 41 minutes            "

## 2025-01-30 ENCOUNTER — VBI (OUTPATIENT)
Dept: ADMINISTRATIVE | Facility: OTHER | Age: 65
End: 2025-01-30

## 2025-01-30 NOTE — TELEPHONE ENCOUNTER
01/30/25 5:21 PM     Chart reviewed for Blood Pressure was/were submitted to the patient's insurance.     Sabina Benítez MA   [facility]

## 2025-02-05 ENCOUNTER — HOSPITAL ENCOUNTER (OUTPATIENT)
Dept: RADIOLOGY | Facility: HOSPITAL | Age: 65
Discharge: HOME | End: 2025-02-05
Attending: PHYSICAL MEDICINE & REHABILITATION
Payer: COMMERCIAL

## 2025-02-05 VITALS
OXYGEN SATURATION: 99 % | DIASTOLIC BLOOD PRESSURE: 88 MMHG | HEART RATE: 75 BPM | TEMPERATURE: 97.6 F | RESPIRATION RATE: 17 BRPM | SYSTOLIC BLOOD PRESSURE: 167 MMHG

## 2025-02-05 DIAGNOSIS — M48.061 LUMBAR FORAMINAL STENOSIS: ICD-10-CM

## 2025-02-05 PROCEDURE — 62323 NJX INTERLAMINAR LMBR/SAC: CPT | Performed by: PHYSICAL MEDICINE & REHABILITATION

## 2025-02-05 RX ORDER — METHYLPREDNISOLONE ACETATE 80 MG/ML
80 INJECTION, SUSPENSION INTRA-ARTICULAR; INTRALESIONAL; INTRAMUSCULAR; PARENTERAL; SOFT TISSUE ONCE
Status: COMPLETED | OUTPATIENT
Start: 2025-02-05 | End: 2025-02-05

## 2025-02-05 RX ADMIN — METHYLPREDNISOLONE ACETATE 80 MG: 80 INJECTION, SUSPENSION INTRA-ARTICULAR; INTRALESIONAL; INTRAMUSCULAR; SOFT TISSUE at 08:57

## 2025-02-05 RX ADMIN — IOHEXOL 1 ML: 300 INJECTION, SOLUTION INTRAVENOUS at 08:57

## 2025-02-05 NOTE — DISCHARGE INSTR - LAB
Epidural Steroid Injection   WHAT YOU NEED TO KNOW:   An epidural steroid injection (GLENN) is a procedure to inject steroid medicine into the epidural space. The epidural space is between your spinal cord and vertebrae. Steroids reduce inflammation and fluid buildup in your spine that may be causing pain. You may be given pain medicine along with the steroids.          ACTIVITY  Do not drive or operate machinery today.  No strenuous activity today - bending, lifting, etc.  You may resume normal activites starting tomorrow - start slowly and as tolerated.  You may shower today, but no tub baths or hot tubs.  You may have numbness for several hours from the local anesthetic. Please use caution and common sense, especially with weight-bearing activities.    CARE OF THE INJECTION SITE  If you have soreness or pain, apply ice to the area today (20 minutes on/20 minutes off).  Starting tomorrow, you may use warm, moist heat or ice if needed.  You may have an increase or change in your discomfort for 36-48 hours after your treatment.  Apply ice and continue with any pain medication you have been prescribed.  Notify the [facility] if you have any of the following: redness, drainage, swelling, headache, stiff neck or fever above 100°F.    SPECIAL INSTRUCTIONS  Our office will contact you in approximately 14 days for a progress report.    MEDICATIONS  Continue to take all routine medications.  Our office may have instructed you to hold some medications.    As no general anesthesia was used in today's procedure, you should not experience any side effects related to anesthesia.     If you are diabetic, the steroids used in today's injection may temporarily increase your blood sugar levels after the first few days after your injection. Please keep a close eye on your sugars and alert the doctor who manages your diabetes if your sugars are significantly high from your baseline or you are symptomatic.     If you have a  problem specifically related to your procedure, please call our office at [phone].  Problems not related to your procedure should be directed to your primary care physician.

## 2025-02-05 NOTE — H&P
History of Present Illness: The patient is a 64 y.o. female who presents with complaints of low back pain    Past Medical History:   Diagnosis Date    Abnormal breast exam     Anemia     Anxiety     Arthritis     Asthma     childhood    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HCC)     primary per allscripts    Cancer (HCC)     IgG kappa smoldering multiple myeloma    Cardiac murmur     Chronic liver disease     resolved 12/04/2017    COVID     Depression     Endometriosis     Fracture of tibia     right tibial plateau fracture per allscripts    Heart murmur     Hepatic disease     Hypertension     last assessed 12/13/2017    Inflammatory bowel disease     Kidney stone     Multiple myeloma (HCC)     Neuropathy     R foot     Nosebleed     OCD (obsessive compulsive disorder)     Osteoporosis     Otitis media     Pneumonia     RSD (reflex sympathetic dystrophy) 12/11/2018    Scoliosis     Seasonal allergies     Urinary tract infection     Visual impairment     Wears eyeglasses     Whiplash injury to neck        Past Surgical History:   Procedure Laterality Date    BACK SURGERY      hemilaminectomy and discectomy, L5-S1, right (Dr. Rashid, [facility] at [facility]) onset 02/14/2005 per allscripts    COLONOSCOPY      EXPLORATION EXTREMITY Right 08/29/2016    Procedure: KNEE PERONEAL NERVE EXPLORATION AND RELEASE ;  Surgeon: Bry De Guzman MD;  [facility];  Service:     FOOT SURGERY      FRACTURE SURGERY      HAND SURGERY      HERNIA REPAIR      IR BIOPSY KIDNEY MASS  05/25/2023    JOINT REPLACEMENT Right     RTK    KNEE SURGERY      MANDIBLE SURGERY      NEPHRECTOMY LAPAROSCOPIC Right 6/21/2023    Procedure: NEPHRECTOMY LAPAROSCOPIC ASSISTED;  Surgeon: Avinash Sterling MD;  [facility];  Service: Urology    NEUROPLASTY / TRANSPOSITION MEDIAN NERVE AT CARPAL TUNNEL      ORIF TIBIAL PLATEAU Right     arthroplasty per allscripts    OTHER SURGICAL HISTORY      injection of trigger point(s) per allscripts    WI ARTHRP LU  CONDYLE&PLATU MEDIAL&LAT COMPARTMENTS Right 06/11/2018    Procedure: ARTHROPLASTY KNEE TOTAL;  Surgeon: Bry De Guzman MD;  [facility];  Service: Orthopedics    MD TENDON SHEATH INCISION Right 12/02/2020    Procedure: RELEASE TRIGGER FINGER-right long;  Surgeon: Todd Mcdonald MD;  [facility];  Service: Orthopedics    SINUS SURGERY      SPINE SURGERY      TONSILLECTOMY           Current Outpatient Medications:     ammonium lactate (LAC-HYDRIN) 12 % lotion, Apply topically 2 (two) times a day as needed for dry skin, Disp: 222 mL, Rfl: 1    betamethasone valerate (VALISONE) 0.1 % ointment, Apply topically 2 (two) times a day As needed to affected area. Mix with Mupirocin ointment when applying, Disp: 45 g, Rfl: 0    Cholecalciferol (Vitamin D3) 50 MCG (2000 UT) TABS, Take 1 tablet (2,000 Units total) by mouth daily, Disp: 90 tablet, Rfl: 3    estradiol (VAGIFEM, YUVAFEM) 10 MCG TABS vaginal tablet, INSERT 1 TABLET INTO THE VAGINA 2 TIMES A WEEK., Disp: 24 tablet, Rfl: 1    FLUoxetine (PROzac) 40 MG capsule, Take 1 capsule (40 mg total) by mouth daily, Disp: 90 capsule, Rfl: 3    ketoconazole (NIZORAL) 2 % cream, Apply topically daily, Disp: 30 g, Rfl: 0    lidocaine (LIDODERM) 5 %, lidocaine 5 % topical patch  APPLY 1 PATCH TO AFFECTED AREA FOR 12 HOURS A DAY & REMOVE FOR 12 HOURS BEFORE APPLYING NEXT PATCH. (12 HOURS ON, 12 HOURS OFF), Disp: , Rfl:     lisinopril (ZESTRIL) 20 mg tablet, TAKE 1 TABLET BY MOUTH EVERY DAY, Disp: 90 tablet, Rfl: 1    methocarbamol (ROBAXIN) 500 mg tablet, Take 2 tablets (1,000 mg total) by mouth 3 (three) times a day for 7 days, Disp: 42 tablet, Rfl: 0    mometasone (NASONEX) 50 mcg/act nasal spray, 2 sprays into each nostril daily for 10 days, Disp: 1 Act, Rfl: 0    mupirocin (BACTROBAN) 2 % ointment, Apply topically 3 (three) times a day As needed to affected area. Mix with Clobetasol ointment when applying to scalp. Use by itself to buttock area, Disp: 30 g,  Rfl: 3    Seladelpar Lysine (Livdelzi) 10 MG CAPS, Take 10 mg by mouth daily, Disp: 30 capsule, Rfl: 11    traMADol (Ultram) 50 mg tablet, Take 1 tablet (50 mg total) by mouth every 8 (eight) hours as needed for moderate pain, Disp: 30 tablet, Rfl: 0    traZODone (DESYREL) 50 mg tablet, Take 0.5-1 tablets (25-50 mg total) by mouth daily at bedtime as needed for sleep, Disp: 90 tablet, Rfl: 3    triamcinolone (KENALOG) 0.1 % ointment, Apply topically 2 (two) times a day, Disp: 454 g, Rfl: 1    ursodiol (ACTIGALL) 500 MG tablet, Take 1 tablet (500 mg total) by mouth 2 (two) times a day, Disp: 180 tablet, Rfl: 3    valACYclovir (VALTREX) 500 mg tablet, Take 2 tablets (1,000 mg total) by mouth daily for 14 days, Disp: 28 tablet, Rfl: 0    Current Facility-Administered Medications:     iohexol (OMNIPAQUE) 300 mg/mL injection 1 mL, 1 mL, Epidural, Once, Carrington Vick DO    methylPREDNISolone acetate (DEPO-MEDROL) injection 80 mg, 80 mg, Epidural, Once, Carrington Vick, DO    Allergies   Allergen Reactions    Codeine GI Intolerance and Nausea Only     Other reaction(s): Other (See Comments)  Violently ill  Violently ill    Latex Rash     itching    Morphine And Codeine Nausea Only     GI intolerance nausea    Nortriptyline Shortness Of Breath    Ocaliva [Obeticholic Acid] Hives    Doxycycline GI Intolerance and Nausea Only    Sulfa Antibiotics GI Intolerance    Cymbalta [Duloxetine Hcl]     Penicillins Other (See Comments) and Rash     Childhood reaction       Physical Exam:   Vitals:    02/05/25 0833   BP: 150/86   Pulse: 79   Resp: 17   Temp: 97.6 °F (36.4 °C)   SpO2: 98%     General: Awake, Alert, Oriented x 3, Mood and affect appropriate  Respiratory: Respirations even and unlabored  Cardiovascular: Peripheral pulses intact; no edema  Musculoskeletal Exam: tenderness to palpation bilateral lumbar paraspinals     ASA Score: 2    Patient/Chart Verification  Patient ID Verified: Verbal  H&P( within 30 days)  Verified: To be obtained in the Procedural area  Allergies Reviewed: Yes  Anticoag/NSAID held?: NA  Currently on antibiotics?: No    Assessment:   1. Lumbar foraminal stenosis        Plan: L5-S1 LESI

## 2025-02-07 ENCOUNTER — TELEPHONE (OUTPATIENT)
Age: 65
End: 2025-02-07

## 2025-02-07 NOTE — TELEPHONE ENCOUNTER
[facility]oke to [facility]. Patient's co pay for Livdelzi increased to $2000 under the new Medicare Prescription Drug Payment Program. Patient can apply with her insurance and arrange for monthly payments with a $2000 cap.     Patient is aware the cost of Livdelzi increased due to insurance plan changes. Patient is not agreeable to continue taking Livdelzi due to cost. She is going to meet with [facility] next week to discuss options. Our office will reach out to [employer] patient assistance program to inquire if she qualifies.

## 2025-02-07 NOTE — TELEPHONE ENCOUNTER
[facility]. Calling [facility] called her and told her that the Livdelzi will cost her $2000 for the ear and she is not able to pay for that, she was paying $10 oop prior, pt. Will have enough medication to last her until Sunday, please advise

## 2025-02-07 NOTE — TELEPHONE ENCOUNTER
Pt called stated her medicine for livdelzi was 10 dollars to now 2000.00. requesting to speak with someone. Warm transferred over to triage nurse for further assistance

## 2025-02-10 ENCOUNTER — TELEPHONE (OUTPATIENT)
Age: 65
End: 2025-02-10

## 2025-02-19 ENCOUNTER — TELEPHONE (OUTPATIENT)
Dept: PAIN MEDICINE | Facility: CLINIC | Age: 65
End: 2025-02-19

## 2025-02-20 NOTE — PROGRESS NOTES
2025    TELEHEALTH EVALUATION -- Audio/Visual    HPI:    David Baca (:  1966) has requested an audio/video evaluation for the following concern(s):    David presents via VV this morning for c/o ongoing issues with worsening cough/chest congestion over the last 2-3 days. Cough is productive of a thick yellow mucus. Denies any wheezing/SOB. Does endorse mild low grade fevers. Has been taking Tylenol and Robitussin with modest benefit, but short lived. He has not tested COVID/Influenza, but denies any known exposures.     Review of Systems   Constitutional:  Positive for chills and fever. Negative for activity change, appetite change and fatigue.   HENT:  Positive for congestion and postnasal drip. Negative for sinus pressure, sinus pain and sore throat.    Eyes: Negative.    Respiratory:  Positive for cough. Negative for chest tightness, shortness of breath and wheezing.    Cardiovascular:  Negative for chest pain and palpitations.   Gastrointestinal:  Negative for abdominal distention, abdominal pain, constipation, diarrhea and nausea.   Genitourinary:  Negative for difficulty urinating, dysuria, flank pain, frequency and hematuria.   Musculoskeletal:  Negative for arthralgias, gait problem, joint swelling and myalgias.   Skin:  Negative for color change and rash.   Neurological:  Negative for dizziness, light-headedness and numbness.   Hematological: Negative.    Psychiatric/Behavioral: Negative.         Prior to Visit Medications    Medication Sig Taking? Authorizing Provider   predniSONE (DELTASONE) 10 MG tablet Take 4 tablets daily for 2 days, then 3 tablets for 2 days, 2 tablets for 2 days, then 1 tablet for 2 days Yes David Locke APRN - CNP   cetirizine-psuedoephedrine (ZYRTEC-D) 5-120 MG per extended release tablet Take 1 tablet by mouth 2 times daily as needed for Allergies Yes David Locke APRN - CNP   benzonatate (TESSALON) 200 MG capsule Take 1 capsule by mouth 3 times daily as  Had an extended discussion with the patient at the bedside today  We reviewed her MRI results indicating right upper pole renal mass measuring about 5 cm  Discussed that typically this is considered malignant until proven otherwise, and this is approximately 90% accurate by MRI reading alone  Discussed also that the appearance of her mass is somewhat atypical   It seems a bit more amorphous than well-defined  We discussed potential for observation, percutaneous biopsy, surgical removal   We discussed risk and benefits of each of these approaches  I would like to review films with my colleagues for additional input  We can certainly consider percutaneous biopsy if desired prior to proceeding with surgical removal which would necessitate right nephrectomy  We could also consider short-term MRI follow-up to reevaluate  All questions answered and she agrees with the plan

## 2025-03-10 ENCOUNTER — RA CDI HCC (OUTPATIENT)
Dept: OTHER | Facility: HOSPITAL | Age: 65
End: 2025-03-10

## 2025-03-10 NOTE — PROGRESS NOTES
HCC coding opportunities          Chart Reviewed number of suggestions sent to Provider: 2    K74.3  C64.1     Patients Insurance     Medicare Insurance: [employer]

## 2025-03-12 ENCOUNTER — APPOINTMENT (OUTPATIENT)
Dept: LAB | Facility: MEDICAL CENTER | Age: 65
End: 2025-03-12
Payer: COMMERCIAL

## 2025-03-12 ENCOUNTER — HOSPITAL ENCOUNTER (OUTPATIENT)
Dept: RADIOLOGY | Facility: MEDICAL CENTER | Age: 65
Discharge: HOME | End: 2025-03-12
Payer: COMMERCIAL

## 2025-03-12 DIAGNOSIS — K74.3 PRIMARY BILIARY CIRRHOSIS (HCC): ICD-10-CM

## 2025-03-12 DIAGNOSIS — C64.1 RENAL CELL CARCINOMA OF RIGHT KIDNEY (HCC): ICD-10-CM

## 2025-03-12 DIAGNOSIS — Z13.6 SCREENING FOR CARDIOVASCULAR CONDITION: ICD-10-CM

## 2025-03-12 DIAGNOSIS — C64.1 MALIGNANT NEOPLASM OF RIGHT KIDNEY (HCC): ICD-10-CM

## 2025-03-12 DIAGNOSIS — K76.0 METABOLIC DYSFUNCTION-ASSOCIATED STEATOTIC LIVER DISEASE (MASLD): ICD-10-CM

## 2025-03-12 DIAGNOSIS — C90.00 MULTIPLE MYELOMA NOT HAVING ACHIEVED REMISSION (HCC): ICD-10-CM

## 2025-03-12 LAB
ALBUMIN SERPL BCG-MCNC: 3.9 G/DL (ref 3.5–5)
ALP SERPL-CCNC: 334 U/L (ref 34–104)
ALT SERPL W P-5'-P-CCNC: 83 U/L (ref 7–52)
ANION GAP SERPL CALCULATED.3IONS-SCNC: 6 MMOL/L (ref 4–13)
AST SERPL W P-5'-P-CCNC: 64 U/L (ref 13–39)
BASOPHILS # BLD AUTO: 0.05 THOUSANDS/ÂΜL (ref 0–0.1)
BASOPHILS NFR BLD AUTO: 1 % (ref 0–1)
BILIRUB SERPL-MCNC: 0.56 MG/DL (ref 0.2–1)
BUN SERPL-MCNC: 17 MG/DL (ref 5–25)
CALCIUM SERPL-MCNC: 9.3 MG/DL (ref 8.4–10.2)
CHLORIDE SERPL-SCNC: 101 MMOL/L (ref 96–108)
CHOLEST SERPL-MCNC: 230 MG/DL (ref ?–200)
CO2 SERPL-SCNC: 30 MMOL/L (ref 21–32)
CREAT SERPL-MCNC: 0.9 MG/DL (ref 0.6–1.3)
EOSINOPHIL # BLD AUTO: 0.13 THOUSAND/ÂΜL (ref 0–0.61)
EOSINOPHIL NFR BLD AUTO: 3 % (ref 0–6)
ERYTHROCYTE [DISTWIDTH] IN BLOOD BY AUTOMATED COUNT: 12.8 % (ref 11.6–15.1)
GFR SERPL CREATININE-BSD FRML MDRD: 67 ML/MIN/1.73SQ M
GLUCOSE P FAST SERPL-MCNC: 84 MG/DL (ref 65–99)
HCT VFR BLD AUTO: 40 % (ref 34.8–46.1)
HDLC SERPL-MCNC: 86 MG/DL
HGB BLD-MCNC: 12.6 G/DL (ref 11.5–15.4)
IGA SERPL-MCNC: 41 MG/DL (ref 66–433)
IGG SERPL-MCNC: 2551 MG/DL (ref 635–1741)
IGM SERPL-MCNC: 226 MG/DL (ref 45–281)
IMM GRANULOCYTES # BLD AUTO: 0.07 THOUSAND/UL (ref 0–0.2)
IMM GRANULOCYTES NFR BLD AUTO: 2 % (ref 0–2)
INR PPP: 0.92 (ref 0.85–1.19)
LDLC SERPL CALC-MCNC: 128 MG/DL (ref 0–100)
LYMPHOCYTES # BLD AUTO: 1.29 THOUSANDS/ÂΜL (ref 0.6–4.47)
LYMPHOCYTES NFR BLD AUTO: 29 % (ref 14–44)
MCH RBC QN AUTO: 30.1 PG (ref 26.8–34.3)
MCHC RBC AUTO-ENTMCNC: 31.5 G/DL (ref 31.4–37.4)
MCV RBC AUTO: 96 FL (ref 82–98)
MONOCYTES # BLD AUTO: 0.31 THOUSAND/ÂΜL (ref 0.17–1.22)
MONOCYTES NFR BLD AUTO: 7 % (ref 4–12)
NEUTROPHILS # BLD AUTO: 2.6 THOUSANDS/ÂΜL (ref 1.85–7.62)
NEUTS SEG NFR BLD AUTO: 58 % (ref 43–75)
NRBC BLD AUTO-RTO: 0 /100 WBCS
PLATELET # BLD AUTO: 184 THOUSANDS/UL (ref 149–390)
PMV BLD AUTO: 11.1 FL (ref 8.9–12.7)
POTASSIUM SERPL-SCNC: 4.4 MMOL/L (ref 3.5–5.3)
PROT SERPL-MCNC: 8.2 G/DL (ref 6.4–8.4)
PROTHROMBIN TIME: 12.7 SECONDS (ref 12.3–15)
RBC # BLD AUTO: 4.19 MILLION/UL (ref 3.81–5.12)
SODIUM SERPL-SCNC: 137 MMOL/L (ref 135–147)
TRIGL SERPL-MCNC: 82 MG/DL (ref ?–150)
WBC # BLD AUTO: 4.45 THOUSAND/UL (ref 4.31–10.16)

## 2025-03-12 PROCEDURE — 80061 LIPID PANEL: CPT

## 2025-03-12 PROCEDURE — 74176 CT ABD & PELVIS W/O CONTRAST: CPT

## 2025-03-12 PROCEDURE — 36415 COLL VENOUS BLD VENIPUNCTURE: CPT

## 2025-03-12 PROCEDURE — 85610 PROTHROMBIN TIME: CPT

## 2025-03-13 ENCOUNTER — RESULTS FOLLOW-UP (OUTPATIENT)
Age: 65
End: 2025-03-13

## 2025-03-13 LAB
ALBUMIN SERPL ELPH-MCNC: 3.93 G/DL (ref 3.2–5.1)
ALBUMIN SERPL ELPH-MCNC: 49.7 % (ref 48–70)
ALPHA1 GLOB SERPL ELPH-MCNC: 0.29 G/DL (ref 0.15–0.47)
ALPHA1 GLOB SERPL ELPH-MCNC: 3.7 % (ref 1.8–7)
ALPHA2 GLOB SERPL ELPH-MCNC: 0.77 G/DL (ref 0.42–1.04)
ALPHA2 GLOB SERPL ELPH-MCNC: 9.7 % (ref 5.9–14.9)
BETA GLOB ABNORMAL SERPL ELPH-MCNC: 0.43 G/DL (ref 0.31–0.57)
BETA1 GLOB SERPL ELPH-MCNC: 5.5 % (ref 4.7–7.7)
BETA2 GLOB SERPL ELPH-MCNC: 4 % (ref 3.1–7.9)
BETA2+GAMMA GLOB SERPL ELPH-MCNC: 0.32 G/DL (ref 0.2–0.58)
GAMMA GLOB ABNORMAL SERPL ELPH-MCNC: 2.16 G/DL (ref 0.4–1.66)
GAMMA GLOB SERPL ELPH-MCNC: 27.4 % (ref 6.9–22.3)
IGG/ALB SER: 0.99 {RATIO} (ref 1.1–1.8)
INTERPRETATION UR IFE-IMP: NORMAL
KAPPA LC FREE SER-MCNC: 50.5 MG/L (ref 3.3–19.4)
KAPPA LC FREE/LAMBDA FREE SER: 3.77 {RATIO} (ref 0.26–1.65)
LAMBDA LC FREE SERPL-MCNC: 13.4 MG/L (ref 5.7–26.3)
M PROTEIN 1 MFR SERPL ELPH: 21.6 %
M PROTEIN 1 SERPL ELPH-MCNC: 1.71 G/DL
PROT PATTERN SERPL ELPH-IMP: ABNORMAL
PROT SERPL-MCNC: 7.9 G/DL (ref 6.4–8.2)

## 2025-03-13 PROCEDURE — 86334 IMMUNOFIX E-PHORESIS SERUM: CPT | Performed by: STUDENT IN AN ORGANIZED HEALTH CARE EDUCATION/TRAINING PROGRAM

## 2025-03-13 PROCEDURE — 84165 PROTEIN E-PHORESIS SERUM: CPT | Performed by: STUDENT IN AN ORGANIZED HEALTH CARE EDUCATION/TRAINING PROGRAM

## 2025-03-14 ENCOUNTER — OFFICE VISIT (OUTPATIENT)
Dept: INTERNAL MEDICINE CLINIC | Facility: CLINIC | Age: 65
End: 2025-03-14
Payer: COMMERCIAL

## 2025-03-14 VITALS
OXYGEN SATURATION: 97 % | SYSTOLIC BLOOD PRESSURE: 148 MMHG | WEIGHT: 173 LBS | BODY MASS INDEX: 25.55 KG/M2 | DIASTOLIC BLOOD PRESSURE: 100 MMHG | HEART RATE: 88 BPM

## 2025-03-14 DIAGNOSIS — G57.71 COMPLEX REGIONAL PAIN SYNDROME TYPE 2 OF RIGHT LOWER EXTREMITY: ICD-10-CM

## 2025-03-14 DIAGNOSIS — Z23 ENCOUNTER FOR IMMUNIZATION: Primary | ICD-10-CM

## 2025-03-14 DIAGNOSIS — L71.9 ROSACEA: ICD-10-CM

## 2025-03-14 DIAGNOSIS — D47.2 SMOLDERING MULTIPLE MYELOMA (SMM): Chronic | ICD-10-CM

## 2025-03-14 DIAGNOSIS — Z79.899 OTHER LONG TERM (CURRENT) DRUG THERAPY: ICD-10-CM

## 2025-03-14 DIAGNOSIS — M79.10 MYALGIA: ICD-10-CM

## 2025-03-14 DIAGNOSIS — R09.82 PND (POST-NASAL DRIP): ICD-10-CM

## 2025-03-14 DIAGNOSIS — I10 ESSENTIAL HYPERTENSION: ICD-10-CM

## 2025-03-14 DIAGNOSIS — K74.3 PRIMARY BILIARY CHOLANGITIS (HCC): ICD-10-CM

## 2025-03-14 DIAGNOSIS — N18.31 STAGE 3A CHRONIC KIDNEY DISEASE (HCC): ICD-10-CM

## 2025-03-14 DIAGNOSIS — K74.60 HEPATIC CIRRHOSIS, UNSPECIFIED HEPATIC CIRRHOSIS TYPE, UNSPECIFIED WHETHER ASCITES PRESENT (HCC): ICD-10-CM

## 2025-03-14 DIAGNOSIS — E78.5 HYPERLIPIDEMIA, UNSPECIFIED HYPERLIPIDEMIA TYPE: Chronic | ICD-10-CM

## 2025-03-14 DIAGNOSIS — R53.83 OTHER FATIGUE: ICD-10-CM

## 2025-03-14 DIAGNOSIS — R00.2 PALPITATIONS: ICD-10-CM

## 2025-03-14 DIAGNOSIS — L23.89 ALLERGIC CONTACT DERMATITIS DUE TO OTHER AGENTS: ICD-10-CM

## 2025-03-14 PROCEDURE — 99214 OFFICE O/P EST MOD 30 MIN: CPT | Performed by: INTERNAL MEDICINE

## 2025-03-14 PROCEDURE — G0008 ADMIN INFLUENZA VIRUS VAC: HCPCS

## 2025-03-14 PROCEDURE — G0009 ADMIN PNEUMOCOCCAL VACCINE: HCPCS

## 2025-03-14 PROCEDURE — G2211 COMPLEX E/M VISIT ADD ON: HCPCS | Performed by: INTERNAL MEDICINE

## 2025-03-14 PROCEDURE — 90677 PCV20 VACCINE IM: CPT

## 2025-03-14 PROCEDURE — 90662 IIV NO PRSV INCREASED AG IM: CPT

## 2025-03-14 RX ORDER — LISINOPRIL 20 MG/1
30 TABLET ORAL DAILY
Qty: 90 TABLET | Refills: 1 | Status: SHIPPED | OUTPATIENT
Start: 2025-03-14

## 2025-03-14 RX ORDER — METRONIDAZOLE 7.5 MG/G
GEL TOPICAL 2 TIMES DAILY
Qty: 45 G | Refills: 0 | Status: SHIPPED | OUTPATIENT
Start: 2025-03-14

## 2025-03-14 RX ORDER — MOMETASONE FUROATE MONOHYDRATE 50 UG/1
2 SPRAY, METERED NASAL DAILY
Qty: 1 G | Refills: 1 | Status: SHIPPED | OUTPATIENT
Start: 2025-03-14

## 2025-03-14 NOTE — TELEPHONE ENCOUNTER
Pt calling to advise she has been dealing with some family issues and hasn't had a chance to take of of some things with the medications. The pt stated she was taken off the  fenofibrate, the medication  seladelpar is over $2,000 now and she iwill be contacting patient assistance to get the price down. She was taking ursodiol but with everything going on stopped for a bit, she took the medication today.

## 2025-03-14 NOTE — ASSESSMENT & PLAN NOTE
Patient has noticed increasing fatigue and bone pain patient will be following up with her oncologist in the near future

## 2025-03-14 NOTE — ASSESSMENT & PLAN NOTE
Suboptimal control increase Zestril to 30 mg once daily continue to monitor blood pressure check BMP in 1 week  Orders:    lisinopril (ZESTRIL) 20 mg tablet; Take 1.5 tablets (30 mg total) by mouth daily    Basic metabolic panel; Future  There is a mild hypopigmentation of the skin medial aspect left knee in the area where she had a previous steroid injection previous year possibly related to the steroid injection at this point time patient would like to hold off on Derm eval and we will monitor if there is any change or worsen she will notify me

## 2025-03-14 NOTE — ASSESSMENT & PLAN NOTE
Lab Results   Component Value Date    EGFR 67 03/12/2025    EGFR 57 12/17/2024    EGFR 56 09/03/2024    CREATININE 0.90 03/12/2025    CREATININE 1.03 12/17/2024    CREATININE 1.04 09/03/2024

## 2025-03-14 NOTE — PROGRESS NOTES
Name: Courtney Davalos      : 1960      MRN: [id number]  Encounter Provider: Jose Daniel Harmon DO  Encounter Date: 3/14/2025   [facility]  :  Assessment & Plan  Encounter for immunization    Orders:    influenza vaccine, high-dose, PF 0.5 mL (Fluzone High Dose)    Pneumococcal Conjugate Vaccine 20-valent (Pcv20)    Stage 3a chronic kidney disease (HCC)  Lab Results   Component Value Date    EGFR 67 2025    EGFR 57 2024    EGFR 56 2024    CREATININE 0.90 2025    CREATININE 1.03 2024    CREATININE 1.04 2024          Smoldering multiple myeloma (SMM)  Patient has noticed increasing fatigue and bone pain patient will be following up with her oncologist in the near future       Primary biliary cholangitis (HCC)  Clinically stable and doing well continue the current medical regiment will continue monitor.  Currently on active call 500 mg twice daily continue routine monitoring via GI       Hyperlipidemia, unspecified hyperlipidemia type  Hyperlipidemia low-cholesterol diet       Complex regional pain syndrome type 2 of right lower extremity  Currently working with pain management       Other fatigue    Orders:    Vitamin B12; Future    TSH, 3rd generation with Free T4 reflex; Future    Palpitations  Intermittent palpitations will further evaluate Zio patch  Orders:    AMB extended holter monitor; Future    Myalgia  Myalgia versus bone pain will check aldolase CPK and patient will discuss with hematology need for bone scan  Orders:    Aldolase; Future    CK; Future    PND (post-nasal drip)  Residual postnasal drip recommend Nasonex 2 sprays each nostril once daily before bedtime if any nasal bleeding please discontinue use for about 10 days  Orders:    mometasone (NASONEX) 50 mcg/act nasal spray; 2 sprays into each nostril daily    Rosacea  Mild acne rosacea face/flushing Rx for MetroGel  Orders:    metroNIDAZOLE (METROGEL) 0.75 %  gel; Apply topically 2 (two) times a day    Hepatic cirrhosis, unspecified hepatic cirrhosis type, unspecified whether ascites present (HCC)  Clinically stable working with GI       Other long term (current) drug therapy    Orders:    Vitamin B12; Future    Essential hypertension  Suboptimal control increase Zestril to 30 mg once daily continue to monitor blood pressure check BMP in 1 week  Orders:    lisinopril (ZESTRIL) 20 mg tablet; Take 1.5 tablets (30 mg total) by mouth daily    Basic metabolic panel; Future  There is a mild hypopigmentation of the skin medial aspect left knee in the area where she had a previous steroid injection previous year possibly related to the steroid injection at this point time patient would like to hold off on Derm eval and we will monitor if there is any change or worsen she will notify me  Allergic contact dermatitis due to other agents  Mild contact dermatitis which is resolving bilateral thighs did recommend to use hydrocortisone 1% over-the-counter apply to affected area once a day as needed until resolved patient has remove the bed linens       RTO in 3 months call any problems       History of Present Illness   HPI 64-year old female coming in for a follow up office visit regarding chronic kidney disease, smoldering multiple myeloma, primary biliary angiitis, hyperlipidemia, complex regional pain syndrome type II right lower extremity, fatigue, palpitations, myalgias, postnasal, rosacea, hypertension; the patient reports me compliant taking medications without untoward side effects the.  The patient is here to review his medical condition, update me on the medical condition and the patient reports me no hospitalizations and no ER visits.  Patient does report to me symptoms of fatigue , right cheek red , left knne water in the knee white [place] at are of in area left knee pain, nasal congestion cough at night almost choke on it , rashy pimples thights winter which are improving  related to the bed linen  Palpitation intermittent palpitations      Review of Systems   Constitutional:  Negative for activity change, appetite change and unexpected weight change.   HENT:  Negative for congestion and postnasal drip.    Eyes:  Negative for visual disturbance.   Respiratory:  Negative for cough and shortness of breath.    Cardiovascular:  Positive for palpitations. Negative for chest pain.   Gastrointestinal:  Negative for abdominal pain, diarrhea, nausea and vomiting.   Skin:  Positive for rash.   Neurological:  Negative for dizziness, light-headedness and headaches.   Hematological:  Negative for adenopathy.       Objective   /100 (BP Location: Left arm, Patient Position: Sitting, Cuff Size: Standard)   Pulse 88   Wt 78.5 kg (173 lb)   LMP  (LMP Unknown)   SpO2 97%   BMI 25.55 kg/m²   Examination with Nicaaris in the room medical assistant mild resolving contact dermatitis bilateral thigh, 1 cm hypopigmented area medial left knee  Physical Exam  Vitals and nursing note reviewed. Exam conducted with a chaperone present.   Constitutional:       General: She is not in acute distress.     Appearance: Normal appearance. She is well-developed. She is not ill-appearing, toxic-appearing or diaphoretic.   HENT:      Head: Normocephalic and atraumatic.      Right Ear: External ear normal.      Left Ear: External ear normal.      Nose: Nose normal.   Eyes:      Pupils: Pupils are equal, round, and reactive to light.   Cardiovascular:      Rate and Rhythm: Normal rate and regular rhythm.      Heart sounds: Normal heart sounds. No murmur heard.  Pulmonary:      Effort: Pulmonary effort is normal.      Breath sounds: Normal breath sounds.   Abdominal:      General: There is no distension.      Palpations: Abdomen is soft.      Tenderness: There is no abdominal tenderness. There is no guarding.   Neurological:      Mental Status: She is alert.

## 2025-03-14 NOTE — ASSESSMENT & PLAN NOTE
Clinically stable and doing well continue the current medical regiment will continue monitor.  Currently on active call 500 mg twice daily continue routine monitoring via GI

## 2025-03-16 PROBLEM — L23.89 ALLERGIC CONTACT DERMATITIS DUE TO OTHER AGENTS: Status: ACTIVE | Noted: 2025-03-16

## 2025-03-16 NOTE — ASSESSMENT & PLAN NOTE
Mild contact dermatitis which is resolving bilateral thighs did recommend to use hydrocortisone 1% over-the-counter apply to affected area once a day as needed until resolved patient has remove the bed linens

## 2025-03-17 NOTE — TELEPHONE ENCOUNTER
Pt calling to inform that she contacted the patient assistance program today and they will be mailing her the paperwork for her to sign for the Seladelpar and the Ursodiol.

## 2025-03-19 ENCOUNTER — TELEPHONE (OUTPATIENT)
Age: 65
End: 2025-03-19

## 2025-03-19 NOTE — TELEPHONE ENCOUNTER
Patient calling to inform may be few minutes late.    Would be ok to let the next patient go and then see the caller.

## 2025-03-20 NOTE — TELEPHONE ENCOUNTER
Arya from [facility] calling in regarding the livdelzi. He is asking if pt was still interested in their assistance program.     Please discuss this with pt and call arya back at [phone]

## 2025-03-24 ENCOUNTER — TELEPHONE (OUTPATIENT)
Age: 65
End: 2025-03-24

## 2025-03-24 DIAGNOSIS — J40 BRONCHITIS: Primary | ICD-10-CM

## 2025-03-24 RX ORDER — AZITHROMYCIN 250 MG/1
TABLET, FILM COATED ORAL
Qty: 6 TABLET | Refills: 0 | Status: SHIPPED | OUTPATIENT
Start: 2025-03-24 | End: 2025-03-28

## 2025-03-24 NOTE — TELEPHONE ENCOUNTER
Arya from [facility] calling for update on patient assistance, Arya is asking if pt. Has gone through the support path through [employer]  of Livdelzi, advised pt. Spoke with patient assistance at support [facility] and they sent enrollment paperwork, called [facility] for Livdelzi and they received paperwork and processed the enrollment and sent to special[facility] but pt. Will need to enroll in the assistance program [employer] -assistance [employer] by calling [phone] or online [employer] in order for her medication to be covered due to large copay, [employer]-  Support [facility] Program Ziggy  called pt. To go over assistance fund information on 25 but pt. Was driving and couldn't take the number for TAF assistance program , this nurse called patient today to  speak with her and give information for assistance , pt. Stating she  applied online and got temporary approval card and will fill out paperwork for longer approval, asked pt. For approval number to give to Arya at [facility] but she said she did not feel good and will call back tomorrow, advised Arya at [facility] is waiting on information in order to process her medication , pt. Stating she will call back tomorrow , called Arya at [facility] and he looked up her information on the path portal and no enrollment status or information was found nor was an  enrollment located, unsure if pt. Will call back tomorrow

## 2025-03-24 NOTE — TELEPHONE ENCOUNTER
Pt called in stating she's been coughing since Saturday night. She says she's not able to sleep and has eat, throat, chest, and back pain. She says Dr. Harmon has agreed to just send in a zpack to her pharmacy like her former PCP did for her without an appt and it if doesn't work then she would have to come in for an appt.

## 2025-03-27 ENCOUNTER — OFFICE VISIT (OUTPATIENT)
Dept: INTERNAL MEDICINE CLINIC | Facility: CLINIC | Age: 65
End: 2025-03-27
Payer: COMMERCIAL

## 2025-03-27 VITALS
OXYGEN SATURATION: 97 % | SYSTOLIC BLOOD PRESSURE: 142 MMHG | HEART RATE: 86 BPM | BODY MASS INDEX: 25.78 KG/M2 | WEIGHT: 174.6 LBS | DIASTOLIC BLOOD PRESSURE: 72 MMHG

## 2025-03-27 DIAGNOSIS — J40 BRONCHITIS: Primary | ICD-10-CM

## 2025-03-27 DIAGNOSIS — J04.0 LARYNGITIS: ICD-10-CM

## 2025-03-27 PROCEDURE — 99213 OFFICE O/P EST LOW 20 MIN: CPT | Performed by: INTERNAL MEDICINE

## 2025-03-27 PROCEDURE — G2211 COMPLEX E/M VISIT ADD ON: HCPCS | Performed by: INTERNAL MEDICINE

## 2025-03-27 RX ORDER — PREDNISONE 20 MG/1
20 TABLET ORAL 2 TIMES DAILY WITH MEALS
Qty: 10 TABLET | Refills: 0 | Status: SHIPPED | OUTPATIENT
Start: 2025-03-27

## 2025-03-27 RX ORDER — DEXTROMETHORPHAN HYDROBROMIDE AND PROMETHAZINE HYDROCHLORIDE 15; 6.25 MG/5ML; MG/5ML
5 SYRUP ORAL 4 TIMES DAILY PRN
Qty: 120 ML | Refills: 0 | Status: SHIPPED | OUTPATIENT
Start: 2025-03-27

## 2025-03-27 NOTE — PROGRESS NOTES
Name: Courtney Davalos      : 1960      MRN: [id number]  Encounter Provider: Liborio Gallagher MD  Encounter Date: 3/27/2025   [facility] department: [facility]  :  Assessment & Plan  Bronchitis  -Please finish the remaining course of azithromycin  -Start prednisone 20 mg twice a day over the next 5 days  -A prescription for Promethazine DM has been sent for cough  -Take Mucinex/Robitussin DM for cough and congestion    Orders:  •  promethazine-dextromethorphan (PHENERGAN-DM) 6.25-15 mg/5 mL oral syrup; Take 5 mL by mouth 4 (four) times a day as needed for cough  •  predniSONE 20 mg tablet; Take 1 tablet (20 mg total) by mouth 2 (two) times a day with meals    Laryngitis  -Likely secondary to postnasal drip.  Patient can continue Flonase as prescribed by her PCP  -Perform daily salt water gargles for sore throat  -Recommended vocal rest              History of Present Illness   HPI  Patient presents today as an acute visit complaining of persistent cough and cold symptoms.  She reports her symptoms started over a week ago.  She was recently sent a prescription for a Z-Isaac.  She reports she has 1 day left.  Despite this she states she continues to experience cough with occasional yellowish/green sputum.  She denies any fevers or shortness of breath.    Review of Systems  All other systems negative except for pertinent findings noted in HPI.       Objective   /72 (BP Location: Left arm, Patient Position: Sitting, Cuff Size: Large)   Pulse 86   Wt 79.2 kg (174 lb 9.6 oz)   LMP  (LMP Unknown)   SpO2 97%   BMI 25.78 kg/m²      Physical Exam  General: NAD  HEENT: NCAT, EOMI, normal conjunctiva  Cardiovascular: RRR, normal S1 and S2, no m/r/g  Pulmonary: Normal respiratory effort, no wheezes, rales or rhonchi  Extremities: No lower extremity edema  Skin: Normal skin color, no rashes   Psychiatric: Normal mood and affect

## 2025-03-27 NOTE — PATIENT INSTRUCTIONS
-Please finish the remaining course of azithromycin  -Start prednisone 20 mg twice a day over the next 5 days  -A prescription for Promethazine DM has been sent for cough  -Take Mucinex/Robitussin DM for cough and congestion  -Perform daily salt water gargles for sore throat  -Try to rest your voice is much as possible and avoid acidic foods until your voice fully recovers

## 2025-04-03 ENCOUNTER — TELEPHONE (OUTPATIENT)
Age: 65
End: 2025-04-03

## 2025-04-03 NOTE — TELEPHONE ENCOUNTER
Patient has had appts with Dr. Harmon, Dr. Gallagher and is getting no better.  She only wants to see Dr. Harmon and I could find no openings until 4/11/25.  Patient still has cough and extreme fatigue.  I offered appts with different providers and she only wanted Dr. Harmon.  Please advise.   Thank you.

## 2025-04-04 ENCOUNTER — APPOINTMENT (OUTPATIENT)
Dept: RADIOLOGY | Age: 65
End: 2025-04-04
Payer: COMMERCIAL

## 2025-04-04 ENCOUNTER — RESULTS FOLLOW-UP (OUTPATIENT)
Dept: INTERNAL MEDICINE CLINIC | Facility: CLINIC | Age: 65
End: 2025-04-04

## 2025-04-04 ENCOUNTER — OFFICE VISIT (OUTPATIENT)
Dept: INTERNAL MEDICINE CLINIC | Facility: CLINIC | Age: 65
End: 2025-04-04
Payer: COMMERCIAL

## 2025-04-04 VITALS
DIASTOLIC BLOOD PRESSURE: 92 MMHG | HEIGHT: 69 IN | BODY MASS INDEX: 25.8 KG/M2 | HEART RATE: 87 BPM | WEIGHT: 174.2 LBS | TEMPERATURE: 96.6 F | SYSTOLIC BLOOD PRESSURE: 150 MMHG | OXYGEN SATURATION: 96 %

## 2025-04-04 DIAGNOSIS — J40 BRONCHITIS: ICD-10-CM

## 2025-04-04 DIAGNOSIS — J40 BRONCHITIS: Primary | ICD-10-CM

## 2025-04-04 PROCEDURE — 99213 OFFICE O/P EST LOW 20 MIN: CPT | Performed by: INTERNAL MEDICINE

## 2025-04-04 PROCEDURE — G2211 COMPLEX E/M VISIT ADD ON: HCPCS | Performed by: INTERNAL MEDICINE

## 2025-04-04 PROCEDURE — 71046 X-RAY EXAM CHEST 2 VIEWS: CPT

## 2025-04-04 RX ORDER — CEFUROXIME AXETIL 500 MG/1
500 TABLET ORAL EVERY 12 HOURS SCHEDULED
Qty: 20 TABLET | Refills: 0 | Status: SHIPPED | OUTPATIENT
Start: 2025-04-04 | End: 2025-04-14

## 2025-04-04 RX ORDER — FLUTICASONE PROPIONATE 110 UG/1
2 AEROSOL, METERED RESPIRATORY (INHALATION) 2 TIMES DAILY
Qty: 12 G | Refills: 0 | Status: SHIPPED | OUTPATIENT
Start: 2025-04-04 | End: 2025-04-04

## 2025-04-04 NOTE — PROGRESS NOTES
"Adult Annual Physical  Name: Courtney Davalos      : 1960      MRN: [id number]  Encounter Provider: Jose Daniel Harmon DO  Encounter Date: 2025   Enc[facility]: [facility]    :  Assessment & Plan  Need for COVID-19 vaccine         Encounter for immunization             Preventive Screenings:    - Cervical cancer screening: screening not indicated   - Breast cancer screening: screening up-to-date     Immunizations:  - Immunizations due: Zoster (Shingrix), Hepatitis A and Hepatitis B         History of Present Illness   {?Quick Links Encounters * My Last Note * Last Note in Specialty * Snapshot * Since Last Visit * History :40230}  Adult Annual Physical  Review of Systems  {Select to Display PMH (Optional):71668}    Objective {?Quick Links Trend Vitals * Enter New Vitals * Results Review * Timeline (Adult) * Labs * Imaging * Cardiology * Procedures * Lung Cancer Screening * Surgical eConsent :75347}  /92 (BP Location: Left arm, Patient Position: Sitting, Cuff Size: Standard)   Pulse 87   Temp (!) 96.6 °F (35.9 °C) (Tympanic)   Ht 5' 9\" (1.753 m)   Wt 79 kg (174 lb 3.2 oz)   LMP  (LMP Unknown)   SpO2 96%   BMI 25.72 kg/m²     Physical Exam  {Administrative / Billing Section (Optional):03024}  "

## 2025-04-04 NOTE — PATIENT INSTRUCTIONS
Please use Mucinex 600 mg 1 tablet every 12 hours as needed until better, use albuterol 2 puffs every 4-6 hours as needed for first couple days please use it regularly.

## 2025-04-04 NOTE — PROGRESS NOTES
"Name: Courtney Davalos      : 1960      MRN: [id number]  Encounter Provider: Jose Daniel Harmon DO  Encounter Date: 2025   Encounter department: [facility]  :  Assessment & Plan  Bronchitis  Possible crackles in the bases bronchitis rule out pneumonia chest x-ray PA and LAT use albuterol 2 puffs every 6 hours as needed Mucinex 600 mg twice daily as needed pain fluids and rest Rx for Ceftin 500 mg 1 tablet every 12 hours x 10 days she has had this medication in the past and tolerates well without side effect  Orders:  •  cefuroxime (CEFTIN) 500 mg tablet; Take 1 tablet (500 mg total) by mouth every 12 (twelve) hours for 10 days  •  XR chest pa and lateral; Future    RTO in 2 weeks call with any problems       History of Present Illness   HPI 64-year-old female chief complaint cough, congestion, fatigue , cough burning , yellow to brown chills and sweats hot and cold , took zpack but just masked , day 13 of sx, initially right ear pain to the throat pain down the chest to the back drainage chills and sweats , rapid onset brother sick in hospital 2 grandchildren, raddelling in chest pred masked it , reports asthma as child does notice some upper back pain with taking deep breath and has noticed being a little bit more winded has noticed rattling in her lungs.  Review of Systems   Constitutional:  Positive for fatigue. Negative for appetite change, chills and fever.   HENT:  Positive for congestion, ear pain and sore throat. Negative for rhinorrhea, sinus pressure, sinus pain and sneezing.    Eyes:  Negative for itching.   Respiratory:  Positive for cough. Negative for shortness of breath and wheezing.    Gastrointestinal:  Negative for diarrhea, nausea and vomiting.   Neurological:  Negative for headaches.       Objective   /92 (BP Location: Left arm, Patient Position: Sitting, Cuff Size: Standard)   Pulse 87   Temp (!) 96.6 °F (35.9 °C) (Tympanic)   Ht 5' 9\" (1.753 m)   Wt " 79 kg (174 lb 3.2 oz)   LMP  (LMP Unknown)   SpO2 96%   BMI 25.72 kg/m²   Postnasal drip, airway normal  Physical Exam  Vitals and nursing note reviewed.   Constitutional:       General: She is not in acute distress.     Appearance: Normal appearance. She is well-developed. She is not ill-appearing, toxic-appearing or diaphoretic.   HENT:      Head: Normocephalic and atraumatic.      Right Ear: External ear normal. There is no impacted cerumen.      Left Ear: External ear normal. There is no impacted cerumen.      Nose: Congestion present.      Mouth/Throat:      Pharynx: Posterior oropharyngeal erythema present. No oropharyngeal exudate.   Eyes:      General: No scleral icterus.        Right eye: No discharge.         Left eye: No discharge.      Conjunctiva/sclera: Conjunctivae normal.      Pupils: Pupils are equal, round, and reactive to light.   Cardiovascular:      Heart sounds: Normal heart sounds. No murmur heard.  Pulmonary:      Effort: No respiratory distress.      Breath sounds: Rales present. No wheezing.      Comments: ?  Mild crackles bilateral bases  Lymphadenopathy:      Cervical: No cervical adenopathy.   Neurological:      Mental Status: She is alert.

## 2025-04-06 DIAGNOSIS — R09.82 PND (POST-NASAL DRIP): ICD-10-CM

## 2025-04-08 RX ORDER — MOMETASONE FUROATE MONOHYDRATE 50 UG/1
SPRAY, METERED NASAL
Qty: 51 G | Refills: 1 | Status: SHIPPED | OUTPATIENT
Start: 2025-04-08

## 2025-04-09 ENCOUNTER — TELEPHONE (OUTPATIENT)
Dept: ADMINISTRATIVE | Facility: HOSPITAL | Age: 65
End: 2025-04-09

## 2025-04-09 DIAGNOSIS — C64.1 RENAL CELL CARCINOMA OF RIGHT KIDNEY (HCC): Primary | ICD-10-CM

## 2025-04-10 ENCOUNTER — TELEPHONE (OUTPATIENT)
Dept: UROLOGY | Facility: MEDICAL CENTER | Age: 65
End: 2025-04-10

## 2025-04-10 NOTE — TELEPHONE ENCOUNTER
Peer to peer performed today in regards to patient's upcoming CT scan.  Partial coverage was obtained.  Patient should undergo a CT of the chest with contrast prior to her upcoming appointment with Dr. Sterling.  Patient currently scheduled for CT of the chest with contrast and abdomen pelvis with and without contrast, which unfortunately was not fully covered by her insurance.    Please assist with scheduling the patient for the CT of the chest with contrast

## 2025-04-10 NOTE — TELEPHONE ENCOUNTER
Patient called stating she has a few more questions after speaking to the office. Warm transfer to office

## 2025-04-10 NOTE — TELEPHONE ENCOUNTER
"I spoke with PT and she was already scheduled for CT.  I also saw that the CT was changed to \"Chest w/ Con\" for 4/22.  She was also made aware of nothing to eat 3 hours prior.  "

## 2025-04-10 NOTE — TELEPHONE ENCOUNTER
Results of peer to peer for CT chest with contrast and abdomen and pelvis with and without contrast.    Partial approval was determined after the phone call.  Reasoning for this is that the patient underwent a CT of the abdomen and pelvis without contrast on 3/12/2025 which did not note findings concerning for recurrence.    Approval for CT chest with contrast approved to visualize the chest since the abdomen pelvis was already visualized on prior CT scan.    Approval code: [id number]  Date of approval: 4/10/25 - 10/7/25    I have placed a new order for the patient to undergo a CT of the chest with contrast prior to her office visit with Dr. Sterling.

## 2025-04-11 ENCOUNTER — OFFICE VISIT (OUTPATIENT)
Dept: INTERNAL MEDICINE CLINIC | Facility: CLINIC | Age: 65
End: 2025-04-11
Payer: COMMERCIAL

## 2025-04-11 VITALS
OXYGEN SATURATION: 97 % | HEART RATE: 79 BPM | TEMPERATURE: 98.1 F | WEIGHT: 174.6 LBS | DIASTOLIC BLOOD PRESSURE: 82 MMHG | BODY MASS INDEX: 25.86 KG/M2 | SYSTOLIC BLOOD PRESSURE: 142 MMHG | HEIGHT: 69 IN

## 2025-04-11 DIAGNOSIS — E13.9 DIABETES 1.5, MANAGED AS TYPE 2 (HCC): ICD-10-CM

## 2025-04-11 DIAGNOSIS — Z23 NEED FOR COVID-19 VACCINE: Primary | ICD-10-CM

## 2025-04-11 DIAGNOSIS — J40 BRONCHITIS: ICD-10-CM

## 2025-04-11 DIAGNOSIS — R63.5 WEIGHT GAIN: ICD-10-CM

## 2025-04-11 DIAGNOSIS — M54.16 LUMBAR RADICULOPATHY: ICD-10-CM

## 2025-04-11 DIAGNOSIS — R19.7 DIARRHEA, UNSPECIFIED TYPE: ICD-10-CM

## 2025-04-11 PROCEDURE — 91320 SARSCV2 VAC 30MCG TRS-SUC IM: CPT

## 2025-04-11 PROCEDURE — 90480 ADMN SARSCOV2 VAC 1/ONLY CMP: CPT

## 2025-04-11 PROCEDURE — 99214 OFFICE O/P EST MOD 30 MIN: CPT | Performed by: INTERNAL MEDICINE

## 2025-04-11 NOTE — PROGRESS NOTES
Name: Courtney Davalos      : 1960      MRN: [id number]  Encounter Provider: Jose Daniel Harmon DO  Encounter Date: 2025   Enc[facility] department: [facility]  :  Assessment & Plan  Need for COVID-19 vaccine  Patient would like to receive the COVID-vaccine  Orders:  •  COVID-19 Pfizer mRNA vaccine 12 yr and older (Comirnaty pre-filled syringe)    Weight gain  I have counselled the pt to follow a healthy and balanced diet ,and recommend routine exercise.  Patient has noticed a weight gain since her nephrectomy 3 years ago will check cortisol and thyroid level courage a healthy balanced diet watch portion control and exercise will continue to monitor  Orders:  •  Cortisol Level, AM Specimen; Future  •  TSH, 3rd generation; Future    Lumbar radiculopathy  Patient has had improvements in the past with GLENN she has noticed bilateral sciatica currently will have patient see pain management for consideration of GLENN Dr. Vick  Orders:  •  Ambulatory referral to Spine & Pain Management; Future    Diabetes 1.5, managed as type 2 (HCC)  I have counselled the pt to follow a healthy and balanced diet ,and recommend routine exercise.  6-year ago patient did have hemoglobin A1c at 6.5 most recent A1c 5.3  Lab Results   Component Value Date    HGBA1C 5.3 2023       Orders:  •  Hemoglobin A1C; Future  •  Comprehensive metabolic panel; Future  •  Lipid Panel with Direct LDL reflex; Future  •  Albumin / creatinine urine ratio; Future    Diarrhea, unspecified type  Chronic intermittent diarrhea will have patient see GI recommend lactose-free  Orders:  •  Ambulatory Referral to Gastroenterology; Future    Bronchitis  Improving completed course of Ceftin lung examination clear to auscultation mild residual mucus/cough recommend albuterol/Mucinex with fluids rest chest x-ray negative okay problems       Patient told to schedule a walk removal in the next several weeks  RTO as scheduled call me  "problems    Falls Plan of Care: balance, strength, and gait training instructions were provided.       History of Present Illness   HPI 64-year old female coming in for a follow up office visit regarding weight gain, lumbar radiculopathy, type 2 diabetes, diarrhea and bronchitis; the patient reports me compliant taking medications without untoward side effects the.  The patient is here to review his medical condition, update me on the medical condition and the patient reports me no hospitalizations and no ER visits.  No injuries since last visit improving symptoms only residual cough no fevers no chills cough is improving no shortness of breath.  Chronic intermittent diarrhea.  Difficulties with weight gain over the last several years since nephrectomy.  Also noticed bilateral sciatica has had GLENN in the past which was helpful fatigue , cough improving , last day ceftin Monday , after eating then bm chronic diary,  bp at home 139/89  Review of Systems   Constitutional:  Negative for activity change, appetite change and unexpected weight change.        Weight gain   HENT:  Negative for congestion and postnasal drip.    Eyes:  Negative for visual disturbance.   Respiratory:  Positive for cough. Negative for shortness of breath.    Cardiovascular:  Negative for chest pain.   Gastrointestinal:  Positive for diarrhea. Negative for abdominal pain, nausea and vomiting.   Musculoskeletal:         Bilateral sciatica   Neurological:  Negative for dizziness, light-headedness and headaches.   Hematological:  Negative for adenopathy.   Bilateral sciatica    Objective   /82 (BP Location: Left arm, Patient Position: Sitting, Cuff Size: Standard)   Pulse 79   Temp 98.1 °F (36.7 °C) (Tympanic)   Ht 5' 9\" (1.753 m)   Wt 79.2 kg (174 lb 9.6 oz)   LMP  (LMP Unknown)   SpO2 97%   BMI 25.78 kg/m²      Physical Exam  Vitals and nursing note reviewed.   Constitutional:       General: She is not in acute distress.     " Appearance: Normal appearance. She is well-developed. She is not ill-appearing, toxic-appearing or diaphoretic.   HENT:      Head: Normocephalic and atraumatic.      Right Ear: External ear normal.      Left Ear: External ear normal.      Nose: Nose normal.   Eyes:      Pupils: Pupils are equal, round, and reactive to light.   Cardiovascular:      Rate and Rhythm: Normal rate and regular rhythm.      Heart sounds: Normal heart sounds. No murmur heard.  Pulmonary:      Effort: Pulmonary effort is normal.      Breath sounds: Normal breath sounds.   Abdominal:      General: There is no distension.      Palpations: Abdomen is soft.      Tenderness: There is no abdominal tenderness. There is no guarding.   Neurological:      Mental Status: She is alert.     Negative edema, distal pulses 2/2  Administrative Statements   I have spent a total time of 30 minutes in caring for this patient on the day of the visit/encounter including Diagnostic results, Impressions, Counseling / Coordination of care, Documenting in the medical record, Reviewing/placing orders in the medical record (including tests, medications, and/or procedures), and Obtaining or reviewing history  .

## 2025-04-11 NOTE — ASSESSMENT & PLAN NOTE
Patient has had improvements in the past with GLENN she has noticed bilateral sciatica currently will have patient see pain management for consideration of GLENN Dr. Vick  Orders:  •  Ambulatory referral to Spine & Pain Management; Future

## 2025-04-12 PROBLEM — J40 BRONCHITIS: Status: ACTIVE | Noted: 2025-04-12

## 2025-04-12 NOTE — ASSESSMENT & PLAN NOTE
Improving completed course of Ceftin lung examination clear to auscultation mild residual mucus/cough recommend albuterol/Mucinex with fluids rest chest x-ray negative okay problems

## 2025-04-14 ENCOUNTER — APPOINTMENT (OUTPATIENT)
Dept: LAB | Facility: CLINIC | Age: 65
End: 2025-04-14
Payer: COMMERCIAL

## 2025-04-14 ENCOUNTER — TELEPHONE (OUTPATIENT)
Dept: INTERNAL MEDICINE CLINIC | Facility: CLINIC | Age: 65
End: 2025-04-14

## 2025-04-14 DIAGNOSIS — R63.5 WEIGHT GAIN: ICD-10-CM

## 2025-04-14 DIAGNOSIS — M79.10 MYALGIA: ICD-10-CM

## 2025-04-14 DIAGNOSIS — I10 ESSENTIAL HYPERTENSION: ICD-10-CM

## 2025-04-14 DIAGNOSIS — E13.9 DIABETES 1.5, MANAGED AS TYPE 2 (HCC): ICD-10-CM

## 2025-04-14 DIAGNOSIS — R53.83 OTHER FATIGUE: ICD-10-CM

## 2025-04-14 DIAGNOSIS — Z79.899 OTHER LONG TERM (CURRENT) DRUG THERAPY: ICD-10-CM

## 2025-04-14 LAB
ALBUMIN SERPL BCG-MCNC: 3.8 G/DL (ref 3.5–5)
ALP SERPL-CCNC: 198 U/L (ref 34–104)
ALT SERPL W P-5'-P-CCNC: 31 U/L (ref 7–52)
ANION GAP SERPL CALCULATED.3IONS-SCNC: 5 MMOL/L (ref 4–13)
AST SERPL W P-5'-P-CCNC: 29 U/L (ref 13–39)
BILIRUB SERPL-MCNC: 0.64 MG/DL (ref 0.2–1)
BUN SERPL-MCNC: 21 MG/DL (ref 5–25)
CALCIUM SERPL-MCNC: 9.1 MG/DL (ref 8.4–10.2)
CHLORIDE SERPL-SCNC: 99 MMOL/L (ref 96–108)
CK SERPL-CCNC: 43 U/L (ref 26–192)
CO2 SERPL-SCNC: 30 MMOL/L (ref 21–32)
CREAT SERPL-MCNC: 1.05 MG/DL (ref 0.6–1.3)
CREAT UR-MCNC: 195.9 MG/DL
EST. AVERAGE GLUCOSE BLD GHB EST-MCNC: 134 MG/DL
GFR SERPL CREATININE-BSD FRML MDRD: 55 ML/MIN/1.73SQ M
GLUCOSE SERPL-MCNC: 68 MG/DL (ref 65–140)
HBA1C MFR BLD: 6.3 %
MICROALBUMIN UR-MCNC: 77.4 MG/L
MICROALBUMIN/CREAT 24H UR: 40 MG/G CREATININE (ref 0–30)
POTASSIUM SERPL-SCNC: 4.5 MMOL/L (ref 3.5–5.3)
PROT SERPL-MCNC: 8.2 G/DL (ref 6.4–8.4)
SODIUM SERPL-SCNC: 134 MMOL/L (ref 135–147)
TSH SERPL DL<=0.05 MIU/L-ACNC: 2.86 UIU/ML (ref 0.45–4.5)
VIT B12 SERPL-MCNC: 243 PG/ML (ref 180–914)

## 2025-04-14 PROCEDURE — 83036 HEMOGLOBIN GLYCOSYLATED A1C: CPT

## 2025-04-14 PROCEDURE — 82043 UR ALBUMIN QUANTITATIVE: CPT

## 2025-04-14 PROCEDURE — 82550 ASSAY OF CK (CPK): CPT

## 2025-04-14 PROCEDURE — 36415 COLL VENOUS BLD VENIPUNCTURE: CPT

## 2025-04-14 PROCEDURE — 84443 ASSAY THYROID STIM HORMONE: CPT

## 2025-04-14 PROCEDURE — 82607 VITAMIN B-12: CPT

## 2025-04-14 PROCEDURE — 82570 ASSAY OF URINE CREATININE: CPT

## 2025-04-14 PROCEDURE — 80053 COMPREHEN METABOLIC PANEL: CPT

## 2025-04-14 PROCEDURE — 82085 ASSAY OF ALDOLASE: CPT

## 2025-04-14 NOTE — TELEPHONE ENCOUNTER
Spoke to the patient and advised, also advised that she not have the lipid panel completed as it was just done in March.

## 2025-04-14 NOTE — TELEPHONE ENCOUNTER
----- Message from Jose Daniel Harmon DO sent at 4/12/2025  7:25 PM EDT -----  Please let the patient know that the next routine lab draw I did order some additional laboratories because of the previous elevated hemoglobin A1c years ago in the diabetic range I will be checking her hemoglobin A1c CMP lipids and urine microalbumin a fasting state thank you

## 2025-04-15 ENCOUNTER — RESULTS FOLLOW-UP (OUTPATIENT)
Dept: OTHER | Facility: HOSPITAL | Age: 65
End: 2025-04-15

## 2025-04-15 LAB — ALDOLASE SERPL-CCNC: 3.6 U/L (ref 3.3–10.3)

## 2025-04-21 ENCOUNTER — OFFICE VISIT (OUTPATIENT)
Dept: INTERNAL MEDICINE CLINIC | Facility: CLINIC | Age: 65
End: 2025-04-21
Payer: COMMERCIAL

## 2025-04-21 VITALS
OXYGEN SATURATION: 96 % | WEIGHT: 171.6 LBS | SYSTOLIC BLOOD PRESSURE: 160 MMHG | DIASTOLIC BLOOD PRESSURE: 84 MMHG | HEIGHT: 69 IN | HEART RATE: 84 BPM | BODY MASS INDEX: 25.42 KG/M2

## 2025-04-21 DIAGNOSIS — J40 BRONCHITIS: Primary | ICD-10-CM

## 2025-04-21 DIAGNOSIS — H61.21 IMPACTED CERUMEN OF RIGHT EAR: ICD-10-CM

## 2025-04-21 PROCEDURE — G2211 COMPLEX E/M VISIT ADD ON: HCPCS

## 2025-04-21 PROCEDURE — 99214 OFFICE O/P EST MOD 30 MIN: CPT

## 2025-04-21 RX ORDER — CEFUROXIME AXETIL 500 MG/1
500 TABLET ORAL EVERY 12 HOURS SCHEDULED
Qty: 14 TABLET | Refills: 0 | Status: SHIPPED | OUTPATIENT
Start: 2025-04-21 | End: 2025-04-28

## 2025-04-21 RX ORDER — SACCHAROMYCES BOULARDII 250 MG
250 CAPSULE ORAL 2 TIMES DAILY
Qty: 14 CAPSULE | Refills: 0 | Status: SHIPPED | OUTPATIENT
Start: 2025-04-21 | End: 2025-04-28

## 2025-04-21 RX ORDER — BENZONATATE 100 MG/1
100 CAPSULE ORAL 3 TIMES DAILY PRN
Qty: 20 CAPSULE | Refills: 0 | Status: SHIPPED | OUTPATIENT
Start: 2025-04-21

## 2025-04-21 NOTE — ASSESSMENT & PLAN NOTE
Recently seen in office on 4/4 and 4/11 for bronchitis, treated with Ceftin 10 days course, complaining of late presentation of symptoms, gave another 7 days course of Ceftin, advised to take probiotics while on antibiotic, use Tessalon Perles at bedtime to suppress cough and Phenergan during daytime to clear up sputum  Orders:    cefuroxime (CEFTIN) 500 mg tablet; Take 1 tablet (500 mg total) by mouth every 12 (twelve) hours for 7 days    saccharomyces boulardii (FLORASTOR) 250 mg capsule; Take 1 capsule (250 mg total) by mouth 2 (two) times a day for 7 days    benzonatate (TESSALON PERLES) 100 mg capsule; Take 1 capsule (100 mg total) by mouth 3 (three) times a day as needed for cough

## 2025-04-21 NOTE — ASSESSMENT & PLAN NOTE
Ceruminosis is noted.  Wax is removed by syringing and manual debridement. Instructions for home care to prevent wax buildup are given.
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
## 2025-04-22 ENCOUNTER — HOSPITAL ENCOUNTER (OUTPATIENT)
Dept: RADIOLOGY | Facility: MEDICAL CENTER | Age: 65
Discharge: HOME | End: 2025-04-22
Payer: COMMERCIAL

## 2025-04-22 DIAGNOSIS — C64.1 RENAL CELL CARCINOMA OF RIGHT KIDNEY (HCC): ICD-10-CM

## 2025-04-22 PROCEDURE — 71260 CT THORAX DX C+: CPT

## 2025-04-22 RX ADMIN — IOHEXOL 85 ML: 350 INJECTION, SOLUTION INTRAVENOUS at 10:44

## 2025-04-29 ENCOUNTER — OFFICE VISIT (OUTPATIENT)
Dept: UROLOGY | Facility: AMBULATORY SURGERY CENTER | Age: 65
End: 2025-04-29
Payer: COMMERCIAL

## 2025-04-29 ENCOUNTER — TELEPHONE (OUTPATIENT)
Age: 65
End: 2025-04-29

## 2025-04-29 VITALS
WEIGHT: 171 LBS | HEART RATE: 78 BPM | SYSTOLIC BLOOD PRESSURE: 152 MMHG | BODY MASS INDEX: 25.33 KG/M2 | HEIGHT: 69 IN | OXYGEN SATURATION: 97 % | DIASTOLIC BLOOD PRESSURE: 100 MMHG

## 2025-04-29 DIAGNOSIS — R91.1 PULMONARY NODULE: ICD-10-CM

## 2025-04-29 DIAGNOSIS — C64.1 MALIGNANT NEOPLASM OF RIGHT KIDNEY (HCC): Primary | ICD-10-CM

## 2025-04-29 DIAGNOSIS — G89.29 CHRONIC LEFT-SIDED LOW BACK PAIN WITHOUT SCIATICA: ICD-10-CM

## 2025-04-29 DIAGNOSIS — M54.50 CHRONIC LEFT-SIDED LOW BACK PAIN WITHOUT SCIATICA: ICD-10-CM

## 2025-04-29 PROCEDURE — 99213 OFFICE O/P EST LOW 20 MIN: CPT | Performed by: UROLOGY

## 2025-04-29 NOTE — PROGRESS NOTES
"Name: Courtney Davalos      : 1960      MRN: [id number]  Encounter Provider: Avinash Sterling MD  Encounter Date: 2025   Encounter department: [facility] UROLOGY [place]  :  Assessment & Plan  Malignant neoplasm of right kidney (HCC)  Discussed continued CT surveillance for stage III cancer at least 5 years.  Orders:    Basic metabolic panel; Future    CT chest w ct abdomen pelvis w wo contrast; Future    Pulmonary nodule  Follow CT chest.  Orders:    Basic metabolic panel; Future    CT chest w ct abdomen pelvis w wo contrast; Future    Chronic left-sided low back pain without sciatica    Orders:    Ambulatory Referral to Neurosurgery; Future        History of Present Illness   Courtney Davalos is a 64 y.o. female with history of stage III renal cell carcinoma status post laparoscopic right nephrectomy on 2023.  She is found to have high-grade disease with invasion of the renal sinus and vessels.  She had a 1 year course of Keytruda adjuvant therapy.  Renal function has been around to GFR 60.  Her primary complaint is severe back pain.    CT chest, abdomen, pelvis does not show any evidence of malignancy. There is a small pulmonary nodule.       Review of Systems       Objective   /100 (BP Location: Left arm, Patient Position: Sitting, Cuff Size: Adult)   Pulse 78   Ht 5' 9\" (1.753 m)   Wt 77.6 kg (171 lb)   LMP  (LMP Unknown)   SpO2 97%   BMI 25.25 kg/m²     Physical Exam      Results   No results found for: \"PSA\"  Lab Results   Component Value Date    GLUCOSE 134 2015    CALCIUM 9.1 2025     2015    K 4.5 2025    CO2 30 2025    CL 99 2025    BUN 21 2025    CREATININE 1.05 2025     Lab Results   Component Value Date    WBC 4.45 2025    HGB 12.6 2025    HCT 40.0 2025    MCV 96 2025     2025       Office Urine Dip  No results found for this or any previous visit (from the past " hour).

## 2025-04-29 NOTE — TELEPHONE ENCOUNTER
Caller: Patient    Doctor/Office: Dr Vick    Call regarding :  patient calling to schedule repeat injection    Call was transferred to: procedure

## 2025-04-29 NOTE — TELEPHONE ENCOUNTER
Caller: Courtney FRASER    Doctor: Dr. Vick     Reason for call: Pt called in to see if she could get an injection or a medical marijuana care because of the pain.    Call back#: [phone]

## 2025-04-29 NOTE — TELEPHONE ENCOUNTER
Caller: javier Valdez    Doctor: Dr. Vick    Reason for call: pt called again to speak to the office    Call back#: [phone]

## 2025-04-29 NOTE — TELEPHONE ENCOUNTER
Left pt a detailed VM asking her to call back if she would like to schedule the repeat injection. Left c/b#.

## 2025-04-30 ENCOUNTER — OFFICE VISIT (OUTPATIENT)
Age: 65
End: 2025-04-30
Payer: COMMERCIAL

## 2025-04-30 VITALS
HEART RATE: 80 BPM | SYSTOLIC BLOOD PRESSURE: 136 MMHG | BODY MASS INDEX: 24.97 KG/M2 | DIASTOLIC BLOOD PRESSURE: 80 MMHG | WEIGHT: 174.4 LBS | HEIGHT: 70 IN

## 2025-04-30 DIAGNOSIS — K76.0 METABOLIC DYSFUNCTION-ASSOCIATED STEATOTIC LIVER DISEASE (MASLD): ICD-10-CM

## 2025-04-30 DIAGNOSIS — K74.3 PRIMARY BILIARY CIRRHOSIS (HCC): Primary | ICD-10-CM

## 2025-04-30 PROCEDURE — 99214 OFFICE O/P EST MOD 30 MIN: CPT | Performed by: INTERNAL MEDICINE

## 2025-04-30 NOTE — PROGRESS NOTES
Ambulatory Visit  Name: Courtney Davalos      : 1960      MRN: [id number]  Encounter Provider: Ebenezer Esquead MD  Encounter Date: 2025   Encounter department: [facility]OLOG[facility]    Assessment & Plan  Primary biliary cirrhosis (HCC)    Diagnosed over 20 years ago.  Treated with hever with eventual addition of fenofibrate, with a switch to Seladelpar 4 weeks ago.  Alkaline phosphatase has improved from 334 down to 198.  No physical, laboratory or radiologic evidence of cirrhosis or portal HTN.  Last abdominal imaging was CT on 23.  Stable left hepatic lobe hemangioma.  Will [id number] mg (500 mg twice daily) and Seladelpar 10 mg daily.  Continue with blood work every 3 month.  Up to date with osteopenia/osteoporosis screening.  Fat-soluble vitamin levels at goal except for vitamin E.    Orders:    Comprehensive metabolic panel; Standing    Metabolic dysfunction-associated steatotic liver disease (MASLD)  Reminded patient about the risk factors for concomitant liver disease increasing the rate of fibrosis.  Unfortunately, she has gained 11 pounds since her last office visit.  Advised her to refocus on a healthy lifestyle for effective weight loss/maintenance.  Ultrasound elastography done in early  showed minimal hepatic steatosis and only S1 steatosis.  No need to repeat elastography at this time.  Orders:    Comprehensive metabolic panel; Standing      History of Present Illness     Courtney Davalos is a 64 y.o. female who presents for follow-up of her PBC and metabolic dysfunction associated fatty liver disease after last being seen in October.    At that time, she had fluctuating levels of alkaline phosphatase that were not at your goal and we initiated to switch from fenofibrate to Seladelpar (Livdelzi), which she finally got approved and patient assistance and has been on it now for just under 4 weeks.  Her alkaline phosphatase had gone up into the high 300s  and is now is back down to 198.    She had significant symptoms of pruritus during that timeframe however that is completely resolved now.  She also states she has much less fatigue.    Unfortunately, she has not been able to lose any weight and in fact gained about 11 pounds since her last office visit.    Wt Readings from Last 20 Encounters:   04/30/25 79.1 kg (174 lb 6.4 oz)   04/29/25 77.6 kg (171 lb)   04/21/25 77.8 kg (171 lb 9.6 oz)   04/11/25 79.2 kg (174 lb 9.6 oz)   04/04/25 79 kg (174 lb 3.2 oz)   03/27/25 79.2 kg (174 lb 9.6 oz)   03/14/25 78.5 kg (173 lb)   12/18/24 74.4 kg (164 lb)   12/17/24 75.3 kg (166 lb)   11/01/24 74.4 kg (164 lb)   10/29/24 73.9 kg (163 lb)   10/25/24 73.9 kg (163 lb)   10/21/24 73.9 kg (163 lb)   10/14/24 74.1 kg (163 lb 6.4 oz)   09/19/24 73.5 kg (162 lb)   09/05/24 71.8 kg (158 lb 3.2 oz)   08/08/24 71.7 kg (158 lb)   07/01/24 72.8 kg (160 lb 9.6 oz)   06/25/24 73 kg (161 lb)   06/12/24 74.8 kg (165 lb)             History obtained from : patient  Review of Systems  Medical History Reviewed by provider this encounter:       Current Outpatient Medications on File Prior to Visit   Medication Sig Dispense Refill    ammonium lactate (LAC-HYDRIN) 12 % lotion Apply topically 2 (two) times a day as needed for dry skin 222 mL 1    benzonatate (TESSALON PERLES) 100 mg capsule Take 1 capsule (100 mg total) by mouth 3 (three) times a day as needed for cough 20 capsule 0    betamethasone valerate (VALISONE) 0.1 % ointment Apply topically 2 (two) times a day As needed to affected area. Mix with Mupirocin ointment when applying 45 g 0    Cholecalciferol (Vitamin D3) 50 MCG (2000 UT) TABS Take 1 tablet (2,000 Units total) by mouth daily (Patient taking differently: Take 2,000 Units by mouth if needed) 90 tablet 3    estradiol (VAGIFEM, YUVAFEM) 10 MCG TABS vaginal tablet INSERT 1 TABLET INTO THE VAGINA 2 TIMES A WEEK. 24 tablet 1    FLUoxetine (PROzac) 40 MG capsule Take 1 capsule (40 mg  total) by mouth daily 90 capsule 3    ketoconazole (NIZORAL) 2 % cream Apply topically daily 30 g 0    lidocaine (LIDODERM) 5 % lidocaine 5 % topical patch   APPLY 1 PATCH TO AFFECTED AREA FOR 12 HOURS A DAY & REMOVE FOR 12 HOURS BEFORE APPLYING NEXT PATCH. (12 HOURS ON, 12 HOURS OFF)      lisinopril (ZESTRIL) 20 mg tablet Take 1.5 tablets (30 mg total) by mouth daily 90 tablet 1    metroNIDAZOLE (METROGEL) 0.75 % gel Apply topically 2 (two) times a day 45 g 0    mometasone (NASONEX) 50 mcg/act nasal spray SPRAY 2 SPRAYS INTO EACH NOSTRIL EVERY DAY 51 g 1    promethazine-dextromethorphan (PHENERGAN-DM) 6.25-15 mg/5 mL oral syrup Take 5 mL by mouth 4 (four) times a day as needed for cough 120 mL 0    Seladelpar Lysine (Livdelzi) 10 MG CAPS Take 10 mg by mouth daily 30 capsule 11    traMADol (Ultram) 50 mg tablet Take 1 tablet (50 mg total) by mouth every 8 (eight) hours as needed for moderate pain 30 tablet 0    traZODone (DESYREL) 50 mg tablet Take 0.5-1 tablets (25-50 mg total) by mouth daily at bedtime as needed for sleep 90 tablet 3    ursodiol (ACTIGALL) 500 MG tablet Take 1 tablet (500 mg total) by mouth 2 (two) times a day 180 tablet 3    valACYclovir (VALTREX) 500 mg tablet Take 2 tablets (1,000 mg total) by mouth daily for 14 days 28 tablet 0    methocarbamol (ROBAXIN) 500 mg tablet Take 2 tablets (1,000 mg total) by mouth 3 (three) times a day for 7 days 42 tablet 0    mometasone (NASONEX) 50 mcg/act nasal spray 2 sprays into each nostril daily for 10 days 1 Act 0    mupirocin (BACTROBAN) 2 % ointment Apply topically 3 (three) times a day As needed to affected area. Mix with Clobetasol ointment when applying to scalp. Use by itself to buttock area (Patient not taking: Reported on 4/30/2025) 30 g 3    predniSONE 20 mg tablet Take 1 tablet (20 mg total) by mouth 2 (two) times a day with meals (Patient not taking: Reported on 4/30/2025) 10 tablet 0    triamcinolone (KENALOG) 0.1 % ointment Apply topically 2  "(two) times a day (Patient not taking: Reported on 4/30/2025) 454 g 1     No current facility-administered medications on file prior to visit.      Social History     Tobacco Use    Smoking status: Never     Passive exposure: Never    Smokeless tobacco: Never   Vaping Use    Vaping status: Never Used   Substance and Sexual Activity    Alcohol use: Not Currently    Drug use: No    Sexual activity: Yes     Partners: Male     Birth control/protection: Post-menopausal         Objective     /80   Pulse 80   Ht 5' 9.5\" (1.765 m)   Wt 79.1 kg (174 lb 6.4 oz)   LMP  (LMP Unknown)   BMI 25.39 kg/m²     Physical Exam  Vitals and nursing note reviewed.   Constitutional:       General: She is not in acute distress.     Appearance: She is well-developed.   HENT:      Head: Normocephalic and atraumatic.   Eyes:      Conjunctiva/sclera: Conjunctivae normal.   Cardiovascular:      Rate and Rhythm: Normal rate and regular rhythm.      Heart sounds: No murmur heard.  Pulmonary:      Effort: Pulmonary effort is normal. No respiratory distress.      Breath sounds: Normal breath sounds.   Abdominal:      Palpations: Abdomen is soft.      Tenderness: There is no abdominal tenderness.   Musculoskeletal:         General: No swelling.      Cervical back: Neck supple.   Skin:     General: Skin is warm and dry.      Capillary Refill: Capillary refill takes less than 2 seconds.   Neurological:      Mental Status: She is alert.   Psychiatric:         Mood and Affect: Mood normal.       Administrative Statements   I have spent a total time of 38 minutes in caring for this patient on the day of the visit/encounter including Diagnostic results, Prognosis, Risks and benefits of tx options, Instructions for management, Patient and family education, Impressions, Documenting in the medical record, and Reviewing / ordering tests, medicine, procedures  .  "

## 2025-05-05 ENCOUNTER — HOSPITAL ENCOUNTER (OUTPATIENT)
Dept: RADIOLOGY | Facility: CLINIC | Age: 65
Discharge: HOME | End: 2025-05-05
Attending: PHYSICAL MEDICINE & REHABILITATION
Payer: COMMERCIAL

## 2025-05-05 VITALS
TEMPERATURE: 98.5 F | RESPIRATION RATE: 18 BRPM | HEART RATE: 69 BPM | OXYGEN SATURATION: 98 % | SYSTOLIC BLOOD PRESSURE: 133 MMHG | DIASTOLIC BLOOD PRESSURE: 83 MMHG

## 2025-05-05 DIAGNOSIS — M47.816 LUMBAR SPONDYLOSIS: ICD-10-CM

## 2025-05-05 PROCEDURE — 62323 NJX INTERLAMINAR LMBR/SAC: CPT | Performed by: PHYSICAL MEDICINE & REHABILITATION

## 2025-05-05 RX ORDER — METHYLPREDNISOLONE ACETATE 80 MG/ML
80 INJECTION, SUSPENSION INTRA-ARTICULAR; INTRALESIONAL; INTRAMUSCULAR; PARENTERAL; SOFT TISSUE ONCE
Status: COMPLETED | OUTPATIENT
Start: 2025-05-05 | End: 2025-05-05

## 2025-05-05 RX ADMIN — METHYLPREDNISOLONE ACETATE 80 MG: 80 INJECTION, SUSPENSION INTRA-ARTICULAR; INTRALESIONAL; INTRAMUSCULAR; SOFT TISSUE at 13:34

## 2025-05-05 RX ADMIN — IOHEXOL 1 ML: 300 INJECTION, SOLUTION INTRAVENOUS at 13:34
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
## 2025-05-05 NOTE — DISCHARGE INSTR - LAB
Epidural Steroid Injection   WHAT YOU NEED TO KNOW:   An epidural steroid injection (GLENN) is a procedure to inject steroid medicine into the epidural space. The epidural space is between your spinal cord and vertebrae. Steroids reduce inflammation and fluid buildup in your spine that may be causing pain. You may be given pain medicine along with the steroids.          ACTIVITY  Do not drive or operate machinery today.  No strenuous activity today - bending, lifting, etc.  You may resume normal activites starting tomorrow - start slowly and as tolerated.  You may shower today, but no tub baths or hot tubs.  You may have numbness for several hours from the local anesthetic. Please use caution and common sense, especially with weight-bearing activities.    CARE OF THE INJECTION SITE  If you have soreness or pain, apply ice to the area today (20 minutes on/20 minutes off).  Starting tomorrow, you may use warm, moist heat or ice if needed.  You may have an increase or change in your discomfort for 36-48 hours after your treatment.  Apply ice and continue with any pain medication you have been prescribed.  Notify the [facility] if you have any of the following: redness, drainage, swelling, headache, stiff neck or fever above 100°F.    SPECIAL INSTRUCTIONS  Our office will contact you in approximately 14 days for a progress report.    MEDICATIONS  Continue to take all routine medications.  Our office may have instructed you to hold some medications.    As no general anesthesia was used in today's procedure, you should not experience any side effects related to anesthesia.     If you are diabetic, the steroids used in today's injection may temporarily increase your blood sugar levels after the first few days after your injection. Please keep a close eye on your sugars and alert the doctor who manages your diabetes if your sugars are significantly high from your baseline or you are symptomatic.     If you have a  problem specifically related to your procedure, please call our office at [phone].  Problems not related to your procedure should be directed to your primary care physician.

## 2025-05-12 ENCOUNTER — PATIENT MESSAGE (OUTPATIENT)
Dept: INTERNAL MEDICINE CLINIC | Facility: CLINIC | Age: 65
End: 2025-05-12

## 2025-05-14 NOTE — TELEPHONE ENCOUNTER
Agnes please schedule patient tomorrow with the medical resident to myself I would like to evaluate her to see why this is not improving may need to give her a course of steroids or steroid inhaler would like to further evaluate

## 2025-05-16 ENCOUNTER — OFFICE VISIT (OUTPATIENT)
Dept: INTERNAL MEDICINE CLINIC | Facility: CLINIC | Age: 65
End: 2025-05-16
Payer: COMMERCIAL

## 2025-05-16 ENCOUNTER — TELEPHONE (OUTPATIENT)
Age: 65
End: 2025-05-16

## 2025-05-16 VITALS
BODY MASS INDEX: 25.59 KG/M2 | WEIGHT: 172.8 LBS | HEIGHT: 69 IN | OXYGEN SATURATION: 96 % | DIASTOLIC BLOOD PRESSURE: 82 MMHG | SYSTOLIC BLOOD PRESSURE: 130 MMHG | HEART RATE: 83 BPM

## 2025-05-16 DIAGNOSIS — J45.31 MILD PERSISTENT ASTHMATIC BRONCHITIS WITH ACUTE EXACERBATION: Primary | ICD-10-CM

## 2025-05-16 DIAGNOSIS — J45.31 MILD PERSISTENT ASTHMATIC BRONCHITIS WITH ACUTE EXACERBATION: ICD-10-CM

## 2025-05-16 DIAGNOSIS — N18.31 STAGE 3A CHRONIC KIDNEY DISEASE (HCC): ICD-10-CM

## 2025-05-16 DIAGNOSIS — C90.00 MULTIPLE MYELOMA NOT HAVING ACHIEVED REMISSION (HCC): ICD-10-CM

## 2025-05-16 DIAGNOSIS — G57.71 COMPLEX REGIONAL PAIN SYNDROME TYPE 2 OF RIGHT LOWER EXTREMITY: ICD-10-CM

## 2025-05-16 PROCEDURE — 99213 OFFICE O/P EST LOW 20 MIN: CPT | Performed by: INTERNAL MEDICINE

## 2025-05-16 PROCEDURE — G2211 COMPLEX E/M VISIT ADD ON: HCPCS | Performed by: INTERNAL MEDICINE

## 2025-05-16 RX ORDER — BUDESONIDE 90 UG/1
1 AEROSOL, POWDER RESPIRATORY (INHALATION) 2 TIMES DAILY
Qty: 1 EACH | Refills: 0 | Status: SHIPPED | OUTPATIENT
Start: 2025-05-16 | End: 2025-05-16

## 2025-05-16 RX ORDER — FLUTICASONE PROPIONATE 110 UG/1
2 AEROSOL, METERED RESPIRATORY (INHALATION) 2 TIMES DAILY
Qty: 12 G | Refills: 0 | Status: SHIPPED | OUTPATIENT
Start: 2025-05-16 | End: 2025-05-16

## 2025-05-16 RX ORDER — LEVOFLOXACIN 500 MG/1
500 TABLET, FILM COATED ORAL EVERY 24 HOURS
Qty: 7 TABLET | Refills: 0 | Status: SHIPPED | OUTPATIENT
Start: 2025-05-16 | End: 2025-05-23

## 2025-05-16 RX ORDER — MOMETASONE FUROATE 50 UG/1
AEROSOL RESPIRATORY (INHALATION)
Refills: 0 | OUTPATIENT
Start: 2025-05-16

## 2025-05-16 RX ORDER — MOMETASONE FUROATE 220 UG/1
INHALANT RESPIRATORY (INHALATION)
Refills: 0 | OUTPATIENT
Start: 2025-05-16

## 2025-05-16 NOTE — TELEPHONE ENCOUNTER
Spoke to the patient and advised. She states that she received another message from the pharmacy that the Pulmicort is not covered.    Attempted to call the pharmacy, but closed for lunch. Will call again for update on medication.

## 2025-05-16 NOTE — TELEPHONE ENCOUNTER
Spoke with patient, patient stated the pharmacy is requesting a different script for the inhaler for the patient. Please advise.

## 2025-05-16 NOTE — PROGRESS NOTES
Name: Courtney Davalos      : 1960      MRN: [id number]  Encounter Provider: Jose Daniel Harmon DO  Encounter Date: 2025   Encounter department: [facility]  :  Assessment & Plan  Mild persistent asthmatic bronchitis with acute exacerbation  Ongoing productive cough intermittent wheezing patient has now had symptoms for 3 months chest x-ray negative CAT scan  no findings of metastatic disease lung examination clear, 3 mm nodule left base unchanged since  pleura unremarkable Rx for Levaquin 500 mg 1 tablet daily for 7 days, Rx for Asmanex 1 inhalation daily continue albuterol 2 puffs every 4-6 hours as needed Mucinex as directed as needed will consult pulmonary.  I have also explained to the patient the risk benefits and side effects of Levaquin to the patient detail including tendinopathy/rupture  Orders:  •  Ambulatory Referral to Pulmonology; Future  •  levofloxacin (LEVAQUIN) 500 mg tablet; Take 1 tablet (500 mg total) by mouth every 24 hours for 7 days    Stage 3a chronic kidney disease (HCC)  Lab Results   Component Value Date    EGFR 55 2025    EGFR 67 2025    EGFR 57 2024    CREATININE 1.05 2025    CREATININE 0.90 2025    CREATININE 1.03 2024     Drink adequate amount of water, avoid anti-inflammatories, reduce sodium in the diet and will continue to monitor the kidney function standard dosing for Levaquin GFR greater than 56       Multiple myeloma not having achieved remission (HCC)  Currently in remission working with hematology Dr. Quintero     I have spent a total time of 30 minutes in caring for this patient on the day of the visit/encounter including Diagnostic results, Instructions for management, Impressions, Counseling / Coordination of care, Documenting in the medical record, Reviewing/placing orders in the medical record (including tests, medications, and/or procedures), and Obtaining or reviewing history  .   Complex  "regional pain syndrome type 2 of right lower extremity  Patient does not report to me increased symptoms of complex regional pain syndrome will be seeing pain management working with pain management please see neurosurgeons near future       RTO as scheduled call if any change, worse or symptoms do not ziyad       History of Present Illness   HPI 64-year old female coming in for a follow up office visit regarding asthmatic bronchitis, cough productive, chronic kidney disease, myeloma multiple, complex regional pain syndrome type II; the patient reports me compliant taking medications without untoward side effects the.  The patient is here to review his medical condition, update me on the medical condition and the patient reports me no hospitalizations and no ER visits.  Patient reports me ongoing deep in the cough , mucous yellow to green when getting up using mucinex , she has had Z-Isaac and 2 rounds of Ceftin she did have some improvement while on Ceftin but her symptoms returned she reports being an ongoing cough productive mucus mild wheezing at nighttime yellow mucus no fever no chills  no f/c continuous , sinus congestion and ear discomfort sob slight wheezing at night asthmas as child.  In the past she has had Levaquin which has helped her and she tolerates well  Review of Systems   Constitutional:  Negative for activity change, appetite change, chills, fever and unexpected weight change.   HENT:  Positive for congestion and postnasal drip.    Respiratory:  Positive for cough. Negative for shortness of breath.    Cardiovascular:  Negative for chest pain.   Gastrointestinal:  Negative for abdominal pain, diarrhea, nausea and vomiting.   Neurological:  Negative for dizziness, light-headedness and headaches.   Hematological:  Negative for adenopathy.       Objective   /82 (BP Location: Left arm, Patient Position: Sitting, Cuff Size: Standard)   Pulse 83   Ht 5' 9\" (1.753 m)   Wt 78.4 kg (172 lb 12.8 oz)  "  LMP  (LMP Unknown)   SpO2 96%   BMI 25.52 kg/m²      Physical Exam  Vitals and nursing note reviewed.   Constitutional:       General: She is not in acute distress.     Appearance: Normal appearance. She is well-developed. She is not ill-appearing, toxic-appearing or diaphoretic.   HENT:      Head: Normocephalic and atraumatic.      Right Ear: External ear normal.      Left Ear: External ear normal.      Nose: Nose normal.     Eyes:      Pupils: Pupils are equal, round, and reactive to light.       Cardiovascular:      Rate and Rhythm: Normal rate and regular rhythm.      Heart sounds: Normal heart sounds. No murmur heard.  Pulmonary:      Effort: Pulmonary effort is normal.      Breath sounds: Wheezing present.      Comments: Left base wheezing slight    Neurological:      Mental Status: She is alert.

## 2025-05-16 NOTE — TELEPHONE ENCOUNTER
Patient is calling in regarding the office to reach out to [facility] regarding medication below. She states she received a message from them stating they have to reach out to provider for a script for the medication    fluticasone (Flovent HFA)

## 2025-05-18 NOTE — ASSESSMENT & PLAN NOTE
Currently in remission working with hematology Dr. Quintero     I have spent a total time of 30 minutes in caring for this patient on the day of the visit/encounter including Diagnostic results, Instructions for management, Impressions, Counseling / Coordination of care, Documenting in the medical record, Reviewing/placing orders in the medical record (including tests, medications, and/or procedures), and Obtaining or reviewing history  .

## 2025-05-18 NOTE — ASSESSMENT & PLAN NOTE
Lab Results   Component Value Date    EGFR 55 04/14/2025    EGFR 67 03/12/2025    EGFR 57 12/17/2024    CREATININE 1.05 04/14/2025    CREATININE 0.90 03/12/2025    CREATININE 1.03 12/17/2024     Drink adequate amount of water, avoid anti-inflammatories, reduce sodium in the diet and will continue to monitor the kidney function standard dosing for Levaquin GFR greater than 56

## 2025-05-18 NOTE — ASSESSMENT & PLAN NOTE
Patient does not report to me increased symptoms of complex regional pain syndrome will be seeing pain management working with pain management please see neurosurgeons near future

## 2025-05-19 ENCOUNTER — TELEPHONE (OUTPATIENT)
Dept: PAIN MEDICINE | Facility: CLINIC | Age: 65
End: 2025-05-19

## 2025-05-19 NOTE — TELEPHONE ENCOUNTER
Will be available as needed after her surgery with Dr. Badillo but would not do anything further until she has completed the surgical process with him   Thank you

## 2025-05-19 NOTE — TELEPHONE ENCOUNTER
JI-    S/w pt and advised of same. Pt verbalized understanding. Pt asked to discuss MBB/RFA process. Described MBB/RFA process in detail. Pt verbalized understanding. Pt confirmed she is not interested in an SCS.    Pt  wanted Dr Vick to be aware that she has surgery planned with Dr Badillo.

## 2025-05-19 NOTE — TELEPHONE ENCOUNTER
Unfortunately we are running out of options to assist with her buttock and leg pain and I believe the underlying CRPS issues are compounding it  A stimulator would be a logical next step, however, I know the patient does not wish to consider this     Thank you

## 2025-05-20 ENCOUNTER — TELEPHONE (OUTPATIENT)
Age: 65
End: 2025-05-20

## 2025-05-20 DIAGNOSIS — M25.562 ACUTE PAIN OF LEFT KNEE: Primary | ICD-10-CM

## 2025-05-20 NOTE — TELEPHONE ENCOUNTER
Pt would like to know if she completed the labs needed for Dr. Quintero's appt on 5/29? Pt would like a call back at [phone]. Thank you.

## 2025-05-20 NOTE — TELEPHONE ENCOUNTER
Pt. Was just in a few days ago to see Dr. Harmon and she forgot to mention to him that her knee has been giving her a problem.  Now her left knee is swollen and yesterday she went to sit and she heard a pop and it was very painful.  She wants to know if he would please put in for her to get an Xray of the that knee.  She has an appt. Coming up again in June.   Ty

## 2025-05-20 NOTE — TELEPHONE ENCOUNTER
Returned phone call to patient to relay message, She verbalized understanding and has no additional questions or concerns at this time.    Patient would like me to note in her chart that she has been experiencing bone and muscles aches, she saw her PCP  and they instructed patient to check and see if it is related to myeloma or if she might need to see Rheumatology and just wanted me to note that in her chart, so it can be discussed at her upcoming appt, I confirmed with her that I would.

## 2025-05-21 ENCOUNTER — TELEPHONE (OUTPATIENT)
Age: 65
End: 2025-05-21

## 2025-05-21 ENCOUNTER — APPOINTMENT (OUTPATIENT)
Dept: RADIOLOGY | Facility: MEDICAL CENTER | Age: 65
End: 2025-05-21
Payer: COMMERCIAL

## 2025-05-21 DIAGNOSIS — M25.562 ACUTE PAIN OF LEFT KNEE: ICD-10-CM

## 2025-05-21 PROBLEM — H61.21 IMPACTED CERUMEN OF RIGHT EAR: Status: RESOLVED | Noted: 2025-04-21 | Resolved: 2025-05-21

## 2025-05-21 PROCEDURE — 73562 X-RAY EXAM OF KNEE 3: CPT

## 2025-05-21 NOTE — TELEPHONE ENCOUNTER
Pt called to see if waitlist appt opened  up, none available at this time. Pt states she will be calling daily to check.

## 2025-05-22 ENCOUNTER — RESULTS FOLLOW-UP (OUTPATIENT)
Dept: INTERNAL MEDICINE CLINIC | Facility: CLINIC | Age: 65
End: 2025-05-22

## 2025-05-22 DIAGNOSIS — M25.469 EFFUSION OF KNEE, UNSPECIFIED LATERALITY: Primary | ICD-10-CM

## 2025-05-22 NOTE — TELEPHONE ENCOUNTER
Pt called to see if any cancellations have opened for Dr. Badillo.  Informed the pt that at this time her appt date of 6/19/25 is Dr. Badillo's next avail at both locations.  Pt verbalized understanding and was appreciative.  She stated that she will continue to CB daily to check as her pain is significantly worsening.

## 2025-05-27 ENCOUNTER — OFFICE VISIT (OUTPATIENT)
Dept: INTERNAL MEDICINE CLINIC | Facility: CLINIC | Age: 65
End: 2025-05-27
Payer: COMMERCIAL

## 2025-05-27 VITALS
DIASTOLIC BLOOD PRESSURE: 88 MMHG | OXYGEN SATURATION: 96 % | BODY MASS INDEX: 25.51 KG/M2 | HEIGHT: 69 IN | WEIGHT: 172.2 LBS | HEART RATE: 92 BPM | SYSTOLIC BLOOD PRESSURE: 132 MMHG

## 2025-05-27 DIAGNOSIS — B35.1 ONYCHOMYCOSIS: Primary | ICD-10-CM

## 2025-05-27 DIAGNOSIS — K74.3 PRIMARY BILIARY CHOLANGITIS (HCC): ICD-10-CM

## 2025-05-27 DIAGNOSIS — J40 BRONCHITIS: ICD-10-CM

## 2025-05-27 DIAGNOSIS — M25.462 EFFUSION OF LEFT KNEE: ICD-10-CM

## 2025-05-27 PROCEDURE — G2211 COMPLEX E/M VISIT ADD ON: HCPCS | Performed by: INTERNAL MEDICINE

## 2025-05-27 PROCEDURE — 99214 OFFICE O/P EST MOD 30 MIN: CPT | Performed by: INTERNAL MEDICINE

## 2025-05-27 RX ORDER — CICLOPIROX 80 MG/ML
SOLUTION TOPICAL
Qty: 6 ML | Refills: 0 | Status: SHIPPED | OUTPATIENT
Start: 2025-05-27

## 2025-05-27 NOTE — PROGRESS NOTES
Name: Courtney Davalos      : 1960      MRN: [id number]  Encounter Provider: Jose Daniel Harmon DO  Encounter Date: 2025   Encounter department: [facility]  :  Assessment & Plan  Onychomycosis  Right great toe thickening suspect onychomycosis Rx for Penlac 8% solution apply to the affected area once a day I did discuss treatment options including nail fungal culture consideration of Lamisil and nail removal at this point in time patient would like to proceed with Penlac  Orders:  •  ciclopirox (PENLAC) 8 % solution; Apply topically daily at bedtime    Effusion of left knee  Left knee effusion suspect inflammation possible previous meniscus injury will have patient see orthopedics  Orders:  •  Ambulatory Referral to Orthopedic Surgery; Future    Primary biliary cholangitis (HCC)  Clinically stable and doing well continue the current medical regiment will continue monitor.  She reports me that her hepatologist will be relocating or reassign her to Dr. Honeycutt GI specialist  Orders:  •  Ambulatory Referral to Gastroenterology; Future    Bronchitis  Significant improvements with chronic cough/bronchitis with Levaquin she has completed the course without any side effects she has minimal cough at this point she is made very good progress lung examination clear to auscultation       RTO in 6 months call if any problems       History of Present Illness   HPI 64-year old female coming in for a follow up office visit regarding onychomycosis great toenail, left knee effusion, primary biliary cholangitis and bronchitis; the patient reports me compliant taking medications without untoward side effects the.  The patient is here to review his medical condition, update me on the medical condition and the patient reports me no hospitalizations and no ER visits.  No injuries no illnesses patient reports me left knee giving out , catched , left knee pain 2 weeks   previous  tap was mowing the grass  "with tur caused tweaked it.  Patient also reports me he chronic thickened toenail right great toe would like treatment also reports me her GI specialist is relocating would like to be reassigned to a different hepatologist.  Also reports made significant improvements in the bronchitis with treatment with Levaquin she tolerated well and has completed the course only minimal residual cough at this point cough at this point  Review of Systems   Constitutional:  Negative for activity change, appetite change and unexpected weight change.   HENT:  Negative for congestion and postnasal drip.    Eyes:  Negative for visual disturbance.   Respiratory:  Negative for cough and shortness of breath.    Cardiovascular:  Negative for chest pain.   Gastrointestinal:  Negative for abdominal pain, diarrhea, nausea and vomiting.   Neurological:  Negative for dizziness, light-headedness and headaches.   Hematological:  Negative for adenopathy.   Resolving cough, thickened toenail, left knee effusion    Objective   /88 (BP Location: Left arm, Patient Position: Sitting, Cuff Size: Standard)   Pulse 92   Ht 5' 9\" (1.753 m)   Wt 78.1 kg (172 lb 3.2 oz)   LMP  (LMP Unknown)   SpO2 96%   BMI 25.43 kg/m²      Physical Exam  Constitutional:       Appearance: She is well-developed.   HENT:      Head: Normocephalic.     Eyes:      General: No scleral icterus.        Right eye: No discharge.         Left eye: No discharge.      Conjunctiva/sclera: Conjunctivae normal.      Pupils: Pupils are equal, round, and reactive to light.       Cardiovascular:      Rate and Rhythm: Normal rate and regular rhythm.      Heart sounds: Normal heart sounds. No murmur heard.     No friction rub. No gallop.   Pulmonary:      Effort: No respiratory distress.      Breath sounds: Normal breath sounds. No wheezing or rales.   Abdominal:      General: Bowel sounds are normal. There is no distension.      Palpations: Abdomen is soft. There is no mass.      " Tenderness: There is no abdominal tenderness. There is no guarding or rebound.     Musculoskeletal:         General: No deformity.      Cervical back: Neck supple.   Lymphadenopathy:      Cervical: No cervical adenopathy.     Neurological:      Mental Status: She is alert.      Coordination: Coordination normal.     Right great toenail thickening and flaking consistent with onychomycosis also left knee effusion minimal medial joint line tenderness  Administrative Statements   I have spent a total time of 30 minutes in caring for this patient on the day of the visit/encounter including Diagnostic results, Risk factor reductions, Impressions, Counseling / Coordination of care, Documenting in the medical record, Reviewing/placing orders in the medical record (including tests, medications, and/or procedures), and Obtaining or reviewing history  .

## 2025-05-27 NOTE — ASSESSMENT & PLAN NOTE
Clinically stable and doing well continue the current medical regiment will continue monitor.  She reports me that her hepatologist will be relocating or reassign her to Dr. Honeycutt GI specialist  Orders:  •  Ambulatory Referral to Gastroenterology; Future

## 2025-05-27 NOTE — ASSESSMENT & PLAN NOTE
Significant improvements with chronic cough/bronchitis with Levaquin she has completed the course without any side effects she has minimal cough at this point she is made very good progress lung examination clear to auscultation

## 2025-05-29 ENCOUNTER — OFFICE VISIT (OUTPATIENT)
Dept: HEMATOLOGY ONCOLOGY | Facility: CLINIC | Age: 65
End: 2025-05-29
Payer: COMMERCIAL

## 2025-05-29 VITALS
RESPIRATION RATE: 18 BRPM | DIASTOLIC BLOOD PRESSURE: 80 MMHG | HEART RATE: 83 BPM | BODY MASS INDEX: 25.62 KG/M2 | TEMPERATURE: 97.4 F | WEIGHT: 173 LBS | HEIGHT: 69 IN | OXYGEN SATURATION: 97 % | SYSTOLIC BLOOD PRESSURE: 146 MMHG

## 2025-05-29 DIAGNOSIS — D47.2 SMOLDERING MULTIPLE MYELOMA (SMM): Primary | Chronic | ICD-10-CM

## 2025-05-29 DIAGNOSIS — C64.1 RENAL CELL CARCINOMA OF RIGHT KIDNEY (HCC): ICD-10-CM

## 2025-05-29 DIAGNOSIS — K74.3 PRIMARY BILIARY CHOLANGITIS (HCC): ICD-10-CM

## 2025-05-29 PROBLEM — C90.00 MULTIPLE MYELOMA NOT HAVING ACHIEVED REMISSION (HCC): Status: RESOLVED | Noted: 2023-02-28 | Resolved: 2025-05-29

## 2025-05-29 PROBLEM — M79.605 LEFT LEG PAIN: Status: RESOLVED | Noted: 2020-02-10 | Resolved: 2025-05-29

## 2025-05-29 PROBLEM — R04.0 EPISTAXIS: Status: RESOLVED | Noted: 2021-11-18 | Resolved: 2025-05-29

## 2025-05-29 PROBLEM — M25.521 RIGHT ELBOW PAIN: Status: RESOLVED | Noted: 2018-03-09 | Resolved: 2025-05-29

## 2025-05-29 PROBLEM — M85.80 OSTEOPENIA: Status: RESOLVED | Noted: 2021-06-09 | Resolved: 2025-05-29

## 2025-05-29 PROBLEM — N28.89 RENAL MASS: Status: RESOLVED | Noted: 2023-05-15 | Resolved: 2025-05-29

## 2025-05-29 PROBLEM — D50.0 IRON DEFICIENCY ANEMIA DUE TO CHRONIC BLOOD LOSS: Status: RESOLVED | Noted: 2021-11-26 | Resolved: 2025-05-29

## 2025-05-29 PROBLEM — L25.5 RHUS DERMATITIS: Status: RESOLVED | Noted: 2024-07-01 | Resolved: 2025-05-29

## 2025-05-29 PROBLEM — M17.31 POST-TRAUMATIC OSTEOARTHRITIS OF RIGHT KNEE: Chronic | Status: RESOLVED | Noted: 2018-03-23 | Resolved: 2025-05-29

## 2025-05-29 PROBLEM — M62.81 QUADRICEPS WEAKNESS: Status: RESOLVED | Noted: 2019-07-05 | Resolved: 2025-05-29

## 2025-05-29 PROBLEM — M77.9 TENDONITIS: Status: RESOLVED | Noted: 2024-05-27 | Resolved: 2025-05-29

## 2025-05-29 PROBLEM — G89.29 CHRONIC PAIN OF RIGHT KNEE: Status: RESOLVED | Noted: 2018-03-23 | Resolved: 2025-05-29

## 2025-05-29 PROBLEM — R07.9 CHEST PAIN: Status: RESOLVED | Noted: 2021-06-09 | Resolved: 2025-05-29

## 2025-05-29 PROBLEM — M25.561 CHRONIC PAIN OF RIGHT KNEE: Status: RESOLVED | Noted: 2018-03-23 | Resolved: 2025-05-29

## 2025-05-29 PROBLEM — R55 SYNCOPE: Status: RESOLVED | Noted: 2018-11-26 | Resolved: 2025-05-29

## 2025-05-29 PROBLEM — G89.4 CHRONIC PAIN DISORDER: Status: RESOLVED | Noted: 2017-05-10 | Resolved: 2025-05-29

## 2025-05-29 PROBLEM — R39.9 URINARY SYMPTOM OR SIGN: Status: RESOLVED | Noted: 2024-09-05 | Resolved: 2025-05-29

## 2025-05-29 PROBLEM — M65.331 TRIGGER FINGER, RIGHT MIDDLE FINGER: Status: RESOLVED | Noted: 2020-12-02 | Resolved: 2025-05-29

## 2025-05-29 PROBLEM — B35.9 TINEA: Status: RESOLVED | Noted: 2024-05-27 | Resolved: 2025-05-29

## 2025-05-29 PROCEDURE — 99214 OFFICE O/P EST MOD 30 MIN: CPT | Performed by: INTERNAL MEDICINE

## 2025-05-29 NOTE — PROGRESS NOTES
Name: Courtney Davalos      : 1960      MRN: [id number]  Encounter Provider: Gagan Quintero MD  Encounter Date: 2025   Encounter department: [facility]  :  Assessment & Plan  Renal cell carcinoma of right kidney (HCC)  Renal cell carcinoma of the right side, stage III (P T3a, PN X, grade 1, M0)  s/p nephrectomy 2023 clear cell subtype, primary 5.1 cm, unifocal, grade 1, with renal vein involvement, all margins are negative     she decided on adjuvant Pembrolizumab 200 mg every 3 weeks for total of 1 year (C1 on 8/15/2023); However the patient has autoimmune primary biliary cirrhosis we have to watch very carefully for autoimmune side effects such as hepatitis, pancreatitis, dermatitis, pneumonitis, adrenal insufficiency, colitis etc.,     Patient finished dose #17 on 2024, CAT scan by urology on 2025 showed no evidence of recurrence of disease, followed by urology       Smoldering multiple myeloma (SMM)  bone marrow biopsy showed 15% plasma cells with normal cytogenetics and FISH panel for multiple myeloma diagnosed in May 2017 she had been on watchful observation no evidence of endorgan damage        Primary biliary cholangitis (HCC)  Followed by hepatology she is on Livdelzi         Assessment & Plan        No follow-ups on file.    History of Present Illness   Chief Complaint   Patient presents with    Follow-up     History of Present Illness  64-year-old  female with past medical history of primary biliary cirrhosis, fracture of the right tibia, hypertension, OCD,worsening of osteoporosis on DEXA scan Done in , nephrolithiasis, chronic lower back pain and possible sacroiliitis was found to have elevated total protein 3-4 years ago, serum protein electrophoresis showed IgG kappa monoclonal protein of 1.7 g/dL however no evidence of anemia, hypercalcemia, or renal insufficiency, she had persistent elevation of alkaline phosphatase  secondary to PBC  had a bone scan done last year which showed activity in the ribs area  serum protein electrophoresis in April 2017 showed IgG kappa monoclonal protein of 1.96 g/dL, total protein 8.5, albumin 3.9, IgG 2580, creatinine 0.8, calcium 8.9, alkaline phosphatase 294, AST 37, ALT 46, IgM 25 in July 2016 monoclonal protein of IgG kappa of 1.73  C-reactive protein has been normal however sedimentation rate was elevated in the range of 60  Bone marrow biopsy in May 2017 showed 15% plasma cells in diffuse and interstitial pattern, normal fish panel for multiple myeloma and cytogenetics  Status post right knee replacement in June 2018  Exacerbation of anxiety/depression followed by psychiatric medicine     Osteoporosis s/p Zometa in 2019, DEXA scan on 08/2021 showed recurrence of osteoporosis in the lumbar spine; she is on Zometa every 6 months  Oncology History   Cancer Staging   Renal cell carcinoma of right kidney (HCC)  Staging form: Kidney, AJCC 8th Edition  - Clinical: Stage II (cT2, cN0, cM0) - Signed by Gagan Quintero MD on 12/8/2023  Histologic grade (G): G2  Histologic grading system: 4 grade system  - Pathologic: Stage III (pT3, pN0, cM0) - Signed by Gagan Quintero MD on 12/8/2023  Stage prefix: Initial diagnosis  Histologic grade (G): G2  Histologic grading system: 4 grade system  Residual tumor (R): R0  Oncology History   Renal cell carcinoma of right kidney (HCC)   6/21/2023 Initial Diagnosis    Renal cell carcinoma of right kidney (HCC)     8/15/2023 -  Chemotherapy    alteplase (CATHFLO), 2 mg, Intracatheter, Every 1 Minute as needed, 17 of 17 cycles  pembrolizumab (KEYTRUDA) IVPB, 200 mg, Intravenous, Once, 17 of 17 cycles  Administration: 200 mg (8/15/2023), 200 mg (9/5/2023), 200 mg (9/26/2023), 200 mg (10/17/2023), 200 mg (11/7/2023), 200 mg (11/28/2023), 200 mg (12/19/2023), 200 mg (1/9/2024), 200 mg (1/30/2024), 200 mg (2/20/2024), 200 mg (3/12/2024), 200 mg (4/3/2024), 200 mg  "(4/23/2024), 200 mg (5/14/2024), 200 mg (6/4/2024), 200 mg (6/25/2024)     12/8/2023 -  Cancer Staged    Staging form: Kidney, AJCC 8th Edition  - Clinical: Stage II (cT2, cN0, cM0) - Signed by Gagan Quintero MD on 12/8/2023  Histologic grade (G): G2  Histologic grading system: 4 grade system       12/8/2023 -  Cancer Staged    Staging form: Kidney, AJCC 8th Edition  - Pathologic: Stage III (pT3, pN0, cM0) - Signed by Gagan Quintero MD on 12/8/2023  Stage prefix: Initial diagnosis  Histologic grade (G): G2  Histologic grading system: 4 grade system  Residual tumor (R): R0 - None            Review of Systems   Constitutional:  Negative for chills and fever.   HENT:  Negative for ear pain and sore throat.    Eyes:  Negative for pain and visual disturbance.   Respiratory:  Negative for cough and shortness of breath.    Cardiovascular:  Negative for chest pain and palpitations.   Gastrointestinal:  Negative for abdominal pain and vomiting.   Genitourinary:  Negative for dysuria and hematuria.   Musculoskeletal:  Positive for arthralgias and back pain.   Skin:  Negative for color change and rash.   Neurological:  Positive for weakness. Negative for seizures and syncope.   All other systems reviewed and are negative.          Objective   /80 (Patient Position: Sitting, Cuff Size: Large)   Pulse 83   Temp (!) 97.4 °F (36.3 °C) (Temporal)   Resp 18   Ht 5' 9\" (1.753 m)   Wt 78.5 kg (173 lb)   LMP  (LMP Unknown)   SpO2 97%   BMI 25.55 kg/m²     Pain Screening:  Pain Score:   9  ECOG   0  Physical Exam  Vitals reviewed.   Constitutional:       General: She is not in acute distress.     Appearance: She is well-developed. She is not diaphoretic.   HENT:      Head: Normocephalic and atraumatic.     Eyes:      Conjunctiva/sclera: Conjunctivae normal.     Neck:      Trachea: No tracheal deviation.     Cardiovascular:      Rate and Rhythm: Normal rate and regular rhythm.      Heart sounds: No murmur heard.     No " friction rub. No gallop.   Pulmonary:      Effort: Pulmonary effort is normal. No respiratory distress.      Breath sounds: Normal breath sounds. No wheezing or rales.   Chest:      Chest wall: No tenderness.   Abdominal:      General: There is no distension.      Palpations: Abdomen is soft.      Tenderness: There is no abdominal tenderness.     Musculoskeletal:         General: Swelling and tenderness present.      Cervical back: Normal range of motion and neck supple.      Right lower leg: No edema.      Left lower leg: No edema.   Lymphadenopathy:      Cervical: No cervical adenopathy.     Skin:     General: Skin is warm and dry.      Coloration: Skin is not pale.      Findings: No erythema.     Neurological:      Mental Status: She is alert.     Psychiatric:         Behavior: Behavior normal.         Thought Content: Thought content normal.       Physical Exam      Results    Labs: I have reviewed the following labs:  Lab Results   Component Value Date/Time    WBC 4.45 03/12/2025 11:36 AM    RBC 4.19 03/12/2025 11:36 AM    Hemoglobin 12.6 03/12/2025 11:36 AM    Hematocrit 40.0 03/12/2025 11:36 AM    MCV 96 03/12/2025 11:36 AM    MCH 30.1 03/12/2025 11:36 AM    RDW 12.8 03/12/2025 11:36 AM    Platelets 184 03/12/2025 11:36 AM    Segmented % 58 03/12/2025 11:36 AM    Lymphocytes % 29 03/12/2025 11:36 AM    Monocytes % 7 03/12/2025 11:36 AM    Eosinophils Relative 3 03/12/2025 11:36 AM    Basophils Relative 1 03/12/2025 11:36 AM    Immature Grans % 2 03/12/2025 11:36 AM    Absolute Neutrophils 2.60 03/12/2025 11:36 AM     Lab Results   Component Value Date/Time    Potassium 4.5 04/14/2025 12:02 PM    Chloride 99 04/14/2025 12:02 PM    CO2 30 04/14/2025 12:02 PM    BUN 21 04/14/2025 12:02 PM    Creatinine 1.05 04/14/2025 12:02 PM    Glucose, Fasting 84 03/12/2025 11:36 AM    Calcium 9.1 04/14/2025 12:02 PM    AST 29 04/14/2025 12:02 PM    ALT 31 04/14/2025 12:02 PM    Alkaline Phosphatase 198 (H) 04/14/2025 12:02  PM    Total Protein 8.2 04/14/2025 12:02 PM    Albumin 3.8 04/14/2025 12:02 PM    Total Bilirubin 0.64 04/14/2025 12:02 PM    eGFR 55 04/14/2025 12:02 PM

## 2025-05-29 NOTE — ASSESSMENT & PLAN NOTE
Renal cell carcinoma of the right side, stage III (P T3a, PN X, grade 1, M0)  s/p nephrectomy 6/21/2023 clear cell subtype, primary 5.1 cm, unifocal, grade 1, with renal vein involvement, all margins are negative     she decided on adjuvant Pembrolizumab 200 mg every 3 weeks for total of 1 year (C1 on 8/15/2023); However the patient has autoimmune primary biliary cirrhosis we have to watch very carefully for autoimmune side effects such as hepatitis, pancreatitis, dermatitis, pneumonitis, adrenal insufficiency, colitis etc.,     Patient finished dose #17 on 6/25/2024, CAT scan by urology on April 2025 showed no evidence of recurrence of disease, followed by urology

## 2025-05-29 NOTE — ASSESSMENT & PLAN NOTE
bone marrow biopsy showed 15% plasma cells with normal cytogenetics and FISH panel for multiple myeloma diagnosed in May 2017 she had been on watchful observation no evidence of endorgan damage

## 2025-06-02 ENCOUNTER — PATIENT MESSAGE (OUTPATIENT)
Dept: INTERNAL MEDICINE CLINIC | Facility: CLINIC | Age: 65
End: 2025-06-02

## 2025-06-09 ENCOUNTER — CONSULT (OUTPATIENT)
Dept: NEUROSURGERY | Facility: CLINIC | Age: 65
End: 2025-06-09
Attending: UROLOGY
Payer: COMMERCIAL

## 2025-06-09 ENCOUNTER — TELEPHONE (OUTPATIENT)
Age: 65
End: 2025-06-09

## 2025-06-09 VITALS
HEIGHT: 69 IN | RESPIRATION RATE: 18 BRPM | BODY MASS INDEX: 25.3 KG/M2 | HEART RATE: 72 BPM | OXYGEN SATURATION: 97 % | WEIGHT: 170.8 LBS | DIASTOLIC BLOOD PRESSURE: 82 MMHG | TEMPERATURE: 97.2 F | SYSTOLIC BLOOD PRESSURE: 130 MMHG

## 2025-06-09 DIAGNOSIS — M54.41 CHRONIC MIDLINE LOW BACK PAIN WITH RIGHT-SIDED SCIATICA: ICD-10-CM

## 2025-06-09 DIAGNOSIS — M48.062 LUMBAR STENOSIS WITH NEUROGENIC CLAUDICATION: Primary | ICD-10-CM

## 2025-06-09 DIAGNOSIS — G89.29 CHRONIC MIDLINE LOW BACK PAIN WITH RIGHT-SIDED SCIATICA: ICD-10-CM

## 2025-06-09 PROCEDURE — 99204 OFFICE O/P NEW MOD 45 MIN: CPT | Performed by: STUDENT IN AN ORGANIZED HEALTH CARE EDUCATION/TRAINING PROGRAM

## 2025-06-09 NOTE — TELEPHONE ENCOUNTER
Sharon calling in, she wants Dr Quintero to know that she had her Neurosurgery consultation today. The doctor recommended a spinal fusion. She would like to know if she goes that route if it's safe with her smoldering multiple myeloma dx. Her second question is that she would like a second opinion of a different neurosurgeon and would like to know if Dr Quintero has any recommendations on providers.     Please call her at [phone].     Thank you!

## 2025-06-09 NOTE — TELEPHONE ENCOUNTER
Reviewed patient questions with Dr. Quintero  He is ok with her proceeding with spinal fusion if she chooses  Dr. Quintero did not have a specific neurosurgeon to refer her to.  Patient states she would like to look out of the network    Patient verbalized understanding
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
## 2025-06-09 NOTE — TELEPHONE ENCOUNTER
Pt called after her visit with you today,  she has additional questions regarding types of surgical procedures not yet discusses.   She was speaking to her brother who had a failed spinal fusion and she wants to know if she is a candidate for any other procedures, artificial disc replacement or any of the newer procedures.   Can you call this patient back or should we schedule her another visit.  Thank You

## 2025-06-10 ENCOUNTER — OFFICE VISIT (OUTPATIENT)
Dept: OBGYN CLINIC | Facility: CLINIC | Age: 65
End: 2025-06-10
Payer: COMMERCIAL

## 2025-06-10 VITALS — HEIGHT: 69 IN | WEIGHT: 170 LBS | BODY MASS INDEX: 25.18 KG/M2

## 2025-06-10 DIAGNOSIS — M70.52 PES ANSERINUS BURSITIS OF LEFT KNEE: ICD-10-CM

## 2025-06-10 DIAGNOSIS — M17.12 ARTHRITIS OF LEFT KNEE: Primary | ICD-10-CM

## 2025-06-10 PROCEDURE — 20610 DRAIN/INJ JOINT/BURSA W/O US: CPT | Performed by: STUDENT IN AN ORGANIZED HEALTH CARE EDUCATION/TRAINING PROGRAM

## 2025-06-10 PROCEDURE — 99214 OFFICE O/P EST MOD 30 MIN: CPT | Performed by: STUDENT IN AN ORGANIZED HEALTH CARE EDUCATION/TRAINING PROGRAM

## 2025-06-10 RX ORDER — CHLORHEXIDINE GLUCONATE ORAL RINSE 1.2 MG/ML
15 SOLUTION DENTAL ONCE
OUTPATIENT
Start: 2025-06-10 | End: 2025-06-10

## 2025-06-10 RX ORDER — LIDOCAINE HYDROCHLORIDE 10 MG/ML
2 INJECTION, SOLUTION INFILTRATION; PERINEURAL
Status: COMPLETED | OUTPATIENT
Start: 2025-06-10 | End: 2025-06-10

## 2025-06-10 RX ORDER — SODIUM CHLORIDE 9 MG/ML
125 INJECTION, SOLUTION INTRAVENOUS CONTINUOUS
OUTPATIENT
Start: 2025-06-10

## 2025-06-10 RX ORDER — CEFAZOLIN SODIUM 2 G/50ML
2000 SOLUTION INTRAVENOUS ONCE
OUTPATIENT
Start: 2025-06-10 | End: 2025-06-10

## 2025-06-10 RX ORDER — BETAMETHASONE SODIUM PHOSPHATE AND BETAMETHASONE ACETATE 3; 3 MG/ML; MG/ML
6 INJECTION, SUSPENSION INTRA-ARTICULAR; INTRALESIONAL; INTRAMUSCULAR; SOFT TISSUE
Status: COMPLETED | OUTPATIENT
Start: 2025-06-10 | End: 2025-06-10

## 2025-06-10 RX ADMIN — BETAMETHASONE SODIUM PHOSPHATE AND BETAMETHASONE ACETATE 6 MG: 3; 3 INJECTION, SUSPENSION INTRA-ARTICULAR; INTRALESIONAL; INTRAMUSCULAR; SOFT TISSUE at 08:15

## 2025-06-10 RX ADMIN — LIDOCAINE HYDROCHLORIDE 2 ML: 10 INJECTION, SOLUTION INFILTRATION; PERINEURAL at 08:15
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
## 2025-06-10 NOTE — ASSESSMENT & PLAN NOTE
-WBAT  -Activity modification to limit strain or impact on the joint  - Ice, heat, topical gels as needed   -Supervised physical therapy. Script provided   -Home exercise program directed by PT  -Knee sleeve or brace for comfort  -Medial  order was given to patient today.  -Cane or walker recommended to offload joint  -Corticosteroid injection was offered, accepted, and administered.  Risk benefits and alternatives were discussed prior to injection.  Patient tolerated the procedure well.  -Patient may follow up prn for further evaluation and treatment.  Discussed if her knee flares up again she may follow-up with the nonoperative sports medicine physician to repeat left knee CSI.    Orders:    Ambulatory Referral to Physical Therapy; Future    Durable Medical Equipment

## 2025-06-11 NOTE — TELEPHONE ENCOUNTER
Msg sent to pt via [employer] of Dr. Badillo's specific response to her surgical inquiry.  Pt read msg and has replied verbalizing understanding.

## 2025-06-18 ENCOUNTER — TELEPHONE (OUTPATIENT)
Age: 65
End: 2025-06-18

## 2025-06-18 NOTE — TELEPHONE ENCOUNTER
Patient missed her appointment at 11 am today with Dr. Harmon. Notified office that she wanted to speak to them. Please call her back.

## 2025-06-24 ENCOUNTER — EVALUATION (OUTPATIENT)
Dept: PHYSICAL THERAPY | Facility: CLINIC | Age: 65
End: 2025-06-24
Payer: COMMERCIAL

## 2025-06-24 DIAGNOSIS — M17.12 ARTHRITIS OF LEFT KNEE: ICD-10-CM

## 2025-06-24 DIAGNOSIS — M70.52 PES ANSERINUS BURSITIS OF LEFT KNEE: ICD-10-CM

## 2025-06-24 PROCEDURE — 97161 PT EVAL LOW COMPLEX 20 MIN: CPT | Performed by: PHYSICAL THERAPIST

## 2025-06-24 PROCEDURE — 97140 MANUAL THERAPY 1/> REGIONS: CPT | Performed by: PHYSICAL THERAPIST

## 2025-06-24 NOTE — PROGRESS NOTES
PT EVALUATION    Today's date: 25  Patient name: Courtney Davalos  : 1960  MRN: [id number]  Referring provider: Joseph Ochoa MD  Dx:   1. Arthritis of left knee    2. Pes anserinus bursitis of left knee        Courtney Davalos is a 64 y.o. female who presents with signs and symptoms consistent with their referring diagnosis of left knee arthritis and pes anserine bursitis.  Treatment will focus on strengthening and stretching with laser/STM prn.  This patient would benefit from skilled physical therapy to address their listed impairments and functional limitations to maximize functional outcome.    Impairments:    restricted ROM    decreased strength   pain with function   activity intolerance   weight bearing intolerance     Prognosis:  Good  Positive and negative prognostic indicator(s):  pain >3 months    Goals:    STG Patient is independent with HEP   STG Range of motion is improved by 25% in 2 weeks  LTG Range of motion is improved by 50% in 4 weeks  LTG ADL performance improved to prior level of function in 6 weeks    Planned interventions:  home exercise program, patient education, manual therapy, massage, graded activity, flexibility, functional range of motion exercises, and strengthening    Duration in visits:  8  Frequency: 2 visits per week  Duration in weeks:  4    History of Current Injury: notes 6 months of medial knee pain.   She has pain with all weight bearing activities, especially stairs.  Pain is medial.  X-rays revealed DJD, more medial compartment.  History of right knee surgery and CRPS.     Pain location: medial left knee  Pain descriptors:  feels bruised  Pain intensity:  up to 7/10     Aggravating factors: getting off the floor, stairs, walking  Easing factors: rest    Imaging: x-rays, medial compartement DJD    Patient goals:  decreased pain, independence with ADLs, increased mobility, and increased strength    Objective     Active Range of Motion   Left Knee   Flexion:  95 degrees   Extensor lag: 3 degrees     Strength/Myotome Testing     Left Knee   Flexion: 4  Extension: 4    General Comments:      Knee Comments  +TTP at pes anserine insertion and distal tendons of the associated muscle          Precautions: LBP, RPS right knee - no contact with outside of right leg      Manuals 6/24            LLL pes anserine SY            STM  pes anserine SY                                      Neuro Re-Ed                                                                                                        Ther Ex             Hamstring iso -pball :10x5            Hamstring add iso pball long sit :10x5            Hamstring stretch :10x5            adductor :10x5                                                                Ther Activity                                       Gait Training                                       Modalities

## 2025-06-27 ENCOUNTER — OFFICE VISIT (OUTPATIENT)
Dept: PHYSICAL THERAPY | Facility: CLINIC | Age: 65
End: 2025-06-27
Attending: STUDENT IN AN ORGANIZED HEALTH CARE EDUCATION/TRAINING PROGRAM
Payer: COMMERCIAL

## 2025-06-27 DIAGNOSIS — M17.12 ARTHRITIS OF LEFT KNEE: Primary | ICD-10-CM

## 2025-06-27 DIAGNOSIS — M70.52 PES ANSERINUS BURSITIS OF LEFT KNEE: ICD-10-CM

## 2025-06-27 PROCEDURE — 97140 MANUAL THERAPY 1/> REGIONS: CPT | Performed by: PHYSICAL THERAPIST

## 2025-06-27 PROCEDURE — 97110 THERAPEUTIC EXERCISES: CPT | Performed by: PHYSICAL THERAPIST

## 2025-06-27 NOTE — PROGRESS NOTES
Daily Note     Today's date: 2025  Patient name: Courtney Davalos  : 1960  MRN: [id number]  Referring provider: Joseph Ochoa MD  Dx:   Encounter Diagnosis     ICD-10-CM    1. Arthritis of left knee  M17.12       2. Pes anserinus bursitis of left knee  M70.52                  Subjective: notes the knee has been sore, she started using the unloading brace    Objective: See treatment diary below      Assessment: Tolerated treatment well. Patient exhibited good technique with therapeutic exercises and would benefit from continued PT.  Brace adjusted at start of treatment; progressed exercises as noted      Plan: Progress treatment as tolerated.       Precautions: LBP, RPS right knee - no contact with outside of right leg      Manuals            LLL pes anserine SY SY           STM  pes anserine SY SY                                     Neuro Re-Ed                                                                                                        Ther Ex             Hamstring iso -pball :10x5 :05x1' knee bent and straight           Hamstring add iso pball long sit :10x5            Hamstring stretch :10x5 Stairs :10x5           adductor :10x5 Stairs :10x5           Hamstring kick outs   :05x1'           Butterfly stretch supine  :05x1'           Hip IR/add squeeze   :05x1'           Hip adductor squeeze knees bent and straight  :05x1'ea           LAQ with ER  #5 :05x1'                                                  Ther Activity                                       Gait Training                                       Modalities                                             Yes

## 2025-07-01 ENCOUNTER — OFFICE VISIT (OUTPATIENT)
Dept: PHYSICAL THERAPY | Facility: CLINIC | Age: 65
End: 2025-07-01
Attending: STUDENT IN AN ORGANIZED HEALTH CARE EDUCATION/TRAINING PROGRAM
Payer: COMMERCIAL

## 2025-07-01 DIAGNOSIS — M70.52 PES ANSERINUS BURSITIS OF LEFT KNEE: ICD-10-CM

## 2025-07-01 DIAGNOSIS — M17.12 ARTHRITIS OF LEFT KNEE: Primary | ICD-10-CM

## 2025-07-01 PROCEDURE — 97140 MANUAL THERAPY 1/> REGIONS: CPT | Performed by: PHYSICAL THERAPIST

## 2025-07-01 PROCEDURE — 97110 THERAPEUTIC EXERCISES: CPT | Performed by: PHYSICAL THERAPIST

## 2025-07-01 NOTE — PROGRESS NOTES
Daily Note     Today's date: 2025  Patient name: Courtney Davalos  : 1960  MRN: [id number]  Referring provider: Joseph Ochoa MD  Dx:   Encounter Diagnosis     ICD-10-CM    1. Arthritis of left knee  M17.12       2. Pes anserinus bursitis of left knee  M70.52                  Subjective: the brace is helping the knee overall    Objective: See treatment diary below      Assessment: Tolerated treatment well. Patient exhibited good technique with therapeutic exercises and would benefit from continued PT.  Continue slow progression of TE with strengthening and stretching      Plan: Progress treatment as tolerated.       Precautions: LBP, RPS right knee - no contact with outside of right leg      Manuals           LLL pes anserine SY SY SY          STM  pes anserine SY SY SY                                    Neuro Re-Ed                                                                                                        Ther Ex             Hamstring iso -pball :10x5 :05x1' knee bent and straight :05x90 knee bent and straight          Hamstring add iso pball long sit :10x5            Hamstring stretch :10x5 Stairs :10x5 Stairs :10x90s          adductor :10x5 Stairs :10x5 Stairs :10x90s          Hamstring kick outs   :05x1' :05x1'          Butterfly stretch supine  :05x1' :05x90s          Hip IR/add squeeze   :05x1' :05x90s          Hip adductor squeeze knees bent and straight  :05x1'ea :[id number]          LAQ with ER  #5 :05x1' #5 :05x90s          SLR   15x                                    Ther Activity                                       Gait Training                                       Modalities

## 2025-07-03 ENCOUNTER — APPOINTMENT (OUTPATIENT)
Dept: PHYSICAL THERAPY | Facility: CLINIC | Age: 65
End: 2025-07-03
Attending: STUDENT IN AN ORGANIZED HEALTH CARE EDUCATION/TRAINING PROGRAM
Payer: COMMERCIAL

## 2025-07-07 ENCOUNTER — TELEPHONE (OUTPATIENT)
Age: 65
End: 2025-07-07

## 2025-07-07 NOTE — TELEPHONE ENCOUNTER
Received a phone call from patient.  Patient stated that she will be having surgery on 7/18 with Dr. Garcia at [facility] and needs clearance from Dr. Quintero.  Patient does not know any details of what is needed for clearance and stated that she needs it by 7/10, as she will be seeing surgeon at that time.

## 2025-07-07 NOTE — TELEPHONE ENCOUNTER
Relayed message to patient. States the surgeon's office is faxing something over to complete. Requests this be completed and sent back to them ASAP as her visit is 7/10 and surgery is next week.

## 2025-07-07 NOTE — TELEPHONE ENCOUNTER
Patient is having spinal fusion.      She is not currently on any treatment. Ok to clear patient from medical oncology standpoint?

## 2025-07-08 ENCOUNTER — OFFICE VISIT (OUTPATIENT)
Dept: PHYSICAL THERAPY | Facility: CLINIC | Age: 65
End: 2025-07-08
Attending: STUDENT IN AN ORGANIZED HEALTH CARE EDUCATION/TRAINING PROGRAM
Payer: COMMERCIAL

## 2025-07-08 ENCOUNTER — OFFICE VISIT (OUTPATIENT)
Dept: CARDIOLOGY CLINIC | Facility: CLINIC | Age: 65
End: 2025-07-08
Payer: COMMERCIAL

## 2025-07-08 VITALS
HEART RATE: 63 BPM | SYSTOLIC BLOOD PRESSURE: 145 MMHG | BODY MASS INDEX: 24.94 KG/M2 | HEIGHT: 69 IN | OXYGEN SATURATION: 97 % | DIASTOLIC BLOOD PRESSURE: 90 MMHG | WEIGHT: 168.4 LBS

## 2025-07-08 DIAGNOSIS — M70.52 PES ANSERINUS BURSITIS OF LEFT KNEE: ICD-10-CM

## 2025-07-08 DIAGNOSIS — Z01.810 PREOP CARDIOVASCULAR EXAM: ICD-10-CM

## 2025-07-08 DIAGNOSIS — I10 HYPERTENSION, UNSPECIFIED TYPE: Primary | ICD-10-CM

## 2025-07-08 DIAGNOSIS — E78.5 HYPERLIPIDEMIA, UNSPECIFIED HYPERLIPIDEMIA TYPE: Chronic | ICD-10-CM

## 2025-07-08 DIAGNOSIS — M17.12 ARTHRITIS OF LEFT KNEE: Primary | ICD-10-CM

## 2025-07-08 PROCEDURE — 97140 MANUAL THERAPY 1/> REGIONS: CPT | Performed by: PHYSICAL THERAPIST

## 2025-07-08 PROCEDURE — 97110 THERAPEUTIC EXERCISES: CPT | Performed by: PHYSICAL THERAPIST

## 2025-07-08 PROCEDURE — 99204 OFFICE O/P NEW MOD 45 MIN: CPT | Performed by: INTERNAL MEDICINE

## 2025-07-08 PROCEDURE — 93000 ELECTROCARDIOGRAM COMPLETE: CPT | Performed by: INTERNAL MEDICINE

## 2025-07-08 NOTE — PROGRESS NOTES
Name: Courtney Davalos      : 1960      MRN: [id number]  Encounter Provider: Albert Chowdhury MD  Encounter Date: 2025   Encounter department: [facility] [place] [facility]  :  Assessment & Plan  Hypertension, unspecified type  Hypertension, reasonably stable.  Slightly higher than desirable in the office environment.  The patient said that her blood pressure is usually adequately controlled.  We will continue present medication.  Orders:    POCT ECG    Hyperlipidemia, unspecified hyperlipidemia type  Mild hyperlipidemia, stable.  The patient is on no medication at this time and will be attempting dietary modification.       Preop cardiovascular exam  The patient is scheduled for back surgery.  She is stable from the cardiac standpoint to proceed with surgery.            History of Present Illness   The patient presented to this office for the purpose of cardiac evaluation and presurgical evaluation in anticipation of back surgery.  The patient describes symptom of occasionally the heart racing somewhat this however is not associated with any symptom of dizziness or fainting.  She has no exercise related chest discomfort.  She denies any significant lower extremity edema.    Past medical history significant for hypertension and mild hyperlipidemia.  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  She has been treated for anxiety and depression that seems to be under control.  She has been diagnosed with primary biliary cirrhosis.    Habits the patient does not smoke or drink alcohol.    Past medical history significant for her father dying of complication of liver disease at the age of 49.  Her mother  at the age of 92.      Courtney Davalos is a 64 y.o. female  History obtained from: patient    Review of Systems   Constitutional: Negative.    Respiratory: Negative.     Cardiovascular:  Positive for palpitations.  "Negative for chest pain and leg swelling.   Psychiatric/Behavioral: Negative.            Objective   /90 (BP Location: Left arm, Patient Position: Sitting, Cuff Size: Standard)   Pulse 63   Ht 5' 9\" (1.753 m)   Wt 76.4 kg (168 lb 6.4 oz)   LMP  (LMP Unknown)   SpO2 97%   BMI 24.87 kg/m²      Physical Exam  Vitals reviewed.   Constitutional:       Appearance: Normal appearance.   HENT:      Head: Normocephalic and atraumatic.     Cardiovascular:      Rate and Rhythm: Normal rate and regular rhythm.      Heart sounds: Normal heart sounds.   Pulmonary:      Effort: Pulmonary effort is normal.      Breath sounds: Normal breath sounds.     Musculoskeletal:      Right lower leg: No edema.      Left lower leg: No edema.     Skin:     General: Skin is warm and dry.     Neurological:      Mental Status: She is alert and oriented to person, place, and time.     Psychiatric:         Mood and Affect: Mood normal.         Behavior: Behavior normal.           "

## 2025-07-08 NOTE — ASSESSMENT & PLAN NOTE
Mild hyperlipidemia, stable.  The patient is on no medication at this time and will be attempting dietary modification.

## 2025-07-08 NOTE — TELEPHONE ENCOUNTER
Received the fax. Will have Dr. Garcia sign in Dr. Quintero's place as he is the covering physician as Dr. Quintero is currently out of the office until 7/14. Will obtain the signature when Dr. Garcia is in the office either 7/9 or 7/10.

## 2025-07-08 NOTE — PROGRESS NOTES
Daily Note     Today's date: 2025  Patient name: Courtney Davalos  : 1960  MRN: [id number]  Referring provider: Joseph Ochoa MD  Dx:   Encounter Diagnosis     ICD-10-CM    1. Arthritis of left knee  M17.12       2. Pes anserinus bursitis of left knee  M70.52                Subjective: the knee is feeling better overall, less swelling - the brace is irritating the skin in some areas    Objective: See treatment diary below      Assessment: Tolerated treatment well. Patient exhibited good technique with therapeutic exercises and would benefit from continued PT.  Issued tubi- to help with brace irritation.  Decreased tenderness to area of pes anserine and associated distal muscles      Plan: Progress treatment as tolerated.       Precautions: LBP, RPS right knee - no contact with outside of right leg      Manuals          LLL pes anserine SY SY SY SY         STM  pes anserine SY SY SY SY                                   Neuro Re-Ed                                                                                                        Ther Ex             Hamstring iso -pball :10x5 :05x1' knee bent and straight :05x90 knee bent and straight :05x90 knee bent and straight         Hamstring add iso pball long sit :10x5            Hamstring stretch :10x5 Stairs :10x5 Stairs :10x90s Stairs :10x90s         adductor :10x5 Stairs :10x5 Stairs :10x90s Stairs :10x90s         Hamstring kick outs   :05x1' :05x1' :05x1'         Butterfly stretch supine  :05x1' :05x90s :05x90s                      Hip adductor squeeze knees bent and straight  :05x1'ea :[id number] :05x90s         LAQ with ER  #5 :05x1' #5 :05x90s #5 :05x90s         SLR   15x 20                                   Ther Activity                                       Gait Training                                       Modalities

## 2025-07-08 NOTE — ASSESSMENT & PLAN NOTE
Hypertension, reasonably stable.  Slightly higher than desirable in the office environment.  The patient said that her blood pressure is usually adequately controlled.  We will continue present medication.  Orders:    POCT ECG

## 2025-07-10 ENCOUNTER — DOCUMENTATION (OUTPATIENT)
Dept: HEMATOLOGY ONCOLOGY | Facility: CLINIC | Age: 65
End: 2025-07-10

## 2025-07-10 NOTE — TELEPHONE ENCOUNTER
Dr. Garcia is currently rounding in the hospital so unable to obtain a signature from him. Reviewed the case with Dr. Alcantar. She was agreeable to sign the letter as we are only clearing the patient from a medical oncology standpoint and patient is not currently on any treatment. Will fax over the signed letter, along with the last office note and this encounter where Dr. Quintero states he is okay to medically clear her from an oncology standpoint as well to the number they provided on the form.

## 2025-07-11 ENCOUNTER — TELEPHONE (OUTPATIENT)
Age: 65
End: 2025-07-11

## 2025-07-11 ENCOUNTER — OFFICE VISIT (OUTPATIENT)
Dept: PHYSICAL THERAPY | Facility: CLINIC | Age: 65
End: 2025-07-11
Attending: STUDENT IN AN ORGANIZED HEALTH CARE EDUCATION/TRAINING PROGRAM
Payer: COMMERCIAL

## 2025-07-11 DIAGNOSIS — M70.52 PES ANSERINUS BURSITIS OF LEFT KNEE: ICD-10-CM

## 2025-07-11 DIAGNOSIS — M17.12 ARTHRITIS OF LEFT KNEE: Primary | ICD-10-CM

## 2025-07-11 PROCEDURE — 97140 MANUAL THERAPY 1/> REGIONS: CPT | Performed by: PHYSICAL THERAPIST

## 2025-07-11 PROCEDURE — 97110 THERAPEUTIC EXERCISES: CPT | Performed by: PHYSICAL THERAPIST

## 2025-07-11 NOTE — TELEPHONE ENCOUNTER
Spoke with patient and advised. She wants to know if the hospital says to stop would there be a problem. She stated she needs to let them know as as she can.

## 2025-07-11 NOTE — TELEPHONE ENCOUNTER
ursodiol (ACTIGALL) 500 MG tablet [[id number]]    Order Details  Dose: 500 mg     [facility] Next Friday July 18   Spine surgery     Liver doctor left [facility] and patient doesn't have upcoming appointment for months with new doctor so she needs Dr. Harmon to approve her stopping medication before surgery and how long after.     Please call patient back.  Thank you

## 2025-07-11 NOTE — PROGRESS NOTES
Daily Note     Today's date: 2025  Patient name: Courtney Davalos  : 1960  MRN: [id number]  Referring provider: Joseph Ochoa MD  Dx:   Encounter Diagnosis     ICD-10-CM    1. Arthritis of left knee  M17.12       2. Pes anserinus bursitis of left knee  M70.52                  Subjective: more pain in the low back this morning which made it hard to get out of bed; she feels the knee might be swollen on the left    Objective: See treatment diary below      Assessment: Tolerated treatment well. Patient exhibited good technique with therapeutic exercises and would benefit from continued PT.  Left knee tenderness in pes anserine medial to knee joint line      Plan: Progress treatment as tolerated.       Precautions: LBP, RPS right knee - no contact with outside of right leg      Manuals         LLL pes anserine SY SY SY SY SY        STM  pes anserine SY SY SY SY SY                                  Neuro Re-Ed                                                                                                        Ther Ex             Hamstring iso -pball :10x5 :05x1' knee bent and straight :05x90 knee bent and straight :05x90 knee bent and straight :05x90 knee bent and straight        Hamstring add iso pball long sit :10x5            Hamstring stretch :10x5 Stairs :10x5 Stairs :10x90s Stairs :10x90s Stairs :10x90s        adductor :10x5 Stairs :10x5 Stairs :10x90s Stairs :10x90s Stairs :10x90s        Hamstring kick outs   :05x1' :05x1' :05x1' :05x1'        Butterfly stretch supine  :05x1' :05x90s :05x90s :05x90s                     Hip adductor squeeze knees bent and straight  :05x1'ea :[id number] :05x90s :05x90s        LAQ with ER  #5 :05x1' #5 :05x90s #5 :05x90s #5 :05x90s        SLR   15x 20 20                                  Ther Activity                                       Gait Training                                       Modalities

## 2025-07-11 NOTE — TELEPHONE ENCOUNTER
I think stopping it for a few days would be fine but there is really no need to do that.  Her alkaline phosphatase level will probably go up.

## 2025-07-11 NOTE — TELEPHONE ENCOUNTER
Advise her that there is no need to discontinue ursodiol preop but Dr. Harmon will be back next week and we will see if he recommends otherwise.

## 2025-07-14 ENCOUNTER — TELEPHONE (OUTPATIENT)
Dept: NEUROSURGERY | Facility: CLINIC | Age: 65
End: 2025-07-14

## 2025-07-14 ENCOUNTER — OFFICE VISIT (OUTPATIENT)
Dept: PHYSICAL THERAPY | Facility: CLINIC | Age: 65
End: 2025-07-14
Attending: STUDENT IN AN ORGANIZED HEALTH CARE EDUCATION/TRAINING PROGRAM
Payer: COMMERCIAL

## 2025-07-14 DIAGNOSIS — M70.52 PES ANSERINUS BURSITIS OF LEFT KNEE: ICD-10-CM

## 2025-07-14 DIAGNOSIS — M17.12 ARTHRITIS OF LEFT KNEE: Primary | ICD-10-CM

## 2025-07-14 PROCEDURE — 97110 THERAPEUTIC EXERCISES: CPT | Performed by: PHYSICAL THERAPIST

## 2025-07-14 PROCEDURE — 97140 MANUAL THERAPY 1/> REGIONS: CPT | Performed by: PHYSICAL THERAPIST

## 2025-07-14 NOTE — PROGRESS NOTES
Daily Note     Today's date: 2025  Patient name: Courtney Davalos  : 1960  MRN: [id number]  Referring provider: Joseph Ochoa MD  Dx:   Encounter Diagnosis     ICD-10-CM    1. Arthritis of left knee  M17.12       2. Pes anserinus bursitis of left knee  M70.52                  Subjective: notes one day when she couldn't get out of bed independently due to low back pain    Objective: See treatment diary below      Assessment: Tolerated treatment well. Patient exhibited good technique with therapeutic exercises and would benefit from continued PT.  Left knee tenderness in pes anserine medial to knee joint line.  Continued with LE strengthening.  She will be having surgery later this week and will hold on PT.       Plan: Progress treatment as tolerated.       Precautions: LBP, RPS right knee - no contact with outside of right leg      Manuals        LLL pes anserine SY SY SY SY SY SY       STM  pes anserine SY SY SY SY SY SY                                 Neuro Re-Ed                                                                                                        Ther Ex             Hamstring iso -pball :10x5 :05x1' knee bent and straight :05x90 knee bent and straight :05x90 knee bent and straight :05x90 knee bent and straight :05x90 knee bent and straight       Hamstring add iso pball long sit :10x5            Hamstring stretch :10x5 Stairs :10x5 Stairs :10x90s Stairs :10x90s Stairs :10x90s Stairs :10x90s       adductor :10x5 Stairs :10x5 Stairs :10x90s Stairs :10x90s Stairs :10x90s Stairs :10x90s       Hamstring kick outs   :05x1' :05x1' :05x1' :05x1' :05x1'       Butterfly stretch supine  :05x1' :05x90s :05x90s :05x90s :05x90s                    Hip adductor squeeze knees bent and straight  :05x1'ea :[id number] :05x90s :05x90s :05x90s       LAQ with ER  #5 :05x1' #5 :05x90s #5 :05x90s #5 :05x90s #5 :05x90s       SLR   15x 20 20 20                                 Ther  Activity                                       Gait Training                                       Modalities

## 2025-07-14 NOTE — TELEPHONE ENCOUNTER
"PT left voicemail Friday 1:30p    \"hi this is this is giulia ramos i'm leaving a message for tyrell i am not going to have surgery uh with [facility] and doctor ALISHA in september um that's way too long for me to wait i am going to [facility] so you can cancel my appointment uh for surgery doctor ALISHA my number is [phone] thank you\"    Attempted to speak with patient advising I did receive her message to cancel surgery. Encouraged the patient to reach out if in need in the future.    "

## 2025-07-15 ENCOUNTER — TELEPHONE (OUTPATIENT)
Age: 65
End: 2025-07-15

## 2025-07-17 ENCOUNTER — APPOINTMENT (OUTPATIENT)
Dept: PHYSICAL THERAPY | Facility: CLINIC | Age: 65
End: 2025-07-17
Attending: STUDENT IN AN ORGANIZED HEALTH CARE EDUCATION/TRAINING PROGRAM
Payer: COMMERCIAL

## 2025-07-21 ENCOUNTER — TELEPHONE (OUTPATIENT)
Age: 65
End: 2025-07-21

## 2025-07-21 ENCOUNTER — APPOINTMENT (OUTPATIENT)
Dept: PHYSICAL THERAPY | Facility: CLINIC | Age: 65
End: 2025-07-21
Attending: STUDENT IN AN ORGANIZED HEALTH CARE EDUCATION/TRAINING PROGRAM
Payer: COMMERCIAL

## 2025-07-21 NOTE — TELEPHONE ENCOUNTER
Received call from LURDES Garcia with [facility] to inform patient was discharged from [facility]. post procedure on 7/18. Calling to confirm if  Primary Care Provider follows with patient and sign home care orders. Please follow up with Radha or office for provider response.

## 2025-07-22 ENCOUNTER — TELEPHONE (OUTPATIENT)
Age: 65
End: 2025-07-22

## 2025-07-22 ENCOUNTER — TELEPHONE (OUTPATIENT)
Dept: INTERNAL MEDICINE CLINIC | Facility: CLINIC | Age: 65
End: 2025-07-22

## 2025-07-22 NOTE — TELEPHONE ENCOUNTER
Taylor, from [facility], called back, regarding orders that need to be faxed.    Please advise.

## 2025-07-22 NOTE — TELEPHONE ENCOUNTER
[facility] was called, I spoke with Yoly and informed her  signed the order they were in need of. Pt was also called for a follow up appt and she stated she will make one when she gets discharged from the hospital.

## 2025-07-22 NOTE — TELEPHONE ENCOUNTER
Dr Garcia from  [facility] called he wanted Dr Harmon to know the lumbar fusion on 7/18/25 went well

## 2025-07-22 NOTE — TELEPHONE ENCOUNTER
Please call [facility] and let them know that Dr. Harmon will sign the orders. She will need a follow up scheduled as well.

## 2025-07-23 ENCOUNTER — TELEPHONE (OUTPATIENT)
Dept: OTHER | Facility: OTHER | Age: 65
End: 2025-07-23

## 2025-07-24 ENCOUNTER — NURSING HOME VISIT (OUTPATIENT)
Age: 65
End: 2025-07-24

## 2025-07-24 ENCOUNTER — APPOINTMENT (OUTPATIENT)
Dept: PHYSICAL THERAPY | Facility: CLINIC | Age: 65
End: 2025-07-24
Attending: STUDENT IN AN ORGANIZED HEALTH CARE EDUCATION/TRAINING PROGRAM
Payer: COMMERCIAL

## 2025-07-24 ENCOUNTER — NURSING HOME VISIT (OUTPATIENT)
Dept: GERIATRICS | Facility: OTHER | Age: 65
End: 2025-07-24
Payer: COMMERCIAL

## 2025-07-24 DIAGNOSIS — F41.8 DEPRESSION WITH ANXIETY: Chronic | ICD-10-CM

## 2025-07-24 DIAGNOSIS — M48.062 SPINAL STENOSIS OF LUMBAR REGION WITH NEUROGENIC CLAUDICATION: Primary | ICD-10-CM

## 2025-07-24 DIAGNOSIS — C64.1 RENAL CELL CARCINOMA OF RIGHT KIDNEY (HCC): ICD-10-CM

## 2025-07-24 DIAGNOSIS — K74.3 PRIMARY BILIARY CHOLANGITIS (HCC): ICD-10-CM

## 2025-07-24 DIAGNOSIS — M48.061 LUMBAR FORAMINAL STENOSIS: ICD-10-CM

## 2025-07-24 DIAGNOSIS — G57.71 COMPLEX REGIONAL PAIN SYNDROME TYPE 2 OF RIGHT LOWER EXTREMITY: ICD-10-CM

## 2025-07-24 DIAGNOSIS — N18.31 STAGE 3A CHRONIC KIDNEY DISEASE (HCC): ICD-10-CM

## 2025-07-24 DIAGNOSIS — I10 HYPERTENSION, UNSPECIFIED TYPE: ICD-10-CM

## 2025-07-24 DIAGNOSIS — D47.2 SMOLDERING MULTIPLE MYELOMA (SMM): Chronic | ICD-10-CM

## 2025-07-24 PROCEDURE — 99306 1ST NF CARE HIGH MDM 50: CPT | Performed by: FAMILY MEDICINE

## 2025-07-24 RX ORDER — ALBUTEROL SULFATE 90 UG/1
2 INHALANT RESPIRATORY (INHALATION) EVERY 4 HOURS PRN
Status: ON HOLD | COMMUNITY

## 2025-07-24 RX ORDER — OXYCODONE HYDROCHLORIDE 5 MG/1
5 CAPSULE ORAL EVERY 6 HOURS SCHEDULED
Status: ON HOLD | COMMUNITY

## 2025-07-24 RX ORDER — DIAZEPAM 5 MG/1
5 TABLET ORAL EVERY 8 HOURS PRN
Status: ON HOLD | COMMUNITY

## 2025-07-24 RX ORDER — POLYETHYLENE GLYCOL 3350 17 G/17G
17 POWDER, FOR SOLUTION ORAL DAILY
Status: ON HOLD | COMMUNITY

## 2025-07-24 RX ORDER — SENNOSIDES 8.6 MG/1
2 TABLET ORAL 2 TIMES DAILY
Status: ON HOLD | COMMUNITY

## 2025-07-24 NOTE — ASSESSMENT & PLAN NOTE
Chronic, stable on Ursodiol 500 mg BID and Livdelzi 10 mg daily. Follows with hepatology.   Continue current regimen   Monitor

## 2025-07-24 NOTE — ASSESSMENT & PLAN NOTE
S/p nephrectomy June 2023. Patient started adjuvant Pembrolizumab 200 mg every 3 weeks for total of 1 year (C1 on 8/15/2023). Follow up CAT scan on June 2024 showed no evidence of reoccurrence.

## 2025-07-24 NOTE — PROGRESS NOTES
[facility]  [address]  [place] [id number]   [facility]  History and Physical    NAME: Courtney Davalos  AGE: 64 y.o. SEX: female [phone]    DATE OF ENCOUNTER: 7/24/2025    Code status:  CPR    Assessment and Plan     Problem List Items Addressed This Visit       Depression with anxiety (Chronic)    Chronic, stable on Prozac 40 mg daily.   Continue home medication          Primary biliary cholangitis (HCC)    Chronic, stable on Ursodiol 500 mg BID and Livdelzi 10 mg daily. Follows with hepatology.   Continue current regimen   Monitor          Smoldering multiple myeloma (SMM) (Chronic)    Diagnosed in 2017 and follows with heme/onc  with no evidence of end organ damage at this time.   Continue to monitor   Follow outpatient with oncology          Hypertension    Chronic, stable on Lisinopril 30 mg daily. Admission BP normotensive 124/72.  Continue current regimen          Complex regional pain syndrome type 2 of right lower extremity    Chronic, follows with pain management. At baseline and symptoms not worsening. Most affected area is right knee.   Tramadol PRN  Avoid palpation of sensitive areas on exam            Renal cell carcinoma of right kidney (HCC)    S/p nephrectomy June 2023. Patient started adjuvant Pembrolizumab 200 mg every 3 weeks for total of 1 year (C1 on 8/15/2023). Follow up CAT scan on June 2024 showed no evidence of reoccurrence.          Spinal stenosis of lumbar region with neurogenic claudication - Primary    Chronic, S/p Lumbar/Thoracic Fusion Spine Surgery on 7/18 requiring STR. Post op incision site C/D/I. Pain controlled on Oxycodone.   Remove surgical dressing on 07/25/25. May shower with the dressing in place. If the dressing comes off before 7/25/25, leave incision open to air  Wound check visit for 14 days after surgery. If still at the rehab facility on 08/01/2025, sutures can be removed there  Lidocaine 5% patch   Pain regimen    Bowel regimen   PT/OT  Avoid strenuous activity or heavy lifting             All medications and routine orders were reviewed and updated as needed.    Plan discussed with: Patient    Chief Complaint     Seen for admission at Nursing Facility    History of Present Illness     64 year old F with PMH of HTN, CKD, RCC, Anxiety, Depression, PBC, Complex regional Pain Syndrome, Lumbar Radiculopathy, Spinal Stenosis, and MM  presenting to STR after undergoing Thoracic/Lumbar Fusion surgery at [facility] for her lumbar spinal stenosis with neurogenic claudication. Patient is doing well post op and with adequate pain control on oxycodone. She has MSK tenderness in line with her regional pain syndrome and has not had a bowel movement in past few days. She is urinating and A&Ox3. She is doing well without any other complaints noted.     HISTORY:  [Past Medical History]    [Past Medical History]  Diagnosis Date    Abnormal breast exam     Anemia     Anxiety     Arthritis     Asthma     childhood    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HCC)     primary per allscripts    Cancer (HCC)     IgG kappa smoldering multiple myeloma    Cardiac murmur     Chronic liver disease     resolved 12/04/2017    COVID     Depression     Endometriosis     Fracture of tibia     right tibial plateau fracture per allscripts    Heart murmur     Hepatic disease     Hypertension     last assessed 12/13/2017    Inflammatory bowel disease     Kidney stone     Multiple myeloma (HCC)     Neuropathy     R foot     Nosebleed     OCD (obsessive compulsive disorder)     Osteoporosis     Otitis media     Pneumonia     RSD (reflex sympathetic dystrophy) 12/11/2018    Scoliosis     Seasonal allergies     Urinary tract infection     Visual impairment     Wears eyeglasses     Whiplash injury to neck      [Family History]    [Family History]  Problem Relation Name Age of Onset    Heart failure Mother Jinny     Anxiety disorder Mother Jinny         symptom per allscripts    Depression  Mother Jinny     Vision loss Mother Jinny     Anxiety disorder Father James         symptom per allscripts    Cirrhosis Father James         hepatic per allscripts    Alcohol abuse Father James     Stomach cancer Maternal Grandmother Moraima     Cancer Maternal Grandmother Moraima     Stomach cancer Maternal Grandfather De     Cancer Maternal Grandfather De     Diabetes Paternal Grandmother Deepti     No Known Problems Paternal Grandfather      No Known Problems Paternal Aunt      No Known Problems Paternal Aunt      Anxiety disorder Other family history         symptom per allscripts    Coronary artery disease Other family history     Depression Other family history     Hyperlipidemia Other family history     Osteoarthritis Other family history     Other Other unknown         back disorder per allscripts    Neuropathy Other unknown      [Social History]    [Social History]  Socioeconomic History    Marital status: Single    Years of education: completed college   Occupational History    Occupation:     Occupation: [employer]   Tobacco Use    Smoking status: Never     Passive exposure: Never    Smokeless tobacco: Never   Vaping Use    Vaping status: Never Used   Substance and Sexual Activity    Alcohol use: Not Currently    Drug use: No    Sexual activity: Yes     Partners: Male     Birth control/protection: Post-menopausal   Social History Narrative    Does not participate in activities outside of the home; participates in moderate activities inside the home    Lives with mother     Social Drivers of Health     Financial Resource Strain: Low Risk  (2/28/2023)    Overall Financial Resource Strain (CARDIA)     Difficulty of Paying Living Expenses: Not hard at all   Food Insecurity: No Food Insecurity (7/18/2025)    Received from [facility]    Hunger Vital Sign     Within the past 12 months, you worried that your food would run out before you got the  money to buy more.: Never true     Within the past 12 months, the food you bought just didn't last and you didn't have money to get more.: Never true   Transportation Needs: No Transportation Needs (7/18/2025)    Received from [facility]    PRAPARE - Transportation     Lack of Transportation (Medical): No     Lack of Transportation (Non-Medical): No   Housing Stability: Low Risk  (7/18/2025)    Received from [facility]    Housing Stability Vital Sign     In the last 12 months, was there a time when you were not able to pay the mortgage or rent on time?: No     In the past 12 months, how many times have you moved where you were living?: 0     At any time in the past 12 months, were you homeless or living in a shelter (including now)?: No       Allergies:  [Allergies]    [Allergies]  Allergen Reactions    Codeine GI Intolerance and Nausea Only     Other reaction(s): Other (See Comments)  Violently ill  Violently ill    Latex Rash     itching    Morphine And Codeine Nausea Only     GI intolerance nausea    Nortriptyline Shortness Of Breath    Ocaliva [Obeticholic Acid] Hives    Doxycycline GI Intolerance and Nausea Only    Sulfa Antibiotics GI Intolerance    Barium Sulfate Diarrhea    Cymbalta [Duloxetine Hcl]     Penicillins Other (See Comments) and Rash     Childhood reaction       Review of Systems     Review of Systems   Constitutional:  Negative for chills and fever.   HENT:  Negative for ear pain and sore throat.    Eyes:  Negative for pain and visual disturbance.   Respiratory:  Negative for cough and shortness of breath.    Cardiovascular:  Negative for chest pain and palpitations.   Gastrointestinal:  Positive for constipation. Negative for abdominal pain and vomiting.   Genitourinary:  Negative for difficulty urinating, dysuria and hematuria.   Musculoskeletal:  Positive for back pain and gait problem.   Skin:  Negative for color change and rash.    Neurological:  Negative for seizures and syncope.   Hematological:  Does not bruise/bleed easily.   Psychiatric/Behavioral:  Negative for agitation, behavioral problems and confusion.    All other systems reviewed and are negative.      Medications and orders     All medications reviewed and updated in [facility] EMR.      Objective     Vitals: T 97.1, P 72, RR 16, /72, O2 sat 92%, Wt 162 lbs    Physical Exam  Constitutional:       General: She is not in acute distress.     Appearance: Normal appearance. She is normal weight. She is not ill-appearing, toxic-appearing or diaphoretic.   HENT:      Head: Normocephalic and atraumatic.      Nose: Nose normal. No congestion or rhinorrhea.     Eyes:      General: No scleral icterus.        Right eye: No discharge.         Left eye: No discharge.      Extraocular Movements: Extraocular movements intact.      Conjunctiva/sclera: Conjunctivae normal.       Cardiovascular:      Rate and Rhythm: Normal rate and regular rhythm.      Pulses: Normal pulses.      Heart sounds: Normal heart sounds.   Pulmonary:      Effort: Pulmonary effort is normal. No respiratory distress.      Breath sounds: Normal breath sounds.   Abdominal:      General: Abdomen is flat. Bowel sounds are normal. There is no distension.      Palpations: Abdomen is soft.      Tenderness: There is no abdominal tenderness.     Musculoskeletal:         General: Normal range of motion.      Cervical back: Normal range of motion.      Lumbar back: Tenderness present. No swelling or edema.      Right knee: Tenderness present.      Right lower leg: No edema.      Left lower leg: No edema.      Comments: Dressings C/D/I at incisions sites     Skin:     General: Skin is warm and dry.      Capillary Refill: Capillary refill takes less than 2 seconds.      Findings: No lesion or rash.     Neurological:      Mental Status: She is alert and oriented to person, place, and time. Mental status is at baseline.       Motor: No weakness.      Gait: Gait abnormal.     Psychiatric:         Attention and Perception: Attention and perception normal.         Mood and Affect: Mood normal.         Speech: Speech normal.         Behavior: Behavior normal. Behavior is cooperative.         Thought Content: Thought content normal. Thought content does not include homicidal or suicidal ideation.         Cognition and Memory: Cognition and memory normal.         Judgment: Judgment normal.     Pertinent Laboratory/Diagnostic Studies:   The following labs/studies were reviewed please see chart or hospital paperwork for details.    Lab Results   Component Value Date    WBC 4.45 03/12/2025    HGB 12.6 03/12/2025    HCT 40.0 03/12/2025    MCV 96 03/12/2025     03/12/2025     Lab Results   Component Value Date     12/29/2015    SODIUM 137 07/19/2025    K 4.4 07/19/2025     07/19/2025    CO2 25 07/19/2025    ANIONGAP 7 12/29/2015    AGAP 5 07/19/2025    BUN 14 07/19/2025    CREATININE 0.79 07/19/2025    GLUC 112 (H) 07/19/2025    GLUF 84 03/12/2025    CALCIUM 7.9 (L) 07/19/2025    AST 38 07/18/2025    ALT 30 07/18/2025    ALKPHOS 181 (H) 07/18/2025    PROT 8.4 (H) 12/29/2015    TP 6.3 07/18/2025    BILITOT 0.46 12/29/2015    TBILI 0.4 07/18/2025    EGFR 63 07/03/2025         - Counseling Documentation: patient was counseled regarding: instructions for management, prognosis, and patient and family education

## 2025-07-24 NOTE — ASSESSMENT & PLAN NOTE
Diagnosed in 2017 and follows with heme/onc  with no evidence of end organ damage at this time.   Continue to monitor   Follow outpatient with oncology

## 2025-07-24 NOTE — PROGRESS NOTES
[facility]  [address]  [place] [id number]   [facility]  History and Physical    NAME: Courtney Davalos  AGE: 64 y.o. SEX: female [phone]    DATE OF ENCOUNTER: 7/24/2025    Code status:  CPR    Assessment and Plan     Problem List Items Addressed This Visit       Depression with anxiety (Chronic)    Chronic, stable on Prozac 40 mg daily.   Continue home medication          Primary biliary cholangitis (HCC)    Chronic, stable on Ursodiol 500 mg BID and Livdelzi 10 mg daily. Follows with hepatology.   Continue current regimen   Monitor          Smoldering multiple myeloma (SMM) (Chronic)    Diagnosed in 2017 and follows with heme/onc  with no evidence of end organ damage at this time.   Continue to monitor   Follow outpatient with oncology          Hypertension    Chronic, stable on Lisinopril 30 mg daily. Admission BP normotensive 124/72.  Continue current regimen          Complex regional pain syndrome type 2 of right lower extremity    Chronic, follows with pain management. At baseline and symptoms not worsening. Most affected area is right knee.   Tramadol PRN  Avoid palpation of sensitive areas on exam            Renal cell carcinoma of right kidney (HCC)    S/p nephrectomy June 2023. Patient started adjuvant Pembrolizumab 200 mg every 3 weeks for total of 1 year (C1 on 8/15/2023). Follow up CAT scan on June 2024 showed no evidence of reoccurrence.          Spinal stenosis of lumbar region with neurogenic claudication    Chronic, S/p Lumbar/Thoracic Fusion Spine Surgery on 7/18 requiring STR. Post op incision site C/D/I. Pain controlled on Oxycodone.   Remove surgical dressing on 07/25/25. May shower with the dressing in place. If the dressing comes off before 7/25/25, leave incision open to air  Wound check visit for 14 days after surgery. If still at the rehab facility on 08/01/2025, sutures can be removed there  Lidocaine 5% patch   Pain regimen   Bowel  regimen   PT/OT  Avoid strenuous activity or heavy lifting           Other Visit Diagnoses         ERRONEOUS ENCOUNTER--DISREGARD    -  Primary            All medications and routine orders were reviewed and updated as needed.    Plan discussed with: Patient    Chief Complaint     Seen for admission at Nursing Facility    History of Present Illness     64 year old F with PMH of HTN, CKD, RCC, Anxiety, Depression, PBC, Complex regional Pain Syndrome, Lumbar Radiculopathy, Spinal Stenosis, and MM  [facility]ing [email or web address] after undergoing Thoracic/Lumbar Fusion surgery at [facility] for her lumbar spinal stenosis with neurogenic claudication. Patient is doing well post op and with adequate pain control on oxycodone. She has MSK tenderness in line with her regional pain syndrome and has not had a bowel movement in past few days. She is urinating and A&Ox3. She is doing well without any other complaints noted.     HISTORY:  Past Medical History[1]  Family History[2]  Social History[3]    Allergies:  Allergies[4]    Review of Systems     Review of Systems   Constitutional:  Negative for chills and fever.   HENT:  Negative for ear pain and sore throat.    Eyes:  Negative for pain and visual disturbance.   Respiratory:  Negative for cough and shortness of breath.    Cardiovascular:  Negative for chest pain and palpitations.   Gastrointestinal:  Positive for constipation. Negative for abdominal pain and vomiting.   Genitourinary:  Negative for difficulty urinating, dysuria and hematuria.   Musculoskeletal:  Positive for back pain and gait problem.   Skin:  Negative for color change and rash.   Neurological:  Negative for seizures and syncope.   Hematological:  Does not bruise/bleed easily.   Psychiatric/Behavioral:  Negative for agitation, behavioral problems and confusion.    All other systems reviewed and are negative.      Medications and orders     All medications reviewed and updated in detention EMR.      Objective     Vitals:  T 97.1, P 72, RR 16, /72, O2 sat 92%, Wt 162 lbs    Physical Exam  Constitutional:       General: She is not in acute distress.     Appearance: Normal appearance. She is normal weight. She is not ill-appearing, toxic-appearing or diaphoretic.   HENT:      Head: Normocephalic and atraumatic.      Nose: Nose normal. No congestion or rhinorrhea.     Eyes:      General: No scleral icterus.        Right eye: No discharge.         Left eye: No discharge.      Extraocular Movements: Extraocular movements intact.      Conjunctiva/sclera: Conjunctivae normal.       Cardiovascular:      Rate and Rhythm: Normal rate and regular rhythm.      Pulses: Normal pulses.      Heart sounds: Normal heart sounds.   Pulmonary:      Effort: Pulmonary effort is normal. No respiratory distress.      Breath sounds: Normal breath sounds.   Abdominal:      General: Abdomen is flat. Bowel sounds are normal. There is no distension.      Palpations: Abdomen is soft.      Tenderness: There is no abdominal tenderness.     Musculoskeletal:         General: Normal range of motion.      Cervical back: Normal range of motion.      Lumbar back: Tenderness present. No swelling or edema.      Right knee: Tenderness present.      Right lower leg: No edema.      Left lower leg: No edema.      Comments: Dressings C/D/I at incisions sites     Skin:     General: Skin is warm and dry.      Capillary Refill: Capillary refill takes less than 2 seconds.      Findings: No lesion or rash.     Neurological:      Mental Status: She is alert and oriented to person, place, and time. Mental status is at baseline.      Motor: No weakness.      Gait: Gait abnormal.     Psychiatric:         Attention and Perception: Attention and perception normal.         Mood and Affect: Mood normal.         Speech: Speech normal.         Behavior: Behavior normal. Behavior is cooperative.         Thought Content: Thought content normal. Thought content does not include homicidal or  suicidal ideation.         Cognition and Memory: Cognition and memory normal.         Judgment: Judgment normal.       Pertinent Laboratory/Diagnostic Studies:   The following labs/studies were reviewed please see chart or hospital paperwork for details.    Lab Results   Component Value Date    WBC 4.45 03/12/2025    HGB 12.6 03/12/2025    HCT 40.0 03/12/2025    MCV 96 03/12/2025     03/12/2025     Lab Results   Component Value Date     12/29/2015    SODIUM 137 07/19/2025    K 4.4 07/19/2025     07/19/2025    CO2 25 07/19/2025    ANIONGAP 7 12/29/2015    AGAP 5 07/19/2025    BUN 14 07/19/2025    CREATININE 0.79 07/19/2025    GLUC 112 (H) 07/19/2025    GLUF 84 03/12/2025    CALCIUM 7.9 (L) 07/19/2025    AST 38 07/18/2025    ALT 30 07/18/2025    ALKPHOS 181 (H) 07/18/2025    PROT 8.4 (H) 12/29/2015    TP 6.3 07/18/2025    BILITOT 0.46 12/29/2015    TBILI 0.4 07/18/2025    EGFR 63 07/03/2025         - Counseling Documentation: patient was counseled regarding: instructions for management, prognosis, and patient and family education                   [1]   Past Medical History:  Diagnosis Date    Abnormal breast exam     Anemia     Anxiety     Arthritis     Asthma     childhood    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HCC)     primary per allscripts    Cancer (HCC)     IgG kappa smoldering multiple myeloma    Cardiac murmur     Chronic liver disease     resolved 12/04/2017    COVID     Depression     Endometriosis     Fracture of tibia     right tibial plateau fracture per allscripts    Heart murmur     Hepatic disease     Hypertension     last assessed 12/13/2017    Inflammatory bowel disease     Kidney stone     Multiple myeloma (HCC)     Neuropathy     R foot     Nosebleed     OCD (obsessive compulsive disorder)     Osteoporosis     Otitis media     Pneumonia     RSD (reflex sympathetic dystrophy) 12/11/2018    Scoliosis     Seasonal allergies     Urinary tract infection     Visual impairment     Wears eyeglasses      Whiplash injury to neck    [2]   Family History  Problem Relation Name Age of Onset    Heart failure Mother Jinny     Anxiety disorder Mother Jinny         symptom per allscripts    Depression Mother Jinny     Vision loss Mother Jinny     Anxiety disorder Father James         symptom per allscripts    Cirrhosis Father James         hepatic per allscripts    Alcohol abuse Father James     Stomach cancer Maternal Grandmother Moraima     Cancer Maternal Grandmother Moraima     Stomach cancer Maternal Grandfather De     Cancer Maternal Grandfather De     Diabetes Paternal Grandmother Deepti     No Known Problems Paternal Grandfather      No Known Problems Paternal Aunt      No Known Problems Paternal Aunt      Anxiety disorder Other family history         symptom per allscripts    Coronary artery disease Other family history     Depression Other family history     Hyperlipidemia Other family history     Osteoarthritis Other family history     Other Other unknown         back disorder per allscripts    Neuropathy Other unknown    [3]   Social History  Socioeconomic History    Marital status: Single    Years of education: completed college   Occupational History    Occupation:     Occupation:    Tobacco Use    Smoking status: Never     Passive exposure: Never    Smokeless tobacco: Never   Vaping Use    Vaping status: Never Used   Substance and Sexual Activity    Alcohol use: Not Currently    Drug use: No    Sexual activity: Yes     Partners: Male     Birth control/protection: Post-menopausal   Social History Narrative    Does not participate in activities outside of the home; participates in moderate activities inside the home    Lives with mother     Social Drivers of Health     Financial Resource Strain: Low Risk  (2/28/2023)    Overall Financial Resource Strain (CARDIA)     Difficulty of Paying Living Expenses: Not hard at all   Food Insecurity: No Food Insecurity  (7/18/2025)    Received from [facility]    Hunger Vital Sign     Within the past 12 months, you worried that your food would run out before you got the money to buy more.: Never true     Within the past 12 months, the food you bought just didn't last and you didn't have money to get more.: Never true   Transportation Needs: No Transportation Needs (7/18/2025)    Received from [facility]    PRAPARE - Transportation     Lack of Transportation (Medical): No     Lack of Transportation (Non-Medical): No   Housing Stability: Low Risk  (7/18/2025)    Received from [facility]    Housing Stability Vital Sign     In the last 12 months, was there a time when you were not able to pay the mortgage or rent on time?: No     In the past 12 months, how many times have you moved where you were living?: 0     At any time in the past 12 months, were you homeless or living in a shelter (including now)?: No   [4]   Allergies  Allergen Reactions    Codeine GI Intolerance and Nausea Only     Other reaction(s): Other (See Comments)  Violently ill  Violently ill    Latex Rash     itching    Morphine And Codeine Nausea Only     GI intolerance nausea    Nortriptyline Shortness Of Breath    Ocaliva [Obeticholic Acid] Hives    Doxycycline GI Intolerance and Nausea Only    Sulfa Antibiotics GI Intolerance    Barium Sulfate Diarrhea    Cymbalta [Duloxetine Hcl]     Penicillins Other (See Comments) and Rash     Childhood reaction

## 2025-07-24 NOTE — ASSESSMENT & PLAN NOTE
Chronic, S/p Lumbar/Thoracic Fusion Spine Surgery on 7/18 requiring STR. Post op incision site C/D/I. Pain controlled on Oxycodone.   Remove surgical dressing on 07/25/25. May shower with the dressing in place. If the dressing comes off before 7/25/25, leave incision open to air  Wound check visit for 14 days after surgery. If still at the rehab facility on 08/01/2025, sutures can be removed there  Lidocaine 5% patch   Pain regimen   Bowel regimen   PT/OT  Avoid strenuous activity or heavy lifting

## 2025-07-24 NOTE — ASSESSMENT & PLAN NOTE
Chronic, follows with pain management. At baseline and symptoms not worsening. Most affected area is right knee.   Tramadol PRN  Avoid palpation of sensitive areas on exam

## 2025-07-24 NOTE — PROGRESS NOTES
[facility]  [address]  [place] [id number]   [facility]  History and Physical     NAME: Courtney Davalos  AGE: 64 y.o. SEX: female [phone]     DATE OF ENCOUNTER: 7/24/2025     Code status:  CPR     Assessment and Plan      Problem List Items Addressed This Visit         Depression with anxiety (Chronic)     Chronic, stable on Prozac 40 mg daily.   Continue home medication            Primary biliary cholangitis (HCC)     Chronic, stable on Ursodiol 500 mg BID and Livdelzi 10 mg daily. Follows with hepatology.   Continue current regimen   Monitor            Smoldering multiple myeloma (SMM) (Chronic)     Diagnosed in 2017 and follows with heme/onc  with no evidence of end organ damage at this time.   Continue to monitor   Follow outpatient with oncology            Hypertension     Chronic, stable on Lisinopril 30 mg daily. Admission BP normotensive 124/72.  Continue current regimen            Complex regional pain syndrome type 2 of right lower extremity     Chronic, follows with pain management. At baseline and symptoms not worsening. Most affected area is right knee.   Tramadol PRN  Avoid palpation of sensitive areas on exam               Renal cell carcinoma of right kidney (HCC)     S/p nephrectomy June 2023. Patient started adjuvant Pembrolizumab 200 mg every 3 weeks for total of 1 year (C1 on 8/15/2023). Follow up CAT scan on June 2024 showed no evidence of reoccurrence.            Spinal stenosis of lumbar region with neurogenic claudication - Primary     Chronic, S/p Lumbar/Thoracic Fusion Spine Surgery on 7/18 requiring STR. Post op incision site C/D/I. Pain controlled on Oxycodone.   Remove surgical dressing on 07/25/25. May shower with the dressing in place. If the dressing comes off before 7/25/25, leave incision open to air  Wound check visit for 14 days after surgery. If still at the rehab facility on 08/01/2025, sutures can be removed there  Lidocaine  5% patch   Pain regimen   Bowel regimen   PT/OT  Avoid strenuous activity or heavy lifting                All medications and routine orders were reviewed and updated as needed.     Plan discussed with: Patient     Chief Complaint      Seen for admission at Nursing Facility     History of Present Illness      64 year old F with PMH of HTN, CKD, RCC, Anxiety, Depression, PBC, Complex regional Pain Syndrome, Lumbar Radiculopathy, Spinal Stenosis, and MM  presenting to STR after undergoing Thoracic/Lumbar Fusion surgery at [facility] for her lumbar spinal stenosis with neurogenic claudication. Patient is doing well post op and with adequate pain control on oxycodone. She has MSK tenderness in line with her regional pain syndrome and has not had a bowel movement in past few days. She is urinating and A&Ox3. She is doing well without any other complaints noted.      HISTORY:  [Past Medical History]    [Past Medical History]       Diagnosis Date    Abnormal breast exam      Anemia      Anxiety      Arthritis      Asthma       childhood    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HCC)       primary per allscripts    Cancer (HCC)       IgG kappa smoldering multiple myeloma    Cardiac murmur      Chronic liver disease       resolved 12/04/2017    COVID      Depression      Endometriosis      Fracture of tibia       right tibial plateau fracture per allscripts    Heart murmur      Hepatic disease      Hypertension       last assessed 12/13/2017    Inflammatory bowel disease      Kidney stone      Multiple myeloma (HCC)      Neuropathy       R foot     Nosebleed      OCD (obsessive compulsive disorder)      Osteoporosis      Otitis media      Pneumonia      RSD (reflex sympathetic dystrophy) 12/11/2018    Scoliosis      Seasonal allergies      Urinary tract infection      Visual impairment      Wears eyeglasses      Whiplash injury to neck       [Family History]    [Family History]         Problem Relation Name Age of Onset    Heart failure Mother  Jinny      Anxiety disorder Mother Jinny           symptom per allscripts    Depression Mother Jinny      Vision loss Mother Jinny      Anxiety disorder Father James           symptom per allscripts    Cirrhosis Father James           hepatic per allscripts    Alcohol abuse Father Jamse      Stomach cancer Maternal Grandmother Moraima      Cancer Maternal Grandmother Moraima      Stomach cancer Maternal Grandfather De      Cancer Maternal Grandfather De      Diabetes Paternal Grandmother Deepti      No Known Problems Paternal Grandfather        No Known Problems Paternal Aunt        No Known Problems Paternal Aunt        Anxiety disorder Other family history           symptom per allscripts    Coronary artery disease Other family history      Depression Other family history      Hyperlipidemia Other family history      Osteoarthritis Other family history      Other Other unknown           back disorder per allscripts    Neuropathy Other unknown       [Social History]    [Social History]        Socioeconomic History    Marital status: Single    Years of education: completed college   Occupational History    Occupation:     Occupation: [employer]   Tobacco Use    Smoking status: Never       Passive exposure: Never    Smokeless tobacco: Never   Vaping Use    Vaping status: Never Used   Substance and Sexual Activity    Alcohol use: Not Currently    Drug use: No    Sexual activity: Yes       Partners: Male       Birth control/protection: Post-menopausal   Social History Narrative     Does not participate in activities outside of the home; participates in moderate activities inside the home     Lives with mother      Social Drivers of Health           Financial Resource Strain: Low Risk  (2/28/2023)     Overall Financial Resource Strain (CARDIA)      Difficulty of Paying Living Expenses: Not hard at all   Food Insecurity: No Food Insecurity (7/18/2025)     Received from [facility]     Hunger Vital Sign      Within the past 12 months, you worried that your food would run out before you got the money to buy more.: Never true      Within the past 12 months, the food you bought just didn't last and you didn't have money to get more.: Never true   Transportation Needs: No Transportation Needs (7/18/2025)     Received from [facility]     PRAPARE - Transportation      Lack of Transportation (Medical): No      Lack of Transportation (Non-Medical): No   Housing Stability: Low Risk  (7/18/2025)     Received from [facility]     Housing Stability Vital Sign      In the last 12 months, was there a time when you were not able to pay the mortgage or rent on time?: No      In the past 12 months, how many times have you moved where you were living?: 0      At any time in the past 12 months, were you homeless or living in a shelter (including now)?: No        Allergies:  [Allergies]    [Allergies]        Allergen Reactions    Codeine GI Intolerance and Nausea Only       Other reaction(s): Other (See Comments)  Violently ill  Violently ill    Latex Rash       itching    Morphine And Codeine Nausea Only       GI intolerance nausea    Nortriptyline Shortness Of Breath    Ocaliva [Obeticholic Acid] Hives    Doxycycline GI Intolerance and Nausea Only    Sulfa Antibiotics GI Intolerance    Barium Sulfate Diarrhea    Cymbalta [Duloxetine Hcl]      Penicillins Other (See Comments) and Rash       Childhood reaction        Review of Systems      Review of Systems   Constitutional:  Negative for chills and fever.   HENT:  Negative for ear pain and sore throat.    Eyes:  Negative for pain and visual disturbance.   Respiratory:  Negative for cough and shortness of breath.    Cardiovascular:  Negative for chest pain and palpitations.   Gastrointestinal:  Positive for constipation. Negative for abdominal pain and vomiting.    Genitourinary:  Negative for difficulty urinating, dysuria and hematuria.   Musculoskeletal:  Positive for back pain and gait problem.   Skin:  Negative for color change and rash.   Neurological:  Negative for seizures and syncope.   Hematological:  Does not bruise/bleed easily.   Psychiatric/Behavioral:  Negative for agitation, behavioral problems and confusion.    All other systems reviewed and are negative.        Medications and orders      All medications reviewed and updated in correction EMR.        Objective      Vitals: T 97.1, P 72, RR 16, /72, O2 sat 92%, Wt 162 lbs     Physical Exam  Constitutional:       General: She is not in acute distress.     Appearance: Normal appearance. She is normal weight. She is not ill-appearing, toxic-appearing or diaphoretic.   HENT:      Head: Normocephalic and atraumatic.      Nose: Nose normal. No congestion or rhinorrhea.      Eyes:      General: No scleral icterus.        Right eye: No discharge.         Left eye: No discharge.      Extraocular Movements: Extraocular movements intact.      Conjunctiva/sclera: Conjunctivae normal.         Cardiovascular:      Rate and Rhythm: Normal rate and regular rhythm.      Pulses: Normal pulses.      Heart sounds: Normal heart sounds.   Pulmonary:      Effort: Pulmonary effort is normal. No respiratory distress.      Breath sounds: Normal breath sounds.   Abdominal:      General: Abdomen is flat. Bowel sounds are normal. There is no distension.      Palpations: Abdomen is soft.      Tenderness: There is no abdominal tenderness.      Musculoskeletal:         General: Normal range of motion.      Cervical back: Normal range of motion.      Lumbar back: Tenderness present. No swelling or edema.      Right knee: Tenderness present.      Right lower leg: No edema.      Left lower leg: No edema.      Comments: Dressings C/D/I at incisions sites      Skin:     General: Skin is warm and dry.      Capillary Refill: Capillary  refill takes less than 2 seconds.      Findings: No lesion or rash.      Neurological:      Mental Status: She is alert and oriented to person, place, and time. Mental status is at baseline.      Motor: No weakness.      Gait: Gait abnormal.      Psychiatric:         Attention and Perception: Attention and perception normal.         Mood and Affect: Mood normal.         Speech: Speech normal.         Behavior: Behavior normal. Behavior is cooperative.         Thought Content: Thought content normal. Thought content does not include homicidal or suicidal ideation.         Cognition and Memory: Cognition and memory normal.         Judgment: Judgment normal.      Pertinent Laboratory/Diagnostic Studies:   The following labs/studies were reviewed please see chart or hospital paperwork for details.           Lab Results   Component Value Date     WBC 4.45 03/12/2025     HGB 12.6 03/12/2025     HCT 40.0 03/12/2025     MCV 96 03/12/2025      03/12/2025            Lab Results   Component Value Date      12/29/2015     SODIUM 137 07/19/2025     K 4.4 07/19/2025      07/19/2025     CO2 25 07/19/2025     ANIONGAP 7 12/29/2015     AGAP 5 07/19/2025     BUN 14 07/19/2025     CREATININE 0.79 07/19/2025     GLUC 112 (H) 07/19/2025     GLUF 84 03/12/2025     CALCIUM 7.9 (L) 07/19/2025     AST 38 07/18/2025     ALT 30 07/18/2025     ALKPHOS 181 (H) 07/18/2025     PROT 8.4 (H) 12/29/2015     TP 6.3 07/18/2025     BILITOT 0.46 12/29/2015     TBILI 0.4 07/18/2025     EGFR 63 07/03/2025            - Counseling Documentation: patient was counseled regarding: instructions for management, prognosis, and patient and family education

## 2025-07-24 NOTE — ASSESSMENT & PLAN NOTE
Chronic, stable on Lisinopril 30 mg daily. Admission BP normotensive 124/72.  Continue current regimen

## 2025-07-24 NOTE — TELEPHONE ENCOUNTER
Nursing home or Independent living name: [facility]  If its not nursing home or Independent living, do they see PCP in Network:  Who is calling: MARCOS Webb  Callback number: [phone]  Symptoms: verify new admission orders  Did you page oncall or place in triage hub: on call paged

## 2025-07-25 ENCOUNTER — APPOINTMENT (EMERGENCY)
Dept: RADIOLOGY | Facility: HOSPITAL | Age: 65
DRG: 543 | End: 2025-07-25
Payer: COMMERCIAL

## 2025-07-25 ENCOUNTER — HOSPITAL ENCOUNTER (INPATIENT)
Facility: HOSPITAL | Age: 65
LOS: 6 days | Discharge: HOME HEALTH | DRG: 543 | End: 2025-08-01
Attending: EMERGENCY MEDICINE | Admitting: STUDENT IN AN ORGANIZED HEALTH CARE EDUCATION/TRAINING PROGRAM
Payer: COMMERCIAL

## 2025-07-25 DIAGNOSIS — M54.9 BACK PAIN: Primary | ICD-10-CM

## 2025-07-25 DIAGNOSIS — S32.009A LUMBAR TRANSVERSE PROCESS FRACTURE (HCC): ICD-10-CM

## 2025-07-25 PROBLEM — S32.009D CLOSED FRACTURE OF TRANSVERSE PROCESS OF LUMBAR VERTEBRA WITH ROUTINE HEALING: Status: ACTIVE | Noted: 2025-07-25

## 2025-07-25 PROBLEM — D64.9 ANEMIA: Status: ACTIVE | Noted: 2025-07-25

## 2025-07-25 LAB
ALBUMIN SERPL BCG-MCNC: 3.2 G/DL (ref 3.5–5)
ALP SERPL-CCNC: 181 U/L (ref 34–104)
ALT SERPL W P-5'-P-CCNC: 22 U/L (ref 7–52)
ANION GAP SERPL CALCULATED.3IONS-SCNC: 6 MMOL/L (ref 4–13)
AST SERPL W P-5'-P-CCNC: 29 U/L (ref 13–39)
BASOPHILS # BLD AUTO: 0.02 THOUSANDS/ÂΜL (ref 0–0.1)
BASOPHILS NFR BLD AUTO: 0 % (ref 0–1)
BILIRUB SERPL-MCNC: 0.53 MG/DL (ref 0.2–1)
BUN SERPL-MCNC: 12 MG/DL (ref 5–25)
CALCIUM ALBUM COR SERPL-MCNC: 8.7 MG/DL (ref 8.3–10.1)
CALCIUM SERPL-MCNC: 8.1 MG/DL (ref 8.4–10.2)
CHLORIDE SERPL-SCNC: 97 MMOL/L (ref 96–108)
CO2 SERPL-SCNC: 28 MMOL/L (ref 21–32)
CREAT SERPL-MCNC: 0.84 MG/DL (ref 0.6–1.3)
EOSINOPHIL # BLD AUTO: 0.08 THOUSAND/ÂΜL (ref 0–0.61)
EOSINOPHIL NFR BLD AUTO: 1 % (ref 0–6)
ERYTHROCYTE [DISTWIDTH] IN BLOOD BY AUTOMATED COUNT: 13.2 % (ref 11.6–15.1)
GFR SERPL CREATININE-BSD FRML MDRD: 73 ML/MIN/1.73SQ M
GLUCOSE SERPL-MCNC: 94 MG/DL (ref 65–140)
HCT VFR BLD AUTO: 28.3 % (ref 34.8–46.1)
HGB BLD-MCNC: 9.3 G/DL (ref 11.5–15.4)
IMM GRANULOCYTES # BLD AUTO: 0.06 THOUSAND/UL (ref 0–0.2)
IMM GRANULOCYTES NFR BLD AUTO: 1 % (ref 0–2)
LYMPHOCYTES # BLD AUTO: 0.92 THOUSANDS/ÂΜL (ref 0.6–4.47)
LYMPHOCYTES NFR BLD AUTO: 16 % (ref 14–44)
MCH RBC QN AUTO: 29.9 PG (ref 26.8–34.3)
MCHC RBC AUTO-ENTMCNC: 32.9 G/DL (ref 31.4–37.4)
MCV RBC AUTO: 91 FL (ref 82–98)
MONOCYTES # BLD AUTO: 0.5 THOUSAND/ÂΜL (ref 0.17–1.22)
MONOCYTES NFR BLD AUTO: 9 % (ref 4–12)
NEUTROPHILS # BLD AUTO: 4.27 THOUSANDS/ÂΜL (ref 1.85–7.62)
NEUTS SEG NFR BLD AUTO: 73 % (ref 43–75)
NRBC BLD AUTO-RTO: 0 /100 WBCS
PLATELET # BLD AUTO: 202 THOUSANDS/UL (ref 149–390)
PMV BLD AUTO: 9 FL (ref 8.9–12.7)
POTASSIUM SERPL-SCNC: 4.1 MMOL/L (ref 3.5–5.3)
PROT SERPL-MCNC: 6.9 G/DL (ref 6.4–8.4)
RBC # BLD AUTO: 3.11 MILLION/UL (ref 3.81–5.12)
SODIUM SERPL-SCNC: 131 MMOL/L (ref 135–147)
WBC # BLD AUTO: 5.85 THOUSAND/UL (ref 4.31–10.16)

## 2025-07-25 PROCEDURE — 36415 COLL VENOUS BLD VENIPUNCTURE: CPT

## 2025-07-25 PROCEDURE — 99285 EMERGENCY DEPT VISIT HI MDM: CPT | Performed by: EMERGENCY MEDICINE

## 2025-07-25 PROCEDURE — 96375 TX/PRO/DX INJ NEW DRUG ADDON: CPT

## 2025-07-25 PROCEDURE — 72131 CT LUMBAR SPINE W/O DYE: CPT

## 2025-07-25 PROCEDURE — 96374 THER/PROPH/DIAG INJ IV PUSH: CPT

## 2025-07-25 PROCEDURE — 85025 COMPLETE CBC W/AUTO DIFF WBC: CPT

## 2025-07-25 PROCEDURE — 99284 EMERGENCY DEPT VISIT MOD MDM: CPT

## 2025-07-25 PROCEDURE — 80053 COMPREHEN METABOLIC PANEL: CPT

## 2025-07-25 PROCEDURE — 99223 1ST HOSP IP/OBS HIGH 75: CPT | Performed by: FAMILY MEDICINE

## 2025-07-25 RX ORDER — HEPARIN SODIUM 5000 [USP'U]/ML
5000 INJECTION, SOLUTION INTRAVENOUS; SUBCUTANEOUS EVERY 8 HOURS SCHEDULED
Status: DISCONTINUED | OUTPATIENT
Start: 2025-07-25 | End: 2025-08-01 | Stop reason: HOSPADM

## 2025-07-25 RX ORDER — HYDROMORPHONE HCL/PF 1 MG/ML
1 SYRINGE (ML) INJECTION ONCE
Status: COMPLETED | OUTPATIENT
Start: 2025-07-25 | End: 2025-07-25

## 2025-07-25 RX ORDER — HYDROMORPHONE HCL/PF 1 MG/ML
1 SYRINGE (ML) INJECTION EVERY 4 HOURS PRN
Status: DISCONTINUED | OUTPATIENT
Start: 2025-07-25 | End: 2025-07-29

## 2025-07-25 RX ORDER — ACETAMINOPHEN 325 MG/1
650 TABLET ORAL ONCE
Status: DISCONTINUED | OUTPATIENT
Start: 2025-07-25 | End: 2025-07-27

## 2025-07-25 RX ORDER — SENNOSIDES 8.6 MG
2 TABLET ORAL 2 TIMES DAILY
Status: DISCONTINUED | OUTPATIENT
Start: 2025-07-25 | End: 2025-08-01 | Stop reason: HOSPADM

## 2025-07-25 RX ORDER — POLYETHYLENE GLYCOL 3350 17 G/17G
17 POWDER, FOR SOLUTION ORAL DAILY
Status: DISCONTINUED | OUTPATIENT
Start: 2025-07-26 | End: 2025-08-01 | Stop reason: HOSPADM

## 2025-07-25 RX ORDER — METHOCARBAMOL 500 MG/1
1000 TABLET, FILM COATED ORAL EVERY 6 HOURS PRN
Status: DISCONTINUED | OUTPATIENT
Start: 2025-07-25 | End: 2025-07-28

## 2025-07-25 RX ORDER — SODIUM CHLORIDE 9 MG/ML
75 INJECTION, SOLUTION INTRAVENOUS CONTINUOUS
Status: DISPENSED | OUTPATIENT
Start: 2025-07-25 | End: 2025-07-26

## 2025-07-25 RX ORDER — OXYCODONE HYDROCHLORIDE 5 MG/1
5 TABLET ORAL EVERY 6 HOURS PRN
Status: DISCONTINUED | OUTPATIENT
Start: 2025-07-25 | End: 2025-07-31

## 2025-07-25 RX ORDER — KETOROLAC TROMETHAMINE 30 MG/ML
15 INJECTION, SOLUTION INTRAMUSCULAR; INTRAVENOUS EVERY 6 HOURS SCHEDULED
Status: DISPENSED | OUTPATIENT
Start: 2025-07-25 | End: 2025-07-26

## 2025-07-25 RX ORDER — OXYCODONE HYDROCHLORIDE 10 MG/1
10 TABLET ORAL EVERY 6 HOURS PRN
Status: DISCONTINUED | OUTPATIENT
Start: 2025-07-25 | End: 2025-07-31

## 2025-07-25 RX ORDER — ALBUTEROL SULFATE 90 UG/1
2 INHALANT RESPIRATORY (INHALATION) EVERY 4 HOURS PRN
Status: DISCONTINUED | OUTPATIENT
Start: 2025-07-25 | End: 2025-08-01 | Stop reason: HOSPADM

## 2025-07-25 RX ORDER — LIDOCAINE 50 MG/G
2 PATCH TOPICAL DAILY
Status: DISCONTINUED | OUTPATIENT
Start: 2025-07-26 | End: 2025-08-01 | Stop reason: HOSPADM

## 2025-07-25 RX ORDER — DIAZEPAM 5 MG/1
5 TABLET ORAL EVERY 6 HOURS PRN
Status: DISCONTINUED | OUTPATIENT
Start: 2025-07-25 | End: 2025-07-28

## 2025-07-25 RX ORDER — KETOROLAC TROMETHAMINE 30 MG/ML
15 INJECTION, SOLUTION INTRAMUSCULAR; INTRAVENOUS ONCE
Status: COMPLETED | OUTPATIENT
Start: 2025-07-25 | End: 2025-07-25

## 2025-07-25 RX ORDER — MAGNESIUM HYDROXIDE/ALUMINUM HYDROXICE/SIMETHICONE 120; 1200; 1200 MG/30ML; MG/30ML; MG/30ML
30 SUSPENSION ORAL EVERY 6 HOURS PRN
Status: DISCONTINUED | OUTPATIENT
Start: 2025-07-25 | End: 2025-08-01 | Stop reason: HOSPADM

## 2025-07-25 RX ORDER — URSODIOL 500 MG/1
500 TABLET, FILM COATED ORAL 2 TIMES DAILY
Status: DISCONTINUED | OUTPATIENT
Start: 2025-07-25 | End: 2025-08-01 | Stop reason: HOSPADM

## 2025-07-25 RX ORDER — BENZONATATE 100 MG/1
100 CAPSULE ORAL 3 TIMES DAILY PRN
Status: DISCONTINUED | OUTPATIENT
Start: 2025-07-25 | End: 2025-08-01 | Stop reason: HOSPADM

## 2025-07-25 RX ORDER — TRAZODONE HYDROCHLORIDE 50 MG/1
50 TABLET ORAL
Status: DISCONTINUED | OUTPATIENT
Start: 2025-07-25 | End: 2025-08-01 | Stop reason: HOSPADM

## 2025-07-25 RX ORDER — ONDANSETRON 2 MG/ML
4 INJECTION INTRAMUSCULAR; INTRAVENOUS EVERY 6 HOURS PRN
Status: DISCONTINUED | OUTPATIENT
Start: 2025-07-25 | End: 2025-08-01 | Stop reason: HOSPADM

## 2025-07-25 RX ORDER — URSODIOL 300 MG/1
300 CAPSULE ORAL 2 TIMES DAILY
Status: DISCONTINUED | OUTPATIENT
Start: 2025-07-25 | End: 2025-07-27

## 2025-07-25 RX ADMIN — HYDROMORPHONE HYDROCHLORIDE 1 MG: 1 INJECTION, SOLUTION INTRAMUSCULAR; INTRAVENOUS; SUBCUTANEOUS at 14:56

## 2025-07-25 RX ADMIN — KETOROLAC TROMETHAMINE 15 MG: 30 INJECTION, SOLUTION INTRAMUSCULAR; INTRAVENOUS at 22:05

## 2025-07-25 RX ADMIN — SENNOSIDES 17.2 MG: 8.6 TABLET, FILM COATED ORAL at 19:56

## 2025-07-25 RX ADMIN — HEPARIN SODIUM 5000 UNITS: 5000 INJECTION INTRAVENOUS; SUBCUTANEOUS at 22:05

## 2025-07-25 RX ADMIN — SODIUM CHLORIDE 75 ML/HR: 0.9 INJECTION, SOLUTION INTRAVENOUS at 19:56

## 2025-07-25 RX ADMIN — URSODIOL 300 MG: 300 CAPSULE ORAL at 22:05

## 2025-07-25 RX ADMIN — OXYCODONE HYDROCHLORIDE 10 MG: 10 TABLET ORAL at 19:55

## 2025-07-25 RX ADMIN — KETOROLAC TROMETHAMINE 15 MG: 30 INJECTION, SOLUTION INTRAMUSCULAR; INTRAVENOUS at 14:56

## 2025-07-26 DIAGNOSIS — I10 ESSENTIAL HYPERTENSION: ICD-10-CM

## 2025-07-26 LAB
ANION GAP SERPL CALCULATED.3IONS-SCNC: 5 MMOL/L (ref 4–13)
BUN SERPL-MCNC: 17 MG/DL (ref 5–25)
CALCIUM SERPL-MCNC: 8.2 MG/DL (ref 8.4–10.2)
CHLORIDE SERPL-SCNC: 99 MMOL/L (ref 96–108)
CO2 SERPL-SCNC: 27 MMOL/L (ref 21–32)
CREAT SERPL-MCNC: 0.8 MG/DL (ref 0.6–1.3)
ERYTHROCYTE [DISTWIDTH] IN BLOOD BY AUTOMATED COUNT: 13.2 % (ref 11.6–15.1)
GFR SERPL CREATININE-BSD FRML MDRD: 77 ML/MIN/1.73SQ M
GLUCOSE P FAST SERPL-MCNC: 101 MG/DL (ref 65–99)
GLUCOSE SERPL-MCNC: 101 MG/DL (ref 65–140)
HCT VFR BLD AUTO: 27 % (ref 34.8–46.1)
HGB BLD-MCNC: 8.9 G/DL (ref 11.5–15.4)
MCH RBC QN AUTO: 30.3 PG (ref 26.8–34.3)
MCHC RBC AUTO-ENTMCNC: 33 G/DL (ref 31.4–37.4)
MCV RBC AUTO: 92 FL (ref 82–98)
PLATELET # BLD AUTO: 198 THOUSANDS/UL (ref 149–390)
PMV BLD AUTO: 9 FL (ref 8.9–12.7)
POTASSIUM SERPL-SCNC: 4.1 MMOL/L (ref 3.5–5.3)
RBC # BLD AUTO: 2.94 MILLION/UL (ref 3.81–5.12)
SODIUM SERPL-SCNC: 131 MMOL/L (ref 135–147)
WBC # BLD AUTO: 4.77 THOUSAND/UL (ref 4.31–10.16)

## 2025-07-26 PROCEDURE — 80048 BASIC METABOLIC PNL TOTAL CA: CPT | Performed by: FAMILY MEDICINE

## 2025-07-26 PROCEDURE — 85027 COMPLETE CBC AUTOMATED: CPT | Performed by: FAMILY MEDICINE

## 2025-07-26 PROCEDURE — 36415 COLL VENOUS BLD VENIPUNCTURE: CPT | Performed by: FAMILY MEDICINE

## 2025-07-26 PROCEDURE — 99232 SBSQ HOSP IP/OBS MODERATE 35: CPT | Performed by: PHYSICIAN ASSISTANT

## 2025-07-26 RX ADMIN — HYDROMORPHONE HYDROCHLORIDE 1 MG: 1 INJECTION, SOLUTION INTRAMUSCULAR; INTRAVENOUS; SUBCUTANEOUS at 11:57

## 2025-07-26 RX ADMIN — DIAZEPAM 5 MG: 5 TABLET ORAL at 22:46

## 2025-07-26 RX ADMIN — FLUOXETINE HYDROCHLORIDE 40 MG: 20 CAPSULE ORAL at 11:12

## 2025-07-26 RX ADMIN — HEPARIN SODIUM 5000 UNITS: 5000 INJECTION INTRAVENOUS; SUBCUTANEOUS at 22:46

## 2025-07-26 RX ADMIN — HEPARIN SODIUM 5000 UNITS: 5000 INJECTION INTRAVENOUS; SUBCUTANEOUS at 13:23

## 2025-07-26 RX ADMIN — POLYETHYLENE GLYCOL 3350 17 G: 17 POWDER, FOR SOLUTION ORAL at 11:12

## 2025-07-26 RX ADMIN — HEPARIN SODIUM 5000 UNITS: 5000 INJECTION INTRAVENOUS; SUBCUTANEOUS at 05:35

## 2025-07-26 RX ADMIN — LISINOPRIL 30 MG: 20 TABLET ORAL at 11:33

## 2025-07-26 RX ADMIN — OXYCODONE HYDROCHLORIDE 5 MG: 5 TABLET ORAL at 09:12

## 2025-07-26 RX ADMIN — KETOROLAC TROMETHAMINE 15 MG: 30 INJECTION, SOLUTION INTRAMUSCULAR; INTRAVENOUS at 11:14

## 2025-07-26 RX ADMIN — LIDOCAINE 2 PATCH: 50 PATCH CUTANEOUS at 11:58

## 2025-07-26 RX ADMIN — URSODIOL 300 MG: 300 CAPSULE ORAL at 11:11

## 2025-07-26 RX ADMIN — KETOROLAC TROMETHAMINE 15 MG: 30 INJECTION, SOLUTION INTRAMUSCULAR; INTRAVENOUS at 05:35

## 2025-07-26 RX ADMIN — URSODIOL 300 MG: 300 CAPSULE ORAL at 19:03

## 2025-07-26 RX ADMIN — METHOCARBAMOL 1000 MG: 500 TABLET ORAL at 13:23

## 2025-07-26 RX ADMIN — SENNOSIDES 17.2 MG: 8.6 TABLET, FILM COATED ORAL at 11:19

## 2025-07-26 RX ADMIN — OXYCODONE HYDROCHLORIDE 10 MG: 10 TABLET ORAL at 14:41

## 2025-07-26 RX ADMIN — OXYCODONE HYDROCHLORIDE 10 MG: 10 TABLET ORAL at 19:03

## 2025-07-26 RX ADMIN — HYDROMORPHONE HYDROCHLORIDE 1 MG: 1 INJECTION, SOLUTION INTRAMUSCULAR; INTRAVENOUS; SUBCUTANEOUS at 20:22

## 2025-07-27 LAB
ANION GAP SERPL CALCULATED.3IONS-SCNC: 5 MMOL/L (ref 4–13)
BUN SERPL-MCNC: 16 MG/DL (ref 5–25)
CALCIUM SERPL-MCNC: 8.2 MG/DL (ref 8.4–10.2)
CHLORIDE SERPL-SCNC: 98 MMOL/L (ref 96–108)
CO2 SERPL-SCNC: 28 MMOL/L (ref 21–32)
CREAT SERPL-MCNC: 0.81 MG/DL (ref 0.6–1.3)
ERYTHROCYTE [DISTWIDTH] IN BLOOD BY AUTOMATED COUNT: 13.3 % (ref 11.6–15.1)
GFR SERPL CREATININE-BSD FRML MDRD: 76 ML/MIN/1.73SQ M
GLUCOSE SERPL-MCNC: 84 MG/DL (ref 65–140)
HCT VFR BLD AUTO: 29 % (ref 34.8–46.1)
HGB BLD-MCNC: 9.5 G/DL (ref 11.5–15.4)
MCH RBC QN AUTO: 29.9 PG (ref 26.8–34.3)
MCHC RBC AUTO-ENTMCNC: 32.8 G/DL (ref 31.4–37.4)
MCV RBC AUTO: 91 FL (ref 82–98)
PLATELET # BLD AUTO: 243 THOUSANDS/UL (ref 149–390)
PMV BLD AUTO: 8.8 FL (ref 8.9–12.7)
POTASSIUM SERPL-SCNC: 4.2 MMOL/L (ref 3.5–5.3)
RBC # BLD AUTO: 3.18 MILLION/UL (ref 3.81–5.12)
SODIUM SERPL-SCNC: 131 MMOL/L (ref 135–147)
WBC # BLD AUTO: 4.75 THOUSAND/UL (ref 4.31–10.16)

## 2025-07-27 PROCEDURE — 97163 PT EVAL HIGH COMPLEX 45 MIN: CPT

## 2025-07-27 PROCEDURE — 97167 OT EVAL HIGH COMPLEX 60 MIN: CPT

## 2025-07-27 PROCEDURE — 85027 COMPLETE CBC AUTOMATED: CPT | Performed by: PHYSICIAN ASSISTANT

## 2025-07-27 PROCEDURE — 99232 SBSQ HOSP IP/OBS MODERATE 35: CPT | Performed by: PHYSICIAN ASSISTANT

## 2025-07-27 PROCEDURE — 80048 BASIC METABOLIC PNL TOTAL CA: CPT | Performed by: PHYSICIAN ASSISTANT

## 2025-07-27 RX ADMIN — LISINOPRIL 30 MG: 20 TABLET ORAL at 08:12

## 2025-07-27 RX ADMIN — SENNOSIDES 17.2 MG: 8.6 TABLET, FILM COATED ORAL at 20:03

## 2025-07-27 RX ADMIN — SELADELPAR LYSINE 10 MG: 10 CAPSULE ORAL at 18:32

## 2025-07-27 RX ADMIN — HEPARIN SODIUM 5000 UNITS: 5000 INJECTION INTRAVENOUS; SUBCUTANEOUS at 07:04

## 2025-07-27 RX ADMIN — METHOCARBAMOL 1000 MG: 500 TABLET ORAL at 23:39

## 2025-07-27 RX ADMIN — HYDROMORPHONE HYDROCHLORIDE 1 MG: 1 INJECTION, SOLUTION INTRAMUSCULAR; INTRAVENOUS; SUBCUTANEOUS at 09:14

## 2025-07-27 RX ADMIN — HEPARIN SODIUM 5000 UNITS: 5000 INJECTION INTRAVENOUS; SUBCUTANEOUS at 13:52

## 2025-07-27 RX ADMIN — OXYCODONE HYDROCHLORIDE 10 MG: 10 TABLET ORAL at 13:59

## 2025-07-27 RX ADMIN — OXYCODONE HYDROCHLORIDE 10 MG: 10 TABLET ORAL at 20:04

## 2025-07-27 RX ADMIN — FLUOXETINE HYDROCHLORIDE 40 MG: 20 CAPSULE ORAL at 08:13

## 2025-07-27 RX ADMIN — HYDROMORPHONE HYDROCHLORIDE 1 MG: 1 INJECTION, SOLUTION INTRAMUSCULAR; INTRAVENOUS; SUBCUTANEOUS at 00:58

## 2025-07-27 RX ADMIN — LIDOCAINE 2 PATCH: 50 PATCH CUTANEOUS at 08:13

## 2025-07-27 RX ADMIN — HYDROMORPHONE HYDROCHLORIDE 1 MG: 1 INJECTION, SOLUTION INTRAMUSCULAR; INTRAVENOUS; SUBCUTANEOUS at 15:35

## 2025-07-27 RX ADMIN — HEPARIN SODIUM 5000 UNITS: 5000 INJECTION INTRAVENOUS; SUBCUTANEOUS at 21:50

## 2025-07-27 RX ADMIN — OXYCODONE HYDROCHLORIDE 10 MG: 10 TABLET ORAL at 07:04

## 2025-07-27 RX ADMIN — URSODIOL 500 MG: 500 TABLET ORAL at 18:31

## 2025-07-27 RX ADMIN — HYDROMORPHONE HYDROCHLORIDE 1 MG: 1 INJECTION, SOLUTION INTRAMUSCULAR; INTRAVENOUS; SUBCUTANEOUS at 21:50

## 2025-07-28 ENCOUNTER — APPOINTMENT (OUTPATIENT)
Dept: PHYSICAL THERAPY | Facility: CLINIC | Age: 65
End: 2025-07-28
Attending: STUDENT IN AN ORGANIZED HEALTH CARE EDUCATION/TRAINING PROGRAM
Payer: COMMERCIAL

## 2025-07-28 LAB
ALBUMIN SERPL BCG-MCNC: 3 G/DL (ref 3.5–5)
ALP SERPL-CCNC: 212 U/L (ref 34–104)
ALT SERPL W P-5'-P-CCNC: 21 U/L (ref 7–52)
ANION GAP SERPL CALCULATED.3IONS-SCNC: 6 MMOL/L (ref 4–13)
AST SERPL W P-5'-P-CCNC: 29 U/L (ref 13–39)
BILIRUB SERPL-MCNC: 0.49 MG/DL (ref 0.2–1)
BUN SERPL-MCNC: 16 MG/DL (ref 5–25)
CALCIUM ALBUM COR SERPL-MCNC: 9.4 MG/DL (ref 8.3–10.1)
CALCIUM SERPL-MCNC: 8.6 MG/DL (ref 8.4–10.2)
CHLORIDE SERPL-SCNC: 98 MMOL/L (ref 96–108)
CO2 SERPL-SCNC: 27 MMOL/L (ref 21–32)
CREAT SERPL-MCNC: 0.85 MG/DL (ref 0.6–1.3)
ERYTHROCYTE [DISTWIDTH] IN BLOOD BY AUTOMATED COUNT: 13.2 % (ref 11.6–15.1)
GFR SERPL CREATININE-BSD FRML MDRD: 72 ML/MIN/1.73SQ M
GLUCOSE SERPL-MCNC: 86 MG/DL (ref 65–140)
HCT VFR BLD AUTO: 29.1 % (ref 34.8–46.1)
HGB BLD-MCNC: 9.5 G/DL (ref 11.5–15.4)
MCH RBC QN AUTO: 30.2 PG (ref 26.8–34.3)
MCHC RBC AUTO-ENTMCNC: 32.6 G/DL (ref 31.4–37.4)
MCV RBC AUTO: 92 FL (ref 82–98)
PLATELET # BLD AUTO: 258 THOUSANDS/UL (ref 149–390)
PMV BLD AUTO: 8.6 FL (ref 8.9–12.7)
POTASSIUM SERPL-SCNC: 4.2 MMOL/L (ref 3.5–5.3)
PROT SERPL-MCNC: 6.9 G/DL (ref 6.4–8.4)
RBC # BLD AUTO: 3.15 MILLION/UL (ref 3.81–5.12)
SODIUM SERPL-SCNC: 131 MMOL/L (ref 135–147)
WBC # BLD AUTO: 4.76 THOUSAND/UL (ref 4.31–10.16)

## 2025-07-28 PROCEDURE — 99232 SBSQ HOSP IP/OBS MODERATE 35: CPT | Performed by: INTERNAL MEDICINE

## 2025-07-28 PROCEDURE — 85027 COMPLETE CBC AUTOMATED: CPT | Performed by: PHYSICIAN ASSISTANT

## 2025-07-28 PROCEDURE — 80053 COMPREHEN METABOLIC PANEL: CPT | Performed by: PHYSICIAN ASSISTANT

## 2025-07-28 PROCEDURE — NC001 PR NO CHARGE

## 2025-07-28 RX ORDER — KETOROLAC TROMETHAMINE 30 MG/ML
15 INJECTION, SOLUTION INTRAMUSCULAR; INTRAVENOUS EVERY 6 HOURS SCHEDULED
Status: DISCONTINUED | OUTPATIENT
Start: 2025-07-28 | End: 2025-08-01 | Stop reason: HOSPADM

## 2025-07-28 RX ORDER — METHOCARBAMOL 500 MG/1
500 TABLET, FILM COATED ORAL 4 TIMES DAILY
Status: DISCONTINUED | OUTPATIENT
Start: 2025-07-28 | End: 2025-08-01 | Stop reason: HOSPADM

## 2025-07-28 RX ADMIN — KETOROLAC TROMETHAMINE 15 MG: 30 INJECTION, SOLUTION INTRAMUSCULAR; INTRAVENOUS at 17:47

## 2025-07-28 RX ADMIN — KETOROLAC TROMETHAMINE 15 MG: 30 INJECTION, SOLUTION INTRAMUSCULAR; INTRAVENOUS at 23:34

## 2025-07-28 RX ADMIN — LISINOPRIL 30 MG: 20 TABLET ORAL at 09:22

## 2025-07-28 RX ADMIN — HEPARIN SODIUM 5000 UNITS: 5000 INJECTION INTRAVENOUS; SUBCUTANEOUS at 05:24

## 2025-07-28 RX ADMIN — URSODIOL 500 MG: 500 TABLET ORAL at 10:51

## 2025-07-28 RX ADMIN — LIDOCAINE 2 PATCH: 50 PATCH CUTANEOUS at 09:23

## 2025-07-28 RX ADMIN — HYDROMORPHONE HYDROCHLORIDE 1 MG: 1 INJECTION, SOLUTION INTRAMUSCULAR; INTRAVENOUS; SUBCUTANEOUS at 07:28

## 2025-07-28 RX ADMIN — SELADELPAR LYSINE 10 MG: 10 CAPSULE ORAL at 09:25

## 2025-07-28 RX ADMIN — HEPARIN SODIUM 5000 UNITS: 5000 INJECTION INTRAVENOUS; SUBCUTANEOUS at 14:45

## 2025-07-28 RX ADMIN — OXYCODONE HYDROCHLORIDE 5 MG: 5 TABLET ORAL at 05:25

## 2025-07-28 RX ADMIN — URSODIOL 500 MG: 500 TABLET ORAL at 17:49

## 2025-07-28 RX ADMIN — FLUOXETINE HYDROCHLORIDE 40 MG: 20 CAPSULE ORAL at 09:21

## 2025-07-28 RX ADMIN — METHOCARBAMOL 1000 MG: 500 TABLET ORAL at 10:51

## 2025-07-28 RX ADMIN — POLYETHYLENE GLYCOL 3350 17 G: 17 POWDER, FOR SOLUTION ORAL at 09:22

## 2025-07-28 RX ADMIN — HEPARIN SODIUM 5000 UNITS: 5000 INJECTION INTRAVENOUS; SUBCUTANEOUS at 22:10

## 2025-07-28 RX ADMIN — SENNOSIDES 17.2 MG: 8.6 TABLET, FILM COATED ORAL at 09:21

## 2025-07-28 RX ADMIN — KETOROLAC TROMETHAMINE 15 MG: 30 INJECTION, SOLUTION INTRAMUSCULAR; INTRAVENOUS at 12:11

## 2025-07-28 RX ADMIN — METHOCARBAMOL 500 MG: 500 TABLET ORAL at 22:10

## 2025-07-28 RX ADMIN — SENNOSIDES 17.2 MG: 8.6 TABLET, FILM COATED ORAL at 17:47

## 2025-07-28 RX ADMIN — METHOCARBAMOL 500 MG: 500 TABLET ORAL at 17:47

## 2025-07-29 LAB
FERRITIN SERPL-MCNC: 83 NG/ML (ref 30–307)
FOLATE SERPL-MCNC: 9.6 NG/ML
IRON SERPL-MCNC: <10 UG/DL (ref 50–212)
TIBC SERPL-MCNC: 334.6 UG/DL (ref 250–450)
TRANSFERRIN SERPL-MCNC: 239 MG/DL (ref 203–362)
VIT B12 SERPL-MCNC: 275 PG/ML (ref 180–914)

## 2025-07-29 PROCEDURE — 97116 GAIT TRAINING THERAPY: CPT

## 2025-07-29 PROCEDURE — 83550 IRON BINDING TEST: CPT | Performed by: INTERNAL MEDICINE

## 2025-07-29 PROCEDURE — 83540 ASSAY OF IRON: CPT | Performed by: INTERNAL MEDICINE

## 2025-07-29 PROCEDURE — 97530 THERAPEUTIC ACTIVITIES: CPT

## 2025-07-29 PROCEDURE — 82746 ASSAY OF FOLIC ACID SERUM: CPT | Performed by: INTERNAL MEDICINE

## 2025-07-29 PROCEDURE — 97535 SELF CARE MNGMENT TRAINING: CPT

## 2025-07-29 PROCEDURE — 99232 SBSQ HOSP IP/OBS MODERATE 35: CPT

## 2025-07-29 PROCEDURE — 99233 SBSQ HOSP IP/OBS HIGH 50: CPT

## 2025-07-29 PROCEDURE — 82607 VITAMIN B-12: CPT | Performed by: INTERNAL MEDICINE

## 2025-07-29 PROCEDURE — 82728 ASSAY OF FERRITIN: CPT | Performed by: INTERNAL MEDICINE

## 2025-07-29 RX ORDER — LISINOPRIL 20 MG/1
TABLET ORAL
Qty: 135 TABLET | Refills: 1 | Status: SHIPPED | OUTPATIENT
Start: 2025-07-29

## 2025-07-29 RX ORDER — HYDROMORPHONE HCL/PF 1 MG/ML
0.5 SYRINGE (ML) INJECTION EVERY 4 HOURS PRN
Status: DISCONTINUED | OUTPATIENT
Start: 2025-07-29 | End: 2025-07-30

## 2025-07-29 RX ADMIN — FLUOXETINE HYDROCHLORIDE 40 MG: 20 CAPSULE ORAL at 08:30

## 2025-07-29 RX ADMIN — HEPARIN SODIUM 5000 UNITS: 5000 INJECTION INTRAVENOUS; SUBCUTANEOUS at 21:40

## 2025-07-29 RX ADMIN — KETOROLAC TROMETHAMINE 15 MG: 30 INJECTION, SOLUTION INTRAMUSCULAR; INTRAVENOUS at 12:23

## 2025-07-29 RX ADMIN — OXYCODONE HYDROCHLORIDE 10 MG: 10 TABLET ORAL at 14:39

## 2025-07-29 RX ADMIN — POLYETHYLENE GLYCOL 3350 17 G: 17 POWDER, FOR SOLUTION ORAL at 08:30

## 2025-07-29 RX ADMIN — SENNOSIDES 17.2 MG: 8.6 TABLET, FILM COATED ORAL at 08:30

## 2025-07-29 RX ADMIN — METHOCARBAMOL 500 MG: 500 TABLET ORAL at 12:23

## 2025-07-29 RX ADMIN — LIDOCAINE 2 PATCH: 50 PATCH CUTANEOUS at 08:34

## 2025-07-29 RX ADMIN — HYDROMORPHONE HYDROCHLORIDE 1 MG: 1 INJECTION, SOLUTION INTRAMUSCULAR; INTRAVENOUS; SUBCUTANEOUS at 10:17

## 2025-07-29 RX ADMIN — METHOCARBAMOL 500 MG: 500 TABLET ORAL at 08:30

## 2025-07-29 RX ADMIN — SENNOSIDES 17.2 MG: 8.6 TABLET, FILM COATED ORAL at 18:01

## 2025-07-29 RX ADMIN — HYDROMORPHONE HYDROCHLORIDE 0.5 MG: 1 INJECTION, SOLUTION INTRAMUSCULAR; INTRAVENOUS; SUBCUTANEOUS at 20:02

## 2025-07-29 RX ADMIN — OXYCODONE HYDROCHLORIDE 5 MG: 5 TABLET ORAL at 08:31

## 2025-07-29 RX ADMIN — HEPARIN SODIUM 5000 UNITS: 5000 INJECTION INTRAVENOUS; SUBCUTANEOUS at 14:39

## 2025-07-29 RX ADMIN — URSODIOL 500 MG: 500 TABLET ORAL at 18:02

## 2025-07-29 RX ADMIN — OXYCODONE HYDROCHLORIDE 5 MG: 5 TABLET ORAL at 02:26

## 2025-07-29 RX ADMIN — URSODIOL 500 MG: 500 TABLET ORAL at 08:29

## 2025-07-29 RX ADMIN — HEPARIN SODIUM 5000 UNITS: 5000 INJECTION INTRAVENOUS; SUBCUTANEOUS at 05:33

## 2025-07-29 RX ADMIN — KETOROLAC TROMETHAMINE 15 MG: 30 INJECTION, SOLUTION INTRAMUSCULAR; INTRAVENOUS at 18:22

## 2025-07-29 RX ADMIN — LISINOPRIL 30 MG: 20 TABLET ORAL at 08:30

## 2025-07-29 RX ADMIN — METHOCARBAMOL 500 MG: 500 TABLET ORAL at 21:39

## 2025-07-29 RX ADMIN — METHOCARBAMOL 500 MG: 500 TABLET ORAL at 18:01

## 2025-07-29 RX ADMIN — SELADELPAR LYSINE 10 MG: 10 CAPSULE ORAL at 08:29

## 2025-07-30 ENCOUNTER — HOME HEALTH ADMISSION (OUTPATIENT)
Dept: HOME HEALTH SERVICES | Facility: HOME HEALTHCARE | Age: 65
End: 2025-07-30
Payer: COMMERCIAL

## 2025-07-30 PROCEDURE — 99233 SBSQ HOSP IP/OBS HIGH 50: CPT

## 2025-07-30 PROCEDURE — 99232 SBSQ HOSP IP/OBS MODERATE 35: CPT

## 2025-07-30 RX ORDER — METHYLPREDNISOLONE SODIUM SUCCINATE 40 MG/ML
40 INJECTION, POWDER, LYOPHILIZED, FOR SOLUTION INTRAMUSCULAR; INTRAVENOUS ONCE
Status: COMPLETED | OUTPATIENT
Start: 2025-07-30 | End: 2025-07-30

## 2025-07-30 RX ORDER — HYDROMORPHONE HCL/PF 1 MG/ML
0.5 SYRINGE (ML) INJECTION EVERY 6 HOURS PRN
Status: DISCONTINUED | OUTPATIENT
Start: 2025-07-30 | End: 2025-07-31

## 2025-07-30 RX ADMIN — SENNOSIDES 17.2 MG: 8.6 TABLET, FILM COATED ORAL at 17:00

## 2025-07-30 RX ADMIN — FLUOXETINE HYDROCHLORIDE 40 MG: 20 CAPSULE ORAL at 08:43

## 2025-07-30 RX ADMIN — METHYLPREDNISOLONE SODIUM SUCCINATE 40 MG: 40 INJECTION, POWDER, FOR SOLUTION INTRAMUSCULAR; INTRAVENOUS at 13:17

## 2025-07-30 RX ADMIN — SELADELPAR LYSINE 10 MG: 10 CAPSULE ORAL at 08:44

## 2025-07-30 RX ADMIN — METHOCARBAMOL 500 MG: 500 TABLET ORAL at 17:00

## 2025-07-30 RX ADMIN — KETOROLAC TROMETHAMINE 15 MG: 30 INJECTION, SOLUTION INTRAMUSCULAR; INTRAVENOUS at 05:35

## 2025-07-30 RX ADMIN — KETOROLAC TROMETHAMINE 15 MG: 30 INJECTION, SOLUTION INTRAMUSCULAR; INTRAVENOUS at 17:03

## 2025-07-30 RX ADMIN — ONDANSETRON 8 MG: 2 INJECTION INTRAMUSCULAR; INTRAVENOUS at 13:17

## 2025-07-30 RX ADMIN — HEPARIN SODIUM 5000 UNITS: 5000 INJECTION INTRAVENOUS; SUBCUTANEOUS at 13:17

## 2025-07-30 RX ADMIN — KETOROLAC TROMETHAMINE 15 MG: 30 INJECTION, SOLUTION INTRAMUSCULAR; INTRAVENOUS at 11:11

## 2025-07-30 RX ADMIN — URSODIOL 500 MG: 500 TABLET ORAL at 17:02

## 2025-07-30 RX ADMIN — URSODIOL 500 MG: 500 TABLET ORAL at 08:44

## 2025-07-30 RX ADMIN — LISINOPRIL 30 MG: 20 TABLET ORAL at 08:43

## 2025-07-30 RX ADMIN — METHOCARBAMOL 500 MG: 500 TABLET ORAL at 08:43

## 2025-07-30 RX ADMIN — POLYETHYLENE GLYCOL 3350 17 G: 17 POWDER, FOR SOLUTION ORAL at 08:43

## 2025-07-30 RX ADMIN — HYDROMORPHONE HYDROCHLORIDE 0.5 MG: 1 INJECTION, SOLUTION INTRAMUSCULAR; INTRAVENOUS; SUBCUTANEOUS at 13:18

## 2025-07-30 RX ADMIN — METHOCARBAMOL 500 MG: 500 TABLET ORAL at 13:17

## 2025-07-30 RX ADMIN — OXYCODONE HYDROCHLORIDE 5 MG: 5 TABLET ORAL at 08:43

## 2025-07-30 RX ADMIN — LIDOCAINE 2 PATCH: 50 PATCH CUTANEOUS at 08:43

## 2025-07-30 RX ADMIN — IRON SUCROSE 300 MG: 20 INJECTION, SOLUTION INTRAVENOUS at 13:17

## 2025-07-30 RX ADMIN — SENNOSIDES 17.2 MG: 8.6 TABLET, FILM COATED ORAL at 08:43

## 2025-07-30 NOTE — ADDENDUM NOTE
Addended by: HERNANDO LEVIN on: 7/30/2025 03:53 PM     Modules accepted: Orders, Level of Service

## 2025-07-30 NOTE — ADDENDUM NOTE
Addended by: HERNANDO LEVIN on: 7/30/2025 09:24 AM     Modules accepted: Orders, Level of Service

## 2025-07-31 ENCOUNTER — APPOINTMENT (OUTPATIENT)
Dept: PHYSICAL THERAPY | Facility: CLINIC | Age: 65
End: 2025-07-31
Attending: STUDENT IN AN ORGANIZED HEALTH CARE EDUCATION/TRAINING PROGRAM
Payer: COMMERCIAL

## 2025-07-31 PROCEDURE — 99233 SBSQ HOSP IP/OBS HIGH 50: CPT

## 2025-07-31 PROCEDURE — 99232 SBSQ HOSP IP/OBS MODERATE 35: CPT

## 2025-07-31 PROCEDURE — 97535 SELF CARE MNGMENT TRAINING: CPT

## 2025-07-31 RX ORDER — HYDROMORPHONE HCL/PF 1 MG/ML
0.5 SYRINGE (ML) INJECTION ONCE
Status: COMPLETED | OUTPATIENT
Start: 2025-07-31 | End: 2025-07-31

## 2025-07-31 RX ORDER — OXYCODONE HYDROCHLORIDE 5 MG/1
5 TABLET ORAL EVERY 6 HOURS PRN
Refills: 0 | Status: DISCONTINUED | OUTPATIENT
Start: 2025-07-31 | End: 2025-08-01 | Stop reason: HOSPADM

## 2025-07-31 RX ORDER — METHYLPREDNISOLONE SODIUM SUCCINATE 40 MG/ML
40 INJECTION, POWDER, LYOPHILIZED, FOR SOLUTION INTRAMUSCULAR; INTRAVENOUS ONCE
Status: COMPLETED | OUTPATIENT
Start: 2025-07-31 | End: 2025-07-31

## 2025-07-31 RX ORDER — OXYCODONE HYDROCHLORIDE 10 MG/1
10 TABLET ORAL EVERY 6 HOURS PRN
Refills: 0 | Status: DISCONTINUED | OUTPATIENT
Start: 2025-07-31 | End: 2025-08-01 | Stop reason: HOSPADM

## 2025-07-31 RX ADMIN — METHOCARBAMOL 500 MG: 500 TABLET ORAL at 12:14

## 2025-07-31 RX ADMIN — HEPARIN SODIUM 5000 UNITS: 5000 INJECTION INTRAVENOUS; SUBCUTANEOUS at 21:07

## 2025-07-31 RX ADMIN — HYDROMORPHONE HYDROCHLORIDE 0.5 MG: 1 INJECTION, SOLUTION INTRAMUSCULAR; INTRAVENOUS; SUBCUTANEOUS at 16:29

## 2025-07-31 RX ADMIN — URSODIOL 500 MG: 500 TABLET ORAL at 09:11

## 2025-07-31 RX ADMIN — KETOROLAC TROMETHAMINE 15 MG: 30 INJECTION, SOLUTION INTRAMUSCULAR; INTRAVENOUS at 06:27

## 2025-07-31 RX ADMIN — FLUOXETINE HYDROCHLORIDE 40 MG: 20 CAPSULE ORAL at 09:06

## 2025-07-31 RX ADMIN — SELADELPAR LYSINE 10 MG: 10 CAPSULE ORAL at 09:11

## 2025-07-31 RX ADMIN — OXYCODONE HYDROCHLORIDE 10 MG: 10 TABLET ORAL at 12:14

## 2025-07-31 RX ADMIN — SENNOSIDES 17.2 MG: 8.6 TABLET, FILM COATED ORAL at 17:50

## 2025-07-31 RX ADMIN — METHYLPREDNISOLONE SODIUM SUCCINATE 40 MG: 40 INJECTION, POWDER, FOR SOLUTION INTRAMUSCULAR; INTRAVENOUS at 11:25

## 2025-07-31 RX ADMIN — HEPARIN SODIUM 5000 UNITS: 5000 INJECTION INTRAVENOUS; SUBCUTANEOUS at 16:26

## 2025-07-31 RX ADMIN — METHOCARBAMOL 500 MG: 500 TABLET ORAL at 21:07

## 2025-07-31 RX ADMIN — KETOROLAC TROMETHAMINE 15 MG: 30 INJECTION, SOLUTION INTRAMUSCULAR; INTRAVENOUS at 11:22

## 2025-07-31 RX ADMIN — METHOCARBAMOL 500 MG: 500 TABLET ORAL at 16:26

## 2025-07-31 RX ADMIN — ONDANSETRON 8 MG: 2 INJECTION INTRAMUSCULAR; INTRAVENOUS at 11:26

## 2025-07-31 RX ADMIN — HEPARIN SODIUM 5000 UNITS: 5000 INJECTION INTRAVENOUS; SUBCUTANEOUS at 06:27

## 2025-07-31 RX ADMIN — URSODIOL 500 MG: 500 TABLET ORAL at 17:54

## 2025-07-31 RX ADMIN — KETOROLAC TROMETHAMINE 15 MG: 30 INJECTION, SOLUTION INTRAMUSCULAR; INTRAVENOUS at 23:51

## 2025-07-31 RX ADMIN — KETOROLAC TROMETHAMINE 15 MG: 30 INJECTION, SOLUTION INTRAMUSCULAR; INTRAVENOUS at 17:53

## 2025-07-31 RX ADMIN — IRON SUCROSE 300 MG: 20 INJECTION, SOLUTION INTRAVENOUS at 12:22

## 2025-07-31 RX ADMIN — LISINOPRIL 30 MG: 20 TABLET ORAL at 09:06

## 2025-07-31 RX ADMIN — SENNOSIDES 17.2 MG: 8.6 TABLET, FILM COATED ORAL at 09:06

## 2025-07-31 RX ADMIN — METHOCARBAMOL 500 MG: 500 TABLET ORAL at 09:06

## 2025-08-01 ENCOUNTER — HOME CARE VISIT (OUTPATIENT)
Dept: HOME HEALTH SERVICES | Facility: HOME HEALTHCARE | Age: 65
End: 2025-08-01

## 2025-08-01 VITALS
HEART RATE: 77 BPM | WEIGHT: 171.96 LBS | HEIGHT: 70 IN | DIASTOLIC BLOOD PRESSURE: 71 MMHG | TEMPERATURE: 98.4 F | RESPIRATION RATE: 18 BRPM | SYSTOLIC BLOOD PRESSURE: 117 MMHG | OXYGEN SATURATION: 96 % | BODY MASS INDEX: 24.62 KG/M2

## 2025-08-01 PROCEDURE — 97530 THERAPEUTIC ACTIVITIES: CPT

## 2025-08-01 PROCEDURE — NC001 PR NO CHARGE

## 2025-08-01 PROCEDURE — 97116 GAIT TRAINING THERAPY: CPT

## 2025-08-01 PROCEDURE — 99239 HOSP IP/OBS DSCHRG MGMT >30: CPT

## 2025-08-01 RX ORDER — HYDROMORPHONE HCL/PF 1 MG/ML
1 SYRINGE (ML) INJECTION ONCE
Status: DISCONTINUED | OUTPATIENT
Start: 2025-08-01 | End: 2025-08-01

## 2025-08-01 RX ORDER — OXYCODONE HYDROCHLORIDE 5 MG/1
10 TABLET ORAL EVERY 6 HOURS PRN
Qty: 48 TABLET | Refills: 0 | Status: SHIPPED | OUTPATIENT
Start: 2025-08-01

## 2025-08-01 RX ORDER — CYCLOBENZAPRINE HCL 10 MG
10 TABLET ORAL 3 TIMES DAILY PRN
Qty: 21 TABLET | Refills: 0 | Status: SHIPPED | OUTPATIENT
Start: 2025-08-01 | End: 2025-08-08

## 2025-08-01 RX ADMIN — HEPARIN SODIUM 5000 UNITS: 5000 INJECTION INTRAVENOUS; SUBCUTANEOUS at 06:26

## 2025-08-01 RX ADMIN — METHOCARBAMOL 500 MG: 500 TABLET ORAL at 12:58

## 2025-08-01 RX ADMIN — POLYETHYLENE GLYCOL 3350 17 G: 17 POWDER, FOR SOLUTION ORAL at 07:56

## 2025-08-01 RX ADMIN — OXYCODONE HYDROCHLORIDE 5 MG: 5 TABLET ORAL at 07:58

## 2025-08-01 RX ADMIN — FLUOXETINE HYDROCHLORIDE 40 MG: 20 CAPSULE ORAL at 07:57

## 2025-08-01 RX ADMIN — KETOROLAC TROMETHAMINE 15 MG: 30 INJECTION, SOLUTION INTRAMUSCULAR; INTRAVENOUS at 12:58

## 2025-08-01 RX ADMIN — URSODIOL 500 MG: 500 TABLET ORAL at 07:58

## 2025-08-01 RX ADMIN — HEPARIN SODIUM 5000 UNITS: 5000 INJECTION INTRAVENOUS; SUBCUTANEOUS at 12:58

## 2025-08-01 RX ADMIN — METHOCARBAMOL 500 MG: 500 TABLET ORAL at 07:57

## 2025-08-01 RX ADMIN — KETOROLAC TROMETHAMINE 15 MG: 30 INJECTION, SOLUTION INTRAMUSCULAR; INTRAVENOUS at 06:26

## 2025-08-01 RX ADMIN — LISINOPRIL 30 MG: 20 TABLET ORAL at 07:57

## 2025-08-01 RX ADMIN — LIDOCAINE 2 PATCH: 50 PATCH CUTANEOUS at 07:56

## 2025-08-01 RX ADMIN — SELADELPAR LYSINE 10 MG: 10 CAPSULE ORAL at 07:58

## 2025-08-01 RX ADMIN — SENNOSIDES 17.2 MG: 8.6 TABLET, FILM COATED ORAL at 07:57

## 2025-08-02 ENCOUNTER — HOME CARE VISIT (OUTPATIENT)
Dept: HOME HEALTH SERVICES | Facility: HOME HEALTHCARE | Age: 65
End: 2025-08-02
Payer: COMMERCIAL

## 2025-08-02 VITALS
DIASTOLIC BLOOD PRESSURE: 82 MMHG | TEMPERATURE: 98 F | HEART RATE: 66 BPM | OXYGEN SATURATION: 99 % | SYSTOLIC BLOOD PRESSURE: 142 MMHG

## 2025-08-02 PROCEDURE — 400013 VN SOC

## 2025-08-02 PROCEDURE — G0151 HHCP-SERV OF PT,EA 15 MIN: HCPCS

## 2025-08-04 ENCOUNTER — HOME CARE VISIT (OUTPATIENT)
Dept: HOME HEALTH SERVICES | Facility: HOME HEALTHCARE | Age: 65
End: 2025-08-04
Payer: COMMERCIAL

## 2025-08-04 ENCOUNTER — PATIENT OUTREACH (OUTPATIENT)
Dept: CASE MANAGEMENT | Facility: OTHER | Age: 65
End: 2025-08-04

## 2025-08-04 ENCOUNTER — TRANSITIONAL CARE MANAGEMENT (OUTPATIENT)
Dept: INTERNAL MEDICINE CLINIC | Facility: CLINIC | Age: 65
End: 2025-08-04

## 2025-08-04 VITALS
OXYGEN SATURATION: 98 % | SYSTOLIC BLOOD PRESSURE: 138 MMHG | DIASTOLIC BLOOD PRESSURE: 82 MMHG | RESPIRATION RATE: 16 BRPM

## 2025-08-04 DIAGNOSIS — Z78.9 NEED FOR FOLLOW-UP BY SOCIAL WORKER: Primary | ICD-10-CM

## 2025-08-04 PROCEDURE — G0151 HHCP-SERV OF PT,EA 15 MIN: HCPCS

## 2025-08-05 ENCOUNTER — PATIENT OUTREACH (OUTPATIENT)
Dept: CASE MANAGEMENT | Facility: OTHER | Age: 65
End: 2025-08-05

## 2025-08-06 ENCOUNTER — HOME CARE VISIT (OUTPATIENT)
Dept: HOME HEALTH SERVICES | Facility: HOME HEALTHCARE | Age: 65
End: 2025-08-06
Payer: COMMERCIAL

## 2025-08-06 PROCEDURE — G0151 HHCP-SERV OF PT,EA 15 MIN: HCPCS

## 2025-08-07 ENCOUNTER — PATIENT OUTREACH (OUTPATIENT)
Dept: CASE MANAGEMENT | Facility: OTHER | Age: 65
End: 2025-08-07

## 2025-08-07 ENCOUNTER — HOME CARE VISIT (OUTPATIENT)
Dept: HOME HEALTH SERVICES | Facility: HOME HEALTHCARE | Age: 65
End: 2025-08-07
Payer: COMMERCIAL

## 2025-08-07 PROCEDURE — G0180 MD CERTIFICATION HHA PATIENT: HCPCS

## 2025-08-07 PROCEDURE — G0152 HHCP-SERV OF OT,EA 15 MIN: HCPCS

## 2025-08-08 VITALS — HEART RATE: 70 BPM | OXYGEN SATURATION: 97 % | SYSTOLIC BLOOD PRESSURE: 130 MMHG | DIASTOLIC BLOOD PRESSURE: 68 MMHG

## 2025-08-11 ENCOUNTER — HOME CARE VISIT (OUTPATIENT)
Dept: HOME HEALTH SERVICES | Facility: HOME HEALTHCARE | Age: 65
End: 2025-08-11
Payer: COMMERCIAL

## 2025-08-11 ENCOUNTER — TELEPHONE (OUTPATIENT)
Dept: OTHER | Facility: OTHER | Age: 65
End: 2025-08-11

## 2025-08-12 ENCOUNTER — HOME CARE VISIT (OUTPATIENT)
Dept: HOME HEALTH SERVICES | Facility: HOME HEALTHCARE | Age: 65
End: 2025-08-12
Payer: COMMERCIAL

## 2025-08-13 ENCOUNTER — HOME CARE VISIT (OUTPATIENT)
Dept: HOME HEALTH SERVICES | Facility: HOME HEALTHCARE | Age: 65
End: 2025-08-13
Payer: COMMERCIAL

## 2025-08-13 ENCOUNTER — PATIENT OUTREACH (OUTPATIENT)
Dept: CASE MANAGEMENT | Facility: OTHER | Age: 65
End: 2025-08-13

## 2025-08-15 ENCOUNTER — TELEPHONE (OUTPATIENT)
Dept: INTERNAL MEDICINE CLINIC | Facility: CLINIC | Age: 65
End: 2025-08-15

## 2025-08-15 ENCOUNTER — OFFICE VISIT (OUTPATIENT)
Dept: INTERNAL MEDICINE CLINIC | Facility: CLINIC | Age: 65
End: 2025-08-15
Payer: COMMERCIAL

## 2025-08-15 VITALS
RESPIRATION RATE: 16 BRPM | HEART RATE: 74 BPM | SYSTOLIC BLOOD PRESSURE: 124 MMHG | HEIGHT: 69 IN | OXYGEN SATURATION: 98 % | DIASTOLIC BLOOD PRESSURE: 86 MMHG | WEIGHT: 164.2 LBS | BODY MASS INDEX: 24.32 KG/M2

## 2025-08-15 DIAGNOSIS — D64.9 ANEMIA, UNSPECIFIED TYPE: ICD-10-CM

## 2025-08-15 DIAGNOSIS — Z13.6 SCREENING FOR CARDIOVASCULAR CONDITION: ICD-10-CM

## 2025-08-15 DIAGNOSIS — S32.009A CLOSED FRACTURE OF LUMBAR VERTEBRA, UNSPECIFIED FRACTURE MORPHOLOGY, UNSPECIFIED LUMBAR VERTEBRAL LEVEL, INITIAL ENCOUNTER (HCC): Primary | ICD-10-CM

## 2025-08-15 PROCEDURE — 99495 TRANSJ CARE MGMT MOD F2F 14D: CPT | Performed by: INTERNAL MEDICINE

## 2025-08-16 ENCOUNTER — HOSPITAL ENCOUNTER (OUTPATIENT)
Dept: RADIOLOGY | Facility: HOSPITAL | Age: 65
Discharge: HOME | End: 2025-08-16
Attending: INTERNAL MEDICINE
Payer: COMMERCIAL

## 2025-08-16 DIAGNOSIS — S32.009A CLOSED FRACTURE OF LUMBAR VERTEBRA, UNSPECIFIED FRACTURE MORPHOLOGY, UNSPECIFIED LUMBAR VERTEBRAL LEVEL, INITIAL ENCOUNTER (HCC): ICD-10-CM

## 2025-08-16 PROCEDURE — 72131 CT LUMBAR SPINE W/O DYE: CPT

## 2025-08-18 ENCOUNTER — HOME CARE VISIT (OUTPATIENT)
Dept: HOME HEALTH SERVICES | Facility: HOME HEALTHCARE | Age: 65
End: 2025-08-18
Payer: COMMERCIAL

## 2025-08-18 PROCEDURE — G0151 HHCP-SERV OF PT,EA 15 MIN: HCPCS

## 2025-08-19 VITALS
OXYGEN SATURATION: 98 % | SYSTOLIC BLOOD PRESSURE: 132 MMHG | RESPIRATION RATE: 16 BRPM | DIASTOLIC BLOOD PRESSURE: 78 MMHG

## 2025-08-22 ENCOUNTER — PATIENT OUTREACH (OUTPATIENT)
Dept: CASE MANAGEMENT | Facility: OTHER | Age: 65
End: 2025-08-22

## (undated) DEVICE — CHLORAPREP HI-LITE 26ML ORANGE

## (undated) DEVICE — SPONGE PVP SCRUB WING STERILE

## (undated) DEVICE — GLOVE INDICATOR PI UNDERGLOVE SZ 8 BLUE

## (undated) DEVICE — BETHLEHEM UNIV TOTAL KNEE, KIT: Brand: CARDINAL HEALTH

## (undated) DEVICE — SUT ETHILON 3-0 FS-1 18 IN 663G

## (undated) DEVICE — PLUMEPEN PRO 10FT

## (undated) DEVICE — CLAMP TOWEL TUBING DISPOSABLE

## (undated) DEVICE — 3M™ IOBAN™ 2 ANTIMICROBIAL INCISE DRAPE 6650EZ: Brand: IOBAN™ 2

## (undated) DEVICE — GLOVE SRG BIOGEL 7.5

## (undated) DEVICE — TRAY FOLEY 16FR URIMETER SILICONE SURESTEP

## (undated) DEVICE — NEEDLE 25G X 1 1/2

## (undated) DEVICE — ACE WRAP 2 IN VELCRO LATEX FREE

## (undated) DEVICE — HEAVY DUTY TABLE COVER: Brand: CONVERTORS

## (undated) DEVICE — LIGACLIP 12 MM ENDOSCOPIC ROTATING MULTIPLE CLIP APPLIER (LARGE): Brand: LIGACLIP

## (undated) DEVICE — DRAPE SHEET X-LG

## (undated) DEVICE — THE ECHELON FLEX POWERED PLUS ARTICULATING ENDOSCOPIC LINEAR CUTTERS ARE STERILE, SINGLE PATIENT USE INSTRUMENTS THAT SIMULTANEOUSLYCUT AND STAPLE TISSUE. THERE ARE SIX STAGGERED ROWS OF STAPLES, THREE ON EITHER SIDE OF THE CUT LINE. THE ECHELON FLEX 45 POWERED PLUSINSTRUMENTS HAVE A STAPLE LINE THAT IS APPROXIMATELY 45 MM LONG AND A CUT LINE THAT IS APPROXIMATELY 42 MM LONG. THE SHAFT CAN ROTATE FREELYIN BOTH DIRECTIONS AND AN ARTICULATION MECHANISM ENABLES THE DISTAL PORTION OF THE SHAFT TO PIVOT TO FACILITATE LATERAL ACCESS TO THE OPERATIVESITE.THE INSTRUMENTS ARE PACKAGED WITH A PRIMARY LITHIUM BATTERY PACK THAT MUST BE INSTALLED PRIOR TO USE. THERE ARE SPECIFIC REQUIREMENTS FORDISPOSING OF THE BATTERY PACK. REFER TO THE BATTERY PACK DISPOSAL SECTION.THE INSTRUMENTS ARE PACKAGED WITHOUT A RELOAD AND MUST BE LOADED PRIOR TO USE. A STAPLE RETAINING CAP ON THE RELOAD PROTECTS THE STAPLE LEGPOINTS DURING SHIPPING AND TRANSPORTATION. THE INSTRUMENTS’ LOCK-OUT FEATURE IS DESIGNED TO PREVENT A USED OR IMPROPERLY INSTALLED RELOADFROM BEING REFIRED OR AN INSTRUMENT FROM BEING FIRED WITHOUT A RELOAD.: Brand: ECHELON FLEX

## (undated) DEVICE — OCCLUSIVE GAUZE STRIP,3% BISMUTH TRIBROMOPHENATE IN PETROLATUM BLEND: Brand: XEROFORM

## (undated) DEVICE — JP 3-SPRING RES W/10FR PVC DRAIN/TR: Brand: CARDINAL HEALTH

## (undated) DEVICE — INSUFFLATION NEEDLE TO ESTABLISH PNEUMOPERITONEUM.: Brand: INSUFFLATION NEEDLE

## (undated) DEVICE — STRETCH BANDAGE: Brand: CURITY

## (undated) DEVICE — SURGIFLO ENDOSCOPIC APPICATOR: Brand: ETHICON

## (undated) DEVICE — SUT VICRYL PLUS 1 CTB-1 36 IN VCPB947H

## (undated) DEVICE — SPINNING CEMENT MIXING BOWL

## (undated) DEVICE — THE ECHELON, ECHELON ENDOPATH™ AND ECHELON FLEX™ FAMILIES OF ENDOSCOPIC LINEAR CUTTERS AND RELOADS ARE STERILE, SINGLE PATIENT USE INSTRUMENTS THAT SIMULTANEOUSLY CUT AND STAPLE TISSUE. THERE ARE SIX STAGGERED ROWS OF STAPLES, THREE ON EITHER SIDE OF THE CUT LINE. THE 45 MM INSTRUMENTS HAVE A STAPLE LINE THATIS APPROXIMATELY 45 MM LONG AND A CUT LINE THAT IS APPROXIMATELY 42 MM LONG. THE SHAFT CAN ROTATE FREELY IN BOTH DIRECTIONS AND AN ARTICULATION MECHANISM ON ARTICULATING INSTRUMENTS ENABLES BENDING THE DISTAL PORTIONOF THE SHAFT TO FACILITATE LATERAL ACCESS OF THE OPERATIVE SITE.THE INSTRUMENTS ARE SHIPPED WITHOUT A RELOAD AND MUST BE LOADED PRIOR TO USE. A STAPLE RETAINING CAP ON THE RELOAD PROTECTS THE STAPLE LEG POINTS DURING SHIPPING AND TRANSPORTATION. THE INSTRUMENTS’ LOCK-OUT FEATURE IS DESIGNED TO PREVENT A USED RELOAD FROM BEING REFIRED.: Brand: ECHELON ENDOPATH

## (undated) DEVICE — 2000CC GUARDIAN II: Brand: GUARDIAN

## (undated) DEVICE — SILVER-COATED ANTIMICROBIAL BARRIER DRESSING: Brand: ACTICOAT   4" X 8"

## (undated) DEVICE — SPONGE GAUZE 4 X 9

## (undated) DEVICE — TROCAR: Brand: KII FIOS FIRST ENTRY

## (undated) DEVICE — SUT PDS II 0 CT-2 27 IN Z334H

## (undated) DEVICE — SKIN MARKER DUAL TIP WITH RULER CAP, FLEXIBLE RULER AND LABELS: Brand: DEVON

## (undated) DEVICE — SUT PROLENE 4-0 PS-2 18 IN 8682G

## (undated) DEVICE — BETHLEHEM MAJOR GENERAL PACK: Brand: CARDINAL HEALTH

## (undated) DEVICE — TISSUE RETRIEVAL SYSTEM: Brand: INZII RETRIEVAL SYSTEM

## (undated) DEVICE — TROCAR: Brand: KII SLEEVE

## (undated) DEVICE — SYRINGE 20ML LL

## (undated) DEVICE — HOOD: Brand: FLYTE, SURGICOOL

## (undated) DEVICE — SUT MONOCRYL 4-0 PS-2 27 IN Y426H

## (undated) DEVICE — DISPOSABLE EQUIPMENT COVER: Brand: SMALL TOWEL DRAPE

## (undated) DEVICE — INTENDED FOR TISSUE SEPARATION, AND OTHER PROCEDURES THAT REQUIRE A SHARP SURGICAL BLADE TO PUNCTURE OR CUT.: Brand: BARD-PARKER SAFETY BLADES SIZE 11, STERILE

## (undated) DEVICE — HEMOSTATIC MATRIX SURGIFLO 8ML W/THROMBIN

## (undated) DEVICE — SYRINGE 10ML LL

## (undated) DEVICE — CAPIT KNEE ATTUNE FB CEMENT - DEPUY

## (undated) DEVICE — SUT PDS II 2-0 CT-1 27 IN Z339H

## (undated) DEVICE — CUFF TOURNIQUET 30 X 4 IN QUICK CONNECT DISP 1BLA

## (undated) DEVICE — SURGICEL 4 X 8

## (undated) DEVICE — CUFF TOURNIQUET 18 X 4 IN QUICK CONNECT DISP 1 BLADDER

## (undated) DEVICE — ADHESIVE SKIN HIGH VISCOSITY EXOFIN 1ML

## (undated) DEVICE — NO-SCRATCH ™ SMALL WHITNEY CURETTE ™ IS A SINGLE-USE, PLASTIC CURETTE FOR QUICKLY APPLYING, MANIPULATING AND REMOVING BONE CEMENT DURING HIP AND KNEE REPLACEMENT SURGERY. THE PLASTIC IS SOFTER THAN STEEL INSTRUMENTS, REDUCING THE RISK OF DAMAGING THE PROSTHESIS WITH METAL INSTRUMENTS.  THE CURETTE’S 6MM TIP REMOVES EXCESS CEMENT FROM REPLACEMENT HIPS AND KNEES. EASY-TO-MANEUVER, THE SMALL BLUE CURETTE LETS YOU REMOVE CEMENT FROM ALL EDGES OF THE PROSTHESIS.NO-SCRATCH WHITNEY SMALL CURETTE FEATURES:SAFER THAN STEEL- MADE OF PLASTIC - STURDY YET SOFTER THAN SURGICAL STEEL.HANDIER- EACH TOOL HAS A MOLDED-IN THUMB INDENTATION INSTANTLY ORIENTING THE TOOL.- EASIER TO MANEUVER IN HARD TO SEE PLACES.- COLOR-CODED FOR EASY IDENTIFICATION.FASTER- COMES INDIVIDUALLY PACKAGED IN STERILE, PEEL OPEN POUCH, READY TO GO.- APPLIES, MANIPULATES, OR REMOVES CEMENT WITH FINGERTIP PRECISION.ECONOMICAL- THE COST OF A SINGLE REVISION DWARFS THE COST OF A SINGLE-USE CURETTE. - DISPOSABLE – THERE’S NO NEED TO WASTE TIME REMOVING HARDENED CEMENT OR RE-STERILIZING TOOLS.- LESS EXPENSIVE TO BUY AND INVENTORY - ORDER ONLY THE TOOL YOU USE.- PACKAGED 25 INDIVIDUALLY WRAPPED TOOLS TO A CARTON FOR CONVENIENT SHELF STORAGE.: Brand: WHITNEY NO-SCRATCH CURETTE (SMALL)

## (undated) DEVICE — GAUZE SPONGES,16 PLY: Brand: CURITY

## (undated) DEVICE — VISUALIZATION SYSTEM: Brand: CLEARIFY

## (undated) DEVICE — ABDOMINAL PAD: Brand: DERMACEA

## (undated) DEVICE — TROCAR SITE CLOSURE DEVICE: Brand: ENDO CLOSE

## (undated) DEVICE — TROCAR PORT ACCESS 12 X120MM W/BLDLS OPTICAL TIP AIRSEAL

## (undated) DEVICE — SUT PDS II 1 CTX 36 IN Z371T

## (undated) DEVICE — INSUFLATION TUBING INSUFLOW (LEXION)

## (undated) DEVICE — 3000CC GUARDIAN II: Brand: GUARDIAN

## (undated) DEVICE — IRRIG ENDO FLO TUBING

## (undated) DEVICE — INTENDED FOR TISSUE SEPARATION, AND OTHER PROCEDURES THAT REQUIRE A SHARP SURGICAL BLADE TO PUNCTURE OR CUT.: Brand: BARD-PARKER SAFETY BLADES SIZE 15, STERILE

## (undated) DEVICE — STOCKINETTE REGULAR

## (undated) DEVICE — ACCESS PLATFORM FOR MINIMALLY INVASIVE SURGERY.: Brand: GELPORT® LAPAROSCOPIC  SYSTEM

## (undated) DEVICE — STERILE BETHLEHEM PLASTIC HAND: Brand: CARDINAL HEALTH

## (undated) DEVICE — SUT VICRYL PLUS 2-0 CTB-1 27 IN VCPB259H

## (undated) DEVICE — REM POLYHESIVE ADULT PATIENT RETURN ELECTRODE: Brand: VALLEYLAB

## (undated) DEVICE — AIRSEAL TUBE SMOKE EVAC LUMENX3 FILTERED

## (undated) DEVICE — GLOVE INDICATOR PI UNDERGLOVE SZ 8.5 BLUE

## (undated) DEVICE — PADDING CAST 4 IN  COTTON STRL

## (undated) DEVICE — RECIPROCATING BLADE, DOUBLE SIDED (70.0 X 0.96 X 12.5MM)

## (undated) DEVICE — BANDAGE ACE VELCRO 6 IN LF

## (undated) DEVICE — HEM-O-LOK CLIP CARTRIDGE LARGE DA VINCI SI/XI

## (undated) DEVICE — INTENDED FOR TISSUE SEPARATION, AND OTHER PROCEDURES THAT REQUIRE A SHARP SURGICAL BLADE TO PUNCTURE OR CUT.: Brand: BARD-PARKER SAFETY BLADES SIZE 10, STERILE

## (undated) DEVICE — SUT VLOC 90 3-0 V-20 9IN VLOCM0644

## (undated) DEVICE — THE SIMPULSE SOLO SYSTEM WITH ULTREX RETRACTABLE SPLASH SHIELD TIP: Brand: SIMPULSE SOLO

## (undated) DEVICE — ACE WRAP 6 IN STERILE

## (undated) DEVICE — ANTI-FOG SOLUTION WITH FOAM PAD: Brand: DEVON

## (undated) DEVICE — HARMONIC ACE 5MM DIAMETER SHEARS 36CM SHAFT LENGTH + ADAPTIVE TISSUE TECHNOLOGY FOR USE WITH GENERATOR G11: Brand: HARMONIC ACE

## (undated) DEVICE — DUAL CUT SAGITTAL BLADE

## (undated) DEVICE — COOL TEMP PAD